# Patient Record
Sex: FEMALE | Race: WHITE | NOT HISPANIC OR LATINO | Employment: OTHER | URBAN - METROPOLITAN AREA
[De-identification: names, ages, dates, MRNs, and addresses within clinical notes are randomized per-mention and may not be internally consistent; named-entity substitution may affect disease eponyms.]

---

## 2017-01-04 ENCOUNTER — HOSPITAL ENCOUNTER (OUTPATIENT)
Dept: PHYSICAL THERAPY | Facility: HOSPITAL | Age: 82
Setting detail: RECURRING SERIES
Discharge: HOME | End: 2017-02-08
Attending: FAMILY MEDICINE | Admitting: FAMILY MEDICINE

## 2017-01-12 ENCOUNTER — HOSPITAL ENCOUNTER (OUTPATIENT)
Dept: PAIN MEDICINE | Facility: HOSPITAL | Age: 82
Discharge: HOME | End: 2017-01-12
Attending: ANESTHESIOLOGY | Admitting: ANESTHESIOLOGY

## 2017-02-02 ENCOUNTER — HOSPITAL ENCOUNTER (OUTPATIENT)
Dept: PAIN MEDICINE | Facility: HOSPITAL | Age: 82
Discharge: HOME | End: 2017-02-02
Attending: ANESTHESIOLOGY | Admitting: ANESTHESIOLOGY

## 2017-02-07 ENCOUNTER — HOSPITAL ENCOUNTER (OUTPATIENT)
Dept: PAIN MEDICINE | Facility: HOSPITAL | Age: 82
Discharge: HOME | End: 2017-02-07
Attending: ANESTHESIOLOGY | Admitting: ANESTHESIOLOGY

## 2017-02-16 ENCOUNTER — HOSPITAL ENCOUNTER (OUTPATIENT)
Dept: PAIN MEDICINE | Facility: HOSPITAL | Age: 82
Discharge: HOME | End: 2017-02-16
Attending: ANESTHESIOLOGY | Admitting: ANESTHESIOLOGY

## 2017-05-18 ENCOUNTER — HOSPITAL ENCOUNTER (OUTPATIENT)
Dept: PAIN MEDICINE | Facility: HOSPITAL | Age: 82
Discharge: HOME | End: 2017-05-18
Attending: ANESTHESIOLOGY | Admitting: ANESTHESIOLOGY

## 2017-08-23 ENCOUNTER — HOSPITAL ENCOUNTER (OUTPATIENT)
Dept: FAMILY MEDICINE CLINIC | Facility: CLINIC | Age: 82
Setting detail: SPECIMEN
Discharge: HOME | End: 2017-08-23
Attending: FAMILY MEDICINE | Admitting: FAMILY MEDICINE

## 2017-08-24 ENCOUNTER — HOSPITAL ENCOUNTER (OUTPATIENT)
Dept: LAB | Facility: HOSPITAL | Age: 82
Discharge: HOME | End: 2017-08-24
Attending: FAMILY MEDICINE | Admitting: FAMILY MEDICINE

## 2017-08-24 LAB
BASOPHILS # BLD AUTO: 0.1 10*3/UL (ref 0–0.2)
BASOPHILS NFR BLD AUTO: 2 % (ref 0–2)
DIFFERENTIAL METHOD BLD: (no result)
EOSINOPHIL # BLD AUTO: 0.3 10*3/UL (ref 0–0.3)
EOSINOPHIL # BLD AUTO: 4 % (ref 0–3)
ERYTHROCYTE [DISTWIDTH] IN BLOOD BY AUTOMATED COUNT: 13.9 % (ref 11.5–14.5)
HCT VFR BLD AUTO: 40.6 % (ref 35–49)
HGB BLD-MCNC: 13.7 G/DL (ref 12–15)
LYMPHOCYTES # BLD AUTO: 2.1 10*3/UL (ref 0.8–4.8)
LYMPHOCYTES NFR BLD AUTO: 29 % (ref 18–42)
MCH RBC QN AUTO: 31 PG (ref 26–32)
MCHC RBC AUTO-ENTMCNC: 33.8 G/DL (ref 32–36)
MCV RBC AUTO: 91.8 FL (ref 80–94)
MONOCYTES # BLD AUTO: 1.1 10*3/UL (ref 0.1–1.3)
MONOCYTES NFR BLD AUTO: 15 % (ref 2–11)
NEUTROPHILS # BLD AUTO: 3.7 10*3/UL (ref 2.3–8.6)
NEUTROPHILS NFR BLD AUTO: 50 % (ref 50–75)
NRBC BLD AUTO-RTO: 0 /100{WBCS}
NRBC/RBC NFR BLD MANUAL: 0 10*3/UL
PLATELET # BLD AUTO: 186 10*3/UL (ref 150–450)
PMV BLD AUTO: 8.6 FL (ref 7.4–10.4)
RBC # BLD AUTO: 4.43 10*6/UL (ref 4–5.4)
WBC # BLD AUTO: 7.3 10*3/UL (ref 4.5–11.5)

## 2018-02-19 ENCOUNTER — HOSPITAL ENCOUNTER (OUTPATIENT)
Dept: FAMILY MEDICINE CLINIC | Facility: CLINIC | Age: 83
Setting detail: SPECIMEN
Discharge: HOME | End: 2018-02-19
Attending: FAMILY MEDICINE | Admitting: FAMILY MEDICINE

## 2018-02-19 LAB
ALBUMIN SERPL-MCNC: 3.9 G/DL (ref 3.5–4.8)
ALBUMIN/GLOB SERPL: 1.5 {RATIO} (ref 1–1.7)
ALP SERPL-CCNC: 54 IU/L (ref 32–91)
ALT SERPL-CCNC: 20 IU/L (ref 14–54)
ANION GAP SERPL CALC-SCNC: 9.3 MMOL/L (ref 10–20)
AST SERPL-CCNC: 20 IU/L (ref 15–41)
BASOPHILS # BLD AUTO: 0.1 10*3/UL (ref 0–0.2)
BASOPHILS NFR BLD AUTO: 1 % (ref 0–2)
BILIRUB SERPL-MCNC: 0.8 MG/DL (ref 0.3–1.2)
BUN SERPL-MCNC: 10 MG/DL (ref 8–20)
BUN/CREAT SERPL: 12.5 (ref 5.4–26.2)
CALCIUM SERPL-MCNC: 9.1 MG/DL (ref 8.9–10.3)
CHLORIDE SERPL-SCNC: 100 MMOL/L (ref 101–111)
CONV CO2: 31 MMOL/L (ref 22–32)
CONV TOTAL PROTEIN: 6.5 G/DL (ref 6.1–7.9)
CREAT UR-MCNC: 0.8 MG/DL (ref 0.4–1)
DIFFERENTIAL METHOD BLD: (no result)
EOSINOPHIL # BLD AUTO: 0.5 10*3/UL (ref 0–0.3)
EOSINOPHIL # BLD AUTO: 5 % (ref 0–3)
ERYTHROCYTE [DISTWIDTH] IN BLOOD BY AUTOMATED COUNT: 13.6 % (ref 11.5–14.5)
GLOBULIN UR ELPH-MCNC: 2.6 G/DL (ref 2.5–3.8)
GLUCOSE SERPL-MCNC: 90 MG/DL (ref 65–99)
HCT VFR BLD AUTO: 39.2 % (ref 35–49)
HGB BLD-MCNC: 13.4 G/DL (ref 12–15)
LYMPHOCYTES # BLD AUTO: 2 10*3/UL (ref 0.8–4.8)
LYMPHOCYTES NFR BLD AUTO: 23 % (ref 18–42)
MCH RBC QN AUTO: 32.1 PG (ref 26–32)
MCHC RBC AUTO-ENTMCNC: 34.3 G/DL (ref 32–36)
MCV RBC AUTO: 93.6 FL (ref 80–94)
MONOCYTES # BLD AUTO: 1.2 10*3/UL (ref 0.1–1.3)
MONOCYTES NFR BLD AUTO: 14 % (ref 2–11)
NEUTROPHILS # BLD AUTO: 4.9 10*3/UL (ref 2.3–8.6)
NEUTROPHILS NFR BLD AUTO: 57 % (ref 50–75)
NRBC BLD AUTO-RTO: 0 /100{WBCS}
NRBC/RBC NFR BLD MANUAL: 0 10*3/UL
PLATELET # BLD AUTO: 206 10*3/UL (ref 150–450)
PMV BLD AUTO: 8.9 FL (ref 7.4–10.4)
POTASSIUM SERPL-SCNC: 4.3 MMOL/L (ref 3.6–5.1)
RBC # BLD AUTO: 4.19 10*6/UL (ref 4–5.4)
SODIUM SERPL-SCNC: 136 MMOL/L (ref 136–144)
WBC # BLD AUTO: 8.7 10*3/UL (ref 4.5–11.5)

## 2018-07-05 ENCOUNTER — HOSPITAL ENCOUNTER (OUTPATIENT)
Dept: URGENT CARE | Facility: CLINIC | Age: 83
Discharge: HOME | End: 2018-07-05
Attending: FAMILY MEDICINE | Admitting: FAMILY MEDICINE

## 2018-08-22 ENCOUNTER — HOSPITAL ENCOUNTER (OUTPATIENT)
Dept: FAMILY MEDICINE CLINIC | Facility: CLINIC | Age: 83
Setting detail: SPECIMEN
Discharge: HOME | End: 2018-08-22
Attending: FAMILY MEDICINE | Admitting: FAMILY MEDICINE

## 2018-08-22 LAB
ANION GAP SERPL CALC-SCNC: 8.9 MMOL/L (ref 10–20)
BUN SERPL-MCNC: 11 MG/DL (ref 8–20)
BUN/CREAT SERPL: 15.7 (ref 5.4–26.2)
CALCIUM SERPL-MCNC: 9.1 MG/DL (ref 8.9–10.3)
CHLORIDE SERPL-SCNC: 98 MMOL/L (ref 101–111)
CONV CO2: 30 MMOL/L (ref 22–32)
CREAT UR-MCNC: 0.7 MG/DL (ref 0.4–1)
GLUCOSE SERPL-MCNC: 93 MG/DL (ref 65–99)
POTASSIUM SERPL-SCNC: 3.9 MMOL/L (ref 3.6–5.1)
SODIUM SERPL-SCNC: 133 MMOL/L (ref 136–144)

## 2018-09-04 ENCOUNTER — HOSPITAL ENCOUNTER (OUTPATIENT)
Dept: PHYSICAL THERAPY | Facility: HOSPITAL | Age: 83
Setting detail: RECURRING SERIES
Discharge: HOME | End: 2018-09-28
Attending: NURSE PRACTITIONER | Admitting: NURSE PRACTITIONER

## 2019-02-18 ENCOUNTER — HOSPITAL ENCOUNTER (OUTPATIENT)
Dept: FAMILY MEDICINE CLINIC | Facility: CLINIC | Age: 84
Setting detail: SPECIMEN
Discharge: HOME | End: 2019-02-18
Attending: FAMILY MEDICINE | Admitting: FAMILY MEDICINE

## 2019-02-18 LAB
ANION GAP SERPL CALC-SCNC: 11.9 MMOL/L (ref 10–20)
BUN SERPL-MCNC: 12 MG/DL (ref 8–20)
BUN/CREAT SERPL: 15 (ref 5.4–26.2)
CALCIUM SERPL-MCNC: 9 MG/DL (ref 8.9–10.3)
CHLORIDE SERPL-SCNC: 100 MMOL/L (ref 101–111)
CONV CO2: 29 MMOL/L (ref 22–32)
CREAT UR-MCNC: 0.8 MG/DL (ref 0.4–1)
GLUCOSE SERPL-MCNC: 90 MG/DL (ref 65–99)
POTASSIUM SERPL-SCNC: 3.9 MMOL/L (ref 3.6–5.1)
SODIUM SERPL-SCNC: 137 MMOL/L (ref 136–144)

## 2019-04-30 ENCOUNTER — CONVERSION ENCOUNTER (OUTPATIENT)
Dept: CARDIOLOGY | Facility: CLINIC | Age: 84
End: 2019-04-30

## 2019-06-03 VITALS
DIASTOLIC BLOOD PRESSURE: 80 MMHG | WEIGHT: 139 LBS | SYSTOLIC BLOOD PRESSURE: 155 MMHG | BODY MASS INDEX: 22.43 KG/M2 | HEART RATE: 61 BPM

## 2019-06-04 ENCOUNTER — CONVERSION ENCOUNTER (OUTPATIENT)
Dept: CARDIOLOGY | Facility: CLINIC | Age: 84
End: 2019-06-04

## 2019-06-04 VITALS
SYSTOLIC BLOOD PRESSURE: 147 MMHG | DIASTOLIC BLOOD PRESSURE: 81 MMHG | HEART RATE: 68 BPM | BODY MASS INDEX: 22.43 KG/M2 | WEIGHT: 139 LBS

## 2019-06-07 NOTE — PROCEDURES
Vital Signs:    Patient Profile:    89 Years Old Female  Height:     66 inches (167.64 cm)  Weight:     139 pounds  (Measured Weight:  139 lbs.  oz.)  BMI:        22.52  Pulse rate: 68 / minute    BP sittin / 81  (left arm)    Vitals Entered By: Sandy Dean RN (2019 11:51 AM)    Allergies:   CODEINE (Critical)  PENICILLIN (PENICILLIN V POTASSIUM) (Critical)  * LATEX (Critical)        Anticoagulation Management History:       The patient is on coumadin and comes in today for a routine follow up visit.  The patient is on Coumadin for chronic atrial fibrillation.  Plans are to keep the patient on coumadin for life.  Her last INR was 2.3 and today's INR is 2.8.      Anticoagulation Management Assessment/Plan:       The patient's current anticoagulation dose is Warfarin sodium 4 mg tablet: TAKE 1 TABLET BY MOUTH ON MONDAY AND THURSDAY, AND 2MG ALL OTHER DAYS.  The target INR is 2.0-3.0.  Anticoagulation instructions were given to patient.  She was notified by cj.        Prior Anticoagulation Instructions:  Continue current dosage and recheck on 6/3/2019 at 11:00 am    Current Anticoagulation Instructions:  Continue current dosage and recheck on 7/10/2019 at 11:15 am        Electronically signed by Sandy Dean RN on 2019 at 12:02 PM  ________________________________________________________________________       Disclaimer: Converted Note message may not contain all data elements that existed in the legacy source system. Please see [employer] for the original note details.

## 2019-07-10 ENCOUNTER — ANTICOAGULATION VISIT (OUTPATIENT)
Dept: CARDIOLOGY | Facility: CLINIC | Age: 84
End: 2019-07-10

## 2019-07-10 VITALS
HEART RATE: 63 BPM | SYSTOLIC BLOOD PRESSURE: 149 MMHG | BODY MASS INDEX: 22.19 KG/M2 | DIASTOLIC BLOOD PRESSURE: 91 MMHG | WEIGHT: 137.5 LBS

## 2019-07-10 DIAGNOSIS — I48.20 CHRONIC ATRIAL FIBRILLATION (HCC): ICD-10-CM

## 2019-07-10 DIAGNOSIS — Z79.01 LONG TERM (CURRENT) USE OF ANTICOAGULANTS: ICD-10-CM

## 2019-07-10 PROBLEM — I48.91 ATRIAL FIBRILLATION: Status: ACTIVE | Noted: 2019-07-10

## 2019-07-10 LAB — INR PPP: 2.8 (ref 2–3)

## 2019-07-10 PROCEDURE — 36416 COLLJ CAPILLARY BLOOD SPEC: CPT | Performed by: INTERNAL MEDICINE

## 2019-07-10 PROCEDURE — 85610 PROTHROMBIN TIME: CPT | Performed by: INTERNAL MEDICINE

## 2019-07-19 ENCOUNTER — APPOINTMENT (OUTPATIENT)
Dept: GENERAL RADIOLOGY | Facility: HOSPITAL | Age: 84
End: 2019-07-19

## 2019-07-19 ENCOUNTER — HOSPITAL ENCOUNTER (INPATIENT)
Facility: HOSPITAL | Age: 84
LOS: 4 days | Discharge: NURSING, REHAB OR LONG-TERM CARE | End: 2019-07-23
Attending: EMERGENCY MEDICINE | Admitting: FAMILY MEDICINE

## 2019-07-19 DIAGNOSIS — S72.141A CLOSED 2-PART INTERTROCHANTERIC FRACTURE OF RIGHT FEMUR, INITIAL ENCOUNTER (HCC): Primary | ICD-10-CM

## 2019-07-19 DIAGNOSIS — R79.1 ELEVATED INR: ICD-10-CM

## 2019-07-19 DIAGNOSIS — S72.144A NONDISPLACED INTERTROCHANTERIC FRACTURE OF RIGHT FEMUR, INITIAL ENCOUNTER FOR CLOSED FRACTURE (HCC): ICD-10-CM

## 2019-07-19 DIAGNOSIS — S40.021A CONTUSION OF RIGHT UPPER ARM, INITIAL ENCOUNTER: ICD-10-CM
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
## 2019-07-19 LAB
ANION GAP SERPL CALCULATED.3IONS-SCNC: 15 MMOL/L (ref 5–15)
APTT PPP: 32.6 SECONDS (ref 24–31)
BASOPHILS # BLD MANUAL: 0.13 10*3/MM3 (ref 0–0.2)
BASOPHILS NFR BLD AUTO: 1 % (ref 0–1.5)
BUN BLD-MCNC: 11 MG/DL (ref 8–20)
BUN/CREAT SERPL: 18.3 (ref 5.4–26.2)
CALCIUM SPEC-SCNC: 8.7 MG/DL (ref 8.9–10.3)
CHLORIDE SERPL-SCNC: 102 MMOL/L (ref 101–111)
CO2 SERPL-SCNC: 22 MMOL/L (ref 22–32)
CREAT BLD-MCNC: 0.6 MG/DL (ref 0.4–1)
DEPRECATED RDW RBC AUTO: 46.8 FL (ref 37–54)
ELLIPTOCYTES BLD QL SMEAR: ABNORMAL
EOSINOPHIL # BLD MANUAL: 0.4 10*3/MM3 (ref 0–0.4)
EOSINOPHIL NFR BLD MANUAL: 3 % (ref 0.3–6.2)
ERYTHROCYTE [DISTWIDTH] IN BLOOD BY AUTOMATED COUNT: 13.9 % (ref 12.3–15.4)
GFR SERPL CREATININE-BSD FRML MDRD: 94 ML/MIN/1.73
GLUCOSE BLD-MCNC: 113 MG/DL (ref 65–99)
HCT VFR BLD AUTO: 42 % (ref 34–46.6)
HGB BLD-MCNC: 13.8 G/DL (ref 12–15.9)
INR PPP: 3.58 (ref 2–3)
LYMPHOCYTES # BLD MANUAL: 0.93 10*3/MM3 (ref 0.7–3.1)
LYMPHOCYTES NFR BLD MANUAL: 7 % (ref 19.6–45.3)
LYMPHOCYTES NFR BLD MANUAL: 8 % (ref 5–12)
MCH RBC QN AUTO: 31.7 PG (ref 26.6–33)
MCHC RBC AUTO-ENTMCNC: 32.9 G/DL (ref 31.5–35.7)
MCV RBC AUTO: 96.4 FL (ref 79–97)
METAMYELOCYTES NFR BLD MANUAL: 1 % (ref 0–0)
MONOCYTES # BLD AUTO: 1.06 10*3/MM3 (ref 0.1–0.9)
MYELOCYTES NFR BLD MANUAL: 1 % (ref 0–0)
NEUTROPHILS # BLD AUTO: 10.37 10*3/MM3 (ref 1.7–7)
NEUTROPHILS NFR BLD MANUAL: 73 % (ref 42.7–76)
NEUTS BAND NFR BLD MANUAL: 5 % (ref 0–5)
PLAT MORPH BLD: NORMAL
PLATELET # BLD AUTO: 188 10*3/MM3 (ref 140–450)
PMV BLD AUTO: 8.6 FL (ref 6–12)
POTASSIUM BLD-SCNC: 4 MMOL/L (ref 3.6–5.1)
PROTHROMBIN TIME: 34.7 SECONDS (ref 19.4–28.5)
RBC # BLD AUTO: 4.36 10*6/MM3 (ref 3.77–5.28)
SCAN SLIDE: NORMAL
SODIUM BLD-SCNC: 135 MMOL/L (ref 136–144)
VARIANT LYMPHS NFR BLD MANUAL: 1 % (ref 0–5)
WBC MORPH BLD: NORMAL
WBC NRBC COR # BLD: 13.3 10*3/MM3 (ref 3.4–10.8)

## 2019-07-19 PROCEDURE — 71045 X-RAY EXAM CHEST 1 VIEW: CPT

## 2019-07-19 PROCEDURE — 73060 X-RAY EXAM OF HUMERUS: CPT

## 2019-07-19 PROCEDURE — 80048 BASIC METABOLIC PNL TOTAL CA: CPT | Performed by: EMERGENCY MEDICINE

## 2019-07-19 PROCEDURE — 25010000002 MORPHINE PER 10 MG: Performed by: EMERGENCY MEDICINE

## 2019-07-19 PROCEDURE — 85007 BL SMEAR W/DIFF WBC COUNT: CPT | Performed by: EMERGENCY MEDICINE

## 2019-07-19 PROCEDURE — 99223 1ST HOSP IP/OBS HIGH 75: CPT | Performed by: PHYSICIAN ASSISTANT

## 2019-07-19 PROCEDURE — 85025 COMPLETE CBC W/AUTO DIFF WBC: CPT | Performed by: EMERGENCY MEDICINE

## 2019-07-19 PROCEDURE — 25010000002 VITAMIN K1 PER 1 MG: Performed by: ORTHOPAEDIC SURGERY

## 2019-07-19 PROCEDURE — 73502 X-RAY EXAM HIP UNI 2-3 VIEWS: CPT

## 2019-07-19 PROCEDURE — 85610 PROTHROMBIN TIME: CPT | Performed by: EMERGENCY MEDICINE

## 2019-07-19 PROCEDURE — 99284 EMERGENCY DEPT VISIT MOD MDM: CPT

## 2019-07-19 PROCEDURE — 25010000002 ONDANSETRON PER 1 MG: Performed by: EMERGENCY MEDICINE

## 2019-07-19 PROCEDURE — 85730 THROMBOPLASTIN TIME PARTIAL: CPT | Performed by: EMERGENCY MEDICINE

## 2019-07-19 RX ORDER — PHYTONADIONE 2 MG/ML
5 INJECTION, EMULSION INTRAMUSCULAR; INTRAVENOUS; SUBCUTANEOUS ONCE
Status: COMPLETED | OUTPATIENT
Start: 2019-07-19 | End: 2019-07-19

## 2019-07-19 RX ORDER — LATANOPROST 50 UG/ML
1 SOLUTION/ DROPS OPHTHALMIC NIGHTLY
COMMUNITY

## 2019-07-19 RX ORDER — MORPHINE SULFATE 4 MG/ML
2 INJECTION, SOLUTION INTRAMUSCULAR; INTRAVENOUS ONCE
Status: COMPLETED | OUTPATIENT
Start: 2019-07-19 | End: 2019-07-19

## 2019-07-19 RX ORDER — ONDANSETRON 2 MG/ML
4 INJECTION INTRAMUSCULAR; INTRAVENOUS ONCE
Status: COMPLETED | OUTPATIENT
Start: 2019-07-19 | End: 2019-07-19

## 2019-07-19 RX ORDER — WARFARIN SODIUM 4 MG/1
4 TABLET ORAL 2 TIMES WEEKLY
COMMUNITY
End: 2019-11-01 | Stop reason: DRUGHIGH

## 2019-07-19 RX ORDER — LANSOPRAZOLE 30 MG/1
30 CAPSULE, DELAYED RELEASE ORAL DAILY PRN
COMMUNITY
End: 2020-03-02

## 2019-07-19 RX ORDER — DIGOXIN 125 MCG
125 TABLET ORAL DAILY
COMMUNITY
Start: 2019-05-15 | End: 2020-04-07 | Stop reason: SDUPTHER

## 2019-07-19 RX ORDER — ASCORBIC ACID 500 MG
1 TABLET ORAL DAILY
COMMUNITY

## 2019-07-19 RX ORDER — OMEGA-3S/DHA/EPA/FISH OIL/D3 300MG-1000
1 CAPSULE ORAL DAILY
COMMUNITY

## 2019-07-19 RX ORDER — WARFARIN SODIUM 2 MG/1
2 TABLET ORAL
COMMUNITY
End: 2019-11-01 | Stop reason: SDUPTHER

## 2019-07-19 RX ORDER — BIOTIN 1 MG
1000 TABLET ORAL EVERY 24 HOURS
COMMUNITY
Start: 2018-02-01 | End: 2019-07-23 | Stop reason: HOSPADM

## 2019-07-19 RX ORDER — METOPROLOL SUCCINATE 25 MG/1
25 TABLET, EXTENDED RELEASE ORAL 2 TIMES DAILY
Refills: 1 | COMMUNITY
Start: 2019-05-29 | End: 2020-01-21

## 2019-07-19 RX ORDER — MAGNESIUM GLUCONATE 27 MG(500)
1 TABLET ORAL 2 TIMES DAILY
COMMUNITY

## 2019-07-19 RX ORDER — SODIUM CHLORIDE 0.9 % (FLUSH) 0.9 %
10 SYRINGE (ML) INJECTION AS NEEDED
Status: DISCONTINUED | OUTPATIENT
Start: 2019-07-19 | End: 2019-07-23 | Stop reason: HOSPADM

## 2019-07-19 RX ADMIN — PHYTONADIONE 5 MG: 10 INJECTION, EMULSION INTRAMUSCULAR; INTRAVENOUS; SUBCUTANEOUS at 20:56

## 2019-07-19 RX ADMIN — MORPHINE SULFATE 2 MG: 4 INJECTION INTRAVENOUS at 19:27

## 2019-07-19 RX ADMIN — ONDANSETRON 4 MG: 2 INJECTION INTRAMUSCULAR; INTRAVENOUS at 19:25

## 2019-07-19 RX ADMIN — MORPHINE SULFATE 2 MG: 4 INJECTION INTRAVENOUS at 21:03

## 2019-07-20 ENCOUNTER — APPOINTMENT (OUTPATIENT)
Dept: GENERAL RADIOLOGY | Facility: HOSPITAL | Age: 84
End: 2019-07-20

## 2019-07-20 ENCOUNTER — ANESTHESIA (OUTPATIENT)
Dept: PERIOP | Facility: HOSPITAL | Age: 84
End: 2019-07-20

## 2019-07-20 ENCOUNTER — ANESTHESIA EVENT (OUTPATIENT)
Dept: PERIOP | Facility: HOSPITAL | Age: 84
End: 2019-07-20

## 2019-07-20 PROBLEM — S72.141A CLOSED 2-PART INTERTROCHANTERIC FRACTURE OF PROXIMAL END OF RIGHT FEMUR: Status: ACTIVE | Noted: 2019-07-19

## 2019-07-20 PROBLEM — I63.9 CEREBROVASCULAR ACCIDENT (CVA): Status: ACTIVE | Noted: 2019-07-20

## 2019-07-20 PROBLEM — E78.5 HYPERLIPIDEMIA: Status: ACTIVE | Noted: 2019-07-20

## 2019-07-20 LAB
ABO GROUP BLD: NORMAL
ANION GAP SERPL CALCULATED.3IONS-SCNC: 12.5 MMOL/L (ref 5–15)
BACTERIA UR QL AUTO: ABNORMAL /HPF
BASOPHILS # BLD AUTO: 0 10*3/MM3 (ref 0–0.2)
BASOPHILS NFR BLD AUTO: 0 % (ref 0–1.5)
BILIRUB UR QL STRIP: NEGATIVE
BLD GP AB SCN SERPL QL: NEGATIVE
BUN BLD-MCNC: 11 MG/DL (ref 8–20)
BUN/CREAT SERPL: 18.3 (ref 5.4–26.2)
CALCIUM SPEC-SCNC: 8.5 MG/DL (ref 8.9–10.3)
CHLORIDE SERPL-SCNC: 102 MMOL/L (ref 101–111)
CLARITY UR: CLEAR
CO2 SERPL-SCNC: 25 MMOL/L (ref 22–32)
COLOR UR: YELLOW
CREAT BLD-MCNC: 0.6 MG/DL (ref 0.4–1)
DEPRECATED RDW RBC AUTO: 46.4 FL (ref 37–54)
EOSINOPHIL # BLD AUTO: 0.6 10*3/MM3 (ref 0–0.4)
EOSINOPHIL NFR BLD AUTO: 5 % (ref 0.3–6.2)
ERYTHROCYTE [DISTWIDTH] IN BLOOD BY AUTOMATED COUNT: 13.9 % (ref 12.3–15.4)
GFR SERPL CREATININE-BSD FRML MDRD: 94 ML/MIN/1.73
GLUCOSE BLD-MCNC: 131 MG/DL (ref 65–99)
GLUCOSE UR STRIP-MCNC: NEGATIVE MG/DL
HCT VFR BLD AUTO: 39 % (ref 34–46.6)
HGB BLD-MCNC: 13.1 G/DL (ref 12–15.9)
HGB UR QL STRIP.AUTO: ABNORMAL
HYALINE CASTS UR QL AUTO: ABNORMAL /LPF
INR PPP: 1.45 (ref 2–3)
INR PPP: 3.43 (ref 2–3)
INR PPP: 3.7 (ref 2–3)
KETONES UR QL STRIP: NEGATIVE
LEUKOCYTE ESTERASE UR QL STRIP.AUTO: ABNORMAL
LYMPHOCYTES # BLD AUTO: 1.3 10*3/MM3 (ref 0.7–3.1)
LYMPHOCYTES NFR BLD AUTO: 10.6 % (ref 19.6–45.3)
MCH RBC QN AUTO: 31.8 PG (ref 26.6–33)
MCHC RBC AUTO-ENTMCNC: 33.5 G/DL (ref 31.5–35.7)
MCV RBC AUTO: 94.8 FL (ref 79–97)
MONOCYTES # BLD AUTO: 2.4 10*3/MM3 (ref 0.1–0.9)
MONOCYTES NFR BLD AUTO: 20 % (ref 5–12)
NEUTROPHILS # BLD AUTO: 7.8 10*3/MM3 (ref 1.7–7)
NEUTROPHILS NFR BLD AUTO: 64.4 % (ref 42.7–76)
NITRITE UR QL STRIP: NEGATIVE
NRBC BLD AUTO-RTO: 0 /100 WBC (ref 0–0.2)
PH UR STRIP.AUTO: 6 [PH] (ref 5–8)
PLATELET # BLD AUTO: 176 10*3/MM3 (ref 140–450)
PMV BLD AUTO: 8.4 FL (ref 6–12)
POTASSIUM BLD-SCNC: 4.5 MMOL/L (ref 3.6–5.1)
PROT UR QL STRIP: NEGATIVE
PROTHROMBIN TIME: 14.3 SECONDS (ref 19.4–28.5)
PROTHROMBIN TIME: 33.2 SECONDS (ref 19.4–28.5)
PROTHROMBIN TIME: 35.9 SECONDS (ref 19.4–28.5)
RBC # BLD AUTO: 4.12 10*6/MM3 (ref 3.77–5.28)
RBC # UR: ABNORMAL /HPF
REF LAB TEST METHOD: ABNORMAL
RH BLD: POSITIVE
SODIUM BLD-SCNC: 135 MMOL/L (ref 136–144)
SP GR UR STRIP: 1.02 (ref 1–1.03)
SQUAMOUS #/AREA URNS HPF: ABNORMAL /HPF
T&S EXPIRATION DATE: NORMAL
UROBILINOGEN UR QL STRIP: ABNORMAL
WBC NRBC COR # BLD: 12.1 10*3/MM3 (ref 3.4–10.8)
WBC UR QL AUTO: ABNORMAL /HPF

## 2019-07-20 PROCEDURE — 87081 CULTURE SCREEN ONLY: CPT | Performed by: ORTHOPAEDIC SURGERY

## 2019-07-20 PROCEDURE — 80048 BASIC METABOLIC PNL TOTAL CA: CPT | Performed by: PHYSICIAN ASSISTANT

## 2019-07-20 PROCEDURE — 25010000002 PROPOFOL 10 MG/ML EMULSION: Performed by: ANESTHESIOLOGY

## 2019-07-20 PROCEDURE — 85610 PROTHROMBIN TIME: CPT | Performed by: INTERNAL MEDICINE

## 2019-07-20 PROCEDURE — 73502 X-RAY EXAM HIP UNI 2-3 VIEWS: CPT

## 2019-07-20 PROCEDURE — 85610 PROTHROMBIN TIME: CPT | Performed by: ORTHOPAEDIC SURGERY

## 2019-07-20 PROCEDURE — C1713 ANCHOR/SCREW BN/BN,TIS/BN: HCPCS | Performed by: ORTHOPAEDIC SURGERY

## 2019-07-20 PROCEDURE — 81001 URINALYSIS AUTO W/SCOPE: CPT | Performed by: ORTHOPAEDIC SURGERY

## 2019-07-20 PROCEDURE — 25010000002 VITAMIN K1 PER 1 MG: Performed by: ORTHOPAEDIC SURGERY

## 2019-07-20 PROCEDURE — 25010000002 ONDANSETRON PER 1 MG: Performed by: PHYSICIAN ASSISTANT

## 2019-07-20 PROCEDURE — 86900 BLOOD TYPING SEROLOGIC ABO: CPT

## 2019-07-20 PROCEDURE — 25010000002 PROTHROMBIN COMPLEX CONC HUMAN 1000 UNITS KIT: Performed by: ORTHOPAEDIC SURGERY

## 2019-07-20 PROCEDURE — 85610 PROTHROMBIN TIME: CPT | Performed by: PHYSICIAN ASSISTANT

## 2019-07-20 PROCEDURE — 86850 RBC ANTIBODY SCREEN: CPT | Performed by: PHYSICIAN ASSISTANT

## 2019-07-20 PROCEDURE — 85025 COMPLETE CBC W/AUTO DIFF WBC: CPT | Performed by: PHYSICIAN ASSISTANT

## 2019-07-20 PROCEDURE — 86901 BLOOD TYPING SEROLOGIC RH(D): CPT

## 2019-07-20 PROCEDURE — 86901 BLOOD TYPING SEROLOGIC RH(D): CPT | Performed by: PHYSICIAN ASSISTANT

## 2019-07-20 PROCEDURE — 99232 SBSQ HOSP IP/OBS MODERATE 35: CPT | Performed by: HOSPITALIST

## 2019-07-20 PROCEDURE — 25010000002 PROPOFOL 1000 MG/ML EMULSION: Performed by: ANESTHESIOLOGY

## 2019-07-20 PROCEDURE — C9132 KCENTRA, PER I.U.: HCPCS | Performed by: ORTHOPAEDIC SURGERY

## 2019-07-20 PROCEDURE — 76000 FLUOROSCOPY <1 HR PHYS/QHP: CPT

## 2019-07-20 PROCEDURE — 86900 BLOOD TYPING SEROLOGIC ABO: CPT | Performed by: PHYSICIAN ASSISTANT

## 2019-07-20 PROCEDURE — 25010000003 LIDOCAINE 1 % SOLUTION: Performed by: ORTHOPAEDIC SURGERY

## 2019-07-20 PROCEDURE — 93005 ELECTROCARDIOGRAM TRACING: CPT | Performed by: ORTHOPAEDIC SURGERY

## 2019-07-20 PROCEDURE — 25010000002 MORPHINE PER 10 MG: Performed by: NURSE PRACTITIONER

## 2019-07-20 PROCEDURE — 99221 1ST HOSP IP/OBS SF/LOW 40: CPT | Performed by: ORTHOPAEDIC SURGERY

## 2019-07-20 DEVICE — SCRW CORT FA FUL/THRD HEX3.5 TI 5X35MM: Type: IMPLANTABLE DEVICE | Site: HIP | Status: FUNCTIONAL

## 2019-07-20 DEVICE — ZNN CMN NAIL 10MMX21.5CM 125R
Type: IMPLANTABLE DEVICE | Site: HIP | Status: FUNCTIONAL
Brand: ZIMMER® NATURAL NAIL® SYSTEM

## 2019-07-20 DEVICE — ZNN CMN LAG SCREW 10.5X110
Type: IMPLANTABLE DEVICE | Site: HIP | Status: FUNCTIONAL
Brand: ZIMMER® NATURAL NAIL® SYSTEM

## 2019-07-20 RX ORDER — CALCIUM CARBONATE 200(500)MG
1 TABLET,CHEWABLE ORAL 2 TIMES DAILY PRN
Status: DISCONTINUED | OUTPATIENT
Start: 2019-07-20 | End: 2019-07-23 | Stop reason: HOSPADM

## 2019-07-20 RX ORDER — BISACODYL 10 MG
10 SUPPOSITORY, RECTAL RECTAL DAILY PRN
Status: DISCONTINUED | OUTPATIENT
Start: 2019-07-20 | End: 2019-07-20

## 2019-07-20 RX ORDER — CLINDAMYCIN PHOSPHATE 600 MG/50ML
600 INJECTION, SOLUTION INTRAVENOUS ONCE
Status: COMPLETED | OUTPATIENT
Start: 2019-07-20 | End: 2019-07-20

## 2019-07-20 RX ORDER — DOCUSATE SODIUM 100 MG/1
100 CAPSULE, LIQUID FILLED ORAL 2 TIMES DAILY
Status: DISCONTINUED | OUTPATIENT
Start: 2019-07-20 | End: 2019-07-23 | Stop reason: HOSPADM

## 2019-07-20 RX ORDER — PANTOPRAZOLE SODIUM 40 MG/1
40 TABLET, DELAYED RELEASE ORAL EVERY MORNING
Status: DISCONTINUED | OUTPATIENT
Start: 2019-07-20 | End: 2019-07-23 | Stop reason: HOSPADM

## 2019-07-20 RX ORDER — KETAMINE HYDROCHLORIDE 10 MG/ML
INJECTION INTRAMUSCULAR; INTRAVENOUS AS NEEDED
Status: DISCONTINUED | OUTPATIENT
Start: 2019-07-20 | End: 2019-07-20 | Stop reason: SURG

## 2019-07-20 RX ORDER — LIDOCAINE HYDROCHLORIDE 10 MG/ML
INJECTION, SOLUTION INFILTRATION; PERINEURAL AS NEEDED
Status: DISCONTINUED | OUTPATIENT
Start: 2019-07-20 | End: 2019-07-20 | Stop reason: HOSPADM

## 2019-07-20 RX ORDER — SODIUM CHLORIDE 0.9 % (FLUSH) 0.9 %
3-10 SYRINGE (ML) INJECTION AS NEEDED
Status: DISCONTINUED | OUTPATIENT
Start: 2019-07-20 | End: 2019-07-23 | Stop reason: HOSPADM

## 2019-07-20 RX ORDER — MORPHINE SULFATE 4 MG/ML
1 INJECTION, SOLUTION INTRAMUSCULAR; INTRAVENOUS EVERY 4 HOURS PRN
Status: DISCONTINUED | OUTPATIENT
Start: 2019-07-20 | End: 2019-07-23 | Stop reason: HOSPADM

## 2019-07-20 RX ORDER — GABAPENTIN 300 MG/1
300 CAPSULE ORAL ONCE
Status: COMPLETED | OUTPATIENT
Start: 2019-07-20 | End: 2019-07-20

## 2019-07-20 RX ORDER — ACETAMINOPHEN 325 MG/1
650 TABLET ORAL EVERY 4 HOURS PRN
Status: DISCONTINUED | OUTPATIENT
Start: 2019-07-20 | End: 2019-07-23 | Stop reason: HOSPADM

## 2019-07-20 RX ORDER — SODIUM CHLORIDE, SODIUM LACTATE, POTASSIUM CHLORIDE, CALCIUM CHLORIDE 600; 310; 30; 20 MG/100ML; MG/100ML; MG/100ML; MG/100ML
INJECTION, SOLUTION INTRAVENOUS CONTINUOUS PRN
Status: DISCONTINUED | OUTPATIENT
Start: 2019-07-20 | End: 2019-07-20 | Stop reason: SURG

## 2019-07-20 RX ORDER — ONDANSETRON 2 MG/ML
4 INJECTION INTRAMUSCULAR; INTRAVENOUS EVERY 6 HOURS PRN
Status: DISCONTINUED | OUTPATIENT
Start: 2019-07-20 | End: 2019-07-23 | Stop reason: HOSPADM

## 2019-07-20 RX ORDER — WARFARIN SODIUM 4 MG/1
4 TABLET ORAL
Status: COMPLETED | OUTPATIENT
Start: 2019-07-20 | End: 2019-07-20

## 2019-07-20 RX ORDER — CELECOXIB 200 MG/1
200 CAPSULE ORAL ONCE
Status: COMPLETED | OUTPATIENT
Start: 2019-07-20 | End: 2019-07-20

## 2019-07-20 RX ORDER — NALOXONE HCL 0.4 MG/ML
0.4 VIAL (ML) INJECTION
Status: DISCONTINUED | OUTPATIENT
Start: 2019-07-20 | End: 2019-07-23 | Stop reason: HOSPADM

## 2019-07-20 RX ORDER — ALUMINA, MAGNESIA, AND SIMETHICONE 2400; 2400; 240 MG/30ML; MG/30ML; MG/30ML
15 SUSPENSION ORAL EVERY 6 HOURS PRN
Status: DISCONTINUED | OUTPATIENT
Start: 2019-07-20 | End: 2019-07-23 | Stop reason: HOSPADM

## 2019-07-20 RX ORDER — CLINDAMYCIN PHOSPHATE 600 MG/50ML
600 INJECTION, SOLUTION INTRAVENOUS EVERY 8 HOURS
Status: COMPLETED | OUTPATIENT
Start: 2019-07-21 | End: 2019-07-21

## 2019-07-20 RX ORDER — DIGOXIN 125 MCG
125 TABLET ORAL
Status: DISCONTINUED | OUTPATIENT
Start: 2019-07-20 | End: 2019-07-22

## 2019-07-20 RX ORDER — FENTANYL CITRATE 50 UG/ML
50 INJECTION, SOLUTION INTRAMUSCULAR; INTRAVENOUS
Status: DISCONTINUED | OUTPATIENT
Start: 2019-07-20 | End: 2019-07-20 | Stop reason: HOSPADM

## 2019-07-20 RX ORDER — OXYCODONE HCL 10 MG/1
10 TABLET, FILM COATED, EXTENDED RELEASE ORAL ONCE
Status: COMPLETED | OUTPATIENT
Start: 2019-07-20 | End: 2019-07-20

## 2019-07-20 RX ORDER — SODIUM CHLORIDE 0.9 % (FLUSH) 0.9 %
3 SYRINGE (ML) INJECTION EVERY 12 HOURS SCHEDULED
Status: DISCONTINUED | OUTPATIENT
Start: 2019-07-20 | End: 2019-07-23 | Stop reason: HOSPADM

## 2019-07-20 RX ORDER — MORPHINE SULFATE 4 MG/ML
2 INJECTION, SOLUTION INTRAMUSCULAR; INTRAVENOUS EVERY 4 HOURS PRN
Status: DISCONTINUED | OUTPATIENT
Start: 2019-07-20 | End: 2019-07-23 | Stop reason: HOSPADM

## 2019-07-20 RX ORDER — PHYTONADIONE 2 MG/ML
2.5 INJECTION, EMULSION INTRAMUSCULAR; INTRAVENOUS; SUBCUTANEOUS ONCE
Status: COMPLETED | OUTPATIENT
Start: 2019-07-20 | End: 2019-07-20

## 2019-07-20 RX ORDER — PROPOFOL 10 MG/ML
VIAL (ML) INTRAVENOUS AS NEEDED
Status: DISCONTINUED | OUTPATIENT
Start: 2019-07-20 | End: 2019-07-20 | Stop reason: SURG

## 2019-07-20 RX ORDER — METOPROLOL SUCCINATE 25 MG/1
25 TABLET, EXTENDED RELEASE ORAL 2 TIMES DAILY
Status: DISCONTINUED | OUTPATIENT
Start: 2019-07-20 | End: 2019-07-23 | Stop reason: HOSPADM

## 2019-07-20 RX ORDER — ONDANSETRON 2 MG/ML
4 INJECTION INTRAMUSCULAR; INTRAVENOUS ONCE AS NEEDED
Status: DISCONTINUED | OUTPATIENT
Start: 2019-07-20 | End: 2019-07-20 | Stop reason: HOSPADM

## 2019-07-20 RX ORDER — ONDANSETRON 4 MG/1
4 TABLET, FILM COATED ORAL EVERY 6 HOURS PRN
Status: DISCONTINUED | OUTPATIENT
Start: 2019-07-20 | End: 2019-07-23 | Stop reason: HOSPADM

## 2019-07-20 RX ORDER — PHENYLEPHRINE HCL IN 0.9% NACL 0.5 MG/5ML
SYRINGE (ML) INTRAVENOUS AS NEEDED
Status: DISCONTINUED | OUTPATIENT
Start: 2019-07-20 | End: 2019-07-20 | Stop reason: SURG

## 2019-07-20 RX ORDER — ONDANSETRON 4 MG/1
4 TABLET, FILM COATED ORAL EVERY 6 HOURS PRN
Status: DISCONTINUED | OUTPATIENT
Start: 2019-07-20 | End: 2019-07-21

## 2019-07-20 RX ORDER — DOCUSATE SODIUM 100 MG/1
100 CAPSULE, LIQUID FILLED ORAL 2 TIMES DAILY PRN
Status: DISCONTINUED | OUTPATIENT
Start: 2019-07-20 | End: 2019-07-23 | Stop reason: HOSPADM

## 2019-07-20 RX ORDER — SODIUM CHLORIDE 0.9 % (FLUSH) 0.9 %
3 SYRINGE (ML) INJECTION EVERY 12 HOURS SCHEDULED
Status: DISCONTINUED | OUTPATIENT
Start: 2019-07-20 | End: 2019-07-21

## 2019-07-20 RX ORDER — NITROGLYCERIN 0.4 MG/1
0.4 TABLET SUBLINGUAL
Status: DISCONTINUED | OUTPATIENT
Start: 2019-07-20 | End: 2019-07-23 | Stop reason: HOSPADM

## 2019-07-20 RX ORDER — SODIUM CHLORIDE 0.9 % (FLUSH) 0.9 %
3-10 SYRINGE (ML) INJECTION AS NEEDED
Status: DISCONTINUED | OUTPATIENT
Start: 2019-07-20 | End: 2019-07-21

## 2019-07-20 RX ORDER — BISACODYL 5 MG/1
5 TABLET, DELAYED RELEASE ORAL DAILY PRN
Status: DISCONTINUED | OUTPATIENT
Start: 2019-07-20 | End: 2019-07-23 | Stop reason: HOSPADM

## 2019-07-20 RX ORDER — BISACODYL 10 MG
10 SUPPOSITORY, RECTAL RECTAL DAILY PRN
Status: DISCONTINUED | OUTPATIENT
Start: 2019-07-20 | End: 2019-07-23 | Stop reason: HOSPADM

## 2019-07-20 RX ORDER — SODIUM CHLORIDE 9 MG/ML
75 INJECTION, SOLUTION INTRAVENOUS CONTINUOUS
Status: DISCONTINUED | OUTPATIENT
Start: 2019-07-20 | End: 2019-07-23 | Stop reason: HOSPADM

## 2019-07-20 RX ORDER — ACETAMINOPHEN 500 MG
1000 TABLET ORAL ONCE
Status: COMPLETED | OUTPATIENT
Start: 2019-07-20 | End: 2019-07-20

## 2019-07-20 RX ORDER — BUPIVACAINE HYDROCHLORIDE AND EPINEPHRINE 2.5; 5 MG/ML; UG/ML
INJECTION, SOLUTION EPIDURAL; INFILTRATION; INTRACAUDAL; PERINEURAL AS NEEDED
Status: DISCONTINUED | OUTPATIENT
Start: 2019-07-20 | End: 2019-07-20 | Stop reason: HOSPADM

## 2019-07-20 RX ORDER — ROCURONIUM BROMIDE 10 MG/ML
INJECTION, SOLUTION INTRAVENOUS AS NEEDED
Status: DISCONTINUED | OUTPATIENT
Start: 2019-07-20 | End: 2019-07-20 | Stop reason: SURG

## 2019-07-20 RX ORDER — ACETAMINOPHEN 650 MG/1
650 SUPPOSITORY RECTAL EVERY 4 HOURS PRN
Status: DISCONTINUED | OUTPATIENT
Start: 2019-07-20 | End: 2019-07-23 | Stop reason: HOSPADM

## 2019-07-20 RX ORDER — ONDANSETRON 2 MG/ML
4 INJECTION INTRAMUSCULAR; INTRAVENOUS EVERY 6 HOURS PRN
Status: DISCONTINUED | OUTPATIENT
Start: 2019-07-20 | End: 2019-07-21

## 2019-07-20 RX ORDER — OXYCODONE HYDROCHLORIDE 5 MG/1
5 TABLET ORAL EVERY 4 HOURS PRN
Status: DISCONTINUED | OUTPATIENT
Start: 2019-07-20 | End: 2019-07-23 | Stop reason: HOSPADM

## 2019-07-20 RX ORDER — LATANOPROST 50 UG/ML
1 SOLUTION/ DROPS OPHTHALMIC NIGHTLY
Status: DISCONTINUED | OUTPATIENT
Start: 2019-07-20 | End: 2019-07-23 | Stop reason: HOSPADM

## 2019-07-20 RX ADMIN — Medication 3 ML: at 00:39

## 2019-07-20 RX ADMIN — DOCUSATE SODIUM 100 MG: 100 CAPSULE, LIQUID FILLED ORAL at 22:47

## 2019-07-20 RX ADMIN — PHYTONADIONE 2.5 MG: 10 INJECTION, EMULSION INTRAMUSCULAR; INTRAVENOUS; SUBCUTANEOUS at 11:37

## 2019-07-20 RX ADMIN — OXYCODONE HYDROCHLORIDE 10 MG: 10 TABLET, FILM COATED, EXTENDED RELEASE ORAL at 17:11

## 2019-07-20 RX ADMIN — CLINDAMYCIN PHOSPHATE 600 MG: 600 INJECTION, SOLUTION INTRAVENOUS at 17:10

## 2019-07-20 RX ADMIN — SODIUM CHLORIDE, SODIUM LACTATE, POTASSIUM CHLORIDE, AND CALCIUM CHLORIDE: .6; .31; .03; .02 INJECTION, SOLUTION INTRAVENOUS at 17:51

## 2019-07-20 RX ADMIN — Medication 1668 UNITS: at 10:36

## 2019-07-20 RX ADMIN — METOPROLOL SUCCINATE 25 MG: 25 TABLET, EXTENDED RELEASE ORAL at 01:05

## 2019-07-20 RX ADMIN — GABAPENTIN 300 MG: 300 CAPSULE ORAL at 17:11

## 2019-07-20 RX ADMIN — ACETAMINOPHEN 1000 MG: 500 TABLET, FILM COATED ORAL at 17:11

## 2019-07-20 RX ADMIN — CELECOXIB 200 MG: 200 CAPSULE ORAL at 17:11

## 2019-07-20 RX ADMIN — MORPHINE SULFATE 2 MG: 4 INJECTION INTRAVENOUS at 00:38

## 2019-07-20 RX ADMIN — PROPOFOL 50 MCG/KG/MIN: 10 INJECTION, EMULSION INTRAVENOUS at 17:55

## 2019-07-20 RX ADMIN — PHENYLEPHRINE HYDROCHLORIDE 100 MCG: 10 INJECTION INTRAVENOUS at 18:12

## 2019-07-20 RX ADMIN — MORPHINE SULFATE 2 MG: 4 INJECTION INTRAVENOUS at 13:57

## 2019-07-20 RX ADMIN — ONDANSETRON 4 MG: 2 INJECTION INTRAMUSCULAR; INTRAVENOUS at 13:57

## 2019-07-20 RX ADMIN — WARFARIN SODIUM 4 MG: 4 TABLET ORAL at 22:47

## 2019-07-20 RX ADMIN — MORPHINE SULFATE 2 MG: 4 INJECTION INTRAVENOUS at 04:30

## 2019-07-20 RX ADMIN — LATANOPROST 1 DROP: 50 SOLUTION/ DROPS OPHTHALMIC at 01:05

## 2019-07-20 RX ADMIN — ROCURONIUM BROMIDE 40 MG: 10 INJECTION, SOLUTION INTRAVENOUS at 17:55

## 2019-07-20 RX ADMIN — PROPOFOL 100 MG: 10 INJECTION, EMULSION INTRAVENOUS at 17:55

## 2019-07-20 RX ADMIN — KETAMINE HYDROCHLORIDE 15 MG: 10 INJECTION INTRAMUSCULAR; INTRAVENOUS at 18:04

## 2019-07-20 NOTE — ANESTHESIA POSTPROCEDURE EVALUATION
Patient: Shruthi Amin    Procedure Summary     Date:  07/20/19 Room / Location:  [facility] OR 08 / [facility] MAIN OR    Anesthesia Start:  1751 Anesthesia Stop:  1856    Procedure:  HIP TROCHANTERIC NAILING SHORT WITH INTRAMEDULLARY HIP SCREW (Right Hip) Diagnosis:       Closed 2-part intertrochanteric fracture of right femur, initial encounter (CMS/[facility])      (Closed 2-part intertrochanteric fracture of right femur, initial encounter (CMS/[facility]) [S72.141A])    Surgeon:  Edward Centeno MD Provider:  Neil Mckay MD    Anesthesia Type:  general ASA Status:  3          Anesthesia Type: general  Last vitals  BP   156/83 (07/20/19 1641)   Temp   98.7 °F (37.1 °C) (07/20/19 1641)   Pulse   72 (07/20/19 1641)   Resp   16 (07/20/19 1641)     SpO2   98 % (07/20/19 1641)     Post Anesthesia Care and Evaluation    Patient location during evaluation: PACU  Patient participation: complete - patient participated  Level of consciousness: awake  Pain scale: See nurse's notes for pain score.  Pain management: adequate  Airway patency: patent  Anesthetic complications: No anesthetic complications  PONV Status: none  Cardiovascular status: acceptable  Respiratory status: acceptable  Hydration status: acceptable    Comments: Patient seen and examined postoperatively; vital signs stable; SpO2 greater than or equal to 90%; cardiopulmonary status stable; nausea/vomiting adequately controlled; pain adequately controlled; no apparent anesthesia complications; patient discharged from anesthesia care when discharge criteria were met

## 2019-07-20 NOTE — ANESTHESIA PROCEDURE NOTES
Airway  Urgency: elective    Airway not difficult    General Information and Staff    Patient location during procedure: OR    Indications and Patient Condition  Indications for airway management: airway protection    Preoxygenated: yes  Mask difficulty assessment: 1 - vent by mask    Final Airway Details  Final airway type: endotracheal airway      Successful airway: ETT  Cuffed: yes   Successful intubation technique: direct laryngoscopy  Blade: Ike  Blade size: 3  ETT size (mm): 7.0  Cormack-Lehane Classification: grade I - full view of glottis  Placement verified by: capnometry   Cuff volume (mL): 7  Measured from: lips  ETT to lips (cm): 20  Number of attempts at approach: 1

## 2019-07-20 NOTE — ANESTHESIA PREPROCEDURE EVALUATION
Anesthesia Evaluation     Patient summary reviewed   NPO Solid Status: > 8 hours  NPO Liquid Status: > 2 hours           Airway   Dental      Pulmonary    Cardiovascular     (+) hypertension, CAD, dysrhythmias Atrial Fib, hyperlipidemia,       Neuro/Psych  (+) CVA, numbness,     GI/Hepatic/Renal/Endo    (+)  hiatal hernia, GERD,      Musculoskeletal     (+) back pain,   Abdominal    Substance History      OB/GYN          Other   (+) arthritis                     Anesthesia Plan    ASA 3     general   total IV anesthesia(ERAS hip protocol.  NO block due to high INR)  intravenous induction   Anesthetic plan, all risks, benefits, and alternatives have been provided, discussed and informed consent has been obtained with: patient.

## 2019-07-21 LAB
ANION GAP SERPL CALCULATED.3IONS-SCNC: 12.3 MMOL/L (ref 5–15)
BASOPHILS # BLD AUTO: 0.1 10*3/MM3 (ref 0–0.2)
BASOPHILS NFR BLD AUTO: 0.7 % (ref 0–1.5)
BUN BLD-MCNC: 10 MG/DL (ref 8–20)
BUN/CREAT SERPL: 16.7 (ref 5.4–26.2)
CALCIUM SPEC-SCNC: 7.8 MG/DL (ref 8.9–10.3)
CHLORIDE SERPL-SCNC: 98 MMOL/L (ref 101–111)
CO2 SERPL-SCNC: 25 MMOL/L (ref 22–32)
CREAT BLD-MCNC: 0.6 MG/DL (ref 0.4–1)
DEPRECATED RDW RBC AUTO: 46.4 FL (ref 37–54)
EOSINOPHIL # BLD AUTO: 0.4 10*3/MM3 (ref 0–0.4)
EOSINOPHIL NFR BLD AUTO: 3.5 % (ref 0.3–6.2)
ERYTHROCYTE [DISTWIDTH] IN BLOOD BY AUTOMATED COUNT: 13.8 % (ref 12.3–15.4)
GFR SERPL CREATININE-BSD FRML MDRD: 94 ML/MIN/1.73
GLUCOSE BLD-MCNC: 125 MG/DL (ref 65–99)
GLUCOSE BLDC GLUCOMTR-MCNC: 149 MG/DL (ref 70–105)
HCT VFR BLD AUTO: 33 % (ref 34–46.6)
HGB BLD-MCNC: 10.9 G/DL (ref 12–15.9)
INR PPP: 1.64 (ref 2–3)
LYMPHOCYTES # BLD AUTO: 1.5 10*3/MM3 (ref 0.7–3.1)
LYMPHOCYTES NFR BLD AUTO: 11.4 % (ref 19.6–45.3)
MCH RBC QN AUTO: 31.7 PG (ref 26.6–33)
MCHC RBC AUTO-ENTMCNC: 33.2 G/DL (ref 31.5–35.7)
MCV RBC AUTO: 95.6 FL (ref 79–97)
MONOCYTES # BLD AUTO: 1.8 10*3/MM3 (ref 0.1–0.9)
MONOCYTES NFR BLD AUTO: 14.2 % (ref 5–12)
MRSA SPEC QL CULT: NORMAL
NEUTROPHILS # BLD AUTO: 8.9 10*3/MM3 (ref 1.7–7)
NEUTROPHILS NFR BLD AUTO: 70.2 % (ref 42.7–76)
NRBC BLD AUTO-RTO: 0.1 /100 WBC (ref 0–0.2)
PLATELET # BLD AUTO: 147 10*3/MM3 (ref 140–450)
PMV BLD AUTO: 8.7 FL (ref 6–12)
POTASSIUM BLD-SCNC: 4.3 MMOL/L (ref 3.6–5.1)
PROTHROMBIN TIME: 16 SECONDS (ref 19.4–28.5)
RBC # BLD AUTO: 3.45 10*6/MM3 (ref 3.77–5.28)
SODIUM BLD-SCNC: 131 MMOL/L (ref 136–144)
WBC NRBC COR # BLD: 12.7 10*3/MM3 (ref 3.4–10.8)

## 2019-07-21 PROCEDURE — 99232 SBSQ HOSP IP/OBS MODERATE 35: CPT | Performed by: HOSPITALIST

## 2019-07-21 PROCEDURE — 97530 THERAPEUTIC ACTIVITIES: CPT

## 2019-07-21 PROCEDURE — 27245 TREAT THIGH FRACTURE: CPT | Performed by: ORTHOPAEDIC SURGERY

## 2019-07-21 PROCEDURE — 85025 COMPLETE CBC W/AUTO DIFF WBC: CPT | Performed by: PHYSICIAN ASSISTANT

## 2019-07-21 PROCEDURE — 85610 PROTHROMBIN TIME: CPT | Performed by: ORTHOPAEDIC SURGERY

## 2019-07-21 PROCEDURE — 80048 BASIC METABOLIC PNL TOTAL CA: CPT | Performed by: PHYSICIAN ASSISTANT

## 2019-07-21 PROCEDURE — 0QH606Z INSERTION OF INTRAMEDULLARY INTERNAL FIXATION DEVICE INTO RIGHT UPPER FEMUR, OPEN APPROACH: ICD-10-PCS | Performed by: ORTHOPAEDIC SURGERY

## 2019-07-21 PROCEDURE — 0QS6XZZ REPOSITION RIGHT UPPER FEMUR, EXTERNAL APPROACH: ICD-10-PCS | Performed by: ORTHOPAEDIC SURGERY

## 2019-07-21 PROCEDURE — 82962 GLUCOSE BLOOD TEST: CPT

## 2019-07-21 PROCEDURE — 97162 PT EVAL MOD COMPLEX 30 MIN: CPT

## 2019-07-21 RX ORDER — WARFARIN SODIUM 4 MG/1
4 TABLET ORAL
Status: COMPLETED | OUTPATIENT
Start: 2019-07-21 | End: 2019-07-21

## 2019-07-21 RX ORDER — HYDROCODONE BITARTRATE AND ACETAMINOPHEN 5; 325 MG/1; MG/1
1 TABLET ORAL EVERY 6 HOURS PRN
Qty: 24 TABLET | Refills: 0 | Status: SHIPPED | OUTPATIENT
Start: 2019-07-21 | End: 2019-09-17

## 2019-07-21 RX ORDER — ONDANSETRON 2 MG/ML
4 INJECTION INTRAMUSCULAR; INTRAVENOUS EVERY 6 HOURS PRN
Status: DISCONTINUED | OUTPATIENT
Start: 2019-07-21 | End: 2019-07-23 | Stop reason: HOSPADM

## 2019-07-21 RX ADMIN — WARFARIN SODIUM 4 MG: 4 TABLET ORAL at 17:18

## 2019-07-21 RX ADMIN — OXYCODONE HYDROCHLORIDE 5 MG: 5 TABLET ORAL at 08:51

## 2019-07-21 RX ADMIN — METOPROLOL SUCCINATE 25 MG: 25 TABLET, EXTENDED RELEASE ORAL at 22:13

## 2019-07-21 RX ADMIN — SODIUM CHLORIDE 75 ML/HR: 900 INJECTION, SOLUTION INTRAVENOUS at 04:24

## 2019-07-21 RX ADMIN — DOCUSATE SODIUM 100 MG: 100 CAPSULE, LIQUID FILLED ORAL at 08:51

## 2019-07-21 RX ADMIN — CLINDAMYCIN PHOSPHATE 600 MG: 600 INJECTION, SOLUTION INTRAVENOUS at 02:01

## 2019-07-21 RX ADMIN — CLINDAMYCIN PHOSPHATE 600 MG: 600 INJECTION, SOLUTION INTRAVENOUS at 08:50

## 2019-07-21 RX ADMIN — ACETAMINOPHEN 650 MG: 325 TABLET, FILM COATED ORAL at 12:10

## 2019-07-21 RX ADMIN — DIGOXIN 125 MCG: 125 TABLET ORAL at 12:08

## 2019-07-21 RX ADMIN — DOCUSATE SODIUM 100 MG: 100 CAPSULE, LIQUID FILLED ORAL at 22:14

## 2019-07-21 RX ADMIN — OXYCODONE HYDROCHLORIDE 5 MG: 5 TABLET ORAL at 04:24

## 2019-07-21 RX ADMIN — OXYCODONE HYDROCHLORIDE 5 MG: 5 TABLET ORAL at 15:19

## 2019-07-22 LAB
ANION GAP SERPL CALCULATED.3IONS-SCNC: 11.5 MMOL/L (ref 5–15)
BASOPHILS # BLD AUTO: 0.1 10*3/MM3 (ref 0–0.2)
BASOPHILS NFR BLD AUTO: 0.7 % (ref 0–1.5)
BUN BLD-MCNC: 13 MG/DL (ref 8–20)
BUN/CREAT SERPL: 18.6 (ref 5.4–26.2)
CALCIUM SPEC-SCNC: 7.8 MG/DL (ref 8.9–10.3)
CHLORIDE SERPL-SCNC: 97 MMOL/L (ref 101–111)
CO2 SERPL-SCNC: 24 MMOL/L (ref 22–32)
CREAT BLD-MCNC: 0.7 MG/DL (ref 0.4–1)
DEPRECATED RDW RBC AUTO: 45.5 FL (ref 37–54)
EOSINOPHIL # BLD AUTO: 0.3 10*3/MM3 (ref 0–0.4)
EOSINOPHIL NFR BLD AUTO: 2.5 % (ref 0.3–6.2)
ERYTHROCYTE [DISTWIDTH] IN BLOOD BY AUTOMATED COUNT: 13.7 % (ref 12.3–15.4)
GFR SERPL CREATININE-BSD FRML MDRD: 78 ML/MIN/1.73
GLUCOSE BLD-MCNC: 125 MG/DL (ref 65–99)
HCT VFR BLD AUTO: 30.7 % (ref 34–46.6)
HGB BLD-MCNC: 10.1 G/DL (ref 12–15.9)
INR PPP: 2.71 (ref 2–3)
LYMPHOCYTES # BLD AUTO: 1.3 10*3/MM3 (ref 0.7–3.1)
LYMPHOCYTES NFR BLD AUTO: 10.7 % (ref 19.6–45.3)
MCH RBC QN AUTO: 31.2 PG (ref 26.6–33)
MCHC RBC AUTO-ENTMCNC: 33 G/DL (ref 31.5–35.7)
MCV RBC AUTO: 94.8 FL (ref 79–97)
MONOCYTES # BLD AUTO: 1.9 10*3/MM3 (ref 0.1–0.9)
MONOCYTES NFR BLD AUTO: 15.5 % (ref 5–12)
NEUTROPHILS # BLD AUTO: 8.7 10*3/MM3 (ref 1.7–7)
NEUTROPHILS NFR BLD AUTO: 70.6 % (ref 42.7–76)
NRBC BLD AUTO-RTO: 0 /100 WBC (ref 0–0.2)
PLATELET # BLD AUTO: 152 10*3/MM3 (ref 140–450)
PMV BLD AUTO: 9.5 FL (ref 6–12)
POTASSIUM BLD-SCNC: 4.5 MMOL/L (ref 3.6–5.1)
PROTHROMBIN TIME: 26.2 SECONDS (ref 19.4–28.5)
RBC # BLD AUTO: 3.24 10*6/MM3 (ref 3.77–5.28)
SODIUM BLD-SCNC: 128 MMOL/L (ref 136–144)
WBC NRBC COR # BLD: 12.3 10*3/MM3 (ref 3.4–10.8)

## 2019-07-22 PROCEDURE — 80048 BASIC METABOLIC PNL TOTAL CA: CPT | Performed by: PHYSICIAN ASSISTANT

## 2019-07-22 PROCEDURE — 99232 SBSQ HOSP IP/OBS MODERATE 35: CPT | Performed by: FAMILY MEDICINE

## 2019-07-22 PROCEDURE — 85025 COMPLETE CBC W/AUTO DIFF WBC: CPT | Performed by: PHYSICIAN ASSISTANT

## 2019-07-22 PROCEDURE — 85610 PROTHROMBIN TIME: CPT | Performed by: PHYSICIAN ASSISTANT

## 2019-07-22 PROCEDURE — 25010000002 ONDANSETRON PER 1 MG: Performed by: NURSE PRACTITIONER

## 2019-07-22 PROCEDURE — 97116 GAIT TRAINING THERAPY: CPT

## 2019-07-22 PROCEDURE — 97530 THERAPEUTIC ACTIVITIES: CPT

## 2019-07-22 RX ORDER — DIGOXIN 125 MCG
125 TABLET ORAL
Status: DISCONTINUED | OUTPATIENT
Start: 2019-07-22 | End: 2019-07-23 | Stop reason: HOSPADM

## 2019-07-22 RX ADMIN — LATANOPROST 1 DROP: 50 SOLUTION/ DROPS OPHTHALMIC at 23:11

## 2019-07-22 RX ADMIN — DOCUSATE SODIUM 100 MG: 100 CAPSULE, LIQUID FILLED ORAL at 08:38

## 2019-07-22 RX ADMIN — BISACODYL 10 MG: 10 SUPPOSITORY RECTAL at 17:16

## 2019-07-22 RX ADMIN — SODIUM CHLORIDE, PRESERVATIVE FREE 3 ML: 5 INJECTION INTRAVENOUS at 07:39

## 2019-07-22 RX ADMIN — DOCUSATE SODIUM 100 MG: 100 CAPSULE, LIQUID FILLED ORAL at 23:01

## 2019-07-22 RX ADMIN — DIGOXIN 125 MCG: 125 TABLET ORAL at 11:01

## 2019-07-22 RX ADMIN — ONDANSETRON 4 MG: 2 INJECTION INTRAMUSCULAR; INTRAVENOUS at 07:38

## 2019-07-22 RX ADMIN — PANTOPRAZOLE SODIUM 40 MG: 40 TABLET, DELAYED RELEASE ORAL at 11:01

## 2019-07-22 RX ADMIN — OXYCODONE HYDROCHLORIDE 5 MG: 5 TABLET ORAL at 11:57

## 2019-07-22 RX ADMIN — METOPROLOL SUCCINATE 25 MG: 25 TABLET, EXTENDED RELEASE ORAL at 08:38

## 2019-07-22 RX ADMIN — Medication 10 ML: at 14:07

## 2019-07-22 RX ADMIN — BISACODYL 5 MG: 5 TABLET, COATED ORAL at 08:38

## 2019-07-22 RX ADMIN — METOPROLOL SUCCINATE 25 MG: 25 TABLET, EXTENDED RELEASE ORAL at 23:00

## 2019-07-22 RX ADMIN — Medication 3 ML: at 23:42

## 2019-07-22 RX ADMIN — OXYCODONE HYDROCHLORIDE 5 MG: 5 TABLET ORAL at 19:43

## 2019-07-23 VITALS
OXYGEN SATURATION: 98 % | RESPIRATION RATE: 16 BRPM | HEART RATE: 77 BPM | WEIGHT: 135 LBS | SYSTOLIC BLOOD PRESSURE: 96 MMHG | DIASTOLIC BLOOD PRESSURE: 60 MMHG | BODY MASS INDEX: 21.69 KG/M2 | TEMPERATURE: 98.5 F | HEIGHT: 66 IN

## 2019-07-23 LAB
ANION GAP SERPL CALCULATED.3IONS-SCNC: 10.4 MMOL/L (ref 5–15)
BUN BLD-MCNC: 10 MG/DL (ref 8–20)
BUN/CREAT SERPL: 20 (ref 5.4–26.2)
CALCIUM SPEC-SCNC: 7.9 MG/DL (ref 8.9–10.3)
CHLORIDE SERPL-SCNC: 97 MMOL/L (ref 101–111)
CO2 SERPL-SCNC: 27 MMOL/L (ref 22–32)
CREAT BLD-MCNC: 0.5 MG/DL (ref 0.4–1)
DEPRECATED RDW RBC AUTO: 44.6 FL (ref 37–54)
EOSINOPHIL # BLD MANUAL: 0.22 10*3/MM3 (ref 0–0.4)
EOSINOPHIL NFR BLD MANUAL: 2 % (ref 0.3–6.2)
ERYTHROCYTE [DISTWIDTH] IN BLOOD BY AUTOMATED COUNT: 13.6 % (ref 12.3–15.4)
GFR SERPL CREATININE-BSD FRML MDRD: 116 ML/MIN/1.73
GLUCOSE BLD-MCNC: 109 MG/DL (ref 65–99)
HCT VFR BLD AUTO: 29.4 % (ref 34–46.6)
HGB BLD-MCNC: 10.2 G/DL (ref 12–15.9)
INR PPP: 3.12 (ref 2–3)
LYMPHOCYTES # BLD MANUAL: 0.97 10*3/MM3 (ref 0.7–3.1)
LYMPHOCYTES NFR BLD MANUAL: 18 % (ref 5–12)
LYMPHOCYTES NFR BLD MANUAL: 9 % (ref 19.6–45.3)
MCH RBC QN AUTO: 32.6 PG (ref 26.6–33)
MCHC RBC AUTO-ENTMCNC: 34.9 G/DL (ref 31.5–35.7)
MCV RBC AUTO: 93.4 FL (ref 79–97)
METAMYELOCYTES NFR BLD MANUAL: 1 % (ref 0–0)
MONOCYTES # BLD AUTO: 1.94 10*3/MM3 (ref 0.1–0.9)
NEUTROPHILS # BLD AUTO: 7.56 10*3/MM3 (ref 1.7–7)
NEUTROPHILS NFR BLD MANUAL: 64 % (ref 42.7–76)
NEUTS BAND NFR BLD MANUAL: 6 % (ref 0–5)
PLAT MORPH BLD: NORMAL
PLATELET # BLD AUTO: 157 10*3/MM3 (ref 140–450)
PMV BLD AUTO: 9.5 FL (ref 6–12)
POTASSIUM BLD-SCNC: 4.4 MMOL/L (ref 3.6–5.1)
PROTHROMBIN TIME: 30.2 SECONDS (ref 19.4–28.5)
RBC # BLD AUTO: 3.15 10*6/MM3 (ref 3.77–5.28)
RBC MORPH BLD: NORMAL
SCAN SLIDE: NORMAL
SODIUM BLD-SCNC: 130 MMOL/L (ref 136–144)
WBC MORPH BLD: NORMAL
WBC NRBC COR # BLD: 10.8 10*3/MM3 (ref 3.4–10.8)

## 2019-07-23 PROCEDURE — 85007 BL SMEAR W/DIFF WBC COUNT: CPT | Performed by: PHYSICIAN ASSISTANT

## 2019-07-23 PROCEDURE — 99238 HOSP IP/OBS DSCHRG MGMT 30/<: CPT | Performed by: FAMILY MEDICINE

## 2019-07-23 PROCEDURE — 97530 THERAPEUTIC ACTIVITIES: CPT

## 2019-07-23 PROCEDURE — 85610 PROTHROMBIN TIME: CPT | Performed by: PHYSICIAN ASSISTANT

## 2019-07-23 PROCEDURE — 80048 BASIC METABOLIC PNL TOTAL CA: CPT | Performed by: PHYSICIAN ASSISTANT

## 2019-07-23 PROCEDURE — 97116 GAIT TRAINING THERAPY: CPT

## 2019-07-23 PROCEDURE — 85025 COMPLETE CBC W/AUTO DIFF WBC: CPT | Performed by: PHYSICIAN ASSISTANT

## 2019-07-23 RX ORDER — OXYCODONE HYDROCHLORIDE 5 MG/1
5 TABLET ORAL EVERY 4 HOURS PRN
Qty: 28 TABLET | Refills: 0 | Status: SHIPPED | OUTPATIENT
Start: 2019-07-23 | End: 2019-07-30

## 2019-07-23 RX ADMIN — DOCUSATE SODIUM 100 MG: 100 CAPSULE, LIQUID FILLED ORAL at 09:58

## 2019-07-23 RX ADMIN — METOPROLOL SUCCINATE 25 MG: 25 TABLET, EXTENDED RELEASE ORAL at 09:57

## 2019-07-23 RX ADMIN — DIGOXIN 125 MCG: 125 TABLET ORAL at 13:00

## 2019-07-23 RX ADMIN — Medication 3 ML: at 09:57

## 2019-07-23 RX ADMIN — PANTOPRAZOLE SODIUM 40 MG: 40 TABLET, DELAYED RELEASE ORAL at 07:33

## 2019-07-24 ENCOUNTER — EPISODE CHANGES (OUTPATIENT)
Dept: CASE MANAGEMENT | Facility: OTHER | Age: 84
End: 2019-07-24

## 2019-07-24 ENCOUNTER — PATIENT OUTREACH (OUTPATIENT)
Dept: CASE MANAGEMENT | Facility: OTHER | Age: 84
End: 2019-07-24

## 2019-07-24 NOTE — OUTREACH NOTE
SNF Follow-up Note    Skilled Nursing Facility Discharge Assessment 7/24/2019   Acute Facility Discharged From [facility]    Acute Discharge Date 7/23/2019   Name of the Skilled Nursing Facility? [facility]   Who is the insurance provider or payor of patient stay? Medicare     Acute discharge alert via PING. ALBER diaz will monitor for discharge from SNF.    Bhumika Valentino RN    7/24/2019, 11:17 AM

## 2019-08-05 ENCOUNTER — OFFICE VISIT (OUTPATIENT)
Dept: ORTHOPEDIC SURGERY | Facility: CLINIC | Age: 84
End: 2019-08-05

## 2019-08-05 VITALS
HEIGHT: 66 IN | BODY MASS INDEX: 21.69 KG/M2 | DIASTOLIC BLOOD PRESSURE: 56 MMHG | HEART RATE: 80 BPM | SYSTOLIC BLOOD PRESSURE: 106 MMHG | WEIGHT: 135 LBS

## 2019-08-05 DIAGNOSIS — S72.141S CLOSED 2-PART INTERTROCHANTERIC FRACTURE OF RIGHT FEMUR, SEQUELA: Primary | ICD-10-CM

## 2019-08-05 DIAGNOSIS — Z47.89 ORTHOPEDIC AFTERCARE: ICD-10-CM

## 2019-08-05 PROCEDURE — 99024 POSTOP FOLLOW-UP VISIT: CPT | Performed by: ORTHOPAEDIC SURGERY

## 2019-08-05 NOTE — PROGRESS NOTES
Patient ID: Shruthi Amin is a 89 y.o. female.    7/20/19 IM nail right hip  Pain improving    Objective:    There were no vitals taken for this visit.    Physical Examination:    Incisions are healing well without infection, compartments are soft with a negative Ruth.  Sensory and motor exam are intact all distributions. Dorsalis pedis and posterior tibialis pulses are palpable and capillary refill is less than two seconds to all digits    Imaging:  Maintenance of fracture reduction with hardware in position    Assessment:  Healing right hip fracture    Plan:  Continue weightbearing as tolerated with x-ray in 6 weeks

## 2019-08-19 ENCOUNTER — TELEPHONE (OUTPATIENT)
Dept: CARDIOLOGY | Facility: CLINIC | Age: 84
End: 2019-08-19

## 2019-08-19 NOTE — TELEPHONE ENCOUNTER
Called pt about INR being due. She said she is currently in rehab and they are now monitoring her INR.

## 2019-09-11 ENCOUNTER — EPISODE CHANGES (OUTPATIENT)
Dept: CASE MANAGEMENT | Facility: OTHER | Age: 84
End: 2019-09-11

## 2019-09-12 ENCOUNTER — TELEPHONE (OUTPATIENT)
Dept: CARDIOLOGY | Facility: CLINIC | Age: 84
End: 2019-09-12

## 2019-09-12 ENCOUNTER — ANTICOAGULATION VISIT (OUTPATIENT)
Dept: CARDIOLOGY | Facility: CLINIC | Age: 84
End: 2019-09-12

## 2019-09-12 DIAGNOSIS — Z79.01 LONG TERM (CURRENT) USE OF ANTICOAGULANTS: ICD-10-CM

## 2019-09-12 DIAGNOSIS — I48.20 CHRONIC ATRIAL FIBRILLATION (HCC): ICD-10-CM

## 2019-09-12 LAB — INR PPP: 1.9 (ref 2–3)

## 2019-09-12 NOTE — TELEPHONE ENCOUNTER
Tasia from home health calling in results. INR: 1.9 and PT 22.9. Will be starting back today with 4 mg Mon and TH. And 2 mg all other days unless advised differently. Just discharged from rehab and was taking a lesser dose. #[phone]

## 2019-09-12 NOTE — TELEPHONE ENCOUNTER
9/12/19 Called Spoke with Tasia MONCADA w/ [facility]  [phone] advised no changes in medication recheck next week Wednesday at scheduled visit Maria

## 2019-09-16 ENCOUNTER — OFFICE VISIT (OUTPATIENT)
Dept: ORTHOPEDIC SURGERY | Facility: CLINIC | Age: 84
End: 2019-09-16

## 2019-09-16 VITALS
WEIGHT: 135 LBS | SYSTOLIC BLOOD PRESSURE: 134 MMHG | HEIGHT: 55 IN | BODY MASS INDEX: 31.24 KG/M2 | DIASTOLIC BLOOD PRESSURE: 66 MMHG | HEART RATE: 76 BPM

## 2019-09-16 DIAGNOSIS — S72.141S CLOSED 2-PART INTERTROCHANTERIC FRACTURE OF RIGHT FEMUR, SEQUELA: Primary | ICD-10-CM

## 2019-09-16 DIAGNOSIS — Z47.89 ORTHOPEDIC AFTERCARE: ICD-10-CM

## 2019-09-16 PROCEDURE — 99024 POSTOP FOLLOW-UP VISIT: CPT | Performed by: ORTHOPAEDIC SURGERY

## 2019-09-16 NOTE — PROGRESS NOTES
"     Patient ID: Shruthi Amin is a 89 y.o. female.  7/20/19 IM nail right hip  Pain improving      Objective:    /66   Pulse 76   Ht 65.5 cm (25.79\")   Wt 61.2 kg (135 lb)   .74 kg/m²     Physical Examination:  Incisions are healed, no pain on range of motion of the hip.  Ruth is negative      Imaging:  Maintenance of fracture reduction with hardware in position    Assessment:  Healing hip fracture    Plan:  Continue weightbearing as tolerated and see me with x-ray in a month  "

## 2019-09-17 ENCOUNTER — OFFICE VISIT (OUTPATIENT)
Dept: FAMILY MEDICINE CLINIC | Facility: CLINIC | Age: 84
End: 2019-09-17

## 2019-09-17 ENCOUNTER — APPOINTMENT (OUTPATIENT)
Dept: LAB | Facility: HOSPITAL | Age: 84
End: 2019-09-17

## 2019-09-17 VITALS
SYSTOLIC BLOOD PRESSURE: 109 MMHG | HEIGHT: 60 IN | BODY MASS INDEX: 26.46 KG/M2 | DIASTOLIC BLOOD PRESSURE: 68 MMHG | OXYGEN SATURATION: 96 % | TEMPERATURE: 98.2 F | RESPIRATION RATE: 20 BRPM | HEART RATE: 78 BPM | WEIGHT: 134.8 LBS

## 2019-09-17 DIAGNOSIS — D64.9 ANEMIA, UNSPECIFIED TYPE: ICD-10-CM

## 2019-09-17 DIAGNOSIS — S72.141S CLOSED 2-PART INTERTROCHANTERIC FRACTURE OF RIGHT FEMUR, SEQUELA: Primary | ICD-10-CM

## 2019-09-17 LAB
ANION GAP SERPL CALCULATED.3IONS-SCNC: 16.7 MMOL/L (ref 5–15)
BASOPHILS # BLD AUTO: 0.1 10*3/MM3 (ref 0–0.2)
BASOPHILS NFR BLD AUTO: 1.2 % (ref 0–1.5)
BUN BLD-MCNC: 13 MG/DL (ref 8–20)
BUN/CREAT SERPL: 18.6 (ref 5.4–26.2)
CALCIUM SPEC-SCNC: 9.4 MG/DL (ref 8.9–10.3)
CHLORIDE SERPL-SCNC: 99 MMOL/L (ref 101–111)
CO2 SERPL-SCNC: 27 MMOL/L (ref 22–32)
CREAT BLD-MCNC: 0.7 MG/DL (ref 0.4–1)
DEPRECATED RDW RBC AUTO: 46.4 FL (ref 37–54)
EOSINOPHIL # BLD AUTO: 0.3 10*3/MM3 (ref 0–0.4)
EOSINOPHIL NFR BLD AUTO: 3.2 % (ref 0.3–6.2)
ERYTHROCYTE [DISTWIDTH] IN BLOOD BY AUTOMATED COUNT: 13.9 % (ref 12.3–15.4)
GFR SERPL CREATININE-BSD FRML MDRD: 78 ML/MIN/1.73
GLUCOSE BLD-MCNC: 99 MG/DL (ref 65–99)
HCT VFR BLD AUTO: 41.4 % (ref 34–46.6)
HGB BLD-MCNC: 13.8 G/DL (ref 12–15.9)
LYMPHOCYTES # BLD AUTO: 2.5 10*3/MM3 (ref 0.7–3.1)
LYMPHOCYTES NFR BLD AUTO: 26.2 % (ref 19.6–45.3)
MCH RBC QN AUTO: 31.6 PG (ref 26.6–33)
MCHC RBC AUTO-ENTMCNC: 33.4 G/DL (ref 31.5–35.7)
MCV RBC AUTO: 94.5 FL (ref 79–97)
MONOCYTES # BLD AUTO: 1.3 10*3/MM3 (ref 0.1–0.9)
MONOCYTES NFR BLD AUTO: 13.3 % (ref 5–12)
NEUTROPHILS # BLD AUTO: 5.3 10*3/MM3 (ref 1.7–7)
NEUTROPHILS NFR BLD AUTO: 56.1 % (ref 42.7–76)
NRBC BLD AUTO-RTO: 0.1 /100 WBC (ref 0–0.2)
PLATELET # BLD AUTO: 193 10*3/MM3 (ref 140–450)
PMV BLD AUTO: 8.9 FL (ref 6–12)
POTASSIUM BLD-SCNC: 4.7 MMOL/L (ref 3.6–5.1)
RBC # BLD AUTO: 4.38 10*6/MM3 (ref 3.77–5.28)
SODIUM BLD-SCNC: 138 MMOL/L (ref 136–144)
WBC NRBC COR # BLD: 9.4 10*3/MM3 (ref 3.4–10.8)

## 2019-09-17 PROCEDURE — 85025 COMPLETE CBC W/AUTO DIFF WBC: CPT | Performed by: NURSE PRACTITIONER

## 2019-09-17 PROCEDURE — 80048 BASIC METABOLIC PNL TOTAL CA: CPT | Performed by: NURSE PRACTITIONER

## 2019-09-17 PROCEDURE — 36415 COLL VENOUS BLD VENIPUNCTURE: CPT | Performed by: NURSE PRACTITIONER

## 2019-09-17 PROCEDURE — 99213 OFFICE O/P EST LOW 20 MIN: CPT | Performed by: NURSE PRACTITIONER

## 2019-09-17 NOTE — PROGRESS NOTES
Subjective   Shruthi Amin is a 89 y.o. female.     Chief Complaint   Patient presents with   • Hip Injury     rehab follow up       HPI  She is here for follow up from fall she had femur fracture right hip she finished rehab and is using walker. Lives with 2 sons . One is with her nightly one is away. Daughter staying with her now. She is not cooking or cleaning she does all her ADL's independently.       The following portions of the patient's history were reviewed and updated as appropriate: allergies, current medications, past family history, past medical history, past social history, past surgical history and problem list.      Current Outpatient Medications:   •  B Complex Vitamins (VITAMIN B COMPLEX PO), Take 1 tablet by mouth Daily., Disp: , Rfl:   •  Calcium-Phosphorus-Vitamin D (CITRACAL +D3) 250-107-500 MG-MG-UNIT chewable tablet, Chew 1 tablet Daily., Disp: , Rfl:   •  cholecalciferol (VITAMIN D3) 400 units tablet, Take 400 Units by mouth Daily., Disp: , Rfl:   •  digoxin (LANOXIN) 125 MCG tablet, Take 125 mcg by mouth Daily., Disp: , Rfl:   •  lansoprazole (PREVACID) 30 MG capsule, Take 30 mg by mouth Daily As Needed (heartburn)., Disp: , Rfl:   •  latanoprost (XALATAN) 0.005 % ophthalmic solution, Administer 1 drop to the right eye Every Night., Disp: , Rfl:   •  magnesium gluconate (MAGONATE) 500 MG tablet, Take 27 mg by mouth 2 (Two) Times a Day., Disp: , Rfl:   •  metoprolol succinate XL (TOPROL-XL) 25 MG 24 hr tablet, Take 25 mg by mouth 2 (Two) Times a Day., Disp: , Rfl: 1  •  vitamin C (ASCORBIC ACID) 500 MG tablet, Take 500 mg by mouth Daily., Disp: , Rfl:   •  warfarin (COUMADIN) 2 MG tablet, Take 2 mg by mouth 4 (Four) Times a Week. Sun, Tue, Wed, Fri, Sat, Disp: , Rfl:   •  warfarin (COUMADIN) 4 MG tablet, Take 4 mg by mouth 2 (Two) Times a Week. Mon and Thurs, Disp: , Rfl:     Recent Results (from the past 4032 hour(s))   POC INR    Collection Time: 07/10/19 11:56 AM   Result Value Ref  Range    INR 2.80 2 - 3   Protime-INR    Collection Time: 07/19/19  7:19 PM   Result Value Ref Range    Protime 34.7 (H) 19.4 - 28.5 Seconds    INR 3.58 (H) 2.00 - 3.00   Basic Metabolic Panel    Collection Time: 07/19/19  7:19 PM   Result Value Ref Range    Glucose 113 (H) 65 - 99 mg/dL    BUN 11 8 - 20 mg/dL    Creatinine 0.60 0.40 - 1.00 mg/dL    Sodium 135 (L) 136 - 144 mmol/L    Potassium 4.0 3.6 - 5.1 mmol/L    Chloride 102 101 - 111 mmol/L    CO2 22.0 22.0 - 32.0 mmol/L    Calcium 8.7 (L) 8.9 - 10.3 mg/dL    eGFR Non African Amer 94 >60 mL/min/1.73    BUN/Creatinine Ratio 18.3 5.4 - 26.2    Anion Gap 15.0 5.0 - 15.0 mmol/L   aPTT    Collection Time: 07/19/19  7:19 PM   Result Value Ref Range    PTT 32.6 (H) 24.0 - 31.0 seconds   CBC Auto Differential    Collection Time: 07/19/19  7:19 PM   Result Value Ref Range    WBC 13.30 (H) 3.40 - 10.80 10*3/mm3    RBC 4.36 3.77 - 5.28 10*6/mm3    Hemoglobin 13.8 12.0 - 15.9 g/dL    Hematocrit 42.0 34.0 - 46.6 %    MCV 96.4 79.0 - 97.0 fL    MCH 31.7 26.6 - 33.0 pg    MCHC 32.9 31.5 - 35.7 g/dL    RDW 13.9 12.3 - 15.4 %    RDW-SD 46.8 37.0 - 54.0 fl    MPV 8.6 6.0 - 12.0 fL    Platelets 188 140 - 450 10*3/mm3   Scan Slide    Collection Time: 07/19/19  7:19 PM   Result Value Ref Range    Scan Slide     Manual Differential    Collection Time: 07/19/19  7:19 PM   Result Value Ref Range    Neutrophil % 73.0 42.7 - 76.0 %    Lymphocyte % 7.0 (L) 19.6 - 45.3 %    Monocyte % 8.0 5.0 - 12.0 %    Eosinophil % 3.0 0.3 - 6.2 %    Basophil % 1.0 0.0 - 1.5 %    Bands %  5.0 0.0 - 5.0 %    Metamyelocyte % 1.0 (H) 0.0 - 0.0 %    Myelocyte % 1.0 (H) 0.0 - 0.0 %    Atypical Lymphocyte % 1.0 0.0 - 5.0 %    Neutrophils Absolute 10.37 (H) 1.70 - 7.00 10*3/mm3    Lymphocytes Absolute 0.93 0.70 - 3.10 10*3/mm3    Monocytes Absolute 1.06 (H) 0.10 - 0.90 10*3/mm3    Eosinophils Absolute 0.40 0.00 - 0.40 10*3/mm3    Basophils Absolute 0.13 0.00 - 0.20 10*3/mm3    Elliptocytes Slight/1+ None Seen     WBC Morphology Normal Normal    Platelet Morphology Normal Normal   Type & Screen    Collection Time: 07/20/19  2:18 AM   Result Value Ref Range    ABO Type A     RH type Positive     Antibody Screen Negative     T&S Expiration Date 7/23/2019 11:59:59 PM    Basic Metabolic Panel    Collection Time: 07/20/19  2:19 AM   Result Value Ref Range    Glucose 131 (H) 65 - 99 mg/dL    BUN 11 8 - 20 mg/dL    Creatinine 0.60 0.40 - 1.00 mg/dL    Sodium 135 (L) 136 - 144 mmol/L    Potassium 4.5 3.6 - 5.1 mmol/L    Chloride 102 101 - 111 mmol/L    CO2 25.0 22.0 - 32.0 mmol/L    Calcium 8.5 (L) 8.9 - 10.3 mg/dL    eGFR Non African Amer 94 >60 mL/min/1.73    BUN/Creatinine Ratio 18.3 5.4 - 26.2    Anion Gap 12.5 5.0 - 15.0 mmol/L   Protime-INR    Collection Time: 07/20/19  2:19 AM   Result Value Ref Range    Protime 35.9 (H) 19.4 - 28.5 Seconds    INR 3.70 (H) 2.00 - 3.00   CBC Auto Differential    Collection Time: 07/20/19  2:19 AM   Result Value Ref Range    WBC 12.10 (H) 3.40 - 10.80 10*3/mm3    RBC 4.12 3.77 - 5.28 10*6/mm3    Hemoglobin 13.1 12.0 - 15.9 g/dL    Hematocrit 39.0 34.0 - 46.6 %    MCV 94.8 79.0 - 97.0 fL    MCH 31.8 26.6 - 33.0 pg    MCHC 33.5 31.5 - 35.7 g/dL    RDW 13.9 12.3 - 15.4 %    RDW-SD 46.4 37.0 - 54.0 fl    MPV 8.4 6.0 - 12.0 fL    Platelets 176 140 - 450 10*3/mm3    Neutrophil % 64.4 42.7 - 76.0 %    Lymphocyte % 10.6 (L) 19.6 - 45.3 %    Monocyte % 20.0 (H) 5.0 - 12.0 %    Eosinophil % 5.0 0.3 - 6.2 %    Basophil % 0.0 0.0 - 1.5 %    Neutrophils, Absolute 7.80 (H) 1.70 - 7.00 10*3/mm3    Lymphocytes, Absolute 1.30 0.70 - 3.10 10*3/mm3    Monocytes, Absolute 2.40 (H) 0.10 - 0.90 10*3/mm3    Eosinophils, Absolute 0.60 (H) 0.00 - 0.40 10*3/mm3    Basophils, Absolute 0.00 0.00 - 0.20 10*3/mm3    nRBC 0.0 0.0 - 0.2 /100 WBC   MRSA Screen Culture - Swab, Nares    Collection Time: 07/20/19  5:01 AM   Result Value Ref Range    MRSA SCREEN CX       No Methicillin Resistant Staphylococcus aureus isolated    Urinalysis With Culture If Indicated - Urine, Clean Catch    Collection Time: 07/20/19  5:01 AM   Result Value Ref Range    Color, UA Yellow Yellow, Straw    Appearance, UA Clear Clear    pH, UA 6.0 5.0 - 8.0    Specific Gravity, UA 1.016 1.005 - 1.030    Glucose, UA Negative Negative    Ketones, UA Negative Negative    Bilirubin, UA Negative Negative    Blood, UA Small (1+) (A) Negative    Protein, UA Negative Negative    Leuk Esterase, UA Trace (A) Negative    Nitrite, UA Negative Negative    Urobilinogen, UA 0.2 E.U./dL 0.2 - 1.0 E.U./dL   Urinalysis, Microscopic Only - Urine, Clean Catch    Collection Time: 07/20/19  5:01 AM   Result Value Ref Range    RBC, UA 6-12 (A) None Seen /HPF    WBC, UA 3-5 (A) None Seen /HPF    Bacteria, UA None Seen None Seen /HPF    Squamous Epithelial Cells, UA None Seen None Seen, 0-2 /HPF    Hyaline Casts, UA 0-2 None Seen /LPF    Methodology Automated Microscopy    Protime-INR    Collection Time: 07/20/19  8:25 AM   Result Value Ref Range    Protime 33.2 (H) 19.4 - 28.5 Seconds    INR 3.43 (H) 2.00 - 3.00   Protime-INR    Collection Time: 07/20/19  1:33 PM   Result Value Ref Range    Protime 14.3 (L) 19.4 - 28.5 Seconds    INR 1.45 (L) 2.00 - 3.00   Protime-INR    Collection Time: 07/21/19  2:18 AM   Result Value Ref Range    Protime 16.0 (L) 19.4 - 28.5 Seconds    INR 1.64 (L) 2.00 - 3.00   Basic Metabolic Panel    Collection Time: 07/21/19  2:18 AM   Result Value Ref Range    Glucose 125 (H) 65 - 99 mg/dL    BUN 10 8 - 20 mg/dL    Creatinine 0.60 0.40 - 1.00 mg/dL    Sodium 131 (L) 136 - 144 mmol/L    Potassium 4.3 3.6 - 5.1 mmol/L    Chloride 98 (L) 101 - 111 mmol/L    CO2 25.0 22.0 - 32.0 mmol/L    Calcium 7.8 (L) 8.9 - 10.3 mg/dL    eGFR Non African Amer 94 >60 mL/min/1.73    BUN/Creatinine Ratio 16.7 5.4 - 26.2    Anion Gap 12.3 5.0 - 15.0 mmol/L   CBC Auto Differential    Collection Time: 07/21/19  2:18 AM   Result Value Ref Range    WBC 12.70 (H) 3.40 - 10.80 10*3/mm3     RBC 3.45 (L) 3.77 - 5.28 10*6/mm3    Hemoglobin 10.9 (L) 12.0 - 15.9 g/dL    Hematocrit 33.0 (L) 34.0 - 46.6 %    MCV 95.6 79.0 - 97.0 fL    MCH 31.7 26.6 - 33.0 pg    MCHC 33.2 31.5 - 35.7 g/dL    RDW 13.8 12.3 - 15.4 %    RDW-SD 46.4 37.0 - 54.0 fl    MPV 8.7 6.0 - 12.0 fL    Platelets 147 140 - 450 10*3/mm3    Neutrophil % 70.2 42.7 - 76.0 %    Lymphocyte % 11.4 (L) 19.6 - 45.3 %    Monocyte % 14.2 (H) 5.0 - 12.0 %    Eosinophil % 3.5 0.3 - 6.2 %    Basophil % 0.7 0.0 - 1.5 %    Neutrophils, Absolute 8.90 (H) 1.70 - 7.00 10*3/mm3    Lymphocytes, Absolute 1.50 0.70 - 3.10 10*3/mm3    Monocytes, Absolute 1.80 (H) 0.10 - 0.90 10*3/mm3    Eosinophils, Absolute 0.40 0.00 - 0.40 10*3/mm3    Basophils, Absolute 0.10 0.00 - 0.20 10*3/mm3    nRBC 0.1 0.0 - 0.2 /100 WBC   POC Glucose Once    Collection Time: 07/21/19 12:01 PM   Result Value Ref Range    Glucose 149 (H) 70 - 105 mg/dL   Basic Metabolic Panel    Collection Time: 07/22/19  2:37 AM   Result Value Ref Range    Glucose 125 (H) 65 - 99 mg/dL    BUN 13 8 - 20 mg/dL    Creatinine 0.70 0.40 - 1.00 mg/dL    Sodium 128 (L) 136 - 144 mmol/L    Potassium 4.5 3.6 - 5.1 mmol/L    Chloride 97 (L) 101 - 111 mmol/L    CO2 24.0 22.0 - 32.0 mmol/L    Calcium 7.8 (L) 8.9 - 10.3 mg/dL    eGFR Non African Amer 78 >60 mL/min/1.73    BUN/Creatinine Ratio 18.6 5.4 - 26.2    Anion Gap 11.5 5.0 - 15.0 mmol/L   Protime-INR    Collection Time: 07/22/19  2:37 AM   Result Value Ref Range    Protime 26.2 19.4 - 28.5 Seconds    INR 2.71 2.00 - 3.00   CBC Auto Differential    Collection Time: 07/22/19  2:37 AM   Result Value Ref Range    WBC 12.30 (H) 3.40 - 10.80 10*3/mm3    RBC 3.24 (L) 3.77 - 5.28 10*6/mm3    Hemoglobin 10.1 (L) 12.0 - 15.9 g/dL    Hematocrit 30.7 (L) 34.0 - 46.6 %    MCV 94.8 79.0 - 97.0 fL    MCH 31.2 26.6 - 33.0 pg    MCHC 33.0 31.5 - 35.7 g/dL    RDW 13.7 12.3 - 15.4 %    RDW-SD 45.5 37.0 - 54.0 fl    MPV 9.5 6.0 - 12.0 fL    Platelets 152 140 - 450 10*3/mm3     Neutrophil % 70.6 42.7 - 76.0 %    Lymphocyte % 10.7 (L) 19.6 - 45.3 %    Monocyte % 15.5 (H) 5.0 - 12.0 %    Eosinophil % 2.5 0.3 - 6.2 %    Basophil % 0.7 0.0 - 1.5 %    Neutrophils, Absolute 8.70 (H) 1.70 - 7.00 10*3/mm3    Lymphocytes, Absolute 1.30 0.70 - 3.10 10*3/mm3    Monocytes, Absolute 1.90 (H) 0.10 - 0.90 10*3/mm3    Eosinophils, Absolute 0.30 0.00 - 0.40 10*3/mm3    Basophils, Absolute 0.10 0.00 - 0.20 10*3/mm3    nRBC 0.0 0.0 - 0.2 /100 WBC   Basic Metabolic Panel    Collection Time: 07/23/19  4:51 AM   Result Value Ref Range    Glucose 109 (H) 65 - 99 mg/dL    BUN 10 8 - 20 mg/dL    Creatinine 0.50 0.40 - 1.00 mg/dL    Sodium 130 (L) 136 - 144 mmol/L    Potassium 4.4 3.6 - 5.1 mmol/L    Chloride 97 (L) 101 - 111 mmol/L    CO2 27.0 22.0 - 32.0 mmol/L    Calcium 7.9 (L) 8.9 - 10.3 mg/dL    eGFR Non African Amer 116 >60 mL/min/1.73    BUN/Creatinine Ratio 20.0 5.4 - 26.2    Anion Gap 10.4 5.0 - 15.0 mmol/L   Protime-INR    Collection Time: 07/23/19  4:51 AM   Result Value Ref Range    Protime 30.2 (H) 19.4 - 28.5 Seconds    INR 3.12 (H) 2.00 - 3.00   CBC Auto Differential    Collection Time: 07/23/19  4:51 AM   Result Value Ref Range    WBC 10.80 3.40 - 10.80 10*3/mm3    RBC 3.15 (L) 3.77 - 5.28 10*6/mm3    Hemoglobin 10.2 (L) 12.0 - 15.9 g/dL    Hematocrit 29.4 (L) 34.0 - 46.6 %    MCV 93.4 79.0 - 97.0 fL    MCH 32.6 26.6 - 33.0 pg    MCHC 34.9 31.5 - 35.7 g/dL    RDW 13.6 12.3 - 15.4 %    RDW-SD 44.6 37.0 - 54.0 fl    MPV 9.5 6.0 - 12.0 fL    Platelets 157 140 - 450 10*3/mm3   Scan Slide    Collection Time: 07/23/19  4:51 AM   Result Value Ref Range    Scan Slide     Manual Differential    Collection Time: 07/23/19  4:51 AM   Result Value Ref Range    Neutrophil % 64.0 42.7 - 76.0 %    Lymphocyte % 9.0 (L) 19.6 - 45.3 %    Monocyte % 18.0 (H) 5.0 - 12.0 %    Eosinophil % 2.0 0.3 - 6.2 %    Bands %  6.0 (H) 0.0 - 5.0 %    Metamyelocyte % 1.0 (H) 0.0 - 0.0 %    Neutrophils Absolute 7.56 (H) 1.70 - 7.00  "10*3/mm3    Lymphocytes Absolute 0.97 0.70 - 3.10 10*3/mm3    Monocytes Absolute 1.94 (H) 0.10 - 0.90 10*3/mm3    Eosinophils Absolute 0.22 0.00 - 0.40 10*3/mm3    RBC Morphology Normal Normal    WBC Morphology Normal Normal    Platelet Morphology Normal Normal   Protime-INR    Collection Time: 09/12/19 12:00 AM   Result Value Ref Range    INR 1.90 (A) 2 - 3         Review of Systems   Constitutional: Negative for activity change, appetite change, unexpected weight gain and unexpected weight loss.   Respiratory: Negative for chest tightness and shortness of breath.    Musculoskeletal: Positive for arthralgias.        Occassionly right hip pain not much. Gets up to a 4-5        Objective     /68 (BP Location: Left arm, Patient Position: Sitting, Cuff Size: Adult)   Pulse 78   Temp 98.2 °F (36.8 °C) (Oral)   Resp 20   Ht 152.4 cm (60\")   Wt 61.1 kg (134 lb 12.8 oz)   SpO2 96%   BMI 26.33 kg/m²     Physical Exam   Constitutional: She is oriented to person, place, and time. She appears well-developed and well-nourished.   HENT:   Head: Normocephalic and atraumatic.   Eyes: Conjunctivae and EOM are normal. Pupils are equal, round, and reactive to light.   Neck: Normal range of motion. Neck supple.   Cardiovascular: Normal rate, regular rhythm, normal heart sounds and intact distal pulses.   Pulmonary/Chest: Effort normal and breath sounds normal.   Abdominal: Soft. Bowel sounds are normal.   Musculoskeletal: Normal range of motion.   Neurological: She is alert and oriented to person, place, and time.   Skin: Skin is warm and dry.   Psychiatric: She has a normal mood and affect. Her behavior is normal.   Nursing note and vitals reviewed.        Assessment/Plan   Shruthi was seen today for hip injury.    Diagnoses and all orders for this visit:    Closed 2-part intertrochanteric fracture of right femur, sequela  Comments:  PT and OT with nurse from homehealth   rehab follow up    Anemia, unspecified type  -     " CBC & Differential  -     Basic Metabolic Panel      Patient Instructions   Follow up on labs.         Mary Alice Snell, APRN    09/17/19

## 2019-09-18 ENCOUNTER — PATIENT OUTREACH (OUTPATIENT)
Dept: CASE MANAGEMENT | Facility: OTHER | Age: 84
End: 2019-09-18

## 2019-09-18 ENCOUNTER — ANTICOAGULATION VISIT (OUTPATIENT)
Dept: CARDIOLOGY | Facility: CLINIC | Age: 84
End: 2019-09-18

## 2019-09-18 DIAGNOSIS — Z79.01 LONG TERM (CURRENT) USE OF ANTICOAGULANTS: ICD-10-CM

## 2019-09-18 DIAGNOSIS — I48.20 CHRONIC ATRIAL FIBRILLATION (HCC): ICD-10-CM

## 2019-09-18 LAB — INR PPP: 3

## 2019-09-18 NOTE — OUTREACH NOTE
SNF Follow-up Note      Responses   Name of the Skilled Nursing Facility?  [facility]   Skilled Nursing Discharge Date?  09/11/19   Where was the patient discharged to?  Home   Home Health Agency at discharge?  Yes   Name of Home Health Agency?  [facility]   DME Needed at Discharge?  Yes   What Medical Equipment is needed?  walker   Are there any medication concerns at Discharge  No   Does the patient have a follow-up appointment?  Yes   Comments Regarding F/U Appt. ?  saw PCP 9/17/19        Pt discharged from [facility] to home on 9/11/19. RN-CC outreach call made to pt. Pt saw PCP yesterday, on 9/17/19. Pt saw orthopedic surgeon earlier this week, on 9/16/19, for follow up s/p hip surgery, pain improving and incisions healed per MD note. Pt confirmed she does have HH services with [facility], including nurse, PT, and OT. Pt states nurse saw her today. Pt states she is using walker to assist with ambulation. Pt states she is staying at her son's house and her daughter is there helping her. Pt reports she is taking all medications as directed. Pt confirmed next follow up appointment date with ortho surgeon. No questions or concerns per pt at this time. Advised pt to call RN-CC with any needs and next outreach would be scheduled for 2-3 weeks, pt in agreement with plan. Outreach scheduled.     Bhumika Valentino RN  Community Care Coordinator    9/18/2019, 3:07 PM

## 2019-09-23 ENCOUNTER — ANTICOAGULATION VISIT (OUTPATIENT)
Dept: CARDIOLOGY | Facility: CLINIC | Age: 84
End: 2019-09-23

## 2019-09-23 DIAGNOSIS — Z79.01 LONG TERM (CURRENT) USE OF ANTICOAGULANTS: ICD-10-CM

## 2019-09-23 DIAGNOSIS — I48.20 CHRONIC ATRIAL FIBRILLATION (HCC): ICD-10-CM

## 2019-09-23 LAB — INR PPP: 1.9 (ref 2–3)

## 2019-09-30 ENCOUNTER — ANTICOAGULATION VISIT (OUTPATIENT)
Dept: CARDIOLOGY | Facility: CLINIC | Age: 84
End: 2019-09-30

## 2019-09-30 ENCOUNTER — TELEPHONE (OUTPATIENT)
Dept: CARDIOLOGY | Facility: CLINIC | Age: 84
End: 2019-09-30

## 2019-09-30 DIAGNOSIS — I48.20 CHRONIC ATRIAL FIBRILLATION (HCC): ICD-10-CM

## 2019-09-30 DIAGNOSIS — Z79.01 LONG TERM (CURRENT) USE OF ANTICOAGULANTS: ICD-10-CM

## 2019-09-30 LAB — INR PPP: 3.1

## 2019-10-10 ENCOUNTER — ANTICOAGULATION VISIT (OUTPATIENT)
Dept: CARDIOLOGY | Facility: CLINIC | Age: 84
End: 2019-10-10

## 2019-10-10 DIAGNOSIS — Z79.01 LONG TERM (CURRENT) USE OF ANTICOAGULANTS: ICD-10-CM

## 2019-10-10 DIAGNOSIS — I48.91 ATRIAL FIBRILLATION, UNSPECIFIED TYPE (HCC): ICD-10-CM

## 2019-10-10 LAB — INR PPP: 2.2

## 2019-10-14 ENCOUNTER — OFFICE VISIT (OUTPATIENT)
Dept: ORTHOPEDIC SURGERY | Facility: CLINIC | Age: 84
End: 2019-10-14

## 2019-10-14 VITALS
HEART RATE: 89 BPM | DIASTOLIC BLOOD PRESSURE: 68 MMHG | SYSTOLIC BLOOD PRESSURE: 107 MMHG | HEIGHT: 60 IN | WEIGHT: 134 LBS | BODY MASS INDEX: 26.31 KG/M2

## 2019-10-14 DIAGNOSIS — S72.141S CLOSED 2-PART INTERTROCHANTERIC FRACTURE OF RIGHT FEMUR, SEQUELA: Primary | ICD-10-CM

## 2019-10-14 DIAGNOSIS — Z47.89 ORTHOPEDIC AFTERCARE: ICD-10-CM

## 2019-10-14 PROCEDURE — 99024 POSTOP FOLLOW-UP VISIT: CPT | Performed by: ORTHOPAEDIC SURGERY

## 2019-10-14 NOTE — PROGRESS NOTES
"     Patient ID: Shruthi Amin is a 89 y.o. female.    7/20/19 IM nail right hip  Pain nearly resolved    Objective:    /68   Pulse 89   Ht 152.4 cm (60\")   Wt 60.8 kg (134 lb)   BMI 26.17 kg/m²     Physical Examination:    Incision is healed, hip flexion 90 degrees, internal/external rotation 50 degrees with no pain.  Ruth negative    Imaging:  Healed fracture with hardware in position    Assessment:  Right hip fracture    Plan:  Activity as tolerated and see me as needed  "

## 2019-10-21 ENCOUNTER — PATIENT OUTREACH (OUTPATIENT)
Dept: CASE MANAGEMENT | Facility: OTHER | Age: 84
End: 2019-10-21

## 2019-10-21 NOTE — OUTREACH NOTE
Care Plan Note      Responses   Lifestyle Goals  Routine follow-up with doctor(s), Medication management, Decrease falls risk, Increase physical activity   Self Management  Increase Physical Activities, Medication Adherence [use walker/rollator]   Annual Wellness Visit:   Care Coordinator Will Schedule   AWV Materials  Send Materials   Care Gaps Addressed  Colon Cancer Screening, Mammogram, Flu Shot, Pneumonia Vaccine   Colon Cancer Screening Type  Colonoscopy   Colonoscopy Status  Up to Date (< 10 yrs)   Flu Shot Status  Refused   Mammogram Status  -- [pt unsure when last mammo was]   Pneumonia Vaccine Status  Up to Date [she believes she received]   Other Patient Education/Resources   24/7 [facility] Nurse Call Line, Advanced Care Planning   24/7 Nurse Call Line Education Method  Send Materials   ACP Education Method  Send Materials   Does patient have depression diagnosis?  No   Advanced Directives:  Send Materials   Ed Visits past 12 months:  None   Hospitalizations past 12 months  1   Discharged From:  [facility]   Discharged to:  [facility]   Admit Date:  07/19/19   Discharge Date:  07/23/19   Discharge destination:  Home Health   Agency:  [facility]   Medication Adherence  Medications understood   Goal Progress  Making Progress Toward Goal(s)   Readiness Scale  10   Confidence Scale  10   How often do you have someone help you read hospital materials?  Never   How often do you have problems learning about your medical condition because of difficulty understanding written information?  Never   How often do you have a problem understanding what is told to you about your medical condition?  Never   How confident are you filling out medical forms by yourself?  Extremely   Health Literacy  Excellent        The main concerns and/or symptoms the patient would like to address are: Follow up RN-CC outreach call made to pt. Pt was discharged home from [facility] on 9/11/19. Pt was in [facility] on 7/19/19 for right hip  fracture. Patient's last follow up appointment with orthopedic surgeon, MD Centeno, was on 10/14/19. Pt last saw NP at PCP office on 9/17/19. Pt has appointment on 10/31/19 for INR check, appointment with cardiologist MD Mathew on 3/2/20, and appointment with NP at PCP office on 3/17/19. Pt reports she completed [facility] services on 10/8/19. Pt is using walker or rollator to assist with ambulation. Pt lives with her 2 son's and her daughter is staying with her currently. Pt is independent with ADL's. Pt states she has been cleaning some dishes and her daughter has been doing her laundry. Pt states she has not been released to drive by herself yet. Pt is compliant with medications. Pt does report that she has an occasional twinge of pain in her hip but states it goes away and does not interfere with mobility. Pt denies pain medication use.     Education/instruction provided by Care Coordinator: safety/fall precautions discussed. Reviewed with pt; medical follow up appointments, RN-CC contact info, coumadin use and monitoring, advance directives, and health maintenance gaps. Pt states she does have an advance directive but she isn't sure where it is. RN-CC will mail AD materials. Pt states she will update a new one with AD materials. Discussed AWV and RN-CC offered to schedule, pt agreed. Pt states her last colonoscopy was in 2014 and she was told she did not need anymore after that. Pt is unsure when her last mammogram was. Pt reports she is up to date on shingles vaccine and she has had a pneumonia vaccine, unsure when. Pt refuses flu vaccine.     Follow Up Outreach Due: RN-CC to call pts PCP office to schedule AWV and call pt back. After call, follow up outreach will be scheduled for 1-2 months.     Bhumika Valentino RN  Community Care Coordinator    10/21/2019, 2:54 PM

## 2019-10-21 NOTE — OUTREACH NOTE
Care Coordination Assessment    Documented/Reviewed By:  Bhumika Valentino RN Date/time:  10/21/2019  2:32 PM   Assessment completed with:  patient  Enrolled in care management program:  Yes  Living arrangement:  children  Support system:  children  Type of residence:  private residence  Home care services:  No  Equipment used at home:  walker  Communication device:  Yes  Bed or wheelchair confined:  No  Inadequate nutrition:  No  Medication adherence problem:  No  Experiencing side effects from current medications:  No  History of fall(s) in last 6 months:  Yes  Difficulty keeping appointments:  No  Family aware of the patient's advance care planning wishes:  Yes

## 2019-10-21 NOTE — OUTREACH NOTE
Care Coordination Note    Called pts PCP, MD Jeronimo's office, AWV appointment scheduled with MD Jeronimo on 12/2/19 at 10:45 am.     Bhumika Valentino RN  Community Care Coordinator    10/21/2019, 2:54 PM

## 2019-10-21 NOTE — OUTREACH NOTE
Patient Outreach Note    Called pt and gave her AWV appointment information. Pt has no questions or concerns at this time. Advised pt to call RN-CC with any needs. Follow up outreach scheduled.     Bhumika Valentino RN  Community Care Coordinator    10/21/2019, 2:56 PM

## 2019-10-31 ENCOUNTER — ANTICOAGULATION VISIT (OUTPATIENT)
Dept: CARDIOLOGY | Facility: CLINIC | Age: 84
End: 2019-10-31

## 2019-10-31 VITALS
BODY MASS INDEX: 26.76 KG/M2 | WEIGHT: 137 LBS | HEART RATE: 70 BPM | SYSTOLIC BLOOD PRESSURE: 139 MMHG | DIASTOLIC BLOOD PRESSURE: 75 MMHG

## 2019-10-31 DIAGNOSIS — Z79.01 LONG TERM (CURRENT) USE OF ANTICOAGULANTS: ICD-10-CM

## 2019-10-31 DIAGNOSIS — I48.91 ATRIAL FIBRILLATION, UNSPECIFIED TYPE (HCC): ICD-10-CM

## 2019-10-31 LAB — INR PPP: 2.7 (ref 0.9–1.1)

## 2019-10-31 PROCEDURE — 85610 PROTHROMBIN TIME: CPT | Performed by: INTERNAL MEDICINE

## 2019-10-31 PROCEDURE — 36416 COLLJ CAPILLARY BLOOD SPEC: CPT | Performed by: INTERNAL MEDICINE

## 2019-11-01 ENCOUNTER — TELEPHONE (OUTPATIENT)
Dept: CARDIOLOGY | Facility: CLINIC | Age: 84
End: 2019-11-01

## 2019-11-01 RX ORDER — WARFARIN SODIUM 2 MG/1
TABLET ORAL
Qty: 40 TABLET | Refills: 3 | Status: SHIPPED | OUTPATIENT
Start: 2019-11-01 | End: 2020-03-02

## 2019-12-02 ENCOUNTER — ANTICOAGULATION VISIT (OUTPATIENT)
Dept: CARDIOLOGY | Facility: CLINIC | Age: 84
End: 2019-12-02

## 2019-12-02 ENCOUNTER — OFFICE VISIT (OUTPATIENT)
Dept: FAMILY MEDICINE CLINIC | Facility: CLINIC | Age: 84
End: 2019-12-02

## 2019-12-02 VITALS
DIASTOLIC BLOOD PRESSURE: 73 MMHG | HEIGHT: 60 IN | SYSTOLIC BLOOD PRESSURE: 145 MMHG | WEIGHT: 138.8 LBS | OXYGEN SATURATION: 94 % | HEART RATE: 67 BPM | BODY MASS INDEX: 27.25 KG/M2 | TEMPERATURE: 97.7 F | RESPIRATION RATE: 16 BRPM

## 2019-12-02 VITALS
SYSTOLIC BLOOD PRESSURE: 149 MMHG | HEART RATE: 73 BPM | DIASTOLIC BLOOD PRESSURE: 81 MMHG | WEIGHT: 139 LBS | BODY MASS INDEX: 27.15 KG/M2

## 2019-12-02 DIAGNOSIS — Z00.00 ENCOUNTER FOR MEDICARE ANNUAL WELLNESS EXAM: Primary | ICD-10-CM

## 2019-12-02 DIAGNOSIS — I48.91 ATRIAL FIBRILLATION, UNSPECIFIED TYPE (HCC): ICD-10-CM

## 2019-12-02 DIAGNOSIS — Z79.01 LONG TERM (CURRENT) USE OF ANTICOAGULANTS: ICD-10-CM

## 2019-12-02 LAB — INR PPP: 2 (ref 0.9–1.1)

## 2019-12-02 PROCEDURE — G0439 PPPS, SUBSEQ VISIT: HCPCS | Performed by: FAMILY MEDICINE

## 2019-12-02 PROCEDURE — 36416 COLLJ CAPILLARY BLOOD SPEC: CPT | Performed by: INTERNAL MEDICINE

## 2019-12-02 PROCEDURE — 85610 PROTHROMBIN TIME: CPT | Performed by: INTERNAL MEDICINE

## 2019-12-02 NOTE — PROGRESS NOTES
The ABCs of the Annual Wellness Visit  Subsequent Medicare Wellness Visit    Chief Complaint   Patient presents with   • Medicare Wellness-subsequent       Subjective   History of Present Illness:  Shruthi Amin is a 89 y.o. female who presents for a Subsequent Medicare Wellness Visit.    HEALTH RISK ASSESSMENT    Recent Hospitalizations:  No hospitalization(s) within the last year.    Current Medical Providers:  Patient Care Team:  Og Jeronimo Jr., MD as PCP - General  Aleksey Mathew MD as PCP - Claims Attributed  Bhumika Majano RN as Ambulatory     Smoking Status:  Social History     Tobacco Use   Smoking Status Never Smoker   Smokeless Tobacco Never Used       Alcohol Consumption:  Social History     Substance and Sexual Activity   Alcohol Use No   • Frequency: Never       Depression Screen:   PHQ-2/PHQ-9 Depression Screening 12/2/2019   Little interest or pleasure in doing things 0   Feeling down, depressed, or hopeless 0   Trouble falling or staying asleep, or sleeping too much 0   Feeling tired or having little energy 0   Poor appetite or overeating 0   Feeling bad about yourself - or that you are a failure or have let yourself or your family down 0   Trouble concentrating on things, such as reading the newspaper or watching television 0   Moving or speaking so slowly that other people could have noticed. Or the opposite - being so fidgety or restless that you have been moving around a lot more than usual 0   Thoughts that you would be better off dead, or of hurting yourself in some way 0   Total Score 0       Fall Risk Screen:  STEADI Fall Risk Assessment was completed, and patient is at LOW risk for falls.Assessment completed on:12/2/2019    Health Habits and Functional and Cognitive Screening:  Functional & Cognitive Status 12/2/2019   Do you have difficulty preparing food and eating? No   Do you have difficulty bathing yourself, getting dressed or grooming yourself? No   Do you have  difficulty using the toilet? No   Do you have difficulty moving around from place to place? No   Do you have trouble with steps or getting out of a bed or a chair? No   Current Diet Well Balanced Diet   Dental Exam Up to date   Eye Exam Up to date   Exercise (times per week) 0 times per week   Current Exercise Activities Include None   Do you need help using the phone?  No   Are you deaf or do you have serious difficulty hearing?  No   Do you need help with transportation? Yes   Do you need help shopping? Yes   Do you need help preparing meals?  No   Do you need help with housework?  No   Do you need help with laundry? No   Do you need help taking your medications? No   Do you need help managing money? No   Do you ever drive or ride in a car without wearing a seat belt? No   Have you felt unusual stress, anger or loneliness in the last month? No   Who do you live with? Child   If you need help, do you have trouble finding someone available to you? No   Do you have difficulty concentrating, remembering or making decisions? No         Does the patient have evidence of cognitive impairment? No    Asprin use counseling:Does not need ASA (and currently is not on it)    Age-appropriate Screening Schedule:  Refer to the list below for future screening recommendations based on patient's age, sex and/or medical conditions. Orders for these recommended tests are listed in the plan section. The patient has been provided with a written plan.    Health Maintenance   Topic Date Due   • TDAP/TD VACCINES (1 - Tdap) 06/10/1947   • ZOSTER VACCINE (1 of 2) 06/10/1978   • PNEUMOCOCCAL VACCINES (65+ LOW/MEDIUM RISK) (1 of 2 - PCV13) 06/10/1993   • LIPID PANEL  07/24/2019   • DXA SCAN  07/24/2019   • INFLUENZA VACCINE  Addressed          The following portions of the patient's history were reviewed and updated as appropriate: allergies, current medications, past family history, past medical history, past social history, past surgical  history and problem list.    Outpatient Medications Prior to Visit   Medication Sig Dispense Refill   • B Complex Vitamins (VITAMIN B COMPLEX PO) Take 1 tablet by mouth Daily.     • Calcium-Phosphorus-Vitamin D (CITRACAL +D3) 250-107-500 MG-MG-UNIT chewable tablet Chew 1 tablet Daily.     • cholecalciferol (VITAMIN D3) 400 units tablet Take 400 Units by mouth Daily.     • digoxin (LANOXIN) 125 MCG tablet Take 125 mcg by mouth Daily.     • lansoprazole (PREVACID) 30 MG capsule Take 30 mg by mouth Daily As Needed (heartburn).     • latanoprost (XALATAN) 0.005 % ophthalmic solution Administer 1 drop to the right eye Every Night.     • magnesium gluconate (MAGONATE) 500 MG tablet Take 27 mg by mouth 2 (Two) Times a Day.     • metoprolol succinate XL (TOPROL-XL) 25 MG 24 hr tablet Take 25 mg by mouth 2 (Two) Times a Day.  1   • vitamin C (ASCORBIC ACID) 500 MG tablet Take 500 mg by mouth Daily.     • warfarin (COUMADIN) 2 MG tablet Take 2 tabs on Monday and Thursday with 1 tab all other days, po, qd after 4 pm or as directed. 40 tablet 3     No facility-administered medications prior to visit.        Patient Active Problem List   Diagnosis   • Atrial fibrillation (CMS/[facility]) [I48.91]   • Long term (current) use of anticoagulants [Z79.01]   • Nondisplaced intertrochanteric fracture of right femur, initial encounter for closed fracture (CMS/HCC)   • Back pain   • Cerebrovascular accident (CVA) (CMS/HCC)   • Degeneration of lumbar or lumbosacral intervertebral disc   • Hiatal hernia   • Hyperlipidemia   • Hypertension   • Lumbar radiculopathy   • Osteoarthritis   • Osteoporosis   • Closed 2-part intertrochanteric fracture of proximal end of right femur (CMS/HCC)       Advanced Care Planning:  Patient does not have an advance directive - information provided to the patient today    Review of Systems    Compared to one year ago, the patient feels her physical health is the same.  Compared to one year ago, the patient feels her  "mental health is the same.    Reviewed chart for potential of high risk medication in the elderly: yes  Reviewed chart for potential of harmful drug interactions in the elderly:yes    Objective         Vitals:    12/02/19 1044   BP: 145/73   BP Location: Right arm   Patient Position: Sitting   Cuff Size: Adult   Pulse: 67   Resp: 16   Temp: 97.7 °F (36.5 °C)   TempSrc: Oral   SpO2: 94%   Weight: 63 kg (138 lb 12.8 oz)   Height: 152.4 cm (60\")       Body mass index is 27.11 kg/m².  Discussed the patient's BMI with her. The BMI is in the acceptable range.    Physical Exam   Constitutional: She is oriented to person, place, and time.   HENT:   Head: Normocephalic and atraumatic.   Eyes: EOM are normal. Pupils are equal, round, and reactive to light.   Neck: Neck supple.   Cardiovascular: Normal rate, normal heart sounds and intact distal pulses. An irregularly irregular rhythm present.   Pulmonary/Chest: Effort normal and breath sounds normal.   Abdominal: Soft. Bowel sounds are normal.   Musculoskeletal: Normal range of motion.   Neurological: She is oriented to person, place, and time.   Skin: Skin is warm and dry.   Psychiatric: She has a normal mood and affect. Her behavior is normal. Judgment and thought content normal.   Nursing note and vitals reviewed.            Assessment/Plan   Medicare Risks and Personalized Health Plan  CMS Preventative Services Quick Reference  Advance Directive Discussion  Immunizations Discussed/Encouraged (specific immunizations; Td, Influenza, Pneumococcal 23, Prevnar and Shingrix )    The above risks/problems have been discussed with the patient.  Pertinent information has been shared with the patient in the After Visit Summary.  Follow up plans and orders are seen below in the Assessment/Plan Section.    Diagnoses and all orders for this visit:    1. Encounter for Medicare annual wellness exam (Primary)      Follow Up:  Return in about 6 months (around 6/2/2020) for Recheck.     An " After Visit Summary and PPPS were given to the patient.

## 2019-12-10 ENCOUNTER — PATIENT OUTREACH (OUTPATIENT)
Dept: CASE MANAGEMENT | Facility: OTHER | Age: 84
End: 2019-12-10

## 2019-12-10 NOTE — OUTREACH NOTE
Care Coordination Assessment    Documented/Reviewed By:  Bhumika Majano RN Date/time:  12/10/2019  1:26 PM   Assessment completed with:  patient  Enrolled in care management program:  Yes  Living arrangement:  children  Support system:  children, family  Type of residence:  private residence  Home care services:  No  Equipment used at home:  walker (Comment: shower chair)  Communication device:  Yes  Bed or wheelchair confined:  No  Inadequate nutrition:  No  Medication adherence problem:  No  Experiencing side effects from current medications:  No  History of fall(s) in last 6 months:  Yes  Difficulty keeping appointments:  No  Family aware of the patient's advance care planning wishes:  Yes  Chronic pain:  Yes  Location of chronic pain:  arthritic pain

## 2019-12-10 NOTE — OUTREACH NOTE
Care Plan Note      Responses   Annual Wellness Visit:   Patient Has Completed   Care Gaps Addressed  Flu Shot, Pneumonia Vaccine, Other (See Comment) [shingles vaccine]   Flu Shot Status  Refused   Pneumonia Vaccine Status  Patient will Complete   Specific Disease Process Teaching  Hypertension   Other Patient Education/Resources   24/7 [facility] Nurse Call Line   24/7 Nurse Call Line Education Method  Send Materials   Advanced Directives:  Patient Has [pt states she has signed one, unsure where it is]   Medication Adherence  Medications understood        The main concerns and/or symptoms the patient would like to address are: Follow up RN-CC call made to pt. Pt saw PCP for AWV on 12/2/19 and had INR check at cardiologist on 12/2/19. Pt reports she has not had any hip pain, does complain of arthritic pain at times. Pt states she is going to [place] with her daughter for about a month. Pt lives with her son's, is independent with ADL's. Pt states she has a walk in shower with a shower chair. Pt reports MD Centeno released her to drive and she tried but didn't feel comfortable so her daughter provides her transportation. Pt uses a walker or rollator to assist with ambulation, denies any recent falls. Pt reports she manages her own medications and is compliant with meds and appointments. Pt reports she has not been checking her blood pressure at home. Pt states she used to check it and probably should because it was a little high at the dentist office recently.     Education/instruction provided by Care Coordinator: Reviewed with pt: education regarding HTN, safety/fall precautions; medications, including Coumadin use; diet; medical appointments; RN-CC contact information; [facility] 24 hour nurse line number; health maintenance gaps, and advance directive. RN-CC encouraged pt to check blood pressure regularly at home. Pt is up to date on AWV. Pt states she has received shingles vaccine years ago. Pt  reports she will look into getting pneumonia vaccine when she gets back from [place]. Pt refuses flu vaccine. Pt reports she has signed an advance directive but states she isn't sure where it is. RN-CC advised pt to give PCP a copy when she finds it. Pt states her granddaughter is her POA and assists her with MyChart. Pt states her daughter, who she is going to [place] with, is a nurse so she helps with her medical care also. Pt has appointments scheduled with PCP, cardiologist, and for INR check. No question or concerns per pt. Advised pt to call RN-CC with any needs. No needs identified. No future outreach scheduled at this time.     Follow Up Outreach Due: as needed    Bhumika Majano RN  Ambulatory     12/10/2019, 1:30 PM

## 2020-01-02 ENCOUNTER — TELEPHONE (OUTPATIENT)
Dept: CARDIOLOGY | Facility: CLINIC | Age: 85
End: 2020-01-02

## 2020-01-02 DIAGNOSIS — Z79.01 LONG TERM (CURRENT) USE OF ANTICOAGULANTS: ICD-10-CM

## 2020-01-02 DIAGNOSIS — I48.91 ATRIAL FIBRILLATION, UNSPECIFIED TYPE (HCC): Primary | ICD-10-CM

## 2020-01-02 DIAGNOSIS — I48.20 CHRONIC ATRIAL FIBRILLATION (HCC): ICD-10-CM

## 2020-01-07 LAB — INR PPP: 1.7

## 2020-01-08 ENCOUNTER — ANTICOAGULATION VISIT (OUTPATIENT)
Dept: CARDIOLOGY | Facility: CLINIC | Age: 85
End: 2020-01-08

## 2020-01-08 DIAGNOSIS — Z79.01 LONG TERM (CURRENT) USE OF ANTICOAGULANTS: ICD-10-CM

## 2020-01-08 DIAGNOSIS — I48.91 ATRIAL FIBRILLATION, UNSPECIFIED TYPE (HCC): ICD-10-CM

## 2020-01-21 RX ORDER — METOPROLOL SUCCINATE 25 MG/1
TABLET, EXTENDED RELEASE ORAL
Qty: 180 TABLET | Refills: 1 | Status: SHIPPED | OUTPATIENT
Start: 2020-01-21 | End: 2020-01-23 | Stop reason: SDUPTHER

## 2020-01-22 ENCOUNTER — TELEPHONE (OUTPATIENT)
Dept: CARDIOLOGY | Facility: CLINIC | Age: 85
End: 2020-01-22

## 2020-01-23 RX ORDER — METOPROLOL SUCCINATE 25 MG/1
25 TABLET, EXTENDED RELEASE ORAL 2 TIMES DAILY
Qty: 180 TABLET | Refills: 1 | Status: SHIPPED | OUTPATIENT
Start: 2020-01-23 | End: 2020-03-09

## 2020-02-03 ENCOUNTER — OFFICE VISIT (OUTPATIENT)
Dept: FAMILY MEDICINE CLINIC | Facility: CLINIC | Age: 85
End: 2020-02-03

## 2020-02-03 VITALS
DIASTOLIC BLOOD PRESSURE: 75 MMHG | WEIGHT: 136.4 LBS | OXYGEN SATURATION: 97 % | HEART RATE: 73 BPM | SYSTOLIC BLOOD PRESSURE: 151 MMHG | TEMPERATURE: 98.1 F | BODY MASS INDEX: 26.78 KG/M2 | HEIGHT: 60 IN | RESPIRATION RATE: 16 BRPM

## 2020-02-03 DIAGNOSIS — I10 ESSENTIAL HYPERTENSION: ICD-10-CM

## 2020-02-03 DIAGNOSIS — M19.91 PRIMARY OSTEOARTHRITIS, UNSPECIFIED SITE: ICD-10-CM

## 2020-02-03 DIAGNOSIS — E78.5 HYPERLIPIDEMIA, UNSPECIFIED HYPERLIPIDEMIA TYPE: ICD-10-CM

## 2020-02-03 DIAGNOSIS — S81.812A LACERATION OF LEFT LOWER EXTREMITY, INITIAL ENCOUNTER: Primary | ICD-10-CM

## 2020-02-03 DIAGNOSIS — I63.10 CEREBROVASCULAR ACCIDENT (CVA) DUE TO EMBOLISM OF PRECEREBRAL ARTERY (HCC): ICD-10-CM

## 2020-02-03 DIAGNOSIS — I48.91 ATRIAL FIBRILLATION, UNSPECIFIED TYPE (HCC): ICD-10-CM

## 2020-02-03 PROBLEM — S72.141A CLOSED 2-PART INTERTROCHANTERIC FRACTURE OF PROXIMAL END OF RIGHT FEMUR (HCC): Status: RESOLVED | Noted: 2019-07-19 | Resolved: 2020-02-03

## 2020-02-03 PROCEDURE — 99214 OFFICE O/P EST MOD 30 MIN: CPT | Performed by: FAMILY MEDICINE

## 2020-02-03 NOTE — PATIENT INSTRUCTIONS
Continue your current medications and treatment.    Use anti-biotic ointment to the leg wound.    Follow up in the office in 3 months or sooner if needed.

## 2020-02-03 NOTE — PROGRESS NOTES
"Subjective   Shruthi Amin is a 89 y.o. female.     Chief Complaint   Patient presents with   • Leg Pain     Left leg       HPI  Chief complaint: Leg injury hypertension hyperlipidemia atrial fibrillation osteoporosis    The patient is an 81-year-old white female comes in for follow-up maintenance of her current problems which include    1. leg injury-new-patient states that she lost her balance and fell down the stairs on December 31, 2019.  She states he lacerated the posterior aspect of her left lower extremity.  Patient states that she has been treating this with antibiotic dressings.  She states that slowly healing.  She denied bone pain.    2.  Hypertension-stable-patient is currently on metoprolol 25 mg twice a day.  She denied headache lightheadedness dizziness or chest pain    3.  Atrial fibrillation-stable-patient's current metoprolol and Lanoxin 0.125 mg daily.  She denies episodes of rapid or slow heart rhythm.  She denied heart palpitations lightheadedness or dizziness.      4.  Osteoporosis-stable-patient on calcium and vitamin D.  She denied recent bone pain or injury.  Patient did fall and fractured hip several months ago.  She is recovered from this.    5 Gastroesophageal reflux disease/hiatal hernia-stable-patient on Prevacid 30 mg daily.  She denies dysphagia or heartburn.        The following portions of the patient's history were reviewed and updated as appropriate: allergies, current medications, past family history, past medical history, past social history, past surgical history and problem list.    Review of Systems    Objective     /75 (BP Location: Right arm, Patient Position: Sitting, Cuff Size: Adult)   Pulse 73   Temp 98.1 °F (36.7 °C) (Oral)   Resp 16   Ht 152.4 cm (60\")   Wt 61.9 kg (136 lb 6.4 oz)   SpO2 97%   BMI 26.64 kg/m²     Physical Exam   Constitutional: She is oriented to person, place, and time.   HENT:   Head: Normocephalic and atraumatic.   Eyes: Pupils are " equal, round, and reactive to light. Conjunctivae and EOM are normal.   Neck: Normal range of motion. Neck supple.   Cardiovascular: Normal rate, S2 normal, normal heart sounds and intact distal pulses. An irregularly irregular rhythm present.   Pulmonary/Chest: Effort normal and breath sounds normal.   Abdominal: Soft. Bowel sounds are normal.   Musculoskeletal: Normal range of motion.   Neurological: She is alert and oriented to person, place, and time.   Skin: Skin is warm and dry.   Psychiatric: She has a normal mood and affect. Her behavior is normal.   Nursing note and vitals reviewed.        Assessment/Plan   Shruthi was seen today for leg pain.    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    Use anti-biotic ointment to the leg wound.    Follow up in the office in 3 months or sooner if needed.          Og Jeronimo Jr., MD    02/03/20

## 2020-02-11 ENCOUNTER — ANTICOAGULATION VISIT (OUTPATIENT)
Dept: CARDIOLOGY | Facility: CLINIC | Age: 85
End: 2020-02-11

## 2020-02-11 VITALS
WEIGHT: 168 LBS | DIASTOLIC BLOOD PRESSURE: 68 MMHG | BODY MASS INDEX: 32.81 KG/M2 | HEART RATE: 68 BPM | SYSTOLIC BLOOD PRESSURE: 150 MMHG

## 2020-02-11 DIAGNOSIS — I48.91 ATRIAL FIBRILLATION, UNSPECIFIED TYPE (HCC): ICD-10-CM

## 2020-02-11 DIAGNOSIS — Z79.01 LONG TERM (CURRENT) USE OF ANTICOAGULANTS: ICD-10-CM

## 2020-02-11 LAB — INR PPP: 2.2 (ref 0.9–1.1)

## 2020-02-11 PROCEDURE — 85610 PROTHROMBIN TIME: CPT | Performed by: INTERNAL MEDICINE

## 2020-02-11 PROCEDURE — 36416 COLLJ CAPILLARY BLOOD SPEC: CPT | Performed by: INTERNAL MEDICINE

## 2020-03-02 ENCOUNTER — ANTICOAGULATION VISIT (OUTPATIENT)
Dept: CARDIOLOGY | Facility: CLINIC | Age: 85
End: 2020-03-02

## 2020-03-02 ENCOUNTER — OFFICE VISIT (OUTPATIENT)
Dept: CARDIOLOGY | Facility: CLINIC | Age: 85
End: 2020-03-02

## 2020-03-02 VITALS
WEIGHT: 135 LBS | BODY MASS INDEX: 26.37 KG/M2 | SYSTOLIC BLOOD PRESSURE: 147 MMHG | HEART RATE: 73 BPM | DIASTOLIC BLOOD PRESSURE: 79 MMHG

## 2020-03-02 VITALS
BODY MASS INDEX: 26.5 KG/M2 | HEART RATE: 73 BPM | DIASTOLIC BLOOD PRESSURE: 79 MMHG | SYSTOLIC BLOOD PRESSURE: 147 MMHG | OXYGEN SATURATION: 95 % | WEIGHT: 135 LBS | HEIGHT: 60 IN

## 2020-03-02 DIAGNOSIS — I48.19 OTHER PERSISTENT ATRIAL FIBRILLATION (HCC): Primary | ICD-10-CM

## 2020-03-02 DIAGNOSIS — I48.91 ATRIAL FIBRILLATION, UNSPECIFIED TYPE (HCC): ICD-10-CM

## 2020-03-02 DIAGNOSIS — Z79.01 LONG TERM (CURRENT) USE OF ANTICOAGULANTS: ICD-10-CM

## 2020-03-02 DIAGNOSIS — E78.00 PURE HYPERCHOLESTEROLEMIA: ICD-10-CM

## 2020-03-02 DIAGNOSIS — I10 ESSENTIAL HYPERTENSION: ICD-10-CM

## 2020-03-02 LAB — INR PPP: 2.7 (ref 0.9–1.1)

## 2020-03-02 PROCEDURE — 99213 OFFICE O/P EST LOW 20 MIN: CPT | Performed by: INTERNAL MEDICINE

## 2020-03-02 PROCEDURE — 85610 PROTHROMBIN TIME: CPT | Performed by: INTERNAL MEDICINE

## 2020-03-02 PROCEDURE — 36416 COLLJ CAPILLARY BLOOD SPEC: CPT | Performed by: INTERNAL MEDICINE

## 2020-03-02 RX ORDER — FUROSEMIDE 20 MG/1
20 TABLET ORAL 2 TIMES DAILY
COMMUNITY
End: 2020-03-19 | Stop reason: SDUPTHER

## 2020-03-02 RX ORDER — WARFARIN SODIUM 4 MG/1
4 TABLET ORAL
COMMUNITY
End: 2020-06-01 | Stop reason: DRUGHIGH

## 2020-03-02 RX ORDER — GLUCOSAMINE/D3/BOSWELLIA SERRA 1500MG-400
1 TABLET ORAL DAILY
COMMUNITY
Start: 2020-01-01

## 2020-03-02 RX ORDER — WARFARIN SODIUM 2 MG/1
TABLET ORAL
Qty: 40 TABLET | Refills: 2 | Status: SHIPPED | OUTPATIENT
Start: 2020-03-02 | End: 2020-06-01

## 2020-03-02 RX ORDER — DOCUSATE SODIUM 100 MG/1
2 CAPSULE, LIQUID FILLED ORAL 2 TIMES DAILY PRN
COMMUNITY
Start: 2022-02-17

## 2020-03-02 RX ORDER — SPIRONOLACTONE 25 MG
20 TABLET ORAL DAILY
COMMUNITY
Start: 2020-01-01

## 2020-03-02 NOTE — PROGRESS NOTES
"    Subjective:     Encounter Date:03/02/2020      Patient ID: Shruthi Amin is a 89 y.o. female.    Chief Complaint:  History of Present Illness 89-year-old white female with history of atrial fibrillation history of hypertension hyperlipidemia and CVA in the past presents to my office for follow-up.  Patient is currently stable without incidence of chest pain or shortness of breath at rest on exertion.  No complains of any PND orthopnea.  No palpitations dizziness syncope or swelling of the feet.  Patient is taking all the medicines regularly she does not smoke    The following portions of the patient's history were reviewed and updated as appropriate: allergies, current medications, past family history, past medical history, past social history, past surgical history and problem list.  Past Medical History:   Diagnosis Date   • Coronary artery disease     Atrial fibrillation   • GERD (gastroesophageal reflux disease)    • Glaucoma    • Hiatal hernia with gastroesophageal reflux    • Hypertension    • Stroke (CMS/HCC)     off and on dizziness from the stroke.     Past Surgical History:   Procedure Laterality Date   • BACK SURGERY      kyphoplasty   • EYE SURGERY      cataract surgery   • FIXATION KYPHOPLASTY LUMBAR SPINE  2013   • HIP TROCHANTERIC NAILING WITH INTRAMEDULLARY HIP SCREW Right 7/20/2019    Procedure: HIP TROCHANTERIC NAILING SHORT WITH INTRAMEDULLARY HIP SCREW;  Surgeon: Edward Centeno MD;  Location: [facility] OR;  Service: Orthopedics   • RECTAL SURGERY      x 3     /79 (BP Location: Left arm, Patient Position: Sitting)   Pulse 73   Ht 152.4 cm (60\")   Wt 61.2 kg (135 lb)   SpO2 95%   BMI 26.37 kg/m²   Family History   Problem Relation Age of Onset   • Heart disease Mother    • Hypertension Mother    • Cancer Father         colon cancer   • Cancer Sister         lung   • Cancer Brother         colon cancer       Current Outpatient Medications:   •  B Complex Vitamins " (VITAMIN B COMPLEX PO), Take 1 tablet by mouth Daily., Disp: , Rfl:   •  Bacillus Coagulans-Inulin (ALIGN PREBIOTIC-PROBIOTIC PO), Take  by mouth., Disp: , Rfl:   •  Biotin 21910 MCG tablet, Take  by mouth., Disp: , Rfl:   •  Calcium-Phosphorus-Vitamin D (CITRACAL +D3) 250-107-500 MG-MG-UNIT chewable tablet, Chew 1 tablet Daily., Disp: , Rfl:   •  cholecalciferol (VITAMIN D3) 400 units tablet, Take 400 Units by mouth Daily., Disp: , Rfl:   •  digoxin (LANOXIN) 125 MCG tablet, Take 125 mcg by mouth Daily., Disp: , Rfl:   •  docusate sodium (STOOL SOFTENER) 100 MG capsule, Take 100 mg by mouth 2 (Two) Times a Day., Disp: , Rfl:   •  furosemide (LASIX) 20 MG tablet, Take 20 mg by mouth 2 (Two) Times a Day., Disp: , Rfl:   •  GERMAN BERRY PO, Take  by mouth., Disp: , Rfl:   •  latanoprost (XALATAN) 0.005 % ophthalmic solution, Administer 1 drop to the right eye Every Night., Disp: , Rfl:   •  Lutein 20 MG capsule, Take  by mouth., Disp: , Rfl:   •  magnesium gluconate (MAGONATE) 500 MG tablet, Take 27 mg by mouth 2 (Two) Times a Day., Disp: , Rfl:   •  metoprolol succinate XL (TOPROL-XL) 25 MG 24 hr tablet, Take 1 tablet by mouth 2 (Two) Times a Day., Disp: 180 tablet, Rfl: 1  •  vitamin C (ASCORBIC ACID) 500 MG tablet, Take 500 mg by mouth Daily., Disp: , Rfl:   •  warfarin (COUMADIN) 2 MG tablet, Take 2 tabs on Monday and Thursday with 1 tab all other days, po, qd after 4 pm or as directed., Disp: 40 tablet, Rfl: 3  •  warfarin (COUMADIN) 4 MG tablet, Take 4 mg by mouth Daily. Monday and Thursday 4mg, Disp: , Rfl:   Allergies   Allergen Reactions   • Codeine Nausea And Vomiting   • Penicillins Hives   • Latex Rash     Social History     Socioeconomic History   • Marital status:      Spouse name: Not on file   • Number of children: Not on file   • Years of education: Not on file   • Highest education level: Not on file   Tobacco Use   • Smoking status: Never Smoker   • Smokeless tobacco: Never Used    Substance and Sexual Activity   • Alcohol use: No     Frequency: Never   • Drug use: No   • Sexual activity: Defer     Review of Systems   Constitution: Negative for fever and malaise/fatigue.   Cardiovascular: Negative for chest pain, dyspnea on exertion, leg swelling and palpitations.   Respiratory: Negative for cough and shortness of breath.    Skin: Negative for rash.   Gastrointestinal: Negative for abdominal pain, nausea and vomiting.   Neurological: Negative for focal weakness and headaches.   All other systems reviewed and are negative.             Objective:     Physical Exam   Constitutional: She appears well-developed and well-nourished.   HENT:   Head: Normocephalic and atraumatic.   Eyes: Conjunctivae are normal. No scleral icterus.   Neck: Normal range of motion. Neck supple. No JVD present. Carotid bruit is not present.   Cardiovascular: Normal rate, regular rhythm, S1 normal, S2 normal and intact distal pulses. PMI is not displaced.   Murmur heard.  Pulmonary/Chest: Effort normal and breath sounds normal. She has no wheezes. She has no rales.   Abdominal: Soft. Bowel sounds are normal.   Neurological: She is alert. She has normal strength.   Skin: Skin is warm and dry. No rash noted.     Procedures    Lab Review:       Assessment:          Diagnosis Plan   1. Other persistent atrial fibrillation     2. Pure hypercholesterolemia     3. Essential hypertension            Plan:       Patient has history of atrial fibrillation is currently stable on warfarin digoxin and metoprolol  Patient's blood pressure and heart rate stable  Patient's lipid levels are followed by the primary care doctor.

## 2020-03-09 RX ORDER — METOPROLOL SUCCINATE 25 MG/1
TABLET, EXTENDED RELEASE ORAL
Qty: 180 TABLET | Refills: 1 | Status: SHIPPED | OUTPATIENT
Start: 2020-03-09 | End: 2020-09-03

## 2020-03-19 RX ORDER — FUROSEMIDE 20 MG/1
20 TABLET ORAL AS NEEDED
Qty: 30 TABLET | Refills: 3 | Status: SHIPPED | OUTPATIENT
Start: 2020-03-19 | End: 2020-12-21

## 2020-04-07 RX ORDER — DIGOXIN 125 MCG
125 TABLET ORAL DAILY
Qty: 90 TABLET | Refills: 1 | Status: SHIPPED | OUTPATIENT
Start: 2020-04-07 | End: 2020-10-05

## 2020-04-24 ENCOUNTER — TELEPHONE (OUTPATIENT)
Dept: FAMILY MEDICINE CLINIC | Facility: CLINIC | Age: 85
End: 2020-04-24

## 2020-05-05 ENCOUNTER — ANTICOAGULATION VISIT (OUTPATIENT)
Dept: CARDIOLOGY | Facility: CLINIC | Age: 85
End: 2020-05-05

## 2020-05-05 VITALS — BODY MASS INDEX: 26.07 KG/M2 | HEART RATE: 71 BPM | WEIGHT: 133.5 LBS

## 2020-05-05 DIAGNOSIS — Z79.01 LONG TERM (CURRENT) USE OF ANTICOAGULANTS: ICD-10-CM

## 2020-05-05 DIAGNOSIS — I48.19 OTHER PERSISTENT ATRIAL FIBRILLATION (HCC): ICD-10-CM

## 2020-05-05 LAB — INR PPP: 2.9 (ref 0.9–1.1)

## 2020-05-05 PROCEDURE — 85610 PROTHROMBIN TIME: CPT | Performed by: INTERNAL MEDICINE

## 2020-05-05 PROCEDURE — 36416 COLLJ CAPILLARY BLOOD SPEC: CPT | Performed by: INTERNAL MEDICINE

## 2020-06-01 RX ORDER — WARFARIN SODIUM 2 MG/1
TABLET ORAL
Qty: 120 TABLET | Refills: 0 | Status: SHIPPED | OUTPATIENT
Start: 2020-06-01 | End: 2020-06-29

## 2020-06-02 ENCOUNTER — OFFICE VISIT (OUTPATIENT)
Dept: FAMILY MEDICINE CLINIC | Facility: CLINIC | Age: 85
End: 2020-06-02

## 2020-06-02 ENCOUNTER — LAB (OUTPATIENT)
Dept: LAB | Facility: HOSPITAL | Age: 85
End: 2020-06-02

## 2020-06-02 VITALS
DIASTOLIC BLOOD PRESSURE: 84 MMHG | RESPIRATION RATE: 20 BRPM | SYSTOLIC BLOOD PRESSURE: 155 MMHG | WEIGHT: 134.4 LBS | BODY MASS INDEX: 26.39 KG/M2 | HEIGHT: 60 IN | OXYGEN SATURATION: 94 % | HEART RATE: 78 BPM

## 2020-06-02 DIAGNOSIS — I48.19 OTHER PERSISTENT ATRIAL FIBRILLATION (HCC): ICD-10-CM

## 2020-06-02 DIAGNOSIS — E78.00 PURE HYPERCHOLESTEROLEMIA: ICD-10-CM

## 2020-06-02 DIAGNOSIS — M81.0 OSTEOPOROSIS, UNSPECIFIED OSTEOPOROSIS TYPE, UNSPECIFIED PATHOLOGICAL FRACTURE PRESENCE: ICD-10-CM

## 2020-06-02 DIAGNOSIS — K44.9 HIATAL HERNIA: ICD-10-CM

## 2020-06-02 DIAGNOSIS — I63.10 CEREBROVASCULAR ACCIDENT (CVA) DUE TO EMBOLISM OF PRECEREBRAL ARTERY (HCC): ICD-10-CM

## 2020-06-02 DIAGNOSIS — I10 ESSENTIAL HYPERTENSION: Primary | ICD-10-CM

## 2020-06-02 DIAGNOSIS — M19.91 PRIMARY OSTEOARTHRITIS, UNSPECIFIED SITE: ICD-10-CM

## 2020-06-02 DIAGNOSIS — I10 ESSENTIAL HYPERTENSION: ICD-10-CM

## 2020-06-02 PROBLEM — S72.144A NONDISPLACED INTERTROCHANTERIC FRACTURE OF RIGHT FEMUR, INITIAL ENCOUNTER FOR CLOSED FRACTURE: Status: RESOLVED | Noted: 2019-07-19 | Resolved: 2020-06-02

## 2020-06-02 PROBLEM — Z79.01 LONG TERM (CURRENT) USE OF ANTICOAGULANTS: Status: RESOLVED | Noted: 2019-07-10 | Resolved: 2020-06-02

## 2020-06-02 PROBLEM — S81.812A LACERATION OF LEFT LOWER EXTREMITY: Status: RESOLVED | Noted: 2020-02-03 | Resolved: 2020-06-02

## 2020-06-02 LAB
ALBUMIN SERPL-MCNC: 4.3 G/DL (ref 3.5–5.2)
ALBUMIN/GLOB SERPL: 1.7 G/DL
ALP SERPL-CCNC: 73 U/L (ref 39–117)
ALT SERPL W P-5'-P-CCNC: 31 U/L (ref 1–33)
ANION GAP SERPL CALCULATED.3IONS-SCNC: 8.6 MMOL/L (ref 5–15)
AST SERPL-CCNC: 42 U/L (ref 1–32)
BASOPHILS # BLD AUTO: 0.11 10*3/MM3 (ref 0–0.2)
BASOPHILS NFR BLD AUTO: 1.3 % (ref 0–1.5)
BILIRUB SERPL-MCNC: 0.8 MG/DL (ref 0.2–1.2)
BUN BLD-MCNC: 11 MG/DL (ref 8–23)
BUN/CREAT SERPL: 16.2 (ref 7–25)
CALCIUM SPEC-SCNC: 9.6 MG/DL (ref 8.2–9.6)
CHLORIDE SERPL-SCNC: 101 MMOL/L (ref 98–107)
CHOLEST SERPL-MCNC: 170 MG/DL (ref 0–200)
CO2 SERPL-SCNC: 29.4 MMOL/L (ref 22–29)
CREAT BLD-MCNC: 0.68 MG/DL (ref 0.57–1)
DEPRECATED RDW RBC AUTO: 43.3 FL (ref 37–54)
EOSINOPHIL # BLD AUTO: 0.54 10*3/MM3 (ref 0–0.4)
EOSINOPHIL NFR BLD AUTO: 6.5 % (ref 0.3–6.2)
ERYTHROCYTE [DISTWIDTH] IN BLOOD BY AUTOMATED COUNT: 12.5 % (ref 12.3–15.4)
GFR SERPL CREATININE-BSD FRML MDRD: 81 ML/MIN/1.73
GLOBULIN UR ELPH-MCNC: 2.5 GM/DL
GLUCOSE BLD-MCNC: 105 MG/DL (ref 65–99)
HCT VFR BLD AUTO: 40.6 % (ref 34–46.6)
HDLC SERPL-MCNC: 64 MG/DL (ref 40–60)
HGB BLD-MCNC: 13.6 G/DL (ref 12–15.9)
IMM GRANULOCYTES # BLD AUTO: 0.07 10*3/MM3 (ref 0–0.05)
IMM GRANULOCYTES NFR BLD AUTO: 0.8 % (ref 0–0.5)
LDLC SERPL CALC-MCNC: 79 MG/DL (ref 0–100)
LDLC/HDLC SERPL: 1.24 {RATIO}
LYMPHOCYTES # BLD AUTO: 1.85 10*3/MM3 (ref 0.7–3.1)
LYMPHOCYTES NFR BLD AUTO: 22.1 % (ref 19.6–45.3)
MCH RBC QN AUTO: 31.7 PG (ref 26.6–33)
MCHC RBC AUTO-ENTMCNC: 33.5 G/DL (ref 31.5–35.7)
MCV RBC AUTO: 94.6 FL (ref 79–97)
MONOCYTES # BLD AUTO: 1.18 10*3/MM3 (ref 0.1–0.9)
MONOCYTES NFR BLD AUTO: 14.1 % (ref 5–12)
NEUTROPHILS # BLD AUTO: 4.62 10*3/MM3 (ref 1.7–7)
NEUTROPHILS NFR BLD AUTO: 55.2 % (ref 42.7–76)
NRBC BLD AUTO-RTO: 0 /100 WBC (ref 0–0.2)
PLATELET # BLD AUTO: 175 10*3/MM3 (ref 140–450)
PMV BLD AUTO: 10.8 FL (ref 6–12)
POTASSIUM BLD-SCNC: 4.3 MMOL/L (ref 3.5–5.2)
PROT SERPL-MCNC: 6.8 G/DL (ref 6–8.5)
RBC # BLD AUTO: 4.29 10*6/MM3 (ref 3.77–5.28)
SODIUM BLD-SCNC: 139 MMOL/L (ref 136–145)
TRIGL SERPL-MCNC: 134 MG/DL (ref 0–150)
VLDLC SERPL-MCNC: 26.8 MG/DL (ref 5–40)
WBC NRBC COR # BLD: 8.37 10*3/MM3 (ref 3.4–10.8)

## 2020-06-02 PROCEDURE — 36415 COLL VENOUS BLD VENIPUNCTURE: CPT

## 2020-06-02 PROCEDURE — 85025 COMPLETE CBC W/AUTO DIFF WBC: CPT

## 2020-06-02 PROCEDURE — 80053 COMPREHEN METABOLIC PANEL: CPT

## 2020-06-02 PROCEDURE — 80061 LIPID PANEL: CPT

## 2020-06-02 PROCEDURE — 99214 OFFICE O/P EST MOD 30 MIN: CPT | Performed by: FAMILY MEDICINE

## 2020-06-02 NOTE — PROGRESS NOTES
"Subjective   Shruthi Amin is a 89 y.o. female.     Chief Complaint   Patient presents with   • Hypertension   • Atrial Fibrillation   • Cerebrovascular Accident       HPI  Chief complaint: Hypertension atrial fibrillation status post CVA osteoporosis arthritis    Patient is 89-year-old white female comes in for follow-up and maintenance of her current problems which include    1.  Atrial fibrillation-stable-patient on Coumadin and beta-blocker and Lanoxin.  She denied episodes of rapid or slow heart rhythm.  She denied heart palpitations lightheadedness or dizziness.    2.  Hypertension-stable-patient on metoprolol 25 mg daily and Lasix 20 mg daily and potassium replacement therapy.  She denied headache lightheadedness dizziness or chest pain.    3.  Hyperlipidemia-stable-patient is on a diet and multiple homeopathic medications.    4.  Osteoporosis-stable-patient on calcium with vitamin D.  She denied bone pain or fractures.    5.  Status post CVA-stable-patient's had an embolic stroke.  She has had minimal residual neurological impairment.      The following portions of the patient's history were reviewed and updated as appropriate: allergies, current medications, past family history, past medical history, past social history, past surgical history and problem list.    Review of Systems    Objective     /84 (BP Location: Left arm, Patient Position: Sitting, Cuff Size: Adult)   Pulse 78   Resp 20   Ht 152.4 cm (60\")   Wt 61 kg (134 lb 6.4 oz)   SpO2 94%   BMI 26.25 kg/m²     Physical Exam   Constitutional: She is oriented to person, place, and time.   HENT:   Head: Normocephalic and atraumatic.   Eyes: Pupils are equal, round, and reactive to light. Conjunctivae and EOM are normal.   Neck: Normal range of motion. Neck supple.   Cardiovascular: Normal rate, normal heart sounds and intact distal pulses. An irregularly irregular rhythm present.   Pulmonary/Chest: Effort normal and breath sounds normal. "   Abdominal: Soft. Bowel sounds are normal.   Musculoskeletal: Normal range of motion.   Neurological: She is alert and oriented to person, place, and time.   Skin: Skin is warm and dry.   Psychiatric: She has a normal mood and affect. Her behavior is normal.   Nursing note and vitals reviewed.        Assessment/Plan   Shruthi was seen today for hypertension, atrial fibrillation and cerebrovascular accident.    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    Follow up in the office in 6 months.    Get the Tetanus vaccine and the shingles vaccine.    Follow up in the office in 6 months.      Og Jeronimo Jr., MD    06/02/20

## 2020-06-02 NOTE — PATIENT INSTRUCTIONS
Continue your current medications and treatment.    Follow up in the office in 6 months.    Get the Tetanus vaccine and the shingles vaccine.    Follow up in the office in 6 months.

## 2020-06-10 ENCOUNTER — ANTICOAGULATION VISIT (OUTPATIENT)
Dept: CARDIOLOGY | Facility: CLINIC | Age: 85
End: 2020-06-10

## 2020-06-10 VITALS
HEART RATE: 70 BPM | BODY MASS INDEX: 26.37 KG/M2 | WEIGHT: 135 LBS | DIASTOLIC BLOOD PRESSURE: 82 MMHG | SYSTOLIC BLOOD PRESSURE: 154 MMHG

## 2020-06-10 DIAGNOSIS — I48.19 OTHER PERSISTENT ATRIAL FIBRILLATION (HCC): ICD-10-CM

## 2020-06-10 LAB — INR PPP: 2.4 (ref 0.9–1.1)

## 2020-06-10 PROCEDURE — 36416 COLLJ CAPILLARY BLOOD SPEC: CPT | Performed by: INTERNAL MEDICINE

## 2020-06-10 PROCEDURE — 85610 PROTHROMBIN TIME: CPT | Performed by: INTERNAL MEDICINE

## 2020-06-29 RX ORDER — WARFARIN SODIUM 2 MG/1
TABLET ORAL
Qty: 120 TABLET | Refills: 0 | Status: SHIPPED | OUTPATIENT
Start: 2020-06-29 | End: 2020-12-21

## 2020-07-22 ENCOUNTER — ANTICOAGULATION VISIT (OUTPATIENT)
Dept: CARDIOLOGY | Facility: CLINIC | Age: 85
End: 2020-07-22

## 2020-07-22 VITALS
DIASTOLIC BLOOD PRESSURE: 69 MMHG | BODY MASS INDEX: 26.17 KG/M2 | WEIGHT: 134 LBS | OXYGEN SATURATION: 95 % | SYSTOLIC BLOOD PRESSURE: 131 MMHG | HEART RATE: 73 BPM

## 2020-07-22 DIAGNOSIS — I48.19 OTHER PERSISTENT ATRIAL FIBRILLATION (HCC): ICD-10-CM

## 2020-07-22 LAB — INR PPP: 2 (ref 0.9–1.1)

## 2020-07-22 PROCEDURE — 85610 PROTHROMBIN TIME: CPT | Performed by: INTERNAL MEDICINE

## 2020-07-22 PROCEDURE — 36416 COLLJ CAPILLARY BLOOD SPEC: CPT | Performed by: INTERNAL MEDICINE

## 2020-08-26 ENCOUNTER — OFFICE VISIT (OUTPATIENT)
Dept: CARDIOLOGY | Facility: CLINIC | Age: 85
End: 2020-08-26

## 2020-08-26 ENCOUNTER — ANTICOAGULATION VISIT (OUTPATIENT)
Dept: CARDIOLOGY | Facility: CLINIC | Age: 85
End: 2020-08-26

## 2020-08-26 VITALS
OXYGEN SATURATION: 95 % | HEART RATE: 68 BPM | DIASTOLIC BLOOD PRESSURE: 80 MMHG | WEIGHT: 129 LBS | BODY MASS INDEX: 25.32 KG/M2 | HEIGHT: 60 IN | SYSTOLIC BLOOD PRESSURE: 139 MMHG

## 2020-08-26 VITALS
BODY MASS INDEX: 25.19 KG/M2 | SYSTOLIC BLOOD PRESSURE: 139 MMHG | HEART RATE: 68 BPM | DIASTOLIC BLOOD PRESSURE: 80 MMHG | WEIGHT: 129 LBS

## 2020-08-26 DIAGNOSIS — I48.19 OTHER PERSISTENT ATRIAL FIBRILLATION (HCC): Primary | ICD-10-CM

## 2020-08-26 DIAGNOSIS — R06.02 SHORTNESS OF BREATH: ICD-10-CM

## 2020-08-26 DIAGNOSIS — I48.19 OTHER PERSISTENT ATRIAL FIBRILLATION (HCC): ICD-10-CM

## 2020-08-26 DIAGNOSIS — E78.00 PURE HYPERCHOLESTEROLEMIA: ICD-10-CM

## 2020-08-26 DIAGNOSIS — I10 ESSENTIAL HYPERTENSION: ICD-10-CM

## 2020-08-26 LAB — INR PPP: 2.1 (ref 0.9–1.1)

## 2020-08-26 PROCEDURE — 36416 COLLJ CAPILLARY BLOOD SPEC: CPT | Performed by: INTERNAL MEDICINE

## 2020-08-26 PROCEDURE — 99213 OFFICE O/P EST LOW 20 MIN: CPT | Performed by: INTERNAL MEDICINE

## 2020-08-26 PROCEDURE — 85610 PROTHROMBIN TIME: CPT | Performed by: INTERNAL MEDICINE

## 2020-08-26 NOTE — PROGRESS NOTES
"    Subjective:     Encounter Date:08/26/2020      Patient ID: Shruthi Amin is a 89 y.o. female.    Chief Complaint:  History of Present Illness 89-year-old white female with history of coronary disease history of atrial fibrillation hypertension history of CVA hyperlipidemia presents to my office for follow-up.  Patient is currently stable with absence of chest pain but has some shortness of breath with exertion.  No complains of any PND orthopnea.  No palpitation dizziness syncope or swelling of the feet.  Patient has been taking all the medicines regularly.  Patient does not smoke.  Patient is trying to exercise regularly she follows a good diet    The following portions of the patient's history were reviewed and updated as appropriate: allergies, current medications, past family history, past medical history, past social history, past surgical history and problem list.  Past Medical History:   Diagnosis Date   • Coronary artery disease     Atrial fibrillation   • GERD (gastroesophageal reflux disease)    • Glaucoma    • Hiatal hernia with gastroesophageal reflux    • Hypertension    • Stroke (CMS/HCC)     off and on dizziness from the stroke.     Past Surgical History:   Procedure Laterality Date   • BACK SURGERY      kyphoplasty   • EYE SURGERY      cataract surgery   • FIXATION KYPHOPLASTY LUMBAR SPINE  2013   • HIP TROCHANTERIC NAILING WITH INTRAMEDULLARY HIP SCREW Right 7/20/2019    Procedure: HIP TROCHANTERIC NAILING SHORT WITH INTRAMEDULLARY HIP SCREW;  Surgeon: Edward Centeno MD;  Location: [facility];  Service: Orthopedics   • RECTAL SURGERY      x 3     /80 (BP Location: Left arm, Patient Position: Sitting)   Pulse 68   Ht 152.4 cm (60\")   Wt 58.5 kg (129 lb)   SpO2 95%   BMI 25.19 kg/m²   Family History   Problem Relation Age of Onset   • Heart disease Mother    • Hypertension Mother    • Cancer Father         colon cancer   • Cancer Sister         lung   • Cancer Brother  "        colon cancer       Current Outpatient Medications:   •  B Complex Vitamins (VITAMIN B COMPLEX PO), Take 1 tablet by mouth Daily., Disp: , Rfl:   •  Bacillus Coagulans-Inulin (ALIGN PREBIOTIC-PROBIOTIC PO), Take  by mouth., Disp: , Rfl:   •  Biotin 00666 MCG tablet, Take  by mouth., Disp: , Rfl:   •  Calcium-Phosphorus-Vitamin D (CITRACAL +D3) 250-107-500 MG-MG-UNIT chewable tablet, Chew 1 tablet Daily., Disp: , Rfl:   •  cholecalciferol (VITAMIN D3) 400 units tablet, Take 400 Units by mouth Daily., Disp: , Rfl:   •  digoxin (LANOXIN) 125 MCG tablet, Take 1 tablet by mouth Daily., Disp: 90 tablet, Rfl: 1  •  docusate sodium (STOOL SOFTENER) 100 MG capsule, Take 100 mg by mouth 2 (Two) Times a Day., Disp: , Rfl:   •  furosemide (LASIX) 20 MG tablet, Take 1 tablet by mouth As Needed (swelling)., Disp: 30 tablet, Rfl: 3  •  GERMAN BERRY PO, Take  by mouth., Disp: , Rfl:   •  latanoprost (XALATAN) 0.005 % ophthalmic solution, Administer 1 drop to the right eye Every Night., Disp: , Rfl:   •  Lutein 20 MG capsule, Take  by mouth., Disp: , Rfl:   •  magnesium gluconate (MAGONATE) 500 MG tablet, Take 27 mg by mouth 2 (Two) Times a Day., Disp: , Rfl:   •  metoprolol succinate XL (TOPROL-XL) 25 MG 24 hr tablet, TAKE 1 TABLET BY MOUTH TWICE A DAY (Patient taking differently: Take  by mouth 2 (two) times a day.), Disp: 180 tablet, Rfl: 1  •  vitamin C (ASCORBIC ACID) 500 MG tablet, Take 500 mg by mouth Daily., Disp: , Rfl:   •  warfarin (COUMADIN) 2 MG tablet, TAKE 2 TABS ON MON AND THURS AND 1 TAB ALL OTHER DAYS BY MOUTH OR AS DIRECTED., Disp: 120 tablet, Rfl: 0  Allergies   Allergen Reactions   • Codeine Nausea And Vomiting   • Penicillins Hives   • Latex Rash     Social History     Socioeconomic History   • Marital status:      Spouse name: Not on file   • Number of children: Not on file   • Years of education: Not on file   • Highest education level: Not on file   Tobacco Use   • Smoking status: Never  Smoker   • Smokeless tobacco: Never Used   Substance and Sexual Activity   • Alcohol use: No     Frequency: Never   • Drug use: No   • Sexual activity: Defer     Review of Systems   Constitution: Negative for fever and malaise/fatigue.   Cardiovascular: Negative for chest pain, dyspnea on exertion and palpitations.   Respiratory: Positive for shortness of breath. Negative for cough.    Skin: Negative for rash.   Gastrointestinal: Positive for nausea. Negative for abdominal pain and vomiting.   Neurological: Negative for focal weakness and headaches.   All other systems reviewed and are negative.             Objective:     Physical Exam   Constitutional: She appears well-developed and well-nourished.   HENT:   Head: Normocephalic and atraumatic.   Eyes: Conjunctivae are normal. No scleral icterus.   Neck: Normal range of motion. Neck supple. No JVD present. Carotid bruit is not present.   Cardiovascular: Normal rate, regular rhythm, S1 normal, S2 normal and intact distal pulses. PMI is not displaced.   Murmur heard.  Pulmonary/Chest: Effort normal and breath sounds normal. She has no wheezes. She has no rales.   Abdominal: Soft. Bowel sounds are normal.   Neurological: She is alert. She has normal strength.   Skin: Skin is warm and dry. No rash noted.     Procedures    Lab Review:       Assessment:          Diagnosis Plan   1. Other persistent atrial fibrillation (CMS/HCC)     2. Pure hypercholesterolemia     3. Essential hypertension     4. Shortness of breath            Plan:     Patient has history of atrial fibrillation is currently stable on medical therapy including warfarin  Patient has shortness of breath and hence I will increase her Lasix to 20 mg once a day and add potassium  His blood pressure heart rate stable  Patient lipid levels are followed by the primary care doctor

## 2020-09-03 RX ORDER — METOPROLOL SUCCINATE 25 MG/1
TABLET, EXTENDED RELEASE ORAL
Qty: 180 TABLET | Refills: 1 | Status: SHIPPED | OUTPATIENT
Start: 2020-09-03 | End: 2020-12-23

## 2020-09-09 ENCOUNTER — LAB (OUTPATIENT)
Dept: LAB | Facility: HOSPITAL | Age: 85
End: 2020-09-09

## 2020-09-09 ENCOUNTER — OFFICE VISIT (OUTPATIENT)
Dept: FAMILY MEDICINE CLINIC | Facility: CLINIC | Age: 85
End: 2020-09-09

## 2020-09-09 ENCOUNTER — HOSPITAL ENCOUNTER (OUTPATIENT)
Dept: GENERAL RADIOLOGY | Facility: HOSPITAL | Age: 85
Discharge: HOME | End: 2020-09-09

## 2020-09-09 VITALS
OXYGEN SATURATION: 94 % | DIASTOLIC BLOOD PRESSURE: 87 MMHG | HEART RATE: 77 BPM | BODY MASS INDEX: 20.7 KG/M2 | RESPIRATION RATE: 15 BRPM | HEIGHT: 66 IN | WEIGHT: 128.8 LBS | SYSTOLIC BLOOD PRESSURE: 142 MMHG | TEMPERATURE: 97.3 F

## 2020-09-09 DIAGNOSIS — J44.0 COPD WITH LOWER RESPIRATORY INFECTION (HCC): ICD-10-CM

## 2020-09-09 DIAGNOSIS — J22 ACUTE RESPIRATORY INFECTION: Primary | ICD-10-CM

## 2020-09-09 LAB
ALBUMIN SERPL-MCNC: 4.3 G/DL (ref 3.5–5.2)
ALBUMIN/GLOB SERPL: 2 G/DL
ALP SERPL-CCNC: 78 U/L (ref 39–117)
ALT SERPL W P-5'-P-CCNC: 34 U/L (ref 1–33)
ANION GAP SERPL CALCULATED.3IONS-SCNC: 8.3 MMOL/L (ref 5–15)
AST SERPL-CCNC: 34 U/L (ref 1–32)
B PARAPERT DNA SPEC QL NAA+PROBE: NOT DETECTED
B PERT DNA SPEC QL NAA+PROBE: NOT DETECTED
BASOPHILS # BLD AUTO: 0.1 10*3/MM3 (ref 0–0.2)
BASOPHILS NFR BLD AUTO: 1.1 % (ref 0–1.5)
BILIRUB SERPL-MCNC: 1.1 MG/DL (ref 0–1.2)
BUN SERPL-MCNC: 11 MG/DL (ref 8–23)
BUN/CREAT SERPL: 17.2 (ref 7–25)
C PNEUM DNA NPH QL NAA+NON-PROBE: NOT DETECTED
CALCIUM SPEC-SCNC: 9.5 MG/DL (ref 8.2–9.6)
CHLORIDE SERPL-SCNC: 97 MMOL/L (ref 98–107)
CO2 SERPL-SCNC: 28.7 MMOL/L (ref 22–29)
CREAT SERPL-MCNC: 0.64 MG/DL (ref 0.57–1)
DEPRECATED RDW RBC AUTO: 43.6 FL (ref 37–54)
EOSINOPHIL # BLD AUTO: 0.31 10*3/MM3 (ref 0–0.4)
EOSINOPHIL NFR BLD AUTO: 3.5 % (ref 0.3–6.2)
ERYTHROCYTE [DISTWIDTH] IN BLOOD BY AUTOMATED COUNT: 12.6 % (ref 12.3–15.4)
FLUAV H1 2009 PAND RNA NPH QL NAA+PROBE: NOT DETECTED
FLUAV H1 HA GENE NPH QL NAA+PROBE: NOT DETECTED
FLUAV H3 RNA NPH QL NAA+PROBE: NOT DETECTED
FLUAV SUBTYP SPEC NAA+PROBE: NOT DETECTED
FLUBV RNA ISLT QL NAA+PROBE: NOT DETECTED
GFR SERPL CREATININE-BSD FRML MDRD: 87 ML/MIN/1.73
GLOBULIN UR ELPH-MCNC: 2.1 GM/DL
GLUCOSE SERPL-MCNC: 80 MG/DL (ref 65–99)
HADV DNA SPEC NAA+PROBE: NOT DETECTED
HCOV 229E RNA SPEC QL NAA+PROBE: NOT DETECTED
HCOV HKU1 RNA SPEC QL NAA+PROBE: NOT DETECTED
HCOV NL63 RNA SPEC QL NAA+PROBE: NOT DETECTED
HCOV OC43 RNA SPEC QL NAA+PROBE: NOT DETECTED
HCT VFR BLD AUTO: 39.9 % (ref 34–46.6)
HGB BLD-MCNC: 13.6 G/DL (ref 12–15.9)
HMPV RNA NPH QL NAA+NON-PROBE: NOT DETECTED
HPIV1 RNA SPEC QL NAA+PROBE: NOT DETECTED
HPIV2 RNA SPEC QL NAA+PROBE: NOT DETECTED
HPIV3 RNA NPH QL NAA+PROBE: NOT DETECTED
HPIV4 P GENE NPH QL NAA+PROBE: NOT DETECTED
IMM GRANULOCYTES # BLD AUTO: 0.16 10*3/MM3 (ref 0–0.05)
IMM GRANULOCYTES NFR BLD AUTO: 1.8 % (ref 0–0.5)
LYMPHOCYTES # BLD AUTO: 2.06 10*3/MM3 (ref 0.7–3.1)
LYMPHOCYTES NFR BLD AUTO: 23.5 % (ref 19.6–45.3)
M PNEUMO IGG SER IA-ACNC: NOT DETECTED
MCH RBC QN AUTO: 32 PG (ref 26.6–33)
MCHC RBC AUTO-ENTMCNC: 34.1 G/DL (ref 31.5–35.7)
MCV RBC AUTO: 93.9 FL (ref 79–97)
MONOCYTES # BLD AUTO: 1.19 10*3/MM3 (ref 0.1–0.9)
MONOCYTES NFR BLD AUTO: 13.6 % (ref 5–12)
NEUTROPHILS NFR BLD AUTO: 4.95 10*3/MM3 (ref 1.7–7)
NEUTROPHILS NFR BLD AUTO: 56.5 % (ref 42.7–76)
NRBC BLD AUTO-RTO: 0.1 /100 WBC (ref 0–0.2)
PLATELET # BLD AUTO: 207 10*3/MM3 (ref 140–450)
PMV BLD AUTO: 11.1 FL (ref 6–12)
POTASSIUM SERPL-SCNC: 4.3 MMOL/L (ref 3.5–5.2)
PROT SERPL-MCNC: 6.4 G/DL (ref 6–8.5)
RBC # BLD AUTO: 4.25 10*6/MM3 (ref 3.77–5.28)
RHINOVIRUS RNA SPEC NAA+PROBE: DETECTED
RSV RNA NPH QL NAA+NON-PROBE: NOT DETECTED
SARS-COV-2 RNA NPH QL NAA+NON-PROBE: NOT DETECTED
SODIUM SERPL-SCNC: 134 MMOL/L (ref 136–145)
WBC # BLD AUTO: 8.77 10*3/MM3 (ref 3.4–10.8)

## 2020-09-09 PROCEDURE — 85025 COMPLETE CBC W/AUTO DIFF WBC: CPT

## 2020-09-09 PROCEDURE — 99214 OFFICE O/P EST MOD 30 MIN: CPT | Performed by: FAMILY MEDICINE

## 2020-09-09 PROCEDURE — 36415 COLL VENOUS BLD VENIPUNCTURE: CPT

## 2020-09-09 PROCEDURE — C9803 HOPD COVID-19 SPEC COLLECT: HCPCS

## 2020-09-09 PROCEDURE — 0202U NFCT DS 22 TRGT SARS-COV-2: CPT

## 2020-09-09 PROCEDURE — 71046 X-RAY EXAM CHEST 2 VIEWS: CPT

## 2020-09-09 PROCEDURE — 80053 COMPREHEN METABOLIC PANEL: CPT

## 2020-09-09 NOTE — PATIENT INSTRUCTIONS
Continue your current medications and treatment.    Have the follow up labs and the xray done and call for results.    Quarantine your self for at least 7 days and 24 hours after your symptoms have resolved.    Further care will depend on the results of the tests and how you progress.

## 2020-09-09 NOTE — PROGRESS NOTES
"Subjective   Shruthi Amin is a 89 y.o. female.     Chief Complaint   Patient presents with   • Bronchitis     urgent care followup, still coughing with phlem       HPI  Chief complaint: Respiratory infection    Patient is a 89-year-old white female who comes in for follow-up and maintenance of her current problems which include    1.  Respiratory infection-new-patient states she became ill about a week to 10 days ago with cough and chest congestion.  States the cough is productive of sputum.  She denied fever chills.  She denied sore throat or sinus congestion.  Patient does feel tired.  The patient went to an urgent care center.  She was diagnosed with bronchitis the patient was given doxycycline.  Patient refused testing for COVID-19 at that time.  The patient has not been quarantining her self.      The following portions of the patient's history were reviewed and updated as appropriate: allergies, current medications, past family history, past medical history, past social history, past surgical history and problem list.    Review of Systems    Objective     /87 (BP Location: Right arm, Patient Position: Sitting, Cuff Size: Adult)   Pulse 77   Temp 97.3 °F (36.3 °C) (Temporal)   Resp 15   Ht 167.6 cm (66\")   Wt 58.4 kg (128 lb 12.8 oz)   SpO2 94%   BMI 20.79 kg/m²     Physical Exam   Constitutional: She is oriented to person, place, and time. She appears well-developed and well-nourished.   HENT:   Head: Normocephalic and atraumatic.   Eyes: Pupils are equal, round, and reactive to light. Conjunctivae and EOM are normal.   Neck: Normal range of motion. Neck supple.   Cardiovascular: Normal rate, normal heart sounds and intact distal pulses. An irregularly irregular rhythm present.   Pulmonary/Chest: Effort normal and breath sounds normal.   Abdominal: Soft. Bowel sounds are normal.   Musculoskeletal: Normal range of motion.   Neurological: She is alert and oriented to person, place, and time. "   Skin: Skin is warm and dry.   Psychiatric: She has a normal mood and affect. Her behavior is normal.   Nursing note and vitals reviewed.        Assessment/Plan   Shruthi was seen today for bronchitis.    Diagnoses and all orders for this visit:    Acute respiratory infection-patient was advised that she has a respiratory infection that could be due to COVID-19 or other respiratory virus.  The patient was advised to quarantine herself for the 7 days after symptoms began and 24 hours after her symptoms resolved.  Recommended chest x-ray laboratory testing respiratory virus panel.    COPD with lower respiratory infection (CMS/[facility])  -     CBC & Differential; Future  -     Comprehensive Metabolic Panel; Future  -     COVID-19,[facility] ROUTINE, NP/OP SWAB IN TRANSPORT MEDIA OR ESWAB 72 HR TAT - Swab, Nasopharynx; Future  -     Respiratory Panel, PCR - Swab, Nasopharynx; Future  -     XR Chest 2 View; Future      Patient Instructions   Continue your current medications and treatment.    Have the follow up labs and the xray done and call for results.    Quarantine your self for at least 7 days and 24 hours after your symptoms have resolved.    Further care will depend on the results of the tests and how you progress.          Og Jeronimo Jr., MD    09/09/20

## 2020-09-10 ENCOUNTER — TELEPHONE (OUTPATIENT)
Dept: FAMILY MEDICINE CLINIC | Facility: CLINIC | Age: 85
End: 2020-09-10

## 2020-09-10 RX ORDER — DOXYCYCLINE 100 MG/1
1 CAPSULE ORAL 2 TIMES DAILY
COMMUNITY
Start: 2020-09-06 | End: 2020-09-10

## 2020-09-10 NOTE — TELEPHONE ENCOUNTER
I spoke with the patient.  Her tests indicate Rhinovirus infection.  I recommended symptomatic therapy.

## 2020-09-23 ENCOUNTER — LAB (OUTPATIENT)
Dept: LAB | Facility: HOSPITAL | Age: 85
End: 2020-09-23

## 2020-09-23 ENCOUNTER — OFFICE VISIT (OUTPATIENT)
Dept: FAMILY MEDICINE CLINIC | Facility: CLINIC | Age: 85
End: 2020-09-23

## 2020-09-23 VITALS
OXYGEN SATURATION: 94 % | HEART RATE: 69 BPM | DIASTOLIC BLOOD PRESSURE: 73 MMHG | TEMPERATURE: 97.2 F | HEIGHT: 62 IN | WEIGHT: 133.2 LBS | SYSTOLIC BLOOD PRESSURE: 128 MMHG | BODY MASS INDEX: 24.51 KG/M2 | RESPIRATION RATE: 20 BRPM

## 2020-09-23 DIAGNOSIS — R06.09 DYSPNEA ON EXERTION: Primary | ICD-10-CM

## 2020-09-23 DIAGNOSIS — R06.09 DYSPNEA ON EXERTION: ICD-10-CM

## 2020-09-23 DIAGNOSIS — J22 ACUTE RESPIRATORY INFECTION: ICD-10-CM

## 2020-09-23 LAB
ANION GAP SERPL CALCULATED.3IONS-SCNC: 6.9 MMOL/L (ref 5–15)
BASOPHILS # BLD AUTO: 0.09 10*3/MM3 (ref 0–0.2)
BASOPHILS NFR BLD AUTO: 1.3 % (ref 0–1.5)
BUN SERPL-MCNC: 10 MG/DL (ref 8–23)
BUN/CREAT SERPL: 13.7 (ref 7–25)
CALCIUM SPEC-SCNC: 9 MG/DL (ref 8.2–9.6)
CHLORIDE SERPL-SCNC: 97 MMOL/L (ref 98–107)
CO2 SERPL-SCNC: 31.1 MMOL/L (ref 22–29)
CREAT SERPL-MCNC: 0.73 MG/DL (ref 0.57–1)
D DIMER PPP FEU-MCNC: 0.51 MG/L (FEU) (ref 0–0.59)
DEPRECATED RDW RBC AUTO: 43.7 FL (ref 37–54)
EOSINOPHIL # BLD AUTO: 0.33 10*3/MM3 (ref 0–0.4)
EOSINOPHIL NFR BLD AUTO: 4.7 % (ref 0.3–6.2)
ERYTHROCYTE [DISTWIDTH] IN BLOOD BY AUTOMATED COUNT: 12.6 % (ref 12.3–15.4)
GFR SERPL CREATININE-BSD FRML MDRD: 75 ML/MIN/1.73
GLUCOSE SERPL-MCNC: 93 MG/DL (ref 65–99)
HCT VFR BLD AUTO: 37.4 % (ref 34–46.6)
HGB BLD-MCNC: 12.8 G/DL (ref 12–15.9)
IMM GRANULOCYTES # BLD AUTO: 0.09 10*3/MM3 (ref 0–0.05)
IMM GRANULOCYTES NFR BLD AUTO: 1.3 % (ref 0–0.5)
LYMPHOCYTES # BLD AUTO: 1.36 10*3/MM3 (ref 0.7–3.1)
LYMPHOCYTES NFR BLD AUTO: 19.3 % (ref 19.6–45.3)
MCH RBC QN AUTO: 32.5 PG (ref 26.6–33)
MCHC RBC AUTO-ENTMCNC: 34.2 G/DL (ref 31.5–35.7)
MCV RBC AUTO: 94.9 FL (ref 79–97)
MONOCYTES # BLD AUTO: 1.06 10*3/MM3 (ref 0.1–0.9)
MONOCYTES NFR BLD AUTO: 15 % (ref 5–12)
NEUTROPHILS NFR BLD AUTO: 4.13 10*3/MM3 (ref 1.7–7)
NEUTROPHILS NFR BLD AUTO: 58.4 % (ref 42.7–76)
NRBC BLD AUTO-RTO: 0.1 /100 WBC (ref 0–0.2)
NT-PROBNP SERPL-MCNC: 2064 PG/ML (ref 0–1800)
PLATELET # BLD AUTO: 169 10*3/MM3 (ref 140–450)
PMV BLD AUTO: 11.3 FL (ref 6–12)
POTASSIUM SERPL-SCNC: 4.4 MMOL/L (ref 3.5–5.2)
RBC # BLD AUTO: 3.94 10*6/MM3 (ref 3.77–5.28)
SODIUM SERPL-SCNC: 135 MMOL/L (ref 136–145)
WBC # BLD AUTO: 7.06 10*3/MM3 (ref 3.4–10.8)

## 2020-09-23 PROCEDURE — 85379 FIBRIN DEGRADATION QUANT: CPT

## 2020-09-23 PROCEDURE — 83880 ASSAY OF NATRIURETIC PEPTIDE: CPT

## 2020-09-23 PROCEDURE — 36415 COLL VENOUS BLD VENIPUNCTURE: CPT

## 2020-09-23 PROCEDURE — 99213 OFFICE O/P EST LOW 20 MIN: CPT | Performed by: FAMILY MEDICINE

## 2020-09-23 PROCEDURE — 80048 BASIC METABOLIC PNL TOTAL CA: CPT

## 2020-09-23 PROCEDURE — 85025 COMPLETE CBC W/AUTO DIFF WBC: CPT

## 2020-09-23 NOTE — PROGRESS NOTES
"Subjective   Shruthi Amin is a 89 y.o. female.     Chief Complaint   Patient presents with   • Shortness of Breath     2 week follow up   • URI       HPI  Chief complaint: Respiratory infection, dyspnea on exertion    Patient is a 89-year-old white female comes in for follow-up and maintenance of her current problems which include    1.  Respiratory infection-stable-the patient was seen by me a couple weeks ago because of cough sinus congestion drainage.  Testing was performed.  Patient was found to have infection with rhinovirus.  She states that the sinus congestion drainage and cough is improved.  She denied fever or chills.    2.  Dyspnea on exertion-new-patient states however over the past several weeks she has had gradually increasing shortness of breath with activity.  She states she gets shortness of breath with walking down the heard or walking across her room.  She states she will have to stop.  Patient had chest x-ray last week which revealed a large hiatal hernia and a small right pleural effusion.      The following portions of the patient's history were reviewed and updated as appropriate: allergies, current medications, past family history, past medical history, past social history, past surgical history and problem list.    Review of Systems    Objective     /73 (BP Location: Left arm, Patient Position: Sitting, Cuff Size: Adult)   Pulse 69   Temp 97.2 °F (36.2 °C) (Infrared)   Resp 20   Ht 157.5 cm (62\")   Wt 60.4 kg (133 lb 3.2 oz)   SpO2 94%   BMI 24.36 kg/m²     Physical Exam  Vitals signs and nursing note reviewed.   Constitutional:       Appearance: Normal appearance. She is well-developed and normal weight.   HENT:      Head: Normocephalic and atraumatic.      Nose: Nose normal.      Mouth/Throat:      Mouth: Mucous membranes are dry.      Pharynx: Oropharynx is clear.   Eyes:      Extraocular Movements: Extraocular movements intact.      Pupils: Pupils are equal, round, and " reactive to light.   Neck:      Musculoskeletal: Neck supple.   Cardiovascular:      Rate and Rhythm: Normal rate and regular rhythm.      Heart sounds: Normal heart sounds.   Pulmonary:      Effort: Pulmonary effort is normal.      Breath sounds: Examination of the right-middle field reveals decreased breath sounds. Examination of the right-lower field reveals decreased breath sounds. Decreased breath sounds present.   Abdominal:      General: Bowel sounds are normal.      Palpations: Abdomen is soft.   Musculoskeletal: Normal range of motion.   Skin:     General: Skin is warm and dry.   Neurological:      Mental Status: She is alert and oriented to person, place, and time.   Psychiatric:         Behavior: Behavior normal.         Thought Content: Thought content normal.         Judgment: Judgment normal.           Assessment/Plan   Shruthi was seen today for shortness of breath and uri.    Diagnoses and all orders for this visit:    Dyspnea on exertion-the patient and her granddaughter were advised that the dyspnea on exertion could be due to the hiatal hernia or other cause.  We discussed evaluation options which include laboratory testing exercise oximetry and CT his chest.  They would like to proceed.  -     CBC & Differential; Future  -     Basic Metabolic Panel; Future  -     proBNP; Future  -     D-dimer, Quantitative; Future  -     Walking Oximetry; Future  -     CT Chest With Contrast; Future    Acute respiratory infection      Patient Instructions   Continue your current meidcations and treatment.    Consider having follow up labs, CT chest, exercise oximetry.    Further care will depend on the results of the tests and how you progress.      Og Jeronimo Jr., MD    09/23/20

## 2020-09-23 NOTE — PATIENT INSTRUCTIONS
Continue your current meidcations and treatment.    Consider having follow up labs, CT chest, exercise oximetry.    Further care will depend on the results of the tests and how you progress.

## 2020-09-28 ENCOUNTER — HOSPITAL ENCOUNTER (OUTPATIENT)
Dept: CT IMAGING | Facility: HOSPITAL | Age: 85
Discharge: HOME | End: 2020-09-28
Admitting: FAMILY MEDICINE

## 2020-09-28 DIAGNOSIS — R06.09 DYSPNEA ON EXERTION: ICD-10-CM

## 2020-09-28 PROCEDURE — 0 IOPAMIDOL PER 1 ML: Performed by: FAMILY MEDICINE

## 2020-09-28 PROCEDURE — 71260 CT THORAX DX C+: CPT

## 2020-09-28 RX ADMIN — IOPAMIDOL 100 ML: 755 INJECTION, SOLUTION INTRAVENOUS at 17:00

## 2020-09-29 ENCOUNTER — HOSPITAL ENCOUNTER (OUTPATIENT)
Dept: RESPIRATORY THERAPY | Facility: HOSPITAL | Age: 85
Discharge: HOME | End: 2020-09-29

## 2020-09-29 DIAGNOSIS — R06.09 DYSPNEA ON EXERTION: ICD-10-CM

## 2020-09-29 NOTE — PROGRESS NOTES
Exercise Oximetry    Patient Name:Shruthi Amin   MRN: [id number]   Date: 09/29/20             ROOM AIR BASELINE   SpO2% 96   Heart Rate 70   Blood Pressure      EXERCISE ON ROOM AIR SpO2% EXERCISE ON O2 @ LPM SpO2%   1 MINUTE           73hr 94 1 MINUTE    2 MINUTES        74hr 94 2 MINUTES    3 MINUTES        79hr 93 3 MINUTES    4 MINUTES       82hr 91 4 MINUTES    5 MINUTES       88hr 90 5 MINUTES    6 MINUTES       87hr 90 6 MINUTES               Distance Walked  1350ft Distance Walked   Dyspnea (Stacie Scale)  severe Dyspnea (Stacie Scale)   Fatigue (Stacie Scale)  moderate Fatigue (Stacie Scale)   SpO2% Post Exercise  93 SpO2% Post Exercise   HR Post Exercise  76 HR Post Exercise   Time to Recovery   Time to Recovery     Comments:

## 2020-09-30 ENCOUNTER — ANTICOAGULATION VISIT (OUTPATIENT)
Dept: CARDIOLOGY | Facility: CLINIC | Age: 85
End: 2020-09-30

## 2020-09-30 VITALS
BODY MASS INDEX: 24.33 KG/M2 | WEIGHT: 133 LBS | HEART RATE: 84 BPM | DIASTOLIC BLOOD PRESSURE: 86 MMHG | SYSTOLIC BLOOD PRESSURE: 134 MMHG

## 2020-09-30 DIAGNOSIS — I48.19 OTHER PERSISTENT ATRIAL FIBRILLATION (HCC): ICD-10-CM

## 2020-09-30 LAB — INR PPP: 3.5 (ref 0.9–1.1)

## 2020-09-30 PROCEDURE — 85610 PROTHROMBIN TIME: CPT | Performed by: INTERNAL MEDICINE

## 2020-09-30 PROCEDURE — 36416 COLLJ CAPILLARY BLOOD SPEC: CPT | Performed by: INTERNAL MEDICINE

## 2020-10-01 ENCOUNTER — TELEPHONE (OUTPATIENT)
Dept: FAMILY MEDICINE CLINIC | Facility: CLINIC | Age: 85
End: 2020-10-01

## 2020-10-01 NOTE — TELEPHONE ENCOUNTER
I spoke with the patient.  She has a large hiatal hernia which is compressing her lungs.  I recommended eating frequent small meals.  I offered consultation with general surgery for paco.  She wants to think about it.

## 2020-10-02 ENCOUNTER — APPOINTMENT (OUTPATIENT)
Dept: CT IMAGING | Facility: HOSPITAL | Age: 85
End: 2020-10-02

## 2020-10-05 ENCOUNTER — APPOINTMENT (OUTPATIENT)
Dept: RESPIRATORY THERAPY | Facility: HOSPITAL | Age: 85
End: 2020-10-05

## 2020-10-05 RX ORDER — DIGOXIN 125 MCG
TABLET ORAL
Qty: 90 TABLET | Refills: 1 | Status: SHIPPED | OUTPATIENT
Start: 2020-10-05 | End: 2021-04-12

## 2020-10-28 ENCOUNTER — ANTICOAGULATION VISIT (OUTPATIENT)
Dept: CARDIOLOGY | Facility: CLINIC | Age: 85
End: 2020-10-28

## 2020-10-28 VITALS
SYSTOLIC BLOOD PRESSURE: 147 MMHG | BODY MASS INDEX: 23.59 KG/M2 | WEIGHT: 129 LBS | DIASTOLIC BLOOD PRESSURE: 74 MMHG | HEART RATE: 75 BPM

## 2020-10-28 DIAGNOSIS — I48.91 ATRIAL FIBRILLATION, UNSPECIFIED TYPE (HCC): ICD-10-CM

## 2020-10-28 LAB — INR PPP: 2 (ref 0.9–1.1)

## 2020-10-28 PROCEDURE — 36416 COLLJ CAPILLARY BLOOD SPEC: CPT | Performed by: INTERNAL MEDICINE

## 2020-10-28 PROCEDURE — 85610 PROTHROMBIN TIME: CPT | Performed by: INTERNAL MEDICINE

## 2020-12-02 ENCOUNTER — ANTICOAGULATION VISIT (OUTPATIENT)
Dept: CARDIOLOGY | Facility: CLINIC | Age: 85
End: 2020-12-02

## 2020-12-02 VITALS
WEIGHT: 130 LBS | HEART RATE: 71 BPM | BODY MASS INDEX: 23.78 KG/M2 | SYSTOLIC BLOOD PRESSURE: 132 MMHG | DIASTOLIC BLOOD PRESSURE: 72 MMHG

## 2020-12-02 DIAGNOSIS — I48.91 ATRIAL FIBRILLATION, UNSPECIFIED TYPE (HCC): ICD-10-CM

## 2020-12-02 LAB — INR PPP: 2.3 (ref 0.9–1.1)

## 2020-12-02 PROCEDURE — 36416 COLLJ CAPILLARY BLOOD SPEC: CPT | Performed by: INTERNAL MEDICINE

## 2020-12-02 PROCEDURE — 85610 PROTHROMBIN TIME: CPT | Performed by: INTERNAL MEDICINE

## 2020-12-21 RX ORDER — FUROSEMIDE 20 MG/1
20 TABLET ORAL AS NEEDED
Qty: 90 TABLET | Refills: 1 | Status: SHIPPED | OUTPATIENT
Start: 2020-12-21 | End: 2021-07-13

## 2020-12-21 RX ORDER — WARFARIN SODIUM 2 MG/1
TABLET ORAL
Qty: 105 TABLET | Refills: 0 | Status: SHIPPED | OUTPATIENT
Start: 2020-12-21 | End: 2021-03-18

## 2020-12-23 RX ORDER — METOPROLOL SUCCINATE 25 MG/1
TABLET, EXTENDED RELEASE ORAL
Qty: 180 TABLET | Refills: 1 | Status: SHIPPED | OUTPATIENT
Start: 2020-12-23 | End: 2021-08-30

## 2021-01-04 ENCOUNTER — ANTICOAGULATION VISIT (OUTPATIENT)
Dept: CARDIOLOGY | Facility: CLINIC | Age: 86
End: 2021-01-04

## 2021-01-04 VITALS
TEMPERATURE: 97.3 F | WEIGHT: 130 LBS | HEART RATE: 79 BPM | BODY MASS INDEX: 23.78 KG/M2 | SYSTOLIC BLOOD PRESSURE: 158 MMHG | DIASTOLIC BLOOD PRESSURE: 78 MMHG

## 2021-01-04 DIAGNOSIS — I48.91 ATRIAL FIBRILLATION, UNSPECIFIED TYPE (HCC): ICD-10-CM

## 2021-01-04 LAB — INR PPP: 2 (ref 0.9–1.1)

## 2021-01-04 PROCEDURE — 36416 COLLJ CAPILLARY BLOOD SPEC: CPT | Performed by: INTERNAL MEDICINE

## 2021-01-04 PROCEDURE — 85610 PROTHROMBIN TIME: CPT | Performed by: INTERNAL MEDICINE

## 2021-01-26 ENCOUNTER — OFFICE VISIT (OUTPATIENT)
Dept: FAMILY MEDICINE CLINIC | Facility: CLINIC | Age: 86
End: 2021-01-26

## 2021-01-26 VITALS
TEMPERATURE: 96.4 F | SYSTOLIC BLOOD PRESSURE: 143 MMHG | HEART RATE: 54 BPM | WEIGHT: 128 LBS | HEIGHT: 62 IN | OXYGEN SATURATION: 92 % | BODY MASS INDEX: 23.55 KG/M2 | DIASTOLIC BLOOD PRESSURE: 76 MMHG | RESPIRATION RATE: 16 BRPM

## 2021-01-26 DIAGNOSIS — R06.09 DYSPNEA ON EXERTION: ICD-10-CM

## 2021-01-26 DIAGNOSIS — K44.9 HIATAL HERNIA: Chronic | ICD-10-CM

## 2021-01-26 DIAGNOSIS — I10 ESSENTIAL HYPERTENSION: Chronic | ICD-10-CM

## 2021-01-26 DIAGNOSIS — I48.91 ATRIAL FIBRILLATION, UNSPECIFIED TYPE (HCC): Chronic | ICD-10-CM

## 2021-01-26 DIAGNOSIS — Z00.00 ENCOUNTER FOR ANNUAL WELLNESS EXAM IN MEDICARE PATIENT: Primary | ICD-10-CM

## 2021-01-26 PROBLEM — I63.9 CEREBROVASCULAR ACCIDENT (CVA): Chronic | Status: ACTIVE | Noted: 2019-07-20

## 2021-01-26 PROBLEM — J22 ACUTE RESPIRATORY INFECTION: Status: RESOLVED | Noted: 2020-09-09 | Resolved: 2021-01-26

## 2021-01-26 PROBLEM — E78.5 HYPERLIPIDEMIA: Chronic | Status: ACTIVE | Noted: 2019-07-20

## 2021-01-26 PROCEDURE — G0439 PPPS, SUBSEQ VISIT: HCPCS | Performed by: FAMILY MEDICINE

## 2021-01-26 PROCEDURE — 99214 OFFICE O/P EST MOD 30 MIN: CPT | Performed by: FAMILY MEDICINE

## 2021-01-26 NOTE — PROGRESS NOTES
The ABCs of the Annual Wellness Visit  Subsequent Medicare Wellness Visit    Chief Complaint   Patient presents with   • Medicare Wellness-subsequent       Subjective   History of Present Illness:  Shruthi Amin is a 89 y.o. female who presents for a Subsequent Medicare Wellness Visit.    HEALTH RISK ASSESSMENT    Recent Hospitalizations:  No hospitalization(s) within the last year.    Current Medical Providers:  Patient Care Team:  Og Jeronimo Jr., MD as PCP - General  Aleksey Mathew MD as Consulting Physician (Cardiology)    Smoking Status:  Social History     Tobacco Use   Smoking Status Never Smoker   Smokeless Tobacco Never Used       Alcohol Consumption:  Social History     Substance and Sexual Activity   Alcohol Use No   • Frequency: Never       Depression Screen:   PHQ-2/PHQ-9 Depression Screening 1/26/2021   Little interest or pleasure in doing things 0   Feeling down, depressed, or hopeless 0   Trouble falling or staying asleep, or sleeping too much -   Feeling tired or having little energy -   Poor appetite or overeating -   Feeling bad about yourself - or that you are a failure or have let yourself or your family down -   Trouble concentrating on things, such as reading the newspaper or watching television -   Moving or speaking so slowly that other people could have noticed. Or the opposite - being so fidgety or restless that you have been moving around a lot more than usual -   Thoughts that you would be better off dead, or of hurting yourself in some way -   Total Score 0       Fall Risk Screen:  STEADI Fall Risk Assessment was completed, and patient is at LOW risk for falls.Assessment completed on:1/26/2021    Health Habits and Functional and Cognitive Screening:  Functional & Cognitive Status 1/26/2021   Do you have difficulty preparing food and eating? No   Do you have difficulty bathing yourself, getting dressed or grooming yourself? No   Do you have difficulty using the toilet? No   Do  you have difficulty moving around from place to place? No   Do you have trouble with steps or getting out of a bed or a chair? No   Current Diet Well Balanced Diet   Dental Exam Up to date   Eye Exam Up to date   Exercise (times per week) 0 times per week   Current Exercise Activities Include None   Do you need help using the phone?  No   Are you deaf or do you have serious difficulty hearing?  Yes   Do you need help with transportation? Yes   Do you need help shopping? Yes   Do you need help preparing meals?  No   Do you need help with housework?  No   Do you need help with laundry? No   Do you need help taking your medications? No   Do you need help managing money? No   Do you ever drive or ride in a car without wearing a seat belt? No   Have you felt unusual stress, anger or loneliness in the last month? No   Who do you live with? Child   If you need help, do you have trouble finding someone available to you? No   Have you been bothered in the last four weeks by sexual problems? No   Do you have difficulty concentrating, remembering or making decisions? No         Does the patient have evidence of cognitive impairment? No    Asprin use counseling:Does not need ASA (and currently is not on it)    Age-appropriate Screening Schedule:  Refer to the list below for future screening recommendations based on patient's age, sex and/or medical conditions. Orders for these recommended tests are listed in the plan section. The patient has been provided with a written plan.    Health Maintenance   Topic Date Due   • TDAP/TD VACCINES (1 - Tdap) 06/10/1947   • DXA SCAN  07/24/2019   • INFLUENZA VACCINE  01/26/2022 (Originally 8/1/2020)   • LIPID PANEL  06/02/2021   • ZOSTER VACCINE  Completed          The following portions of the patient's history were reviewed and updated as appropriate: allergies, current medications, past family history, past medical history, past social history, past surgical history and problem  list.    Outpatient Medications Prior to Visit   Medication Sig Dispense Refill   • B Complex Vitamins (VITAMIN B COMPLEX PO) Take 1 tablet by mouth Daily.     • Bacillus Coagulans-Inulin (ALIGN PREBIOTIC-PROBIOTIC PO) Take  by mouth.     • Biotin 85767 MCG tablet Take  by mouth.     • Calcium-Phosphorus-Vitamin D (CITRACAL +D3) 250-107-500 MG-MG-UNIT chewable tablet Chew 1 tablet Daily.     • cholecalciferol (VITAMIN D3) 400 units tablet Take 400 Units by mouth Daily.     • digoxin (LANOXIN) 125 MCG tablet TAKE 1 TABLET BY MOUTH EVERY DAY 90 tablet 1   • docusate sodium (STOOL SOFTENER) 100 MG capsule Take 100 mg by mouth 2 (Two) Times a Day.     • furosemide (LASIX) 20 MG tablet TAKE 1 TABLET BY MOUTH AS NEEDED (SWELLING). 90 tablet 1   • GERMAN BERRY PO Take  by mouth.     • latanoprost (XALATAN) 0.005 % ophthalmic solution Administer 1 drop to the right eye Every Night.     • Lutein 20 MG capsule Take  by mouth.     • magnesium gluconate (MAGONATE) 500 MG tablet Take 27 mg by mouth 2 (Two) Times a Day.     • metoprolol succinate XL (TOPROL-XL) 25 MG 24 hr tablet TAKE 1 TABLET BY MOUTH TWICE A  tablet 1   • vitamin C (ASCORBIC ACID) 500 MG tablet Take 500 mg by mouth Daily.     • warfarin (COUMADIN) 2 MG tablet TAKE 2 TABS ON MON AND 1 TAB ALL OTHER DAYS BY MOUTH OR AS DIRECTED. 105 tablet 0     No facility-administered medications prior to visit.        Patient Active Problem List   Diagnosis   • Atrial fibrillation (CMS/HCC) [I48.91]   • Back pain   • Cerebrovascular accident (CVA) (CMS/HCC)   • Hiatal hernia   • Hyperlipidemia   • Hypertension   • Osteoarthritis   • Osteoporosis       Advanced Care Planning:  ACP discussion was held with the patient during this visit. Patient has an advance directive in EMR which is still valid.     Review of Systems    Compared to one year ago, the patient feels her physical health is the same.  Compared to one year ago, the patient feels her mental health is the  "same.    Reviewed chart for potential of high risk medication in the elderly: yes  Reviewed chart for potential of harmful drug interactions in the elderly:yes    Objective         Vitals:    01/26/21 1141   BP: 143/76   BP Location: Right arm   Patient Position: Sitting   Cuff Size: Adult   Pulse: 54   Resp: 16   Temp: 96.4 °F (35.8 °C)   TempSrc: Infrared   SpO2: 92%   Weight: 58.1 kg (128 lb)   Height: 157.5 cm (62\")       Body mass index is 23.41 kg/m².  Discussed the patient's BMI with her. The BMI is in the acceptable range.    Physical Exam  Vitals signs and nursing note reviewed.   Constitutional:       Appearance: She is well-developed and normal weight.   HENT:      Head: Normocephalic and atraumatic.      Nose: Nose normal.      Mouth/Throat:      Mouth: Mucous membranes are moist.      Pharynx: Oropharynx is clear.   Eyes:      Extraocular Movements: Extraocular movements intact.      Conjunctiva/sclera: Conjunctivae normal.      Pupils: Pupils are equal, round, and reactive to light.   Neck:      Musculoskeletal: Neck supple.   Cardiovascular:      Rate and Rhythm: Normal rate. Rhythm irregularly irregular.      Heart sounds: Normal heart sounds.   Pulmonary:      Effort: Pulmonary effort is normal.      Breath sounds: Normal breath sounds.   Abdominal:      General: Abdomen is flat. Bowel sounds are normal.      Palpations: Abdomen is soft.   Musculoskeletal: Normal range of motion.   Skin:     General: Skin is warm and dry.   Neurological:      Mental Status: She is alert and oriented to person, place, and time.   Psychiatric:         Behavior: Behavior normal.         Thought Content: Thought content normal.         Judgment: Judgment normal.               Assessment/Plan   Medicare Risks and Personalized Health Plan  CMS Preventative Services Quick Reference  Advance Directive Discussion  Immunizations Discussed/Encouraged (specific immunizations; Td, Influenza, Pneumococcal 23 and Shingrix )    The " above risks/problems have been discussed with the patient.  Pertinent information has been shared with the patient in the After Visit Summary.  Follow up plans and orders are seen below in the Assessment/Plan Section.    Diagnoses and all orders for this visit:    1. Encounter for annual wellness exam in Medicare patient (Primary)    2. Dyspnea on exertion  -     Walking Oximetry; Future      Follow Up:  Return in about 6 months (around 7/26/2021) for Recheck.     An After Visit Summary and PPPS were given to the patient.

## 2021-01-26 NOTE — PROGRESS NOTES
"Subjective   Shruthi Amin is a 89 y.o. female.     Chief Complaint   Patient presents with   • Medicare Wellness-subsequent   • Hypertension   • Shortness of Breath   • Osteoporosis       HPI chief complaint: Hypertension dyspnea on exertion osteoporosis atrial fibrillation    The patient is a 89-year-old white female comes in for follow-up and maintenance of her current problems which include    1. dyspnea on exertion-deteriorated-patient has history of shortness of breath which is felt to be due to pulmonary fibrosis atrial fibrillation Age and large hiatal hernia.  Patient states that when she walks her O2 sats dropped into the 80s.  Patient had oximetry back in September which revealed her O2 sats did not drop below 90.  She requested follow-up O2 sat.    2.  Hypertension-stable-patient on metoprolol 25 mg once a day Lasix 20 mg daily and potassium.  She denied headache lightheadedness or chest pain    3.  Atrial fibrillation-stable-patient is on Lanoxin 0.125 daily metoprolol 25 mg daily and Coumadin.  She denies episodes of rapid or slow heart rhythm.    4. hiatal hernia-unchanged-patient has been found to have a large hiatal hernia.  Patient does have shortness of breath that is in part due to the hiatal hernia.  Patient also has weight loss patient is not able to eat as much as she has in the past.  She is been offered surgical evaluation.  Patient refuses.        The following portions of the patient's history were reviewed and updated as appropriate: allergies, current medications, past family history, past medical history, past social history, past surgical history and problem list.    Review of Systems    Objective     /76 (BP Location: Right arm, Patient Position: Sitting, Cuff Size: Adult)   Pulse 54   Temp 96.4 °F (35.8 °C) (Infrared)   Resp 16   Ht 157.5 cm (62\")   Wt 58.1 kg (128 lb)   SpO2 92%   BMI 23.41 kg/m²     Physical Exam      Assessment/Plan   Diagnoses and all orders for " this visit:    1. Encounter for annual wellness exam in Medicare patient (Primary)    2. Dyspnea on exertion  -     Walking Oximetry; Future    3. Essential hypertension    4. Atrial fibrillation, unspecified type (CMS/HCC)    5. Hiatal hernia      Patient Instructions   Have the follow up Oximetry done.    Continue your current medications and treatment.    Follow up in the offcie in 6 months.      Og Jeronimo Jr., MD    01/26/21

## 2021-01-26 NOTE — PATIENT INSTRUCTIONS
Have the follow up Oximetry done.    Continue your current medications and treatment.    Follow up in the offcie in 6 months.

## 2021-01-27 ENCOUNTER — OFFICE VISIT (OUTPATIENT)
Dept: CARDIOLOGY | Facility: CLINIC | Age: 86
End: 2021-01-27

## 2021-01-27 ENCOUNTER — ANTICOAGULATION VISIT (OUTPATIENT)
Dept: CARDIOLOGY | Facility: CLINIC | Age: 86
End: 2021-01-27

## 2021-01-27 VITALS
SYSTOLIC BLOOD PRESSURE: 139 MMHG | HEART RATE: 72 BPM | BODY MASS INDEX: 23.41 KG/M2 | DIASTOLIC BLOOD PRESSURE: 87 MMHG | WEIGHT: 128 LBS

## 2021-01-27 VITALS
SYSTOLIC BLOOD PRESSURE: 139 MMHG | HEIGHT: 62 IN | WEIGHT: 128 LBS | HEART RATE: 72 BPM | BODY MASS INDEX: 23.55 KG/M2 | DIASTOLIC BLOOD PRESSURE: 87 MMHG | OXYGEN SATURATION: 94 % | TEMPERATURE: 95 F

## 2021-01-27 DIAGNOSIS — I25.10 CORONARY ARTERY DISEASE INVOLVING NATIVE CORONARY ARTERY OF NATIVE HEART WITHOUT ANGINA PECTORIS: ICD-10-CM

## 2021-01-27 DIAGNOSIS — I48.91 ATRIAL FIBRILLATION, UNSPECIFIED TYPE (HCC): ICD-10-CM

## 2021-01-27 DIAGNOSIS — E78.00 PURE HYPERCHOLESTEROLEMIA: ICD-10-CM

## 2021-01-27 DIAGNOSIS — R06.02 SHORTNESS OF BREATH: ICD-10-CM

## 2021-01-27 DIAGNOSIS — I48.91 ATRIAL FIBRILLATION, UNSPECIFIED TYPE (HCC): Primary | ICD-10-CM

## 2021-01-27 DIAGNOSIS — I10 ESSENTIAL HYPERTENSION: ICD-10-CM

## 2021-01-27 LAB — INR PPP: 2.4 (ref 0.9–1.1)

## 2021-01-27 PROCEDURE — 36416 COLLJ CAPILLARY BLOOD SPEC: CPT | Performed by: INTERNAL MEDICINE

## 2021-01-27 PROCEDURE — 99214 OFFICE O/P EST MOD 30 MIN: CPT | Performed by: INTERNAL MEDICINE

## 2021-01-27 PROCEDURE — 85610 PROTHROMBIN TIME: CPT | Performed by: INTERNAL MEDICINE

## 2021-01-27 NOTE — PROGRESS NOTES
"    Subjective:     Encounter Date:01/27/2021      Patient ID: Shruthi Amin is a 89 y.o. female.    Chief Complaint:  History of Present Illness 89-year-old white female with history of coronary disease history of atrial fibrillation history of hypertension hyperlipidemia presents to my office for follow-up.  Patient has been having increasing episodes of shortness of breath.  No complains of any chest pain.  No PND orthopnea.  No palpitation dizziness syncope or swelling of the feet.  She is taking her medicines regularly.  She does not smoke.  She wants to be active and tries to walk every day.  She is following good diet    The following portions of the patient's history were reviewed and updated as appropriate: allergies, current medications, past family history, past medical history, past social history, past surgical history and problem list.  Past Medical History:   Diagnosis Date   • Coronary artery disease     Atrial fibrillation   • GERD (gastroesophageal reflux disease)    • Glaucoma    • Hiatal hernia with gastroesophageal reflux    • Hypertension    • Stroke (CMS/HCC)     off and on dizziness from the stroke.     Past Surgical History:   Procedure Laterality Date   • BACK SURGERY      kyphoplasty   • EYE SURGERY      cataract surgery   • FIXATION KYPHOPLASTY LUMBAR SPINE  2013   • HIP TROCHANTERIC NAILING WITH INTRAMEDULLARY HIP SCREW Right 7/20/2019    Procedure: HIP TROCHANTERIC NAILING SHORT WITH INTRAMEDULLARY HIP SCREW;  Surgeon: Edward Centeno MD;  Location: [facility];  Service: Orthopedics   • RECTAL SURGERY      x 3     /87 (BP Location: Left arm, Patient Position: Sitting)   Pulse 72   Temp 95 °F (35 °C)   Ht 157.5 cm (62\")   Wt 58.1 kg (128 lb)   SpO2 94%   BMI 23.41 kg/m²   Family History   Problem Relation Age of Onset   • Heart disease Mother    • Hypertension Mother    • Cancer Father         colon cancer   • Cancer Sister         lung   • Cancer Brother       "   colon cancer       Current Outpatient Medications:   •  B Complex Vitamins (VITAMIN B COMPLEX PO), Take 1 tablet by mouth Daily., Disp: , Rfl:   •  Bacillus Coagulans-Inulin (ALIGN PREBIOTIC-PROBIOTIC PO), Take  by mouth., Disp: , Rfl:   •  Biotin 01995 MCG tablet, Take  by mouth., Disp: , Rfl:   •  Calcium-Phosphorus-Vitamin D (CITRACAL +D3) 250-107-500 MG-MG-UNIT chewable tablet, Chew 1 tablet Daily., Disp: , Rfl:   •  cholecalciferol (VITAMIN D3) 400 units tablet, Take 400 Units by mouth Daily., Disp: , Rfl:   •  digoxin (LANOXIN) 125 MCG tablet, TAKE 1 TABLET BY MOUTH EVERY DAY, Disp: 90 tablet, Rfl: 1  •  docusate sodium (STOOL SOFTENER) 100 MG capsule, Take 100 mg by mouth 2 (Two) Times a Day., Disp: , Rfl:   •  furosemide (LASIX) 20 MG tablet, TAKE 1 TABLET BY MOUTH AS NEEDED (SWELLING)., Disp: 90 tablet, Rfl: 1  •  GERMAN BERRY PO, Take  by mouth., Disp: , Rfl:   •  latanoprost (XALATAN) 0.005 % ophthalmic solution, Administer 1 drop to the right eye Every Night., Disp: , Rfl:   •  Lutein 20 MG capsule, Take  by mouth., Disp: , Rfl:   •  magnesium gluconate (MAGONATE) 500 MG tablet, Take 27 mg by mouth 2 (Two) Times a Day., Disp: , Rfl:   •  metoprolol succinate XL (TOPROL-XL) 25 MG 24 hr tablet, TAKE 1 TABLET BY MOUTH TWICE A DAY, Disp: 180 tablet, Rfl: 1  •  vitamin C (ASCORBIC ACID) 500 MG tablet, Take 500 mg by mouth Daily., Disp: , Rfl:   •  warfarin (COUMADIN) 2 MG tablet, TAKE 2 TABS ON MON AND 1 TAB ALL OTHER DAYS BY MOUTH OR AS DIRECTED., Disp: 105 tablet, Rfl: 0  Allergies   Allergen Reactions   • Codeine Nausea And Vomiting   • Penicillins Hives   • Latex Rash     Social History     Socioeconomic History   • Marital status:      Spouse name: Not on file   • Number of children: Not on file   • Years of education: Not on file   • Highest education level: Not on file   Tobacco Use   • Smoking status: Never Smoker   • Smokeless tobacco: Never Used   Substance and Sexual Activity   •  Alcohol use: No     Frequency: Never   • Drug use: No   • Sexual activity: Defer     Review of Systems   Constitution: Positive for malaise/fatigue. Negative for fever.   Cardiovascular: Negative for chest pain, dyspnea on exertion and palpitations.   Respiratory: Positive for shortness of breath. Negative for cough.    Skin: Negative for rash.   Gastrointestinal: Negative for abdominal pain, nausea and vomiting.   Neurological: Negative for focal weakness and headaches.   All other systems reviewed and are negative.             Objective:     Constitutional:       Appearance: Well-developed.   Eyes:      General: No scleral icterus.     Conjunctiva/sclera: Conjunctivae normal.   HENT:      Head: Normocephalic and atraumatic.   Neck:      Musculoskeletal: Normal range of motion and neck supple.      Vascular: No carotid bruit or JVD.   Pulmonary:      Effort: Pulmonary effort is normal.      Breath sounds: Normal breath sounds. No wheezing. No rales.   Cardiovascular:      Normal rate. Regular rhythm.      Murmurs: There is a systolic murmur.   Pulses:     Intact distal pulses.   Abdominal:      General: Bowel sounds are normal.      Palpations: Abdomen is soft.   Skin:     General: Skin is warm and dry.      Findings: No rash.   Neurological:      Mental Status: Alert.       Procedures    Lab Review:         MDM   #1 coronary disease  Patient has nonobstructive coronary disease with normal function the past but is having shortness of breath and hence I will perform an echocardiogram for LV function valvular abnormalities  2.  Atrial fibrillation  Patient has history of atrial fibrillation and is currently stable on medical therapy including warfarin and keeps INR between 2.0-3.0  3.  Hypertension  Patient blood pressure is currently stable on medical therapy with beta-blockers  4.  Hyperlipidemia  Patient's lipid levels are followed by the primary care doctor

## 2021-02-09 ENCOUNTER — HOSPITAL ENCOUNTER (OUTPATIENT)
Dept: RESPIRATORY THERAPY | Facility: HOSPITAL | Age: 86
Discharge: HOME | End: 2021-02-09

## 2021-02-09 ENCOUNTER — TELEPHONE (OUTPATIENT)
Dept: FAMILY MEDICINE CLINIC | Facility: CLINIC | Age: 86
End: 2021-02-09

## 2021-02-09 DIAGNOSIS — R06.09 DYSPNEA ON EXERTION: ICD-10-CM

## 2021-02-09 NOTE — TELEPHONE ENCOUNTER
Caller: Shruthi Amin    Relationship to patient: Self    Best call back number: [phone]    Patient is needing: Patient had a breathing test on 2/9/2021, and qualifies for oxygen, she was told to call us and let us know. Please reach out to patient and advise on next steps.

## 2021-02-09 NOTE — PROGRESS NOTES
Exercise Oximetry    Patient Name:Shruthi Amin   MRN: [id number]   Date: 02/09/21             ROOM AIR BASELINE   SpO2% 96   Heart Rate 87   Blood Pressure     EXERCISE ON ROOM AIR SpO2% EXERCISE ON O2 @ 2 LPM SpO2%   1 MINUTE 93 1 MINUTE 90   2 MINUTES 90 2 MINUTES 91   3 MINUTES 88 3 MINUTES 91   4 MINUTES 86 4 MINUTES 91   5 MINUTES  5 MINUTES 92   6 MINUTES  6 MINUTES 92              Distance Walked   Distance Walked     1400ft   Dyspnea (Stacie Scale)   Dyspnea (Stacie Scale) slight   Fatigue (Stacie Scale)   Fatigue (Stacie Scale) mild   SpO2% Post Exercise   SpO2% Post Exercise   97   HR Post Exercise   HR Post Exercise 83   Time to Recovery   Time to Recovery 1 minute     Comments:

## 2021-02-19 ENCOUNTER — TELEPHONE (OUTPATIENT)
Dept: FAMILY MEDICINE CLINIC | Facility: CLINIC | Age: 86
End: 2021-02-19

## 2021-02-19 NOTE — TELEPHONE ENCOUNTER
Patient called in requesting the status of the oxygen test she had done. Patient wants to know if she's going to get oxygen?    Best call back # [phone]

## 2021-02-19 NOTE — TELEPHONE ENCOUNTER
Tami Roberson,  I spoke with Ms Amin.  She does qualify for 02.  We sent the order to [facility].  She has not received the oxygen or heatrd from [facility].  Please check with [employer] and see where things stand.  Thanks, [facility]

## 2021-03-03 ENCOUNTER — ANTICOAGULATION VISIT (OUTPATIENT)
Dept: CARDIOLOGY | Facility: CLINIC | Age: 86
End: 2021-03-03

## 2021-03-03 VITALS
BODY MASS INDEX: 23.23 KG/M2 | DIASTOLIC BLOOD PRESSURE: 67 MMHG | HEART RATE: 72 BPM | SYSTOLIC BLOOD PRESSURE: 134 MMHG | WEIGHT: 127 LBS

## 2021-03-03 DIAGNOSIS — I48.91 ATRIAL FIBRILLATION, UNSPECIFIED TYPE (HCC): ICD-10-CM

## 2021-03-03 LAB — INR PPP: 2.2 (ref 0.9–1.1)

## 2021-03-03 PROCEDURE — 85610 PROTHROMBIN TIME: CPT | Performed by: INTERNAL MEDICINE

## 2021-03-03 PROCEDURE — 36416 COLLJ CAPILLARY BLOOD SPEC: CPT | Performed by: INTERNAL MEDICINE

## 2021-03-18 DIAGNOSIS — I48.91 ATRIAL FIBRILLATION, UNSPECIFIED TYPE (HCC): ICD-10-CM

## 2021-03-18 DIAGNOSIS — Z79.01 LONG TERM (CURRENT) USE OF ANTICOAGULANTS: Primary | ICD-10-CM

## 2021-03-18 RX ORDER — WARFARIN SODIUM 2 MG/1
TABLET ORAL
Qty: 105 TABLET | Refills: 0 | Status: SHIPPED | OUTPATIENT
Start: 2021-03-18 | End: 2021-04-19

## 2021-04-07 ENCOUNTER — ANTICOAGULATION VISIT (OUTPATIENT)
Dept: CARDIOLOGY | Facility: CLINIC | Age: 86
End: 2021-04-07

## 2021-04-07 VITALS
TEMPERATURE: 96.7 F | SYSTOLIC BLOOD PRESSURE: 148 MMHG | DIASTOLIC BLOOD PRESSURE: 65 MMHG | HEART RATE: 72 BPM | WEIGHT: 121 LBS | BODY MASS INDEX: 22.13 KG/M2

## 2021-04-07 DIAGNOSIS — I48.91 ATRIAL FIBRILLATION, UNSPECIFIED TYPE (HCC): ICD-10-CM

## 2021-04-07 LAB — INR PPP: 3.5 (ref 0.9–1.1)

## 2021-04-07 PROCEDURE — 36416 COLLJ CAPILLARY BLOOD SPEC: CPT | Performed by: INTERNAL MEDICINE

## 2021-04-07 PROCEDURE — 85610 PROTHROMBIN TIME: CPT | Performed by: INTERNAL MEDICINE

## 2021-04-12 RX ORDER — DIGOXIN 125 MCG
TABLET ORAL
Qty: 90 TABLET | Refills: 1 | Status: SHIPPED | OUTPATIENT
Start: 2021-04-12 | End: 2021-11-02

## 2021-04-14 ENCOUNTER — TELEPHONE (OUTPATIENT)
Dept: CARDIOLOGY | Facility: CLINIC | Age: 86
End: 2021-04-14

## 2021-04-14 RX ORDER — POTASSIUM CHLORIDE 750 MG/1
TABLET, EXTENDED RELEASE ORAL
Qty: 90 TABLET | Refills: 1 | Status: SHIPPED | OUTPATIENT
Start: 2021-04-14

## 2021-04-18 DIAGNOSIS — Z79.01 LONG TERM (CURRENT) USE OF ANTICOAGULANTS: ICD-10-CM

## 2021-04-18 DIAGNOSIS — I48.91 ATRIAL FIBRILLATION, UNSPECIFIED TYPE (HCC): ICD-10-CM

## 2021-04-19 RX ORDER — WARFARIN SODIUM 2 MG/1
TABLET ORAL
Qty: 100 TABLET | Refills: 0 | Status: SHIPPED | OUTPATIENT
Start: 2021-04-19 | End: 2021-09-17 | Stop reason: SDUPTHER

## 2021-04-21 ENCOUNTER — ANTICOAGULATION VISIT (OUTPATIENT)
Dept: CARDIOLOGY | Facility: CLINIC | Age: 86
End: 2021-04-21

## 2021-04-21 VITALS — SYSTOLIC BLOOD PRESSURE: 134 MMHG | HEART RATE: 71 BPM | TEMPERATURE: 97.2 F | DIASTOLIC BLOOD PRESSURE: 67 MMHG

## 2021-04-21 DIAGNOSIS — I48.91 ATRIAL FIBRILLATION, UNSPECIFIED TYPE (HCC): Primary | ICD-10-CM

## 2021-04-21 LAB — INR PPP: 1.8 (ref 0.9–1.1)

## 2021-04-21 PROCEDURE — 36416 COLLJ CAPILLARY BLOOD SPEC: CPT | Performed by: INTERNAL MEDICINE

## 2021-04-21 PROCEDURE — 85610 PROTHROMBIN TIME: CPT | Performed by: INTERNAL MEDICINE

## 2021-05-18 ENCOUNTER — ANTICOAGULATION VISIT (OUTPATIENT)
Dept: CARDIOLOGY | Facility: CLINIC | Age: 86
End: 2021-05-18

## 2021-05-18 VITALS
HEART RATE: 65 BPM | DIASTOLIC BLOOD PRESSURE: 70 MMHG | SYSTOLIC BLOOD PRESSURE: 146 MMHG | WEIGHT: 123 LBS | BODY MASS INDEX: 22.5 KG/M2

## 2021-05-18 DIAGNOSIS — I48.91 ATRIAL FIBRILLATION, UNSPECIFIED TYPE (HCC): Primary | Chronic | ICD-10-CM

## 2021-05-18 LAB — INR PPP: 1.7 (ref 0.9–1.1)

## 2021-05-18 PROCEDURE — 85610 PROTHROMBIN TIME: CPT | Performed by: INTERNAL MEDICINE

## 2021-05-18 PROCEDURE — 36416 COLLJ CAPILLARY BLOOD SPEC: CPT | Performed by: INTERNAL MEDICINE

## 2021-06-04 ENCOUNTER — ANTICOAGULATION VISIT (OUTPATIENT)
Dept: CARDIOLOGY | Facility: CLINIC | Age: 86
End: 2021-06-04

## 2021-06-04 VITALS
DIASTOLIC BLOOD PRESSURE: 65 MMHG | BODY MASS INDEX: 22.09 KG/M2 | HEART RATE: 67 BPM | WEIGHT: 120.75 LBS | SYSTOLIC BLOOD PRESSURE: 105 MMHG

## 2021-06-04 DIAGNOSIS — Z79.01 LONG TERM (CURRENT) USE OF ANTICOAGULANTS: ICD-10-CM

## 2021-06-04 DIAGNOSIS — I48.91 ATRIAL FIBRILLATION, UNSPECIFIED TYPE (HCC): Primary | ICD-10-CM

## 2021-06-04 LAB — INR PPP: 1.7 (ref 0.9–1.1)

## 2021-06-04 PROCEDURE — 36416 COLLJ CAPILLARY BLOOD SPEC: CPT | Performed by: INTERNAL MEDICINE

## 2021-06-04 PROCEDURE — 85610 PROTHROMBIN TIME: CPT | Performed by: INTERNAL MEDICINE

## 2021-06-17 ENCOUNTER — ANTICOAGULATION VISIT (OUTPATIENT)
Dept: CARDIOLOGY | Facility: CLINIC | Age: 86
End: 2021-06-17

## 2021-06-17 VITALS
DIASTOLIC BLOOD PRESSURE: 98 MMHG | BODY MASS INDEX: 22.13 KG/M2 | HEART RATE: 78 BPM | SYSTOLIC BLOOD PRESSURE: 149 MMHG | WEIGHT: 121 LBS

## 2021-06-17 DIAGNOSIS — I48.91 ATRIAL FIBRILLATION, UNSPECIFIED TYPE (HCC): Primary | ICD-10-CM

## 2021-06-17 DIAGNOSIS — Z79.01 LONG TERM (CURRENT) USE OF ANTICOAGULANTS: ICD-10-CM

## 2021-06-17 LAB — INR PPP: 2.1 (ref 0.9–1.1)

## 2021-06-17 PROCEDURE — 36416 COLLJ CAPILLARY BLOOD SPEC: CPT | Performed by: INTERNAL MEDICINE

## 2021-06-17 PROCEDURE — 85610 PROTHROMBIN TIME: CPT | Performed by: INTERNAL MEDICINE

## 2021-07-13 RX ORDER — FUROSEMIDE 20 MG/1
20 TABLET ORAL AS NEEDED
Qty: 90 TABLET | Refills: 2 | Status: SHIPPED | OUTPATIENT
Start: 2021-07-13

## 2021-07-15 ENCOUNTER — ANTICOAGULATION VISIT (OUTPATIENT)
Dept: CARDIOLOGY | Facility: CLINIC | Age: 86
End: 2021-07-15

## 2021-07-15 VITALS
SYSTOLIC BLOOD PRESSURE: 151 MMHG | DIASTOLIC BLOOD PRESSURE: 75 MMHG | BODY MASS INDEX: 22.13 KG/M2 | WEIGHT: 121 LBS | HEART RATE: 72 BPM

## 2021-07-15 DIAGNOSIS — I48.11 LONGSTANDING PERSISTENT ATRIAL FIBRILLATION (HCC): Primary | Chronic | ICD-10-CM

## 2021-07-15 LAB — INR PPP: 2.3 (ref 0.9–1.1)

## 2021-07-15 PROCEDURE — 36416 COLLJ CAPILLARY BLOOD SPEC: CPT | Performed by: INTERNAL MEDICINE

## 2021-07-15 PROCEDURE — 85610 PROTHROMBIN TIME: CPT | Performed by: INTERNAL MEDICINE

## 2021-07-26 ENCOUNTER — LAB (OUTPATIENT)
Dept: LAB | Facility: HOSPITAL | Age: 86
End: 2021-07-26

## 2021-07-26 ENCOUNTER — OFFICE VISIT (OUTPATIENT)
Dept: FAMILY MEDICINE CLINIC | Facility: CLINIC | Age: 86
End: 2021-07-26

## 2021-07-26 VITALS
BODY MASS INDEX: 22.08 KG/M2 | DIASTOLIC BLOOD PRESSURE: 84 MMHG | OXYGEN SATURATION: 98 % | TEMPERATURE: 96.9 F | HEART RATE: 91 BPM | WEIGHT: 120 LBS | SYSTOLIC BLOOD PRESSURE: 136 MMHG | HEIGHT: 62 IN | RESPIRATION RATE: 16 BRPM

## 2021-07-26 DIAGNOSIS — E78.00 PURE HYPERCHOLESTEROLEMIA: Chronic | ICD-10-CM

## 2021-07-26 DIAGNOSIS — I48.11 LONGSTANDING PERSISTENT ATRIAL FIBRILLATION (HCC): Chronic | ICD-10-CM

## 2021-07-26 DIAGNOSIS — J84.10 PULMONARY FIBROSIS (HCC): ICD-10-CM

## 2021-07-26 DIAGNOSIS — I10 ESSENTIAL HYPERTENSION: Chronic | ICD-10-CM

## 2021-07-26 DIAGNOSIS — R09.02 HYPOXEMIA: Chronic | ICD-10-CM

## 2021-07-26 DIAGNOSIS — M19.91 PRIMARY OSTEOARTHRITIS, UNSPECIFIED SITE: Chronic | ICD-10-CM

## 2021-07-26 DIAGNOSIS — M81.0 OSTEOPOROSIS, UNSPECIFIED OSTEOPOROSIS TYPE, UNSPECIFIED PATHOLOGICAL FRACTURE PRESENCE: Chronic | ICD-10-CM

## 2021-07-26 DIAGNOSIS — I10 ESSENTIAL HYPERTENSION: Primary | Chronic | ICD-10-CM

## 2021-07-26 DIAGNOSIS — I63.10 CEREBROVASCULAR ACCIDENT (CVA) DUE TO EMBOLISM OF PRECEREBRAL ARTERY (HCC): Chronic | ICD-10-CM

## 2021-07-26 PROCEDURE — 80048 BASIC METABOLIC PNL TOTAL CA: CPT

## 2021-07-26 PROCEDURE — 36415 COLL VENOUS BLD VENIPUNCTURE: CPT

## 2021-07-26 PROCEDURE — 99214 OFFICE O/P EST MOD 30 MIN: CPT | Performed by: FAMILY MEDICINE

## 2021-07-26 NOTE — PROGRESS NOTES
"Subjective   Shruthi Amni is a 89 y.o. female.     Chief Complaint   Patient presents with   • Hyperlipidemia     6 month followup   • Hypertension       HPI  Chief complaint: Hypertension hyperlipidemia atrial fibrillation pulmonary fibrosis    Patient is a 89-year-old white female comes in for follow-up and maintenance of her current problems which include    1.  Hypertension-stable-patient on Lasix 20 mg daily.  She denied headache lightheadedness dizziness or chest pain.      2. atrial fibrillation-stable-patient is currently on metoprolol 25 mg daily and Lanoxin 0.125 mg daily warfarin 2 mg daily.  She denies episodes of rapid or slow heart rhythm.  She denied heart palpitations lightheadedness or dizziness.    3.  Osteoporosis-stable--the patient is on calcium and vitamin D.  She denied bone pain or fractures.    4.  Pulmonary fibrosis/hypoxemia-stable-patient's been found to have pulmonary fibrosis with hypoxemia.  Patient is on oxygen.  She states that she finds she is using her oxygen more and more.    5.  Status post CVA-stable-patient currently does not have weakness or numbness on one side of the body or the other.  She denied problems processing information or speech disorder.      The following portions of the patient's history were reviewed and updated as appropriate: allergies, current medications, past family history, past medical history, past social history, past surgical history and problem list.    Review of Systems    Objective     /84 (BP Location: Left arm, Patient Position: Sitting, Cuff Size: Adult)   Pulse 91   Temp 96.9 °F (36.1 °C) (Infrared)   Resp 16   Ht 157.5 cm (62\")   Wt 54.4 kg (120 lb)   SpO2 98% Comment: 2 liters of oxygen  BMI 21.95 kg/m²     Physical Exam  Vitals and nursing note reviewed.   Constitutional:       Appearance: She is well-developed and normal weight.   HENT:      Head: Normocephalic and atraumatic.      Nose: Nose normal.      Mouth/Throat:      " Mouth: Mucous membranes are moist.      Pharynx: Oropharynx is clear.   Eyes:      Extraocular Movements: Extraocular movements intact.      Conjunctiva/sclera: Conjunctivae normal.      Pupils: Pupils are equal, round, and reactive to light.   Cardiovascular:      Rate and Rhythm: Normal rate and regular rhythm.      Heart sounds: Normal heart sounds.   Pulmonary:      Effort: Pulmonary effort is normal.      Breath sounds: Normal breath sounds.   Abdominal:      General: Abdomen is flat. Bowel sounds are normal.      Palpations: Abdomen is soft.   Musculoskeletal:         General: Normal range of motion.      Cervical back: Neck supple.   Skin:     General: Skin is warm and dry.   Neurological:      Mental Status: She is alert and oriented to person, place, and time.   Psychiatric:         Behavior: Behavior normal.         Thought Content: Thought content normal.         Judgment: Judgment normal.       Adult Transthoracic Echo Complete W/ Cont if Necessary Per Protocol (01/27/2021 11:36)    CT Chest With Contrast (09/28/2020 16:08)  D-dimer, Quantitative (09/23/2020 09:10)  proBNP (09/23/2020 09:10)  Basic Metabolic Panel (09/23/2020 09:10)  CBC & Differential (09/23/2020 09:10)  COVID PRE-OP / PRE-PROCEDURE SCREENING ORDER (NO ISOLATION) - Swab, Nasopharynx (09/09/2020 12:32)    Assessment/Plan   Diagnoses and all orders for this visit:    1. Essential hypertension (Primary)  -     Basic Metabolic Panel; Future    2. Pure hypercholesterolemia    3. Longstanding persistent atrial fibrillation (CMS/HCC)    4. Primary osteoarthritis, unspecified site    5. Osteoporosis, unspecified osteoporosis type, unspecified pathological fracture presence  -     Ambulatory Referral to Physical Therapy Evaluate and treat    6. Hypoxemia    7. 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 Pulmonary fibrosis (CMS/HCC)      Patient Instructions   Continue your current medications and  treatment.    Follow up in the office in 6 months.    Laboratory testing at that time.      Og Jeronimo Jr., MD    07/26/21

## 2021-07-27 LAB
ANION GAP SERPL CALCULATED.3IONS-SCNC: 8.8 MMOL/L (ref 5–15)
BUN SERPL-MCNC: 15 MG/DL (ref 8–23)
BUN/CREAT SERPL: 24.2 (ref 7–25)
CALCIUM SPEC-SCNC: 9 MG/DL (ref 8.2–9.6)
CHLORIDE SERPL-SCNC: 98 MMOL/L (ref 98–107)
CO2 SERPL-SCNC: 30.2 MMOL/L (ref 22–29)
CREAT SERPL-MCNC: 0.62 MG/DL (ref 0.57–1)
GFR SERPL CREATININE-BSD FRML MDRD: 90 ML/MIN/1.73
GLUCOSE SERPL-MCNC: 94 MG/DL (ref 65–99)
POTASSIUM SERPL-SCNC: 4.8 MMOL/L (ref 3.5–5.2)
SODIUM SERPL-SCNC: 137 MMOL/L (ref 136–145)

## 2021-08-18 ENCOUNTER — OFFICE VISIT (OUTPATIENT)
Dept: CARDIOLOGY | Facility: CLINIC | Age: 86
End: 2021-08-18

## 2021-08-18 ENCOUNTER — HOSPITAL ENCOUNTER (OUTPATIENT)
Dept: CARDIOLOGY | Facility: HOSPITAL | Age: 86
Discharge: HOME | End: 2021-08-18
Admitting: INTERNAL MEDICINE

## 2021-08-18 ENCOUNTER — ANTICOAGULATION VISIT (OUTPATIENT)
Dept: CARDIOLOGY | Facility: CLINIC | Age: 86
End: 2021-08-18

## 2021-08-18 VITALS
WEIGHT: 120.25 LBS | SYSTOLIC BLOOD PRESSURE: 144 MMHG | HEART RATE: 65 BPM | OXYGEN SATURATION: 98 % | DIASTOLIC BLOOD PRESSURE: 78 MMHG | BODY MASS INDEX: 22.13 KG/M2 | HEIGHT: 62 IN

## 2021-08-18 VITALS
HEART RATE: 65 BPM | DIASTOLIC BLOOD PRESSURE: 78 MMHG | BODY MASS INDEX: 21.95 KG/M2 | SYSTOLIC BLOOD PRESSURE: 144 MMHG | WEIGHT: 120 LBS

## 2021-08-18 VITALS — BODY MASS INDEX: 22.08 KG/M2 | HEIGHT: 62 IN | WEIGHT: 120 LBS

## 2021-08-18 DIAGNOSIS — I48.91 ATRIAL FIBRILLATION, UNSPECIFIED TYPE (HCC): Primary | ICD-10-CM

## 2021-08-18 DIAGNOSIS — R06.02 SHORTNESS OF BREATH: ICD-10-CM

## 2021-08-18 DIAGNOSIS — I10 ESSENTIAL HYPERTENSION: ICD-10-CM

## 2021-08-18 DIAGNOSIS — I25.10 CORONARY ARTERY DISEASE INVOLVING NATIVE CORONARY ARTERY OF NATIVE HEART WITHOUT ANGINA PECTORIS: ICD-10-CM

## 2021-08-18 DIAGNOSIS — E78.00 PURE HYPERCHOLESTEROLEMIA: ICD-10-CM

## 2021-08-18 DIAGNOSIS — I48.91 ATRIAL FIBRILLATION, UNSPECIFIED TYPE (HCC): ICD-10-CM

## 2021-08-18 LAB
BH CV ECHO MEAS - ACS: 1.8 CM
BH CV ECHO MEAS - AO MAX PG (FULL): 4.4 MMHG
BH CV ECHO MEAS - AO MAX PG: 5.9 MMHG
BH CV ECHO MEAS - AO MEAN PG (FULL): 2.4 MMHG
BH CV ECHO MEAS - AO MEAN PG: 3.2 MMHG
BH CV ECHO MEAS - AO ROOT AREA (BSA CORRECTED): 1.9
BH CV ECHO MEAS - AO ROOT AREA: 6.8 CM^2
BH CV ECHO MEAS - AO ROOT DIAM: 2.9 CM
BH CV ECHO MEAS - AO V2 MAX: 121.7 CM/SEC
BH CV ECHO MEAS - AO V2 MEAN: 85.1 CM/SEC
BH CV ECHO MEAS - AO V2 VTI: 26.2 CM
BH CV ECHO MEAS - ASC AORTA: 2.9 CM
BH CV ECHO MEAS - AVA(I,A): 1.6 CM^2
BH CV ECHO MEAS - AVA(I,D): 1.6 CM^2
BH CV ECHO MEAS - AVA(V,A): 1.8 CM^2
BH CV ECHO MEAS - AVA(V,D): 1.8 CM^2
BH CV ECHO MEAS - BSA(HAYCOCK): 1.5 M^2
BH CV ECHO MEAS - BSA: 1.5 M^2
BH CV ECHO MEAS - BZI_BMI: 22.3 KILOGRAMS/M^2
BH CV ECHO MEAS - BZI_METRIC_HEIGHT: 154.9 CM
BH CV ECHO MEAS - BZI_METRIC_WEIGHT: 53.5 KG
BH CV ECHO MEAS - EDV(CUBED): 98.9 ML
BH CV ECHO MEAS - EDV(MOD-SP4): 60.6 ML
BH CV ECHO MEAS - EDV(TEICH): 98.5 ML
BH CV ECHO MEAS - EF(CUBED): 61.1 %
BH CV ECHO MEAS - EF(MOD-SP4): 57.4 %
BH CV ECHO MEAS - EF(TEICH): 52.6 %
BH CV ECHO MEAS - ESV(CUBED): 38.5 ML
BH CV ECHO MEAS - ESV(MOD-SP4): 25.8 ML
BH CV ECHO MEAS - ESV(TEICH): 46.7 ML
BH CV ECHO MEAS - FS: 27 %
BH CV ECHO MEAS - IVS/LVPW: 1.1
BH CV ECHO MEAS - IVSD: 1.1 CM
BH CV ECHO MEAS - LA DIMENSION: 5.5 CM
BH CV ECHO MEAS - LA/AO: 1.9
BH CV ECHO MEAS - LV DIASTOLIC VOL/BSA (35-75): 40.1 ML/M^2
BH CV ECHO MEAS - LV MASS(C)D: 161.2 GRAMS
BH CV ECHO MEAS - LV MASS(C)DI: 106.8 GRAMS/M^2
BH CV ECHO MEAS - LV MAX PG: 1.6 MMHG
BH CV ECHO MEAS - LV MEAN PG: 0.82 MMHG
BH CV ECHO MEAS - LV SYSTOLIC VOL/BSA (12-30): 17.1 ML/M^2
BH CV ECHO MEAS - LV V1 MAX: 62.4 CM/SEC
BH CV ECHO MEAS - LV V1 MEAN: 43.1 CM/SEC
BH CV ECHO MEAS - LV V1 VTI: 12.2 CM
BH CV ECHO MEAS - LVIDD: 4.6 CM
BH CV ECHO MEAS - LVIDS: 3.4 CM
BH CV ECHO MEAS - LVOT AREA: 3.5 CM^2
BH CV ECHO MEAS - LVOT DIAM: 2.1 CM
BH CV ECHO MEAS - LVPWD: 0.95 CM
BH CV ECHO MEAS - MV A MAX VEL: 39.5 CM/SEC
BH CV ECHO MEAS - MV DEC SLOPE: 850.1 CM/SEC^2
BH CV ECHO MEAS - MV DEC TIME: 0.17 SEC
BH CV ECHO MEAS - MV E MAX VEL: 140.8 CM/SEC
BH CV ECHO MEAS - MV E/A: 3.6
BH CV ECHO MEAS - MV MAX PG: 9.1 MMHG
BH CV ECHO MEAS - MV MEAN PG: 2.3 MMHG
BH CV ECHO MEAS - MV V2 MAX: 151 CM/SEC
BH CV ECHO MEAS - MV V2 MEAN: 66.3 CM/SEC
BH CV ECHO MEAS - MV V2 VTI: 27.3 CM
BH CV ECHO MEAS - MVA(VTI): 1.6 CM^2
BH CV ECHO MEAS - PA ACC TIME: 0.11 SEC
BH CV ECHO MEAS - PA MAX PG (FULL): 1.3 MMHG
BH CV ECHO MEAS - PA MAX PG: 2.6 MMHG
BH CV ECHO MEAS - PA PR(ACCEL): 29.2 MMHG
BH CV ECHO MEAS - PA V2 MAX: 80.1 CM/SEC
BH CV ECHO MEAS - PI MAX PG: 16.6 MMHG
BH CV ECHO MEAS - PI MAX VEL: 203.5 CM/SEC
BH CV ECHO MEAS - RAP SYSTOLE: 15 MMHG
BH CV ECHO MEAS - RV MAX PG: 1.3 MMHG
BH CV ECHO MEAS - RV MEAN PG: 0.7 MMHG
BH CV ECHO MEAS - RV V1 MAX: 56 CM/SEC
BH CV ECHO MEAS - RV V1 MEAN: 40 CM/SEC
BH CV ECHO MEAS - RV V1 VTI: 11.6 CM
BH CV ECHO MEAS - RVDD: 2.4 CM
BH CV ECHO MEAS - RVSP: 49.1 MMHG
BH CV ECHO MEAS - SI(AO): 118.1 ML/M^2
BH CV ECHO MEAS - SI(CUBED): 40 ML/M^2
BH CV ECHO MEAS - SI(LVOT): 28.3 ML/M^2
BH CV ECHO MEAS - SI(MOD-SP4): 23 ML/M^2
BH CV ECHO MEAS - SI(TEICH): 34.4 ML/M^2
BH CV ECHO MEAS - SV(AO): 178.3 ML
BH CV ECHO MEAS - SV(CUBED): 60.4 ML
BH CV ECHO MEAS - SV(LVOT): 42.7 ML
BH CV ECHO MEAS - SV(MOD-SP4): 34.8 ML
BH CV ECHO MEAS - SV(TEICH): 51.9 ML
BH CV ECHO MEAS - TR MAX VEL: 291 CM/SEC
INR PPP: 1.8 (ref 0.9–1.1)
MAXIMAL PREDICTED HEART RATE: 127 BPM
STRESS TARGET HR: 108 BPM

## 2021-08-18 PROCEDURE — 93306 TTE W/DOPPLER COMPLETE: CPT | Performed by: INTERNAL MEDICINE

## 2021-08-18 PROCEDURE — 85610 PROTHROMBIN TIME: CPT | Performed by: INTERNAL MEDICINE

## 2021-08-18 PROCEDURE — 99214 OFFICE O/P EST MOD 30 MIN: CPT | Performed by: INTERNAL MEDICINE

## 2021-08-18 PROCEDURE — 93306 TTE W/DOPPLER COMPLETE: CPT

## 2021-08-18 PROCEDURE — 36416 COLLJ CAPILLARY BLOOD SPEC: CPT | Performed by: INTERNAL MEDICINE

## 2021-08-18 NOTE — PROGRESS NOTES
"    Subjective:     Encounter Date:08/18/2021      Patient ID: Shruthi Amin is a 89 y.o. female.    Chief Complaint: Atrial fibrillation  History of Present Illness 89-year-old white female with history of coronary disease hypertension hyperlipidemia atrial fibrillation presents to my office for follow-up.  Patient is currently stable without any signs of chest pain but has some shortness of breath with exertion.  No complains any PND orthopnea.  No palpitation dizziness syncope or swelling of the feet.  She is taking her medicine regularly.  She does not smoke.  She is trying to walk and be active    The following portions of the patient's history were reviewed and updated as appropriate: allergies, current medications, past family history, past medical history, past social history, past surgical history and problem list.  Past Medical History:   Diagnosis Date   • Atrial fibrillation (CMS/HCC)    • Coronary artery disease     Atrial fibrillation   • GERD (gastroesophageal reflux disease)    • Glaucoma    • Hiatal hernia with gastroesophageal reflux    • Hyperlipidemia    • Hypertension    • Osteoarthritis    • Osteoporosis    • Stroke (CMS/HCC)     off and on dizziness from the stroke.     Past Surgical History:   Procedure Laterality Date   • BACK SURGERY      kyphoplasty   • EYE SURGERY      cataract surgery   • FIXATION KYPHOPLASTY LUMBAR SPINE  2013   • HIP TROCHANTERIC NAILING WITH INTRAMEDULLARY HIP SCREW Right 7/20/2019    Procedure: HIP TROCHANTERIC NAILING SHORT WITH INTRAMEDULLARY HIP SCREW;  Surgeon: Edward Centeno MD;  Location: [facility] OR;  Service: Orthopedics   • RECTAL SURGERY      x 3     /78 (BP Location: Left arm, Patient Position: Sitting, Cuff Size: Adult)   Pulse 65   Ht 157.5 cm (62\")   Wt 54.5 kg (120 lb 4 oz)   SpO2 98%   BMI 21.99 kg/m²   Family History   Problem Relation Age of Onset   • Heart disease Mother    • Hypertension Mother    • Cancer Father         " colon cancer   • Cancer Sister         lung   • Cancer Brother         colon cancer       Current Outpatient Medications:   •  B Complex Vitamins (VITAMIN B COMPLEX PO), Take 1 tablet by mouth Daily., Disp: , Rfl:   •  Bacillus Coagulans-Inulin (ALIGN PREBIOTIC-PROBIOTIC PO), Take  by mouth., Disp: , Rfl:   •  Biotin 54217 MCG tablet, Take  by mouth., Disp: , Rfl:   •  Calcium-Phosphorus-Vitamin D (CITRACAL +D3) 250-107-500 MG-MG-UNIT chewable tablet, Chew 1 tablet Daily., Disp: , Rfl:   •  cholecalciferol (VITAMIN D3) 400 units tablet, Take 400 Units by mouth Daily., Disp: , Rfl:   •  digoxin (LANOXIN) 125 MCG tablet, TAKE 1 TABLET BY MOUTH EVERY DAY, Disp: 90 tablet, Rfl: 1  •  docusate sodium (STOOL SOFTENER) 100 MG capsule, Take 100 mg by mouth 2 (Two) Times a Day., Disp: , Rfl:   •  furosemide (LASIX) 20 MG tablet, TAKE 1 TABLET BY MOUTH AS NEEDED (SWELLING)., Disp: 90 tablet, Rfl: 2  •  KLOR-CON 10 MEQ CR tablet, TAKE 1 TABLET BY MOUTH EVERY DAY, Disp: 90 tablet, Rfl: 1  •  latanoprost (XALATAN) 0.005 % ophthalmic solution, Administer 1 drop to the right eye Every Night., Disp: , Rfl:   •  Lutein 20 MG capsule, Take  by mouth., Disp: , Rfl:   •  magnesium gluconate (MAGONATE) 500 MG tablet, Take 27 mg by mouth 2 (Two) Times a Day., Disp: , Rfl:   •  metoprolol succinate XL (TOPROL-XL) 25 MG 24 hr tablet, TAKE 1 TABLET BY MOUTH TWICE A DAY, Disp: 180 tablet, Rfl: 1  •  vitamin C (ASCORBIC ACID) 500 MG tablet, Take 500 mg by mouth Daily., Disp: , Rfl:   •  warfarin (COUMADIN) 2 MG tablet, Take 1 tablet by mouth daily., Disp: 100 tablet, Rfl: 0  Allergies   Allergen Reactions   • Codeine Nausea And Vomiting   • Penicillins Hives   • Latex Rash     Social History     Socioeconomic History   • Marital status:      Spouse name: Not on file   • Number of children: Not on file   • Years of education: Not on file   • Highest education level: Not on file   Tobacco Use   • Smoking status: Never Smoker   •  Smokeless tobacco: Never Used   Vaping Use   • Vaping Use: Never used   Substance and Sexual Activity   • Alcohol use: No   • Drug use: No   • Sexual activity: Defer     Review of Systems   Constitutional: Negative for fever and malaise/fatigue.   HENT: Negative for congestion and hearing loss.    Eyes: Negative for double vision and visual disturbance.   Cardiovascular: Negative for chest pain, claudication, dyspnea on exertion, leg swelling and syncope.   Respiratory: Positive for shortness of breath. Negative for cough.    Endocrine: Negative for cold intolerance.   Skin: Negative for color change and rash.   Musculoskeletal: Negative for arthritis and joint pain.   Gastrointestinal: Negative for abdominal pain and heartburn.   Genitourinary: Negative for hematuria.   Neurological: Negative for excessive daytime sleepiness and dizziness.   Psychiatric/Behavioral: Negative for depression. The patient is not nervous/anxious.    All other systems reviewed and are negative.             Objective:     Constitutional:       Appearance: Well-developed.   Eyes:      General: No scleral icterus.     Conjunctiva/sclera: Conjunctivae normal.   HENT:      Head: Normocephalic and atraumatic.   Neck:      Vascular: No carotid bruit or JVD.   Pulmonary:      Effort: Pulmonary effort is normal.      Breath sounds: Normal breath sounds. No wheezing. No rales.   Cardiovascular:      Normal rate. Regular rhythm.      Murmurs: There is a systolic murmur.   Pulses:     Intact distal pulses.   Abdominal:      General: Bowel sounds are normal.      Palpations: Abdomen is soft.   Musculoskeletal:      Cervical back: Normal range of motion and neck supple. Skin:     General: Skin is warm and dry.      Findings: No rash.   Neurological:      Mental Status: Alert.       Procedures    Lab Review:         MDM    1.  Coronary artery disease  Patient has nonobstructive disease now with normal LV systolic function is currently stable on  medications  Patient has mild to moderate mitral gestation moderate tricuspid regurgitation  2.  Atrial fibrillation  Patient has history of atrial fibrillation is currently stable on warfarin keep his INR between 2.0-3.0  3.  Hypertension  Patient blood pressure currently stable on medications  4.  Hyperlipidemia  Patient lipid levels are followed by the primary care doctor.    Patient's previous medical records, labs, and EKG were reviewed and discussed with the patient at today's visit.

## 2021-08-26 ENCOUNTER — TREATMENT (OUTPATIENT)
Dept: PHYSICAL THERAPY | Facility: CLINIC | Age: 86
End: 2021-08-26

## 2021-08-26 DIAGNOSIS — M81.0 AGE RELATED OSTEOPOROSIS, UNSPECIFIED PATHOLOGICAL FRACTURE PRESENCE: Primary | ICD-10-CM

## 2021-08-26 PROCEDURE — 97110 THERAPEUTIC EXERCISES: CPT | Performed by: PHYSICAL THERAPIST

## 2021-08-26 PROCEDURE — 97162 PT EVAL MOD COMPLEX 30 MIN: CPT | Performed by: PHYSICAL THERAPIST

## 2021-08-26 PROCEDURE — 97530 THERAPEUTIC ACTIVITIES: CPT | Performed by: PHYSICAL THERAPIST

## 2021-08-26 NOTE — PROGRESS NOTES
Physical Therapy Initial Evaluation and Plan of Care    Patient: Shruthi Amin   : 6/10/1928  Diagnosis/ICD-10 Code:  Age related osteoporosis, unspecified pathological fracture presence [M81.0]  Referring practitioner: Og Jeronimo Jr., MD  Date of Initial Visit: 2021  Today's Date: 2021  Patient seen for 1 sessions           Subjective Questionnaire: Oswestry: 21/50 ( 42%)      Subjective Evaluation    History of Present Illness  Mechanism of injury: Pt is referred to therapy due to Osteoporosis. She has hx of compression fxs, hx of  kyphoplasty in . She fell in 2019 and broke her R leg, had surgery and spent several weeks in rehab. She asked the Dr if there is anything to do to improve her posture. He recommended therapy. She wants to strengthen her back, improve her posture and be able to stand up straight.  She uses a rwx when she goes out, doesn't need it at home. She gets short of breath and her chest hurts if reach up or walk too much. Has been using O2 for several months.  She sleeps with her head elevated due to her breathing.   PMH: hx of stroke, she is on blood thinner, A-fib, back surgery, kyphoplasty.     Quality of life: fair    Pain  Current pain ratin  At best pain ratin  At worst pain ratin  Location: upper back and hips  Quality: dull ache  Relieving factors: change in position and rest  Aggravating factors: standing, prolonged positioning and ambulation    Social Support  Lives with: adult children    Patient Goals  Patient goals for therapy: decreased pain and increased motion  Patient goal: improve posture         Precautions: SOA, she is on blood thinner    Objective          Postural Observations  Seated posture: poor  Standing posture: fair    Additional Postural Observation Details  Flexed posture, ambulates with 4 wheel rwx, leans over her wx, she is on 2 lt O2, but takes it off to put her mask on.   Inc thoracic kyphosis/ dec lumbar lordosis, RS, spinal  scoliosis, L hip higher      Active Range of Motion     Lumbar   Flexion: WFL    Additional Active Range of Motion Details  Cervical and thoracic ROM w/ mod limitation   Lumbar ext w/ mod/ max limitation. She can come to neutral standing against the wall    LE ROM/ strength: wfl B     UE ROM/ strength: wfl B ,co pain ant chest with shoulder flex    30' Sit to stand test: x5  Inc SOA/ has to use UE            Assessment & Plan     Assessment  Impairments: abnormal or restricted ROM, activity intolerance, lacks appropriate home exercise program and pain with function  Assessment details: Pt is a 89 y/o f referred to therapy due to Osteoporosis. Pt presents with poor posture, dec tolerance to activities due to SOA.  Presents with hx of previous compression fx, hx of Kyphoplasty, back surgery, R hip fx.  Upon initial evaluation pt exhibits the above impairments and functional limitations. Impairments affect walking/ and performing normal/ daily activities. Pt will benefit from skilled physical therapy to address impairments, improve posture, reduce pain  and maximize function.   Prognosis: fair  Functional Limitations: lifting, uncomfortable because of pain, standing and reaching overhead  Goals  Plan Goals: STGs:  In 4 weeks  1- Pt will  report at least 35 % improvement and pain reduction   2- Pt will be independent with initial HEP   3- Pt will tolerate progression of HEP and her exercise program   4- Pt demonstrate understanding of postural corrections and correct with min vcs    LTGs: By DC   1- Pt will report at least 75 % improvement and pain reduction   2- Pt will be independent with final HEP and self management of her condition   3- Pt will improve Oswestry score compare to initial score at eval indicating functional improvement   4- Pt will voice readiness for DC with independent program   5- Pt will demonstrate improved posture and be able to self correct  6- Pt will improve 30' sit to stand test to 7 or  better    Plan  Therapy options: will be seen for skilled physical therapy services  Planned therapy interventions: abdominal trunk stabilization, functional ROM exercises, flexibility, home exercise program, manual therapy, neuromuscular re-education, postural training, strengthening and therapeutic activities  Frequency: 1x week  Treatment plan discussed with: patient  Plan details: 20 visits        See flow sheet for treatment detail    History # of Personal Factors and/or Comorbidities: MODERATE (1-2)  Examination of Body System(s): # of elements: MODERATE (3)  Clinical Presentation: EVOLVING  Clinical Decision Making: MODERATE          Timed:         Manual Therapy:         mins  94020;     Therapeutic Exercise:   10      mins  74192;     Neuromuscular Anel:        mins  06589;    Therapeutic Activity:     15     mins  31430;     Gait Training:           mins  24914;     Ultrasound:          mins  72157;    Ionto                                   mins   87247  Self Care                            mins   61540  Canal repositioning           mins    42952      Un-Timed:  Electrical Stimulation:         mins  57509 ( );  Dry Needling          mins self-pay  Traction          mins 86752  Low Eval          Mins  88053  Mod Eval      25    Mins  71998  High Eval                            Mins  96558  Re-Eval                               mins  28809        Timed Treatment:  25    mins   Total Treatment:    50    mins    PT SIGNATURE: Leo Morrison PT, CLTANYA   DATE TREATMENT INITIATED: 8/26/2021    Initial Certification  Certification Period: 11/24/2021  I certify that the therapy services are furnished while this patient is under my care.  The services outlined above are required by this patient, and will be reviewed every 90 days.     PHYSICIAN: Og Jeronimo Jr., MD      DATE:     Please sign and return via fax to [phone].. Thank you, [facility].

## 2021-08-30 RX ORDER — METOPROLOL SUCCINATE 25 MG/1
TABLET, EXTENDED RELEASE ORAL
Qty: 180 TABLET | Refills: 1 | Status: SHIPPED | OUTPATIENT
Start: 2021-08-30 | End: 2022-02-21

## 2021-08-30 NOTE — TELEPHONE ENCOUNTER
Rx Refill Note  Requested Prescriptions     Pending Prescriptions Disp Refills   • metoprolol succinate XL (TOPROL-XL) 25 MG 24 hr tablet [Pharmacy Med Name: METOPROLOL SUCC ER 25 MG TAB] 180 tablet 1     Sig: TAKE 1 TABLET BY MOUTH TWICE A DAY      Last office visit with prescribing clinician: 8/18/2021      Next office visit with prescribing clinician: 3/14/2022            Ellen Pearson MA  08/30/21, 07:31 EDT

## 2021-09-02 ENCOUNTER — TREATMENT (OUTPATIENT)
Dept: PHYSICAL THERAPY | Facility: CLINIC | Age: 86
End: 2021-09-02

## 2021-09-02 DIAGNOSIS — M81.0 AGE RELATED OSTEOPOROSIS, UNSPECIFIED PATHOLOGICAL FRACTURE PRESENCE: Primary | ICD-10-CM

## 2021-09-02 PROCEDURE — 97110 THERAPEUTIC EXERCISES: CPT | Performed by: PHYSICAL THERAPIST

## 2021-09-02 PROCEDURE — 97530 THERAPEUTIC ACTIVITIES: CPT | Performed by: PHYSICAL THERAPIST

## 2021-09-02 NOTE — PROGRESS NOTES
Physical Therapy Daily Progress Note    Patient: Shruthi Amin  : 6/10/1928  Today's Date: 2021    VISIT#: 2    Subjective   Shruthi Amin reports: Doing well, feels a little SOA at times.      Objective     See Exercise, Manual, and Modality Logs for complete treatment.     Patient Education: continue HEP.    Assessment/Plan  Good response to session, requires frequent cues to prevent scapular hiking during exercises and cues for postural corrections.    Progress per Plan of Care            Timed:         Manual Therapy:         mins  00849;     Therapeutic Exercise:    15     mins  53830;     Neuromuscular Anel:        mins  50038;    Therapeutic Activity:     10     mins  20435;     Gait Training:           mins  29568;     Ultrasound:          mins  25641;    Ionto:                                   mins   76147  Self Care:                            mins   80922    Un-Timed:  Electrical Stimulation:         mins  67300 ( );  Dry Needling          mins self-pay  Traction          mins 68332  Re-Eval                               mins  35194    Timed Treatment:   25   mins   Total Treatment:     25   mins    Farheen Crowder PT  Physical Therapist

## 2021-09-07 ENCOUNTER — TREATMENT (OUTPATIENT)
Dept: PHYSICAL THERAPY | Facility: CLINIC | Age: 86
End: 2021-09-07

## 2021-09-07 DIAGNOSIS — M81.0 AGE RELATED OSTEOPOROSIS, UNSPECIFIED PATHOLOGICAL FRACTURE PRESENCE: Primary | ICD-10-CM

## 2021-09-07 PROCEDURE — 97530 THERAPEUTIC ACTIVITIES: CPT | Performed by: PHYSICAL THERAPIST

## 2021-09-07 PROCEDURE — 97110 THERAPEUTIC EXERCISES: CPT | Performed by: PHYSICAL THERAPIST

## 2021-09-07 NOTE — PROGRESS NOTES
Physical Therapy Daily Progress Note        Patient: Shruthi Amin   : 6/10/1928  Diagnosis/ICD-10 Code:  Age related osteoporosis, unspecified pathological fracture presence [M81.0]  Referring practitioner: Og Jeronimo Jr., MD  Date of Initial Visit: Type: THERAPY  Noted: 2021  Today's Date: 2021  Patient seen for 3 sessions.  POC 20, 1x/wk, exp. 21    PMH: hx of stroke, she is on blood thinner, A-fib, back surgery, kyphoplasty.          Subjective :  Pt. C/o shortness of breath and weakness.  She did not bring her oxygen.     Objective   See Exercise, Manual, and Modality Logs for complete treatment. Progression as noted.   During treatment pt. SpO2 fluctuated between 82%  to 97% .    EDUCATION:  Importance of wearing oxygen when exercising.     Assessment/Plan:  Frequent rest required due to short of breath and oxygen decompensating.  Encouraged patient to bring oxygen to PT.     Progress per Plan of Care     Timed:                                                                          Manual Therapy:                 mins  74630;                      Therapeutic Exercise:    20     mins  07034;     Neuromuscular Anel:        mins  80233;    Therapeutic Activity:      10     mins  14816;     Gait Training:                      mins  28038;     Ultrasound:                          mins  22995;    Ionto:                                   mins   32533  Self Care:                            mins   49720     Un-Timed:  Electrical Stimulation:         mins  12390 ( );  Dry Needling                       mins self-pay  Traction                               mins 41702  Re-Eval                               mins  86826     Timed Treatment:   30   mins   Total Treatment:     30   mins         Christine Powers PTA  Physical Therapist Assistant

## 2021-09-15 ENCOUNTER — TREATMENT (OUTPATIENT)
Dept: PHYSICAL THERAPY | Facility: CLINIC | Age: 86
End: 2021-09-15

## 2021-09-15 DIAGNOSIS — M81.0 AGE RELATED OSTEOPOROSIS, UNSPECIFIED PATHOLOGICAL FRACTURE PRESENCE: Primary | ICD-10-CM

## 2021-09-15 PROCEDURE — 97530 THERAPEUTIC ACTIVITIES: CPT | Performed by: PHYSICAL THERAPIST

## 2021-09-15 PROCEDURE — 97110 THERAPEUTIC EXERCISES: CPT | Performed by: PHYSICAL THERAPIST

## 2021-09-15 NOTE — PROGRESS NOTES
Physical Therapy Daily Progress Note    Patient: Shruthi Amin  : 6/10/1928  Referring practitioner: Og Jeronimo Jr., MD  Today's Date: 9/15/2021    VISIT#: 4    Subjective   Pt reports: doing ok , a little SOA today. She brought her O2 tank today.       Objective     See Exercise, Manual, and Modality Logs for complete treatment.     Patient Education:    Assessment & Plan     Assessment  Assessment details: Good roland to today's treatment session, inc SOA with min activities. Requires frequent short rest breaks.           Progress per Plan of Care            Timed:         Manual Therapy:         mins  72923;     Therapeutic Exercise:    20     mins  57624;     Neuromuscular Anel:        mins  94680;    Therapeutic Activity:    15      mins  25886;     Gait Training:           mins  69398;     Ultrasound:          mins  45193;    Ionto                                   mins   36820  Self Care                            mins   90008  Canal repositioning           mins    41848    Un-Timed:  Electrical Stimulation:         mins  22699 ( );  Traction          mins 47089  Low Eval          Mins  75603  Mod Eval          Mins  96233  High Eval                            Mins  12790  Re-Eval                               mins  78156    Timed Treatment:  35    mins   Total Treatment:    35    mins    Leo Morrison, PT, CLT  Physical Therapist

## 2021-09-17 ENCOUNTER — TELEPHONE (OUTPATIENT)
Dept: CARDIOLOGY | Facility: CLINIC | Age: 86
End: 2021-09-17

## 2021-09-17 ENCOUNTER — ANTICOAGULATION VISIT (OUTPATIENT)
Dept: CARDIOLOGY | Facility: CLINIC | Age: 86
End: 2021-09-17

## 2021-09-17 VITALS
HEART RATE: 78 BPM | BODY MASS INDEX: 21.95 KG/M2 | WEIGHT: 120 LBS | DIASTOLIC BLOOD PRESSURE: 74 MMHG | SYSTOLIC BLOOD PRESSURE: 144 MMHG

## 2021-09-17 DIAGNOSIS — Z79.01 LONG TERM (CURRENT) USE OF ANTICOAGULANTS: ICD-10-CM

## 2021-09-17 DIAGNOSIS — I48.91 ATRIAL FIBRILLATION, UNSPECIFIED TYPE (HCC): ICD-10-CM

## 2021-09-17 DIAGNOSIS — I48.91 ATRIAL FIBRILLATION, UNSPECIFIED TYPE (HCC): Primary | ICD-10-CM

## 2021-09-17 LAB — INR PPP: 1.8 (ref 0.9–1.1)

## 2021-09-17 PROCEDURE — 85610 PROTHROMBIN TIME: CPT | Performed by: INTERNAL MEDICINE

## 2021-09-17 PROCEDURE — 36416 COLLJ CAPILLARY BLOOD SPEC: CPT | Performed by: INTERNAL MEDICINE

## 2021-09-17 RX ORDER — WARFARIN SODIUM 2 MG/1
TABLET ORAL
Qty: 102 TABLET | Refills: 0 | Status: SHIPPED | OUTPATIENT
Start: 2021-09-17 | End: 2021-09-24

## 2021-09-17 NOTE — TELEPHONE ENCOUNTER
Rx Refill Note  Requested Prescriptions     Pending Prescriptions Disp Refills   • warfarin (COUMADIN) 2 MG tablet 102 tablet 0     Sig: Take 2 tablets by mouth on Mondays, with 1 tab all other days or as directed.      Last office visit with prescribing clinician: 8/18/2021      Next office visit with prescribing clinician: 3/14/2022            Sandy Dean RN  09/17/21, 12:20 EDT

## 2021-09-21 ENCOUNTER — TREATMENT (OUTPATIENT)
Dept: PHYSICAL THERAPY | Facility: CLINIC | Age: 86
End: 2021-09-21

## 2021-09-21 DIAGNOSIS — M81.0 AGE RELATED OSTEOPOROSIS, UNSPECIFIED PATHOLOGICAL FRACTURE PRESENCE: Primary | ICD-10-CM

## 2021-09-21 PROCEDURE — 97110 THERAPEUTIC EXERCISES: CPT | Performed by: PHYSICAL THERAPIST

## 2021-09-21 PROCEDURE — 97530 THERAPEUTIC ACTIVITIES: CPT | Performed by: PHYSICAL THERAPIST

## 2021-09-21 NOTE — PROGRESS NOTES
Physical Therapy Daily Progress Note        Patient: Shruthi Amin   : 6/10/1928  Diagnosis/ICD-10 Code:  Age related osteoporosis, unspecified pathological fracture presence [M81.0]  Referring practitioner: Og Jeronimo Jr., MD  Date of Initial Visit: Type: THERAPY  Noted: 2021  Today's Date: 2021  Patient seen for 5 sessions.  POC 20, 1x/wk, exp. 21    PMH: hx of stroke, she is on blood thinner, A-fib, back surgery, kyphoplasty.          Subjective :  Pt. States that she thinks she may be getting a little stronger.     Objective   See Exercise, Manual, and Modality Logs for complete treatment.    Pt. On 2L continuous oxygen per nasal canula:  SpO2  96%-97% throughout treatment.       EDUCATION:      Assessment/Plan:  Pt. Endurance much improved with oxygen.  She requires frequent cueing for posture.     Progress per Plan of Care     Timed:                                                                          Manual Therapy:                 mins  95921;                      Therapeutic Exercise:    20     mins  77079;     Neuromuscular Anel:        mins  83775;    Therapeutic Activity:      10     mins  15605;     Gait Training:                      mins  99456;     Ultrasound:                          mins  40560;    Ionto:                                   mins   90470  Self Care:                            mins   08481     Un-Timed:  Electrical Stimulation:         mins  91338 ( );  Dry Needling                       mins self-pay  Traction                               mins 19309  Re-Eval                               mins  00727     Timed Treatment:   30   mins   Total Treatment:     30   mins         Christine Powers PTA  Physical Therapist Assistant

## 2021-09-24 DIAGNOSIS — Z79.01 LONG TERM (CURRENT) USE OF ANTICOAGULANTS: ICD-10-CM

## 2021-09-24 DIAGNOSIS — I48.91 ATRIAL FIBRILLATION, UNSPECIFIED TYPE (HCC): ICD-10-CM

## 2021-09-24 RX ORDER — WARFARIN SODIUM 2 MG/1
TABLET ORAL
Qty: 105 TABLET | Refills: 0 | Status: SHIPPED | OUTPATIENT
Start: 2021-09-24 | End: 2021-12-08 | Stop reason: SDUPTHER

## 2021-09-24 NOTE — TELEPHONE ENCOUNTER
Rx Refill Note  Requested Prescriptions     Pending Prescriptions Disp Refills   • warfarin (COUMADIN) 2 MG tablet [Pharmacy Med Name: WARFARIN SODIUM 2 MG TABLET] 105 tablet 0     Sig: TAKE 2 tablets by mouth on Monday and one TABLET BY MOUTH all other days or as direcred      Last office visit with prescribing clinician: 8/18/2021      Next office visit with prescribing clinician: 3/14/2022              Anticoagulation Visit (09/17/2021)      Debbie Enamorado LPN  09/24/21, 14:43 EDT

## 2021-09-28 ENCOUNTER — TREATMENT (OUTPATIENT)
Dept: PHYSICAL THERAPY | Facility: CLINIC | Age: 86
End: 2021-09-28

## 2021-09-28 DIAGNOSIS — M81.0 AGE RELATED OSTEOPOROSIS, UNSPECIFIED PATHOLOGICAL FRACTURE PRESENCE: Primary | ICD-10-CM

## 2021-09-28 PROCEDURE — 97110 THERAPEUTIC EXERCISES: CPT | Performed by: PHYSICAL THERAPIST

## 2021-09-28 PROCEDURE — 97530 THERAPEUTIC ACTIVITIES: CPT | Performed by: PHYSICAL THERAPIST

## 2021-09-28 NOTE — PROGRESS NOTES
Physical Therapy Daily Progress Note        Patient: Shruthi Amin   : 6/10/1928  Diagnosis/ICD-10 Code:  Age related osteoporosis, unspecified pathological fracture presence [M81.0]  Referring practitioner: Og Jeronimo Jr., MD  Date of Initial Visit: Type: THERAPY  Noted: 2021  Today's Date: 2021  Patient seen for 6 sessions.  POC 20, 1x/wk, exp. 21    PMH: hx of stroke, she is on blood thinner, A-fib, back surgery, kyphoplasty.          Subjective :  Denies pain currently.  Pt. Biggest complaint at this time is becoming short of breath with activity.     Objective   See Exercise, Manual, and Modality Logs for complete treatment.  Progression as noted.     Pt. On 2L continuous oxygen per nasal canula:  SpO2  93%-96%   throughout treatment.       EDUCATION:      Assessment/Plan:  Pt. Able to progress without issue, just frequent therapeutic rest periods needed.     Progress per Plan of Care     Timed:                                                                          Manual Therapy:                 mins  66388;                      Therapeutic Exercise:    20     mins  67893;     Neuromuscular Anel:        mins  60186;    Therapeutic Activity:      10     mins  60321;     Gait Training:                      mins  06807;     Ultrasound:                          mins  03607;    Ionto:                                   mins   39048  Self Care:                            mins   84608     Un-Timed:  Electrical Stimulation:         mins  37233 ( );  Dry Needling                       mins self-pay  Traction                               mins 91694  Re-Eval                               mins  25694     Timed Treatment:   30   mins   Total Treatment:     30   mins         Christine Powers PTA  Physical Therapist Assistant

## 2021-10-05 ENCOUNTER — TREATMENT (OUTPATIENT)
Dept: PHYSICAL THERAPY | Facility: CLINIC | Age: 86
End: 2021-10-05

## 2021-10-05 DIAGNOSIS — M81.0 AGE RELATED OSTEOPOROSIS, UNSPECIFIED PATHOLOGICAL FRACTURE PRESENCE: Primary | ICD-10-CM

## 2021-10-05 PROCEDURE — 97530 THERAPEUTIC ACTIVITIES: CPT | Performed by: PHYSICAL THERAPIST

## 2021-10-05 PROCEDURE — 97110 THERAPEUTIC EXERCISES: CPT | Performed by: PHYSICAL THERAPIST

## 2021-10-05 NOTE — PROGRESS NOTES
Physical Therapy Progress Note ( 30 day PN)    Patient: Shruthi Amin  : 6/10/1928  Referring practitioner: Og Jeronimo Jr., MD  Today's Date: 10/5/2021    VISIT#: 7    Subjective Evaluation    Pain  Current pain ratin  At best pain ratin  At worst pain ratin         Pt reports: her back still hurts when she tries to cook or do anything at home. The exercises help and try to do them at least once a day.       Objective          Postural Observations  Seated posture: fair  Standing posture: fair    Additional Postural Observation Details  Flexed posture, ambulates with 4 wheel rwx, she is on 2 lt O2. Inc SOA with min activities  Inc thoracic kyphosis/ dec lumbar lordosis, RS, spinal scoliosis, L hip higher      Active Range of Motion     Lumbar   Flexion: WFL    Additional Active Range of Motion Details  Cervical and thoracic ROM w/ mod limitation   Lumbar ext w/ mod/ max limitation. She can come to neutral standing against the wall    LE ROM/ strength: wfl B     UE ROM/ strength: wfl B ,co pain ant chest with shoulder flex              See Exercise, Manual, and Modality Logs for complete treatment.     Patient Education:    Assessment & Plan     Assessment  Assessment details: Pt has been seen for 7 visits. Demonstrates understanding of her HEP. She is able to correct posture with vcs but still presents with flexed posture and inc subjective pain with standing and min physical activities such as cooking and performing daily activities. She will benefit from continue therapy to max function.   2/4 STGs met , making progress towards remaining STG and LTGs.     Goals  Plan Goals: Plan Goals: STGs:  In 4 weeks  1- Pt will  report at least 35 % improvement and pain reduction   2- Pt will be independent with initial HEP ( MET 10/5)  3- Pt will tolerate progression of HEP and her exercise program ( MET 10/5)  4- Pt demonstrate understanding of postural corrections and correct with min vcs    LTGs: By  DC   1- Pt will report at least 75 % improvement and pain reduction   2- Pt will be independent with final HEP and self management of her condition   3- Pt will improve Oswestry score compare to initial score at eval indicating functional improvement   4- Pt will voice readiness for DC with independent program   5- Pt will demonstrate improved posture and be able to self correct  6- Pt will improve 30' sit to stand test to 7 or better     Plan  Therapy options: will be seen for skilled physical therapy services  Plan details: Continue PT, current POC still indicated.                       Timed:         Manual Therapy:         mins  37256;     Therapeutic Exercise:   15      mins  70864;     Neuromuscular Anel:        mins  04486;    Therapeutic Activity:    15      mins  81898;     Gait Training:           mins  06682;     Ultrasound:          mins  84720;    Ionto                                   mins   78492  Self Care                            mins   64278  Canal repositioning           mins    09258    Un-Timed:  Electrical Stimulation:         mins  59210 ( );  Traction          mins 77668  Low Eval          Mins  88817  Mod Eval          Mins  31238  High Eval                            Mins  05691  Re-Eval                               mins  09215    Timed Treatment:   30   mins   Total Treatment:    30    mins    Leo Morrison, PT, CLT  Physical Therapist

## 2021-10-12 ENCOUNTER — TREATMENT (OUTPATIENT)
Dept: PHYSICAL THERAPY | Facility: CLINIC | Age: 86
End: 2021-10-12

## 2021-10-12 DIAGNOSIS — M81.0 AGE RELATED OSTEOPOROSIS, UNSPECIFIED PATHOLOGICAL FRACTURE PRESENCE: Primary | ICD-10-CM

## 2021-10-12 PROCEDURE — 97530 THERAPEUTIC ACTIVITIES: CPT | Performed by: PHYSICAL THERAPIST

## 2021-10-12 PROCEDURE — 97110 THERAPEUTIC EXERCISES: CPT | Performed by: PHYSICAL THERAPIST

## 2021-10-12 NOTE — PROGRESS NOTES
Physical Therapy Daily Progress Note        Patient: Shruthi Amin   : 6/10/1928  Diagnosis/ICD-10 Code:  Age related osteoporosis, unspecified pathological fracture presence [M81.0]  Referring practitioner: Og Jeronimo Jr., MD  Date of Initial Visit: Type: THERAPY  Noted: 2021  Today's Date: 10/12/2021  Patient seen for 8 sessions.  POC 20, 1x/wk, exp. 21    PMH: hx of stroke, she is on blood thinner, A-fib, back surgery, kyphoplasty.          Subjective :  Denies pain currently. She notes that PT seems to be helping with posture  Objective   See Exercise, Manual, and Modality Logs for complete treatment.  Progression as noted.     Pt. On 2L continuous oxygen per nasal canula:  SpO2  91%-93%   throughout treatment.       EDUCATION:      Assessment/Plan:  Pt. Becoming more aware of posture and will independently correct.     Progress per Plan of Care     Timed:                                                                          Manual Therapy:                 mins  99365;                      Therapeutic Exercise:    20     mins  01062;     Neuromuscular Anel:        mins  84056;    Therapeutic Activity:      10     mins  50968;     Gait Training:                      mins  58823;     Ultrasound:                          mins  64196;    Ionto:                                   mins   55539  Self Care:                            mins   92510     Un-Timed:  Electrical Stimulation:         mins  98279 ( );  Dry Needling                       mins self-pay  Traction                               mins 01182  Re-Eval                               mins  36046     Timed Treatment:   30   mins   Total Treatment:     30   mins         Christine Powers PTA  Physical Therapist Assistant

## 2021-10-21 ENCOUNTER — ANTICOAGULATION VISIT (OUTPATIENT)
Dept: CARDIOLOGY | Facility: CLINIC | Age: 86
End: 2021-10-21

## 2021-10-21 VITALS
WEIGHT: 118 LBS | BODY MASS INDEX: 21.58 KG/M2 | DIASTOLIC BLOOD PRESSURE: 79 MMHG | HEART RATE: 74 BPM | SYSTOLIC BLOOD PRESSURE: 126 MMHG

## 2021-10-21 DIAGNOSIS — I48.91 ATRIAL FIBRILLATION, UNSPECIFIED TYPE (HCC): Primary | ICD-10-CM

## 2021-10-21 LAB — INR PPP: 2 (ref 0.9–1.1)

## 2021-10-21 PROCEDURE — 85610 PROTHROMBIN TIME: CPT | Performed by: INTERNAL MEDICINE

## 2021-10-21 PROCEDURE — 36416 COLLJ CAPILLARY BLOOD SPEC: CPT | Performed by: INTERNAL MEDICINE

## 2021-10-26 ENCOUNTER — TREATMENT (OUTPATIENT)
Dept: PHYSICAL THERAPY | Facility: CLINIC | Age: 86
End: 2021-10-26

## 2021-10-26 DIAGNOSIS — M81.0 AGE RELATED OSTEOPOROSIS, UNSPECIFIED PATHOLOGICAL FRACTURE PRESENCE: Primary | ICD-10-CM

## 2021-10-26 PROCEDURE — 97112 NEUROMUSCULAR REEDUCATION: CPT | Performed by: PHYSICAL THERAPIST

## 2021-10-26 PROCEDURE — 97530 THERAPEUTIC ACTIVITIES: CPT | Performed by: PHYSICAL THERAPIST

## 2021-10-26 NOTE — PROGRESS NOTES
Physical Therapy Daily Progress Note    Patient: Shruthi Amin  : 6/10/1928  Referring practitioner: Og Jeronimo Jr., MD  Today's Date: 10/26/2021    VISIT#: 9    Subjective   Shruthi Amin reports: Says she is feeling better since last visit. Says her back is ok when sitting, but when she gets up to move around it still hurts.       Objective     O2 96% post exercise.    See Exercise, Manual, and Modality Logs for complete treatment.     Patient Education: continue HEP    Assessment/Plan  Good response to session, no increased pain throughout. Balance impairment noted in standing but no assist needed today. Continues to demonstrate impaired posture and weakness of postural muscles.     Progress per Plan of Care            Timed:         Manual Therapy:         mins  04114;     Therapeutic Exercise:         mins  59110;     Neuromuscular Anel:    10    mins  92157;    Therapeutic Activity:     15     mins  22330;     Gait Training:           mins  29870;     Ultrasound:          mins  34743;    Ionto:                                   mins   41071  Self Care:                            mins   85931    Un-Timed:  Electrical Stimulation:         mins  28522 ( );  Dry Needling          mins self-pay  Traction          mins 58459  Re-Eval                               mins  59694    Timed Treatment:   25   mins   Total Treatment:     25   mins    Farheen Crowder PT  Physical Therapist

## 2021-11-02 RX ORDER — DIGOXIN 125 MCG
TABLET ORAL
Qty: 90 TABLET | Refills: 1 | Status: SHIPPED | OUTPATIENT
Start: 2021-11-02 | End: 2022-05-23

## 2021-11-02 NOTE — TELEPHONE ENCOUNTER
Rx Refill Note  Requested Prescriptions     Pending Prescriptions Disp Refills   • digoxin (LANOXIN) 125 MCG tablet [Pharmacy Med Name: DIGOXIN 125 MCG TABLET] 90 tablet 1     Sig: TAKE 1 TABLET BY MOUTH EVERY DAY      Last office visit with prescribing clinician: 8/18/2021      Next office visit with prescribing clinician: 3/14/2022            Rissa Cortez MA  11/02/21, 08:50 EDT

## 2021-11-04 ENCOUNTER — TREATMENT (OUTPATIENT)
Dept: PHYSICAL THERAPY | Facility: CLINIC | Age: 86
End: 2021-11-04

## 2021-11-04 DIAGNOSIS — M81.0 AGE RELATED OSTEOPOROSIS, UNSPECIFIED PATHOLOGICAL FRACTURE PRESENCE: Primary | ICD-10-CM

## 2021-11-04 PROCEDURE — 97110 THERAPEUTIC EXERCISES: CPT | Performed by: PHYSICAL THERAPIST

## 2021-11-04 PROCEDURE — 97112 NEUROMUSCULAR REEDUCATION: CPT | Performed by: PHYSICAL THERAPIST

## 2021-11-04 NOTE — PROGRESS NOTES
Physical Therapy Daily Progress Note    Patient: Shruthi Amin  : 6/10/1928  Referring practitioner: Og Jeronimo Jr., MD  Today's Date: 2021    VISIT#: 10    Subjective   Pt reports: pt 10 min late for her appt today. She moved last week, she  Lives  with her daughter now.       Objective     See Exercise, Manual, and Modality Logs for complete treatment.     Patient Education:    Assessment & Plan     Assessment  Assessment details: Good roland to today's treatment session.           Progress per Plan of Care            Timed:         Manual Therapy:         mins  47479;     Therapeutic Exercise:         mins  97130;     Neuromuscular Anel:  10      mins  87802;    Therapeutic Activity:   15       mins  41198;     Gait Training:           mins  98344;     Ultrasound:          mins  19079;    Ionto                                   mins   09478  Self Care                            mins   42535  Canal repositioning           mins    67932    Un-Timed:  Electrical Stimulation:         mins  24008 ( );  Traction          mins 30863  Low Eval          Mins  51202  Mod Eval          Mins  70566  High Eval                            Mins  83702  Re-Eval                               mins  42761    Timed Treatment:  25   mins   Total Treatment:    25    mins    Leo Morrison, PT, CLT  Physical Therapist

## 2021-11-09 ENCOUNTER — TREATMENT (OUTPATIENT)
Dept: PHYSICAL THERAPY | Facility: CLINIC | Age: 86
End: 2021-11-09

## 2021-11-09 DIAGNOSIS — M81.0 AGE RELATED OSTEOPOROSIS, UNSPECIFIED PATHOLOGICAL FRACTURE PRESENCE: Primary | ICD-10-CM

## 2021-11-09 PROCEDURE — 97112 NEUROMUSCULAR REEDUCATION: CPT | Performed by: PHYSICAL THERAPIST

## 2021-11-09 PROCEDURE — 97110 THERAPEUTIC EXERCISES: CPT | Performed by: PHYSICAL THERAPIST

## 2021-11-09 NOTE — PROGRESS NOTES
Physical Therapy Daily Progress Note    Patient: Shruthi Amin  : 6/10/1928  Referring practitioner: Og Jeronimo Jr., MD  Today's Date: 2021    VISIT#: 11    Subjective   Pt reports: doing pretty good . Nothing new to report. Feels she is doing a little better.       Objective     See Exercise, Manual, and Modality Logs for complete treatment. Issued RTB for progression of her HEP.     Patient Education:    Assessment & Plan     Assessment  Assessment details: Pt tolerated today's treatment session well. Inc tolerance to exercises and all activities. Demos understanding of her HEP.           Progress per Plan of Care            Timed:         Manual Therapy:         mins  50378;     Therapeutic Exercise:    15     mins  34992;     Neuromuscular Anel:  15      mins  72588;    Therapeutic Activity:          mins  61949;     Gait Training:           mins  56526;     Ultrasound:          mins  11224;    Ionto                                   mins   27228  Self Care                            mins   01602  Canal repositioning           mins    16988        Timed Treatment: 30     mins   Total Treatment:   30     mins    Leo Morrison PT, CLT  Physical Therapist

## 2021-11-16 ENCOUNTER — TREATMENT (OUTPATIENT)
Dept: PHYSICAL THERAPY | Facility: CLINIC | Age: 86
End: 2021-11-16

## 2021-11-16 DIAGNOSIS — M81.0 AGE RELATED OSTEOPOROSIS, UNSPECIFIED PATHOLOGICAL FRACTURE PRESENCE: Primary | ICD-10-CM

## 2021-11-16 PROCEDURE — 97110 THERAPEUTIC EXERCISES: CPT | Performed by: PHYSICAL THERAPIST

## 2021-11-16 NOTE — PROGRESS NOTES
Physical Therapy Daily Progress Note        Patient: Shruthi Amin   : 6/10/1928  Diagnosis/ICD-10 Code:  Age related osteoporosis, unspecified pathological fracture presence [M81.0]  Referring practitioner: Og Jeronimo Jr., MD  Date of Initial Visit: Type: THERAPY  Noted: 2021  Today's Date: 2021  Patient seen for 12 sessions.  POC 20, 1x/wk, exp. 21    PMH: hx of stroke, she is on blood thinner, A-fib, back surgery, kyphoplasty.          Subjective :  Denies pain currently. She notes that PT seems to be helping with posture    Objective PATIENT 10 MINUTES LATE.   See Exercise, Manual, and Modality Logs for complete treatment.      Pt. On 2L continuous oxygen per nasal canula:  SpO2  91%-93%   throughout treatment.       EDUCATION:      Assessment/Plan:  Treatment abbreviated due to patient being late. She knows exercises well and requires little cueing.     Progress per Plan of Care     Timed:                                                                          Manual Therapy:                 mins  10589;                      Therapeutic Exercise:    20     mins  96663;     Neuromuscular Anel:        mins  49626;    Therapeutic Activity:           mins  98919;     Gait Training:                      mins  49179;     Ultrasound:                          mins  21395;    Ionto:                                   mins   62240  Self Care:                            mins   19140     Un-Timed:  Electrical Stimulation:         mins  79404 ( );  Dry Needling                       mins self-pay  Traction                               mins 09513  Re-Eval                               mins  88242     Timed Treatment:   20   mins   Total Treatment:     20   mins         Christine Powers PTA  Physical Therapist Assistant

## 2021-11-18 ENCOUNTER — TREATMENT (OUTPATIENT)
Dept: PHYSICAL THERAPY | Facility: CLINIC | Age: 86
End: 2021-11-18

## 2021-11-18 PROCEDURE — 97530 THERAPEUTIC ACTIVITIES: CPT | Performed by: PHYSICAL THERAPIST

## 2021-11-18 PROCEDURE — 97110 THERAPEUTIC EXERCISES: CPT | Performed by: PHYSICAL THERAPIST

## 2021-11-18 NOTE — PROGRESS NOTES
Physical Therapy Progress Note/ DC summary    Patient: Shruthi Amin  : 6/10/1928  Referring practitioner: Og Jeronimo Jr., MD  Today's Date: 2021    VISIT#: 13    Subjective   Pt reports: doing pretty well, feels therapy has helped her . Feels a little stronger. Able to stand and walk a little longer , doesn't have much pain unless stand too long. Doesn't have to do much at home. Her daughter does the cooking and cleaning. She makes her own breakfast. Would like to be DC to continue with HEP.       Objective          Postural Observations  Seated posture: fair  Standing posture: fair    Additional Postural Observation Details  Flexed posture, ambulates with 4 wheel rwx, she is on 2 lt O2. Inc SOA with min activities  Inc thoracic kyphosis/ dec lumbar lordosis, RS, spinal scoliosis, L hip higher      Active Range of Motion     Lumbar   Flexion: WFL    Additional Active Range of Motion Details  Cervical and thoracic ROM w/ mod limitation   Lumbar ext w/ mod/ max limitation. She can come to neutral standing against the wall    LE ROM/ strength: wfl B     UE ROM/ strength: wfl B     30' Sit to stand test: x7  Inc SOA/ has to use UE                  See Exercise, Manual, and Modality Logs for complete treatment.     Patient Education:    Assessment & Plan     Assessment  Assessment details: Pt has been seen for 13 visits. Demonstrates understanding of her HEP. Voices readiness for DC with I program and HEP.  She is able to correct posture with min vcs but still presents with flexed posture and inc subjective pain with prolonged standing . Reports at least 35-40% improved since start of therapy .   All STGs and 4/6 LTGs met .     Goals  Plan Goals: Plan Goals: STGs:  In 4 weeks  1- Pt will  report at least 35 % improvement and pain reduction ( MET )  2- Pt will be independent with initial HEP ( MET 10/5)  3- Pt will tolerate progression of HEP and her exercise program ( MET 10/5)  4- Pt demonstrate  understanding of postural corrections and correct with min vcs  (MET 11/17)    LTGs: By DC   1- Pt will report at least 75 % improvement and pain reduction   2- Pt will be independent with final HEP and self management of her condition  (MET 11/17)  3- Pt will improve Oswestry score compare to initial score at eval indicating functional improvement   4- Pt will voice readiness for DC with independent program ( MET 11/17)  5- Pt will demonstrate improved posture and be able to self correct ( MET 11/17)  6- Pt will improve 30' sit to stand test to 7 or better ( met 11/17)    Plan  Plan details: Pt will be DC to continue with HEP.                       Timed:         Manual Therapy:         mins  49164;     Therapeutic Exercise:    15     mins  27770;     Neuromuscular Anle:        mins  48060;    Therapeutic Activity:     15     mins  43085;     Gait Training:           mins  50053;     Ultrasound:          mins  76591;    Ionto                                   mins   65028  Self Care                            mins   48092  Canal repositioning           mins    15158          Timed Treatment:   30   mins   Total Treatment:    30    mins    Leo Morrison, PT, CLT  Physical Therapist

## 2021-11-23 ENCOUNTER — ANTICOAGULATION VISIT (OUTPATIENT)
Dept: CARDIOLOGY | Facility: CLINIC | Age: 86
End: 2021-11-23

## 2021-11-23 VITALS
HEART RATE: 72 BPM | SYSTOLIC BLOOD PRESSURE: 138 MMHG | WEIGHT: 117 LBS | DIASTOLIC BLOOD PRESSURE: 75 MMHG | BODY MASS INDEX: 21.4 KG/M2

## 2021-11-23 DIAGNOSIS — I48.11 LONGSTANDING PERSISTENT ATRIAL FIBRILLATION (HCC): Primary | ICD-10-CM

## 2021-11-23 LAB — INR PPP: 1.7 (ref 0.9–1.1)

## 2021-11-23 PROCEDURE — 36416 COLLJ CAPILLARY BLOOD SPEC: CPT | Performed by: INTERNAL MEDICINE

## 2021-11-23 PROCEDURE — 85610 PROTHROMBIN TIME: CPT | Performed by: INTERNAL MEDICINE

## 2021-11-30 ENCOUNTER — TELEPHONE (OUTPATIENT)
Dept: FAMILY MEDICINE CLINIC | Facility: CLINIC | Age: 86
End: 2021-11-30

## 2021-11-30 NOTE — TELEPHONE ENCOUNTER
Caller: Shruthi Amin    Relationship: Self    Best call back number: [phone]    What is the best time to reach you: ANYTIME    Who are you requesting to speak with (clinical staff, provider,  specific staff member): MA OR DOCTOR     What was the call regarding: PATIENT STATES SHE RECEIVED A LETTER THAT SHE NEEDS TO BE REEVALUATED FOR RECEIVING OXYGEN BUT PATIENT HAS APPT 01/27. PATIENT IS WONDERING IF SHE NEEDS TO BE SEEN BEFORE     Do you require a callback: YES

## 2021-11-30 NOTE — TELEPHONE ENCOUNTER
I spoke with the patient.  She spoke with her Oxygen supplier and they told her that she does not need to do anything at this time.

## 2021-12-07 ENCOUNTER — ANTICOAGULATION VISIT (OUTPATIENT)
Dept: CARDIOLOGY | Facility: CLINIC | Age: 86
End: 2021-12-07

## 2021-12-07 VITALS
WEIGHT: 117 LBS | HEART RATE: 77 BPM | BODY MASS INDEX: 21.4 KG/M2 | DIASTOLIC BLOOD PRESSURE: 80 MMHG | SYSTOLIC BLOOD PRESSURE: 143 MMHG

## 2021-12-07 DIAGNOSIS — I48.91 ATRIAL FIBRILLATION, UNSPECIFIED TYPE (HCC): Primary | ICD-10-CM

## 2021-12-07 LAB — INR PPP: 1.8 (ref 0.9–1.1)

## 2021-12-07 PROCEDURE — 36416 COLLJ CAPILLARY BLOOD SPEC: CPT | Performed by: INTERNAL MEDICINE

## 2021-12-07 PROCEDURE — 85610 PROTHROMBIN TIME: CPT | Performed by: INTERNAL MEDICINE

## 2021-12-08 ENCOUNTER — TELEPHONE (OUTPATIENT)
Dept: CARDIOLOGY | Facility: CLINIC | Age: 86
End: 2021-12-08

## 2021-12-08 DIAGNOSIS — Z79.01 LONG TERM (CURRENT) USE OF ANTICOAGULANTS: ICD-10-CM

## 2021-12-08 DIAGNOSIS — I48.91 ATRIAL FIBRILLATION, UNSPECIFIED TYPE (HCC): ICD-10-CM

## 2021-12-08 RX ORDER — WARFARIN SODIUM 2 MG/1
TABLET ORAL
Qty: 130 TABLET | Refills: 0 | Status: SHIPPED | OUTPATIENT
Start: 2021-12-08 | End: 2022-02-21

## 2021-12-08 NOTE — TELEPHONE ENCOUNTER
Incoming Refill Request      Medication requested (name and dose): WARFARIN     Pharmacy where request should be sent: [facility] [address]    Additional details provided by patient:     Best call back number: [phone]    Does the patient have less than a 3 day supply:  [] Yes  [x] No    [employer] Rep  12/08/21, 09:51 EST

## 2021-12-15 ENCOUNTER — TELEPHONE (OUTPATIENT)
Dept: CARDIOLOGY | Facility: CLINIC | Age: 86
End: 2021-12-15

## 2021-12-15 NOTE — TELEPHONE ENCOUNTER
Called [employer] and advised them they need to send us a form on their letterhead with their request for home monitoring. Leonardo

## 2021-12-15 NOTE — TELEPHONE ENCOUNTER
Caller:  dung    Relationship: [unfilled] self    Best call back number: [phone]    What is your medical concern? Per pt- Tami smith told her we need to call/fax rx to them for a inr at home machine.

## 2021-12-16 ENCOUNTER — TELEPHONE (OUTPATIENT)
Dept: CARDIOLOGY | Facility: CLINIC | Age: 86
End: 2021-12-16

## 2021-12-16 NOTE — TELEPHONE ENCOUNTER
[facility] and placed on Dr Mathew's desk to sign apLPN

## 2022-01-10 ENCOUNTER — ANTICOAGULATION VISIT (OUTPATIENT)
Dept: CARDIOLOGY | Facility: CLINIC | Age: 87
End: 2022-01-10

## 2022-01-10 VITALS
SYSTOLIC BLOOD PRESSURE: 142 MMHG | HEART RATE: 80 BPM | BODY MASS INDEX: 21.58 KG/M2 | WEIGHT: 118 LBS | OXYGEN SATURATION: 88 % | DIASTOLIC BLOOD PRESSURE: 83 MMHG

## 2022-01-10 DIAGNOSIS — I48.11 LONGSTANDING PERSISTENT ATRIAL FIBRILLATION: Primary | ICD-10-CM

## 2022-01-10 LAB — INR PPP: 2.1 (ref 0.9–1.1)

## 2022-01-10 PROCEDURE — 85610 PROTHROMBIN TIME: CPT | Performed by: INTERNAL MEDICINE

## 2022-01-10 PROCEDURE — 36416 COLLJ CAPILLARY BLOOD SPEC: CPT | Performed by: INTERNAL MEDICINE

## 2022-01-13 ENCOUNTER — ANTICOAGULATION VISIT (OUTPATIENT)
Dept: CARDIOLOGY | Facility: CLINIC | Age: 87
End: 2022-01-13

## 2022-01-13 DIAGNOSIS — I48.11 LONGSTANDING PERSISTENT ATRIAL FIBRILLATION: Primary | ICD-10-CM

## 2022-01-13 LAB — INR PPP: 2.3

## 2022-01-18 ENCOUNTER — ANTICOAGULATION VISIT (OUTPATIENT)
Dept: CARDIOLOGY | Facility: CLINIC | Age: 87
End: 2022-01-18

## 2022-01-18 LAB — INR PPP: 2.1

## 2022-01-26 ENCOUNTER — ANTICOAGULATION VISIT (OUTPATIENT)
Dept: CARDIOLOGY | Facility: CLINIC | Age: 87
End: 2022-01-26

## 2022-01-26 LAB — INR PPP: 1.9

## 2022-01-27 ENCOUNTER — LAB (OUTPATIENT)
Dept: LAB | Facility: HOSPITAL | Age: 87
End: 2022-01-27

## 2022-01-27 ENCOUNTER — OFFICE VISIT (OUTPATIENT)
Dept: FAMILY MEDICINE CLINIC | Facility: CLINIC | Age: 87
End: 2022-01-27

## 2022-01-27 VITALS
DIASTOLIC BLOOD PRESSURE: 72 MMHG | WEIGHT: 118 LBS | BODY MASS INDEX: 21.71 KG/M2 | TEMPERATURE: 97 F | HEIGHT: 62 IN | RESPIRATION RATE: 16 BRPM | SYSTOLIC BLOOD PRESSURE: 135 MMHG | HEART RATE: 65 BPM

## 2022-01-27 DIAGNOSIS — K44.9 HIATAL HERNIA: Chronic | ICD-10-CM

## 2022-01-27 DIAGNOSIS — Z00.00 ENCOUNTER FOR ANNUAL WELLNESS EXAM IN MEDICARE PATIENT: Primary | ICD-10-CM

## 2022-01-27 DIAGNOSIS — E78.00 PURE HYPERCHOLESTEROLEMIA: Chronic | ICD-10-CM

## 2022-01-27 DIAGNOSIS — I10 PRIMARY HYPERTENSION: Chronic | ICD-10-CM

## 2022-01-27 DIAGNOSIS — R09.02 HYPOXEMIA: Chronic | ICD-10-CM

## 2022-01-27 DIAGNOSIS — I48.11 LONGSTANDING PERSISTENT ATRIAL FIBRILLATION: Chronic | ICD-10-CM

## 2022-01-27 LAB
ANION GAP SERPL CALCULATED.3IONS-SCNC: 5.6 MMOL/L (ref 5–15)
BUN SERPL-MCNC: 13 MG/DL (ref 8–23)
BUN/CREAT SERPL: 18.8 (ref 7–25)
CALCIUM SPEC-SCNC: 9.3 MG/DL (ref 8.2–9.6)
CHLORIDE SERPL-SCNC: 100 MMOL/L (ref 98–107)
CHOLEST SERPL-MCNC: 159 MG/DL (ref 0–200)
CO2 SERPL-SCNC: 31.4 MMOL/L (ref 22–29)
CREAT SERPL-MCNC: 0.69 MG/DL (ref 0.57–1)
DIGOXIN SERPL-MCNC: 0.9 NG/ML (ref 0.6–1.2)
GFR SERPL CREATININE-BSD FRML MDRD: 79 ML/MIN/1.73
GLUCOSE SERPL-MCNC: 95 MG/DL (ref 65–99)
HDLC SERPL-MCNC: 73 MG/DL (ref 40–60)
LDLC SERPL CALC-MCNC: 75 MG/DL (ref 0–100)
LDLC/HDLC SERPL: 1.02 {RATIO}
POTASSIUM SERPL-SCNC: 4.3 MMOL/L (ref 3.5–5.2)
SODIUM SERPL-SCNC: 137 MMOL/L (ref 136–145)
TRIGL SERPL-MCNC: 56 MG/DL (ref 0–150)
VLDLC SERPL-MCNC: 11 MG/DL (ref 5–40)

## 2022-01-27 PROCEDURE — 80048 BASIC METABOLIC PNL TOTAL CA: CPT

## 2022-01-27 PROCEDURE — 36415 COLL VENOUS BLD VENIPUNCTURE: CPT

## 2022-01-27 PROCEDURE — 80061 LIPID PANEL: CPT

## 2022-01-27 PROCEDURE — G0439 PPPS, SUBSEQ VISIT: HCPCS | Performed by: FAMILY MEDICINE

## 2022-01-27 PROCEDURE — 80162 ASSAY OF DIGOXIN TOTAL: CPT

## 2022-01-27 PROCEDURE — 99214 OFFICE O/P EST MOD 30 MIN: CPT | Performed by: FAMILY MEDICINE

## 2022-01-27 PROCEDURE — 1160F RVW MEDS BY RX/DR IN RCRD: CPT | Performed by: FAMILY MEDICINE

## 2022-01-27 PROCEDURE — 1126F AMNT PAIN NOTED NONE PRSNT: CPT | Performed by: FAMILY MEDICINE

## 2022-01-27 PROCEDURE — 1170F FXNL STATUS ASSESSED: CPT | Performed by: FAMILY MEDICINE

## 2022-01-27 NOTE — PATIENT INSTRUCTIONS
Continue your current medications and treatment.    Have the follow up labs done and call for results.    Follow up in the office in 1 year.

## 2022-01-27 NOTE — PROGRESS NOTES
"Subjective   Shruthi Amin is a 89 y.o. female.     Chief Complaint   Patient presents with   • Medicare Wellness-subsequent   • Hypertension   • Atrial Fibrillation       HPI  Chief complaint: Hypertension hyperlipidemia atrial fibrillation hiatal hernia hypoxemia    Patient is a 89-year-old white female who comes in for follow-up and maintenance of her current problems which include    1.  Hypertension-stable-patient on Lasix 20 mg daily as needed potassium 10 mEq daily as needed metoprolol 25 mg daily.  She is asymptomatic.  She does complain of swelling in her extremities on a periodic basis for which she takes Lasix.    2.  Hyperlipidemia-stable out of patient is current on a diet.    3.  Atrial fibrillation-stable-patient has history of chronic atrial fibrillation.  Patient is on metoprolol 25 mg daily Coumadin 2 mg Tuesday Thursday Saturday and Sunday and 4 mg Monday Wednesday and Friday.  Patient also is on Lanoxin 0.125 mg daily.  She denied episodes of rapid or slow heart rhythm.  She denied heart palpitations lightheadedness or dizziness.    4.  Hiatal hernia-stable-patient is not on a proton pump inhibitor.  Patient is a large hiatal hernia is found on CT scan in September 2020.  Patient gets occasional chest pain from this.    5.  Hypoxemia-stable-patient has history of chronic hypoxemia secondary to pulmonary fibrosis.  States her O2 requirement is gradually getting worse.        The following portions of the patient's history were reviewed and updated as appropriate: allergies, current medications, past family history, past medical history, past social history, past surgical history and problem list.    Review of Systems    Objective     /72 (BP Location: Left arm, Patient Position: Sitting, Cuff Size: Adult)   Pulse 65   Temp 97 °F (36.1 °C) (Infrared)   Resp 16   Ht 157.5 cm (62\")   Wt 53.5 kg (118 lb)   BMI 21.58 kg/m²     Physical Exam  Vitals and nursing note reviewed. "   Constitutional:       Appearance: She is well-developed and normal weight.   HENT:      Head: Normocephalic and atraumatic.   Eyes:      Pupils: Pupils are equal, round, and reactive to light.   Cardiovascular:      Rate and Rhythm: Normal rate and regular rhythm.      Pulses: Normal pulses.      Heart sounds: Murmur heard.    Systolic murmur is present with a grade of 3/6.  No friction rub. No gallop.    Pulmonary:      Effort: Pulmonary effort is normal.      Breath sounds: Normal breath sounds.   Abdominal:      General: Bowel sounds are normal.      Palpations: Abdomen is soft.   Musculoskeletal:         General: Normal range of motion.      Cervical back: Neck supple.   Skin:     General: Skin is warm and dry.   Neurological:      Mental Status: She is alert and oriented to person, place, and time.   Psychiatric:         Behavior: Behavior normal.         Thought Content: Thought content normal.         Judgment: Judgment normal.           Assessment/Plan   Diagnoses and all orders for this visit:    1. Encounter for annual wellness exam in Medicare patient (Primary)    2. Primary hypertension  -     Basic Metabolic Panel; Future    3. Pure hypercholesterolemia  -     Lipid Panel; Future    4. Longstanding persistent atrial fibrillation (HCC)  -     Digoxin level; Future    5. Hiatal hernia    6. Hypoxemia      Patient Instructions   Continue your current medications and treatment.    Have the follow up labs done and call for results.    Follow up in the office in 1 year.          Og Jeronimo Jr., MD    01/27/22

## 2022-01-27 NOTE — PROGRESS NOTES
The ABCs of the Annual Wellness Visit  Subsequent Medicare Wellness Visit    Chief Complaint   Patient presents with   • Medicare Wellness-subsequent   • Hypertension   • Atrial Fibrillation      Subjective    History of Present Illness:  Shruthi Amin is a 89 y.o. female who presents for a Subsequent Medicare Wellness Visit.    The following portions of the patient's history were reviewed and   updated as appropriate: allergies, current medications, past family history, past medical history, past social history, past surgical history and problem list.    Compared to one year ago, the patient feels her physical   health is the same.    Compared to one year ago, the patient feels her mental   health is the same.    Recent Hospitalizations:  She was not admitted to the hospital during the last year.       Current Medical Providers:  Patient Care Team:  Og Jeronimo Jr., MD as PCP - General  Aleksey Mathew MD as Consulting Physician (Cardiology)    Outpatient Medications Prior to Visit   Medication Sig Dispense Refill   • B Complex Vitamins (VITAMIN B COMPLEX PO) Take 1 tablet by mouth Daily.     • Bacillus Coagulans-Inulin (ALIGN PREBIOTIC-PROBIOTIC PO) Take  by mouth.     • Biotin 94705 MCG tablet Take  by mouth.     • Calcium-Phosphorus-Vitamin D (CITRACAL +D3) 250-107-500 MG-MG-UNIT chewable tablet Chew 1 tablet Daily.     • cholecalciferol (VITAMIN D3) 400 units tablet Take 400 Units by mouth Daily.     • digoxin (LANOXIN) 125 MCG tablet TAKE 1 TABLET BY MOUTH EVERY DAY 90 tablet 1   • docusate sodium (STOOL SOFTENER) 100 MG capsule Take 100 mg by mouth 2 (Two) Times a Day.     • furosemide (LASIX) 20 MG tablet TAKE 1 TABLET BY MOUTH AS NEEDED (SWELLING). 90 tablet 2   • KLOR-CON 10 MEQ CR tablet TAKE 1 TABLET BY MOUTH EVERY DAY 90 tablet 1   • latanoprost (XALATAN) 0.005 % ophthalmic solution Administer 1 drop to the right eye Every Night.     • Lutein 20 MG capsule Take  by mouth.     • magnesium  "gluconate (MAGONATE) 500 MG tablet Take 27 mg by mouth 2 (Two) Times a Day.     • metoprolol succinate XL (TOPROL-XL) 25 MG 24 hr tablet TAKE 1 TABLET BY MOUTH TWICE A  tablet 1   • vitamin C (ASCORBIC ACID) 500 MG tablet Take 500 mg by mouth Daily.     • warfarin (COUMADIN) 2 MG tablet TAKE 2 tablets by mouth on Monday Wednesday and Friday and one TABLET BY MOUTH all other days or as direcred 130 tablet 0     No facility-administered medications prior to visit.       No opioid medication identified on active medication list. I have reviewed chart for other potential  high risk medication/s and harmful drug interactions in the elderly.          Aspirin is not on active medication list.  Aspirin use is not indicated based on review of current medical condition/s. Risk of harm outweighs potential benefits.  .    Patient Active Problem List   Diagnosis   • Atrial fibrillation (CMS/HCC) [I48.91]   • Back pain   • Cerebrovascular accident (CVA) (HCC)   • Hiatal hernia   • Hyperlipidemia   • Hypertension   • Osteoarthritis   • Osteoporosis   • Hypoxemia     Advance Care Planning  Advance Directive is not on file.  ACP discussion was held with the patient during this visit. Patient has an advance directive (not in EMR), copy requested.          Objective    Vitals:    01/27/22 1323   BP: 135/72   BP Location: Left arm   Patient Position: Sitting   Cuff Size: Adult   Pulse: 65   Resp: 16   Temp: 97 °F (36.1 °C)   TempSrc: Infrared   SpO2: Comment: 2 liters of oxygen   Weight: 53.5 kg (118 lb)   Height: 157.5 cm (62\")   PainSc: 0-No pain     BMI Readings from Last 1 Encounters:   01/27/22 21.58 kg/m²   BMI is within normal parameters. No follow-up required.    Does the patient have evidence of cognitive impairment? No    Physical Exam  Vitals and nursing note reviewed.   Constitutional:       Appearance: She is well-developed and normal weight.   HENT:      Head: Normocephalic and atraumatic.   Eyes:      Pupils: " Pupils are equal, round, and reactive to light.   Cardiovascular:      Rate and Rhythm: Normal rate. Rhythm irregularly irregular.      Pulses: Normal pulses.      Heart sounds: Normal heart sounds. No murmur heard.  No friction rub. No gallop.    Pulmonary:      Effort: Pulmonary effort is normal.      Breath sounds: Normal breath sounds.   Abdominal:      General: Abdomen is flat. Bowel sounds are normal.      Palpations: Abdomen is soft.   Musculoskeletal:         General: Normal range of motion.      Cervical back: Normal range of motion and neck supple.   Skin:     General: Skin is warm and dry.   Neurological:      Mental Status: She is alert and oriented to person, place, and time.   Psychiatric:         Behavior: Behavior normal.         Thought Content: Thought content normal.         Judgment: Judgment normal.                 HEALTH RISK ASSESSMENT    Smoking Status:  Social History     Tobacco Use   Smoking Status Never Smoker   Smokeless Tobacco Never Used     Alcohol Consumption:  Social History     Substance and Sexual Activity   Alcohol Use No     Fall Risk Screen:    [facility], and patient is at LOW risk for falls.Assessment completed on:1/27/2022    Depression Screening:  PHQ-2/PHQ-9 Depression Screening 1/27/2022   Little interest or pleasure in doing things 0   Feeling down, depressed, or hopeless 1   Trouble falling or staying asleep, or sleeping too much -   Feeling tired or having little energy -   Poor appetite or overeating -   Feeling bad about yourself - or that you are a failure or have let yourself or your family down -   Trouble concentrating on things, such as reading the newspaper or watching television -   Moving or speaking so slowly that other people could have noticed. Or the opposite - being so fidgety or restless that you have been moving around a lot more than usual -   Thoughts that you would be better off dead, or of hurting yourself in some way  -   Total Score 1       Health Habits and Functional and Cognitive Screening:  Functional & Cognitive Status 1/27/2022   Do you have difficulty preparing food and eating? No   Do you have difficulty bathing yourself, getting dressed or grooming yourself? No   Do you have difficulty using the toilet? No   Do you have difficulty moving around from place to place? No   Do you have trouble with steps or getting out of a bed or a chair? No   Current Diet Well Balanced Diet   Dental Exam Up to date   Eye Exam Up to date   Exercise (times per week) 0 times per week   Current Exercises Include No Regular Exercise   Current Exercise Activities Include -   Do you need help using the phone?  No   Are you deaf or do you have serious difficulty hearing?  Yes   Do you need help with transportation? Yes   Do you need help shopping? No   Do you need help preparing meals?  No   Do you need help with housework?  No   Do you need help with laundry? No   Do you need help taking your medications? No   Do you need help managing money? No   Do you ever drive or ride in a car without wearing a seat belt? No   Have you felt unusual stress, anger or loneliness in the last month? No   Who do you live with? Child   If you need help, do you have trouble finding someone available to you? No   Have you been bothered in the last four weeks by sexual problems? No   Do you have difficulty concentrating, remembering or making decisions? Yes       Age-appropriate Screening Schedule:  Refer to the list below for future screening recommendations based on patient's age, sex and/or medical conditions. Orders for these recommended tests are listed in the plan section. The patient has been provided with a written plan.    Health Maintenance   Topic Date Due   • DXA SCAN  Never done   • LIPID PANEL  06/02/2021   • INFLUENZA VACCINE  03/31/2022 (Originally 8/1/2021)   • TDAP/TD VACCINES (1 - Tdap) 01/27/2023 (Originally 6/10/1947)   • ZOSTER VACCINE   Completed              Assessment/Plan   CMS Preventative Services Quick Reference  Risk Factors Identified During Encounter  Immunizations Discussed/Encouraged (specific Immunizations; Td, Influenza, Pneumococcal 23, Shingrix and COVID19  The above risks/problems have been discussed with the patient.  Follow up actions/plans if indicated are seen below in the Assessment/Plan Section.  Pertinent information has been shared with the patient in the After Visit Summary.    Diagnoses and all orders for this visit:    1. Encounter for annual wellness exam in Medicare patient (Primary)        Follow Up:   Return in about 1 year (around 1/27/2023).     An After Visit Summary and PPPS were made available to the patient.

## 2022-01-29 ENCOUNTER — APPOINTMENT (OUTPATIENT)
Dept: GENERAL RADIOLOGY | Facility: HOSPITAL | Age: 87
End: 2022-01-29

## 2022-01-29 ENCOUNTER — HOSPITAL ENCOUNTER (EMERGENCY)
Facility: HOSPITAL | Age: 87
Discharge: HOME | End: 2022-01-29
Admitting: EMERGENCY MEDICINE

## 2022-01-29 VITALS
RESPIRATION RATE: 16 BRPM | OXYGEN SATURATION: 94 % | HEART RATE: 79 BPM | HEIGHT: 60 IN | SYSTOLIC BLOOD PRESSURE: 152 MMHG | TEMPERATURE: 97.6 F | BODY MASS INDEX: 22.72 KG/M2 | DIASTOLIC BLOOD PRESSURE: 86 MMHG | WEIGHT: 115.74 LBS

## 2022-01-29 DIAGNOSIS — M54.6 ACUTE MIDLINE THORACIC BACK PAIN: Primary | ICD-10-CM

## 2022-01-29 DIAGNOSIS — S22.050A CLOSED WEDGE COMPRESSION FRACTURE OF T6 VERTEBRA, INITIAL ENCOUNTER: ICD-10-CM

## 2022-01-29 LAB
ANION GAP SERPL CALCULATED.3IONS-SCNC: 9 MMOL/L (ref 5–15)
BASOPHILS # BLD MANUAL: 0.09 10*3/MM3 (ref 0–0.2)
BASOPHILS NFR BLD MANUAL: 1 % (ref 0–1.5)
BUN SERPL-MCNC: 13 MG/DL (ref 8–23)
BUN/CREAT SERPL: 21.3 (ref 7–25)
CALCIUM SPEC-SCNC: 9.1 MG/DL (ref 8.2–9.6)
CHLORIDE SERPL-SCNC: 98 MMOL/L (ref 98–107)
CO2 SERPL-SCNC: 29 MMOL/L (ref 22–29)
CREAT SERPL-MCNC: 0.61 MG/DL (ref 0.57–1)
DEPRECATED RDW RBC AUTO: 48.6 FL (ref 37–54)
EOSINOPHIL # BLD MANUAL: 0.36 10*3/MM3 (ref 0–0.4)
EOSINOPHIL NFR BLD MANUAL: 4 % (ref 0.3–6.2)
ERYTHROCYTE [DISTWIDTH] IN BLOOD BY AUTOMATED COUNT: 14.4 % (ref 12.3–15.4)
GFR SERPL CREATININE-BSD FRML MDRD: 92 ML/MIN/1.73
GLUCOSE SERPL-MCNC: 89 MG/DL (ref 65–99)
HCT VFR BLD AUTO: 41.8 % (ref 34–46.6)
HGB BLD-MCNC: 14.2 G/DL (ref 12–15.9)
INR PPP: 2.89 (ref 2–3)
LARGE PLATELETS: ABNORMAL
LYMPHOCYTES # BLD MANUAL: 1.69 10*3/MM3 (ref 0.7–3.1)
LYMPHOCYTES NFR BLD MANUAL: 10 % (ref 5–12)
MACROCYTES BLD QL SMEAR: ABNORMAL
MCH RBC QN AUTO: 33.3 PG (ref 26.6–33)
MCHC RBC AUTO-ENTMCNC: 34.1 G/DL (ref 31.5–35.7)
MCV RBC AUTO: 97.8 FL (ref 79–97)
MONOCYTES # BLD: 0.89 10*3/MM3 (ref 0.1–0.9)
NEUTROPHILS # BLD AUTO: 5.87 10*3/MM3 (ref 1.7–7)
NEUTROPHILS NFR BLD MANUAL: 63 % (ref 42.7–76)
NEUTS BAND NFR BLD MANUAL: 3 % (ref 0–5)
PLATELET # BLD AUTO: 172 10*3/MM3 (ref 140–450)
PMV BLD AUTO: 8.9 FL (ref 6–12)
POTASSIUM SERPL-SCNC: 4.3 MMOL/L (ref 3.5–5.2)
PROTHROMBIN TIME: 29.7 SECONDS (ref 19.4–28.5)
RBC # BLD AUTO: 4.27 10*6/MM3 (ref 3.77–5.28)
SCAN SLIDE: NORMAL
SODIUM SERPL-SCNC: 136 MMOL/L (ref 136–145)
TROPONIN T SERPL-MCNC: <0.01 NG/ML (ref 0–0.03)
VARIANT LYMPHS NFR BLD MANUAL: 19 % (ref 19.6–45.3)
WBC MORPH BLD: NORMAL
WBC NRBC COR # BLD: 8.9 10*3/MM3 (ref 3.4–10.8)

## 2022-01-29 PROCEDURE — 85610 PROTHROMBIN TIME: CPT | Performed by: NURSE PRACTITIONER

## 2022-01-29 PROCEDURE — 99282 EMERGENCY DEPT VISIT SF MDM: CPT

## 2022-01-29 PROCEDURE — 80048 BASIC METABOLIC PNL TOTAL CA: CPT | Performed by: NURSE PRACTITIONER

## 2022-01-29 PROCEDURE — 93005 ELECTROCARDIOGRAM TRACING: CPT | Performed by: NURSE PRACTITIONER

## 2022-01-29 PROCEDURE — 72072 X-RAY EXAM THORAC SPINE 3VWS: CPT

## 2022-01-29 PROCEDURE — 85025 COMPLETE CBC W/AUTO DIFF WBC: CPT | Performed by: NURSE PRACTITIONER

## 2022-01-29 PROCEDURE — 84484 ASSAY OF TROPONIN QUANT: CPT | Performed by: NURSE PRACTITIONER

## 2022-01-29 PROCEDURE — 85007 BL SMEAR W/DIFF WBC COUNT: CPT | Performed by: NURSE PRACTITIONER

## 2022-01-29 PROCEDURE — 36415 COLL VENOUS BLD VENIPUNCTURE: CPT

## 2022-01-29 RX ORDER — HYDROCODONE BITARTRATE AND ACETAMINOPHEN 5; 325 MG/1; MG/1
1 TABLET ORAL EVERY 6 HOURS PRN
Qty: 12 TABLET | Refills: 0 | Status: SHIPPED | OUTPATIENT
Start: 2022-01-29 | End: 2022-02-03 | Stop reason: SDUPTHER

## 2022-01-29 RX ORDER — SODIUM CHLORIDE 0.9 % (FLUSH) 0.9 %
10 SYRINGE (ML) INJECTION AS NEEDED
Status: DISCONTINUED | OUTPATIENT
Start: 2022-01-29 | End: 2022-01-29 | Stop reason: HOSPADM

## 2022-01-30 LAB — QT INTERVAL: 363 MS

## 2022-02-01 ENCOUNTER — OFFICE VISIT (OUTPATIENT)
Dept: NEUROSURGERY | Facility: CLINIC | Age: 87
End: 2022-02-01

## 2022-02-01 VITALS
BODY MASS INDEX: 22.58 KG/M2 | OXYGEN SATURATION: 95 % | HEART RATE: 66 BPM | SYSTOLIC BLOOD PRESSURE: 138 MMHG | TEMPERATURE: 98.4 F | RESPIRATION RATE: 16 BRPM | DIASTOLIC BLOOD PRESSURE: 74 MMHG | WEIGHT: 115 LBS | HEIGHT: 60 IN

## 2022-02-01 DIAGNOSIS — M80.88XA: ICD-10-CM

## 2022-02-01 DIAGNOSIS — S22.050A COMPRESSION FRACTURE OF T6 VERTEBRA, INITIAL ENCOUNTER: Primary | ICD-10-CM

## 2022-02-01 LAB — INR PPP: 2.7

## 2022-02-01 PROCEDURE — 99214 OFFICE O/P EST MOD 30 MIN: CPT | Performed by: NURSE PRACTITIONER

## 2022-02-01 RX ORDER — CYCLOBENZAPRINE HCL 10 MG
5 TABLET ORAL 2 TIMES DAILY PRN
Qty: 60 TABLET | Refills: 1 | Status: ON HOLD | OUTPATIENT
Start: 2022-02-01 | End: 2022-10-02

## 2022-02-03 ENCOUNTER — ANTICOAGULATION VISIT (OUTPATIENT)
Dept: CARDIOLOGY | Facility: CLINIC | Age: 87
End: 2022-02-03

## 2022-02-03 DIAGNOSIS — I48.91 ATRIAL FIBRILLATION, UNSPECIFIED TYPE: Primary | ICD-10-CM

## 2022-02-03 DIAGNOSIS — S22.050A CLOSED WEDGE COMPRESSION FRACTURE OF T6 VERTEBRA, INITIAL ENCOUNTER: ICD-10-CM

## 2022-02-03 NOTE — TELEPHONE ENCOUNTER
Caller: NEIDA BERNY    Relationship: Child    Best call back number: [phone]     Requested Prescriptions:   Requested Prescriptions     Pending Prescriptions Disp Refills   • HYDROcodone-acetaminophen (NORCO) 5-325 MG per tablet 12 tablet 0     Sig: Take 1 tablet by mouth Every 6 (Six) Hours As Needed for Moderate Pain .        Pharmacy where request should be sent: [facility]/PHARMACY #[id number] [place], [place] - [address] [place] [address] [phone] [facility] [phone]      Additional details provided by patient:HAS LESS THAN THREE DAYS LEFT     Does the patient have less than a 3 day supply:  [x] Yes  [] No    Little Lantigua Rep   02/03/22 10:25 EST

## 2022-02-04 ENCOUNTER — PATIENT ROUNDING (BHMG ONLY) (OUTPATIENT)
Dept: NEUROSURGERY | Facility: CLINIC | Age: 87
End: 2022-02-04

## 2022-02-04 ENCOUNTER — TELEPHONE (OUTPATIENT)
Dept: FAMILY MEDICINE CLINIC | Facility: CLINIC | Age: 87
End: 2022-02-04

## 2022-02-04 RX ORDER — HYDROCODONE BITARTRATE AND ACETAMINOPHEN 5; 325 MG/1; MG/1
1 TABLET ORAL EVERY 6 HOURS PRN
Qty: 12 TABLET | Refills: 0 | Status: SHIPPED | OUTPATIENT
Start: 2022-02-04 | End: 2022-02-08 | Stop reason: SDUPTHER

## 2022-02-08 ENCOUNTER — OFFICE VISIT (OUTPATIENT)
Dept: FAMILY MEDICINE CLINIC | Facility: CLINIC | Age: 87
End: 2022-02-08

## 2022-02-08 ENCOUNTER — PATIENT OUTREACH (OUTPATIENT)
Dept: CASE MANAGEMENT | Facility: OTHER | Age: 87
End: 2022-02-08

## 2022-02-08 VITALS
DIASTOLIC BLOOD PRESSURE: 83 MMHG | TEMPERATURE: 96.9 F | SYSTOLIC BLOOD PRESSURE: 144 MMHG | RESPIRATION RATE: 16 BRPM | BODY MASS INDEX: 22.46 KG/M2 | HEART RATE: 82 BPM | HEIGHT: 60 IN

## 2022-02-08 DIAGNOSIS — S22.050A CLOSED WEDGE COMPRESSION FRACTURE OF T6 VERTEBRA, INITIAL ENCOUNTER: ICD-10-CM

## 2022-02-08 DIAGNOSIS — S22.000A COMPRESSION FRACTURE OF BODY OF THORACIC VERTEBRA: Primary | ICD-10-CM

## 2022-02-08 LAB — INR PPP: 2.8

## 2022-02-08 PROCEDURE — 99213 OFFICE O/P EST LOW 20 MIN: CPT | Performed by: FAMILY MEDICINE

## 2022-02-08 RX ORDER — HYDROCODONE BITARTRATE AND ACETAMINOPHEN 5; 325 MG/1; MG/1
1 TABLET ORAL EVERY 6 HOURS PRN
Qty: 56 TABLET | Refills: 0 | Status: SHIPPED | OUTPATIENT
Start: 2022-02-08 | End: 2022-02-23

## 2022-02-08 NOTE — PROGRESS NOTES
"Subjective   Shruthi Amin is a 89 y.o. female.     Chief Complaint   Patient presents with   • Back Pain     fractured spine pain getting worse       HPI  Chief complaint: Back pain    Patient is a 89-year-old white female comes in for follow-up and maintenance of her current problems which include    1.  Back pain/compression fracture T6-deteriorated-the patient developed spontaneous back pain recently.  Patient was sitting and she turned the wrong way or turned a certain direction and developed pain in her mid back.  She states that pain was severe.  She went to the emergency room.  In the emergency room evaluation was performed.  Patient was found to have sustained a compression fracture of T6.  Patient was given pain medication and sent home.  Patient states she continues have pain in the mid thoracic spine area.  She denied fever.  She denied bowel or bladder problems.  She denied weakness in the lower extremities or upper extremities.        The following portions of the patient's history were reviewed and updated as appropriate: allergies, current medications, past family history, past medical history, past social history, past surgical history and problem list.    Review of Systems    Objective     /83 (BP Location: Left arm, Patient Position: Sitting, Cuff Size: Adult)   Pulse 82   Temp 96.9 °F (36.1 °C) (Infrared)   Resp 16   Ht 152.4 cm (60\")   BMI 22.46 kg/m²     Physical Exam  Vitals and nursing note reviewed.   Constitutional:       Appearance: She is well-developed and normal weight.   HENT:      Head: Normocephalic and atraumatic.   Eyes:      Pupils: Pupils are equal, round, and reactive to light.   Cardiovascular:      Rate and Rhythm: Normal rate and regular rhythm.      Pulses: Normal pulses.      Heart sounds: Normal heart sounds. No murmur heard.  No friction rub. No gallop.    Pulmonary:      Effort: Pulmonary effort is normal.      Breath sounds: Normal breath sounds. "   Abdominal:      General: Bowel sounds are normal.      Palpations: Abdomen is soft.   Musculoskeletal:         General: Normal range of motion.      Cervical back: Neck supple.      Comments: Kyphosis     Skin:     General: Skin is warm and dry.   Neurological:      Mental Status: She is alert and oriented to person, place, and time.   Psychiatric:         Behavior: Behavior normal.         Thought Content: Thought content normal.         Judgment: Judgment normal.           Assessment/Plan   Diagnoses and all orders for this visit:    1. Compression fracture of body of thoracic vertebra (HCC) (Primary)-patient was advised that she has a compression fracture of T6.  She was advised that treatment options could be either with pain control and physical therapy or she could pursue having a kyphoplasty.  She was advised the kyphoplasty would involve get an MRI.  Patient opted to try physical therapy and pain management.  Patient does request assistance at home from home health.    2. Closed wedge compression fracture of T6 vertebra, initial encounter ([facility])      Patient Instructions   Take the pain medication as needed.    Gradually resume your regular activities.    Continue other medications.    Follow-up in the office in 2 weeks or sooner.    Call if you develop weakness in the lower extremities fever or bowel or bladder problems.      Og Jeronimo Jr., MD    02/08/22

## 2022-02-08 NOTE — OUTREACH NOTE
Ambulatory Case Management Note    Patient Outreach    Spoke with patient over the phone as a follow up call regarding an ER visit on 1/29/2022 for back pain, compression fracture. Patient reports continued upper back pain that limits her independence. She has difficulty bathing and requested home health services to assist her. Spoke with daughter on the phone as well. Patient has a PCP appointment today, daughter will discuss home health services during PCP appointment. Discussed a ramp to enter the home. Per daughter, they rent the home. She will talk with the land lord and follow up with patient's  at [facility]. They denied further needs/questions at this time and are agreeable for outreach call next week.     General & Health Literacy Assessment    Questions/Answers      Most Recent Value   Assessment Completed With Patient   Living Arrangement Children  [Lives with ronen who is a retired nurse]   Type of Residence Private Residence   Home Care Services No  [Previously had caregiver services with [facility] ([facility]]   Equiptment Used at Home Bedside Commode,  Walker,  Oxygen/Respiratory Treatment  [Has a rollator as well, INR machine and oxygen is with [facility]]   Communication Device Yes   Other Issues Hearing Impairment   Bed or Wheelchair Confined Yes   Difficulty Keeping Appointments No   Chronic Pain Yes   Location of Chronic Pain Upper back, left shoulder   Chronic Pain Timing Constant   Chronic Pain Severity 10   Limitation of Routine Activities Due to Chronic Pain Severe        Care Evaluation    Questions/Answers      Most Recent Value   Other Patient Education/Resources  24/7 [facility] Nurse Call Line   24/7 Nurse Call Line Education Method Verbal   Medication Adherence Medications understood   Healthy Lifestyle (Self-Efficacy) recognizes when to contact medical assistance,  self-reports important symptoms to medical professional      SDOH updated and reviewed with the patient during  this program:     Financial Resource Strain: Low Risk    • Difficulty of Paying Living Expenses: Not hard at all       Food Insecurity: No Food Insecurity   • Worried About Running Out of Food in the Last Year: Never true   • Ran Out of Food in the Last Year: Never true       Transportation Needs: No Transportation Needs   • Lack of Transportation (Medical): No   • Lack of Transportation (Non-Medical): No         Celeste Del Rio RN  Ambulatory Case Management    2/8/2022, 11:37 EST

## 2022-02-08 NOTE — PATIENT INSTRUCTIONS
Take the pain medication as needed.    Gradually resume your regular activities.    Continue other medications.    Follow-up in the office in 2 weeks or sooner.    Call if you develop weakness in the lower extremities fever or bowel or bladder problems.

## 2022-02-09 ENCOUNTER — ANTICOAGULATION VISIT (OUTPATIENT)
Dept: CARDIOLOGY | Facility: CLINIC | Age: 87
End: 2022-02-09

## 2022-02-09 ENCOUNTER — HOME HEALTH ADMISSION (OUTPATIENT)
Dept: HOME HEALTH SERVICES | Facility: HOME HEALTHCARE | Age: 87
End: 2022-02-09

## 2022-02-09 DIAGNOSIS — I48.91 ATRIAL FIBRILLATION, UNSPECIFIED TYPE: Primary | ICD-10-CM

## 2022-02-10 ENCOUNTER — HOME CARE VISIT (OUTPATIENT)
Dept: HOME HEALTH SERVICES | Facility: HOME HEALTHCARE | Age: 87
End: 2022-02-10

## 2022-02-10 VITALS
HEIGHT: 66 IN | BODY MASS INDEX: 18.48 KG/M2 | WEIGHT: 115 LBS | OXYGEN SATURATION: 99 % | TEMPERATURE: 98.5 F | HEART RATE: 55 BPM | DIASTOLIC BLOOD PRESSURE: 62 MMHG | RESPIRATION RATE: 19 BRPM | SYSTOLIC BLOOD PRESSURE: 142 MMHG

## 2022-02-10 PROCEDURE — G0299 HHS/HOSPICE OF RN EA 15 MIN: HCPCS

## 2022-02-11 ENCOUNTER — HOME CARE VISIT (OUTPATIENT)
Dept: HOME HEALTH SERVICES | Facility: HOME HEALTHCARE | Age: 87
End: 2022-02-11

## 2022-02-11 VITALS
DIASTOLIC BLOOD PRESSURE: 78 MMHG | SYSTOLIC BLOOD PRESSURE: 116 MMHG | TEMPERATURE: 98.4 F | HEART RATE: 59 BPM | OXYGEN SATURATION: 99 %

## 2022-02-11 DIAGNOSIS — M81.0 OSTEOPOROSIS, UNSPECIFIED OSTEOPOROSIS TYPE, UNSPECIFIED PATHOLOGICAL FRACTURE PRESENCE: Primary | ICD-10-CM

## 2022-02-11 PROCEDURE — G0151 HHCP-SERV OF PT,EA 15 MIN: HCPCS

## 2022-02-11 NOTE — HOME HEALTH
Pt is a 89 yo female referred for PT due to recent compression fx of T6. Pt states she was just sitting in car when all of a sudden her back started hurting. She went to ER where they found the compression fracture.     PLOF limited to residential ambulation with use of front wheeled walker. Pt o2 dependent.     Environment Lives with daughter in a single story house with 2 steps to get in and out. House is clutter free. Daughter willing and able to assist patient.     Pt reporting pain is limiting her from ambulating. Pt wants to return to being able to ambulate in house. Pt requires SBA with bed mobility, transfers and ambulation. Pt requires use of wheeled walker for support. Pt was only able to tolerate ambulation x 40 ft with use of wheeled walker and had to sit down due to fatigue, shortness of breath and back pain.     Plan for next visit progress HEP as tolerated, transfer training balance and gait training.

## 2022-02-11 NOTE — HOME HEALTH
Pt is a 89 yowf who had a compression fracture in Jan 2022. pt is alert and oriented and is able to walk about her home with the use of walker. pt does wear oxygen at 2l cont.    pt will require SN,PT and OT     admitting dx   compression fracture of body of thoracic vertebra and closed wedge compression fracture of T6    history   afib  hypertension  atherosclertoic heart disease of native coronary arthery without angina  intervertebral deisc degeneration lumbosacral region  osteoporosis

## 2022-02-15 ENCOUNTER — HOME CARE VISIT (OUTPATIENT)
Dept: HOME HEALTH SERVICES | Facility: HOME HEALTHCARE | Age: 87
End: 2022-02-15

## 2022-02-15 ENCOUNTER — PATIENT OUTREACH (OUTPATIENT)
Dept: CASE MANAGEMENT | Facility: OTHER | Age: 87
End: 2022-02-15

## 2022-02-15 NOTE — OUTREACH NOTE
Ambulatory Case Management Note    Patient Outreach    Spoke with patient over the phone as a follow up to my call last week. She now receives [facility] ([facility]) for SN/PT/OT. She reported her back pain has improved with Norco. Currently rates her pain as 0. Reviewed side effects of Norco and ways to help with constipation. Patient has several steps at home and is unable to move to one floor. Daughter is discussing a ramp with their land lord. Patient denied further questions and needs at this time. ACM-RN will plan for a chart review next week.     There are no recently modified care plans to display for this patient.      Celeste Del Rio RN  Ambulatory Case Management    2/15/2022, 12:59 EST

## 2022-02-16 ENCOUNTER — ANTICOAGULATION VISIT (OUTPATIENT)
Dept: CARDIOLOGY | Facility: CLINIC | Age: 87
End: 2022-02-16

## 2022-02-16 ENCOUNTER — HOME CARE VISIT (OUTPATIENT)
Dept: HOME HEALTH SERVICES | Facility: HOME HEALTHCARE | Age: 87
End: 2022-02-16

## 2022-02-16 LAB — INR PPP: 2.3

## 2022-02-16 PROCEDURE — G0157 HHC PT ASSISTANT EA 15: HCPCS

## 2022-02-17 ENCOUNTER — HOME CARE VISIT (OUTPATIENT)
Dept: HOME HEALTH SERVICES | Facility: HOME HEALTHCARE | Age: 87
End: 2022-02-17

## 2022-02-17 VITALS
RESPIRATION RATE: 18 BRPM | TEMPERATURE: 97.9 F | DIASTOLIC BLOOD PRESSURE: 82 MMHG | SYSTOLIC BLOOD PRESSURE: 138 MMHG | HEART RATE: 80 BPM

## 2022-02-17 VITALS
DIASTOLIC BLOOD PRESSURE: 76 MMHG | SYSTOLIC BLOOD PRESSURE: 126 MMHG | HEART RATE: 88 BPM | TEMPERATURE: 97.7 F | OXYGEN SATURATION: 98 %

## 2022-02-17 PROCEDURE — G0299 HHS/HOSPICE OF RN EA 15 MIN: HCPCS

## 2022-02-17 PROCEDURE — G0152 HHCP-SERV OF OT,EA 15 MIN: HCPCS

## 2022-02-17 PROCEDURE — G0156 HHCP-SVS OF AIDE,EA 15 MIN: HCPCS

## 2022-02-18 ENCOUNTER — HOME CARE VISIT (OUTPATIENT)
Dept: HOME HEALTH SERVICES | Facility: HOME HEALTHCARE | Age: 87
End: 2022-02-18

## 2022-02-18 VITALS
BODY MASS INDEX: 18.56 KG/M2 | RESPIRATION RATE: 18 BRPM | OXYGEN SATURATION: 94 % | DIASTOLIC BLOOD PRESSURE: 70 MMHG | TEMPERATURE: 97.2 F | HEART RATE: 89 BPM | WEIGHT: 115 LBS | SYSTOLIC BLOOD PRESSURE: 118 MMHG

## 2022-02-18 PROCEDURE — G0157 HHC PT ASSISTANT EA 15: HCPCS

## 2022-02-18 NOTE — HOME HEALTH
Pt main concern today is constipation.  Pt has been taking norco routinely for pain 3-4 times a day.  Instructed on norco and main SE of constipation.  Instructed on increasing fluid intake, foods high in fiber, on taking her stool softeners and on warm MOM and prune juice.  Pt and Daughter states they will try.   Pt has call necklace on.  She states she fell in 2013 and had 2 compression fractures.  She recently found out she sustained another compression fx  by just moving around.  She states they are unable to operate and she is to take the pain meds until it heals. Lives with supportive daughter. HHA arriving to assist with bath. No supplies in home duoderm retreived from car stock.  Daughter states Nurse ordered supplies last visit.     Plan for next visit: assess Bowels  assess skin wound.  If supplies not in home, check office and bring duoderm.

## 2022-02-20 VITALS
DIASTOLIC BLOOD PRESSURE: 66 MMHG | RESPIRATION RATE: 17 BRPM | OXYGEN SATURATION: 96 % | SYSTOLIC BLOOD PRESSURE: 132 MMHG | TEMPERATURE: 97.8 F | HEART RATE: 71 BPM

## 2022-02-20 NOTE — HOME HEALTH
pt sitting up in her recliner,  ready for PT session.    reporting she has been performing hep daily and is motivated to improve/return to plf.    pt admits her strength has been low, currently at 50 percent plf       pt was compliant throughout PT session but was able to perform all the requested activities.  pt was challenged today to stand for extended periods of time and needed extensive rest periods. pt was educated on proper posture when standing and was encouraged to use the cane for ambulation when needed.    pt needed cues during hep review to perform within the correct plane and to fully contract/hold as able

## 2022-02-21 ENCOUNTER — HOME CARE VISIT (OUTPATIENT)
Dept: HOME HEALTH SERVICES | Facility: HOME HEALTHCARE | Age: 87
End: 2022-02-21

## 2022-02-21 DIAGNOSIS — Z79.01 LONG TERM (CURRENT) USE OF ANTICOAGULANTS: ICD-10-CM

## 2022-02-21 DIAGNOSIS — I48.91 ATRIAL FIBRILLATION, UNSPECIFIED TYPE: ICD-10-CM

## 2022-02-21 PROCEDURE — G0156 HHCP-SVS OF AIDE,EA 15 MIN: HCPCS

## 2022-02-21 RX ORDER — WARFARIN SODIUM 2 MG/1
TABLET ORAL
Qty: 120 TABLET | Refills: 0 | Status: SHIPPED | OUTPATIENT
Start: 2022-02-21 | End: 2022-05-20

## 2022-02-21 RX ORDER — METOPROLOL SUCCINATE 25 MG/1
TABLET, EXTENDED RELEASE ORAL
Qty: 180 TABLET | Refills: 1 | Status: SHIPPED | OUTPATIENT
Start: 2022-02-21 | End: 2022-08-11

## 2022-02-21 NOTE — TELEPHONE ENCOUNTER
Rx Refill Note  Requested Prescriptions     Pending Prescriptions Disp Refills   • warfarin (COUMADIN) 2 MG tablet [Pharmacy Med Name: WARFARIN SODIUM 2 MG TABLET] 105 tablet      Sig: TAKE 2 TABLETS BY MOUTH ON MONDAY AND ONE TABLET BY MOUTH ALL OTHER DAYS OR AS DIRECRED      Last office visit with prescribing clinician: 8/18/2021      Next office visit with prescribing clinician: 3/14/2022            Quintin Shipley MA  02/21/22, 11:33 EST

## 2022-02-21 NOTE — TELEPHONE ENCOUNTER
Rx Refill Note  Requested Prescriptions     Pending Prescriptions Disp Refills   • metoprolol succinate XL (TOPROL-XL) 25 MG 24 hr tablet [Pharmacy Med Name: METOPROLOL SUCC ER 25 MG TAB] 180 tablet 1     Sig: TAKE 1 TABLET BY MOUTH TWICE A DAY      Last office visit with prescribing clinician: 8/18/2021      Next office visit with prescribing clinician: 3/14/2022            Quintin Shipley MA  02/21/22, 07:03 EST

## 2022-02-21 NOTE — HOME HEALTH
Patient is a 89 Yo female who lives in a single story home with daughter.  Patient referred to OT due to recetn T6 compression fracture.  Patient stated she was just riding in the car and felt pain.  No fall or other incident noted by patient.    Patient is O2 dependent  SOA with minimal exertion.    PLOF was increased independence with ADLS and overall activity tolerance      Patient demonstrates decreased strength, enduracne and independence since dx with compression fracture    OT recommended to address these issues.  Patient in agreement with POC

## 2022-02-21 NOTE — TELEPHONE ENCOUNTER
Rx Refill Note  Requested Prescriptions     Pending Prescriptions Disp Refills   • warfarin (COUMADIN) 2 MG tablet [Pharmacy Med Name: WARFARIN SODIUM 2 MG TABLET] 105 tablet      Sig: TAKE 2 TABLETS BY MOUTH ON MONDAY AND ONE TABLET BY MOUTH ALL OTHER DAYS OR AS DIRECRED      Last office visit with prescribing clinician: 8/18/2021      Next office visit with prescribing clinician: 3/14/2022   Anticoagulation Visit 2/15/22 INR 2.3           Allyson Calzada RN  02/21/22, 13:28 EST

## 2022-02-22 ENCOUNTER — HOME CARE VISIT (OUTPATIENT)
Dept: HOME HEALTH SERVICES | Facility: HOME HEALTHCARE | Age: 87
End: 2022-02-22

## 2022-02-22 PROCEDURE — G0180 MD CERTIFICATION HHA PATIENT: HCPCS | Performed by: FAMILY MEDICINE

## 2022-02-22 PROCEDURE — G0151 HHCP-SERV OF PT,EA 15 MIN: HCPCS

## 2022-02-23 ENCOUNTER — TELEPHONE (OUTPATIENT)
Dept: CARDIOLOGY | Facility: CLINIC | Age: 87
End: 2022-02-23

## 2022-02-23 ENCOUNTER — HOME CARE VISIT (OUTPATIENT)
Dept: HOME HEALTH SERVICES | Facility: HOME HEALTHCARE | Age: 87
End: 2022-02-23

## 2022-02-23 ENCOUNTER — OFFICE VISIT (OUTPATIENT)
Dept: ORTHOPEDIC SURGERY | Facility: CLINIC | Age: 87
End: 2022-02-23

## 2022-02-23 ENCOUNTER — ANTICOAGULATION VISIT (OUTPATIENT)
Dept: CARDIOLOGY | Facility: CLINIC | Age: 87
End: 2022-02-23

## 2022-02-23 VITALS
RESPIRATION RATE: 18 BRPM | DIASTOLIC BLOOD PRESSURE: 88 MMHG | HEART RATE: 68 BPM | SYSTOLIC BLOOD PRESSURE: 138 MMHG | OXYGEN SATURATION: 100 % | TEMPERATURE: 97.4 F

## 2022-02-23 VITALS
TEMPERATURE: 97.4 F | DIASTOLIC BLOOD PRESSURE: 82 MMHG | HEART RATE: 80 BPM | OXYGEN SATURATION: 99 % | SYSTOLIC BLOOD PRESSURE: 126 MMHG

## 2022-02-23 VITALS
OXYGEN SATURATION: 93 % | HEART RATE: 71 BPM | SYSTOLIC BLOOD PRESSURE: 110 MMHG | TEMPERATURE: 98.2 F | DIASTOLIC BLOOD PRESSURE: 62 MMHG | RESPIRATION RATE: 18 BRPM

## 2022-02-23 VITALS
BODY MASS INDEX: 17.52 KG/M2 | SYSTOLIC BLOOD PRESSURE: 156 MMHG | HEIGHT: 66 IN | DIASTOLIC BLOOD PRESSURE: 75 MMHG | HEART RATE: 91 BPM | WEIGHT: 109 LBS

## 2022-02-23 DIAGNOSIS — Z87.81 HX OF COMPRESSION FRACTURE OF SPINE: ICD-10-CM

## 2022-02-23 DIAGNOSIS — M81.0 AGE-RELATED OSTEOPOROSIS WITHOUT CURRENT PATHOLOGICAL FRACTURE: Primary | ICD-10-CM

## 2022-02-23 DIAGNOSIS — Z87.310 HISTORY OF OSTEOPOROTIC PATHOLOGICAL FRACTURE: ICD-10-CM

## 2022-02-23 DIAGNOSIS — I48.11 LONGSTANDING PERSISTENT ATRIAL FIBRILLATION: ICD-10-CM

## 2022-02-23 DIAGNOSIS — Z82.62 FAMILY HX OSTEOPOROSIS: ICD-10-CM

## 2022-02-23 DIAGNOSIS — I63.10 CEREBROVASCULAR ACCIDENT (CVA) DUE TO EMBOLISM OF PRECEREBRAL ARTERY: ICD-10-CM

## 2022-02-23 LAB — INR PPP: 3.7

## 2022-02-23 PROCEDURE — G0158 HHC OT ASSISTANT EA 15: HCPCS

## 2022-02-23 PROCEDURE — G0299 HHS/HOSPICE OF RN EA 15 MIN: HCPCS

## 2022-02-23 PROCEDURE — 99215 OFFICE O/P EST HI 40 MIN: CPT | Performed by: PHYSICIAN ASSISTANT

## 2022-02-23 NOTE — PATIENT INSTRUCTIONS
Osteoporosis    Osteoporosis is when the bones get thin and weak. This can cause your bones to break (fracture) more easily.  What are the causes?  The exact cause of this condition is not known.  What increases the risk?  · Having family members with this condition.  · Not eating enough healthy foods.  · Taking certain medicines.  · Being female.  · Being age 50 or older.  · Smoking or using other products that contain nicotine or tobacco, such as e-cigarettes or chewing tobacco.  · Not exercising.  · Being of  or  ancestry.  · Having a small body frame.  What are the signs or symptoms?  A broken bone might be the first sign, especially if the break results from a fall or injury that usually would not cause a bone to break.  Other signs and symptoms include:  · Pain in the neck or low back.  · Being hunched over (stooped posture).  · Getting shorter.  How is this treated?  · Eating more foods with more calcium and vitamin D in them.  · Doing exercises.  · Stopping tobacco use.  · Limiting how much alcohol you drink.  · Taking medicines to slow bone loss or help make the bones stronger.  · Taking supplements of calcium and vitamin D every day.  · Taking medicines to replace chemicals in the body (hormone replacement medicines).  · Monitoring your levels of calcium and vitamin D.  The goal of treatment is to strengthen your bones and lower your risk for a bone break.  Follow these instructions at home:  Eating and drinking  Eat plenty of calcium and vitamin D. These nutrients are good for your bones. Good sources of calcium and vitamin D include:  · Some fish, such as salmon and tuna.  · Foods that have calcium and vitamin D added to them (fortified foods), such as some breakfast cereals.  · Egg yolks.  · Cheese.  · Liver.    Activity  Do exercises as told by your doctor. Ask your doctor what exercises are safe for you. You should do:  · Exercises that make your muscles work to hold your body weight up  (weight-bearing exercises). These include luz elena chi, yoga, and walking.  · Exercises to make your muscles stronger. One example is lifting weights.  Lifestyle  · Do not drink alcohol if:  ? Your doctor tells you not to drink.  ? You are pregnant, may be pregnant, or are planning to become pregnant.  · If you drink alcohol:  ? Limit how much you use to:  § 0-1 drink a day for women.  § 0-2 drinks a day for men.  · Know how much alcohol is in your drink. In the [place] one drink equals one 12 oz bottle of beer (355 mL), one 5 oz glass of wine (148 mL), or one 1½ oz glass of hard liquor (44 mL).  · Do not smoke or use any products that contain nicotine or tobacco. If you need help quitting, ask your doctor.  Preventing falls  · Use tools to help you move around (mobility aids) as needed. These include canes, walkers, scooters, and crutches.  · Keep rooms well-lit.  · Put away things on the floor that could make you trip. These include cords and rugs.  · Install safety rails on stairs. Install grab bars in bathrooms.  · Use rubber mats in slippery areas, like bathrooms.  · Wear shoes that:  ? Fit you well.  ? Support your feet.  ? Have closed toes.  ? Have rubber soles or low heels.  · Tell your doctor about all of the medicines you are taking. Some medicines can make you more likely to fall.  General instructions  · Take over-the-counter and prescription medicines only as told by your doctor.  · Keep all follow-up visits.  Contact a doctor if:  · You have not been tested (screened) for osteoporosis and you are:  ? A woman who is age 65 or older.  ? A man who is age 70 or older.  Get help right away if:  · You fall.  · You get hurt.  Summary  · Osteoporosis happens when your bones get thin and weak.  · Weak bones can break (fracture) more easily.  · Eat plenty of calcium and vitamin D. These are good for your bones.  · Tell your doctor about all of the medicines that you take.  This information is not intended to replace  advice given to you by your health care provider. Make sure you discuss any questions you have with your health care provider.  Document Revised: 06/03/2021 Document Reviewed: 06/03/2021  Elsevier Patient Education © 2021 [employer]

## 2022-02-23 NOTE — HOME HEALTH
Patient stated no disturbing pain in back this visit and reported compliance with illustrated home exercise program to ble between PT visits. Functionally, she is ambulating safely indoor with rolling walker and ambulation goal upgraded to safe indoor ambulation without assistive device as in prior gait status.

## 2022-02-23 NOTE — HOME HEALTH
"The patient's caregiver greeted DUGGAN at the door. Pt was seated in recliner upon DUGGAN arrival. She states she is \"doing fair\" although did not sleep well last night. Reviewed medication and caregiver addressed that patient is taking 3 new ones not listed.  Added to medication list:  Lidocaine Pain Relief Patch Lidocaine 4% places one patch on back every night  Bone Nutrient Supplement   Phytoestrogen    Pt was fully engaged in skilled OT session and is motivated to improve and return to I living at home. The patient denies any new issues or concerns, no falls. Next BLANCA visit to continue HEP for BUE strengthening and transfer training with pt voicing understanding."

## 2022-02-23 NOTE — PROGRESS NOTES
ORTHOPEDIC VISIT    Referring Provider: Gamal  Primary Care Provider: Og Jeronimo Jr., MD         Subjective:  Chief Complaint:  Chief Complaint   Patient presents with   • Osteoporosis     New Consult       HPI:  Shruthi Amin is a 89 y.o. female who presents for initial visit for osteoporosis.  The patient complains of Complains of: back pain, loss of height and recent spinal fracture.  And denies Denies: generalized bone pain and Joint pain.  Their risk factors include risk factors: low calcium intake, Low sun exposure, Personal history of fracture, Family history of fracture, Elevated falls risk, Hypogonadism and Medication use.  The patient has the following associated illnesses: Associated illnesses: Esophagitis, Gastritis, Atrial fibrillation and History of stroke.  Treatment to date has included Treatment to date: Calcium supplementation and Vitamin D supplementation.  She has never taken any osteoporosis medications.  She currently takes a multivitamin +5000 international units of D3 per day.  She has sustained multiple fragility fractures, including T12 and L1 in 2013, right intertrochanteric hip fracture in July 2019, and a recent T6 fracture in January 2022.  She does have a family history of osteoporosis in her mother, who sustained a hip fracture, and her father, who did not sustain any fractures.  She reports menopause around the age of 50.  She does not smoke or drink alcohol.  She gets very little physical activity, although she is currently in physical therapy at home.  She denies any history of cancer, radiation treatment, kidney stones, or chronic diarrhea.  She is currently on Coumadin.  She does have a history of A. fib and stroke.  She also takes digoxin.  She does see a dentist regularly.  She denies any falls in the last year, however does feel unsteady with walking, but is not worried about falling.  She scored a 7 out of 14 on the STEADI risk for falling assessment.  Chart  review completed today, along with recent and previous labs, and previous imaging.  She does not recall when her last DEXA scan was.  Referred for consultation by Sultana Yeung NP.    STEADI Fall Risk Assessment was completed, and patient is at MODERATE risk for falls. Assessment completed on:2/23/2022      Past Medical History:   Diagnosis Date   • Atrial fibrillation (HCC)    • Coronary artery disease     Atrial fibrillation   • GERD (gastroesophageal reflux disease)    • Glaucoma    • Hiatal hernia with gastroesophageal reflux    • History of osteoporotic pathological fracture     Right intertroch (7/2019)   • Hx of compression fracture of spine     T12 and L1(2013); T6 (1/2022)   • Hyperlipidemia    • Hypertension    • Osteoarthritis    • Osteoporosis    • Stroke (HCC)     off and on dizziness from the stroke.       Past Surgical History:   Procedure Laterality Date   • BACK SURGERY      kyphoplasty   • EYE SURGERY      cataract surgery   • FIXATION KYPHOPLASTY LUMBAR SPINE  2013   • HIP TROCHANTERIC NAILING WITH INTRAMEDULLARY HIP SCREW Right 7/20/2019    Procedure: HIP TROCHANTERIC NAILING SHORT WITH INTRAMEDULLARY HIP SCREW;  Surgeon: Edward Centeno MD;  Location: [facility] MAIN OR;  Service: Orthopedics   • RECTAL SURGERY      x 3       Family History   Problem Relation Age of Onset   • Heart disease Mother    • Hypertension Mother    • Osteoporosis Mother    • Cancer Father         colon cancer   • Osteoporosis Father    • Cancer Sister         lung   • Cancer Brother         colon cancer       Social History     Occupational History   • Not on file   Tobacco Use   • Smoking status: Never Smoker   • Smokeless tobacco: Never Used   Vaping Use   • Vaping Use: Never used   Substance and Sexual Activity   • Alcohol use: No   • Drug use: No   • Sexual activity: Defer        Medications:    Current Outpatient Medications:   •  Bacillus Coagulans-Inulin (ALIGN PREBIOTIC-PROBIOTIC PO), Take  by mouth., Disp:  , Rfl:   •  Biotin 83364 MCG tablet, Take  by mouth., Disp: , Rfl:   •  cholecalciferol (VITAMIN D3) 400 units tablet, Take 400 Units by mouth Daily., Disp: , Rfl:   •  cyclobenzaprine (FLEXERIL) 10 MG tablet, Take 0.5 tablets by mouth 2 (Two) Times a Day As Needed for Muscle Spasms., Disp: 60 tablet, Rfl: 1  •  digoxin (LANOXIN) 125 MCG tablet, TAKE 1 TABLET BY MOUTH EVERY DAY, Disp: 90 tablet, Rfl: 1  •  docusate sodium (STOOL SOFTENER) 100 MG capsule, Take 200 mg by mouth 2 (Two) Times a Day As Needed for Constipation., Disp: , Rfl:   •  furosemide (LASIX) 20 MG tablet, TAKE 1 TABLET BY MOUTH AS NEEDED (SWELLING)., Disp: 90 tablet, Rfl: 2  •  KLOR-CON 10 MEQ CR tablet, TAKE 1 TABLET BY MOUTH EVERY DAY (Patient taking differently: 10 mEq As Needed. ONLY WHEN TAKING WATER PILL), Disp: 90 tablet, Rfl: 1  •  latanoprost (XALATAN) 0.005 % ophthalmic solution, Administer 1 drop to the right eye Every Night., Disp: , Rfl:   •  Lutein 20 MG capsule, Take  by mouth., Disp: , Rfl:   •  magnesium gluconate (MAGONATE) 500 MG tablet, Take 27 mg by mouth 2 (Two) Times a Day., Disp: , Rfl:   •  magnesium hydroxide (MILK OF MAGNESIA) 800 MG/5ML suspension, Take 30 mL by mouth Daily As Needed for Constipation., Disp: , Rfl:   •  metoprolol succinate XL (TOPROL-XL) 25 MG 24 hr tablet, TAKE 1 TABLET BY MOUTH TWICE A DAY, Disp: 180 tablet, Rfl: 1  •  NON FORMULARY, 2 tablets 2 (Two) Times a Day. Bone nutrient, Disp: , Rfl:   •  NON FORMULARY, 1 tablet 2 (Two) Times a Day. PHYTOESTROGEN, Disp: , Rfl:   •  Sennosides 25 MG tablet, Take 25 mg by mouth 2 (Two) Times a Day As Needed (constipation)., Disp: , Rfl:   •  vitamin C (ASCORBIC ACID) 500 MG tablet, Take 500 mg by mouth Daily., Disp: , Rfl:   •  warfarin (COUMADIN) 2 MG tablet, TAKE 2 TABLETS BY MOUTH ON Monday, Wednesday AND FRIDAY AND ONE TABLET BY MOUTH ALL OTHER DAYS OR AS DIRECRED, Disp: 120 tablet, Rfl: 0  •  Zinc Sulfate (ZINC 15 PO), Take 2 tablets by mouth Daily. WITH  "ELDERBERRY--GUMMIES, Disp: , Rfl:   •  B Complex Vitamins (VITAMIN B COMPLEX PO), Take 1 tablet by mouth Daily., Disp: , Rfl:     Allergies:  Allergies   Allergen Reactions   • Codeine Nausea And Vomiting   • Penicillins Hives   • Latex Rash         Review of Systems:  Gen -no fever, chills , sweats, headache   Eyes - no irritation or discharge   ENT -  no ear pain , runny nose , sore throat , difficulty swallowing   Resp - no cough , congestion , excessive expectoration   CVS - no chest pain , palpitations.   Abd - no pain , nausea , vomiting , diarrhea   Skin - no rash , lesions.   Neuro - no dizziness    Please see HPI for any other pertinent positives.  All other systems were reviewed and are negative.       Objective   Objective:    /75 (BP Location: Left arm, Patient Position: Sitting, Cuff Size: Adult)   Pulse 91   Ht 167.6 cm (66\")   Wt 49.4 kg (109 lb)   BMI 17.59 kg/m²     Physical Examination:  Very thin, frail-appearing individual in no acute distress, patient is alert and cooperative with the exam, appears to have normal mood and affect with a normal attention span and concentration, currently in a wheelchair  normocephalic, atraumatic, extraocular movements intact, conjunctiva and sclera are clear, grossly normal hearing  no lymphadenopathy or thyromegaly  normal respiratory effort, on O2  abdomen is nontender, without guarding or rebound and nondistended  Kyphotic  no LE edema noted  cranial nerves II-XII grossly intact with no focal defects  skin intact without lesions or rashes visible         Imagin-T12 and L1 compression fractures  2019-right intertrochanteric hip fragility fracture  2022-T6 compression fracture            Assessment:  1. Age-related osteoporosis without current pathological fracture    2. Hx of compression fracture of spine    3. History of osteoporotic pathological fracture    4. Family hx osteoporosis    5. Longstanding persistent atrial fibrillation (HCC) " "   6. 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  Due to age she has deferred any more DEXA scans at this time.  I do recommend 1200 mg of calcium daily, either through diet or supplementation.  She should continue her vitamin D.  With her recent vertebral compression fracture, she is at imminent fracture risk for the next 2 years.  At this time, I am recommending treatment with Prolia.  We did discuss the risks and benefits of this medication, including but not limited to, rash, hypocalcemia, osteonecrosis of the jaw, atypical femur fractures, and the risk of multiple vertebral fractures upon discontinuation.  She voiced understanding and would like to proceed.  She will be given an osteoporosis folder today containing everything discussed, including weightbearing exercises and fall prevention.  She should continue her physical therapy.  I will plan to see her back in 1 year and as needed, and she will be notified when the injection is available here in the office.  She should call with any questions or concerns.  More than 45 minutes was spent with previsit planning, chart review, face-to-face encounter with counseling and education and final documentation of the visit.  PATIENT EDUCATION:    Education was provided to: patient and patient's caretaker  Patient response: expressed understanding and receptive  Topics discussed: medical condition, workup results, treatment options, therapeutic risks and benefits, medication use, health promotion, diet and nutrition, support systems available, drug interactions, compliance with medication, osteoporosis risks, Prolia  Informed how: verbally, demonstration, literature             ROCAEL Yañez  02/23/22  16:10 EST    \"Please note that portions of this note were completed with a voice recognition program\".   "

## 2022-02-24 ENCOUNTER — HOME CARE VISIT (OUTPATIENT)
Dept: HOME HEALTH SERVICES | Facility: HOME HEALTHCARE | Age: 87
End: 2022-02-24

## 2022-02-24 ENCOUNTER — TELEPHONE (OUTPATIENT)
Dept: ORTHOPEDIC SURGERY | Facility: CLINIC | Age: 87
End: 2022-02-24

## 2022-02-24 PROCEDURE — G0156 HHCP-SVS OF AIDE,EA 15 MIN: HCPCS

## 2022-02-24 NOTE — HOME HEALTH
Patient denies changes from last SN visit, including worsening SOA, CP, falls or other symptoms. Encouraged patient to continue using incentive spirometer for PNA prevention and O2 PRN. Pressure injury on coccyx assessed and appears to be healing well without s/sx infection. Order changed to: apply silicone border dressing Q3D based on management recommendation. Wound care performed as such. Also advised patient to begin applying barrier cream containing zinc oxide. Silicone border dressings ordered 2/23/22 to be shipped to [place]. Patient verbalizes understanding of new care plan.      Plan for next visit: wound care, medication review, disease management education (osteoporosis).

## 2022-02-25 ENCOUNTER — OFFICE VISIT (OUTPATIENT)
Dept: FAMILY MEDICINE CLINIC | Facility: CLINIC | Age: 87
End: 2022-02-25

## 2022-02-25 ENCOUNTER — HOME CARE VISIT (OUTPATIENT)
Dept: HOME HEALTH SERVICES | Facility: HOME HEALTHCARE | Age: 87
End: 2022-02-25

## 2022-02-25 ENCOUNTER — PATIENT ROUNDING (BHMG ONLY) (OUTPATIENT)
Dept: ORTHOPEDIC SURGERY | Facility: CLINIC | Age: 87
End: 2022-02-25

## 2022-02-25 VITALS
HEIGHT: 66 IN | OXYGEN SATURATION: 93 % | BODY MASS INDEX: 18.48 KG/M2 | HEART RATE: 88 BPM | SYSTOLIC BLOOD PRESSURE: 127 MMHG | DIASTOLIC BLOOD PRESSURE: 81 MMHG | TEMPERATURE: 97.1 F | WEIGHT: 115 LBS

## 2022-02-25 DIAGNOSIS — S22.000A COMPRESSION FRACTURE OF BODY OF THORACIC VERTEBRA: ICD-10-CM

## 2022-02-25 DIAGNOSIS — Z87.310 HISTORY OF OSTEOPOROTIC PATHOLOGICAL FRACTURE: Primary | Chronic | ICD-10-CM

## 2022-02-25 PROBLEM — Z87.81 HX OF COMPRESSION FRACTURE OF SPINE: Status: RESOLVED | Noted: 2022-02-23 | Resolved: 2022-02-25

## 2022-02-25 PROCEDURE — G0157 HHC PT ASSISTANT EA 15: HCPCS

## 2022-02-25 PROCEDURE — 99213 OFFICE O/P EST LOW 20 MIN: CPT | Performed by: FAMILY MEDICINE

## 2022-02-25 NOTE — PROGRESS NOTES
A My-Chart message has been sent to the patient for PATIENT ROUNDING with [facility]

## 2022-02-25 NOTE — PATIENT INSTRUCTIONS
Continue your current medications and treatment.    Consider using the prolia injection to treat the osteoporosis to prevent further fractures.    Follow up in the office in 3 months.

## 2022-02-25 NOTE — PROGRESS NOTES
"Subjective   Shruthi Amin is a 89 y.o. female.     Chief Complaint   Patient presents with   • Back Pain     2 week f/u       HPI chief complaint: Compression fracture of the thoracic spine, osteoporosis, atrial fibrillation    The patient is a 89-year-old white female comes in for follow-up and maintenance of her current problems which include    1.  Compression fracture of the thoracic spine-unchanged-patient developed a spontaneous compression fracture of the thoracic spine several weeks ago.  Patient is treating this nonoperatively.  Patient states she still has pain.  She could not tolerate opioids.  Patient is using lidocaine patch and Tylenol which helps.    2.  Osteoporosis-unchanged-patient has been advised to start Prolia injections or to consider using Prolia injections to help treat osteoporosis to prevent further fractures.  Patient states that she is having second thoughts about it.  I strongly encouraged patient to use the injections to help prevent further fractures.    Her other problems include atrial fibrillation hypertension hyperlipidemia and pulmonary hypertension with hypoxemia.        The following portions of the patient's history were reviewed and updated as appropriate: allergies, current medications, past family history, past medical history, past social history, past surgical history and problem list.    Review of Systems    Objective     /81 (BP Location: Right arm, Patient Position: Sitting, Cuff Size: Adult)   Pulse 88   Temp 97.1 °F (36.2 °C) (Infrared)   Ht 167.6 cm (66\")   Wt 52.2 kg (115 lb)   SpO2 93%   BMI 18.56 kg/m²     Physical Exam  Vitals and nursing note reviewed.   Constitutional:       Appearance: She is well-developed.      Comments: Elderly and frail   HENT:      Head: Normocephalic and atraumatic.   Eyes:      Pupils: Pupils are equal, round, and reactive to light.   Cardiovascular:      Rate and Rhythm: Normal rate. Rhythm irregularly irregular.      " Pulses: Normal pulses.      Heart sounds: Normal heart sounds. No murmur heard.  No friction rub. No gallop.    Pulmonary:      Effort: Pulmonary effort is normal.      Breath sounds: Normal breath sounds.   Abdominal:      General: Abdomen is flat. Bowel sounds are normal.      Palpations: Abdomen is soft.   Musculoskeletal:         General: Normal range of motion.      Cervical back: Neck supple.   Skin:     General: Skin is warm and dry.   Neurological:      Mental Status: She is alert and oriented to person, place, and time.   Psychiatric:         Behavior: Behavior normal.         Thought Content: Thought content normal.         Judgment: Judgment normal.           Assessment/Plan   Diagnoses and all orders for this visit:    1. History of osteoporotic pathological fracture (Primary)    2. Compression fracture of body of thoracic vertebra (HCC)      Patient Instructions   Continue your current medications and treatment.    Consider using the prolia injection to treat the osteoporosis to prevent further fractures.    Follow up in the office in 3 months.      Og Jeronimo Jr., MD    02/25/22

## 2022-02-26 VITALS
TEMPERATURE: 97.1 F | DIASTOLIC BLOOD PRESSURE: 80 MMHG | SYSTOLIC BLOOD PRESSURE: 122 MMHG | OXYGEN SATURATION: 97 % | HEART RATE: 88 BPM

## 2022-02-28 ENCOUNTER — HOME CARE VISIT (OUTPATIENT)
Dept: HOME HEALTH SERVICES | Facility: HOME HEALTHCARE | Age: 87
End: 2022-02-28

## 2022-02-28 VITALS
TEMPERATURE: 98.7 F | DIASTOLIC BLOOD PRESSURE: 62 MMHG | OXYGEN SATURATION: 97 % | SYSTOLIC BLOOD PRESSURE: 106 MMHG | HEART RATE: 66 BPM

## 2022-02-28 PROCEDURE — G0151 HHCP-SERV OF PT,EA 15 MIN: HCPCS

## 2022-03-01 ENCOUNTER — ANTICOAGULATION VISIT (OUTPATIENT)
Dept: CARDIOLOGY | Facility: CLINIC | Age: 87
End: 2022-03-01

## 2022-03-01 ENCOUNTER — HOME CARE VISIT (OUTPATIENT)
Dept: HOME HEALTH SERVICES | Facility: HOME HEALTHCARE | Age: 87
End: 2022-03-01

## 2022-03-01 DIAGNOSIS — I48.91 ATRIAL FIBRILLATION, UNSPECIFIED TYPE: Primary | ICD-10-CM

## 2022-03-01 LAB — INR PPP: 2

## 2022-03-01 NOTE — HOME HEALTH
Patient is compliant with illustrated home exercise program and met all transfer and ambulation goals. She and daughter therefore agreed with PT discharge this visit.

## 2022-03-03 ENCOUNTER — HOME CARE VISIT (OUTPATIENT)
Dept: HOME HEALTH SERVICES | Facility: HOME HEALTHCARE | Age: 87
End: 2022-03-03

## 2022-03-03 PROCEDURE — G0299 HHS/HOSPICE OF RN EA 15 MIN: HCPCS

## 2022-03-03 NOTE — HOME HEALTH
pt still having difficulty with getting in and out of the shower. pt will require hha to continue. sn for medication education, pain assessment, fall prevention education and oxygen education

## 2022-03-04 ENCOUNTER — HOME CARE VISIT (OUTPATIENT)
Dept: HOME HEALTH SERVICES | Facility: HOME HEALTHCARE | Age: 87
End: 2022-03-04

## 2022-03-04 VITALS
DIASTOLIC BLOOD PRESSURE: 84 MMHG | SYSTOLIC BLOOD PRESSURE: 128 MMHG | HEART RATE: 78 BPM | TEMPERATURE: 98.9 F | OXYGEN SATURATION: 90 %

## 2022-03-04 PROCEDURE — G0156 HHCP-SVS OF AIDE,EA 15 MIN: HCPCS

## 2022-03-04 PROCEDURE — G0158 HHC OT ASSISTANT EA 15: HCPCS

## 2022-03-04 NOTE — TELEPHONE ENCOUNTER
[employer] Prolia SOB returned. PA required, $0 OOP for Prolia and admin fee.    Primary medical benefit Benefits subject to a $233.00 deductible ($233.00 met) and 20% co-insurance forthe administration and cost of Prolia. The benefits provided on this Verification of Benefits form are Medical Benefits and are the patient's In-Network benefits for Prolia.    Secondary medical benefit : The secondary plan will coordinate benefits and will consider the patient's remaining cost share at 100%. The benefits provided on this Verification of Benefits form are Medical Benefits and are the patient's In-Network benefits for Prolia.    Call placed to [facility] ins, was advised that need to auth Prolia through [facility] ([employer]) at [phone]. Transferred to [facility]. Information given, clinical question answered and advised that has gone to review. Need to fax clinical to [phone] with Case# [id number], Ins ID#, pt info, and provider info on cover sheet.   Clinical printed and faxed.

## 2022-03-04 NOTE — HOME HEALTH
"The patient was seated in recliner upon DUGGAN arrival. She states she is \"ok\" and consents to skilled OT session. Pt was fully engaged in skilled OT session and is motivated to improve and return to I living safely at home. Pt denies any new issues/concerns, no med changes or adverse reactions. Pt does report pain in L side/rib area under arm however tolerates fair during OT session."

## 2022-03-08 ENCOUNTER — HOME CARE VISIT (OUTPATIENT)
Dept: HOME HEALTH SERVICES | Facility: HOME HEALTHCARE | Age: 87
End: 2022-03-08

## 2022-03-08 LAB — INR PPP: 1.9

## 2022-03-08 PROCEDURE — G0156 HHCP-SVS OF AIDE,EA 15 MIN: HCPCS

## 2022-03-09 ENCOUNTER — ANTICOAGULATION VISIT (OUTPATIENT)
Dept: CARDIOLOGY | Facility: CLINIC | Age: 87
End: 2022-03-09

## 2022-03-09 DIAGNOSIS — I48.91 ATRIAL FIBRILLATION, UNSPECIFIED TYPE: Primary | ICD-10-CM

## 2022-03-10 ENCOUNTER — HOME CARE VISIT (OUTPATIENT)
Dept: HOME HEALTH SERVICES | Facility: HOME HEALTHCARE | Age: 87
End: 2022-03-10

## 2022-03-10 VITALS
SYSTOLIC BLOOD PRESSURE: 98 MMHG | TEMPERATURE: 98.3 F | DIASTOLIC BLOOD PRESSURE: 70 MMHG | OXYGEN SATURATION: 96 % | HEART RATE: 79 BPM

## 2022-03-10 PROCEDURE — G0158 HHC OT ASSISTANT EA 15: HCPCS

## 2022-03-10 NOTE — TELEPHONE ENCOUNTER
Call from Dr. Mattie Lopez, [employer]  is approved  Auth # [id number]   3-9-2022 through 3-9-2023    Call placed to patient,left message on machine advising of auth and to call back to get scheduled for first injection. Advised to contact me at my direct phone number.

## 2022-03-10 NOTE — HOME HEALTH
"The patient was seated on couch upon DUGGAN arrival; states she is feeling \"just ok\" today, feeling tired. The patient was fully engaged in skilled OT session and is motivated to improve. The patient is motivated to improve. The patient denies any new issues/concerns, no medication changes or adverse reactions. Pt is aware of OT d/c at next visit and voiced understanding and agreement."

## 2022-03-11 ENCOUNTER — HOME CARE VISIT (OUTPATIENT)
Dept: HOME HEALTH SERVICES | Facility: HOME HEALTHCARE | Age: 87
End: 2022-03-11

## 2022-03-11 VITALS
HEART RATE: 69 BPM | WEIGHT: 110 LBS | RESPIRATION RATE: 18 BRPM | BODY MASS INDEX: 17.75 KG/M2 | DIASTOLIC BLOOD PRESSURE: 60 MMHG | SYSTOLIC BLOOD PRESSURE: 122 MMHG | OXYGEN SATURATION: 99 % | TEMPERATURE: 98.2 F

## 2022-03-11 PROCEDURE — G0299 HHS/HOSPICE OF RN EA 15 MIN: HCPCS

## 2022-03-11 PROCEDURE — G0156 HHCP-SVS OF AIDE,EA 15 MIN: HCPCS

## 2022-03-11 NOTE — TELEPHONE ENCOUNTER
Call back from patient's daughter to schedule first Prolia injection. Advised of first avail appt; per patient's daughter Inessa, they will take it.

## 2022-03-14 ENCOUNTER — OFFICE VISIT (OUTPATIENT)
Dept: CARDIOLOGY | Facility: CLINIC | Age: 87
End: 2022-03-14

## 2022-03-14 VITALS
WEIGHT: 111 LBS | BODY MASS INDEX: 17.84 KG/M2 | HEART RATE: 77 BPM | OXYGEN SATURATION: 97 % | DIASTOLIC BLOOD PRESSURE: 75 MMHG | SYSTOLIC BLOOD PRESSURE: 148 MMHG | HEIGHT: 66 IN

## 2022-03-14 DIAGNOSIS — I48.11 LONGSTANDING PERSISTENT ATRIAL FIBRILLATION: Primary | ICD-10-CM

## 2022-03-14 DIAGNOSIS — I25.10 CORONARY ARTERY DISEASE INVOLVING NATIVE CORONARY ARTERY OF NATIVE HEART WITHOUT ANGINA PECTORIS: ICD-10-CM

## 2022-03-14 DIAGNOSIS — I10 ESSENTIAL HYPERTENSION: ICD-10-CM

## 2022-03-14 DIAGNOSIS — E78.00 PURE HYPERCHOLESTEROLEMIA: ICD-10-CM

## 2022-03-14 PROCEDURE — 99214 OFFICE O/P EST MOD 30 MIN: CPT | Performed by: INTERNAL MEDICINE

## 2022-03-14 NOTE — PROGRESS NOTES
Subjective:     Encounter Date:03/14/2022      Patient ID: Shruthi Amin is a 89 y.o. female.    Chief Complaint:  History of Present Illness 89-year-old white female with history of atrial fibrillation hypertension hyperlipidemia coronary disease presents to office for follow-up.  Patient is currently stable without any symptoms of chest pain but has some shortness of breath exertion but no complains any PND orthopnea.  She has occasional palpitation without any dizziness syncope.  She has some swelling of the feet but she is taking her medicines regularly.  She does not smoke.  She had back injury and since then she has been slowed down a little bit.    The following portions of the patient's history were reviewed and updated as appropriate: allergies, current medications, past family history, past medical history, past social history, past surgical history and problem list.  Past Medical History:   Diagnosis Date   • Atrial fibrillation (HCC)    • Coronary artery disease     Atrial fibrillation   • GERD (gastroesophageal reflux disease)    • Glaucoma    • Hiatal hernia with gastroesophageal reflux    • History of osteoporotic pathological fracture     Right intertroch (7/2019)   • Hx of compression fracture of spine     T12 and L1(2013); T6 (1/2022)   • Hyperlipidemia    • Hypertension    • Osteoarthritis    • Osteoporosis    • Stroke (HCC)     off and on dizziness from the stroke.     Past Surgical History:   Procedure Laterality Date   • BACK SURGERY      kyphoplasty   • EYE SURGERY      cataract surgery   • FIXATION KYPHOPLASTY LUMBAR SPINE  2013   • HIP TROCHANTERIC NAILING WITH INTRAMEDULLARY HIP SCREW Right 7/20/2019    Procedure: HIP TROCHANTERIC NAILING SHORT WITH INTRAMEDULLARY HIP SCREW;  Surgeon: Edward Centeno MD;  Location: [facility];  Service: Orthopedics   • RECTAL SURGERY      x 3     /75 (BP Location: Left arm, Patient Position: Sitting, Cuff Size: Adult)   Pulse 77    "Ht 167.6 cm (66\")   Wt 50.3 kg (111 lb)   SpO2 97%   BMI 17.92 kg/m²   Family History   Problem Relation Age of Onset   • Heart disease Mother    • Hypertension Mother    • Osteoporosis Mother    • Cancer Father         colon cancer   • Osteoporosis Father    • Cancer Sister         lung   • Cancer Brother         colon cancer       Current Outpatient Medications:   •  B Complex Vitamins (VITAMIN B COMPLEX PO), Take 1 tablet by mouth Daily., Disp: , Rfl:   •  Bacillus Coagulans-Inulin (ALIGN PREBIOTIC-PROBIOTIC PO), Take  by mouth., Disp: , Rfl:   •  Biotin 97446 MCG tablet, Take  by mouth., Disp: , Rfl:   •  cholecalciferol (VITAMIN D3) 400 units tablet, Take 400 Units by mouth Daily., Disp: , Rfl:   •  cyclobenzaprine (FLEXERIL) 10 MG tablet, Take 0.5 tablets by mouth 2 (Two) Times a Day As Needed for Muscle Spasms., Disp: 60 tablet, Rfl: 1  •  digoxin (LANOXIN) 125 MCG tablet, TAKE 1 TABLET BY MOUTH EVERY DAY, Disp: 90 tablet, Rfl: 1  •  docusate sodium (COLACE) 100 MG capsule, Take 200 mg by mouth 2 (Two) Times a Day As Needed for Constipation., Disp: , Rfl:   •  furosemide (LASIX) 20 MG tablet, TAKE 1 TABLET BY MOUTH AS NEEDED (SWELLING)., Disp: 90 tablet, Rfl: 2  •  KLOR-CON 10 MEQ CR tablet, TAKE 1 TABLET BY MOUTH EVERY DAY (Patient taking differently: 10 mEq As Needed. ONLY WHEN TAKING WATER PILL), Disp: 90 tablet, Rfl: 1  •  latanoprost (XALATAN) 0.005 % ophthalmic solution, Administer 1 drop to the right eye Every Night., Disp: , Rfl:   •  Lutein 20 MG capsule, Take  by mouth., Disp: , Rfl:   •  magnesium gluconate (MAGONATE) 500 MG tablet, Take 27 mg by mouth 2 (Two) Times a Day., Disp: , Rfl:   •  magnesium hydroxide (MILK OF MAGNESIA) 800 MG/5ML suspension, Take 30 mL by mouth Daily As Needed for Constipation., Disp: , Rfl:   •  metoprolol succinate XL (TOPROL-XL) 25 MG 24 hr tablet, TAKE 1 TABLET BY MOUTH TWICE A DAY, Disp: 180 tablet, Rfl: 1  •  NON FORMULARY, 2 tablets 2 (Two) Times a Day. Bone " nutrient, Disp: , Rfl:   •  NON FORMULARY, 1 tablet 2 (Two) Times a Day. PHYTOESTROGEN, Disp: , Rfl:   •  O2 (OXYGEN), Inhale 2 L/min Continuous As Needed., Disp: , Rfl:   •  Sennosides 25 MG tablet, Take 25 mg by mouth 2 (Two) Times a Day As Needed (constipation)., Disp: , Rfl:   •  vitamin C (ASCORBIC ACID) 500 MG tablet, Take 500 mg by mouth Daily., Disp: , Rfl:   •  warfarin (COUMADIN) 2 MG tablet, TAKE 2 TABLETS BY MOUTH ON Monday, Wednesday AND FRIDAY AND ONE TABLET BY MOUTH ALL OTHER DAYS OR AS DIRECRED, Disp: 120 tablet, Rfl: 0  •  Zinc Sulfate (ZINC 15 PO), Take 2 tablets by mouth Daily. WITH ELDERBERRY--GUMMIES, Disp: , Rfl:   Allergies   Allergen Reactions   • Codeine Nausea And Vomiting   • Penicillins Hives   • Latex Rash     Social History     Socioeconomic History   • Marital status:    Tobacco Use   • Smoking status: Never Smoker   • Smokeless tobacco: Never Used   Vaping Use   • Vaping Use: Never used   Substance and Sexual Activity   • Alcohol use: No   • Drug use: No   • Sexual activity: Defer     Review of Systems   Constitutional: Negative for fever and malaise/fatigue.   Cardiovascular: Positive for leg swelling and palpitations. Negative for chest pain and dyspnea on exertion.   Respiratory: Positive for shortness of breath. Negative for cough.    Skin: Negative for rash.   Gastrointestinal: Negative for abdominal pain, nausea and vomiting.   Neurological: Negative for focal weakness and headaches.   All other systems reviewed and are negative.             Objective:     Constitutional:       Appearance: Well-developed.   Eyes:      General: No scleral icterus.     Conjunctiva/sclera: Conjunctivae normal.   HENT:      Head: Normocephalic and atraumatic.   Neck:      Vascular: No carotid bruit or JVD.   Pulmonary:      Effort: Pulmonary effort is normal.      Breath sounds: Normal breath sounds. No wheezing. No rales.   Cardiovascular:      Normal rate. Irregularly irregular rhythm.       Murmurs: There is a systolic murmur.   Pulses:     Intact distal pulses.   Abdominal:      General: Bowel sounds are normal.      Palpations: Abdomen is soft.   Musculoskeletal:      Cervical back: Normal range of motion and neck supple. Skin:     General: Skin is warm and dry.      Findings: No rash.   Neurological:      Mental Status: Alert.       Procedures    Lab Review:         MDM  1 atrial fibrillation  Patient has history of atrial fibrillation is currently stable on medications including metoprolol and warfarin keep his INR between 2.0-3.0  2.  Coronary disease  Patient has nonobstructive disease and is currently stable on medications  3.  Hyperlipidemia  Patient's lipid levels are followed by the primary care doctor  4.  Hypertension  Patient blood pressure currently stable on medications      Patient's previous medical records, labs, and EKG were reviewed and discussed with the patient at today's visit.

## 2022-03-15 ENCOUNTER — HOME CARE VISIT (OUTPATIENT)
Dept: HOME HEALTH SERVICES | Facility: HOME HEALTHCARE | Age: 87
End: 2022-03-15

## 2022-03-15 PROCEDURE — G0152 HHCP-SERV OF OT,EA 15 MIN: HCPCS

## 2022-03-15 PROCEDURE — G0156 HHCP-SVS OF AIDE,EA 15 MIN: HCPCS

## 2022-03-16 ENCOUNTER — ANTICOAGULATION VISIT (OUTPATIENT)
Dept: CARDIOLOGY | Facility: CLINIC | Age: 87
End: 2022-03-16

## 2022-03-16 VITALS — DIASTOLIC BLOOD PRESSURE: 60 MMHG | SYSTOLIC BLOOD PRESSURE: 110 MMHG

## 2022-03-16 LAB — INR PPP: 3.5

## 2022-03-17 ENCOUNTER — HOME CARE VISIT (OUTPATIENT)
Dept: HOME HEALTH SERVICES | Facility: HOME HEALTHCARE | Age: 87
End: 2022-03-17

## 2022-03-17 NOTE — HOME HEALTH
Pt discharged from OT services this date as pt has met most goals and reached Max level of ADL function.  Pt to continue with HEP for maintenance.      Pt denies any falls or med changes

## 2022-03-18 ENCOUNTER — HOME CARE VISIT (OUTPATIENT)
Dept: HOME HEALTH SERVICES | Facility: HOME HEALTHCARE | Age: 87
End: 2022-03-18

## 2022-03-18 PROCEDURE — G0156 HHCP-SVS OF AIDE,EA 15 MIN: HCPCS

## 2022-03-21 ENCOUNTER — CLINICAL SUPPORT (OUTPATIENT)
Dept: ORTHOPEDIC SURGERY | Facility: CLINIC | Age: 87
End: 2022-03-21

## 2022-03-21 VITALS
DIASTOLIC BLOOD PRESSURE: 56 MMHG | BODY MASS INDEX: 17.84 KG/M2 | HEIGHT: 66 IN | HEART RATE: 57 BPM | WEIGHT: 111 LBS | SYSTOLIC BLOOD PRESSURE: 121 MMHG

## 2022-03-21 DIAGNOSIS — M81.0 AGE-RELATED OSTEOPOROSIS WITHOUT CURRENT PATHOLOGICAL FRACTURE: Primary | ICD-10-CM

## 2022-03-21 PROCEDURE — 96372 THER/PROPH/DIAG INJ SC/IM: CPT | Performed by: PHYSICIAN ASSISTANT

## 2022-03-21 NOTE — PROGRESS NOTES
Patient presents for First Prolia injection. Patient's last Calcium was 9.1mg/dL. Injection administered and patient tolerated without complication after waiting in office for 15 minutes.

## 2022-03-22 ENCOUNTER — ANTICOAGULATION VISIT (OUTPATIENT)
Dept: CARDIOLOGY | Facility: CLINIC | Age: 87
End: 2022-03-22

## 2022-03-22 ENCOUNTER — OFFICE VISIT (OUTPATIENT)
Dept: FAMILY MEDICINE CLINIC | Facility: CLINIC | Age: 87
End: 2022-03-22

## 2022-03-22 ENCOUNTER — HOME CARE VISIT (OUTPATIENT)
Dept: HOME HEALTH SERVICES | Facility: HOME HEALTHCARE | Age: 87
End: 2022-03-22

## 2022-03-22 VITALS
TEMPERATURE: 96.8 F | SYSTOLIC BLOOD PRESSURE: 142 MMHG | HEIGHT: 66 IN | BODY MASS INDEX: 17.97 KG/M2 | DIASTOLIC BLOOD PRESSURE: 76 MMHG | WEIGHT: 111.8 LBS | OXYGEN SATURATION: 94 % | HEART RATE: 97 BPM

## 2022-03-22 DIAGNOSIS — M81.0 AGE-RELATED OSTEOPOROSIS WITHOUT CURRENT PATHOLOGICAL FRACTURE: Chronic | ICD-10-CM
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
## 2022-03-22 DIAGNOSIS — S22.000A COMPRESSION FRACTURE OF BODY OF THORACIC VERTEBRA: ICD-10-CM

## 2022-03-22 LAB — INR PPP: 1.7

## 2022-03-22 PROCEDURE — 90471 IMMUNIZATION ADMIN: CPT | Performed by: FAMILY MEDICINE

## 2022-03-22 PROCEDURE — 90715 TDAP VACCINE 7 YRS/> IM: CPT | Performed by: FAMILY MEDICINE

## 2022-03-22 PROCEDURE — G0156 HHCP-SVS OF AIDE,EA 15 MIN: HCPCS

## 2022-03-22 PROCEDURE — 99213 OFFICE O/P EST LOW 20 MIN: CPT | Performed by: FAMILY MEDICINE

## 2022-03-22 NOTE — PATIENT INSTRUCTIONS
Continue your current medications and treatment.    Use anti-biotic ointment to the bite.    Follow up in the office at your next appointment.

## 2022-03-22 NOTE — PROGRESS NOTES
"Subjective   Shruthi Amin is a 89 y.o. female.     Chief Complaint   Patient presents with   • Animal Bite     Sunday pt was bit by cat on right wrist. No pain.       HPI  Chief complaint: Cat bite, osteoporosis    Patient is 89-year-old white female states that she was petting her cat the other day and he suddenly better.  He bit her on the radial side of the right wrist at the level of the radial styloid.  Patient complains of some soreness in this area.    Patient's current problems include    1.  Osteoporosis/compression fracture of the thoracic vertebra-stable-the patient has had a Prolia injection since I saw her last.  She also states that the pain in her back is improved.  She is getting along better.      Her other problems include pulmonary fibrosis and hypoxemia atrial fibrillation hyperlipidemia hiatal hernia and previous CVA.    The following portions of the patient's history were reviewed and updated as appropriate: allergies, current medications, past family history, past medical history, past social history, past surgical history and problem list.    Review of Systems    Objective     /76 (BP Location: Right arm, Patient Position: Sitting, Cuff Size: Adult)   Pulse 97   Temp 96.8 °F (36 °C) (Infrared)   Ht 167.6 cm (66\")   Wt 50.7 kg (111 lb 12.8 oz)   SpO2 94%   BMI 18.04 kg/m²     Physical Exam  Vitals and nursing note reviewed.   Constitutional:       Appearance: She is well-developed and normal weight.   HENT:      Head: Normocephalic and atraumatic.   Eyes:      Pupils: Pupils are equal, round, and reactive to light.   Cardiovascular:      Rate and Rhythm: Normal rate and regular rhythm.      Pulses: Normal pulses.      Heart sounds: Normal heart sounds. No murmur heard.    No friction rub. No gallop.   Pulmonary:      Effort: Pulmonary effort is normal.      Breath sounds: Normal breath sounds.   Abdominal:      General: Bowel sounds are normal.      Palpations: Abdomen is soft. "   Musculoskeletal:         General: Normal range of motion.      Cervical back: Neck supple.   Skin:     General: Skin is warm and dry.      Comments: 1/4 inch superficial laceration of the disttal right wrist without surrounding erythema   Neurological:      Mental Status: She is alert and oriented to person, place, and time.   Psychiatric:         Behavior: Behavior normal.         Thought Content: Thought content normal.         Judgment: Judgment normal.           Assessment/Plan   Diagnoses and all orders for this visit:    1. Cat bite, initial encounter (Primary)-patient has a small puncture wound at the distal right wrist.  There is no surrounding erythema.  I recommended treatment with topical antibiotic ointment.  She is going to get a tetanus shot.    2. Age-related osteoporosis without current pathological fracture    3. Compression fracture of body of thoracic vertebra (HCC)      Patient Instructions   Continue your current medications and treatment.    Use anti-biotic ointment to the bite.    Follow up in the office at your next appointment.      Og Jeronimo Jr., MD    03/22/22

## 2022-03-25 ENCOUNTER — HOME CARE VISIT (OUTPATIENT)
Dept: HOME HEALTH SERVICES | Facility: HOME HEALTHCARE | Age: 87
End: 2022-03-25

## 2022-03-25 PROCEDURE — G0156 HHCP-SVS OF AIDE,EA 15 MIN: HCPCS

## 2022-03-28 ENCOUNTER — HOME CARE VISIT (OUTPATIENT)
Dept: HOME HEALTH SERVICES | Facility: HOME HEALTHCARE | Age: 87
End: 2022-03-28

## 2022-03-28 PROCEDURE — G0156 HHCP-SVS OF AIDE,EA 15 MIN: HCPCS

## 2022-03-29 NOTE — PROGRESS NOTES
"...Subjective   History of Present Illness: Shruthi Amin is a 89 y.o. female being seen for follow-up of her T6 compression fracture. She has seen osteoporosis clinic and has started on Prolia.  She reports that her pain is significantly better and she has not had to take any narcotic pain medication for several days.  She continues with her physical therapy exercises and utilizes a walker for extra stability.  She denies any acute pain, radiating pain or numbness and tingling.  She has no acute bowel or bladder dysfunction.         Back Pain  This is a recurrent problem. The current episode started 1 to 4 weeks ago. The problem occurs intermittently. The problem has been gradually improving since onset. The pain is present in the lumbar spine. The quality of the pain is described as aching. The pain does not radiate. The pain is worse during the night. Pertinent negatives include no bladder incontinence, bowel incontinence, chest pain, leg pain, numbness or weakness. She has tried heat for the symptoms.       The following portions of the patient's history were reviewed and updated as appropriate: allergies, current medications, past family history, past medical history, past social history, past surgical history and problem list.    Review of Systems   Respiratory: Negative for chest tightness and shortness of breath.    Cardiovascular: Negative for chest pain.   Gastrointestinal: Negative for bowel incontinence.   Genitourinary: Negative for bladder incontinence.   Musculoskeletal: Positive for back pain.   Neurological: Negative for weakness and numbness.   All other systems reviewed and are negative.  She    Objective     ./72   Pulse 67   Temp 97.6 °F (36.4 °C)   Resp 16   Ht 167.6 cm (66\")   Wt 50.3 kg (111 lb)   SpO2 96%   BMI 17.92 kg/m²    Body mass index is 17.92 kg/m².      Physical Exam  Neurologic Exam  Alert and oriented x3  Advanced thoracic kyphosis  No tenderness to " palpation    Assessment/Plan   Independent Review of Radiographic Studies:      I personally reviewed the images from the following studies.    No new imaging    Medical Decision Making:      Shruthi Aminis a 89 y.o. female being seen for follow-up of her T6 compression fracture.  Patient was last seen evaluated by myself on 2/1/2022 when she initially presented for her back pain related to a T6 compression fracture.  She has a significant thoracic kyphotic curve and advanced osteoporosis and a known history of prior kyphoplasty at T12.  Patient was sent for physical therapy and work-up in osteoporosis clinic.  She was interested in kyphoplasty procedure but explained that it was not recommended in the acute phase of a compression fracture and that she would need to be off warfarin therapy and need clearance to do so from a cardiology.  She is already suffered 1 stroke before.  She was seen evaluated in osteoporosis clinic and recommended Prolia.  Patient doing well with significantly improved back pain.  She is no longer requiring use of narcotic pain medication.  She has started with Prolia therapy and tolerating well.  I recommend that patient continue with her physical therapy exercises and utilize walker at all times for stability.  I also recommend she continue with Tylenol for management of pain as narcotic pain medicine can result in constipation.  We can follow her up as needed from here on out.  She is to call if the office with any questions or concerns.    Procedures      Diagnoses and all orders for this visit:    1. Osteoporosis, unspecified osteoporosis type, unspecified pathological fracture presence (Primary)    2. Compression fracture of T6 vertebra with delayed healing, subsequent encounter      Return if symptoms worsen or fail to improve.    This patient was examined wearing appropriate personal protective equipment.     Shruthi JARVIS Amin  reports that she has never smoked. She has never used  smokeless tobacco..    Patient's Body mass index is 17.92 kg/m². indicating that she is within normal range (BMI 18.5-24.9). No BMI management plan needed..        Patient's blood pressure was reviewed.  Recommendations for  a low-salt diet and exercise to maintain/improve BP in addition to taking any presribed medications.    Advance Care Planning   ACP discussion was held with the patient during this visit. Patient has an advance directive (not in EMR), copy requested.         ALMA Kim  03/30/22  12:01 EDT

## 2022-03-30 ENCOUNTER — OFFICE VISIT (OUTPATIENT)
Dept: NEUROSURGERY | Facility: CLINIC | Age: 87
End: 2022-03-30

## 2022-03-30 ENCOUNTER — ANTICOAGULATION VISIT (OUTPATIENT)
Dept: CARDIOLOGY | Facility: CLINIC | Age: 87
End: 2022-03-30

## 2022-03-30 VITALS
BODY MASS INDEX: 17.84 KG/M2 | OXYGEN SATURATION: 96 % | RESPIRATION RATE: 16 BRPM | WEIGHT: 111 LBS | DIASTOLIC BLOOD PRESSURE: 72 MMHG | HEIGHT: 66 IN | SYSTOLIC BLOOD PRESSURE: 144 MMHG | HEART RATE: 67 BPM | TEMPERATURE: 97.6 F

## 2022-03-30 DIAGNOSIS — M81.0 OSTEOPOROSIS, UNSPECIFIED OSTEOPOROSIS TYPE, UNSPECIFIED PATHOLOGICAL FRACTURE PRESENCE: Primary | ICD-10-CM

## 2022-03-30 DIAGNOSIS — I48.11 LONGSTANDING PERSISTENT ATRIAL FIBRILLATION: Primary | ICD-10-CM

## 2022-03-30 DIAGNOSIS — S22.050G COMPRESSION FRACTURE OF T6 VERTEBRA WITH DELAYED HEALING, SUBSEQUENT ENCOUNTER: ICD-10-CM

## 2022-03-30 PROBLEM — S22.000G COMPRESSION FRACTURE OF THORACIC VERTEBRA WITH DELAYED HEALING: Status: ACTIVE | Noted: 2022-02-08

## 2022-03-30 LAB — INR PPP: 2

## 2022-03-30 PROCEDURE — 99213 OFFICE O/P EST LOW 20 MIN: CPT | Performed by: NURSE PRACTITIONER

## 2022-03-31 ENCOUNTER — HOME CARE VISIT (OUTPATIENT)
Dept: HOME HEALTH SERVICES | Facility: HOME HEALTHCARE | Age: 87
End: 2022-03-31

## 2022-04-01 ENCOUNTER — HOME CARE VISIT (OUTPATIENT)
Dept: HOME HEALTH SERVICES | Facility: HOME HEALTHCARE | Age: 87
End: 2022-04-01

## 2022-04-01 PROCEDURE — G0156 HHCP-SVS OF AIDE,EA 15 MIN: HCPCS

## 2022-04-06 ENCOUNTER — TELEPHONE (OUTPATIENT)
Dept: CARDIOLOGY | Facility: CLINIC | Age: 87
End: 2022-04-06

## 2022-04-06 NOTE — TELEPHONE ENCOUNTER
Pt unable to get home INR meter to work last night. Her daughter is out of town, so it will be next week before she can check  Her INR when her daughter gets home. Leonardo

## 2022-04-14 ENCOUNTER — ANTICOAGULATION VISIT (OUTPATIENT)
Dept: CARDIOLOGY | Facility: CLINIC | Age: 87
End: 2022-04-14

## 2022-04-14 LAB — INR PPP: 2.2

## 2022-04-19 ENCOUNTER — OFFICE VISIT (OUTPATIENT)
Dept: FAMILY MEDICINE CLINIC | Facility: CLINIC | Age: 87
End: 2022-04-19

## 2022-04-19 VITALS
TEMPERATURE: 96.9 F | HEIGHT: 66 IN | HEART RATE: 99 BPM | DIASTOLIC BLOOD PRESSURE: 75 MMHG | WEIGHT: 111 LBS | BODY MASS INDEX: 17.84 KG/M2 | SYSTOLIC BLOOD PRESSURE: 133 MMHG

## 2022-04-19 DIAGNOSIS — J01.00 ACUTE NON-RECURRENT MAXILLARY SINUSITIS: Primary | ICD-10-CM

## 2022-04-19 LAB
EXPIRATION DATE: NORMAL
FLUAV AG UPPER RESP QL IA.RAPID: NOT DETECTED
FLUBV AG UPPER RESP QL IA.RAPID: NOT DETECTED
INTERNAL CONTROL: NORMAL
Lab: NORMAL
SARS-COV-2 AG UPPER RESP QL IA.RAPID: NOT DETECTED

## 2022-04-19 PROCEDURE — 87428 SARSCOV & INF VIR A&B AG IA: CPT | Performed by: FAMILY MEDICINE

## 2022-04-19 PROCEDURE — 99213 OFFICE O/P EST LOW 20 MIN: CPT | Performed by: FAMILY MEDICINE

## 2022-04-19 RX ORDER — DOXYCYCLINE 100 MG/1
100 CAPSULE ORAL 2 TIMES DAILY
Qty: 20 CAPSULE | Refills: 0 | Status: ON HOLD | OUTPATIENT
Start: 2022-04-19 | End: 2022-10-02

## 2022-04-19 NOTE — PROGRESS NOTES
"Subjective   Shruthi Amin is a 89 y.o. female.     Chief Complaint   Patient presents with   • Nasal Congestion     Congestion x4 days       HPI  Chief complaint: Sinus congestion    The patient is a 89-year-old white female states that she has had sinus congestion and drainage for the past 2 weeks.  She states her symptoms started out as a cold.  She states that initially she had a scratchy throat in addition to the sinus infection.  She denied fever or chills.  She states she does not feel well.  She denied productive cough.    The patient's current problems include hypertension hyperlipidemia atrial fibrillation and osteoporosis.        The following portions of the patient's history were reviewed and updated as appropriate: allergies, current medications, past family history, past medical history, past social history, past surgical history and problem list.    Review of Systems    Objective     /75 (BP Location: Left arm, Patient Position: Sitting, Cuff Size: Adult)   Pulse 99   Temp 96.9 °F (36.1 °C) (Infrared)   Ht 167.6 cm (66\")   Wt 50.3 kg (111 lb)   BMI 17.92 kg/m²     Physical Exam  Vitals and nursing note reviewed.   Constitutional:       Appearance: She is well-developed.   HENT:      Head: Normocephalic and atraumatic.      Nose: Congestion present.      Comments: Patient has purulent secretions draining down the back of her throat.  Eyes:      Pupils: Pupils are equal, round, and reactive to light.   Cardiovascular:      Rate and Rhythm: Normal rate and regular rhythm.      Pulses: Normal pulses.      Heart sounds: Normal heart sounds. No murmur heard.    No friction rub. No gallop.   Pulmonary:      Effort: Pulmonary effort is normal.      Breath sounds: Normal breath sounds.   Abdominal:      General: Bowel sounds are normal.      Palpations: Abdomen is soft.   Musculoskeletal:         General: Normal range of motion.      Cervical back: Neck supple.   Skin:     General: Skin is warm " and dry.   Neurological:      Mental Status: She is alert and oriented to person, place, and time.   Psychiatric:         Behavior: Behavior normal.         Thought Content: Thought content normal.         Judgment: Judgment normal.       Assessment/Plan   Diagnoses and all orders for this visit:    1. Acute non-recurrent maxillary sinusitis (Primary)-the patient was tested for COVID and influenza.  Both were negative.  Patient has sinusitis.  We discussed antibiotic options and finally decided on doxycycline.  Patient is to take doxycycline 100 mg twice a day.  She is to monitor her INR frequently while on the doxycycline.  She was advised that this has a potential to prolong her INR.      Patient Instructions   Rest; get plenty of fluids.    Take the anti-biotics as prescribed.    Check your INR regularly while taking Doxycycline    Follow up in the office in 2 weeks or sooner if needed.      Og Jeronimo Jr., MD    04/19/22

## 2022-04-19 NOTE — PATIENT INSTRUCTIONS
Rest; get plenty of fluids.    Take the anti-biotics as prescribed.    Check your INR regularly while taking Doxycycline    Follow up in the office in 2 weeks or sooner if needed.

## 2022-04-20 ENCOUNTER — ANTICOAGULATION VISIT (OUTPATIENT)
Dept: CARDIOLOGY | Facility: CLINIC | Age: 87
End: 2022-04-20

## 2022-04-20 LAB — INR PPP: 2.1

## 2022-04-27 ENCOUNTER — ANTICOAGULATION VISIT (OUTPATIENT)
Dept: CARDIOLOGY | Facility: CLINIC | Age: 87
End: 2022-04-27

## 2022-04-27 LAB — INR PPP: 2.7

## 2022-05-03 ENCOUNTER — OFFICE VISIT (OUTPATIENT)
Dept: FAMILY MEDICINE CLINIC | Facility: CLINIC | Age: 87
End: 2022-05-03

## 2022-05-03 VITALS
WEIGHT: 112 LBS | HEART RATE: 80 BPM | BODY MASS INDEX: 18 KG/M2 | HEIGHT: 66 IN | DIASTOLIC BLOOD PRESSURE: 83 MMHG | TEMPERATURE: 96.6 F | SYSTOLIC BLOOD PRESSURE: 144 MMHG

## 2022-05-03 DIAGNOSIS — J01.00 ACUTE NON-RECURRENT MAXILLARY SINUSITIS: Primary | ICD-10-CM

## 2022-05-03 DIAGNOSIS — L98.9 SKIN LESION: ICD-10-CM

## 2022-05-03 PROCEDURE — 99213 OFFICE O/P EST LOW 20 MIN: CPT | Performed by: FAMILY MEDICINE

## 2022-05-03 NOTE — PROGRESS NOTES
"Subjective   Shruthi Amin is a 89 y.o. female.     Chief Complaint   Patient presents with   • Nasal Congestion     2 WEEK F/U       HPI  Chief complaint: Sinusitis, skin lesion    The patient is a 89-year-old white female comes in for follow-up and maintenance of her current problems which include    1.  Sinusitis-improved-patient was seen by me within the past 2 weeks with symptoms of sinusitis and sinus congestion.  Patient was treated with antibiotics Flonase and saline nasal spray.  Patient states she is feeling better.  She Dilla does have some sinus congestion.    2.  Skin lesion-new-patient has a skin lesion involving the posterior aspect of her left upper extremity.  She states it is gotten larger and started to bleed.  Patient wonders whether this needs to be removed.    Her other problems include hypertension A. fib atrial fibrillation hyperlipidemia hiatal hernia osteoporosis hypoxemia and hiatal hernia.      The following portions of the patient's history were reviewed and updated as appropriate: allergies, current medications, past family history, past medical history, past social history, past surgical history and problem list.    Review of Systems    Objective     /83 (BP Location: Left arm, Patient Position: Sitting, Cuff Size: Adult)   Pulse 80   Temp 96.6 °F (35.9 °C) (Infrared)   Ht 167.6 cm (66\")   Wt 50.8 kg (112 lb)   BMI 18.08 kg/m²     Physical Exam  Vitals and nursing note reviewed.   Constitutional:       Appearance: She is well-developed and normal weight.   HENT:      Head: Normocephalic and atraumatic.      Right Ear: Tympanic membrane normal.      Left Ear: Tympanic membrane normal.      Nose: Nose normal.      Mouth/Throat:      Mouth: Mucous membranes are moist.      Pharynx: Oropharynx is clear.   Eyes:      Extraocular Movements: Extraocular movements intact.      Conjunctiva/sclera: Conjunctivae normal.      Pupils: Pupils are equal, round, and reactive to light. "   Cardiovascular:      Rate and Rhythm: Normal rate and regular rhythm.      Pulses: Normal pulses.      Heart sounds: Normal heart sounds. No murmur heard.    No friction rub. No gallop.   Pulmonary:      Effort: Pulmonary effort is normal.      Breath sounds: Normal breath sounds.   Abdominal:      General: Bowel sounds are normal.      Palpations: Abdomen is soft.   Musculoskeletal:         General: Normal range of motion.      Cervical back: Neck supple.   Skin:     General: Skin is warm and dry.      Comments: 1 cm diameter macule of the left upper arm    Neurological:      Mental Status: She is alert and oriented to person, place, and time.   Psychiatric:         Behavior: Behavior normal.         Thought Content: Thought content normal.         Judgment: Judgment normal.           Assessment/Plan   Diagnoses and all orders for this visit:    1. Acute non-recurrent maxillary sinusitis (Primary)-the patient was advised that this is resolved and she needs no further intervention.    2. Skin lesion-patient is to follow-up with dermatology for possible removal of the lesion in the left upper extremity.      Patient Instructions   Continue your current medications and treatment.    Follow up with dermatology regarding the skin lesion.    Follow up in the [facility] in 6  months.      Og Jeronimo Jr., MD    05/03/22

## 2022-05-03 NOTE — PATIENT INSTRUCTIONS
Continue your current medications and treatment.    Follow up with dermatology regarding the skin lesion.    Follow up in the offcie in 6  months.

## 2022-05-04 ENCOUNTER — ANTICOAGULATION VISIT (OUTPATIENT)
Dept: CARDIOLOGY | Facility: CLINIC | Age: 87
End: 2022-05-04

## 2022-05-04 LAB — INR PPP: 1.5

## 2022-05-17 LAB — INR PPP: 2.6

## 2022-05-19 ENCOUNTER — ANTICOAGULATION VISIT (OUTPATIENT)
Dept: CARDIOLOGY | Facility: CLINIC | Age: 87
End: 2022-05-19

## 2022-05-19 DIAGNOSIS — I48.91 ATRIAL FIBRILLATION, UNSPECIFIED TYPE: Primary | ICD-10-CM

## 2022-05-20 DIAGNOSIS — Z79.01 LONG TERM (CURRENT) USE OF ANTICOAGULANTS: ICD-10-CM

## 2022-05-20 DIAGNOSIS — I48.91 ATRIAL FIBRILLATION, UNSPECIFIED TYPE: ICD-10-CM

## 2022-05-20 RX ORDER — WARFARIN SODIUM 2 MG/1
TABLET ORAL
Qty: 120 TABLET | Refills: 0 | Status: SHIPPED | OUTPATIENT
Start: 2022-05-20 | End: 2022-08-17

## 2022-05-20 NOTE — TELEPHONE ENCOUNTER
Rx Refill Note  Requested Prescriptions     Pending Prescriptions Disp Refills   • warfarin (COUMADIN) 2 MG tablet [Pharmacy Med Name: WARFARIN SODIUM 2 MG TABLET] 120 tablet 0     Sig: TAKE 2 TABLETS BY MOUTH ON Monday AND FRIDAY AND ONE TABLET BY MOUTH ALL OTHER DAYS OR AS DIRECTED      Last office visit with prescribing clinician: 3/14/2022      Next office visit with prescribing clinician: 10/27/2022              Anticoagulation Visit with Aleksey Mathew MD (05/19/2022)    Debbie Enamorado LPN  05/20/22, 09:02 EDT

## 2022-05-23 RX ORDER — DIGOXIN 125 MCG
TABLET ORAL
Qty: 90 TABLET | Refills: 1 | Status: SHIPPED | OUTPATIENT
Start: 2022-05-23 | End: 2022-11-22

## 2022-05-23 NOTE — TELEPHONE ENCOUNTER
Rx Refill Note  Requested Prescriptions     Pending Prescriptions Disp Refills   • digoxin (LANOXIN) 125 MCG tablet [Pharmacy Med Name: DIGOXIN 125 MCG TABLET] 90 tablet 1     Sig: TAKE 1 TABLET BY MOUTH EVERY DAY      Last office visit with prescribing clinician: 3/14/2022      Next office visit with prescribing clinician: 10/27/2022            Ellen Pearson MA  05/23/22, 08:06 EDT

## 2022-05-24 ENCOUNTER — ANTICOAGULATION VISIT (OUTPATIENT)
Dept: CARDIOLOGY | Facility: CLINIC | Age: 87
End: 2022-05-24

## 2022-05-24 DIAGNOSIS — I48.11 LONGSTANDING PERSISTENT ATRIAL FIBRILLATION: Primary | ICD-10-CM

## 2022-05-24 LAB — INR PPP: 2.1

## 2022-06-01 ENCOUNTER — ANTICOAGULATION VISIT (OUTPATIENT)
Dept: CARDIOLOGY | Facility: CLINIC | Age: 87
End: 2022-06-01

## 2022-06-01 DIAGNOSIS — I48.11 LONGSTANDING PERSISTENT ATRIAL FIBRILLATION: Primary | ICD-10-CM

## 2022-06-01 LAB — INR PPP: 2.9

## 2022-06-08 ENCOUNTER — ANTICOAGULATION VISIT (OUTPATIENT)
Dept: CARDIOLOGY | Facility: CLINIC | Age: 87
End: 2022-06-08

## 2022-06-08 LAB — INR PPP: 2

## 2022-06-15 ENCOUNTER — ANTICOAGULATION VISIT (OUTPATIENT)
Dept: CARDIOLOGY | Facility: CLINIC | Age: 87
End: 2022-06-15

## 2022-06-15 DIAGNOSIS — I48.11 LONGSTANDING PERSISTENT ATRIAL FIBRILLATION: Primary | ICD-10-CM

## 2022-06-15 LAB — INR PPP: 2

## 2022-06-21 LAB — INR PPP: 1.7

## 2022-06-22 ENCOUNTER — ANTICOAGULATION VISIT (OUTPATIENT)
Dept: CARDIOLOGY | Facility: CLINIC | Age: 87
End: 2022-06-22

## 2022-06-22 DIAGNOSIS — I48.91 ATRIAL FIBRILLATION, UNSPECIFIED TYPE: Primary | ICD-10-CM

## 2022-06-22 NOTE — PROGRESS NOTES
Home INR was slightly low yesterday. Left voicemail advising her to take 4 mgs today, then resume current dosage and recheck in a week. Leonardo

## 2022-06-29 ENCOUNTER — ANTICOAGULATION VISIT (OUTPATIENT)
Dept: CARDIOLOGY | Facility: CLINIC | Age: 87
End: 2022-06-29

## 2022-06-29 DIAGNOSIS — I48.11 LONGSTANDING PERSISTENT ATRIAL FIBRILLATION: Primary | ICD-10-CM

## 2022-06-29 LAB — INR PPP: 3.2

## 2022-07-05 ENCOUNTER — ANTICOAGULATION VISIT (OUTPATIENT)
Dept: CARDIOLOGY | Facility: CLINIC | Age: 87
End: 2022-07-05

## 2022-07-05 LAB — INR PPP: 2.1

## 2022-07-12 ENCOUNTER — ANTICOAGULATION VISIT (OUTPATIENT)
Dept: CARDIOLOGY | Facility: CLINIC | Age: 87
End: 2022-07-12

## 2022-07-12 DIAGNOSIS — I48.11 LONGSTANDING PERSISTENT ATRIAL FIBRILLATION: Primary | ICD-10-CM

## 2022-07-12 LAB — INR PPP: 1.7

## 2022-07-13 NOTE — PROGRESS NOTES
Spoke to pt. She had been drinking Boost this week, INR low at 1.7. Advised pt to take 4 mgs today, then resume current plan and recheck in a week. Leonardo

## 2022-07-18 ENCOUNTER — APPOINTMENT (OUTPATIENT)
Dept: CARDIOLOGY | Facility: HOSPITAL | Age: 87
End: 2022-07-18

## 2022-07-18 ENCOUNTER — HOSPITAL ENCOUNTER (EMERGENCY)
Facility: HOSPITAL | Age: 87
Discharge: HOME | End: 2022-07-18
Attending: EMERGENCY MEDICINE | Admitting: EMERGENCY MEDICINE

## 2022-07-18 ENCOUNTER — TELEPHONE (OUTPATIENT)
Dept: FAMILY MEDICINE CLINIC | Facility: CLINIC | Age: 87
End: 2022-07-18

## 2022-07-18 VITALS
WEIGHT: 109 LBS | TEMPERATURE: 97.4 F | SYSTOLIC BLOOD PRESSURE: 127 MMHG | HEART RATE: 80 BPM | HEIGHT: 65 IN | DIASTOLIC BLOOD PRESSURE: 74 MMHG | BODY MASS INDEX: 18.16 KG/M2 | OXYGEN SATURATION: 94 % | RESPIRATION RATE: 16 BRPM

## 2022-07-18 DIAGNOSIS — L23.1 CONTACT DERMATITIS DUE TO ADHESIVE BANDAGE: ICD-10-CM

## 2022-07-18 DIAGNOSIS — L03.115 CELLULITIS OF RIGHT LOWER EXTREMITY: Primary | ICD-10-CM

## 2022-07-18 LAB
ALBUMIN SERPL-MCNC: 4.1 G/DL (ref 3.5–5.2)
ALBUMIN/GLOB SERPL: 1.3 G/DL
ALP SERPL-CCNC: 92 U/L (ref 39–117)
ALT SERPL W P-5'-P-CCNC: 22 U/L (ref 1–33)
ANION GAP SERPL CALCULATED.3IONS-SCNC: 10 MMOL/L (ref 5–15)
AST SERPL-CCNC: 37 U/L (ref 1–32)
BASOPHILS # BLD AUTO: 0.1 10*3/MM3 (ref 0–0.2)
BASOPHILS NFR BLD AUTO: 1.4 % (ref 0–1.5)
BH CV LOWER VASCULAR LEFT COMMON FEMORAL AUGMENT: NORMAL
BH CV LOWER VASCULAR LEFT COMMON FEMORAL COMPETENT: NORMAL
BH CV LOWER VASCULAR LEFT COMMON FEMORAL COMPRESS: NORMAL
BH CV LOWER VASCULAR LEFT COMMON FEMORAL PHASIC: NORMAL
BH CV LOWER VASCULAR LEFT COMMON FEMORAL SPONT: NORMAL
BH CV LOWER VASCULAR LEFT DISTAL FEMORAL COMPRESS: NORMAL
BH CV LOWER VASCULAR LEFT GASTRONEMIUS COMPRESS: NORMAL
BH CV LOWER VASCULAR LEFT GREATER SAPH AK COMPRESS: NORMAL
BH CV LOWER VASCULAR LEFT GREATER SAPH BK COMPRESS: NORMAL
BH CV LOWER VASCULAR LEFT LESSER SAPH COMPRESS: NORMAL
BH CV LOWER VASCULAR LEFT MID FEMORAL AUGMENT: NORMAL
BH CV LOWER VASCULAR LEFT MID FEMORAL COMPETENT: NORMAL
BH CV LOWER VASCULAR LEFT MID FEMORAL COMPRESS: NORMAL
BH CV LOWER VASCULAR LEFT MID FEMORAL PHASIC: NORMAL
BH CV LOWER VASCULAR LEFT MID FEMORAL SPONT: NORMAL
BH CV LOWER VASCULAR LEFT PERONEAL COMPRESS: NORMAL
BH CV LOWER VASCULAR LEFT POPLITEAL AUGMENT: NORMAL
BH CV LOWER VASCULAR LEFT POPLITEAL COMPETENT: NORMAL
BH CV LOWER VASCULAR LEFT POPLITEAL COMPRESS: NORMAL
BH CV LOWER VASCULAR LEFT POPLITEAL PHASIC: NORMAL
BH CV LOWER VASCULAR LEFT POPLITEAL SPONT: NORMAL
BH CV LOWER VASCULAR LEFT POSTERIOR TIBIAL COMPRESS: NORMAL
BH CV LOWER VASCULAR LEFT PROXIMAL FEMORAL COMPRESS: NORMAL
BH CV LOWER VASCULAR LEFT SAPHENOFEMORAL JUNCTION COMPRESS: NORMAL
BH CV LOWER VASCULAR RIGHT COMMON FEMORAL AUGMENT: NORMAL
BH CV LOWER VASCULAR RIGHT COMMON FEMORAL COMPETENT: NORMAL
BH CV LOWER VASCULAR RIGHT COMMON FEMORAL COMPRESS: NORMAL
BH CV LOWER VASCULAR RIGHT COMMON FEMORAL PHASIC: NORMAL
BH CV LOWER VASCULAR RIGHT COMMON FEMORAL SPONT: NORMAL
BH CV LOWER VASCULAR RIGHT DISTAL FEMORAL COMPRESS: NORMAL
BH CV LOWER VASCULAR RIGHT GASTRONEMIUS COMPRESS: NORMAL
BH CV LOWER VASCULAR RIGHT GREATER SAPH AK COMPRESS: NORMAL
BH CV LOWER VASCULAR RIGHT GREATER SAPH BK COMPRESS: NORMAL
BH CV LOWER VASCULAR RIGHT LESSER SAPH COMPRESS: NORMAL
BH CV LOWER VASCULAR RIGHT MID FEMORAL AUGMENT: NORMAL
BH CV LOWER VASCULAR RIGHT MID FEMORAL COMPETENT: NORMAL
BH CV LOWER VASCULAR RIGHT MID FEMORAL COMPRESS: NORMAL
BH CV LOWER VASCULAR RIGHT MID FEMORAL PHASIC: NORMAL
BH CV LOWER VASCULAR RIGHT MID FEMORAL SPONT: NORMAL
BH CV LOWER VASCULAR RIGHT PERONEAL COMPRESS: NORMAL
BH CV LOWER VASCULAR RIGHT POPLITEAL AUGMENT: NORMAL
BH CV LOWER VASCULAR RIGHT POPLITEAL COMPETENT: NORMAL
BH CV LOWER VASCULAR RIGHT POPLITEAL COMPRESS: NORMAL
BH CV LOWER VASCULAR RIGHT POPLITEAL PHASIC: NORMAL
BH CV LOWER VASCULAR RIGHT POPLITEAL SPONT: NORMAL
BH CV LOWER VASCULAR RIGHT POSTERIOR TIBIAL COMPRESS: NORMAL
BH CV LOWER VASCULAR RIGHT PROXIMAL FEMORAL COMPRESS: NORMAL
BH CV LOWER VASCULAR RIGHT SAPHENOFEMORAL JUNCTION COMPRESS: NORMAL
BILIRUB SERPL-MCNC: 1 MG/DL (ref 0–1.2)
BUN SERPL-MCNC: 12 MG/DL (ref 8–23)
BUN/CREAT SERPL: 20 (ref 7–25)
CALCIUM SPEC-SCNC: 8.8 MG/DL (ref 8.2–9.6)
CHLORIDE SERPL-SCNC: 100 MMOL/L (ref 98–107)
CO2 SERPL-SCNC: 27 MMOL/L (ref 22–29)
CREAT SERPL-MCNC: 0.6 MG/DL (ref 0.57–1)
DEPRECATED RDW RBC AUTO: 48.1 FL (ref 37–54)
EGFRCR SERPLBLD CKD-EPI 2021: 83.3 ML/MIN/1.73
EOSINOPHIL # BLD AUTO: 0.1 10*3/MM3 (ref 0–0.4)
EOSINOPHIL NFR BLD AUTO: 2.8 % (ref 0.3–6.2)
ERYTHROCYTE [DISTWIDTH] IN BLOOD BY AUTOMATED COUNT: 14.3 % (ref 12.3–15.4)
GLOBULIN UR ELPH-MCNC: 3.1 GM/DL
GLUCOSE SERPL-MCNC: 91 MG/DL (ref 65–99)
HCT VFR BLD AUTO: 42.6 % (ref 34–46.6)
HGB BLD-MCNC: 14.5 G/DL (ref 12–15.9)
INR PPP: 2.91 (ref 2–3)
LYMPHOCYTES # BLD AUTO: 1.4 10*3/MM3 (ref 0.7–3.1)
LYMPHOCYTES NFR BLD AUTO: 26.5 % (ref 19.6–45.3)
MAXIMAL PREDICTED HEART RATE: 126 BPM
MCH RBC QN AUTO: 32.5 PG (ref 26.6–33)
MCHC RBC AUTO-ENTMCNC: 34.1 G/DL (ref 31.5–35.7)
MCV RBC AUTO: 95.4 FL (ref 79–97)
MONOCYTES # BLD AUTO: 0.9 10*3/MM3 (ref 0.1–0.9)
MONOCYTES NFR BLD AUTO: 17.2 % (ref 5–12)
NEUTROPHILS NFR BLD AUTO: 2.7 10*3/MM3 (ref 1.7–7)
NEUTROPHILS NFR BLD AUTO: 52.1 % (ref 42.7–76)
NRBC BLD AUTO-RTO: 0.4 /100 WBC (ref 0–0.2)
PLATELET # BLD AUTO: 129 10*3/MM3 (ref 140–450)
PMV BLD AUTO: 9 FL (ref 6–12)
POTASSIUM SERPL-SCNC: 4.8 MMOL/L (ref 3.5–5.2)
PROT SERPL-MCNC: 7.2 G/DL (ref 6–8.5)
PROTHROMBIN TIME: 28.2 SECONDS (ref 19.4–28.5)
RBC # BLD AUTO: 4.46 10*6/MM3 (ref 3.77–5.28)
SODIUM SERPL-SCNC: 137 MMOL/L (ref 136–145)
STRESS TARGET HR: 107 BPM
WBC NRBC COR # BLD: 5.2 10*3/MM3 (ref 3.4–10.8)

## 2022-07-18 PROCEDURE — 99283 EMERGENCY DEPT VISIT LOW MDM: CPT

## 2022-07-18 PROCEDURE — 36415 COLL VENOUS BLD VENIPUNCTURE: CPT

## 2022-07-18 PROCEDURE — 96365 THER/PROPH/DIAG IV INF INIT: CPT

## 2022-07-18 PROCEDURE — 93970 EXTREMITY STUDY: CPT

## 2022-07-18 PROCEDURE — 85610 PROTHROMBIN TIME: CPT | Performed by: EMERGENCY MEDICINE

## 2022-07-18 PROCEDURE — 85025 COMPLETE CBC W/AUTO DIFF WBC: CPT | Performed by: EMERGENCY MEDICINE

## 2022-07-18 PROCEDURE — 80053 COMPREHEN METABOLIC PANEL: CPT | Performed by: EMERGENCY MEDICINE

## 2022-07-18 PROCEDURE — 87040 BLOOD CULTURE FOR BACTERIA: CPT | Performed by: EMERGENCY MEDICINE

## 2022-07-18 RX ORDER — CLINDAMYCIN HYDROCHLORIDE 150 MG/1
150 CAPSULE ORAL 3 TIMES DAILY
Qty: 20 CAPSULE | Refills: 0 | Status: ON HOLD | OUTPATIENT
Start: 2022-07-18 | End: 2022-10-02

## 2022-07-18 RX ORDER — CLINDAMYCIN PHOSPHATE 300 MG/50ML
300 INJECTION, SOLUTION INTRAVENOUS ONCE
Status: COMPLETED | OUTPATIENT
Start: 2022-07-18 | End: 2022-07-18

## 2022-07-18 RX ADMIN — CLINDAMYCIN PHOSPHATE 300 MG: 300 INJECTION, SOLUTION INTRAVENOUS at 14:25

## 2022-07-18 NOTE — TELEPHONE ENCOUNTER
Caller: NEIDA ARRIETA    Relationship to patient: Child    Best call back number: [phone]    Chief complaint: NEIDA STATES PATIENT HAS AREA ON BACK OF LEG THAT IS RED, PAINFUL TO WALK AND RED.  SHE WAS DIRECTED TO THE ER.    Patient directed to call 911 or go to their nearest emergency room.     Patient verbalized understanding: [x] Yes  [] No  If no, why?    Additional notes:PATIENTS DAUGHTER STATES SHE WILL CALL A FAMILY MEMBER TO TAKE PATIENT TO ER.    PLEASE ADVISE.

## 2022-07-19 ENCOUNTER — PATIENT OUTREACH (OUTPATIENT)
Dept: CASE MANAGEMENT | Facility: OTHER | Age: 87
End: 2022-07-19

## 2022-07-19 ENCOUNTER — ANTICOAGULATION VISIT (OUTPATIENT)
Dept: CARDIOLOGY | Facility: CLINIC | Age: 87
End: 2022-07-19

## 2022-07-19 LAB — INR PPP: 4.4

## 2022-07-19 NOTE — OUTREACH NOTE
AMBULATORY CASE MANAGEMENT NOTE    Name and Relationship of Patient/Support Person: Shruthi Amin R - Self    Send Education  Questions/Answers    Flowsheet Row Most Recent Value   Other Patient Education/Resources  24/7 [facility] Nurse Call Line   24/7 Nurse Call Line Education Method Verbal        Patient Outreach    Pt discharged from [facility] ED on 7/18/22, seen for redness, swelling to RLE, diagnosed with cellulitis, contact dermatitis. RN-ACM outreach call made to pt. Explained role of RN-ACM. Reviewed ED AVS with pt. Education provided. Pt reports to be taking antibiotic rx as directed. RN-ACM emphasized importance of antibiotic completion. Pt states she has dermatologist appointment tomorrow, reports she plans to call her PCP office for follow up. Unable to further outreach, pt thanks RN-ACM for calling and ended call. Follow up outreach prn.     STEPHANIE JEFFERSON  Ambulatory Case Management    7/19/2022, 11:49 EDT

## 2022-07-20 NOTE — PROGRESS NOTES
Spoke to patient, she is on an abt at this time for infection from leg surgery. She is not sure how many days she will be on abt. Advised her to hold warfarin x 2 days and monitor. Will check INR next week.

## 2022-07-21 ENCOUNTER — TELEPHONE (OUTPATIENT)
Dept: FAMILY MEDICINE CLINIC | Facility: CLINIC | Age: 87
End: 2022-07-21

## 2022-07-21 NOTE — TELEPHONE ENCOUNTER
Caller: Shruthi Amin    Relationship: Self    Best call back number:     What is the best time to reach you:     Who are you requesting to speak with (clinical staff, provider,  specific staff member):     Do you know the name of the person who called:     What was the call regarding: PATIENT IS CALLING IN TO INFORM DR ALDRIDGE THAT SHE HAD CANCER ON HER ARM AND HAD STITCHES.  SHE HAD THEM REMOVED YESTERDAY AND SAY THAT SHE HAD A ALLERGIC REACTION TO A BAND AID THAT WAS PUT ON HER SKIN.  SHE WANT TO BE CONTACTED BY DR ALDRIDGE TO DISCUSS THIS AND OTHER ISSUES.     Do you require a callback: YES

## 2022-07-21 NOTE — TELEPHONE ENCOUNTER
Returned call and left a message on the patient's voicemail that I recommended a trial of Cortaid cream or ointment.  She is to call back with questions.  She is to be seen in the office if no improvement.

## 2022-07-23 LAB — BACTERIA SPEC AEROBE CULT: NORMAL

## 2022-07-26 ENCOUNTER — OFFICE VISIT (OUTPATIENT)
Dept: FAMILY MEDICINE CLINIC | Facility: CLINIC | Age: 87
End: 2022-07-26

## 2022-07-26 VITALS
DIASTOLIC BLOOD PRESSURE: 81 MMHG | SYSTOLIC BLOOD PRESSURE: 142 MMHG | HEIGHT: 66 IN | TEMPERATURE: 97.1 F | HEART RATE: 87 BPM | RESPIRATION RATE: 18 BRPM | OXYGEN SATURATION: 95 % | BODY MASS INDEX: 17.59 KG/M2

## 2022-07-26 DIAGNOSIS — L08.9 SKIN INFECTION: ICD-10-CM

## 2022-07-26 DIAGNOSIS — L23.1 ALLERGIC CONTACT DERMATITIS DUE TO ADHESIVES: Primary | ICD-10-CM

## 2022-07-26 LAB — INR PPP: 2.8

## 2022-07-26 PROCEDURE — 99213 OFFICE O/P EST LOW 20 MIN: CPT | Performed by: FAMILY MEDICINE

## 2022-07-26 NOTE — PROGRESS NOTES
"Subjective   Srhuthi Amin is a 89 y.o. female.     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  This required an elliptical incision.  The wound itself is healed.  The patient however is developed a skin rash from the adhesive on the dressing that was applied.    Patient also had a blister on the right distal posterior lower extremity.  This  popped.  She was left with a superficial ulcer.  She went to the emergency room.  She was treated with mupirocin.        The following portions of the patient's history were reviewed and updated as appropriate: allergies, current medications, past family history, past medical history, past social history, past surgical history and problem list.    Review of Systems    Objective     /81   Pulse 87   Temp 97.1 °F (36.2 °C) (Infrared)   Resp 18   Ht 167.6 cm (66\")   SpO2 95%   BMI 17.59 kg/m²     Physical Exam  Vitals and nursing note reviewed.   Constitutional:       Appearance: She is well-developed.   HENT:      Head: Normocephalic and atraumatic.   Eyes:      Pupils: Pupils are equal, round, and reactive to light.   Cardiovascular:      Rate and Rhythm: Normal rate and regular rhythm.      Heart sounds: Normal heart sounds.   Pulmonary:      Effort: Pulmonary effort is normal.      Breath sounds: Normal breath sounds.   Abdominal:      General: Bowel sounds are normal.      Palpations: Abdomen is soft.   Musculoskeletal:         General: Normal range of motion.      Cervical back: Neck supple.   Skin:     General: Skin is warm and dry.      Comments: Examination left upper extremity reveals erythematous outline of the bandage that was used to cover the wound.  This is typical contact dermatitis.    Patient also has a 1 cm x 2 and half " centimeter vertical superficial ulcer in the right posterior extremity.  There is no surrounding erythema.  There is minimal amount of drainage.   Neurological:      Mental Status: She is alert and oriented to person, place, and time.   Psychiatric:         Behavior: Behavior normal.         Thought Content: Thought content normal.         Judgment: Judgment normal.           Assessment & Plan   Diagnoses and all orders for this visit:    1. Allergic contact dermatitis due to adhesives (Primary)-the patient was advised she has contact dermatitis from the adhesive from the bandage.  I recommended cortisone cream 2.5% 2-4 times a day.  She also has 0.5 cm diameter eschar in the left inferior corner.  I recommended mupirocin ointment.    2. Skin infection-the patient has superficial skin infection in the right posterior lower extremity.  I recommended continued treatment with mupirocin.      Patient Instructions   Use the topical creams as prescribed.    Continue your other medications and treatment.    Follow up in the office if no improved over the next week      Og Jeronimo Jr., MD    07/26/22

## 2022-07-26 NOTE — PATIENT INSTRUCTIONS
Use the topical creams as prescribed.    Continue your other medications and treatment.    Follow up in the office if no improved over the next week

## 2022-07-27 ENCOUNTER — ANTICOAGULATION VISIT (OUTPATIENT)
Dept: CARDIOLOGY | Facility: CLINIC | Age: 87
End: 2022-07-27

## 2022-07-27 DIAGNOSIS — I48.91 ATRIAL FIBRILLATION, UNSPECIFIED TYPE: Primary | ICD-10-CM

## 2022-08-02 LAB — INR PPP: 3.3

## 2022-08-03 ENCOUNTER — ANTICOAGULATION VISIT (OUTPATIENT)
Dept: CARDIOLOGY | Facility: CLINIC | Age: 87
End: 2022-08-03

## 2022-08-03 DIAGNOSIS — I48.91 ATRIAL FIBRILLATION, UNSPECIFIED TYPE: Primary | ICD-10-CM

## 2022-08-10 ENCOUNTER — ANTICOAGULATION VISIT (OUTPATIENT)
Dept: CARDIOLOGY | Facility: CLINIC | Age: 87
End: 2022-08-10

## 2022-08-10 LAB — INR PPP: 1.9

## 2022-08-11 RX ORDER — METOPROLOL SUCCINATE 25 MG/1
TABLET, EXTENDED RELEASE ORAL
Qty: 180 TABLET | Refills: 1 | Status: SHIPPED | OUTPATIENT
Start: 2022-08-11 | End: 2023-03-15

## 2022-08-11 NOTE — TELEPHONE ENCOUNTER
Rx Refill Note  Requested Prescriptions     Pending Prescriptions Disp Refills   • metoprolol succinate XL (TOPROL-XL) 25 MG 24 hr tablet [Pharmacy Med Name: METOPROLOL SUCC ER 25 MG TAB] 180 tablet 1     Sig: TAKE 1 TABLET BY MOUTH TWICE A DAY      Last office visit with prescribing clinician: 3/14/2022      Next office visit with prescribing clinician: 10/27/2022            Quintin Shipley MA  08/11/22, 08:29 EDT

## 2022-08-16 ENCOUNTER — ANTICOAGULATION VISIT (OUTPATIENT)
Dept: CARDIOLOGY | Facility: CLINIC | Age: 87
End: 2022-08-16

## 2022-08-16 LAB — INR PPP: 1.6

## 2022-08-17 DIAGNOSIS — Z79.01 LONG TERM (CURRENT) USE OF ANTICOAGULANTS: ICD-10-CM

## 2022-08-17 DIAGNOSIS — I48.91 ATRIAL FIBRILLATION, UNSPECIFIED TYPE: ICD-10-CM

## 2022-08-17 RX ORDER — WARFARIN SODIUM 2 MG/1
TABLET ORAL
Qty: 120 TABLET | Refills: 0 | Status: SHIPPED | OUTPATIENT
Start: 2022-08-17 | End: 2022-09-02 | Stop reason: SDUPTHER

## 2022-08-17 NOTE — TELEPHONE ENCOUNTER
Rx Refill Note  Requested Prescriptions     Pending Prescriptions Disp Refills   • warfarin (COUMADIN) 2 MG tablet [Pharmacy Med Name: WARFARIN SODIUM 2 MG TABLET] 120 tablet 0     Sig: TAKE 2 TABLETS BY MOUTH ON MONDAY AND FRIDAY AND ONE TABLET BY MOUTH ALL OTHER DAYS OR AS DIRECTED    Last INR 8/16/22  Last office visit with prescribing clinician: 3/14/2022      Next office visit with prescribing clinician: 10/27/2022            Sandy Dean RN  08/17/22, 17:31 EDT

## 2022-08-24 ENCOUNTER — ANTICOAGULATION VISIT (OUTPATIENT)
Dept: CARDIOLOGY | Facility: CLINIC | Age: 87
End: 2022-08-24

## 2022-08-24 DIAGNOSIS — I48.91 ATRIAL FIBRILLATION, UNSPECIFIED TYPE: Primary | ICD-10-CM

## 2022-08-24 LAB — INR PPP: 2.1

## 2022-08-31 ENCOUNTER — ANTICOAGULATION VISIT (OUTPATIENT)
Dept: CARDIOLOGY | Facility: CLINIC | Age: 87
End: 2022-08-31

## 2022-08-31 DIAGNOSIS — I48.91 ATRIAL FIBRILLATION, UNSPECIFIED TYPE: Primary | ICD-10-CM

## 2022-08-31 LAB — INR PPP: 2.1

## 2022-09-02 ENCOUNTER — TELEPHONE (OUTPATIENT)
Dept: CARDIOLOGY | Facility: CLINIC | Age: 87
End: 2022-09-02

## 2022-09-02 DIAGNOSIS — I48.91 ATRIAL FIBRILLATION, UNSPECIFIED TYPE: ICD-10-CM

## 2022-09-02 DIAGNOSIS — Z79.01 LONG TERM (CURRENT) USE OF ANTICOAGULANTS: ICD-10-CM

## 2022-09-02 RX ORDER — WARFARIN SODIUM 2 MG/1
TABLET ORAL
Qty: 120 TABLET | Refills: 0 | Status: ON HOLD | OUTPATIENT
Start: 2022-09-02 | End: 2022-10-03

## 2022-09-06 ENCOUNTER — ANTICOAGULATION VISIT (OUTPATIENT)
Dept: CARDIOLOGY | Facility: CLINIC | Age: 87
End: 2022-09-06

## 2022-09-06 DIAGNOSIS — I48.91 ATRIAL FIBRILLATION, UNSPECIFIED TYPE: Primary | ICD-10-CM

## 2022-09-06 LAB — INR PPP: 2.2

## 2022-09-13 ENCOUNTER — ANTICOAGULATION VISIT (OUTPATIENT)
Dept: CARDIOLOGY | Facility: CLINIC | Age: 87
End: 2022-09-13

## 2022-09-13 DIAGNOSIS — I48.91 ATRIAL FIBRILLATION, UNSPECIFIED TYPE: Primary | ICD-10-CM

## 2022-09-13 LAB — INR PPP: 2.4

## 2022-09-21 ENCOUNTER — ANTICOAGULATION VISIT (OUTPATIENT)
Dept: CARDIOLOGY | Facility: CLINIC | Age: 87
End: 2022-09-21

## 2022-09-21 DIAGNOSIS — I48.91 ATRIAL FIBRILLATION, UNSPECIFIED TYPE: Primary | ICD-10-CM

## 2022-09-21 LAB — INR PPP: 2.3

## 2022-09-22 ENCOUNTER — CLINICAL SUPPORT (OUTPATIENT)
Dept: ORTHOPEDIC SURGERY | Facility: CLINIC | Age: 87
End: 2022-09-22

## 2022-09-22 VITALS
WEIGHT: 112.8 LBS | DIASTOLIC BLOOD PRESSURE: 75 MMHG | SYSTOLIC BLOOD PRESSURE: 136 MMHG | HEIGHT: 66 IN | BODY MASS INDEX: 18.13 KG/M2 | HEART RATE: 84 BPM

## 2022-09-22 DIAGNOSIS — M81.0 AGE-RELATED OSTEOPOROSIS WITHOUT CURRENT PATHOLOGICAL FRACTURE: Primary | ICD-10-CM

## 2022-09-22 PROCEDURE — 96372 THER/PROPH/DIAG INJ SC/IM: CPT | Performed by: PHYSICIAN ASSISTANT

## 2022-09-27 LAB — INR PPP: 2.4

## 2022-09-28 ENCOUNTER — ANTICOAGULATION VISIT (OUTPATIENT)
Dept: CARDIOLOGY | Facility: CLINIC | Age: 87
End: 2022-09-28

## 2022-09-28 DIAGNOSIS — I48.91 ATRIAL FIBRILLATION, UNSPECIFIED TYPE: Primary | ICD-10-CM

## 2022-10-01 ENCOUNTER — APPOINTMENT (OUTPATIENT)
Dept: CT IMAGING | Facility: HOSPITAL | Age: 87
End: 2022-10-01

## 2022-10-01 ENCOUNTER — HOSPITAL ENCOUNTER (INPATIENT)
Facility: HOSPITAL | Age: 87
LOS: 4 days | Discharge: HOME | End: 2022-10-06
Attending: EMERGENCY MEDICINE | Admitting: HOSPITALIST

## 2022-10-01 ENCOUNTER — APPOINTMENT (OUTPATIENT)
Dept: GENERAL RADIOLOGY | Facility: HOSPITAL | Age: 87
End: 2022-10-01

## 2022-10-01 DIAGNOSIS — R05.1 ACUTE COUGH: ICD-10-CM

## 2022-10-01 DIAGNOSIS — J96.21 ACUTE ON CHRONIC RESPIRATORY FAILURE WITH HYPOXIA: ICD-10-CM

## 2022-10-01 DIAGNOSIS — R53.1 GENERAL WEAKNESS: ICD-10-CM

## 2022-10-01 DIAGNOSIS — N39.0 ACUTE UTI: ICD-10-CM

## 2022-10-01 DIAGNOSIS — R53.1 GENERALIZED WEAKNESS: ICD-10-CM

## 2022-10-01 DIAGNOSIS — R06.02 SHORTNESS OF BREATH: ICD-10-CM

## 2022-10-01 DIAGNOSIS — I50.9 ACUTE ON CHRONIC CONGESTIVE HEART FAILURE, UNSPECIFIED HEART FAILURE TYPE: Primary | ICD-10-CM

## 2022-10-01 LAB
ALBUMIN SERPL-MCNC: 2.5 G/DL (ref 3.5–5.2)
ALBUMIN/GLOB SERPL: 1.1 G/DL
ALP SERPL-CCNC: 68 U/L (ref 39–117)
ALT SERPL W P-5'-P-CCNC: 13 U/L (ref 1–33)
ANION GAP SERPL CALCULATED.3IONS-SCNC: 8 MMOL/L (ref 5–15)
AST SERPL-CCNC: 20 U/L (ref 1–32)
BACTERIA UR QL AUTO: ABNORMAL /HPF
BASOPHILS # BLD AUTO: 0.1 10*3/MM3 (ref 0–0.2)
BASOPHILS NFR BLD AUTO: 1 % (ref 0–1.5)
BILIRUB SERPL-MCNC: 0.9 MG/DL (ref 0–1.2)
BILIRUB UR QL STRIP: NEGATIVE
BUN SERPL-MCNC: 17 MG/DL (ref 8–23)
BUN/CREAT SERPL: 37 (ref 7–25)
CALCIUM SPEC-SCNC: 6.6 MG/DL (ref 8.2–9.6)
CHLORIDE SERPL-SCNC: 103 MMOL/L (ref 98–107)
CLARITY UR: CLEAR
CO2 SERPL-SCNC: 23 MMOL/L (ref 22–29)
COLOR UR: YELLOW
CREAT SERPL-MCNC: 0.46 MG/DL (ref 0.57–1)
D DIMER PPP FEU-MCNC: 0.69 MG/L (FEU) (ref 0–0.59)
DEPRECATED RDW RBC AUTO: 50.3 FL (ref 37–54)
DIGOXIN SERPL-MCNC: 0.8 NG/ML (ref 0.6–1.2)
EGFRCR SERPLBLD CKD-EPI 2021: 88.8 ML/MIN/1.73
EOSINOPHIL # BLD AUTO: 0 10*3/MM3 (ref 0–0.4)
EOSINOPHIL NFR BLD AUTO: 0.4 % (ref 0.3–6.2)
ERYTHROCYTE [DISTWIDTH] IN BLOOD BY AUTOMATED COUNT: 14.6 % (ref 12.3–15.4)
GLOBULIN UR ELPH-MCNC: 2.2 GM/DL
GLUCOSE BLDC GLUCOMTR-MCNC: 92 MG/DL (ref 70–105)
GLUCOSE SERPL-MCNC: 81 MG/DL (ref 65–99)
GLUCOSE UR STRIP-MCNC: NEGATIVE MG/DL
HCT VFR BLD AUTO: 38.3 % (ref 34–46.6)
HGB BLD-MCNC: 12.7 G/DL (ref 12–15.9)
HGB UR QL STRIP.AUTO: ABNORMAL
HYALINE CASTS UR QL AUTO: ABNORMAL /LPF
INR PPP: 2.98 (ref 2–3)
KETONES UR QL STRIP: ABNORMAL
LEUKOCYTE ESTERASE UR QL STRIP.AUTO: ABNORMAL
LYMPHOCYTES # BLD AUTO: 1.7 10*3/MM3 (ref 0.7–3.1)
LYMPHOCYTES NFR BLD AUTO: 19.7 % (ref 19.6–45.3)
MCH RBC QN AUTO: 32.6 PG (ref 26.6–33)
MCHC RBC AUTO-ENTMCNC: 33.1 G/DL (ref 31.5–35.7)
MCV RBC AUTO: 98.5 FL (ref 79–97)
MONOCYTES # BLD AUTO: 1.9 10*3/MM3 (ref 0.1–0.9)
MONOCYTES NFR BLD AUTO: 22.2 % (ref 5–12)
MUCOUS THREADS URNS QL MICRO: ABNORMAL /HPF
NEUTROPHILS NFR BLD AUTO: 4.8 10*3/MM3 (ref 1.7–7)
NEUTROPHILS NFR BLD AUTO: 56.7 % (ref 42.7–76)
NITRITE UR QL STRIP: NEGATIVE
NRBC BLD AUTO-RTO: 0.3 /100 WBC (ref 0–0.2)
NT-PROBNP SERPL-MCNC: 5393 PG/ML (ref 0–1800)
PH UR STRIP.AUTO: <=5 [PH] (ref 5–8)
PLATELET # BLD AUTO: 148 10*3/MM3 (ref 140–450)
PMV BLD AUTO: 9 FL (ref 6–12)
POTASSIUM SERPL-SCNC: 3.9 MMOL/L (ref 3.5–5.2)
PROT SERPL-MCNC: 4.7 G/DL (ref 6–8.5)
PROT UR QL STRIP: ABNORMAL
PROTHROMBIN TIME: 28.9 SECONDS (ref 19.4–28.5)
RBC # BLD AUTO: 3.9 10*6/MM3 (ref 3.77–5.28)
RBC # UR STRIP: ABNORMAL /HPF
REF LAB TEST METHOD: ABNORMAL
SODIUM SERPL-SCNC: 134 MMOL/L (ref 136–145)
SP GR UR STRIP: 1.02 (ref 1–1.03)
SQUAMOUS #/AREA URNS HPF: ABNORMAL /HPF
TRANS CELLS #/AREA URNS HPF: ABNORMAL /HPF
TROPONIN T SERPL-MCNC: <0.01 NG/ML (ref 0–0.03)
UROBILINOGEN UR QL STRIP: ABNORMAL
WBC # UR STRIP: ABNORMAL /HPF
WBC NRBC COR # BLD: 8.4 10*3/MM3 (ref 3.4–10.8)

## 2022-10-01 PROCEDURE — 85379 FIBRIN DEGRADATION QUANT: CPT | Performed by: PHYSICIAN ASSISTANT

## 2022-10-01 PROCEDURE — 99284 EMERGENCY DEPT VISIT MOD MDM: CPT

## 2022-10-01 PROCEDURE — 93005 ELECTROCARDIOGRAM TRACING: CPT | Performed by: PHYSICIAN ASSISTANT

## 2022-10-01 PROCEDURE — 80053 COMPREHEN METABOLIC PANEL: CPT | Performed by: PHYSICIAN ASSISTANT

## 2022-10-01 PROCEDURE — P9612 CATHETERIZE FOR URINE SPEC: HCPCS

## 2022-10-01 PROCEDURE — 83880 ASSAY OF NATRIURETIC PEPTIDE: CPT | Performed by: PHYSICIAN ASSISTANT

## 2022-10-01 PROCEDURE — 82962 GLUCOSE BLOOD TEST: CPT

## 2022-10-01 PROCEDURE — 81001 URINALYSIS AUTO W/SCOPE: CPT | Performed by: PHYSICIAN ASSISTANT

## 2022-10-01 PROCEDURE — 84145 PROCALCITONIN (PCT): CPT | Performed by: INTERNAL MEDICINE

## 2022-10-01 PROCEDURE — 80162 ASSAY OF DIGOXIN TOTAL: CPT | Performed by: PHYSICIAN ASSISTANT

## 2022-10-01 PROCEDURE — 0 IOPAMIDOL PER 1 ML: Performed by: EMERGENCY MEDICINE

## 2022-10-01 PROCEDURE — 85610 PROTHROMBIN TIME: CPT | Performed by: PHYSICIAN ASSISTANT

## 2022-10-01 PROCEDURE — 71275 CT ANGIOGRAPHY CHEST: CPT

## 2022-10-01 PROCEDURE — 70450 CT HEAD/BRAIN W/O DYE: CPT

## 2022-10-01 PROCEDURE — 85025 COMPLETE CBC W/AUTO DIFF WBC: CPT | Performed by: PHYSICIAN ASSISTANT

## 2022-10-01 PROCEDURE — 87086 URINE CULTURE/COLONY COUNT: CPT | Performed by: PHYSICIAN ASSISTANT

## 2022-10-01 PROCEDURE — 71045 X-RAY EXAM CHEST 1 VIEW: CPT

## 2022-10-01 PROCEDURE — 84484 ASSAY OF TROPONIN QUANT: CPT | Performed by: PHYSICIAN ASSISTANT

## 2022-10-01 RX ORDER — SODIUM CHLORIDE 0.9 % (FLUSH) 0.9 %
10 SYRINGE (ML) INJECTION AS NEEDED
Status: DISCONTINUED | OUTPATIENT
Start: 2022-10-01 | End: 2022-10-06 | Stop reason: HOSPADM

## 2022-10-01 RX ADMIN — IOPAMIDOL 100 ML: 755 INJECTION, SOLUTION INTRAVENOUS at 23:02

## 2022-10-01 NOTE — ED PROVIDER NOTES
Subjective       Patient is a 89-year-old female comes in complaining of generalized weakness for the past 3 days.  Patient states that she has had a cough that is productive with yellow sputum the past 5 days as well.  Patient states she has had some subjective chills and body aches but no fever at home.  Patient denies any bilateral lower extremity edema, urinary symptoms, nausea, vomiting, diarrhea or abdominal or chest pain.  Patient does report some mild shortness of breath and states that she wears 2 L nasal cannula as needed but states she has had to wear it throughout the day yesterday.  Family at bedside states that they have to help the patient up from bed and help her walk when she is usually able to do this on her own without any assistance.  Patient lives at home with family.         Review of Systems   Constitutional: Positive for chills and fatigue. Negative for fever.   HENT: Negative for congestion, sore throat, tinnitus and trouble swallowing.    Eyes: Negative for photophobia, discharge and visual disturbance.   Respiratory: Positive for cough and shortness of breath. Negative for wheezing.    Cardiovascular: Negative for chest pain, palpitations and leg swelling.   Gastrointestinal: Negative for abdominal pain, diarrhea, nausea and vomiting.   Genitourinary: Negative for dysuria, flank pain and urgency.   Musculoskeletal: Negative for arthralgias and myalgias.   Skin: Negative for rash.   Neurological: Negative for dizziness, syncope, light-headedness and headaches.        Generalized weakness   Psychiatric/Behavioral: Negative for confusion.       Past Medical History:   Diagnosis Date   • Atrial fibrillation (HCC)    • Coronary artery disease     Atrial fibrillation   • GERD (gastroesophageal reflux disease)    • Glaucoma    • Hiatal hernia with gastroesophageal reflux    • History of osteoporotic pathological fracture     Right intertroch (7/2019)   • Hx of compression fracture of spine      T12 and L1(2013); T6 (1/2022)   • Hyperlipidemia    • Hypertension    • Osteoarthritis    • Osteoporosis     on prolia(3/21/22)   • Stroke (HCC)     off and on dizziness from the stroke.       Allergies   Allergen Reactions   • Codeine Nausea And Vomiting   • Penicillins Hives   • Latex Rash       Past Surgical History:   Procedure Laterality Date   • BACK SURGERY      kyphoplasty   • EYE SURGERY      cataract surgery   • FIXATION KYPHOPLASTY LUMBAR SPINE  2013   • HIP TROCHANTERIC NAILING WITH INTRAMEDULLARY HIP SCREW Right 7/20/2019    Procedure: HIP TROCHANTERIC NAILING SHORT WITH INTRAMEDULLARY HIP SCREW;  Surgeon: Edward Centeno MD;  Location: [facility] MAIN OR;  Service: Orthopedics   • RECTAL SURGERY      x 3       Family History   Problem Relation Age of Onset   • Heart disease Mother    • Hypertension Mother    • Osteoporosis Mother    • Cancer Father         colon cancer   • Osteoporosis Father    • Cancer Sister         lung   • Cancer Brother         colon cancer       Social History     Socioeconomic History   • Marital status:    Tobacco Use   • Smoking status: Never Smoker   • Smokeless tobacco: Never Used   Vaping Use   • Vaping Use: Never used   Substance and Sexual Activity   • Alcohol use: No   • Drug use: No   • Sexual activity: Defer           Objective   Physical Exam  Vitals and nursing note reviewed.   Constitutional:       General: She is not in acute distress.     Appearance: She is well-developed. She is not diaphoretic.   HENT:      Head: Normocephalic and atraumatic.      Right Ear: External ear normal.      Left Ear: External ear normal.      Nose: Nose normal.      Mouth/Throat:      Mouth: Mucous membranes are moist.      Pharynx: No oropharyngeal exudate.   Eyes:      Extraocular Movements: Extraocular movements intact.      Conjunctiva/sclera: Conjunctivae normal.      Pupils: Pupils are equal, round, and reactive to light.   Cardiovascular:      Rate and Rhythm: Normal  "rate and regular rhythm.      Pulses: Normal pulses.      Heart sounds: Normal heart sounds.      Comments: S1, S2 audible.  Pulmonary:      Effort: Pulmonary effort is normal. No respiratory distress.      Breath sounds: Rales (bilateral bases) present. No wheezing or rhonchi.      Comments: On room air.  Abdominal:      General: Bowel sounds are normal. There is no distension.      Palpations: Abdomen is soft.      Tenderness: There is no abdominal tenderness. There is no guarding or rebound.   Musculoskeletal:         General: No tenderness or deformity. Normal range of motion.      Cervical back: Normal range of motion.   Skin:     General: Skin is warm.      Capillary Refill: Capillary refill takes less than 2 seconds.      Findings: No erythema or rash.   Neurological:      General: No focal deficit present.      Mental Status: She is alert and oriented to person, place, and time. Mental status is at baseline.      Cranial Nerves: No cranial nerve deficit.      Sensory: No sensory deficit.      Motor: No weakness.   Psychiatric:         Mood and Affect: Mood normal.         Behavior: Behavior normal.         Procedures           ED Course      /52   Pulse 78   Temp 99.1 °F (37.3 °C) (Oral)   Resp 18   Ht 157.5 cm (62\")   Wt 49.9 kg (110 lb 0.2 oz)   SpO2 95%   BMI 20.12 kg/m²   Labs Reviewed   RESPIRATORY PANEL PCR W/ COVID-19 (SARS-COV-2) [facility]ONA/FABI/MISTY/PAD/[facility] IN-HOUSE, NP SWAB IN UT/VTP, 3-4 HR TAT - Abnormal; Notable for the following components:       Result Value    RSV, PCR Detected (*)     All other components within normal limits    Narrative:     In the setting of a positive respiratory panel with a viral infection PLUS a negative procalcitonin without other underlying concern for bacterial infection, consider observing off antibiotics or discontinuation of antibiotics and continue supportive care. If the respiratory panel is positive for atypical bacterial infection (Bordetella " pertussis, Chlamydophila pneumoniae, or Mycoplasma pneumoniae), consider antibiotic de-escalation to target atypical bacterial infection.   COMPREHENSIVE METABOLIC PANEL - Abnormal; Notable for the following components:    Creatinine 0.46 (*)     Sodium 134 (*)     Calcium 6.6 (*)     Total Protein 4.7 (*)     Albumin 2.50 (*)     BUN/Creatinine Ratio 37.0 (*)     All other components within normal limits    Narrative:     GFR Normal >60  Chronic Kidney Disease <60  Kidney Failure <15     URINALYSIS W/ CULTURE IF INDICATED - Abnormal; Notable for the following components:    Ketones, UA 15 mg/dL (1+) (*)     Blood, UA Large (3+) (*)     Protein,  mg/dL (2+) (*)     Leuk Esterase, UA Small (1+) (*)     All other components within normal limits    Narrative:     In absence of clinical symptoms, the presence of pyuria, bacteria, and/or nitrites on the urinalysis result does not correlate with infection.   PROTIME-INR - Abnormal; Notable for the following components:    Protime 28.9 (*)     All other components within normal limits   BNP (IN-HOUSE) - Abnormal; Notable for the following components:    proBNP 5,393.0 (*)     All other components within normal limits    Narrative:     Among patients with dyspnea, NT-proBNP is highly sensitive for the detection of acute congestive heart failure. In addition NT-proBNP of <300 pg/ml effectively rules out acute congestive heart failure with 99% negative predictive value.    Results may be falsely decreased if patient taking Biotin.     D-DIMER, QUANTITATIVE - Abnormal; Notable for the following components:    D-Dimer, Quantitative 0.69 (*)     All other components within normal limits    Narrative:     Reference Range  --------------------------------------------------------------------     < 0.50   Negative Predictive Value  0.50-0.59   Indeterminate    >= 0.60   Probable VTE             A very low percentage of patients with DVT may yield D-Dimer results   below the  "cut-off of 0.50 mg/L FEU.  This is known to be more   prevalent in patients with distal DVT.             Results of this test should always be interpreted in conjunction with   the patient's medical history, clinical presentation and other   findings.  Clinical diagnosis should not be based on the result of   [place] D-Dimer alone.   CBC WITH AUTO DIFFERENTIAL - Abnormal; Notable for the following components:    MCV 98.5 (*)     Monocyte % 22.2 (*)     Monocytes, Absolute 1.90 (*)     nRBC 0.3 (*)     All other components within normal limits   URINALYSIS, MICROSCOPIC ONLY - Abnormal; Notable for the following components:    RBC, UA 3-5 (*)     WBC, UA 13-20 (*)     Bacteria, UA 1+ (*)     Squamous Epithelial Cells, UA 3-6 (*)     Mucus, UA Small/1+ (*)     All other components within normal limits   PROCALCITONIN - Abnormal; Notable for the following components:    Procalcitonin 0.26 (*)     All other components within normal limits    Narrative:     As a Marker for Sepsis (Non-Neonates):    1. <0.5 ng/mL represents a low risk of severe sepsis and/or septic shock.  2. >2 ng/mL represents a high risk of severe sepsis and/or septic shock.    As a Marker for Lower Respiratory Tract Infections that require antibiotic therapy:    PCT on Admission    Antibiotic Therapy       6-12 Hrs later    >0.5                Strongly Recommended  >0.25 - <0.5        Recommended   0.1 - 0.25          Discouraged              Remeasure/reassess PCT  <0.1                Strongly Discouraged     Remeasure/reassess PCT    As 28 day mortality risk marker: \"Change in Procalcitonin Result\" (>80% or <=80%) if Day 0 (or Day 1) and Day 4 values are available. Refer to http://www.[employer]-pct-calculator.com    Change in PCT <=80%  A decrease of PCT levels below or equal to 80% defines a positive change in PCT test result representing a higher risk for 28-day all-cause mortality of patients diagnosed with severe sepsis for septic shock.    Change " in PCT >80%  A decrease of PCT levels of more than 80% defines a negative change in PCT result representing a lower risk for 28-day all-cause mortality of patients diagnosed with severe sepsis or septic shock.      TROPONIN (IN-HOUSE) - Normal    Narrative:     Troponin T Reference Range:  <= 0.03 ng/mL-   Negative for AMI  >0.03 ng/mL-     Abnormal for myocardial necrosis.  Clinicians would have to utilize clinical acumen, EKG, Troponin and serial changes to determine if it is an Acute Myocardial Infarction or myocardial injury due to an underlying chronic condition.       Results may be falsely decreased if patient taking Biotin.     DIGOXIN LEVEL - Normal   POCT GLUCOSE FINGERSTICK - Normal   POC LACTATE - Normal   URINE CULTURE   BLOOD CULTURE   BLOOD CULTURE   BASIC METABOLIC PANEL   CBC WITH AUTO DIFFERENTIAL   MAGNESIUM   POCT GLUCOSE FINGERSTICK   POC LACTATE   CBC AND DIFFERENTIAL    Narrative:     The following orders were created for panel order CBC & Differential.  Procedure                               Abnormality         Status                     ---------                               -----------         ------                     CBC Auto Differential[[id number]]        Abnormal            Final result               Scan Slide[[id number]]                                                                    Please view results for these tests on the individual orders.     CT Head Without Contrast    Result Date: 10/1/2022  1. No acute intracranial abnormality. 2. Mild chronic age-related intracranial findings as above. 3. Moderate mucosal thickening in the paranasal sinuses.  This examination was interpreted by Sekou Lal M.D. Electronically signed by:  Sekou Lal M.D.  10/1/2022 9:18 PM    XR Chest 1 View    Result Date: 10/1/2022  1. Stable cardiomegaly. 2. A moderate right pleural fluid collection has increased since the prior examination. 3. Atelectasis versus pneumonia in the right  "lower lung. 4. A large hiatal hernia was better demonstrated on the prior chest x-ray. Electronically signed by:  Eddie Ortez M.D.  10/1/2022 9:21 PM    CT Angiogram Chest Pulmonary Embolism    Result Date: 10/1/2022  1.  Large right pleural effusion, adjacent atelectasis/consolidation, there could be superimposed infection. 2.  Large hiatus hernia 3.  Right upper lung subpleural reticulation, pleural thickening, consider scarring, perhaps from prior insult 4.  Cardiomegaly, suggestion of pulmonary. Question heart failure. 5.  No pulmonary embolus. 6.  Unremarkable aorta Electronically signed by:  Seema Chang M.D.  10/1/2022 9:41 PM                                         MDM     Chart review: Allergy to penicillin, codeine and latex  EKG: EKG reviewed by myself interpreted by Dr. Puma Moraes, shows atrial fibrillation rate 82 bpm, no ST elevation apparent.    Imaging:  See above  Labs: CBC and CMP large unremarkable for acute findings.  D-dimer slightly elevated 0.69.  Digoxin level normal.  UA shows 1+ bacteria and 13-20 WBCs and 1+ leukocyte Estrace and 3+ blood, urine culture pending.  Blood cultures x2 pending.  Initial lactic normal.  Initial troponin negative.  BNP elevated at 5300.  INR in therapeutic range at 2.98.  Vitals:  /52   Pulse 78   Temp 99.1 °F (37.3 °C) (Oral)   Resp 18   Ht 157.5 cm (62\")   Wt 49.9 kg (110 lb 0.2 oz)   SpO2 95%   BMI 20.12 kg/m²     Medications given:    Medications   sodium chloride 0.9 % flush 10 mL (has no administration in time range)   nitroglycerin (NITROSTAT) SL tablet 0.4 mg (has no administration in time range)   sodium chloride 0.9 % flush 10 mL (has no administration in time range)   sodium chloride 0.9 % flush 10 mL (has no administration in time range)   acetaminophen (TYLENOL) tablet 650 mg (has no administration in time range)     Or   acetaminophen (TYLENOL) 160 MG/5ML solution 650 mg (has no administration in time range)     Or   acetaminophen " (TYLENOL) suppository 650 mg (has no administration in time range)   sennosides-docusate (PERICOLACE) 8.6-50 MG per tablet 2 tablet (has no administration in time range)     And   polyethylene glycol (MIRALAX) packet 17 g (has no administration in time range)     And   bisacodyl (DULCOLAX) EC tablet 5 mg (has no administration in time range)     And   bisacodyl (DULCOLAX) suppository 10 mg (has no administration in time range)   ondansetron (ZOFRAN) tablet 4 mg (has no administration in time range)     Or   ondansetron (ZOFRAN) injection 4 mg (has no administration in time range)   melatonin tablet 5 mg (has no administration in time range)   Pharmacy to dose warfarin (has no administration in time range)   digoxin (LANOXIN) tablet 125 mcg (has no administration in time range)   metoprolol succinate XL (TOPROL-XL) 24 hr tablet 25 mg (has no administration in time range)   albumin human 25 % IV SOLN 12.5 g (has no administration in time range)   furosemide (LASIX) injection 20 mg (has no administration in time range)   potassium chloride (K-DUR,KLOR-CON) CR tablet 40 mEq (has no administration in time range)   potassium chloride (KLOR-CON) packet 40 mEq (has no administration in time range)   Magnesium Sulfate 2 gram infusion - Mg less than or equal to 1.5 mg/dL (has no administration in time range)     Or   Magnesium Sulfate 1 gram infusion - Mg 1.6-1.9 mg/dL (has no administration in time range)   guaiFENesin (MUCINEX) 12 hr tablet 600 mg (has no administration in time range)   azelastine (ASTELIN) nasal spray 1 spray (has no administration in time range)   cefTRIAXone (ROCEPHIN) 1 g in sodium chloride 0.9 % 100 mL IVPB (has no administration in time range)   magnesium sulfate 2g/50 mL (PREMIX) infusion (has no administration in time range)   doxycycline (ADOXA) tablet 100 mg (has no administration in time range)   pantoprazole (PROTONIX) EC tablet 40 mg (has no administration in time range)   ipratropium-albuterol  (DUO-NEB) nebulizer solution 3 mL (has no administration in time range)   atorvastatin (LIPITOR) tablet 10 mg (has no administration in time range)   benzonatate (TESSALON) capsule 100 mg (has no administration in time range)   potassium chloride (K-DUR,KLOR-CON) CR tablet 20 mEq (has no administration in time range)   dexamethasone (DECADRON) injection 6 mg (has no administration in time range)   iopamidol (ISOVUE-370) 76 % injection 100 mL (100 mL Intravenous Given 10/1/22 2302)   azithromycin (ZITHROMAX) 500 mg in sodium chloride 0.9 % 250 mL IVPB-VTB (500 mg Intravenous Given 10/2/22 0149)   furosemide (LASIX) injection 80 mg (80 mg Intravenous Given 10/2/22 0102)       Procedures:  Not indicated  MDM: Patient is a 89-year-old female comes in complaining of generalized weakness, cough and shortness of breath.  IV established.  CBC and CMP large unremarkable for acute findings.  D-dimer slightly elevated 0.69.  Digoxin level normal.  UA shows 1+ bacteria and 13-20 WBCs and 1+ leukocyte Estrace and 3+ blood, urine culture pending.  Blood cultures x2 pending.  Initial lactic normal.  Initial troponin negative.  BNP elevated at 5300.  INR in therapeutic range at 2.98.  CT head unremarkable for acute findings.  Chest x-ray shows stable cardiomegaly.  A moderate right pleural fluid collection increased since prior exam.  Atelectasis versus pneumonia in the right lower lung.  Large hiatal hernia.  CT PE protocol was obtained given elevated D-dimer and shows large right pleural effusion, adjacent atelectasis/consolidation, there could be superimposed infection.  Right upper lung subpleural reticulation, pleural thickening, consider scarring.  Questionable heart failure.  Patient started on azithromycin and Rocephin for apparent UTI and questionable pneumonia.  Patient given IV Lasix for patient likely suffering from acute CHF exacerbation.  Patient and family at bedside states that she has been diagnosed with diastolic  heart failure in the past by her cardiologist.  Spoke with Dr. Lewis, who accepted patient behalf of hospitalists for admission to hospital.  Results and plan discussed with patient and is agreeable with plan.    Final diagnoses:   Acute on chronic congestive heart failure, unspecified heart failure type (HCC)   Acute UTI   Generalized weakness   Acute cough   Shortness of breath   Acute on chronic respiratory failure with hypoxia (HCC)       ED Disposition  ED Disposition     ED Disposition   Decision to Admit    Condition   --    Comment   Level of Care: Progressive Care [20]   Diagnosis: Acute on chronic congestive heart failure, unspecified heart failure type (HCC) [[id number]]   Admitting Physician: ROALIA LEWIS [[id number]]   Attending Physician: ORALIA LEWIS [[id number]]   Certification: I Certify That Inpatient Hospital Services Are Medically Necessary For Greater Than 2 Midnights               No follow-up provider specified.       Medication List      No changes were made to your prescriptions during this visit.          Meek Dalal PA  10/02/22 0306

## 2022-10-02 ENCOUNTER — APPOINTMENT (OUTPATIENT)
Dept: CARDIOLOGY | Facility: HOSPITAL | Age: 87
End: 2022-10-02

## 2022-10-02 PROBLEM — J98.4 SCARRING OF LUNG: Status: ACTIVE | Noted: 2022-10-02

## 2022-10-02 PROBLEM — J90 PLEURAL EFFUSION: Status: ACTIVE | Noted: 2022-10-02

## 2022-10-02 PROBLEM — J18.9 PNEUMONIA DUE TO INFECTIOUS ORGANISM: Status: ACTIVE | Noted: 2022-10-02

## 2022-10-02 PROBLEM — N39.0 UTI (URINARY TRACT INFECTION) DUE TO URINARY INDWELLING CATHETER: Status: ACTIVE | Noted: 2022-10-02

## 2022-10-02 PROBLEM — R53.1 GENERAL WEAKNESS: Status: ACTIVE | Noted: 2022-10-02

## 2022-10-02 PROBLEM — E88.09 HYPOALBUMINEMIA: Status: ACTIVE | Noted: 2022-10-02

## 2022-10-02 PROBLEM — T83.511A UTI (URINARY TRACT INFECTION) DUE TO URINARY INDWELLING CATHETER: Status: ACTIVE | Noted: 2022-10-02

## 2022-10-02 PROBLEM — J96.21 ACUTE ON CHRONIC RESPIRATORY FAILURE WITH HYPOXIA: Status: ACTIVE | Noted: 2022-10-02

## 2022-10-02 PROBLEM — I50.9 ACUTE ON CHRONIC CONGESTIVE HEART FAILURE, UNSPECIFIED HEART FAILURE TYPE: Status: ACTIVE | Noted: 2022-10-02

## 2022-10-02 LAB
ANION GAP SERPL CALCULATED.3IONS-SCNC: 10 MMOL/L (ref 5–15)
B PARAPERT DNA SPEC QL NAA+PROBE: NOT DETECTED
B PERT DNA SPEC QL NAA+PROBE: NOT DETECTED
BACTERIA SPEC AEROBE CULT: NO GROWTH
BASOPHILS # BLD AUTO: 0.1 10*3/MM3 (ref 0–0.2)
BASOPHILS NFR BLD AUTO: 0.7 % (ref 0–1.5)
BUN SERPL-MCNC: 20 MG/DL (ref 8–23)
BUN/CREAT SERPL: 32.8 (ref 7–25)
C PNEUM DNA NPH QL NAA+NON-PROBE: NOT DETECTED
CALCIUM SPEC-SCNC: 8.1 MG/DL (ref 8.2–9.6)
CHLORIDE SERPL-SCNC: 91 MMOL/L (ref 98–107)
CO2 SERPL-SCNC: 30 MMOL/L (ref 22–29)
CREAT SERPL-MCNC: 0.61 MG/DL (ref 0.57–1)
D-LACTATE SERPL-SCNC: 0.6 MMOL/L (ref 0.5–2)
DEPRECATED RDW RBC AUTO: 48.6 FL (ref 37–54)
EGFRCR SERPLBLD CKD-EPI 2021: 83 ML/MIN/1.73
EOSINOPHIL # BLD AUTO: 0 10*3/MM3 (ref 0–0.4)
EOSINOPHIL NFR BLD AUTO: 0 % (ref 0.3–6.2)
ERYTHROCYTE [DISTWIDTH] IN BLOOD BY AUTOMATED COUNT: 14.2 % (ref 12.3–15.4)
FLUAV SUBTYP SPEC NAA+PROBE: NOT DETECTED
FLUBV RNA ISLT QL NAA+PROBE: NOT DETECTED
GLUCOSE SERPL-MCNC: 114 MG/DL (ref 65–99)
HADV DNA SPEC NAA+PROBE: NOT DETECTED
HCOV 229E RNA SPEC QL NAA+PROBE: NOT DETECTED
HCOV HKU1 RNA SPEC QL NAA+PROBE: NOT DETECTED
HCOV NL63 RNA SPEC QL NAA+PROBE: NOT DETECTED
HCOV OC43 RNA SPEC QL NAA+PROBE: NOT DETECTED
HCT VFR BLD AUTO: 37.5 % (ref 34–46.6)
HGB BLD-MCNC: 12.4 G/DL (ref 12–15.9)
HMPV RNA NPH QL NAA+NON-PROBE: NOT DETECTED
HPIV1 RNA ISLT QL NAA+PROBE: NOT DETECTED
HPIV2 RNA SPEC QL NAA+PROBE: NOT DETECTED
HPIV3 RNA NPH QL NAA+PROBE: NOT DETECTED
HPIV4 P GENE NPH QL NAA+PROBE: NOT DETECTED
INR PPP: 2.56 (ref 2–3)
LYMPHOCYTES # BLD AUTO: 0.5 10*3/MM3 (ref 0.7–3.1)
LYMPHOCYTES NFR BLD AUTO: 6.8 % (ref 19.6–45.3)
M PNEUMO IGG SER IA-ACNC: NOT DETECTED
MAGNESIUM SERPL-MCNC: 2.5 MG/DL (ref 1.7–2.3)
MCH RBC QN AUTO: 32.7 PG (ref 26.6–33)
MCHC RBC AUTO-ENTMCNC: 33.2 G/DL (ref 31.5–35.7)
MCV RBC AUTO: 98.7 FL (ref 79–97)
MONOCYTES # BLD AUTO: 0.5 10*3/MM3 (ref 0.1–0.9)
MONOCYTES NFR BLD AUTO: 6.7 % (ref 5–12)
NEUTROPHILS NFR BLD AUTO: 6.5 10*3/MM3 (ref 1.7–7)
NEUTROPHILS NFR BLD AUTO: 85.8 % (ref 42.7–76)
NRBC BLD AUTO-RTO: 0.1 /100 WBC (ref 0–0.2)
PLATELET # BLD AUTO: 140 10*3/MM3 (ref 140–450)
PMV BLD AUTO: 9 FL (ref 6–12)
POTASSIUM SERPL-SCNC: 3.6 MMOL/L (ref 3.5–5.2)
PROCALCITONIN SERPL-MCNC: 0.26 NG/ML (ref 0–0.25)
PROTHROMBIN TIME: 25 SECONDS (ref 19.4–28.5)
RBC # BLD AUTO: 3.8 10*6/MM3 (ref 3.77–5.28)
RHINOVIRUS RNA SPEC NAA+PROBE: NOT DETECTED
RSV RNA NPH QL NAA+NON-PROBE: DETECTED
SARS-COV-2 RNA NPH QL NAA+NON-PROBE: NOT DETECTED
SODIUM SERPL-SCNC: 131 MMOL/L (ref 136–145)
WBC NRBC COR # BLD: 7.6 10*3/MM3 (ref 3.4–10.8)

## 2022-10-02 PROCEDURE — 99222 1ST HOSP IP/OBS MODERATE 55: CPT | Performed by: INTERNAL MEDICINE

## 2022-10-02 PROCEDURE — 87040 BLOOD CULTURE FOR BACTERIA: CPT | Performed by: PHYSICIAN ASSISTANT

## 2022-10-02 PROCEDURE — 25010000002 MAGNESIUM SULFATE 2 GM/50ML SOLUTION: Performed by: INTERNAL MEDICINE

## 2022-10-02 PROCEDURE — 97116 GAIT TRAINING THERAPY: CPT

## 2022-10-02 PROCEDURE — 94799 UNLISTED PULMONARY SVC/PX: CPT

## 2022-10-02 PROCEDURE — 93306 TTE W/DOPPLER COMPLETE: CPT

## 2022-10-02 PROCEDURE — 94640 AIRWAY INHALATION TREATMENT: CPT

## 2022-10-02 PROCEDURE — 25010000002 FUROSEMIDE PER 20 MG: Performed by: INTERNAL MEDICINE

## 2022-10-02 PROCEDURE — 36415 COLL VENOUS BLD VENIPUNCTURE: CPT | Performed by: INTERNAL MEDICINE

## 2022-10-02 PROCEDURE — 25010000002 FUROSEMIDE PER 20 MG: Performed by: PHYSICIAN ASSISTANT

## 2022-10-02 PROCEDURE — 85610 PROTHROMBIN TIME: CPT | Performed by: INTERNAL MEDICINE

## 2022-10-02 PROCEDURE — 25010000002 ALBUMIN HUMAN 25% PER 50 ML: Performed by: INTERNAL MEDICINE

## 2022-10-02 PROCEDURE — 85025 COMPLETE CBC W/AUTO DIFF WBC: CPT | Performed by: INTERNAL MEDICINE

## 2022-10-02 PROCEDURE — 94761 N-INVAS EAR/PLS OXIMETRY MLT: CPT

## 2022-10-02 PROCEDURE — 93306 TTE W/DOPPLER COMPLETE: CPT | Performed by: INTERNAL MEDICINE

## 2022-10-02 PROCEDURE — 25010000002 DEXAMETHASONE PER 1 MG: Performed by: INTERNAL MEDICINE

## 2022-10-02 PROCEDURE — 97166 OT EVAL MOD COMPLEX 45 MIN: CPT

## 2022-10-02 PROCEDURE — 83605 ASSAY OF LACTIC ACID: CPT

## 2022-10-02 PROCEDURE — 25010000002 CEFTRIAXONE PER 250 MG: Performed by: PHYSICIAN ASSISTANT

## 2022-10-02 PROCEDURE — 25010000002 CEFTRIAXONE PER 250 MG: Performed by: INTERNAL MEDICINE

## 2022-10-02 PROCEDURE — 97162 PT EVAL MOD COMPLEX 30 MIN: CPT

## 2022-10-02 PROCEDURE — 97535 SELF CARE MNGMENT TRAINING: CPT

## 2022-10-02 PROCEDURE — P9047 ALBUMIN (HUMAN), 25%, 50ML: HCPCS | Performed by: INTERNAL MEDICINE

## 2022-10-02 PROCEDURE — 80048 BASIC METABOLIC PNL TOTAL CA: CPT | Performed by: INTERNAL MEDICINE

## 2022-10-02 PROCEDURE — 0202U NFCT DS 22 TRGT SARS-COV-2: CPT | Performed by: PHYSICIAN ASSISTANT

## 2022-10-02 PROCEDURE — 83735 ASSAY OF MAGNESIUM: CPT | Performed by: INTERNAL MEDICINE

## 2022-10-02 PROCEDURE — 25010000002 AZITHROMYCIN PER 500 MG: Performed by: PHYSICIAN ASSISTANT

## 2022-10-02 RX ORDER — ONDANSETRON 4 MG/1
4 TABLET, FILM COATED ORAL EVERY 6 HOURS PRN
Status: DISCONTINUED | OUTPATIENT
Start: 2022-10-02 | End: 2022-10-06 | Stop reason: HOSPADM

## 2022-10-02 RX ORDER — IPRATROPIUM BROMIDE AND ALBUTEROL SULFATE 2.5; .5 MG/3ML; MG/3ML
3 SOLUTION RESPIRATORY (INHALATION)
Status: DISCONTINUED | OUTPATIENT
Start: 2022-10-02 | End: 2022-10-06 | Stop reason: HOSPADM

## 2022-10-02 RX ORDER — ACETAMINOPHEN 650 MG/1
650 SUPPOSITORY RECTAL EVERY 4 HOURS PRN
Status: DISCONTINUED | OUTPATIENT
Start: 2022-10-02 | End: 2022-10-06 | Stop reason: HOSPADM

## 2022-10-02 RX ORDER — DIGOXIN 125 MCG
125 TABLET ORAL
Status: DISCONTINUED | OUTPATIENT
Start: 2022-10-02 | End: 2022-10-06 | Stop reason: HOSPADM

## 2022-10-02 RX ORDER — GUAIFENESIN 600 MG/1
600 TABLET, EXTENDED RELEASE ORAL EVERY 12 HOURS SCHEDULED
Status: DISCONTINUED | OUTPATIENT
Start: 2022-10-02 | End: 2022-10-06 | Stop reason: HOSPADM

## 2022-10-02 RX ORDER — METOPROLOL SUCCINATE 25 MG/1
25 TABLET, EXTENDED RELEASE ORAL 2 TIMES DAILY
Status: DISCONTINUED | OUTPATIENT
Start: 2022-10-02 | End: 2022-10-06 | Stop reason: HOSPADM

## 2022-10-02 RX ORDER — SODIUM CHLORIDE 0.9 % (FLUSH) 0.9 %
10 SYRINGE (ML) INJECTION AS NEEDED
Status: DISCONTINUED | OUTPATIENT
Start: 2022-10-02 | End: 2022-10-06 | Stop reason: HOSPADM

## 2022-10-02 RX ORDER — BENZONATATE 100 MG/1
100 CAPSULE ORAL 3 TIMES DAILY PRN
Status: DISCONTINUED | OUTPATIENT
Start: 2022-10-02 | End: 2022-10-06 | Stop reason: HOSPADM

## 2022-10-02 RX ORDER — IPRATROPIUM BROMIDE AND ALBUTEROL SULFATE 2.5; .5 MG/3ML; MG/3ML
3 SOLUTION RESPIRATORY (INHALATION)
Status: DISCONTINUED | OUTPATIENT
Start: 2022-10-02 | End: 2022-10-02

## 2022-10-02 RX ORDER — POTASSIUM CHLORIDE 1.5 G/1.77G
40 POWDER, FOR SOLUTION ORAL AS NEEDED
Status: DISCONTINUED | OUTPATIENT
Start: 2022-10-02 | End: 2022-10-06 | Stop reason: HOSPADM

## 2022-10-02 RX ORDER — SODIUM CHLORIDE 0.9 % (FLUSH) 0.9 %
10 SYRINGE (ML) INJECTION EVERY 12 HOURS SCHEDULED
Status: DISCONTINUED | OUTPATIENT
Start: 2022-10-02 | End: 2022-10-06 | Stop reason: HOSPADM

## 2022-10-02 RX ORDER — ALUMINA, MAGNESIA, AND SIMETHICONE 2400; 2400; 240 MG/30ML; MG/30ML; MG/30ML
15 SUSPENSION ORAL EVERY 6 HOURS PRN
Status: DISCONTINUED | OUTPATIENT
Start: 2022-10-02 | End: 2022-10-02

## 2022-10-02 RX ORDER — AMOXICILLIN 250 MG
2 CAPSULE ORAL 2 TIMES DAILY
Status: DISCONTINUED | OUTPATIENT
Start: 2022-10-02 | End: 2022-10-06 | Stop reason: HOSPADM

## 2022-10-02 RX ORDER — ATORVASTATIN CALCIUM 10 MG/1
10 TABLET, FILM COATED ORAL NIGHTLY
Status: DISCONTINUED | OUTPATIENT
Start: 2022-10-02 | End: 2022-10-06 | Stop reason: HOSPADM

## 2022-10-02 RX ORDER — MAGNESIUM SULFATE 1 G/100ML
1 INJECTION INTRAVENOUS AS NEEDED
Status: DISCONTINUED | OUTPATIENT
Start: 2022-10-02 | End: 2022-10-06 | Stop reason: HOSPADM

## 2022-10-02 RX ORDER — FUROSEMIDE 10 MG/ML
80 INJECTION INTRAMUSCULAR; INTRAVENOUS ONCE
Status: COMPLETED | OUTPATIENT
Start: 2022-10-02 | End: 2022-10-02

## 2022-10-02 RX ORDER — ACETAMINOPHEN 160 MG/5ML
650 SOLUTION ORAL EVERY 4 HOURS PRN
Status: DISCONTINUED | OUTPATIENT
Start: 2022-10-02 | End: 2022-10-06 | Stop reason: HOSPADM

## 2022-10-02 RX ORDER — NITROGLYCERIN 0.4 MG/1
0.4 TABLET SUBLINGUAL
Status: DISCONTINUED | OUTPATIENT
Start: 2022-10-02 | End: 2022-10-06 | Stop reason: HOSPADM

## 2022-10-02 RX ORDER — FUROSEMIDE 10 MG/ML
20 INJECTION INTRAMUSCULAR; INTRAVENOUS EVERY 8 HOURS
Status: COMPLETED | OUTPATIENT
Start: 2022-10-02 | End: 2022-10-02

## 2022-10-02 RX ORDER — BISACODYL 10 MG
10 SUPPOSITORY, RECTAL RECTAL DAILY PRN
Status: DISCONTINUED | OUTPATIENT
Start: 2022-10-02 | End: 2022-10-06 | Stop reason: HOSPADM

## 2022-10-02 RX ORDER — ONDANSETRON 2 MG/ML
4 INJECTION INTRAMUSCULAR; INTRAVENOUS EVERY 6 HOURS PRN
Status: DISCONTINUED | OUTPATIENT
Start: 2022-10-02 | End: 2022-10-06 | Stop reason: HOSPADM

## 2022-10-02 RX ORDER — AZELASTINE 1 MG/ML
1 SPRAY, METERED NASAL 2 TIMES DAILY
Status: DISCONTINUED | OUTPATIENT
Start: 2022-10-02 | End: 2022-10-06 | Stop reason: HOSPADM

## 2022-10-02 RX ORDER — CHOLECALCIFEROL (VITAMIN D3) 125 MCG
5 CAPSULE ORAL NIGHTLY PRN
Status: DISCONTINUED | OUTPATIENT
Start: 2022-10-02 | End: 2022-10-06 | Stop reason: HOSPADM

## 2022-10-02 RX ORDER — POTASSIUM CHLORIDE 20 MEQ/1
20 TABLET, EXTENDED RELEASE ORAL ONCE
Status: COMPLETED | OUTPATIENT
Start: 2022-10-02 | End: 2022-10-02

## 2022-10-02 RX ORDER — PANTOPRAZOLE SODIUM 40 MG/1
40 TABLET, DELAYED RELEASE ORAL
Status: DISCONTINUED | OUTPATIENT
Start: 2022-10-02 | End: 2022-10-06 | Stop reason: HOSPADM

## 2022-10-02 RX ORDER — ACETAMINOPHEN 325 MG/1
650 TABLET ORAL EVERY 4 HOURS PRN
Status: DISCONTINUED | OUTPATIENT
Start: 2022-10-02 | End: 2022-10-06 | Stop reason: HOSPADM

## 2022-10-02 RX ORDER — MAGNESIUM SULFATE HEPTAHYDRATE 40 MG/ML
2 INJECTION, SOLUTION INTRAVENOUS ONCE
Status: COMPLETED | OUTPATIENT
Start: 2022-10-02 | End: 2022-10-02

## 2022-10-02 RX ORDER — POTASSIUM CHLORIDE 20 MEQ/1
40 TABLET, EXTENDED RELEASE ORAL AS NEEDED
Status: DISCONTINUED | OUTPATIENT
Start: 2022-10-02 | End: 2022-10-06 | Stop reason: HOSPADM

## 2022-10-02 RX ORDER — POLYETHYLENE GLYCOL 3350 17 G/17G
17 POWDER, FOR SOLUTION ORAL DAILY PRN
Status: DISCONTINUED | OUTPATIENT
Start: 2022-10-02 | End: 2022-10-06 | Stop reason: HOSPADM

## 2022-10-02 RX ORDER — METOPROLOL SUCCINATE 25 MG/1
25 TABLET, EXTENDED RELEASE ORAL
Status: DISCONTINUED | OUTPATIENT
Start: 2022-10-02 | End: 2022-10-02

## 2022-10-02 RX ORDER — ALBUMIN (HUMAN) 12.5 G/50ML
12.5 SOLUTION INTRAVENOUS EVERY 8 HOURS
Status: COMPLETED | OUTPATIENT
Start: 2022-10-02 | End: 2022-10-02

## 2022-10-02 RX ORDER — BISACODYL 5 MG/1
5 TABLET, DELAYED RELEASE ORAL DAILY PRN
Status: DISCONTINUED | OUTPATIENT
Start: 2022-10-02 | End: 2022-10-06 | Stop reason: HOSPADM

## 2022-10-02 RX ORDER — MAGNESIUM SULFATE HEPTAHYDRATE 40 MG/ML
2 INJECTION, SOLUTION INTRAVENOUS AS NEEDED
Status: DISCONTINUED | OUTPATIENT
Start: 2022-10-02 | End: 2022-10-06 | Stop reason: HOSPADM

## 2022-10-02 RX ORDER — DOXYCYCLINE 100 MG/1
100 TABLET ORAL EVERY 12 HOURS SCHEDULED
Status: DISCONTINUED | OUTPATIENT
Start: 2022-10-02 | End: 2022-10-03

## 2022-10-02 RX ORDER — DEXAMETHASONE SODIUM PHOSPHATE 4 MG/ML
6 INJECTION, SOLUTION INTRA-ARTICULAR; INTRALESIONAL; INTRAMUSCULAR; INTRAVENOUS; SOFT TISSUE DAILY
Status: DISCONTINUED | OUTPATIENT
Start: 2022-10-02 | End: 2022-10-03

## 2022-10-02 RX ORDER — WARFARIN SODIUM 2 MG/1
2 TABLET ORAL
Status: COMPLETED | OUTPATIENT
Start: 2022-10-02 | End: 2022-10-02

## 2022-10-02 RX ADMIN — SENNOSIDES AND DOCUSATE SODIUM 2 TABLET: 50; 8.6 TABLET ORAL at 20:32

## 2022-10-02 RX ADMIN — GUAIFENESIN 600 MG: 600 TABLET, EXTENDED RELEASE ORAL at 10:25

## 2022-10-02 RX ADMIN — CEFTRIAXONE 1 G: 1 INJECTION, POWDER, FOR SOLUTION INTRAMUSCULAR; INTRAVENOUS at 21:00

## 2022-10-02 RX ADMIN — MAGNESIUM SULFATE HEPTAHYDRATE 2 G: 2 INJECTION, SOLUTION INTRAVENOUS at 05:05

## 2022-10-02 RX ADMIN — POTASSIUM CHLORIDE 20 MEQ: 1500 TABLET, EXTENDED RELEASE ORAL at 05:02

## 2022-10-02 RX ADMIN — ATORVASTATIN CALCIUM 10 MG: 10 TABLET, FILM COATED ORAL at 05:02

## 2022-10-02 RX ADMIN — WARFARIN 2 MG: 2 TABLET ORAL at 17:43

## 2022-10-02 RX ADMIN — IPRATROPIUM BROMIDE AND ALBUTEROL SULFATE 3 ML: .5; 3 SOLUTION RESPIRATORY (INHALATION) at 07:36

## 2022-10-02 RX ADMIN — DOXYCYCLINE 100 MG: 100 TABLET ORAL at 10:17

## 2022-10-02 RX ADMIN — GUAIFENESIN 600 MG: 600 TABLET, EXTENDED RELEASE ORAL at 20:32

## 2022-10-02 RX ADMIN — Medication 10 ML: at 10:25

## 2022-10-02 RX ADMIN — CEFTRIAXONE 1 G: 1 INJECTION, POWDER, FOR SOLUTION INTRAMUSCULAR; INTRAVENOUS at 01:03

## 2022-10-02 RX ADMIN — GUAIFENESIN 600 MG: 600 TABLET, EXTENDED RELEASE ORAL at 05:02

## 2022-10-02 RX ADMIN — AZELASTINE HYDROCHLORIDE 1 SPRAY: 137 SPRAY, METERED NASAL at 10:17

## 2022-10-02 RX ADMIN — FUROSEMIDE 20 MG: 10 INJECTION, SOLUTION INTRAMUSCULAR; INTRAVENOUS at 10:25

## 2022-10-02 RX ADMIN — DEXAMETHASONE SODIUM PHOSPHATE 6 MG: 4 INJECTION, SOLUTION INTRAMUSCULAR; INTRAVENOUS at 05:01

## 2022-10-02 RX ADMIN — METOPROLOL SUCCINATE 25 MG: 25 TABLET, FILM COATED, EXTENDED RELEASE ORAL at 20:32

## 2022-10-02 RX ADMIN — ALBUMIN (HUMAN) 12.5 G: 0.25 INJECTION, SOLUTION INTRAVENOUS at 05:02

## 2022-10-02 RX ADMIN — SENNOSIDES AND DOCUSATE SODIUM 2 TABLET: 50; 8.6 TABLET ORAL at 10:25

## 2022-10-02 RX ADMIN — IPRATROPIUM BROMIDE AND ALBUTEROL SULFATE 3 ML: .5; 3 SOLUTION RESPIRATORY (INHALATION) at 20:20

## 2022-10-02 RX ADMIN — FUROSEMIDE 80 MG: 10 INJECTION, SOLUTION INTRAMUSCULAR; INTRAVENOUS at 01:02

## 2022-10-02 RX ADMIN — ALBUMIN (HUMAN) 12.5 G: 0.25 INJECTION, SOLUTION INTRAVENOUS at 17:43

## 2022-10-02 RX ADMIN — FUROSEMIDE 20 MG: 10 INJECTION, SOLUTION INTRAMUSCULAR; INTRAVENOUS at 05:01

## 2022-10-02 RX ADMIN — Medication 10 ML: at 05:03

## 2022-10-02 RX ADMIN — AZITHROMYCIN DIHYDRATE 500 MG: 500 INJECTION, POWDER, LYOPHILIZED, FOR SOLUTION INTRAVENOUS at 01:03

## 2022-10-02 RX ADMIN — FUROSEMIDE 20 MG: 10 INJECTION, SOLUTION INTRAMUSCULAR; INTRAVENOUS at 18:03

## 2022-10-02 RX ADMIN — AZITHROMYCIN DIHYDRATE 500 MG: 500 INJECTION, POWDER, LYOPHILIZED, FOR SOLUTION INTRAVENOUS at 01:49

## 2022-10-02 RX ADMIN — PANTOPRAZOLE SODIUM 40 MG: 40 TABLET, DELAYED RELEASE ORAL at 05:05

## 2022-10-02 RX ADMIN — DIGOXIN 125 MCG: 125 TABLET ORAL at 12:08

## 2022-10-02 RX ADMIN — ATORVASTATIN CALCIUM 10 MG: 10 TABLET, FILM COATED ORAL at 22:19

## 2022-10-02 RX ADMIN — Medication 10 ML: at 22:17

## 2022-10-02 RX ADMIN — DOXYCYCLINE 100 MG: 100 TABLET ORAL at 20:33

## 2022-10-02 RX ADMIN — ACETAMINOPHEN 650 MG: 325 TABLET, FILM COATED ORAL at 05:02

## 2022-10-02 RX ADMIN — ALBUMIN (HUMAN) 12.5 G: 0.25 INJECTION, SOLUTION INTRAVENOUS at 10:17

## 2022-10-02 RX ADMIN — METOPROLOL SUCCINATE 25 MG: 25 TABLET, FILM COATED, EXTENDED RELEASE ORAL at 10:25

## 2022-10-02 NOTE — PROGRESS NOTES
Please see H&P from this morning for details.  Briefly, 89-year-old lady with RSV infection and mild CHF.  Seems to be improving on 3 L at this time.  Wears 2 L at home.  Discussed with family at bedside.

## 2022-10-02 NOTE — PLAN OF CARE
Goal Outcome Evaluation:  Plan of Care Reviewed With: patient        Progress: improving  Outcome Evaluation: Pt was admitted after 3 days of increasing weakness and 5 days of coughing, Dx with acute on chronic respiratory failure with worsening hypoxia -patient has an underlying pulmonary scarring from a previous pneumonia and has oxygen available as needed - due to RSV infection. Normally she does not require oxygen but for the past 2 days she has needed it all the time at home per ER note. Pt has good family support and lives with her dtr who assists her as needed. Typically pt is walking in the home w/ RW without assist. Pt demonstrates an acute decline in her ADL skill and basic mobility. Would recommend home with HHC and family assist.

## 2022-10-02 NOTE — CONSULTS
Cardiology Consult Note    Patient Identification:  Name: Shruthi Amin  Age: 89 y.o.  Sex: female  :  6/10/1928  MRN: [id number]             Requesting Physician : Niki    Reason for Consultation / Chief Complaint : management recommendations  Heart failure and pleural effusion    History of Present Illness:      Patient is a 89-year-old female who presented to the emergency room with progressive weakness, shortness of breath, cough and congestion.    According to the patient and her family approximately 5 days ago she developed cough and congestion.  It progressively became worse and she developed more shortness of breath, activity intolerance, fatigue and weakness.  She previously had used oxygen as needed but has been requiring it more full-time and became too tired and weak to get out of bed.    In the emergency department evaluation included a respiratory virus panel which was positive for RSV.  CT of her chest showed significant right pleural effusion, possible right lower lobe pneumonia and concern for possible heart failure.  BNP was elevated at 5400.  CT of the head showed sinusitis.    She routinely follows in the office with Dr. Peguero.  She is known to have nonobstructive coronary artery disease, history of atrial fibrillation, hypertension and dyslipidemia.    The patient denies any current chest pain.  Initial troponin was less than 0.01    Cardiology attending addendum :    I have personally performed a face-to-face diagnostic evaluation, physical exam and reviewed data on this patient.  I have reviewed documentation done by me and nurse practitioner  and corrected as needed.  And agree with the different components of documentation.Greater than 50% of the time spent in the care of this patient was provided by attending consultant/me.      Is a 89-year-old with past medical history of CAD nonobstructive, A. fib hypertension dyslipidemia presented with complaint of progressive worsening  shortness of breath cough and congestion.  Was found to be positive for RSV.  CT chest showed right pleural effusion and right lower lobe pneumonia BNP is elevated.    Assessment:  :    Shortness of breath  Elevated proBNP  RSV pneumonia  Acute HFpEF  Right pleural effusion  Atrial fibrillation  Hypertension        Recommendations / Plan:        Telemetry to monitor rhythm  Diuresis as tolerated  Monitor renal function and urine output  Check an echocardiogram.  Patient has highly [id number] score of 8 due to female gender, age over 75, CVA, PAD CAD, hypertension, CHF, will benefit from long-term anticoagulation to prevent thromboembolic event  Patient is DNR status we will continue conservative management.  Patient's primary cardiologist will follow up in a.m.    CHADVASC2 SCORE   [id number] Score: 8 (10/2/2022  3:36 PM)             Diagnosis Plan   1. Acute on chronic congestive heart failure, unspecified heart failure type (HCC)     2. Acute UTI     3. Generalized weakness     4. Acute cough     5. Shortness of breath     6. Acute on chronic respiratory failure with hypoxia (HCC)                Past Medical History:  Past Medical History:   Diagnosis Date   • Atrial fibrillation (HCC)    • Coronary artery disease     Atrial fibrillation   • GERD (gastroesophageal reflux disease)    • Glaucoma    • Hiatal hernia with gastroesophageal reflux    • History of osteoporotic pathological fracture     Right intertroch (7/2019)   • Hx of compression fracture of spine     T12 and L1(2013); T6 (1/2022)   • Hyperlipidemia    • Hypertension    • Osteoarthritis    • Osteoporosis     on prolia(3/21/22)   • Stroke (HCC)     off and on dizziness from the stroke.     Past Surgical History:  Past Surgical History:   Procedure Laterality Date   • BACK SURGERY      kyphoplasty   • EYE SURGERY      cataract surgery   • FIXATION KYPHOPLASTY LUMBAR SPINE  2013   • HIP TROCHANTERIC NAILING WITH INTRAMEDULLARY HIP SCREW Right  7/20/2019    Procedure: HIP TROCHANTERIC NAILING SHORT WITH INTRAMEDULLARY HIP SCREW;  Surgeon: Edward Centeno MD;  Location: [facility] MAIN OR;  Service: Orthopedics   • RECTAL SURGERY      x 3      Allergies:  Allergies   Allergen Reactions   • Codeine Nausea And Vomiting   • Penicillins Hives   • Latex Rash     Home Meds:  Facility-Administered Medications Prior to Admission   Medication Dose Route Frequency Provider Last Rate Last Admin   • denosumab (PROLIA) syringe 60 mg  60 mg Subcutaneous Q6 Months Ashli Ott PA   60 mg at 09/22/22 1404     Medications Prior to Admission   Medication Sig Dispense Refill Last Dose   • B Complex Vitamins (VITAMIN B COMPLEX PO) Take 1 tablet by mouth Daily.      • Bacillus Coagulans-Inulin (ALIGN PREBIOTIC-PROBIOTIC PO) Take  by mouth.      • Biotin 00128 MCG tablet Take  by mouth.      • cholecalciferol (VITAMIN D3) 400 units tablet Take 400 Units by mouth Daily.      • digoxin (LANOXIN) 125 MCG tablet TAKE 1 TABLET BY MOUTH EVERY DAY 90 tablet 1 10/1/2022   • hydrocortisone 2.5 % cream Apply 1 application topically to the appropriate area as directed 2 (Two) Times a Day. 20 g 3 Past Month at Unknown time   • latanoprost (XALATAN) 0.005 % ophthalmic solution Administer 1 drop to the right eye Every Night.      • Lutein 20 MG capsule Take  by mouth.      • magnesium gluconate (MAGONATE) 500 MG tablet Take 27 mg by mouth 2 (Two) Times a Day.      • magnesium hydroxide (MILK OF MAGNESIA) 800 MG/5ML suspension Take 30 mL by mouth Daily As Needed for Constipation.   10/1/2022   • metoprolol succinate XL (TOPROL-XL) 25 MG 24 hr tablet TAKE 1 TABLET BY MOUTH TWICE A  tablet 1 10/1/2022   • NON FORMULARY 1 tablet 2 (Two) Times a Day. PHYTOESTROGEN      • O2 (OXYGEN) Inhale 2 L/min Continuous As Needed.   10/1/2022   • Sennosides 25 MG tablet Take 25 mg by mouth 2 (Two) Times a Day As Needed (constipation).      • vitamin C (ASCORBIC ACID) 500 MG tablet Take 500 mg  by mouth Daily.      • warfarin (COUMADIN) 2 MG tablet Take 2 tablets on Monday and Friday and 1 tablet the other 5 days a week. 120 tablet 0    • Zinc Sulfate (ZINC 15 PO) Take 2 tablets by mouth Daily. WITH ELDERBERRY--GUMMIES      • docusate sodium (COLACE) 100 MG capsule Take 200 mg by mouth 2 (Two) Times a Day As Needed for Constipation.   More than a month at Unknown time   • furosemide (LASIX) 20 MG tablet TAKE 1 TABLET BY MOUTH AS NEEDED (SWELLING). 90 tablet 2 More than a month at Unknown time   • KLOR-CON 10 MEQ CR tablet TAKE 1 TABLET BY MOUTH EVERY DAY (Patient taking differently: 10 mEq As Needed. ONLY WHEN TAKING WATER PILL) 90 tablet 1 More than a month at Unknown time     Current Meds:     Current Facility-Administered Medications:   •  acetaminophen (TYLENOL) tablet 650 mg, 650 mg, Oral, Q4H PRN, 650 mg at 10/02/22 0502 **OR** acetaminophen (TYLENOL) 160 MG/5ML solution 650 mg, 650 mg, Oral, Q4H PRN **OR** acetaminophen (TYLENOL) suppository 650 mg, 650 mg, Rectal, Q4H PRN, Eddie Yoder MD  •  albumin human 25 % IV SOLN 12.5 g, 12.5 g, Intravenous, Q8H, Eddie Yoder MD, 12.5 g at 10/02/22 1017  •  atorvastatin (LIPITOR) tablet 10 mg, 10 mg, Oral, Nightly, Eddie Yoder MD, 10 mg at 10/02/22 0502  •  azelastine (ASTELIN) nasal spray 1 spray, 1 spray, Each Nare, BID, Eddie Yoder MD, 1 spray at 10/02/22 1017  •  benzonatate (TESSALON) capsule 100 mg, 100 mg, Oral, TID PRN, Eddie Yoder MD  •  sennosides-docusate (PERICOLACE) 8.6-50 MG per tablet 2 tablet, 2 tablet, Oral, BID, 2 tablet at 10/02/22 1025 **AND** polyethylene glycol (MIRALAX) packet 17 g, 17 g, Oral, Daily PRN **AND** bisacodyl (DULCOLAX) EC tablet 5 mg, 5 mg, Oral, Daily PRN **AND** bisacodyl (DULCOLAX) suppository 10 mg, 10 mg, Rectal, Daily PRN, Eddie Yoder MD  •  cefTRIAXone (ROCEPHIN) 1 g in sodium chloride 0.9 % 100 mL IVPB, 1 g, Intravenous, Q24H, Eddie Yoder MD  •  dexamethasone (DECADRON) injection 6  mg, 6 mg, Intravenous, Daily, Eddie Yoder MD, 6 mg at 10/02/22 0501  •  digoxin (LANOXIN) tablet 125 mcg, 125 mcg, Oral, Daily, Eddie Yoder MD, 125 mcg at 10/02/22 1208  •  doxycycline (ADOXA) tablet 100 mg, 100 mg, Oral, Q12H, Eddie Yoder MD, 100 mg at 10/02/22 1017  •  furosemide (LASIX) injection 20 mg, 20 mg, Intravenous, Q8H, Eddie Yoder MD, 20 mg at 10/02/22 1025  •  guaiFENesin (MUCINEX) 12 hr tablet 600 mg, 600 mg, Oral, Q12H, Eddie Yoder MD, 600 mg at 10/02/22 1025  •  ipratropium-albuterol (DUO-NEB) nebulizer solution 3 mL, 3 mL, Nebulization, 4x Daily - RT, Eddie Yoder MD, 3 mL at 10/02/22 0736  •  Magnesium Sulfate 2 gram infusion - Mg less than or equal to 1.5 mg/dL, 2 g, Intravenous, PRN **OR** Magnesium Sulfate 1 gram infusion - Mg 1.6-1.9 mg/dL, 1 g, Intravenous, PRN, Eddie Yoder MD  •  melatonin tablet 5 mg, 5 mg, Oral, Nightly PRN, Eddie Yoder MD  •  metoprolol succinate XL (TOPROL-XL) 24 hr tablet 25 mg, 25 mg, Oral, Q24H, Eddie Yoder MD, 25 mg at 10/02/22 1025  •  nitroglycerin (NITROSTAT) SL tablet 0.4 mg, 0.4 mg, Sublingual, Q5 Min PRN, Eddie Yoder MD  •  ondansetron (ZOFRAN) tablet 4 mg, 4 mg, Oral, Q6H PRN **OR** ondansetron (ZOFRAN) injection 4 mg, 4 mg, Intravenous, Q6H PRN, Eddie Yoder MD  •  pantoprazole (PROTONIX) EC tablet 40 mg, 40 mg, Oral, Q AM, Eddie Yoder MD, 40 mg at 10/02/22 0505  •  Pharmacy to dose warfarin, , Does not apply, Continuous PRN, Eddie Yoder MD  •  potassium chloride (K-DUR,KLOR-CON) CR tablet 40 mEq, 40 mEq, Oral, PRN, Eddie Yoder MD  •  potassium chloride (KLOR-CON) packet 40 mEq, 40 mEq, Oral, PRN, Eddie Yoder MD  •  sodium chloride 0.9 % flush 10 mL, 10 mL, Intravenous, PRN, Meek Dalal PA  •  sodium chloride 0.9 % flush 10 mL, 10 mL, Intravenous, Q12H, Eddie Yoder MD, 10 mL at 10/02/22 1025  •  sodium chloride 0.9 % flush 10 mL, 10 mL, Intravenous, PRN, Eddie Yoder MD  Social  "History:   Social History     Tobacco Use   • Smoking status: Never Smoker   • Smokeless tobacco: Never Used   Substance Use Topics   • Alcohol use: No      Family History:  Family History   Problem Relation Age of Onset   • Heart disease Mother    • Hypertension Mother    • Osteoporosis Mother    • Cancer Father         colon cancer   • Osteoporosis Father    • Cancer Sister         lung   • Cancer Brother         colon cancer        Review of Systems : Review of Systems   Constitutional: Positive for malaise/fatigue.   HENT: Positive for congestion.    Cardiovascular: Positive for dyspnea on exertion.   Respiratory: Positive for cough, shortness of breath and sputum production.    Musculoskeletal: Positive for arthritis.   Gastrointestinal: Positive for anorexia.   Neurological: Positive for weakness.   All other systems reviewed and are negative.               Constitutional:  Temp:  [97.2 °F (36.2 °C)-99.1 °F (37.3 °C)] 97.2 °F (36.2 °C)  Heart Rate:  [70-94] 77  Resp:  [18-20] 20  BP: (107-143)/(51-75) 107/53    Physical Exam   /53 (BP Location: Left arm, Patient Position: Lying)   Pulse 77   Temp 97.2 °F (36.2 °C) (Oral)   Resp 20   Ht 157.5 cm (62\")   Wt 49.9 kg (110 lb)   SpO2 97%   BMI 20.12 kg/m²   Physical Exam  General:  Appears in no acute distress  Eyes: Sclera is anicteric,  conjunctiva is clear   HEENT:  No JVD. Thyroid not visibly enlarged. No mucosal pallor or cyanosis  Respiratory: Nonlabored breathing, scattered rhonchi  Cardiovascular: S1,S2 Regular rate and rhythm.  2/6 holosystolic murmur, no rub or gallop auscultated. No pretibial pitting edema  Gastrointestinal: Abdomen soft, flat, non tender. Bowel sounds present.   Musculoskeletal:  No abnormal movements  Extremities: No digital clubbing or cyanosis  Skin: Color pink. Skin warm and dry to touch. No rashes  No xanthoma  Neuro: Alert and awake, no lateralizing deficits appreciated    Cardiographics  ECG: EKG tracing was  " personally reviewed/interpreted by me  ECG 12 Lead   Preliminary Result   HEART RATE= 82  bpm   RR Interval= 732  ms   MD Interval=   ms   P Horizontal Axis=   deg   P Front Axis=   deg   QRSD Interval= 92  ms   QT Interval= 340  ms   QRS Axis= 188  deg   T Wave Axis= -26  deg   - ABNORMAL ECG -   Atrial fibrillation   Nonspecific T abnrm, anterolateral leads   When compared with ECG of 29-Jan-2022 16:01:10,   Significant repolarization change   Significant axis, voltage or hypertrophy change   Electronically Signed By:    Date and Time of Study: 2022-10-01 20:22:21                Telemetry: Atrial fibrillation with controlled rates    Echocardiogram:   Results for orders placed during the hospital encounter of 08/18/21    Adult Transthoracic Echo Complete W/ Cont if Necessary Per Protocol    Interpretation Summary  · Left ventricular ejection fraction appears to be 56 - 60%.  · Moderate tricuspid valve regurgitation is present.  · No pericardial effusion noted      Imaging  Chest X-ray:   Imaging Results (Last 24 Hours)     Procedure Component Value Units Date/Time    CT Angiogram Chest Pulmonary Embolism [[id number]] Collected: 10/01/22 2328     Updated: 10/01/22 2343    Narrative:      Probable EXAM:  CTA CHEST WITH IV CONTRAST, CT PULMONARY ANGIOGRAM  DATE: 10/1/2022 11:01 PM    HISTORY: FATIGUE, COUGH,     TECHNIQUE: CTA examination of the chest was performed following the intravenous administration of 100 mL Isovue 370. Sagittal, coronal and 3-D reformatted images were provided. CT dose lowering techniques were used, to include: automated exposure   control, adjustment for patient size, and or use of iterative reconstruction.    COMPARISON:  9/28/2020.      FINDINGS:    THYROID: Normal.     LUNGS/PLEURA: Large right pleural effusion (4 Hounsfield units), adjacent atelectasis/consolidation. Left lung base atelectasis near hernia. Anterior right upper lung and right lung apex subpleural reticulation. Suggestion  of septal thickening    MEDIASTINUM/CLAUS: Normal.    CARDIOVASCULAR:  Enlarged heart.   Unremarkable aorta.  Normal pulmonary arteries.    CHEST WALL: Normal.     MUSCULOSKELETAL: Moderate T6 compression deformity. T12 vertebroplasty.    UPPER ABDOMEN: Large hiatus hernia, with the near entirety of the rotated stomach in the thorax.        Impression:        1.  Large right pleural effusion, adjacent atelectasis/consolidation, there could be superimposed infection.  2.  Large hiatus hernia  3.  Right upper lung subpleural reticulation, pleural thickening, consider scarring, perhaps from prior insult   4.  Cardiomegaly, suggestion of pulmonary. Question heart failure.  5.  No pulmonary embolus.   6.  Unremarkable aorta    Electronically signed by:  Seema Chang M.D.    10/1/2022 9:41 PM    XR Chest 1 View [[id number]] Collected: 10/01/22 2319     Updated: 10/01/22 2322    Narrative:      EXAMINATION: XR CHEST 1 VW      DATE: 10/1/2022 8:17 PM    INDICATIONS: Altered mental status.    COMPARISON: September 9, 2020    FINDINGS:    Stable cardiomegaly.    A right-sided pleural fluid collection has increased since the prior examination and is now moderate. A trace left pleural fluid collection may be present.    Atelectasis versus pneumonia in the right lower lung.    Stable aorta and mediastinum. Stable osseous structures.    Large hiatal hernia was better demonstrated on the prior examination.      Impression:        1. Stable cardiomegaly.  2. A moderate right pleural fluid collection has increased since the prior examination.  3. Atelectasis versus pneumonia in the right lower lung.  4. A large hiatal hernia was better demonstrated on the prior chest x-ray.    Electronically signed by:  Eddie Ortez M.D.    10/1/2022 9:21 PM    CT Head Without Contrast [[id number]] Collected: 10/01/22 2317     Updated: 10/01/22 2319    Narrative:      EXAMINATION: CT HEAD WO CONTRAST    DATE: 10/1/2022 11:00 PM     INDICATION:  Generalized weakness, fatigue     COMPARISON: CT from 5/16/2018     TECHNIQUE: Thin section noncontrast axial images were obtained through the head. Coronal reformats were created.  CT dose lowering techniques were used, to include: automated exposure control, adjustment for patient size, and or use of iterative   reconstruction.     FINDINGS:     Intracranial contents:    No acute intracranial hemorrhage, evidence of acute territorial infarct, mass, mass effect or hydrocephalus. Mild global cerebral volume loss. Mild chronic small vessel ischemic changes in the white matter and mild intracranial atherosclerosis.    Bones and extracranial soft tissues:     No fracture or focal osseous lesion in the calvarium or skull base. Moderate mucosal thickening in bilateral anterior ethmoid air cells and left sphenoid sinus. Mild mucosal thickening in the bilateral maxillary sinuses and right sphenoid sinus. Mastoid   air cells and middle ear cavities are clear bilaterally. No acute findings in the orbits.         Impression:        1. No acute intracranial abnormality.  2. Mild chronic age-related intracranial findings as above.  3. Moderate mucosal thickening in the paranasal sinuses.         This examination was interpreted by Sekou Lal M.D.    Electronically signed by:  Sekou Lal M.D.    10/1/2022 9:18 PM          Lab Review: I have reviewed the labs  Results from last 7 days   Lab Units 10/01/22  2156   TROPONIN T ng/mL <0.010     Results from last 7 days   Lab Units 10/02/22  0706   MAGNESIUM mg/dL 2.5*     Results from last 7 days   Lab Units 10/02/22  0706   SODIUM mmol/L 131*   POTASSIUM mmol/L 3.6   BUN mg/dL 20   CREATININE mg/dL 0.61   CALCIUM mg/dL 8.1*         Results from last 7 days   Lab Units 10/01/22  2024   PROBNP pg/mL 5,393.0*     Results from last 7 days   Lab Units 10/02/22  0706 10/01/22  2024   WBC 10*3/mm3 7.60 8.40   HEMOGLOBIN g/dL 12.4 12.7   HEMATOCRIT % 37.5 38.3   PLATELETS  10*3/mm3 140 148     Results from last 7 days   Lab Units 10/02/22  0706 10/01/22  2024 09/27/22  0000   INR  2.56 2.98 2.40             Nadir Harris MD  10/2/2022, 15:37 EDT      EMR Dragon/Transcription:   Dictated utilizing Dragon dictation

## 2022-10-02 NOTE — THERAPY EVALUATION
Patient Name: Shruthi Amin  : 6/10/1928    MRN: [id number]                              Today's Date: 10/2/2022       Admit Date: 10/1/2022    Visit Dx:     ICD-10-CM ICD-9-CM   1. Acute on chronic congestive heart failure, unspecified heart failure type (HCC)  I50.9 428.0   2. Acute UTI  N39.0 599.0   3. Generalized weakness  R53.1 780.79   4. Acute cough  R05.1 786.2   5. Shortness of breath  R06.02 786.05   6. Acute on chronic respiratory failure with hypoxia (Formerly [facility])  J96.21 518.84     799.02     Patient Active Problem List   Diagnosis   • Atrial fibrillation (CMS/HCC) [I48.91]   • Chronic anticoagulation   • Back pain   • Cerebrovascular accident (CVA) (Formerly [facility])   • Hiatal hernia   • Hyperlipidemia   • Hypertension   • Osteoarthritis   • Osteoporosis   • Hypoxemia   • Compression fracture of thoracic vertebra with delayed healing   • History of osteoporotic pathological fracture   • Family hx osteoporosis   • Skin lesion   • Acute on chronic congestive heart failure, unspecified heart failure type (HCC)   • Acute on chronic respiratory failure with hypoxia ([facility]ly [facility])   • Scarring of lung   • UTI (urinary tract infection) due to urinary indwelling catheter ([facility]ly [facility])   • Pleural effusion   • Pneumonia due to infectious organism   • Hypoalbuminemia   • General weakness     Past Medical History:   Diagnosis Date   • Atrial fibrillation (Formerly [facility])    • Coronary artery disease     Atrial fibrillation   • GERD (gastroesophageal reflux disease)    • Glaucoma    • Hiatal hernia with gastroesophageal reflux    • History of osteoporotic pathological fracture     Right intertroch (2019)   • Hx of compression fracture of spine     T12 and L1(); T6 (2022)   • Hyperlipidemia    • Hypertension    • Osteoarthritis    • Osteoporosis     on prolia(3/21/22)   • Stroke (Formerly [facility])     off and on dizziness from the stroke.     Past Surgical History:   Procedure Laterality Date   • BACK SURGERY      kyphoplasty   • EYE SURGERY      cataract  surgery   • FIXATION KYPHOPLASTY LUMBAR SPINE  2013   • HIP TROCHANTERIC NAILING WITH INTRAMEDULLARY HIP SCREW Right 7/20/2019    Procedure: HIP TROCHANTERIC NAILING SHORT WITH INTRAMEDULLARY HIP SCREW;  Surgeon: Edward Centeno MD;  Location: [facility] MAIN OR;  Service: Orthopedics   • RECTAL SURGERY      x 3      General Information     Row Name 10/02/22 1602          General Information    Prior Level of Function independent:;all household mobility;mod assist:;home management;min assist:;ADL's  -     Existing Precautions/Restrictions fall  -     Barriers to Rehab medically complex;previous functional deficit  -     Row Name 10/02/22 1602          Living Environment    People in Home child(juan francisco), adult  -     Row Name 10/02/22 1602          Home Main Entrance    Number of Stairs, Main Entrance two  -     Row Name 10/02/22 1602          Stairs Within Home, Primary    Number of Stairs, Within Home, Primary none  -     Row Name 10/02/22 1602          Cognition    Orientation Status (Cognition) oriented x 4  -     Row Name 10/02/22 1602          Safety Issues, Functional Mobility    Impairments Affecting Function (Mobility) endurance/activity tolerance  -           User Key  (r) = Recorded By, (t) = Taken By, (c) = Cosigned By    Initials Name Provider Type     Dorene Conner OT Occupational Therapist                 Mobility/ADL's     Row Name 10/02/22 1603          Bed Mobility    Bed Mobility supine-sit-supine  -     Supine-Sit-Supine [place] (Bed Mobility) contact guard;set up  -     Assistive Device (Bed Mobility) head of bed elevated;bed rails  -     Comment, (Bed Mobility) has adjustible bed at home  -     Row Name 10/02/22 1603          Transfers    Transfers sit-stand transfer;toilet transfer  -     Bed-Chair [place] (Transfers) set up;verbal cues;contact guard;1 person to manage equipment  -     Assistive Device (Bed-Chair Transfers) walker, front-wheeled  -      Sit-Stand [place] (Transfers) contact guard  -     [place] Level (Toilet Transfer) 1 person to manage equipment;contact guard  -[facility] Name 10/02/22 1603          Sit-Stand Transfer    Assistive Device (Sit-Stand Transfers) walker, front-wheeled  -[facility] Name 10/02/22 1603          Toilet Transfer    Comment, (Toilet Transfer) incontinent during transfer. Pt was aware. Donned brief during cleanup to manage addittional incidence of incontinence if occurring during functional mobility.  -MH     Row Name 10/02/22 1603          Functional Mobility    Functional Mobility- Ind. Level contact guard assist;1 person + 1 person to manage equipment  -     Functional Mobility- Device walker, front-wheeled  -     Functional Mobility- Comment too weak still to walk more than a few feet. Kept within O2 line length.  -MH     Row Name 10/02/22 1603          Activities of Daily Living    BADL Assessment/Intervention lower body dressing;toileting;bathing;upper body dressing;grooming;feeding  -MH     Row Name 10/02/22 1603          Lower Body Dressing Assessment/Training    [place] Level (Lower Body Dressing) doff;don;pants/bottoms;socks;moderate assist (50% patient effort)  -[facility] Name 10/02/22 1603          Toileting Assessment/Training    [place] Level (Toileting) adjust/manage clothing;perform perineal hygiene;minimum assist (75% patient effort)  -MH     Row Name 10/02/22 1603          Bathing Assessment/Intervention    [place] Level (Bathing) bathing skills;maximum assist (25% patient effort)  -MH     Row Name 10/02/22 1603          Upper Body Dressing Assessment/Training    [place] Level (Upper Body Dressing) upper body dressing skills;maximum assist (25% patient effort)  -MH     Row Name 10/02/22 1603          Grooming Assessment/Training    [place] Level (Grooming) hair care, combing/brushing;maximum assist (25% patient effort);wash face, hands;set up  -MH     Row Name  10/02/22 1603          Self-Feeding Assessment/Training    [place] Level (Feeding) feeding skills;set up  -           User Key  (r) = Recorded By, (t) = Taken By, (c) = Cosigned By    Initials Name Provider Type     Dorene Conner OT Occupational Therapist               Obj/Interventions     [facility] Name 10/02/22 1606          Sensory Assessment (Somatosensory)    Sensory Assessment (Somatosensory) sensation intact  -     Row Name 10/02/22 1606          Vision Assessment/Intervention    Visual Impairment/Limitations corrective lenses full-time  -     Row Name 10/02/22 1606          Range of Motion Comprehensive    General Range of Motion no range of motion deficits identified  -[facility] Name 10/02/22 1606          Strength Comprehensive (MMT)    Comment, General Manual Muscle Testing (MMT) Assessment moderate global deconditioning  -     Row Name 10/02/22 1606          Balance    Balance Assessment sitting static balance;sitting dynamic balance;standing static balance;standing dynamic balance  -     Static Sitting Balance standby assist  -     Dynamic Sitting Balance standby assist  -     Static Standing Balance standby assist;set-up  -     Dynamic Standing Balance contact guard;1 person to manage equipment  -     Position/Device Used, Standing Balance supported;walker, rolling  -           User Key  (r) = Recorded By, (t) = Taken By, (c) = Cosigned By    Initials Name Provider Type     Dorene Conner OT Occupational Therapist               Goals/Plan     Row Name 10/02/22 1611          Bathing Goal 1 (OT)    Activity/Device (Bathing Goal 1, OT) bathing skills, all;grab bar, tub/shower;shower chair  -     [place] Level/Cues Needed (Bathing Goal 1, OT) minimum assist (75% or more patient effort)  -     Time Frame (Bathing Goal 1, OT) 2 weeks  -     Row Name 10/02/22 1611          Dressing Goal 1 (OT)    Activity/Device (Dressing Goal 1, OT) dressing skills, all  -      [place]/Cues Needed (Dressing Goal 1, OT) minimum assist (75% or more patient effort)  -     Time Frame (Dressing Goal 1, OT) 2 weeks  -     Row Name 10/02/22 1611          Toileting Goal 1 (OT)    Activity/Device (Toileting Goal 1, OT) toileting skills, all;grab bar/safety frame;raised toilet seat  -     [place] Level/Cues Needed (Toileting Goal 1, OT) set-up required  -     Time Frame (Toileting Goal 1, OT) 2 weeks  -     Row Name 10/02/22 1611          Therapy Assessment/Plan (OT)    Planned Therapy Interventions (OT) activity tolerance training;adaptive equipment training;BADL retraining;functional balance retraining;occupation/activity based interventions;patient/caregiver education/training;transfer/mobility retraining;ROM/therapeutic exercise  -           User Key  (r) = Recorded By, (t) = Taken By, (c) = Cosigned By    Initials Name Provider Type     Dorene Conner, OT Occupational Therapist               Clinical Impression     Row Name 10/02/22 1604          Pain Assessment    Pretreatment Pain Rating 0/10 - no pain  -     Posttreatment Pain Rating 0/10 - no pain  -     Row Name 10/02/22 160          Plan of Care Review    Plan of Care Reviewed With patient  -     Progress improving  -     Outcome Evaluation Pt was admitted after 3 days of increasing weakness and 5 days of coughing, Dx with acute on chronic respiratory failure with worsening hypoxia -patient has an underlying pulmonary scarring from a previous pneumonia and has oxygen available as needed - due to RSV infection. Normally she does not require oxygen but for the past 2 days she has needed it all the time at home per ER note. Pt has good family support and lives with her dtr who assists her as needed. Typically pt is walking in the home w/ RW without assist. Pt demonstrates an acute decline in her ADL skill and basic mobility. Would recommend home with [facility] and family assist.  -     Row Name 10/02/22 1253           Therapy Assessment/Plan (OT)    Rehab Potential (OT) good, to achieve stated therapy goals  -     Criteria for Skilled Therapeutic Interventions Met (OT) skilled treatment is necessary  -     Therapy Frequency (OT) 5 times/wk  -     Predicted Duration of Therapy Intervention (OT) until d/c  -     Row Name 10/02/22 1608          Therapy Plan Review/Discharge Plan (OT)    Anticipated Discharge Disposition (OT) home with home health;home with assist  -     Row Name 10/02/22 1608          Vital Signs    Pre Systolic BP Rehab 115  -MH     Pre Treatment Diastolic BP 58  -MH     Pretreatment Heart Rate (beats/min) 90  -MH     Intratreatment Heart Rate (beats/min) 104  -MH     Posttreatment Heart Rate (beats/min) 93  -MH     Pre SpO2 (%) 97  -MH     O2 Delivery Pre Treatment supplemental O2  -     O2 Delivery Intra Treatment supplemental O2  -     O2 Delivery Post Treatment supplemental O2  -MH     Pre Patient Position Supine  -     Intra Patient Position Standing  -     Post Patient Position Sitting  -     Row Name 10/02/22 1608          Positioning and Restraints    Pre-Treatment Position in bed  -     Post Treatment Position bed  -MH     In Bed notified nsg;fowlers;exit alarm on;encouraged to call for assist;call light within reach  -           User Key  (r) = Recorded By, (t) = Taken By, (c) = Cosigned By    Initials Name Provider Type     Dorene Conner, SAMREEN Occupational Therapist               Outcome Measures     Row Name 10/02/22 1126          How much help from another person do you currently need...    Turning from your back to your side while in flat bed without using bedrails? 3  -CL     Moving from lying on back to sitting on the side of a flat bed without bedrails? 3  -CL     Moving to and from a bed to a chair (including a wheelchair)? 3  -CL     Standing up from a chair using your arms (e.g., wheelchair, bedside chair)? 3  -CL     Climbing 3-5 steps with a railing? 1  -CL      To walk in hospital room? 3  -CL     AM-PAC 6 Clicks Score (PT) 16  -CL     Highest level of mobility 5 --> Static standing  -CL     Row Name 10/02/22 1126          Functional Assessment    Outcome Measure Options AM-PAC 6 Clicks Basic Mobility (PT)  -CL           User Key  (r) = Recorded By, (t) = Taken By, (c) = Cosigned By    Initials Name Provider Type    Rehana Pena PT Physical Therapist                Occupational Therapy Education                 Title: PT OT SLP Therapies (In Progress)     Topic: Occupational Therapy (In Progress)     Point: ADL training (Done)     Description:   Instruct learner(s) on proper safety adaptation and remediation techniques during self care or transfers.   Instruct in proper use of assistive devices.              Learning Progress Summary           Patient Acceptance, E,TB, VU,DU,NR by  at 10/2/2022 1612                   Point: Home exercise program (Not Started)     Description:   Instruct learner(s) on appropriate technique for monitoring, assisting and/or progressing therapeutic exercises/activities.              Learner Progress:  Not documented in this visit.          Point: Precautions (Done)     Description:   Instruct learner(s) on prescribed precautions during self-care and functional transfers.              Learning Progress Summary           Patient Acceptance, E,TB, VU,DU,NR by  at 10/2/2022 1612                   Point: Body mechanics (Done)     Description:   Instruct learner(s) on proper positioning and spine alignment during self-care, functional mobility activities and/or exercises.              Learning Progress Summary           Patient Acceptance, E,TB, VU,DU,NR by  at 10/2/2022 1612                               User Key     Initials Effective Dates Name Provider Type Discipline     06/16/21 -  Dorene Conner OT Occupational Therapist OT              OT Recommendation and Plan  Planned Therapy Interventions (OT): activity tolerance  training, adaptive equipment training, BADL retraining, functional balance retraining, occupation/activity based interventions, patient/caregiver education/training, transfer/mobility retraining, ROM/therapeutic exercise  Therapy Frequency (OT): 5 times/wk  Plan of Care Review  Plan of Care Reviewed With: patient  Progress: improving  Outcome Evaluation: Pt was admitted after 3 days of increasing weakness and 5 days of coughing, Dx with acute on chronic respiratory failure with worsening hypoxia -patient has an underlying pulmonary scarring from a previous pneumonia and has oxygen available as needed - due to RSV infection. Normally she does not require oxygen but for the past 2 days she has needed it all the time at home per ER note. Pt has good family support and lives with her dtr who assists her as needed. Typically pt is walking in the home w/ RW without assist. Pt demonstrates an acute decline in her ADL skill and basic mobility. Would recommend home with C and family assist.     Time Calculation:    Time Calculation- OT     Row Name 10/02/22 1613             Time Calculation-     OT Start Time 0906  -      OT Stop Time 0940  -      OT Time Calculation (min) 34 min  -      Total Timed Code Minutes- OT 15 minute(s)  -      OT Received On 10/02/22  -      OT - Next Appointment 10/04/22  -      OT Goal Re-Cert Due Date 10/16/22  -            User Key  (r) = Recorded By, (t) = Taken By, (c) = Cosigned By    Initials Name Provider Type     Dorene Conner OT Occupational Therapist              Therapy Charges for Today     Code Description Service Date Service Provider Modifiers Qty    [id number]  OT SELF CARE/MGMT/TRAIN EA 15 MIN 10/2/2022 Dorene Conner OT GO 1    [id number]  OT EVAL MOD COMPLEXITY 3 10/2/2022 Dorene Conner OT GO 1               Dorene Conner OT  10/2/2022

## 2022-10-02 NOTE — PLAN OF CARE
Goal Outcome Evaluation:  Plan of Care Reviewed With: patient           Outcome Evaluation: Pt is a 89 y.o. female with 5 day hx of cough and congestion admitted with acute on chronic resp failure and found to be positive for RSV.  PMH is significant for Afib, CHF, MVR osteoporosis with compression fx T12-L1 an T6. Pt lives at home with her daughter in a H with 1-2 LINO. Pt is normally independent with mobility using RW; she uses an adjustable bed at night.  Daughter does grocery shopping and meal prep; pt assists as she feels able. At time of evaluation, pt needed CGA for bed mobility and gait with RW requiring min A for transfers due to low endurance. She is currently maintaining SpO2 > 95% on 3L throughout session; she has used O2 at home in the past.  She is close to baseline and reports having family support. She would benefit from  PT at discharge.

## 2022-10-02 NOTE — THERAPY EVALUATION
Patient Name: Shruthi Amin  : 6/10/1928    MRN: [id number]                              Today's Date: 10/2/2022       Admit Date: 10/1/2022    Visit Dx:     ICD-10-CM ICD-9-CM   1. Acute on chronic congestive heart failure, unspecified heart failure type (HCC)  I50.9 428.0   2. Acute UTI  N39.0 599.0   3. Generalized weakness  R53.1 780.79   4. Acute cough  R05.1 786.2   5. Shortness of breath  R06.02 786.05   6. Acute on chronic respiratory failure with hypoxia ([facility])  J96.21 518.84     799.02     Patient Active Problem List   Diagnosis   • Atrial fibrillation (CMS/HCC) [I48.91]   • Chronic anticoagulation   • Back pain   • Cerebrovascular accident (CVA) ([facility])   • Hiatal hernia   • Hyperlipidemia   • Hypertension   • Osteoarthritis   • Osteoporosis   • Hypoxemia   • Compression fracture of thoracic vertebra with delayed healing   • History of osteoporotic pathological fracture   • Family hx osteoporosis   • Skin lesion   • Acute on chronic congestive heart failure, unspecified heart failure type (HCC)   • Acute on chronic respiratory failure with hypoxia ([facility])   • Scarring of lung   • UTI (urinary tract infection) due to urinary indwelling catheter ([facility])   • Pleural effusion   • Pneumonia due to infectious organism   • Hypoalbuminemia   • General weakness     Past Medical History:   Diagnosis Date   • Atrial fibrillation ([facility])    • Coronary artery disease     Atrial fibrillation   • GERD (gastroesophageal reflux disease)    • Glaucoma    • Hiatal hernia with gastroesophageal reflux    • History of osteoporotic pathological fracture     Right intertroch (2019)   • Hx of compression fracture of spine     T12 and L1(); T6 (2022)   • Hyperlipidemia    • Hypertension    • Osteoarthritis    • Osteoporosis     on prolia(3/21/22)   • Stroke ([facility])     off and on dizziness from the stroke.     Past Surgical History:   Procedure Laterality Date   • BACK SURGERY      kyphoplasty   • EYE SURGERY      cataract  surgery   • FIXATION KYPHOPLASTY LUMBAR SPINE  2013   • HIP TROCHANTERIC NAILING WITH INTRAMEDULLARY HIP SCREW Right 7/20/2019    Procedure: HIP TROCHANTERIC NAILING SHORT WITH INTRAMEDULLARY HIP SCREW;  Surgeon: Edward Centeno MD;  Location: [facility] MAIN OR;  Service: Orthopedics   • RECTAL SURGERY      x 3      General Information     Row Name 10/02/22 1114          Physical Therapy Time and Intention    Document Type evaluation  -CL     Mode of Treatment physical therapy;occupational therapy  -CL     Row Name 10/02/22 1114          General Information    Patient Profile Reviewed yes  -CL     Prior Level of Function independent:;all household mobility;gait;transfer  Uses RW; Daughter cooks - pt occasionally helps with meal prep  -CL     Existing Precautions/Restrictions fall  -CL     Barriers to Rehab medically complex  -CL     Row Name 10/02/22 1114          Living Environment    People in Home child(juan francisco), adult  -CL     Row Name 10/02/22 1114          Home Main Entrance    Number of Stairs, Main Entrance two  -CL     Row Name 10/02/22 1114          Stairs Within Home, Primary    Number of Stairs, Within Home, Primary none  -CL     Row Name 10/02/22 1114          Cognition    Orientation Status (Cognition) oriented x 4  -CL     Row Name 10/02/22 1114          Safety Issues, Functional Mobility    Impairments Affecting Function (Mobility) endurance/activity tolerance  -CL           User Key  (r) = Recorded By, (t) = Taken By, (c) = Cosigned By    Initials Name Provider Type    CL Rehana Moses PT Physical Therapist               Mobility     Row Name 10/02/22 1116          Bed Mobility    Bed Mobility supine-sit-supine  -CL     Supine-Sit-Supine [place] (Bed Mobility) contact guard  -CL     Assistive Device (Bed Mobility) bed rails  -CL     Row Name 10/02/22 1116          Bed-Chair Transfer    Bed-Chair [place] (Transfers) minimum assist (75% patient effort)  -CL     Assistive Device  (Bed-Chair Transfers) walker, front-wheeled  -CL     Row Name 10/02/22 1116          Sit-Stand Transfer    Sit-Stand [place] (Transfers) contact guard  -CL     Row Name 10/02/22 1116          Gait/Stairs (Locomotion)    [place] Level (Gait) contact guard;minimum assist (75% patient effort)  -CL     Assistive Device (Gait) walker, front-wheeled  -CL     Distance in Feet (Gait) 5' x 2  -CL           User Key  (r) = Recorded By, (t) = Taken By, (c) = Cosigned By    Initials Name Provider Type    CL Rehana Moses, PT Physical Therapist               Obj/Interventions     Row Name 10/02/22 1117          Range of Motion Comprehensive    General Range of Motion bilateral lower extremity ROM WFL  -CL     Row Name 10/02/22 1117          Strength Comprehensive (MMT)    Comment, General Manual Muscle Testing (MMT) Assessment BLEs grossly 4+/5  -CL     Row Name 10/02/22 1117          Balance    Balance Assessment sitting dynamic balance;standing static balance  -CL     Dynamic Sitting Balance modified independence  -CL     Position, Sitting Balance sitting edge of bed  -CL     Static Standing Balance contact guard  -CL     Position/Device Used, Standing Balance supported  -CL     Balance Interventions dynamic  -CL     Row Name 10/02/22 1117          Sensory Assessment (Somatosensory)    Sensory Assessment (Somatosensory) sensation intact  -CL           User Key  (r) = Recorded By, (t) = Taken By, (c) = Cosigned By    Initials Name Provider Type    CL Rehana Moses, PT Physical Therapist               Goals/Plan     Row Name 10/02/22 1123          Bed Mobility Goal 1 (PT)    Activity/Assistive Device (Bed Mobility Goal 1, PT) bed mobility activities, all  -CL     [place] Level/Cues Needed (Bed Mobility Goal 1, PT) independent  -CL     Time Frame (Bed Mobility Goal 1, PT) long term goal (LTG);2 weeks  -CL     Row Name 10/02/22 1123          Transfer Goal 1 (PT)    Activity/Assistive Device (Transfer Goal  1, PT) transfers, all  -CL     [place] Level/Cues Needed (Transfer Goal 1, PT) modified independence  -CL     Time Frame (Transfer Goal 1, PT) long term goal (LTG);2 weeks  -CL     Row Name 10/02/22 1123          Gait Training Goal 1 (PT)    Activity/Assistive Device (Gait Training Goal 1, PT) gait (walking locomotion);assistive device use  -CL     [place] Level (Gait Training Goal 1, PT) modified independence  -CL     Distance (Gait Training Goal 1, PT) 50'  -CL     Time Frame (Gait Training Goal 1, PT) long term goal (LTG);2 weeks  -CL     Row Name 10/02/22 1123          Stairs Goal 1 (PT)    Activity/Assistive Device (Stairs Goal 1, PT) ascending stairs;descending stairs  -CL     Number of Stairs (Stairs Goal 1, PT) 2  -CL     Time Frame (Stairs Goal 1, PT) long term goal (LTG);2 weeks  -CL     Row Name 10/02/22 1123          Therapy Assessment/Plan (PT)    Planned Therapy Interventions (PT) balance training;bed mobility training;gait training;patient/family education;stair training;strengthening;transfer training  -CL           User Key  (r) = Recorded By, (t) = Taken By, (c) = Cosigned By    Initials Name Provider Type    Rehana Pena, PT Physical Therapist               Clinical Impression     Row Name 10/02/22 1118          Pain    Pretreatment Pain Rating 0/10 - no pain  -CL     Posttreatment Pain Rating 0/10 - no pain  -CL     Row Name 10/02/22 1118          Plan of Care Review    Plan of Care Reviewed With patient  -CL     Outcome Evaluation Pt is a 89 y.o. female with 5 day hx of cough and congestion admitted with acute on chronic resp failure and found to be positive for RSV.  PMH is significant for Afib, CHF, MVR osteoporosis with compression fx T12-L1 an T6. Pt lives at home with her daughter in a H with 1-2 LINO. Pt is normally independent with mobility using RW; she uses an adjustable bed at night. Daughter preps and cooks all meals with pt helping as she feels able.   At time of  evaluation, pt needed CGA for bed mobility and gait with RW requiring min A for transfers due to low endurance. She is currently maintaining SpO2 > 95% on 3L throughout session; she has used O2 at home in the past.  She is close to baseline and reports having family support. She would benefit from  PT at discharge.  -CL     Row Name 10/02/22 1118          Therapy Assessment/Plan (PT)    Rehab Potential (PT) good, to achieve stated therapy goals  -CL     Criteria for Skilled Interventions Met (PT) yes;skilled treatment is necessary  -CL     Therapy Frequency (PT) 5 times/wk  -CL     Predicted Duration of Therapy Intervention (PT) Until discharge  -CL     Row Name 10/02/22 1118          Vital Signs    Pre Systolic BP Rehab 115  -CL     Pre Treatment Diastolic BP 58  -CL     Pretreatment Heart Rate (beats/min) 90  -CL     Intratreatment Heart Rate (beats/min) 96  -CL     Posttreatment Heart Rate (beats/min) 90  -CL     Pre SpO2 (%) 97  -CL     O2 Delivery Pre Treatment supplemental O2  3L  -CL     Row Name 10/02/22 1118          Positioning and Restraints    Pre-Treatment Position in bed  -CL     Post Treatment Position bed  -CL     In Bed notified nsg;supine;call light within reach;exit alarm on;encouraged to call for assist  -CL           User Key  (r) = Recorded By, (t) = Taken By, (c) = Cosigned By    Initials Name Provider Type    CL Rehana Moses, PT Physical Therapist               Outcome Measures     Row Name 10/02/22 1126          How much help from another person do you currently need...    Turning from your back to your side while in flat bed without using bedrails? 3  -CL     Moving from lying on back to sitting on the side of a flat bed without bedrails? 3  -CL     Moving to and from a bed to a chair (including a wheelchair)? 3  -CL     Standing up from a chair using your arms (e.g., wheelchair, bedside chair)? 3  -CL     Climbing 3-5 steps with a railing? 1  -CL     To walk in hospital room? 3   -CL     AM-PAC 6 Clicks Score (PT) 16  -CL     Highest level of mobility 5 --> Static standing  -CL     Row Name 10/02/22 1126          Functional Assessment    Outcome Measure Options AM-PAC 6 Clicks Basic Mobility (PT)  -CL           User Key  (r) = Recorded By, (t) = Taken By, (c) = Cosigned By    Initials Name Provider Type    CL Rehana Moses, PT Physical Therapist                             Physical Therapy Education                 Title: PT OT SLP Therapies (Done)     Topic: Physical Therapy (Done)     Point: Mobility training (Done)     Learning Progress Summary           Patient Acceptance, E,TB, VU by CL at 10/2/2022 1126                   Point: Home exercise program (Done)     Learning Progress Summary           Patient Acceptance, E,TB, VU by CL at 10/2/2022 1126                   Point: Body mechanics (Done)     Learning Progress Summary           Patient Acceptance, E,TB, VU by CL at 10/2/2022 1126                   Point: Precautions (Done)     Learning Progress Summary           Patient Acceptance, E,TB, VU by CL at 10/2/2022 1126                               User Key     Initials Effective Dates Name Provider Type Discipline     03/02/22 -  Rehana Moses PT Physical Therapist PT              PT Recommendation and Plan  Planned Therapy Interventions (PT): balance training, bed mobility training, gait training, patient/family education, stair training, strengthening, transfer training  Plan of Care Reviewed With: patient  Outcome Evaluation: Pt is a 89 y.o. female with 5 day hx of cough and congestion admitted with acute on chronic resp failure and found to be positive for RSV.  PMH is significant for Afib, CHF, MVR osteoporosis with compression fx T12-L1 an T6. Pt lives at home with her daughter in a H with 1-2 LINO. Pt is normally independent with mobility using RW; she uses an adjustable bed at night. Daughter preps and cooks all meals with pt helping as she feels able.   At time  of evaluation, pt needed CGA for bed mobility and gait with RW requiring min A for transfers due to low endurance. She is currently maintaining SpO2 > 95% on 3L throughout session; she has used O2 at home in the past.  She is close to baseline and reports having family support. She would benefit from  PT at discharge.     Time Calculation:    PT Charges     Row Name 10/02/22 1129             Time Calculation    Start Time 0906  -CL      Stop Time 0941  -CL      Time Calculation (min) 35 min  -CL      PT Received On 10/02/22  -CL      PT - Next Appointment 10/03/22  -CL      PT Goal Re-Cert Due Date 10/16/22  -CL              Time Calculation- PT    Total Timed Code Minutes- PT 10 minute(s)  -CL            User Key  (r) = Recorded By, (t) = Taken By, (c) = Cosigned By    Initials Name Provider Type    Rehana Pena, PT Physical Therapist              Therapy Charges for Today     Code Description Service Date Service Provider Modifiers Qty    81027522759 HC PT EVAL MOD COMPLEXITY 4 10/2/2022 Rehana Moses, PT GP 1    29874265501 HC GAIT TRAINING EA 15 MIN 10/2/2022 Rehana Moses, PT GP 1          PT G-Codes  Outcome Measure Options: AM-PAC 6 Clicks Basic Mobility (PT)  AM-PAC 6 Clicks Score (PT): 16    Rehana Moses PT  10/2/2022

## 2022-10-02 NOTE — H&P
[facility] Medicine Services      Patient Name: Shruthi Amin  : 6/10/1928  MRN: [id number]  Primary Care Physician:  Og Jeronimo Jr., MD  Date of admission: 10/1/2022      Subjective      Chief Complaint: Weakness and shortness of breath    History of Present Illness: Shruthi Amin is a 89 y.o. female who presented to [facility] on 10/1/2022 with a past medical history of atrial fibrillation, congestive heart failure with preserved ejection fraction with moderate tricuspid regurgitation, mild to moderate mitral valve regurgitation, lung scarring requiring as needed oxygen prescribed at home, chronic anticoagulation and large hiatal hernia.  Despite her advanced age she is relatively active.  She is able to walk on her own and interacts with her family.  She was her usual state of health till about 5 days ago when she developed a cough and congestion.  Couple days later the congestion became more thick and green.  She started becoming more short of breath.  She normally does not have to use her oxygen very much but over the past couple of days she has required her oxygen full-time.  Normally she gets up and walks and moves about the house but starting 48 hours ago she was just too tired and weak to get out of bed.  She had subjective fevers and chills but they never did check it.  Because her condition was worsening they took her to the emergency room.  In the emergency room patient was in nature of, urinalysis although dirty did indicate a possible infection, CT scan performed indicated a significant pleural effusion and possibly a right lower pneumonia but did indicate some bronchial thickening and scarring, and elevated BNP of 5400 and a CT scan of the head showed sinusitis.  Patient will be admitted for acute on chronic respiratory failure with hypoxia and will investigate potential causes.  Please see H&P on how we will proceed    Daughter was at bedside and  patient stated that she did not want to be resuscitated and the healthcare power of  is Inessa Trevino    ROS weakness generalized, shortness of breath, cough, increased sputum production, change in color of sputum, subjective fever and chills, decreased p.o. intake, but denied mental status changes, chest pain, leg swelling, abdominal pain, nausea vomiting or diarrhea, unusual joint pains, dysuria, flank pain, increased frequency and urgency of urination, and rash and the rest the 15 essential review of systems have been reviewed and are negative    Personal History     Past Medical History:   Diagnosis Date   • Atrial fibrillation (HCC)    • Coronary artery disease     Atrial fibrillation   • GERD (gastroesophageal reflux disease)    • Glaucoma    • Hiatal hernia with gastroesophageal reflux    • History of osteoporotic pathological fracture     Right intertroch (7/2019)   • Hx of compression fracture of spine     T12 and L1(2013); T6 (1/2022)   • Hyperlipidemia    • Hypertension    • Osteoarthritis    • Osteoporosis     on prolia(3/21/22)   • Stroke (HCC)     off and on dizziness from the stroke.       Past Surgical History:   Procedure Laterality Date   • BACK SURGERY      kyphoplasty   • EYE SURGERY      cataract surgery   • FIXATION KYPHOPLASTY LUMBAR SPINE  2013   • HIP TROCHANTERIC NAILING WITH INTRAMEDULLARY HIP SCREW Right 7/20/2019    Procedure: HIP TROCHANTERIC NAILING SHORT WITH INTRAMEDULLARY HIP SCREW;  Surgeon: Edward Centeno MD;  Location: [facility] MAIN OR;  Service: Orthopedics   • RECTAL SURGERY      x 3       Family History: family history includes Cancer in her brother, father, and sister; Heart disease in her mother; Hypertension in her mother; Osteoporosis in her father and mother. Otherwise pertinent FHx was reviewed and not pertinent to current issue.    Social History:  reports that she has never smoked. She has never used smokeless tobacco. She reports that she does not  drink alcohol and does not use drugs.  Currently lives with family's.  Power of  medical Inessa Trevino    Home Medications:  Prior to Admission Medications     Prescriptions Last Dose Informant Patient Reported? Taking?    B Complex Vitamins (VITAMIN B COMPLEX PO)   Yes No    Take 1 tablet by mouth Daily.    Bacillus Coagulans-Inulin (ALIGN PREBIOTIC-PROBIOTIC PO)   Yes No    Take  by mouth.    Biotin 26843 MCG tablet   Yes No    Take  by mouth.    cholecalciferol (VITAMIN D3) 400 units tablet   Yes No    Take 400 Units by mouth Daily.    clindamycin (CLEOCIN) 150 MG capsule   No No    Take 1 capsule by mouth 3 (Three) Times a Day.    cyclobenzaprine (FLEXERIL) 10 MG tablet   No No    Take 0.5 tablets by mouth 2 (Two) Times a Day As Needed for Muscle Spasms.    denosumab (PROLIA) syringe 60 mg   No No    digoxin (LANOXIN) 125 MCG tablet   No No    TAKE 1 TABLET BY MOUTH EVERY DAY    docusate sodium (COLACE) 100 MG capsule   Yes No    Take 200 mg by mouth 2 (Two) Times a Day As Needed for Constipation.    doxycycline (MONODOX) 100 MG capsule   No No    Take 1 capsule by mouth 2 (Two) Times a Day.    furosemide (LASIX) 20 MG tablet   No No    TAKE 1 TABLET BY MOUTH AS NEEDED (SWELLING).    hydrocortisone 2.5 % cream   No No    Apply 1 application topically to the appropriate area as directed 2 (Two) Times a Day.    KLOR-CON 10 MEQ CR tablet   No No    TAKE 1 TABLET BY MOUTH EVERY DAY    Patient taking differently:  10 mEq As Needed. ONLY WHEN TAKING WATER PILL    latanoprost (XALATAN) 0.005 % ophthalmic solution   Yes No    Administer 1 drop to the right eye Every Night.    Lutein 20 MG capsule   Yes No    Take  by mouth.    magnesium gluconate (MAGONATE) 500 MG tablet   Yes No    Take 27 mg by mouth 2 (Two) Times a Day.    magnesium hydroxide (MILK OF MAGNESIA) 800 MG/5ML suspension   Yes No    Take 30 mL by mouth Daily As Needed for Constipation.    metoprolol succinate XL (TOPROL-XL) 25 MG 24 hr tablet    No No    TAKE 1 TABLET BY MOUTH TWICE A DAY    mupirocin (BACTROBAN) 2 % ointment   Yes No    MIX 50/50 WITH HYDROCORTISONE OINTMENT AND APPLY TO AFFECTED AREAS TWICE A DAY.    NON FORMULARY   Yes No    1 tablet 2 (Two) Times a Day. PHYTOESTROGEN    O2 (OXYGEN)   Yes No    Inhale 2 L/min Continuous As Needed.    Sennosides 25 MG tablet   Yes No    Take 25 mg by mouth 2 (Two) Times a Day As Needed (constipation).    vitamin C (ASCORBIC ACID) 500 MG tablet   Yes No    Take 500 mg by mouth Daily.    warfarin (COUMADIN) 2 MG tablet   No No    Take 2 tablets on Monday and Friday and 1 tablet the other 5 days a week.    Zinc Sulfate (ZINC 15 PO)   Yes No    Take 2 tablets by mouth Daily. WITH ELDERBERRY--GUMMIES            Allergies:  Allergies   Allergen Reactions   • Codeine Nausea And Vomiting   • Penicillins Hives   • Latex Rash       Objective      Vitals:   Temp:  [98.4 °F (36.9 °C)-99.1 °F (37.3 °C)] 99.1 °F (37.3 °C)  Heart Rate:  [70-85] 78  Resp:  [18] 18  BP: (109-137)/(51-75) 109/52  Flow (L/min):  [2] 2    Physical Exam   General very fragile appearing but no distress and appropriate  Head atraumatic, there is some temporal wasting  Eyes equal round reactive to light  Oropharynx membranes moist no erythema  Nares green nasal discharge nasal cannula in place  Neck no thyromegaly lymphadenopathy  Pulmonary decreased breath sounds in the bases rhonchi and crackles throughout lung fields  Cardiac irregular irregular, 2/6 systolic ejection murmur heard best in the left sternal border, chest significant prominence of ribs little to no subcutaneous fat, no peripheral edema  Abdomen positive bowel sounds no guarding no rebound no hepatosplenomegaly no pain on palpation  Extremities symmetric right may be slightly larger than left but no peripheral edema significant atrophy probably appropriate for age  Psych patient was alert and oriented x4 very astute especially for her age  Neuro cranial nerves II through XII  intact moves all extremities  Derm no rash    Result Review    Result Review:  I have personally reviewed the results from the time of this admission to 10/2/2022 02:45 EDT and agree with these findings:  [x]  Laboratory  [x]  Microbiology  [x]  Radiology  [x]  EKG/Telemetry   [x]  Cardiology/Vascular   []  Pathology  [x]  Old records  []  Other:  Most notable findings include:     BNP 5400, troponin less than 0.10, D-dimer 0.69, PT 28.9, INR 3, digoxin level 0.8 glucose 92, sodium 134, potassium 3.9, bicarb 23, creatinine 0.46, BUN 17, calcium was 6.6 but albumin was low  Alkaline phosphatase 68, total protein 4.7, AST 20, ALT 13, total bilirubin 0.5, albumin 2.5 low  WBCs 8400, hemoglobin 12.7, platelets 148,000 with elevation in monocytes  Urinalysis blood 3+, leukocyte esterase positive nitrate negative White blood cells 13-20+1 bacteria 3-6 squamous    Echocardiogram approximately 1 year ago showed an ejection fraction 56 to 60% moderate tricuspid regurgitation and mild to moderate mitral valve regurgitation    Respiratory pathogen panel and procalcitonin pending       IMPRESSION: CT of head   1. No acute intracranial abnormality.  2. Mild chronic age-related intracranial findings as above.  3. Moderate mucosal thickening in the paranasal sinuses.     IMPRESSION: CTA of chest   1.  Large right pleural effusion, adjacent atelectasis/consolidation, there could be superimposed infection.  2.  Large hiatus hernia  3.  Right upper lung subpleural reticulation, pleural thickening, consider scarring, perhaps from prior insult   4.  Cardiomegaly, suggestion of pulmonary. Question heart failure.  5.  No pulmonary embolus.   6.  Unremarkable aorta     - ABNORMAL ECG -  Atrial fibrillation  Nonspecific T abnrm, anterolateral leads  When compared with ECG of 29-Jan-2022 16:01:10,  Significant repolarization change  Significant axis, voltage or hypertrophy change  Assessment & Plan        Active Hospital Problems:  Active  Hospital Problems    Diagnosis    • **Acute on chronic respiratory failure with hypoxia (HCC)    • Acute on chronic congestive heart failure, unspecified heart failure type (HCC)    • Pleural effusion    • Scarring of lung    • UTI (urinary tract infection) due to urinary indwelling catheter ([facility])    • Pneumonia due to infectious organism    • Hypoalbuminemia    • General weakness    • Hyperlipidemia    • Atrial fibrillation (CMS/[facility]) [I48.91]    • Chronic anticoagulation      Plan:   1.  Acute on chronic respiratory failure with worsening hypoxia -patient has an underlying pulmonary scarring from a previous pneumonia and has oxygen available as needed.  Normally she does not require oxygen but for the past 2 days she has needed it all the time.  At this time the differential diagnosis includes heart failure, pleural effusion, and or respiratory tract infection which would include upper respiratory and lower which would be pneumonia.  The reason I mention upper respiratory and pneumonias that could have set off a reactive airway disease with an underlying pulmonary scarring.  At this time we will treat both of these possibilities.  Reactive airway disease DuoNebs was started but did not place the patient on any steroids.  2.  Acute on chronic congestive heart failure with preserved ejection fraction -echocardiogram from approximately a year ago showed preserved ejection fraction 55 to 60%.  But it did show moderate tricuspid regurgitation and mild to moderate mitral valve regurgitation.  This could have resulted in worsening of her congestive heart failure with the mitral valve regurgitation increased pulmonary edema.  Since there is a pleural effusion most likely this is secondary to mitral valve regurgitation and congestive heart failure.  Patient has a low albumin and blood pressure is soft so in order to diurese the pleural effusion we will give albumin and Lasix.  This weight should preserve the blood pressure  and renal function while correcting the pleural effusion.  This treatment is only for 1 day the primary care team can determine the they want to continue this or not.  Dr. Mathew her cardiologist has been consulted.  Echocardiogram has been ordered.  Electrolytes and renal function will be followed and replaced as needed  3.  Pleural effusion most likely secondary to congestive heart failure see #2.  If there is concerned that this may be an empyema then a thoracentesis may be required she is on Coumadin and her INR is 3 but most likely it has to do with congestive heart failure  4.  Pneumonia -substantial amount of diagnostic uncertainty here.  Patient did not have a fever and white count was not elevated on admission.  However she did have increased cough congestion there was significant rhonchi within the lungs so for now we will treat with doxycycline and ceftriaxone.  This could be an upper respiratory tract infection.  Respiratory pathogen panel is pending this may help determine treatment plan this could very well be viral in nature.  Procalcitonin has been ordered if its negative may be able to decrease the antibiotics.  Do have the patient on Mucinex and DuoNebs.  Did not place the patient on steroids does not have a history of COPD or reactive airway disease  5.  Urinary tract infection this may also be contributing to her generalized weakness the UA had bacteria and white blood cells but also had squamous cells and she did not really have clear symptoms of a urinary tract infection she is on ceftriaxone which would cover double duty for pneumonia and urinary tract so for now we will continue cultures are pending  6.  Hypoalbuminemia replacing while giving Lasix and ordered boost and a nutrition consult  7.  Hyperlipidemia there was not a statin on her medical record but she has had a coronary artery disease and reported stroke in the past Alli Drake placed her on 20 mg of atorvastatin  8.  Atrial  fibrillation appears to be rate controlled digoxin is in a therapeutic level will maintain potassium greater than 4 and magnesium greater than 2 which should help continue metoprolol 25 mg and digoxin.  Her cardiologist has been consulted  9.  Generalized weakness can be attributed to many of the factors mentioned above.  We will treat the underlying problems provide supportive care and had PT and OT working with the patient  10.  Chronic anticoagulation have consulted pharmacy anticipate holding the warfarin for a while the INR is approaching 3 and she is on antibiotics    DVT prophylaxis:  Medical DVT prophylaxis orders are present.    CODE STATUS:    Level Of Support Discussed With: Patient  Code Status (Patient has no pulse and is not breathing): No CPR (Do Not Attempt to Resuscitate)  Medical Interventions (Patient has pulse or is breathing): Comfort Measures    Admission Status:  I believe this patient meets inpatient status.    I discussed the patient's findings and my recommendations with patient and family.    This patient has been examined wearing appropriate Personal Protective Equipment and discussed with . 10/02/22     Results came back after note  Respiratory pathogen panel came back positive for RSV this would explain her increased mucus production cough and congestion.  Since this is of very inflammatory type of virus went ahead and started some dexamethasone.  We will continue the doxycycline but if the procalcitonin comes back negative would discontinue the ceftriaxone    Signature:

## 2022-10-03 LAB
ALBUMIN SERPL-MCNC: 4.2 G/DL (ref 3.5–5.2)
ALBUMIN/GLOB SERPL: 1.6 G/DL
ALP SERPL-CCNC: 75 U/L (ref 39–117)
ALT SERPL W P-5'-P-CCNC: 21 U/L (ref 1–33)
ANION GAP SERPL CALCULATED.3IONS-SCNC: 10 MMOL/L (ref 5–15)
AST SERPL-CCNC: 30 U/L (ref 1–32)
BASOPHILS # BLD AUTO: 0 10*3/MM3 (ref 0–0.2)
BASOPHILS NFR BLD AUTO: 0.5 % (ref 0–1.5)
BH CV ECHO MEAS - ACS: 1.85 CM
BH CV ECHO MEAS - AO MAX PG: 3.9 MMHG
BH CV ECHO MEAS - AO MEAN PG: 2.21 MMHG
BH CV ECHO MEAS - AO ROOT DIAM: 2.9 CM
BH CV ECHO MEAS - AO V2 MAX: 98.5 CM/SEC
BH CV ECHO MEAS - AO V2 VTI: 18.3 CM
BH CV ECHO MEAS - AVA(I,D): 1.1 CM2
BH CV ECHO MEAS - EDV(CUBED): 106.6 ML
BH CV ECHO MEAS - EF(MOD-BP): 57 %
BH CV ECHO MEAS - ESV(CUBED): 30.8 ML
BH CV ECHO MEAS - FS: 33.9 %
BH CV ECHO MEAS - IVS/LVPW: 0.92 CM
BH CV ECHO MEAS - IVSD: 0.93 CM
BH CV ECHO MEAS - LA DIMENSION: 5.7 CM
BH CV ECHO MEAS - LV MASS(C)D: 160.5 GRAMS
BH CV ECHO MEAS - LV MAX PG: 0.3 MMHG
BH CV ECHO MEAS - LV MEAN PG: 0.17 MMHG
BH CV ECHO MEAS - LV V1 MAX: 27.2 CM/SEC
BH CV ECHO MEAS - LV V1 VTI: 5.2 CM
BH CV ECHO MEAS - LVIDD: 4.7 CM
BH CV ECHO MEAS - LVIDS: 3.1 CM
BH CV ECHO MEAS - LVOT AREA: 3.8 CM2
BH CV ECHO MEAS - LVOT DIAM: 2.21 CM
BH CV ECHO MEAS - LVPWD: 1.01 CM
BH CV ECHO MEAS - MR MAX PG: 84.5 MMHG
BH CV ECHO MEAS - MR MAX VEL: 457.4 CM/SEC
BH CV ECHO MEAS - MV DEC TIME: 0.16 MSEC
BH CV ECHO MEAS - MV E MAX VEL: 83.1 CM/SEC
BH CV ECHO MEAS - MV MAX PG: 5.5 MMHG
BH CV ECHO MEAS - MV MEAN PG: 2.21 MMHG
BH CV ECHO MEAS - MV V2 VTI: 21.3 CM
BH CV ECHO MEAS - MVA(VTI): 0.94 CM2
BH CV ECHO MEAS - PA V2 MAX: 72.1 CM/SEC
BH CV ECHO MEAS - PI END-D VEL: 111.3 CM/SEC
BH CV ECHO MEAS - RAP SYSTOLE: 15 MMHG
BH CV ECHO MEAS - RV MAX PG: 0.35 MMHG
BH CV ECHO MEAS - RV V1 MAX: 29.7 CM/SEC
BH CV ECHO MEAS - RV V1 VTI: 4.7 CM
BH CV ECHO MEAS - RVDD: 2.32 CM
BH CV ECHO MEAS - RVSP: 35.1 MMHG
BH CV ECHO MEAS - SV(LVOT): 20 ML
BH CV ECHO MEAS - TR MAX PG: 20.1 MMHG
BH CV ECHO MEAS - TR MAX VEL: 223.3 CM/SEC
BILIRUB SERPL-MCNC: 0.9 MG/DL (ref 0–1.2)
BUN SERPL-MCNC: 23 MG/DL (ref 8–23)
BUN/CREAT SERPL: 37.7 (ref 7–25)
CALCIUM SPEC-SCNC: 8 MG/DL (ref 8.2–9.6)
CHLORIDE SERPL-SCNC: 90 MMOL/L (ref 98–107)
CO2 SERPL-SCNC: 32 MMOL/L (ref 22–29)
CREAT SERPL-MCNC: 0.61 MG/DL (ref 0.57–1)
DEPRECATED RDW RBC AUTO: 47.3 FL (ref 37–54)
EGFRCR SERPLBLD CKD-EPI 2021: 83 ML/MIN/1.73
EOSINOPHIL # BLD AUTO: 0 10*3/MM3 (ref 0–0.4)
EOSINOPHIL NFR BLD AUTO: 0.1 % (ref 0.3–6.2)
ERYTHROCYTE [DISTWIDTH] IN BLOOD BY AUTOMATED COUNT: 14 % (ref 12.3–15.4)
GLOBULIN UR ELPH-MCNC: 2.7 GM/DL
GLUCOSE SERPL-MCNC: 122 MG/DL (ref 65–99)
HCT VFR BLD AUTO: 38.1 % (ref 34–46.6)
HGB BLD-MCNC: 12.6 G/DL (ref 12–15.9)
INR PPP: 2.88 (ref 2–3)
L PNEUMO1 AG UR QL IA: NEGATIVE
LYMPHOCYTES # BLD AUTO: 1.1 10*3/MM3 (ref 0.7–3.1)
LYMPHOCYTES NFR BLD AUTO: 12.9 % (ref 19.6–45.3)
MAGNESIUM SERPL-MCNC: 2.1 MG/DL (ref 1.7–2.3)
MAXIMAL PREDICTED HEART RATE: 126 BPM
MCH RBC QN AUTO: 32.1 PG (ref 26.6–33)
MCHC RBC AUTO-ENTMCNC: 33.2 G/DL (ref 31.5–35.7)
MCV RBC AUTO: 96.9 FL (ref 79–97)
MONOCYTES # BLD AUTO: 1.3 10*3/MM3 (ref 0.1–0.9)
MONOCYTES NFR BLD AUTO: 14.9 % (ref 5–12)
NEUTROPHILS NFR BLD AUTO: 6.4 10*3/MM3 (ref 1.7–7)
NEUTROPHILS NFR BLD AUTO: 71.6 % (ref 42.7–76)
NRBC BLD AUTO-RTO: 0.1 /100 WBC (ref 0–0.2)
PHOSPHATE SERPL-MCNC: 2 MG/DL (ref 2.5–4.5)
PHOSPHATE SERPL-MCNC: 2.2 MG/DL (ref 2.5–4.5)
PLATELET # BLD AUTO: 158 10*3/MM3 (ref 140–450)
PMV BLD AUTO: 9.5 FL (ref 6–12)
POTASSIUM SERPL-SCNC: 3.9 MMOL/L (ref 3.5–5.2)
PROCALCITONIN SERPL-MCNC: 0.2 NG/ML (ref 0–0.25)
PROT SERPL-MCNC: 6.9 G/DL (ref 6–8.5)
PROTHROMBIN TIME: 27.9 SECONDS (ref 19.4–28.5)
QT INTERVAL: 340 MS
RBC # BLD AUTO: 3.93 10*6/MM3 (ref 3.77–5.28)
S PNEUM AG SPEC QL LA: NEGATIVE
SODIUM SERPL-SCNC: 132 MMOL/L (ref 136–145)
STRESS TARGET HR: 107 BPM
WBC NRBC COR # BLD: 8.9 10*3/MM3 (ref 3.4–10.8)

## 2022-10-03 PROCEDURE — 94799 UNLISTED PULMONARY SVC/PX: CPT

## 2022-10-03 PROCEDURE — 84100 ASSAY OF PHOSPHORUS: CPT | Performed by: HOSPITALIST

## 2022-10-03 PROCEDURE — 63710000001 ONDANSETRON PER 8 MG: Performed by: INTERNAL MEDICINE

## 2022-10-03 PROCEDURE — 84100 ASSAY OF PHOSPHORUS: CPT | Performed by: INTERNAL MEDICINE

## 2022-10-03 PROCEDURE — 80053 COMPREHEN METABOLIC PANEL: CPT | Performed by: INTERNAL MEDICINE

## 2022-10-03 PROCEDURE — 83735 ASSAY OF MAGNESIUM: CPT | Performed by: INTERNAL MEDICINE

## 2022-10-03 PROCEDURE — 85025 COMPLETE CBC W/AUTO DIFF WBC: CPT | Performed by: INTERNAL MEDICINE

## 2022-10-03 PROCEDURE — 99232 SBSQ HOSP IP/OBS MODERATE 35: CPT | Performed by: INTERNAL MEDICINE

## 2022-10-03 PROCEDURE — 94761 N-INVAS EAR/PLS OXIMETRY MLT: CPT

## 2022-10-03 PROCEDURE — 87899 AGENT NOS ASSAY W/OPTIC: CPT | Performed by: HOSPITALIST

## 2022-10-03 PROCEDURE — 84145 PROCALCITONIN (PCT): CPT | Performed by: HOSPITALIST

## 2022-10-03 PROCEDURE — 25010000002 CEFTRIAXONE PER 250 MG: Performed by: HOSPITALIST

## 2022-10-03 PROCEDURE — 25010000002 DEXAMETHASONE PER 1 MG: Performed by: INTERNAL MEDICINE

## 2022-10-03 PROCEDURE — 85610 PROTHROMBIN TIME: CPT | Performed by: INTERNAL MEDICINE

## 2022-10-03 PROCEDURE — 97116 GAIT TRAINING THERAPY: CPT

## 2022-10-03 PROCEDURE — 94664 DEMO&/EVAL PT USE INHALER: CPT

## 2022-10-03 PROCEDURE — 87449 NOS EACH ORGANISM AG IA: CPT | Performed by: HOSPITALIST

## 2022-10-03 PROCEDURE — 36415 COLL VENOUS BLD VENIPUNCTURE: CPT | Performed by: INTERNAL MEDICINE

## 2022-10-03 RX ORDER — WARFARIN SODIUM 1 MG/1
1 TABLET ORAL
Status: COMPLETED | OUTPATIENT
Start: 2022-10-03 | End: 2022-10-03

## 2022-10-03 RX ORDER — WARFARIN SODIUM 2 MG/1
4 TABLET ORAL 2 TIMES WEEKLY
COMMUNITY
End: 2022-10-06 | Stop reason: HOSPADM

## 2022-10-03 RX ORDER — LATANOPROST 50 UG/ML
1 SOLUTION/ DROPS OPHTHALMIC NIGHTLY
Status: DISCONTINUED | OUTPATIENT
Start: 2022-10-03 | End: 2022-10-06 | Stop reason: HOSPADM

## 2022-10-03 RX ORDER — WARFARIN SODIUM 2 MG/1
2 TABLET ORAL SEE ADMIN INSTRUCTIONS
COMMUNITY
End: 2022-10-06 | Stop reason: HOSPADM

## 2022-10-03 RX ADMIN — DOXYCYCLINE 100 MG: 100 TABLET ORAL at 09:43

## 2022-10-03 RX ADMIN — POLYETHYLENE GLYCOL 3350 17 G: 17 POWDER, FOR SOLUTION ORAL at 09:43

## 2022-10-03 RX ADMIN — WARFARIN 1 MG: 1 TABLET ORAL at 18:31

## 2022-10-03 RX ADMIN — METOPROLOL SUCCINATE 25 MG: 25 TABLET, FILM COATED, EXTENDED RELEASE ORAL at 20:52

## 2022-10-03 RX ADMIN — DIGOXIN 125 MCG: 125 TABLET ORAL at 12:14

## 2022-10-03 RX ADMIN — IPRATROPIUM BROMIDE AND ALBUTEROL SULFATE 3 ML: .5; 3 SOLUTION RESPIRATORY (INHALATION) at 11:09

## 2022-10-03 RX ADMIN — GUAIFENESIN 600 MG: 600 TABLET, EXTENDED RELEASE ORAL at 09:43

## 2022-10-03 RX ADMIN — CEFTRIAXONE 1 G: 1 INJECTION, POWDER, FOR SOLUTION INTRAMUSCULAR; INTRAVENOUS at 20:52

## 2022-10-03 RX ADMIN — BENZONATATE 100 MG: 100 CAPSULE ORAL at 09:43

## 2022-10-03 RX ADMIN — Medication 10 ML: at 09:43

## 2022-10-03 RX ADMIN — IPRATROPIUM BROMIDE AND ALBUTEROL SULFATE 3 ML: .5; 3 SOLUTION RESPIRATORY (INHALATION) at 07:38

## 2022-10-03 RX ADMIN — SENNOSIDES AND DOCUSATE SODIUM 2 TABLET: 50; 8.6 TABLET ORAL at 09:43

## 2022-10-03 RX ADMIN — Medication 2 PACKET: at 12:14

## 2022-10-03 RX ADMIN — PANTOPRAZOLE SODIUM 40 MG: 40 TABLET, DELAYED RELEASE ORAL at 05:53

## 2022-10-03 RX ADMIN — AZELASTINE HYDROCHLORIDE 1 SPRAY: 137 SPRAY, METERED NASAL at 09:43

## 2022-10-03 RX ADMIN — ONDANSETRON HYDROCHLORIDE 4 MG: 4 TABLET, FILM COATED ORAL at 09:43

## 2022-10-03 RX ADMIN — SENNOSIDES AND DOCUSATE SODIUM 2 TABLET: 50; 8.6 TABLET ORAL at 20:52

## 2022-10-03 RX ADMIN — GUAIFENESIN 600 MG: 600 TABLET, EXTENDED RELEASE ORAL at 20:52

## 2022-10-03 RX ADMIN — IPRATROPIUM BROMIDE AND ALBUTEROL SULFATE 3 ML: .5; 3 SOLUTION RESPIRATORY (INHALATION) at 19:52

## 2022-10-03 RX ADMIN — AZELASTINE HYDROCHLORIDE 1 SPRAY: 137 SPRAY, METERED NASAL at 20:54

## 2022-10-03 RX ADMIN — METOPROLOL SUCCINATE 25 MG: 25 TABLET, FILM COATED, EXTENDED RELEASE ORAL at 09:43

## 2022-10-03 RX ADMIN — DEXAMETHASONE SODIUM PHOSPHATE 6 MG: 4 INJECTION, SOLUTION INTRAMUSCULAR; INTRAVENOUS at 09:43

## 2022-10-03 RX ADMIN — Medication 10 ML: at 20:52

## 2022-10-03 NOTE — PLAN OF CARE
Goal Outcome Evaluation:  Plan of Care Reviewed With: patient        Progress: no change  Outcome Evaluation: Patient with RSV, on baseline of 2L O2 via nasal cannula.  Some c/o nausea this shift.  Receiving IV Abx.  Phosphorus replaced.  Up with assist x1.  External cath in place.  Falls precautions in place

## 2022-10-03 NOTE — DISCHARGE PLACEMENT REQUEST
"Shruthi Cartwright (89 y.o. Female)             Date of Birth   06/10/1928    Social Security Number       Address   [facility] [address] [place] [facility]    Home Phone   [phone]    MRN   [id number]       Scientologist   None    Marital Status                               Admission Date   10/1/22    Admission Type   Emergency    Admitting Provider   Abran Amador MD    Attending Provider   Abran Amador MD    Department, Room/Bed   [facility] 3C MEDICAL INPATIENT, 360/1       Discharge Date       Discharge Disposition       Discharge Destination                               Attending Provider: Abran Amador MD    Allergies: Codeine, Penicillins, Latex    Isolation: Contact Drop   Infection: RSV (10/02/22)   Code Status: No CPR   Advance Care Planning Activity    Ht: 157.5 cm (62\")   Wt: 50 kg (110 lb 3.7 oz)    Admission Cmt: None   Principal Problem: Acute on chronic respiratory failure with hypoxia (HCC) [J96.21]                 Active Insurance as of 10/1/2022     Primary Coverage     Payor Plan Insurance Group Employer/Plan Group    MEDICARE MEDICARE A & B      Payor Plan Address Payor Plan Phone Number Payor Plan Fax Number Effective Dates    [address] [phone]  6/1/1993 - None Entered    [facility] [id number]       Subscriber Name Subscriber Birth Date Member ID       SHRUTHI CARTWRIGHT 6/10/1928 [id number]           Secondary Coverage     Payor Plan Insurance Group Employer/Plan Group    ANTHEM BLUE CROSS ANTHEM BLUE CROSS BLUE SHIELD PPO [id number]     Payor Plan Address Payor Plan Phone Number Payor Plan Fax Number Effective Dates    [address] [phone]  1/1/2021 - None Entered    [facility] [id number]       Subscriber Name Subscriber Birth Date Member ID       SHRUTHI CARTWRIGHT JARVIS 6/10/1928 [id number]                 Emergency Contacts      (Rel.) Home Phone Work Phone Mobile Phone    fritz aranda (Grandchild) -- -- [phone]    sonya cartwright (Son) -- " -- [phone]    Shea Nam (Daughter) -- -- [phone]

## 2022-10-03 NOTE — THERAPY TREATMENT NOTE
"Subjective: Pt agreeable to therapeutic plan of care.    Objective:     Bed mobility - Modified-Independent  Transfers - Min-A and with rolling walker  Ambulation - 30 feet Min-A and with rolling walker    Vitals: WNL    Pain: 3 VAS Soreness in both elbows  Education: Provided education on importance of mobility and skilled verbal / tactile cueing throughout intervention.     Assessment: Shruthi Amin presents with functional mobility impairments which indicate the need for skilled intervention. Tolerating session today without incident.Patient's both elbows are developing soreness, advised to apply ice 2-3/day. Will continue to follow and progress as tolerated.     Plan/Recommendations:   Low Intensity Therapy recommended post-acute care - This is recommended as therapy feels this patient would require 2-3 visits per week. OP or HH would be the best option depending on patient's home bound status. Consider, if the patient has other  \"skilled\" needs such as wounds, IV antibiotics, etc. Combined with \"low intensity\" could also equate to a SNF. If patient is medically complex, consider LTAC.. Pt requires no DME at discharge.     Pt desires Home with Home Health at discharge. Pt cooperative; agreeable to therapeutic recommendations and plan of care.         Basic Mobility 6-click:  Rollin = Total, A lot = 2, A little = 3; 4 = None  Supine>Sit:   1 = Total, A lot = 2, A little = 3; 4 = None   Sit>Stand with arms:  1 = Total, A lot = 2, A little = 3; 4 = None  Bed>Chair:   1 = Total, A lot = 2, A little = 3; 4 = None  Ambulate in room:  1 = Total, A lot = 2, A little = 3; 4 = None  3-5 Steps with railin = Total, A lot = 2, A little = 3; 4 = None  Score: 17    Modified [place]: N/A = No pre-op stroke/TIA    Post-Tx Position: Supine with HOB Elevated, Alarms activated and Call light and personal items within reach  PPE: mask, gloves and eye protection  "

## 2022-10-03 NOTE — PROGRESS NOTES
Referring Provider: Hospitalist    Reason for follow-up: Shortness of breath     Patient Care Team:  Og Jeronimo Jr., MD as PCP - General  Aleksey Mathew MD as Consulting Physician (Cardiology)    Subjective .  Patient is feeling much better than when she came in    Objective  In bed comfortably     Review of Systems   Constitutional: Negative for fever and malaise/fatigue.   Cardiovascular: Negative for chest pain, dyspnea on exertion and palpitations.   Respiratory: Negative for cough and shortness of breath.    Skin: Negative for rash.   Gastrointestinal: Negative for abdominal pain, nausea and vomiting.   Neurological: Negative for focal weakness and headaches.   All other systems reviewed and are negative.      Codeine, Penicillins, and Latex    Scheduled Meds:atorvastatin, 10 mg, Oral, Nightly  azelastine, 1 spray, Each Nare, BID  cefTRIAXone, 1 g, Intravenous, Q24H  digoxin, 125 mcg, Oral, Daily  guaiFENesin, 600 mg, Oral, Q12H  ipratropium-albuterol, 3 mL, Nebulization, Q6H While Awake - RT  metoprolol succinate XL, 25 mg, Oral, BID  pantoprazole, 40 mg, Oral, Q AM  senna-docusate sodium, 2 tablet, Oral, BID  sodium chloride, 10 mL, Intravenous, Q12H      Continuous Infusions:Pharmacy to dose warfarin,       PRN Meds:.•  acetaminophen **OR** acetaminophen **OR** acetaminophen  •  benzonatate  •  senna-docusate sodium **AND** polyethylene glycol **AND** bisacodyl **AND** bisacodyl  •  magnesium sulfate **OR** magnesium sulfate in D5W 1g/100mL (PREMIX)  •  melatonin  •  nitroglycerin  •  ondansetron **OR** ondansetron  •  Pharmacy to dose warfarin  •  potassium & sodium phosphates **OR** potassium & sodium phosphates  •  potassium chloride  •  potassium chloride  •  sodium chloride  •  sodium chloride        VITAL SIGNS  Vitals:    10/03/22 1109 10/03/22 1115 10/03/22 1214 10/03/22 1300   BP:   104/60 131/71   BP Location:    Left arm   Patient Position:    Lying   Pulse: 78 70 75 85   Resp: 20 20  20  "  Temp:    97.3 °F (36.3 °C)   TempSrc:    Oral   SpO2: 98% 98%  97%   Weight:       Height:           Flowsheet Rows    Flowsheet Row First Filed Value   Admission Height 157.5 cm (62\") Documented at 10/01/2022 1940   Admission Weight 49.9 kg (110 lb 0.2 oz) Documented at 10/01/2022 1940           TELEMETRY: Atrial fibrillation controlled ventricular response    Physical Exam:  Constitutional:       Appearance: Well-developed.   Eyes:      General: No scleral icterus.     Conjunctiva/sclera: Conjunctivae normal.   HENT:      Head: Normocephalic and atraumatic.   Neck:      Vascular: No carotid bruit or JVD.   Pulmonary:      Effort: Pulmonary effort is normal.      Breath sounds: Normal breath sounds. No wheezing. No rales.   Cardiovascular:      Normal rate. Regular rhythm.   Pulses:     Intact distal pulses.   Abdominal:      General: Bowel sounds are normal.      Palpations: Abdomen is soft.   Musculoskeletal:      Cervical back: Normal range of motion and neck supple. Skin:     General: Skin is warm and dry.      Findings: No rash.   Neurological:      Mental Status: Alert.          Results Review:   I reviewed the patient's new clinical results.  Lab Results (last 24 hours)     Procedure Component Value Units Date/Time    Procalcitonin [[id number]]  (Normal) Collected: 10/03/22 0852    Specimen: Blood Updated: 10/03/22 1130     Procalcitonin 0.20 ng/mL     Narrative:      As a Marker for Sepsis (Non-Neonates):    1. <0.5 ng/mL represents a low risk of severe sepsis and/or septic shock.  2. >2 ng/mL represents a high risk of severe sepsis and/or septic shock.    As a Marker for Lower Respiratory Tract Infections that require antibiotic therapy:    PCT on Admission    Antibiotic Therapy       6-12 Hrs later    >0.5                Strongly Recommended  >0.25 - <0.5        Recommended   0.1 - 0.25          Discouraged              Remeasure/reassess PCT  <0.1                Strongly Discouraged     " "Remeasure/reassess PCT    As 28 day mortality risk marker: \"Change in Procalcitonin Result\" (>80% or <=80%) if Day 0 (or Day 1) and Day 4 values are available. Refer to http://[email or web address]-pct-calculator.com    Change in PCT <=80%  A decrease of PCT levels below or equal to 80% defines a positive change in PCT test result representing a higher risk for 28-day all-cause mortality of patients diagnosed with severe sepsis for septic shock.    Change in PCT >80%  A decrease of PCT levels of more than 80% defines a negative change in PCT result representing a lower risk for 28-day all-cause mortality of patients diagnosed with severe sepsis or septic shock.       S. Pneumo Ag Urine or CSF - Urine, Urine, Clean Catch [[id number]]  (Normal) Collected: 10/03/22 1025    Specimen: Urine, Clean Catch Updated: 10/03/22 1058     Strep Pneumo Ag Negative    Legionella Antigen, Urine - Urine, Urine, Clean Catch [[id number]]  (Normal) Collected: 10/03/22 1025    Specimen: Urine, Clean Catch Updated: 10/03/22 1057     LEGIONELLA ANTIGEN, URINE Negative    Comprehensive Metabolic Panel [[id number]]  (Abnormal) Collected: 10/03/22 0213    Specimen: Blood Updated: 10/03/22 0428     Glucose 122 mg/dL      BUN 23 mg/dL      Creatinine 0.61 mg/dL      Sodium 132 mmol/L      Potassium 3.9 mmol/L      Chloride 90 mmol/L      CO2 32.0 mmol/L      Calcium 8.0 mg/dL      Total Protein 6.9 g/dL      Albumin 4.20 g/dL      ALT (SGPT) 21 U/L      AST (SGOT) 30 U/L      Alkaline Phosphatase 75 U/L      Total Bilirubin 0.9 mg/dL      Globulin 2.7 gm/dL      A/G Ratio 1.6 g/dL      BUN/Creatinine Ratio 37.7     Anion Gap 10.0 mmol/L      eGFR 83.0 mL/min/1.73      Comment: National Kidney Foundation and American Society of Nephrology (ASN) Task Force recommended calculation based on the Chronic Kidney Disease Epidemiology Collaboration (CKD-EPI) equation refit without adjustment for race.       Narrative:      GFR Normal >60  Chronic Kidney Disease " <60  Kidney Failure <15      Phosphorus [[id number]]  (Abnormal) Collected: 10/03/22 0213    Specimen: Blood Updated: 10/03/22 0417     Phosphorus 2.0 mg/dL     Magnesium [[id number]]  (Normal) Collected: 10/03/22 0213    Specimen: Blood Updated: 10/03/22 0417     Magnesium 2.1 mg/dL     Protime-INR [[id number]]  (Normal) Collected: 10/03/22 0213    Specimen: Blood Updated: 10/03/22 0403     Protime 27.9 Seconds      INR 2.88    CBC & Differential [[id number]]  (Abnormal) Collected: 10/03/22 0213    Specimen: Blood Updated: 10/03/22 0349    Narrative:      The following orders were created for panel order CBC & Differential.  Procedure                               Abnormality         Status                     ---------                               -----------         ------                     CBC Auto Differential[[id number]]        Abnormal            Final result                 Please view results for these tests on the individual orders.    CBC Auto Differential [[id number]]  (Abnormal) Collected: 10/03/22 0213    Specimen: Blood Updated: 10/03/22 0349     WBC 8.90 10*3/mm3      RBC 3.93 10*6/mm3      Hemoglobin 12.6 g/dL      Hematocrit 38.1 %      MCV 96.9 fL      MCH 32.1 pg      MCHC 33.2 g/dL      RDW 14.0 %      RDW-SD 47.3 fl      MPV 9.5 fL      Platelets 158 10*3/mm3      Neutrophil % 71.6 %      Lymphocyte % 12.9 %      Monocyte % 14.9 %      Eosinophil % 0.1 %      Basophil % 0.5 %      Neutrophils, Absolute 6.40 10*3/mm3      Lymphocytes, Absolute 1.10 10*3/mm3      Monocytes, Absolute 1.30 10*3/mm3      Eosinophils, Absolute 0.00 10*3/mm3      Basophils, Absolute 0.00 10*3/mm3      nRBC 0.1 /100 WBC     Blood Culture - Blood, Blood, Venous Line [[id number]]  (Normal) Collected: 10/02/22 0048    Specimen: Blood, Venous Line Updated: 10/03/22 0117     Blood Culture No growth at 24 hours    Blood Culture - Blood, Blood, Venous Line [[id number]]  (Normal) Collected: 10/02/22 0048    Specimen: Blood, Venous  Line Updated: 10/03/22 0117     Blood Culture No growth at 24 hours    Urine Culture - Urine, Urine, Catheter [[id number]]  (Normal) Collected: 10/01/22 2156    Specimen: Urine, Catheter Updated: 10/02/22 2307     Urine Culture No growth          Imaging Results (Last 24 Hours)     ** No results found for the last 24 hours. **          EKG      I personally viewed and interpreted the patient's EKG/Telemetry data:    ECHOCARDIOGRAM:    STRESS MYOVIEW:    CARDIAC CATHETERIZATION:    OTHER:         Assessment & Plan     Principal Problem:    Acute on chronic respiratory failure with hypoxia (HCC)  Active Problems:    Atrial fibrillation (CMS/HCC) [I48.91]    Chronic anticoagulation    Hyperlipidemia    Acute on chronic congestive heart failure, unspecified heart failure type (HCC)    Scarring of lung    UTI (urinary tract infection) due to urinary indwelling catheter (HCC)    Pleural effusion    Pneumonia due to infectious organism    Hypoalbuminemia    General weakness       Patient presents with shortness of breath and is ruled out for MI by get enzymes  Patient had respiratory virus and is being treated for the same  Patient had high chads Vascor but because of her age she did not want to be on any anticoagulation except for aspirin.  Patient is currently on digoxin and metoprolol and her heart rates are well controlled  No further cardiac work-up    I discussed the patients findings and my recommendations with patient and nurse    Aleksey Mathew MD  10/03/22  19:20 EDT

## 2022-10-03 NOTE — CASE MANAGEMENT/SOCIAL WORK
Discharge Planning Assessment   [facility]     Patient Name: Shruthi Amin  MRN: [id number]  Today's Date: 10/3/2022    Admit Date: 10/1/2022    Plan: Return home with daughter, current with [facility] for home O2. [facility] referral pending.   Discharge Needs Assessment     Row Name 10/03/22 1211       Living Environment    People in Home child(juan francisco), adult    Name(s) of People in Home Daughter-Shea    Current Living Arrangements home    Primary Care Provided by self;child(juan francisco)    Provides Primary Care For no one, unable/limited ability to care for self    Family Caregiver if Needed child(juan francisco), adult    Family Caregiver Names Shea    Quality of Family Relationships helpful;involved;supportive    Able to Return to Prior Arrangements yes       Resource/Environmental Concerns    Resource/Environmental Concerns none    Transportation Concerns none       Transition Planning    Patient/Family Anticipates Transition to home with family;home with help/services    Patient/Family Anticipated Services at Transition home health care    Transportation Anticipated family or friend will provide       Discharge Needs Assessment    Readmission Within the Last 30 Days no previous admission in last 30 days    Equipment Currently Used at Home walker, rolling;rollator;wheelchair;cane, straight;commode;oxygen    Concerns to be Addressed care coordination/care conferences;discharge planning    Anticipated Changes Related to Illness none    Equipment Needed After Discharge none    Outpatient/Agency/Support Group Needs homecare agency    Discharge Facility/Level of Care Needs home with home health    Provided Post Acute Provider List? N/A    Provided Post Acute Provider Quality & Resource List? N/A               Discharge Plan     Row Name 10/03/22 1213       Plan    Plan Return home with daughter, current with [facility] for home O2. [facility] referral pending.    Patient/Family in Agreement with Plan yes    Provided Post Acute  Provider List? N/A    Provided Post Acute Provider Quality & Resource List? N/A    Plan Comments Met with patient at bedside. Pt reports she lives at home with daughter Shea; she does not drive and requires assistance with ADL's. PCP and pharmacy confirmed. Pt uses a cane, walker, rollator, BSC, and 2L O2 (supplied by [facility]). Discussed PT's recommendations of HHC and pt is agreeable. Pt reports she has used [facility] previously and would like to use them again. Referral sent in [facility] and to liaison Mattie. No additional needs/services identified at this time.              Continued Care and Services - Admitted Since 10/1/2022     Home Medical Care     Service Provider Request Status Selected Services Address Phone Fax Patient Preferred    [facility]  Pending - Request Sent N/A [address] [facility] [place] [address] [phone] --              Expected Discharge Date and Time     Expected Discharge Date Expected Discharge Time    Oct 4, 2022          Demographic Summary     Row Name 10/03/22 1210       General Information    Admission Type inpatient    Arrived From emergency department    Referral Source admission list    Reason for Consult discharge planning;care coordination/care conference    Preferred Language English       Contact Information    Permission Granted to Share Info With                Functional Status     Row Name 10/03/22 1210       Functional Status    Usual Activity Tolerance moderate    Current Activity Tolerance moderate       Functional Status, IADL    Medications assistive person    Meal Preparation assistive person    Housekeeping assistive person    Laundry assistive person    Shopping assistive person              Met with patient in room wearing PPE: mask, face shield/goggles.      Maintained distance greater than six feet and spent less than 15 minutes in the room.      Megan Naegele, RN      Office Phone: [phone]  Office  Cell: [phone]

## 2022-10-03 NOTE — PLAN OF CARE
Goal Outcome Evaluation:              Outcome Evaluation: Pt transferred to this unit from pcu.  Vitals taken and placed on female external cath.  No other issues.  Will continue to monitor.

## 2022-10-03 NOTE — PLAN OF CARE
"Assessment: Shruthi Amin presents with functional mobility impairments which indicate the need for skilled intervention. Tolerating session today without incident.Patient's both elbows are developing soreness, advised to apply ice 2-3/day. Will continue to follow and progress as tolerated.     Plan/Recommendations:   Low Intensity Therapy recommended post-acute care - This is recommended as therapy feels this patient would require 2-3 visits per week. OP or HH would be the best option depending on patient's home bound status. Consider, if the patient has other  \"skilled\" needs such as wounds, IV antibiotics, etc. Combined with \"low intensity\" could also equate to a SNF. If patient is medically complex, consider LTAC.. Pt requires no DME at discharge.     Pt desires Home with Home Health at discharge. Pt cooperative; agreeable to therapeutic recommendations and plan of care.   "

## 2022-10-03 NOTE — PROGRESS NOTES
[facility] Medicine Services        Patient Name: Shruthi Amin  : 6/10/1928  MRN: [id number]  Primary Care Physician:  Og Jeronimo Jr., MD  Date of admission: 10/1/2022        Subjective       Chief Complaint: Weakness and shortness of breath     History of Present Illness: Shruthi Amin is a 89 y.o. female who presented to [facility] on 10/1/2022 with a past medical history of atrial fibrillation, congestive heart failure with preserved ejection fraction with moderate tricuspid regurgitation, mild to moderate mitral valve regurgitation, lung scarring requiring as needed oxygen prescribed at home, chronic anticoagulation and large hiatal hernia.  Despite her advanced age she is relatively active.  She is able to walk on her own and interacts with her family.  She was her usual state of health till about 5 days ago when she developed a cough and congestion.  Couple days later the congestion became more thick and green.  She started becoming more short of breath.  She normally does not have to use her oxygen very much but over the past couple of days she has required her oxygen full-time.  Normally she gets up and walks and moves about the house but starting 48 hours ago she was just too tired and weak to get out of bed.  She had subjective fevers and chills but they never did check it.  Because her condition was worsening they took her to the emergency room.  In the emergency room patient was in nature of, urinalysis although dirty did indicate a possible infection, CT scan performed indicated a significant pleural effusion and possibly a right lower pneumonia but did indicate some bronchial thickening and scarring, and elevated BNP of 5400 and a CT scan of the head showed sinusitis.  Patient will be admitted for acute on chronic respiratory failure with hypoxia and will investigate potential causes.  Please see H&P on how we will proceed     Daughter was at bedside  and patient stated that she did not want to be resuscitated and the healthcare power of  is Inessa Trevino     ROS weakness generalized, shortness of breath, cough, increased sputum production, change in color of sputum, subjective fever and chills, decreased p.o. intake, but denied mental status changes, chest pain, leg swelling, abdominal pain, nausea vomiting or diarrhea, unusual joint pains, dysuria, flank pain, increased frequency and urgency of urination, and rash and the rest the 15 essential review of systems have been reviewed and are negative     10/3/2022; first time seeing the patient, extensive chart notes reviewed, sitting up in the bed feels like her breathing has improved denies any fevers or chills nausea or vomiting, discontinue doxycycline as both strep and Legionella urine antigen negative     Personal History      Medical History        Past Medical History:   Diagnosis Date   • Atrial fibrillation (HCC)     • Coronary artery disease       Atrial fibrillation   • GERD (gastroesophageal reflux disease)     • Glaucoma     • Hiatal hernia with gastroesophageal reflux     • History of osteoporotic pathological fracture       Right intertroch (7/2019)   • Hx of compression fracture of spine       T12 and L1(2013); T6 (1/2022)   • Hyperlipidemia     • Hypertension     • Osteoarthritis     • Osteoporosis       on prolia(3/21/22)   • Stroke (HCC)       off and on dizziness from the stroke.            Surgical History         Past Surgical History:   Procedure Laterality Date   • BACK SURGERY         kyphoplasty   • EYE SURGERY         cataract surgery   • FIXATION KYPHOPLASTY LUMBAR SPINE   2013   • HIP TROCHANTERIC NAILING WITH INTRAMEDULLARY HIP SCREW Right 7/20/2019     Procedure: HIP TROCHANTERIC NAILING SHORT WITH INTRAMEDULLARY HIP SCREW;  Surgeon: Edward Centeno MD;  Location: [facility] MAIN OR;  Service: Orthopedics   • RECTAL SURGERY         x 3            Family History: family  history includes Cancer in her brother, father, and sister; Heart disease in her mother; Hypertension in her mother; Osteoporosis in her father and mother. Otherwise pertinent FHx was reviewed and not pertinent to current issue.     Social History:  reports that she has never smoked. She has never used smokeless tobacco. She reports that she does not drink alcohol and does not use drugs.  Currently lives with family's.  Power of  medical Inessa Trevino     Home Medications:           Prior to Admission Medications      Prescriptions Last Dose Informant Patient Reported? Taking?     B Complex Vitamins (VITAMIN B COMPLEX PO)     Yes No     Take 1 tablet by mouth Daily.     Bacillus Coagulans-Inulin (ALIGN PREBIOTIC-PROBIOTIC PO)     Yes No     Take  by mouth.     Biotin 25924 MCG tablet     Yes No     Take  by mouth.     cholecalciferol (VITAMIN D3) 400 units tablet     Yes No     Take 400 Units by mouth Daily.     clindamycin (CLEOCIN) 150 MG capsule     No No     Take 1 capsule by mouth 3 (Three) Times a Day.     cyclobenzaprine (FLEXERIL) 10 MG tablet     No No     Take 0.5 tablets by mouth 2 (Two) Times a Day As Needed for Muscle Spasms.     denosumab (PROLIA) syringe 60 mg     No No     digoxin (LANOXIN) 125 MCG tablet     No No     TAKE 1 TABLET BY MOUTH EVERY DAY     docusate sodium (COLACE) 100 MG capsule     Yes No     Take 200 mg by mouth 2 (Two) Times a Day As Needed for Constipation.     doxycycline (MONODOX) 100 MG capsule     No No     Take 1 capsule by mouth 2 (Two) Times a Day.     furosemide (LASIX) 20 MG tablet     No No     TAKE 1 TABLET BY MOUTH AS NEEDED (SWELLING).     hydrocortisone 2.5 % cream     No No     Apply 1 application topically to the appropriate area as directed 2 (Two) Times a Day.     KLOR-CON 10 MEQ CR tablet     No No     TAKE 1 TABLET BY MOUTH EVERY DAY     Patient taking differently:  10 mEq As Needed. ONLY WHEN TAKING WATER PILL     latanoprost (XALATAN) 0.005 %  ophthalmic solution     Yes No     Administer 1 drop to the right eye Every Night.     Lutein 20 MG capsule     Yes No     Take  by mouth.     magnesium gluconate (MAGONATE) 500 MG tablet     Yes No     Take 27 mg by mouth 2 (Two) Times a Day.     magnesium hydroxide (MILK OF MAGNESIA) 800 MG/5ML suspension     Yes No     Take 30 mL by mouth Daily As Needed for Constipation.     metoprolol succinate XL (TOPROL-XL) 25 MG 24 hr tablet     No No     TAKE 1 TABLET BY MOUTH TWICE A DAY     mupirocin (BACTROBAN) 2 % ointment     Yes No     MIX 50/50 WITH HYDROCORTISONE OINTMENT AND APPLY TO AFFECTED AREAS TWICE A DAY.     NON FORMULARY     Yes No     1 tablet 2 (Two) Times a Day. PHYTOESTROGEN     O2 (OXYGEN)     Yes No     Inhale 2 L/min Continuous As Needed.     Sennosides 25 MG tablet     Yes No     Take 25 mg by mouth 2 (Two) Times a Day As Needed (constipation).     vitamin C (ASCORBIC ACID) 500 MG tablet     Yes No     Take 500 mg by mouth Daily.     warfarin (COUMADIN) 2 MG tablet     No No     Take 2 tablets on Monday and Friday and 1 tablet the other 5 days a week.     Zinc Sulfate (ZINC 15 PO)     Yes No     Take 2 tablets by mouth Daily. WITH ELDERBERRY--GUMMIES                Allergies:       Allergies   Allergen Reactions   • Codeine Nausea And Vomiting   • Penicillins Hives   • Latex Rash         Objective       Vitals:   Temp:  [97.3 °F (36.3 °C)-97.6 °F (36.4 °C)] 97.3 °F (36.3 °C)  Heart Rate:  [66-87] 85  Resp:  [16-20] 20  BP: (104-146)/(60-89) 131/71  Flow (L/min):  [2-3] 2     Physical Exam   General very fragile appearing but no distress and appropriate  Head atraumatic, there is some temporal wasting  Eyes equal round reactive to light  Oropharynx membranes moist no erythema  Nares green nasal discharge nasal cannula in place  Neck no thyromegaly lymphadenopathy  Pulmonary decreased breath sounds in the bases rhonchi and crackles throughout lung fields  Cardiac irregular irregular, 2/6 systolic  ejection murmur heard best in the left sternal border, chest significant prominence of ribs little to no subcutaneous fat, no peripheral edema  Abdomen positive bowel sounds no guarding no rebound no hepatosplenomegaly no pain on palpation  Extremities symmetric right may be slightly larger than left but no peripheral edema.  Psych patient was alert and oriented x4 very astute especially for her age  Neuro cranial nerves II through XII intact moves all extremities  Derm no rash     Result Review    Result Review:  I have personally reviewed the results from the time of this admission to 10/2/2022 02:45 EDT and agree with these findings:  [x]?  Laboratory  [x]?  Microbiology  [x]?  Radiology  [x]?  EKG/Telemetry   [x]?  Cardiology/Vascular   []?  Pathology  [x]?  Old records  []?  Other:  Most notable findings include:      BNP 5400, troponin less than 0.10, D-dimer 0.69, PT 28.9, INR 3, digoxin level 0.8 glucose 92, sodium 134, potassium 3.9, bicarb 23, creatinine 0.46, BUN 17, calcium was 6.6 but albumin was low  Alkaline phosphatase 68, total protein 4.7, AST 20, ALT 13, total bilirubin 0.5, albumin 2.5 low  WBCs 8400, hemoglobin 12.7, platelets 148,000 with elevation in monocytes  Urinalysis blood 3+, leukocyte esterase positive nitrate negative White blood cells 13-20+1 bacteria 3-6 squamous     Echocardiogram approximately 1 year ago showed an ejection fraction 56 to 60% moderate tricuspid regurgitation and mild to moderate mitral valve regurgitation     Respiratory pathogen panel and procalcitonin pending        IMPRESSION: CT of head   1. No acute intracranial abnormality.  2. Mild chronic age-related intracranial findings as above.  3. Moderate mucosal thickening in the paranasal sinuses.     IMPRESSION: CTA of chest   1.  Large right pleural effusion, adjacent atelectasis/consolidation, there could be superimposed infection.  2.  Large hiatus hernia  3.  Right upper lung subpleural reticulation, pleural  thickening, consider scarring, perhaps from prior insult   4.  Cardiomegaly, suggestion of pulmonary. Question heart failure.  5.  No pulmonary embolus.   6.  Unremarkable aorta     - ABNORMAL ECG -  Atrial fibrillation  Nonspecific T abnrm, anterolateral leads  When compared with ECG of 29-Jan-2022 16:01:10,  Significant repolarization change  Significant axis, voltage or hypertrophy change  Assessment & Plan          Active Hospital Problems:       Active Hospital Problems     Diagnosis     • **Acute on chronic respiratory failure with hypoxia (HCC)     • Acute on chronic congestive heart failure, unspecified heart failure type (HCC)     • Pleural effusion     • Scarring of lung     • UTI (urinary tract infection) due to urinary indwelling catheter (HCC)     • Pneumonia due to infectious organism     • Hypoalbuminemia     • General weakness     • Hyperlipidemia     • Atrial fibrillation (CMS/HCC) [I48.91]     • Chronic anticoagulation        Plan:   1.  Acute on chronic hypoxic respiratory failure with worsening hypoxia -patient has an underlying pulmonary scarring from a previous pneumonia and has oxygen available as needed.  Normally she does not require oxygen but for the past 2 days she has needed it all the time.  At this time the differential diagnosis includes heart failure, pleural effusion, and or respiratory tract infection which would include upper respiratory and lower which would be pneumonia.  The reason I mention upper respiratory and pneumonias that could have set off a reactive airway disease with an underlying pulmonary scarring.  At this time we will treat both of these possibilities.  Reactive airway disease DuoNebs was started but did not place the patient on any steroids.  2.  Acute on chronic congestive heart failure with preserved ejection fraction -echocardiogram from approximately a year ago showed preserved ejection fraction 55 to 60%.  But it did show moderate tricuspid regurgitation  and mild to moderate mitral valve regurgitation.  This could have resulted in worsening of her congestive heart failure with the mitral valve regurgitation increased pulmonary edema.  Since there is a pleural effusion most likely this is secondary to mitral valve regurgitation and congestive heart failure.  Patient has a low albumin and blood pressure is soft so in order to diurese the pleural effusion we will give albumin and Lasix.  This weight should preserve the blood pressure and renal function while correcting the pleural effusion.    Dr. Mathew her cardiologist has been consulted.  Echocardiogram has been ordered.  Electrolytes and renal function will be followed and replaced as needed  3.  Pleural effusion most likely secondary to congestive heart failure see #2.  If there is concerned that this may be an empyema then a thoracentesis may be required she is on Coumadin and her INR is 3 but most likely it has to do with congestive heart failure  4.  RSV Pneumonia with possible secondary bacterial pneumonia; PCT elevated initially but later normalized, urine strep and Legionella antigen negative, discontinued Decadron as well as doxycycline continue Rocephin IV for 5 days.  5.    Pyuria; urine culture negative and blood cultures x2 negative growth to date, discontinued IV antibiotics.  6.  Hypoalbuminemia replacing while giving Lasix and ordered boost and a nutrition consult  7.  Hyperlipidemia there was not a statin on her medical record but she has had a coronary artery disease and reported stroke in the past Alli Drake placed her on 20 mg of atorvastatin  8.  Atrial fibrillation appears to be rate controlled digoxin is in a therapeutic level will maintain potassium greater than 4 and magnesium greater than 2 which should help continue metoprolol 25 mg and digoxin.  Her cardiologist has been consulted.  9.  Generalized weakness can be attributed to many of the factors mentioned above.  We will treat the  underlying problems provide supportive care and had PT and OT working with the patient  10.  Chronic anticoagulation pharmacy to manage     DVT prophylaxis:  Medical DVT prophylaxis orders are present.     CODE STATUS:    Level Of Support Discussed With: Patient  Code Status (Patient has no pulse and is not breathing): No CPR (Do Not Attempt to Resuscitate)  Medical Interventions (Patient has pulse or is breathing): Comfort Measures     Admission Status:  I believe this patient meets inpatient status.     I discussed the patient's findings and my recommendations with patient and family.     This patient has been examined wearing appropriate Personal Protective Equipment and discussed.    Electronically signed by Abran Amador MD, 10/03/22, 2:57 PM EDT.

## 2022-10-03 NOTE — CONSULTS
Nutrition Services    Patient Name: Shruthi Amin  YOB: 1928  MRN: [id number]  Admission date: 10/1/2022    Comment:  Boost Plus BID    Severe chronic disease related malnutrition related to inadequate oral intake in the context of CHF as evidenced by physical exam revealing overall severe muscle/fat loss with po intake providing less than 75% of needs for at least 1 month PTA.    See MSA at bottom of note.    PPE Documentation        PPE Worn By Provider mask, gloves, eye protection and gown   PPE Worn By Patient  none     CLINICAL NUTRITION ASSESSMENT      Reason for Assessment 10/3: Unintentional weight loss consult     H&P      Past Medical History:   Diagnosis Date   • Atrial fibrillation (HCC)    • Coronary artery disease     Atrial fibrillation   • GERD (gastroesophageal reflux disease)    • Glaucoma    • Hiatal hernia with gastroesophageal reflux    • History of osteoporotic pathological fracture     Right intertroch (7/2019)   • Hx of compression fracture of spine     T12 and L1(2013); T6 (1/2022)   • Hyperlipidemia    • Hypertension    • Osteoarthritis    • Osteoporosis     on prolia(3/21/22)   • Stroke (HCC)     off and on dizziness from the stroke.       Past Surgical History:   Procedure Laterality Date   • BACK SURGERY      kyphoplasty   • EYE SURGERY      cataract surgery   • FIXATION KYPHOPLASTY LUMBAR SPINE  2013   • HIP TROCHANTERIC NAILING WITH INTRAMEDULLARY HIP SCREW Right 7/20/2019    Procedure: HIP TROCHANTERIC NAILING SHORT WITH INTRAMEDULLARY HIP SCREW;  Surgeon: Edward Centeno MD;  Location: [facility] OR;  Service: Orthopedics   • RECTAL SURGERY      x 3        Current Problems   Acute on chronic hypoxic respiratory failure    Acute on chronic CHF    Pleural effusion    RSV    Pyuria    HLD    Afib    Generalized weakness       Encounter Information        Trending Narrative     10/3: Visited patient in room. Reports her appetite is currently low due to needing  "to have a BM. Family at bedside report poor po intake chronically at home. Pt reports she typically has oatmeal at breakfast or just coffee, lunch consists of soup, and dinner is prepared by family whom she lives with- small portion. Pt does take Boost at home, 1x/day. Pt agreeable to Boost while inpatient.     Anthropometrics        Current Height, Weight Height: 157.5 cm (62\")  Weight: 50 kg (110 lb 3.7 oz) (10/03/22 0335)       Ideal Body Weight (IBW) 110lb   Usual Body Weight (UBW) 106lb (without fluid)       Trending Weight Hx     This admission: 10/3: current weight stable since admit             PTA: 10/3: current weight down 8% x1 year    Wt Readings from Last 30 Encounters:   10/03/22 0335 50 kg (110 lb 3.7 oz)   10/03/22 0112 50 kg (110 lb 3.7 oz)   10/02/22 1103 49.9 kg (110 lb)   10/01/22 1940 49.9 kg (110 lb 0.2 oz)   09/22/22 1354 51.2 kg (112 lb 12.8 oz)   07/18/22 1252 49.4 kg (109 lb)   05/03/22 1422 50.8 kg (112 lb)   04/19/22 1420 50.3 kg (111 lb)   03/30/22 1130 50.3 kg (111 lb)   03/22/22 1339 50.7 kg (111 lb 12.8 oz)   03/21/22 1345 50.3 kg (111 lb)   03/14/22 1407 50.3 kg (111 lb)   03/11/22 1025 49.9 kg (110 lb)   02/25/22 1353 52.2 kg (115 lb)   02/23/22 1514 49.4 kg (109 lb)   02/17/22 1155 52.2 kg (115 lb)   02/10/22 1223 52.2 kg (115 lb)   02/01/22 1022 52.2 kg (115 lb)   01/29/22 1442 52.5 kg (115 lb 11.9 oz)   01/27/22 1323 53.5 kg (118 lb)   01/10/22 1155 53.5 kg (118 lb)   12/07/21 1342 53.1 kg (117 lb)   11/23/21 1122 53.1 kg (117 lb)   10/21/21 1316 53.5 kg (118 lb)   09/17/21 1044 54.4 kg (120 lb)   08/18/21 1302 54.4 kg (120 lb)   08/18/21 1431 54.4 kg (120 lb)   08/18/21 1357 54.5 kg (120 lb 4 oz)   07/26/21 1509 54.4 kg (120 lb)   07/15/21 1052 54.9 kg (121 lb)   06/17/21 1132 54.9 kg (121 lb)   06/04/21 1151 54.8 kg (120 lb 12 oz)   05/18/21 1202 55.8 kg (123 lb)      BMI kg/m2 Body mass index is 20.16 kg/m².       Labs        Pertinent Labs    Results from last 7 days   Lab " Units 10/03/22  0213 10/02/22  0706 10/01/22  2156   SODIUM mmol/L 132* 131* 134*   POTASSIUM mmol/L 3.9 3.6 3.9   CHLORIDE mmol/L 90* 91* 103   CO2 mmol/L 32.0* 30.0* 23.0   BUN mg/dL 23 20 17   CREATININE mg/dL 0.61 0.61 0.46*   CALCIUM mg/dL 8.0* 8.1* 6.6*   BILIRUBIN mg/dL 0.9  --  0.9   ALK PHOS U/L 75  --  68   ALT (SGPT) U/L 21  --  13   AST (SGOT) U/L 30  --  20   GLUCOSE mg/dL 122* 114* 81     Results from last 7 days   Lab Units 10/03/22  0213 10/02/22  0706   MAGNESIUM mg/dL 2.1 2.5*   PHOSPHORUS mg/dL 2.0*  --    HEMOGLOBIN g/dL 12.6 12.4   HEMATOCRIT % 38.1 37.5     COVID19   Date Value Ref Range Status   10/02/2022 Not Detected Not Detected - Ref. Range Final     No results found for: HGBA1C     Medications    Scheduled Medications atorvastatin, 10 mg, Oral, Nightly  azelastine, 1 spray, Each Nare, BID  cefTRIAXone, 1 g, Intravenous, Q24H  digoxin, 125 mcg, Oral, Daily  guaiFENesin, 600 mg, Oral, Q12H  ipratropium-albuterol, 3 mL, Nebulization, Q6H While Awake - RT  metoprolol succinate XL, 25 mg, Oral, BID  pantoprazole, 40 mg, Oral, Q AM  senna-docusate sodium, 2 tablet, Oral, BID  sodium chloride, 10 mL, Intravenous, Q12H  warfarin, 1 mg, Oral, Once        Infusions Pharmacy to dose warfarin,         PRN Medications •  acetaminophen **OR** acetaminophen **OR** acetaminophen  •  benzonatate  •  senna-docusate sodium **AND** polyethylene glycol **AND** bisacodyl **AND** bisacodyl  •  magnesium sulfate **OR** magnesium sulfate in D5W 1g/100mL (PREMIX)  •  melatonin  •  nitroglycerin  •  ondansetron **OR** ondansetron  •  Pharmacy to dose warfarin  •  potassium & sodium phosphates **OR** potassium & sodium phosphates  •  potassium chloride  •  potassium chloride  •  sodium chloride  •  sodium chloride     Physical Findings        Trending Physical   Appearance, NFPE 10/3: NFPE completed, overall meets criteria for severe malnutrition   --  Edema  trace     Bowel Function BM 10/1   Tubes none    Chewing/Swallowing No issues per pt- does have new dentures   Skin No breakdown    --  Current Nutrition Orders & Evaluation of Intake       Oral Nutrition     Food Allergies NKFA   Current PO Diet Diet Regular   Supplement Boost Plus BID   PO Evaluation     Trending % PO Intake 0-50%   --  Nutritional Risk Screening        NRS-2002 Score          Nutrition Diagnosis         Nutrition Dx Problem 1 Severe chronic disease related malnutrition related to inadequate oral intake in the context of CHF as evidenced by physical exam revealing overall severe muscle/fat loss with po intake providing less than 75% of needs for at least 1 month PTA.      Nutrition Dx Problem 2        Intervention Goal         Intervention Goal(s) Meal intake at least 50% of meals  Consumption of ONS     Nutrition Intervention        RD Action Continue ONS     Nutrition Prescription          Diet Prescription Regular   Supplement Prescription Boost Plus BID (Provides 720 kcals, 28 g protein if consumed)      --  Monitor/Evaluation        Monitor PO intake, Supplement intake, Pertinent labs, Weight, Skin status, GI status, POC/GOC     Malnutrition Severity Assessment      Patient meets criteria for : Severe Malnutrition  Malnutrition Type (last 8 hours)     Malnutrition Severity Assessment     Row Name 10/03/22 1635       Malnutrition Severity Assessment    Malnutrition Type Chronic Disease - Related Malnutrition    Row Name 10/03/22 1635       Insufficient Energy Intake     Insufficient Energy Intake Findings Severe    Insufficient Energy Intake  <75% of est. energy requirement for > or equal to 1 month    Row Name 10/03/22 1635       Muscle Loss    Loss of Muscle Mass Findings Severe    [place] Region Moderate - slight depression    Clavicle Bone Region Severe - protruding prominent bone    Acromion Bone Region Severe - squared shoulders, bones, and acromion process protrusion prominent    Scapular Bone Region Moderate - mild depression, bones  may show slightly    Dorsal Hand Region Moderate - slight depression    Patellar Region Severe - prominent bone, square looking, very little muscle definition    Anterior Thigh Region Severe - line/depression along thigh, obviously thin    Posterior Calf Region Severe - thin with very little definition/firmness    Row Name 10/03/22 2391       Fat Loss    Subcutaneous Fat Loss Findings Severe    Orbital Region  Moderate -  somewhat hollowness, slightly dark circles    Upper Arm Region Severe - mostly skin, very little space between folds, fingers touch    Row Name 10/03/22 5544       Criteria Met (Must meet criteria for severity in at least 2 of these categories: M Wasting, Fat Loss, Fluid, Secondary Signs, Wt. Status, Intake)    Patient meets criteria for  Severe Malnutrition                       Electronically signed by:  Silke Fontana RD  10/03/22 16:32 EDT

## 2022-10-03 NOTE — PROGRESS NOTES
"Pharmacy dosing service  Anticoagulant  Warfarin     Subjective:    Shruthi Amin is a 89 y.o.female being continued on warfarin for atrial fibrillation.    INR Goal: 2 - 3  Home medication?: Yes, warfarin 4 mg PO on Mon/Fri and warfarin 2 mg PO on all other days of the week  Bridge Therapy Present?:  No  Interacting Medications Evaluation (New/Present/Discontinued): doxycycline, ceftriaxone, dexamethasone (new meds, may increase INR)  Additional Contributing Factors: none      Assessment/Plan:    INR continues to be therapeutic today. Will give dose slightly lower than home regimen today in anticipation of INR effects of DDI with abx and steroid listed above.    Continue to monitor and adjust based on INR.         Date 10/1 10/2 10/3         INR 2.98 2.56 2.88         Dose hold 2 mg 1 mg           Objective:  [Ht: 157.5 cm (62\"); Wt: 50 kg (110 lb 3.7 oz); BMI: Body mass index is 20.16 kg/m².]    Lab Results   Component Value Date    ALBUMIN 4.20 10/03/2022     Lab Results   Component Value Date    INR 2.88 10/03/2022    INR 2.56 10/02/2022    INR 2.98 10/01/2022    PROTIME 27.9 10/03/2022    PROTIME 25.0 10/02/2022    PROTIME 28.9 (H) 10/01/2022     Lab Results   Component Value Date    HGB 12.6 10/03/2022    HGB 12.4 10/02/2022    HGB 12.7 10/01/2022     Lab Results   Component Value Date    HCT 38.1 10/03/2022    HCT 37.5 10/02/2022    HCT 38.3 10/01/2022     Johanna Mortensen, PharmD  10/03/22 09:21 EDT   "

## 2022-10-04 ENCOUNTER — HOME HEALTH ADMISSION (OUTPATIENT)
Dept: HOME HEALTH SERVICES | Facility: HOME HEALTHCARE | Age: 87
End: 2022-10-04

## 2022-10-04 PROBLEM — E43 SEVERE MALNUTRITION: Status: ACTIVE | Noted: 2022-10-04

## 2022-10-04 LAB
ANION GAP SERPL CALCULATED.3IONS-SCNC: 10 MMOL/L (ref 5–15)
BUN SERPL-MCNC: 18 MG/DL (ref 8–23)
BUN/CREAT SERPL: 40 (ref 7–25)
CALCIUM SPEC-SCNC: 8.4 MG/DL (ref 8.2–9.6)
CHLORIDE SERPL-SCNC: 89 MMOL/L (ref 98–107)
CO2 SERPL-SCNC: 29 MMOL/L (ref 22–29)
CREAT SERPL-MCNC: 0.45 MG/DL (ref 0.57–1)
EGFRCR SERPLBLD CKD-EPI 2021: 89.3 ML/MIN/1.73
GLUCOSE SERPL-MCNC: 119 MG/DL (ref 65–99)
INR PPP: 3.06 (ref 2–3)
MAGNESIUM SERPL-MCNC: 1.9 MG/DL (ref 1.7–2.3)
PHOSPHATE SERPL-MCNC: 2.1 MG/DL (ref 2.5–4.5)
PHOSPHATE SERPL-MCNC: 2.5 MG/DL (ref 2.5–4.5)
POTASSIUM SERPL-SCNC: 4 MMOL/L (ref 3.5–5.2)
POTASSIUM SERPL-SCNC: 4.1 MMOL/L (ref 3.5–5.2)
PROTHROMBIN TIME: 29.6 SECONDS (ref 19.4–28.5)
SODIUM SERPL-SCNC: 128 MMOL/L (ref 136–145)

## 2022-10-04 PROCEDURE — 94799 UNLISTED PULMONARY SVC/PX: CPT

## 2022-10-04 PROCEDURE — 83735 ASSAY OF MAGNESIUM: CPT | Performed by: INTERNAL MEDICINE

## 2022-10-04 PROCEDURE — 84100 ASSAY OF PHOSPHORUS: CPT | Performed by: HOSPITALIST

## 2022-10-04 PROCEDURE — 99232 SBSQ HOSP IP/OBS MODERATE 35: CPT | Performed by: INTERNAL MEDICINE

## 2022-10-04 PROCEDURE — 85610 PROTHROMBIN TIME: CPT | Performed by: INTERNAL MEDICINE

## 2022-10-04 PROCEDURE — 94664 DEMO&/EVAL PT USE INHALER: CPT

## 2022-10-04 PROCEDURE — 94761 N-INVAS EAR/PLS OXIMETRY MLT: CPT

## 2022-10-04 PROCEDURE — 80048 BASIC METABOLIC PNL TOTAL CA: CPT | Performed by: HOSPITALIST

## 2022-10-04 RX ORDER — ASPIRIN 81 MG/1
81 TABLET ORAL DAILY
Qty: 100 TABLET | Refills: 0 | Status: SHIPPED | OUTPATIENT
Start: 2022-10-04 | End: 2023-01-12

## 2022-10-04 RX ADMIN — LATANOPROST 1 DROP: 50 SOLUTION/ DROPS OPHTHALMIC at 20:37

## 2022-10-04 RX ADMIN — IPRATROPIUM BROMIDE AND ALBUTEROL SULFATE 3 ML: .5; 3 SOLUTION RESPIRATORY (INHALATION) at 06:29

## 2022-10-04 RX ADMIN — BENZONATATE 100 MG: 100 CAPSULE ORAL at 20:37

## 2022-10-04 RX ADMIN — METOPROLOL SUCCINATE 25 MG: 25 TABLET, FILM COATED, EXTENDED RELEASE ORAL at 09:08

## 2022-10-04 RX ADMIN — IPRATROPIUM BROMIDE AND ALBUTEROL SULFATE 3 ML: .5; 3 SOLUTION RESPIRATORY (INHALATION) at 19:15

## 2022-10-04 RX ADMIN — AZELASTINE HYDROCHLORIDE 1 SPRAY: 137 SPRAY, METERED NASAL at 20:37

## 2022-10-04 RX ADMIN — SENNOSIDES AND DOCUSATE SODIUM 2 TABLET: 50; 8.6 TABLET ORAL at 09:07

## 2022-10-04 RX ADMIN — LATANOPROST 1 DROP: 50 SOLUTION/ DROPS OPHTHALMIC at 04:43

## 2022-10-04 RX ADMIN — Medication 2 PACKET: at 05:35

## 2022-10-04 RX ADMIN — AZELASTINE HYDROCHLORIDE 1 SPRAY: 137 SPRAY, METERED NASAL at 09:08

## 2022-10-04 RX ADMIN — Medication 10 ML: at 20:38

## 2022-10-04 RX ADMIN — SENNOSIDES AND DOCUSATE SODIUM 2 TABLET: 50; 8.6 TABLET ORAL at 20:37

## 2022-10-04 RX ADMIN — METOPROLOL SUCCINATE 25 MG: 25 TABLET, FILM COATED, EXTENDED RELEASE ORAL at 20:37

## 2022-10-04 RX ADMIN — GUAIFENESIN 600 MG: 600 TABLET, EXTENDED RELEASE ORAL at 20:37

## 2022-10-04 RX ADMIN — PANTOPRAZOLE SODIUM 40 MG: 40 TABLET, DELAYED RELEASE ORAL at 05:35

## 2022-10-04 RX ADMIN — Medication 10 ML: at 09:08

## 2022-10-04 RX ADMIN — DIGOXIN 125 MCG: 125 TABLET ORAL at 13:00

## 2022-10-04 RX ADMIN — IPRATROPIUM BROMIDE AND ALBUTEROL SULFATE 3 ML: .5; 3 SOLUTION RESPIRATORY (INHALATION) at 11:16

## 2022-10-04 RX ADMIN — ATORVASTATIN CALCIUM 10 MG: 10 TABLET, FILM COATED ORAL at 20:37

## 2022-10-04 RX ADMIN — GUAIFENESIN 600 MG: 600 TABLET, EXTENDED RELEASE ORAL at 09:07

## 2022-10-04 NOTE — CASE MANAGEMENT/SOCIAL WORK
Continued Stay Note   J[facility]     Patient Name: Shruthi Amin  MRN: [id number]  Today's Date: 10/4/2022    Admit Date: 10/1/2022    Plan: Home with daughter and [facility] (accepted, order in). Current with [facility] for home O2.   Discharge Plan     Row Name 10/04/22 1509       Plan    Plan Home with daughter and L[facility] Concepcion [facility] (accepted, order in). Current with [facility] for home O2.    Patient/Family in Agreement with Plan yes    Plan Comments CM confirmed with home health liaison Mattie that pt will be accepted for services. Dr Amador notified to place [facility] order. Notified by C liaison that daughter Shea is out of town today 10/4, so discharge will be held. Avoidable day placed.              Phone communication or documentation only - no physical contact with patient or family.      Megan Naegele, RN      Office Phone: [phone]  Office Cell: [phone]

## 2022-10-04 NOTE — DISCHARGE SUMMARY
[facility]        Patient Name: Shruthi Amin  : 6/10/1928  MRN: [id number]  Primary Care Physician:  Og Jeronimo Jr., MD  Date of admission: 10/1/2022  Date of discharge; 10/4/2022     Subjective       Chief Complaint: Weakness and shortness of breath     History of Present Illness: Shruthi Amin is a 89 y.o. female who presented to [facility] on 10/1/2022 with a past medical history of atrial fibrillation, congestive heart failure with preserved ejection fraction with moderate tricuspid regurgitation, mild to moderate mitral valve regurgitation, lung scarring requiring as needed oxygen prescribed at home, chronic anticoagulation and large hiatal hernia.  Despite her advanced age she is relatively active.  She is able to walk on her own and interacts with her family.  She was her usual state of health till about 5 days ago when she developed a cough and congestion.  Couple days later the congestion became more thick and green.  She started becoming more short of breath.  She normally does not have to use her oxygen very much but over the past couple of days she has required her oxygen full-time.  Normally she gets up and walks and moves about the house but starting 48 hours ago she was just too tired and weak to get out of bed.  She had subjective fevers and chills but they never did check it.  Because her condition was worsening they took her to the emergency room.  In the emergency room patient was in nature of, urinalysis although dirty did indicate a possible infection, CT scan performed indicated a significant pleural effusion and possibly a right lower pneumonia but did indicate some bronchial thickening and scarring, and elevated BNP of 5400 and a CT scan of the head showed sinusitis.  Patient will be admitted for acute on chronic respiratory failure with hypoxia and will investigate potential causes.  Please see H&P on how we will proceed      Daughter was at bedside and patient stated that she did not want to be resuscitated and the healthcare power of  is Inessa Trevino     ROS weakness generalized, shortness of breath, cough, increased sputum production, change in color of sputum, subjective fever and chills, decreased p.o. intake, but denied mental status changes, chest pain, leg swelling, abdominal pain, nausea vomiting or diarrhea, unusual joint pains, dysuria, flank pain, increased frequency and urgency of urination, and rash and the rest the 15 essential review of systems have been reviewed and are negative     10/3/2022; first time seeing the patient, extensive chart notes reviewed, sitting up in the bed feels like her breathing has improved denies any fevers or chills nausea or vomiting, discontinue doxycycline as both strep and Legionella urine antigen negative     Personal History      Medical History        Past Medical History:   Diagnosis Date   • Atrial fibrillation (HCC)     • Coronary artery disease       Atrial fibrillation   • GERD (gastroesophageal reflux disease)     • Glaucoma     • Hiatal hernia with gastroesophageal reflux     • History of osteoporotic pathological fracture       Right intertroch (7/2019)   • Hx of compression fracture of spine       T12 and L1(2013); T6 (1/2022)   • Hyperlipidemia     • Hypertension     • Osteoarthritis     • Osteoporosis       on prolia(3/21/22)   • Stroke (HCC)       off and on dizziness from the stroke.            Surgical History         Past Surgical History:   Procedure Laterality Date   • BACK SURGERY         kyphoplasty   • EYE SURGERY         cataract surgery   • FIXATION KYPHOPLASTY LUMBAR SPINE   2013   • HIP TROCHANTERIC NAILING WITH INTRAMEDULLARY HIP SCREW Right 7/20/2019     Procedure: HIP TROCHANTERIC NAILING SHORT WITH INTRAMEDULLARY HIP SCREW;  Surgeon: Edward Centeno MD;  Location: [facility] MAIN OR;  Service: Orthopedics   • RECTAL SURGERY         x 3             Family History: family history includes Cancer in her brother, father, and sister; Heart disease in her mother; Hypertension in her mother; Osteoporosis in her father and mother. Otherwise pertinent FHx was reviewed and not pertinent to current issue.     Social History:  reports that she has never smoked. She has never used smokeless tobacco. She reports that she does not drink alcohol and does not use drugs.  Currently lives with family's.  Power of  medical Inessa Trevino     Home Medications:           Prior to Admission Medications      Prescriptions Last Dose Informant Patient Reported? Taking?     B Complex Vitamins (VITAMIN B COMPLEX PO)     Yes No     Take 1 tablet by mouth Daily.     Bacillus Coagulans-Inulin (ALIGN PREBIOTIC-PROBIOTIC PO)     Yes No     Take  by mouth.     Biotin 58866 MCG tablet     Yes No     Take  by mouth.     cholecalciferol (VITAMIN D3) 400 units tablet     Yes No     Take 400 Units by mouth Daily.     clindamycin (CLEOCIN) 150 MG capsule     No No     Take 1 capsule by mouth 3 (Three) Times a Day.     cyclobenzaprine (FLEXERIL) 10 MG tablet     No No     Take 0.5 tablets by mouth 2 (Two) Times a Day As Needed for Muscle Spasms.     denosumab (PROLIA) syringe 60 mg     No No     digoxin (LANOXIN) 125 MCG tablet     No No     TAKE 1 TABLET BY MOUTH EVERY DAY     docusate sodium (COLACE) 100 MG capsule     Yes No     Take 200 mg by mouth 2 (Two) Times a Day As Needed for Constipation.     doxycycline (MONODOX) 100 MG capsule     No No     Take 1 capsule by mouth 2 (Two) Times a Day.     furosemide (LASIX) 20 MG tablet     No No     TAKE 1 TABLET BY MOUTH AS NEEDED (SWELLING).     hydrocortisone 2.5 % cream     No No     Apply 1 application topically to the appropriate area as directed 2 (Two) Times a Day.     KLOR-CON 10 MEQ CR tablet     No No     TAKE 1 TABLET BY MOUTH EVERY DAY     Patient taking differently:  10 mEq As Needed. ONLY WHEN TAKING WATER PILL     latanoprost  (XALATAN) 0.005 % ophthalmic solution     Yes No     Administer 1 drop to the right eye Every Night.     Lutein 20 MG capsule     Yes No     Take  by mouth.     magnesium gluconate (MAGONATE) 500 MG tablet     Yes No     Take 27 mg by mouth 2 (Two) Times a Day.     magnesium hydroxide (MILK OF MAGNESIA) 800 MG/5ML suspension     Yes No     Take 30 mL by mouth Daily As Needed for Constipation.     metoprolol succinate XL (TOPROL-XL) 25 MG 24 hr tablet     No No     TAKE 1 TABLET BY MOUTH TWICE A DAY     mupirocin (BACTROBAN) 2 % ointment     Yes No     MIX 50/50 WITH HYDROCORTISONE OINTMENT AND APPLY TO AFFECTED AREAS TWICE A DAY.     NON FORMULARY     Yes No     1 tablet 2 (Two) Times a Day. PHYTOESTROGEN     O2 (OXYGEN)     Yes No     Inhale 2 L/min Continuous As Needed.     Sennosides 25 MG tablet     Yes No     Take 25 mg by mouth 2 (Two) Times a Day As Needed (constipation).     vitamin C (ASCORBIC ACID) 500 MG tablet     Yes No     Take 500 mg by mouth Daily.     warfarin (COUMADIN) 2 MG tablet     No No     Take 2 tablets on Monday and Friday and 1 tablet the other 5 days a week.     Zinc Sulfate (ZINC 15 PO)     Yes No     Take 2 tablets by mouth Daily. WITH ELDERBERRY--GUMMIES                Allergies:       Allergies   Allergen Reactions   • Codeine Nausea And Vomiting   • Penicillins Hives   • Latex Rash         Objective       Vitals:   Temp:  [97.3 °F (36.3 °C)-98.2 °F (36.8 °C)] 97.7 °F (36.5 °C)  Heart Rate:  [68-85] 73  Resp:  [18-20] 18  BP: ()/(55-83) 97/55  Flow (L/min):  [2] 2     Physical Exam   General very fragile appearing but no distress and appropriate  Head atraumatic, there is some temporal wasting appears cachectic  Eyes equal round reactive to light  Oropharynx membranes moist no erythema  Nares green nasal discharge nasal cannula in place  Neck no thyromegaly lymphadenopathy  Pulmonary decreased breath sounds in the bases rhonchi and crackles throughout lung fields  Cardiac  irregular irregular, 2/6 systolic ejection murmur heard best in the left sternal border, chest significant prominence of ribs little to no subcutaneous fat, no peripheral edema  Abdomen positive bowel sounds no guarding no rebound no hepatosplenomegaly no pain on palpation  Extremities symmetric right may be slightly larger than left but no peripheral edema.  Psych patient was alert and oriented x4 very astute especially for her age  Neuro cranial nerves II through XII intact moves all extremities  Derm no rash     Result Review    Result Review:  I have personally reviewed the results from the time of this admission to 10/2/2022 02:45 EDT and agree with these findings:  [x]?  Laboratory  [x]?  Microbiology  [x]?  Radiology  [x]?  EKG/Telemetry   [x]?  Cardiology/Vascular   []?  Pathology  [x]?  Old records  []?  Other:  Most notable findings include:      BNP 5400, troponin less than 0.10, D-dimer 0.69, PT 28.9, INR 3, digoxin level 0.8 glucose 92, sodium 134, potassium 3.9, bicarb 23, creatinine 0.46, BUN 17, calcium was 6.6 but albumin was low  Alkaline phosphatase 68, total protein 4.7, AST 20, ALT 13, total bilirubin 0.5, albumin 2.5 low  WBCs 8400, hemoglobin 12.7, platelets 148,000 with elevation in monocytes  Urinalysis blood 3+, leukocyte esterase positive nitrate negative White blood cells 13-20+1 bacteria 3-6 squamous     Echocardiogram approximately 1 year ago showed an ejection fraction 56 to 60% moderate tricuspid regurgitation and mild to moderate mitral valve regurgitation     Respiratory pathogen panel and procalcitonin pending        IMPRESSION: CT of head   1. No acute intracranial abnormality.  2. Mild chronic age-related intracranial findings as above.  3. Moderate mucosal thickening in the paranasal sinuses.     IMPRESSION: CTA of chest   1.  Large right pleural effusion, adjacent atelectasis/consolidation, there could be superimposed infection.  2.  Large hiatus hernia  3.  Right upper lung  subpleural reticulation, pleural thickening, consider scarring, perhaps from prior insult   4.  Cardiomegaly, suggestion of pulmonary. Question heart failure.  5.  No pulmonary embolus.   6.  Unremarkable aorta     - ABNORMAL ECG -  Atrial fibrillation  Nonspecific T abnrm, anterolateral leads  When compared with ECG of 29-Jan-2022 16:01:10,  Significant repolarization change  Significant axis, voltage or hypertrophy change  Assessment & Plan          Active Hospital Problems:       Active Hospital Problems     Diagnosis     • **Acute on chronic respiratory failure with hypoxia (HCC)     • Acute on chronic congestive heart failure, unspecified heart failure type (HCC)     • Pleural effusion     • Scarring of lung     • UTI (urinary tract infection) due to urinary indwelling catheter (HCC)     • Pneumonia due to infectious organism     • Hypoalbuminemia     • General weakness     • Hyperlipidemia     • Atrial fibrillation (CMS/HCC) [I48.91]     • Chronic anticoagulation        Plan:   1.  Acute on chronic hypoxic respiratory failure with worsening hypoxia -patient has an underlying pulmonary scarring from a previous pneumonia and has oxygen available as needed.  Normally she does not require oxygen but for the past 2 days she has needed it all the time.  RSV pneumonia,   2.  Acute on chronic congestive heart failure with preserved ejection fraction -echocardiogram from approximately a year ago showed preserved ejection fraction 55 to 60%.  But it did show moderate tricuspid regurgitation and mild to moderate mitral valve regurgitation.  This could have resulted in worsening of her congestive heart failure with the mitral valve regurgitation increased pulmonary edema.  Since there is a pleural effusion most likely this is secondary to mitral valve regurgitation and congestive heart failure.  Patient has a low albumin and blood pressure is soft so in order to diurese the pleural effusion received albumin and Lasix.    effusion.    Dr. Mathew her cardiologist consult appreciated Echocardiogram with EF of 60 to 65%, aortic valve is trileaflet has mobile density present on leaflet consistent with fibrin strands versus vegetation to follow-up with cardiology as outpatient spoke with cardiology Dr. Mathew.  3.  Pleural effusion most likely secondary to congestive heart failure see #2.  If there is concerned that this may be an empyema then a thoracentesis may be required she is on Coumadin and her INR is 3 but most likely it has to do with congestive heart failure currently back to her baseline 2 L  4.  RSV Pneumonia with possible secondary bacterial pneumonia; PCT elevated initially but later normalized, urine strep and Legionella antigen negative, discontinued Decadron as well as doxycycline.  Received Rocephin IV for 3 days.  5.    Pyuria; urine culture negative and blood cultures x2 negative growth to date, discontinued IV antibiotics.  6.  Hypoalbuminemia replacing while giving Lasix and ordered boost and a nutrition consult  7.  Hyperlipidemia there was not a statin on her medical record but she has had a coronary artery disease and reported stroke in the past Alli Drake placed her on 20 mg of atorvastatin  8.  Atrial fibrillation appears to be rate controlled digoxin is in a therapeutic level will maintain potassium greater than 4 and magnesium greater than 2 which should help continue metoprolol 25 mg and digoxin.  Continue with aspirin, will discontinue warfarin at the time of discharge  9.  Generalized weakness can be attributed to many of the factors mentioned above.  We will treat the underlying problems provide supportive care and had PT and OT working with the patient  10.  Severe protein energy malnutrition.  Dietitian consult appreciated       DVT prophylaxis:  Medical DVT prophylaxis orders are present.     CODE STATUS:    Level Of Support Discussed With: Patient  Code Status (Patient has no pulse and is not  breathing): No CPR (Do Not Attempt to Resuscitate)  Medical Interventions (Patient has pulse or is breathing): Comfort Measures     Admission Status:  I believe this patient meets inpatient status.     I discussed the patient's findings and my recommendations with patient and family.     This patient has been examined wearing appropriate Personal Protective Equipment and discussed.     Your medication list      START taking these medications      Instructions Last Dose Given Next Dose Due   aspirin 81 MG EC tablet      Take 1 tablet by mouth Daily for 100 days.          CHANGE how you take these medications      Instructions Last Dose Given Next Dose Due   KLOR-CON 10 MEQ CR tablet  Generic drug: potassium chloride  What changed:   · how much to take  · how to take this  · when to take this  · reasons to take this  · additional instructions      TAKE 1 TABLET BY MOUTH EVERY DAY          CONTINUE taking these medications      Instructions Last Dose Given Next Dose Due   ALIGN PREBIOTIC-PROBIOTIC PO      Take  by mouth.       Biotin 22424 MCG tablet      Take  by mouth.       cholecalciferol 10 MCG (400 UNIT) tablet  Commonly known as: VITAMIN D3      Take 400 Units by mouth Daily.       digoxin 125 MCG tablet  Commonly known as: LANOXIN      TAKE 1 TABLET BY MOUTH EVERY DAY       docusate sodium 100 MG capsule  Commonly known as: COLACE      Take 200 mg by mouth 2 (Two) Times a Day As Needed for Constipation.       furosemide 20 MG tablet  Commonly known as: LASIX      TAKE 1 TABLET BY MOUTH AS NEEDED (SWELLING).       hydrocortisone 2.5 % cream      Apply 1 application topically to the appropriate area as directed 2 (Two) Times a Day.       latanoprost 0.005 % ophthalmic solution  Commonly known as: XALATAN      Administer 1 drop to the right eye Every Night.       Lutein 20 MG capsule      Take  by mouth.       magnesium gluconate 500 MG tablet  Commonly known as: MAGONATE      Take 27 mg by mouth 2 (Two) Times a  Day.       magnesium hydroxide 800 MG/5ML suspension  Commonly known as: MILK OF MAGNESIA      Take 30 mL by mouth Daily As Needed for Constipation.       metoprolol succinate XL 25 MG 24 hr tablet  Commonly known as: TOPROL-XL      TAKE 1 TABLET BY MOUTH TWICE A DAY       NON FORMULARY      1 tablet 2 (Two) Times a Day. PHYTOESTROGEN       O2  Commonly known as: OXYGEN      Inhale 2 L/min Continuous As Needed.       Sennosides 25 MG tablet      Take 25 mg by mouth 2 (Two) Times a Day As Needed (constipation).       VITAMIN B COMPLEX PO      Take 1 tablet by mouth Daily.       vitamin C 500 MG tablet  Commonly known as: ASCORBIC ACID      Take 500 mg by mouth Daily.       ZINC 15 PO      Take 2 tablets by mouth Daily. WITH ELDERBERRY--GUMMIES          STOP taking these medications    warfarin 2 MG tablet  Commonly known as: COUMADIN              Where to Get Your Medications      These medications were sent to [facility]/pharmacy #[id number] [facility] [place], [place] - [address] [place] [address] - [phone]  - [phone] [address] [place] [address]    Hours: 24-hours Phone: [phone]   · aspirin 81 MG EC tablet           DISCHARGE Follow Up Recommendations for labs and diagnostics; follow-up with PCP and cardiology as scheduled  Discharge condition; fair condition  Discharge diet; cardiac diet.  Discharge activity; as per PT OT  Discharge total time; more than 33 minutes.    Electronically signed by Abran Amador MD, 10/04/22, 12:45 PM EDT.

## 2022-10-04 NOTE — PLAN OF CARE
Goal Outcome Evaluation:  Plan of Care Reviewed With: patient        Progress: no change  Outcome Evaluation: Pt AOx4 up with standby assist to BSC.Pt on 2L NC. Call light within reach. No s/s of distress noted.

## 2022-10-04 NOTE — SIGNIFICANT NOTE
10/04/22 1324   OTHER   Discipline physical therapist   Rehab Time/Intention   Session Not Performed other (see comments)  (hospital d/c pending)   Recommendation   PT - Next Appointment 10/05/22

## 2022-10-04 NOTE — PROGRESS NOTES
Spoke to daughter and is agreeable to [facility] services   Demographics verified    Daughter is her caregiver thru help at home  Attendant care    Dr Jeronimo will follow and sign POC per secure chat   SN admit

## 2022-10-04 NOTE — PLAN OF CARE
Goal Outcome Evaluation:   Patient is able to be discharged, patients family won't be available to  patient until tomorrow so we will discharge tomorrow. Will continue to monitor.

## 2022-10-04 NOTE — PROGRESS NOTES
Referring Provider: Hospitalist    Reason for follow-up: Shortness of breath     Patient Care Team:  Og Jeronimo Jr., MD as PCP - General  Aleksey Mathew MD as Consulting Physician (Cardiology)    Subjective .  Patient is doing well without any symptoms    Objective  In bed comfortably     Review of Systems   Constitutional: Negative for fever and malaise/fatigue.   Cardiovascular: Negative for chest pain, dyspnea on exertion and palpitations.   Respiratory: Negative for cough and shortness of breath.    Skin: Negative for rash.   Gastrointestinal: Negative for abdominal pain, nausea and vomiting.   Neurological: Negative for focal weakness and headaches.   All other systems reviewed and are negative.      Codeine, Penicillins, and Latex    Scheduled Meds:atorvastatin, 10 mg, Oral, Nightly  azelastine, 1 spray, Each Nare, BID  digoxin, 125 mcg, Oral, Daily  guaiFENesin, 600 mg, Oral, Q12H  ipratropium-albuterol, 3 mL, Nebulization, Q6H While Awake - RT  latanoprost, 1 drop, Both Eyes, Nightly  metoprolol succinate XL, 25 mg, Oral, BID  pantoprazole, 40 mg, Oral, Q AM  senna-docusate sodium, 2 tablet, Oral, BID  sodium chloride, 10 mL, Intravenous, Q12H      Continuous Infusions:Pharmacy to dose warfarin,       PRN Meds:.•  acetaminophen **OR** acetaminophen **OR** acetaminophen  •  benzonatate  •  senna-docusate sodium **AND** polyethylene glycol **AND** bisacodyl **AND** bisacodyl  •  magnesium sulfate **OR** magnesium sulfate in D5W 1g/100mL (PREMIX)  •  melatonin  •  nitroglycerin  •  ondansetron **OR** ondansetron  •  Pharmacy to dose warfarin  •  potassium & sodium phosphates **OR** potassium & sodium phosphates  •  potassium chloride  •  potassium chloride  •  sodium chloride  •  sodium chloride        VITAL SIGNS  Vitals:    10/04/22 0634 10/04/22 1116 10/04/22 1122 10/04/22 1135   BP:    97/55   BP Location:    Right arm   Patient Position:    Lying   Pulse: 74 70 71 73   Resp: 20 20 20 18   Temp:     "97.7 °F (36.5 °C)   TempSrc:    Oral   SpO2: 96% 98% 98% 97%   Weight:       Height:           Flowsheet Rows    Flowsheet Row First Filed Value   Admission Height 157.5 cm (62\") Documented at 10/01/2022 1940   Admission Weight 49.9 kg (110 lb 0.2 oz) Documented at 10/01/2022 1940           TELEMETRY: Atrial fibrillation controlled ventricular response    Physical Exam:  Constitutional:       Appearance: Well-developed.   Eyes:      General: No scleral icterus.     Conjunctiva/sclera: Conjunctivae normal.   HENT:      Head: Normocephalic and atraumatic.   Neck:      Vascular: No carotid bruit or JVD.   Pulmonary:      Effort: Pulmonary effort is normal.      Breath sounds: Normal breath sounds. No wheezing. No rales.   Cardiovascular:      Normal rate. Regular rhythm.   Pulses:     Intact distal pulses.   Abdominal:      General: Bowel sounds are normal.      Palpations: Abdomen is soft.   Musculoskeletal:      Cervical back: Normal range of motion and neck supple. Skin:     General: Skin is warm and dry.      Findings: No rash.   Neurological:      Mental Status: Alert.          Results Review:   I reviewed the patient's new clinical results.  Lab Results (last 24 hours)     Procedure Component Value Units Date/Time    Phosphorus [[id number]]  (Normal) Collected: 10/04/22 1135    Specimen: Blood Updated: 10/04/22 1225     Phosphorus 2.5 mg/dL     Basic Metabolic Panel [[id number]]  (Abnormal) Collected: 10/04/22 0114    Specimen: Blood Updated: 10/04/22 0940     Glucose 119 mg/dL      BUN 18 mg/dL      Creatinine 0.45 mg/dL      Sodium 128 mmol/L      Potassium 4.1 mmol/L      Comment: Slight hemolysis detected by analyzer. Results may be affected.        Chloride 89 mmol/L      CO2 29.0 mmol/L      Calcium 8.4 mg/dL      BUN/Creatinine Ratio 40.0     Anion Gap 10.0 mmol/L      eGFR 89.3 mL/min/1.73      Comment: National Kidney Foundation and American Society of Nephrology (ASN) Task Force recommended calculation " based on the Chronic Kidney Disease Epidemiology Collaboration (CKD-EPI) equation refit without adjustment for race.       Narrative:      GFR Normal >60  Chronic Kidney Disease <60  Kidney Failure <15      Protime-INR [[id number]]  (Abnormal) Collected: 10/04/22 0114    Specimen: Blood Updated: 10/04/22 0147     Protime 29.6 Seconds      INR 3.06    Magnesium [[id number]]  (Normal) Collected: 10/04/22 0114    Specimen: Blood Updated: 10/04/22 0143     Magnesium 1.9 mg/dL     Potassium [[id number]]  (Normal) Collected: 10/04/22 0114    Specimen: Blood Updated: 10/04/22 0143     Potassium 4.0 mmol/L      Comment: Slight hemolysis detected by analyzer. Results may be affected.       Phosphorus [[id number]]  (Abnormal) Collected: 10/04/22 0114    Specimen: Blood Updated: 10/04/22 0143     Phosphorus 2.1 mg/dL     Blood Culture - Blood, Blood, Venous Line [[id number]]  (Normal) Collected: 10/02/22 0048    Specimen: Blood, Venous Line Updated: 10/04/22 0115     Blood Culture No growth at 2 days    Blood Culture - Blood, Blood, Venous Line [[id number]]  (Normal) Collected: 10/02/22 0048    Specimen: Blood, Venous Line Updated: 10/04/22 0115     Blood Culture No growth at 2 days    Phosphorus [[id number]]  (Abnormal) Collected: 10/03/22 2036    Specimen: Blood Updated: 10/03/22 2120     Phosphorus 2.2 mg/dL           Imaging Results (Last 24 Hours)     ** No results found for the last 24 hours. **          EKG      I personally viewed and interpreted the patient's EKG/Telemetry data:    ECHOCARDIOGRAM:    STRESS MYOVIEW:    CARDIAC CATHETERIZATION:    OTHER:         Assessment & Plan     Principal Problem:    Acute on chronic respiratory failure with hypoxia (HCC)  Active Problems:    Atrial fibrillation (CMS/HCC) [I48.91]    Chronic anticoagulation    Hyperlipidemia    Acute on chronic congestive heart failure, unspecified heart failure type (HCC)    Scarring of lung    UTI (urinary tract infection) due to urinary indwelling  catheter (HCC)    Pleural effusion    Pneumonia due to infectious organism    Hypoalbuminemia    General weakness    Severe malnutrition (HCC)       Patient presents with shortness of breath and is ruled out for MI by get enzymes  Patient had respiratory virus and is being treated for the same  Patient had high chads Vascor but because of her age she did not want to be on any anticoagulation except for aspirin.  Patient is currently on digoxin and metoprolol and her heart rates are well controlled  Patient had an echocardiogram which showed a possible fibrin status post vegetation but her blood cultures were negative and hence no further cardiac work-up at this time and the suspicion for endocarditis is low.    I discussed the patients findings and my recommendations with patient and nurse    Aleksey Mathew MD  10/04/22  12:53 EDT

## 2022-10-04 NOTE — PROGRESS NOTES
"Pharmacy dosing service  Anticoagulant  Warfarin     Subjective:    Shruthi Amin is a 89 y.o.female being continued on warfarin for atrial fibrillation.    INR Goal: 2 - 3  Home medication?: Yes, warfarin 4 mg PO on Mon/Fri and warfarin 2 mg PO on all other days of the week  Bridge Therapy Present?:  No  Interacting Medications Evaluation (New/Present/Discontinued): doxycycline, ceftriaxone, dexamethasone (new meds, may increase INR, all stopped 10/3)  Additional Contributing Factors: none      Assessment/Plan:    INR is slightly supratherapeutic today. Slightly lower dose than home regimen given yesterday in anticipation of INR effects of DDI with abx and steroid listed above. Last doses of abx and steroids were yesterday. Will hold warfarin today and anticipate being able to resume tomorrow.    Continue to monitor and adjust based on INR.         Date 10/1 10/2 10/3 10/4        INR 2.98 2.56 2.88 3.06        Dose hold 2 mg 1 mg           Objective:  [Ht: 157.5 cm (62\"); Wt: 51.8 kg (114 lb 3.2 oz); BMI: Body mass index is 20.89 kg/m².]    Lab Results   Component Value Date    ALBUMIN 4.20 10/03/2022     Lab Results   Component Value Date    INR 3.06 (H) 10/04/2022    INR 2.88 10/03/2022    INR 2.56 10/02/2022    PROTIME 29.6 (H) 10/04/2022    PROTIME 27.9 10/03/2022    PROTIME 25.0 10/02/2022     Lab Results   Component Value Date    HGB 12.6 10/03/2022    HGB 12.4 10/02/2022    HGB 12.7 10/01/2022     Lab Results   Component Value Date    HCT 38.1 10/03/2022    HCT 37.5 10/02/2022    HCT 38.3 10/01/2022     Johanna Mortensen, PharmD  10/04/22 11:37 EDT   "

## 2022-10-05 ENCOUNTER — APPOINTMENT (OUTPATIENT)
Dept: GENERAL RADIOLOGY | Facility: HOSPITAL | Age: 87
End: 2022-10-05

## 2022-10-05 LAB
ANION GAP SERPL CALCULATED.3IONS-SCNC: 8 MMOL/L (ref 5–15)
BUN SERPL-MCNC: 22 MG/DL (ref 8–23)
BUN/CREAT SERPL: 33.8 (ref 7–25)
CALCIUM SPEC-SCNC: 8.6 MG/DL (ref 8.2–9.6)
CHLORIDE SERPL-SCNC: 93 MMOL/L (ref 98–107)
CO2 SERPL-SCNC: 33 MMOL/L (ref 22–29)
CREAT SERPL-MCNC: 0.65 MG/DL (ref 0.57–1)
EGFRCR SERPLBLD CKD-EPI 2021: 81.7 ML/MIN/1.73
GLUCOSE SERPL-MCNC: 76 MG/DL (ref 65–99)
INR PPP: 2.64 (ref 2–3)
MAGNESIUM SERPL-MCNC: 2.1 MG/DL (ref 1.7–2.3)
POTASSIUM SERPL-SCNC: 4.1 MMOL/L (ref 3.5–5.2)
POTASSIUM SERPL-SCNC: 4.2 MMOL/L (ref 3.5–5.2)
PROTHROMBIN TIME: 25.7 SECONDS (ref 19.4–28.5)
SODIUM SERPL-SCNC: 134 MMOL/L (ref 136–145)

## 2022-10-05 PROCEDURE — 85610 PROTHROMBIN TIME: CPT | Performed by: INTERNAL MEDICINE

## 2022-10-05 PROCEDURE — 94799 UNLISTED PULMONARY SVC/PX: CPT

## 2022-10-05 PROCEDURE — 80048 BASIC METABOLIC PNL TOTAL CA: CPT | Performed by: HOSPITALIST

## 2022-10-05 PROCEDURE — 72100 X-RAY EXAM L-S SPINE 2/3 VWS: CPT

## 2022-10-05 PROCEDURE — 72072 X-RAY EXAM THORAC SPINE 3VWS: CPT

## 2022-10-05 PROCEDURE — 97530 THERAPEUTIC ACTIVITIES: CPT

## 2022-10-05 PROCEDURE — 83735 ASSAY OF MAGNESIUM: CPT | Performed by: INTERNAL MEDICINE

## 2022-10-05 PROCEDURE — 36415 COLL VENOUS BLD VENIPUNCTURE: CPT | Performed by: INTERNAL MEDICINE

## 2022-10-05 PROCEDURE — 97535 SELF CARE MNGMENT TRAINING: CPT

## 2022-10-05 RX ORDER — WARFARIN SODIUM 2 MG/1
2 TABLET ORAL
Status: COMPLETED | OUTPATIENT
Start: 2022-10-05 | End: 2022-10-05

## 2022-10-05 RX ADMIN — AZELASTINE HYDROCHLORIDE 1 SPRAY: 137 SPRAY, METERED NASAL at 20:10

## 2022-10-05 RX ADMIN — AZELASTINE HYDROCHLORIDE 1 SPRAY: 137 SPRAY, METERED NASAL at 09:31

## 2022-10-05 RX ADMIN — Medication 10 ML: at 20:11

## 2022-10-05 RX ADMIN — SENNOSIDES AND DOCUSATE SODIUM 2 TABLET: 50; 8.6 TABLET ORAL at 20:11

## 2022-10-05 RX ADMIN — METOPROLOL SUCCINATE 25 MG: 25 TABLET, FILM COATED, EXTENDED RELEASE ORAL at 09:30

## 2022-10-05 RX ADMIN — DIGOXIN 125 MCG: 125 TABLET ORAL at 12:40

## 2022-10-05 RX ADMIN — METOPROLOL SUCCINATE 25 MG: 25 TABLET, FILM COATED, EXTENDED RELEASE ORAL at 20:11

## 2022-10-05 RX ADMIN — IPRATROPIUM BROMIDE AND ALBUTEROL SULFATE 3 ML: .5; 3 SOLUTION RESPIRATORY (INHALATION) at 11:57

## 2022-10-05 RX ADMIN — IPRATROPIUM BROMIDE AND ALBUTEROL SULFATE 3 ML: .5; 3 SOLUTION RESPIRATORY (INHALATION) at 19:56

## 2022-10-05 RX ADMIN — GUAIFENESIN 600 MG: 600 TABLET, EXTENDED RELEASE ORAL at 20:11

## 2022-10-05 RX ADMIN — LATANOPROST 1 DROP: 50 SOLUTION/ DROPS OPHTHALMIC at 20:10

## 2022-10-05 RX ADMIN — Medication 10 ML: at 09:31

## 2022-10-05 RX ADMIN — IPRATROPIUM BROMIDE AND ALBUTEROL SULFATE 3 ML: .5; 3 SOLUTION RESPIRATORY (INHALATION) at 07:57

## 2022-10-05 RX ADMIN — GUAIFENESIN 600 MG: 600 TABLET, EXTENDED RELEASE ORAL at 09:30

## 2022-10-05 RX ADMIN — WARFARIN 2 MG: 2 TABLET ORAL at 18:02

## 2022-10-05 RX ADMIN — SENNOSIDES AND DOCUSATE SODIUM 2 TABLET: 50; 8.6 TABLET ORAL at 09:30

## 2022-10-05 RX ADMIN — PANTOPRAZOLE SODIUM 40 MG: 40 TABLET, DELAYED RELEASE ORAL at 06:55

## 2022-10-05 NOTE — PROGRESS NOTES
"Pharmacy dosing service  Anticoagulant  Warfarin     Subjective:    Shruthi Amin is a 89 y.o.female being continued on warfarin for atrial fibrillation.    INR Goal: 2 - 3  Home medication?:  yes  Bridge Therapy Present?:  No  Interacting Medications Evaluation (New/Present/Discontinued): N/A  Additional Contributing Factors: DC abx and steroid      Assessment/Plan:  INR therapeutic - attempt to resume home dosing schedule with caution given potential DDIs have been DC'ed    Continue to monitor and adjust based on INR.       Date 10/1 10/2 10/3 10/4  10/5           INR 2.98 2.56 2.88 3.06  2.63           Dose hold 2 mg 1 mg held 2/2 supra  2mg                 Objective:  [Ht: 157.5 cm (62\"); Wt: 48.2 kg (106 lb 4.2 oz); BMI: Body mass index is 19.44 kg/m².]    Lab Results   Component Value Date    ALBUMIN 4.20 10/03/2022     Lab Results   Component Value Date    INR 2.64 10/05/2022    INR 3.06 (H) 10/04/2022    INR 2.88 10/03/2022    PROTIME 25.7 10/05/2022    PROTIME 29.6 (H) 10/04/2022    PROTIME 27.9 10/03/2022     Lab Results   Component Value Date    HGB 12.6 10/03/2022    HGB 12.4 10/02/2022    HGB 12.7 10/01/2022     Lab Results   Component Value Date    HCT 38.1 10/03/2022    HCT 37.5 10/02/2022    HCT 38.3 10/01/2022       Asiya Ray  10/05/22 10:30 EDT       "

## 2022-10-05 NOTE — PLAN OF CARE
Problem: Adult Inpatient Plan of Care  Goal: Absence of Hospital-Acquired Illness or Injury  Intervention: Prevent Skin Injury  Recent Flowsheet Documentation  Taken 10/5/2022 0817 by Maricel Garner LPN  Body Position: supine     Problem: Adult Inpatient Plan of Care  Goal: Absence of Hospital-Acquired Illness or Injury  Intervention: Prevent Infection  Recent Flowsheet Documentation  Taken 10/5/2022 1420 by Maricel Garner LPN  Infection Prevention:   environmental surveillance performed   hand hygiene promoted   personal protective equipment utilized   single patient room provided   visitors restricted/screened  Taken 10/5/2022 1220 by Maricel Garner LPN  Infection Prevention:   environmental surveillance performed   hand hygiene promoted   personal protective equipment utilized   single patient room provided  Taken 10/5/2022 1015 by Maricel Garner LPN  Infection Prevention:   environmental surveillance performed   hand hygiene promoted   personal protective equipment utilized   single patient room provided  Taken 10/5/2022 0817 by Maricel Garner LPN  Infection Prevention:   environmental surveillance performed   hand hygiene promoted   personal protective equipment utilized   single patient room provided   Goal Outcome Evaluation:  Plan of Care Reviewed With: patient, daughter        Progress: no change   Alert and oriented x 4. Takes medication whole and tolerates well. Continent of bowel and bladder. Assist x 1 for transfers, uses walker for ambulation. Discharge orders in place and family agreeable. PT/OT re-evaluated to ensure patient safe to go home. Spine x-rays ordered. Currently out of bed, daughters at bedside. Continues to require O2 therapy at 2 LPM via NC and tolerating well.

## 2022-10-05 NOTE — CASE MANAGEMENT/SOCIAL WORK
Continued Stay Note   Jamey     Patient Name: Shruthi Amin  MRN: [id number]  Today's Date: 10/5/2022    Admit Date: 10/1/2022    Plan: Accepted to [facility] pending bed availability. No precert required. PASRR per facility.   Discharge Plan     Row Name 10/05/22 1650       Plan    Plan Accepted to [facility] pending bed availability. No precert required. PASRR per facility.    Plan Comments CM called by nursing to meet with family at bedside regarding discharge plan. Met with daughters Sonia and Shea at bedside. They report that pt is not at her baseline physically and they have concerns with her returning home. Nursing contacted therapy to re-evaluate her. They also voiced concerns with patient experiencing back pain with hx of fracture. CM contacted Dr Amador who ordered x-rays. Spoke to OT after she worked with pt and she canged the recommendation to SNF. Met with pt and daughters again at bedside and they are now wanting SNF. Patient had previously been to [facility] and that's their first choice. Referral sent in Epic and message sent to liaisons Mili and Debbie; explained that pt is discharge ready pending her x-rays. Shortly after, notified by Shea that d/t rating on Medicare.gov, they would like to change their choice to 1) [facility] and 2) [facility]. Inquired about bed availability with [facility] who reports no bed available until the weekend. Checked with [facility] and [facility] who confirmed that they could take 10/6 AM once they rearranged some patients to accomodate patient's isolation status. Called Dr Amador and he is agreeable to cancel discharge until tomorrow. Secure chat also sent to update nursing. [facility]              Met with patient in room wearing PPE: mask, face shield/goggles.      Maintained distance greater than six feet and spent less than 15 minutes in the  room.      Megan Naegele, RN      Office Phone: [phone]  Office Cell: [phone]

## 2022-10-05 NOTE — THERAPY TREATMENT NOTE
"Subjective: Pt agreeable to therapeutic plan of care.  Cognition: oriented to Person, Place, Time and Situation    Objective:     Bed Mobility: CGA   Functional Transfers: CGA  Functional Ambulation: CGA and Min-A, Min-A required for balance while turning with FWW    Lower Body Dressing: Min-A  ADL Position: edge of bed sitting  ADL Comments: Pt required Min-A to don sourav shoes at EOB      Vitals: WNL    Pain: 0 VAS  Education: Provided education on importance of mobility and skilled verbal / tactile cueing throughout intervention.     Assessment: Shruthi Amin presents with ADL impairments below baseline abilities which indicate the need for continued skilled intervention while inpatient. Pt sitting up in bed at start of session. Pt required CGA for bed mobility and increased time due to weakness. Pt tolerated sitting EOB with Min-A for donning sourav shoes. Pt completed sit>stand with FWW and CGA. Pt ambulated from EOB>hallway>chair with CGA and Min-A for balance/stability during turns with FWW.  Tolerating session today without incident. Will continue to follow and progress as tolerated.     Plan/Recommendations:   Pt would benefit from Skilled Rehab placement at discharge from facility.   Pt desires Skilled Rehab placement at discharge. Pt cooperative; agreeable to therapeutic recommendations and plan of care.     Moderate Intensity Therapy recommended post-acute care. This is recommended as therapy feels the patient would require 3-4 days per week and wouldn't tolerate \"3 hour daily\" rehab intensity. SNF would be the preferred choice. If the patient does not agree to SNF, arrange HH or OP depending on home bound status. If patient is medically complex, consider LTACH.       Modified [place]: N/A = No pre-op stroke/TIA    Post-Tx Position: Up in Chair, Alarms activated and Call light and personal items within reach  PPE: mask, gloves, eye protection and gown    "

## 2022-10-05 NOTE — PLAN OF CARE
Goal Outcome Evaluation:              Outcome Evaluation: Pt resting on and off thorugh shift. No complaints voiced. Vitals stable. Will continue to monitor.

## 2022-10-05 NOTE — PLAN OF CARE
"Goal Outcome Evaluation:         Assessment: Shruthi Amin presents with ADL impairments below baseline abilities which indicate the need for continued skilled intervention while inpatient. Pt sitting up in bed at start of session. Pt required CGA for bed mobility and increased time due to weakness. Pt tolerated sitting EOB with Min-A for donning sourav shoes. Pt completed sit>stand with FWW and CGA. Pt ambulated from EOB>hallway>chair with CGA and Min-A for balance/stability during turns with FWW.  Tolerating session today without incident. Will continue to follow and progress as tolerated.     Plan/Recommendations:   Pt would benefit from Skilled Rehab placement at discharge from facility.   Pt desires Skilled Rehab placement at discharge. Pt cooperative; agreeable to therapeutic recommendations and plan of care.     Moderate Intensity Therapy recommended post-acute care. This is recommended as therapy feels the patient would require 3-4 days per week and wouldn't tolerate \"3 hour daily\" rehab intensity. SNF would be the preferred choice. If the patient does not agree to SNF, arrange HH or OP depending on home bound status. If patient is medically complex, consider LTACH.         "

## 2022-10-05 NOTE — PROGRESS NOTES
[facility] Medicine Services        Patient Name: Shruthi Amin  : 6/10/1928  MRN: [id number]  Primary Care Physician:  Og Jeronimo Jr., MD  Date of admission: 10/1/2022       Subjective       Chief Complaint: Weakness and shortness of breath     History of Present Illness: Shruthi Amin is a 89 y.o. female who presented to [facility] on 10/1/2022 with a past medical history of atrial fibrillation, congestive heart failure with preserved ejection fraction with moderate tricuspid regurgitation, mild to moderate mitral valve regurgitation, lung scarring requiring as needed oxygen prescribed at home, chronic anticoagulation and large hiatal hernia.  Despite her advanced age she is relatively active.  She is able to walk on her own and interacts with her family.  She was her usual state of health till about 5 days ago when she developed a cough and congestion.  Couple days later the congestion became more thick and green.  She started becoming more short of breath.  She normally does not have to use her oxygen very much but over the past couple of days she has required her oxygen full-time.  Normally she gets up and walks and moves about the house but starting 48 hours ago she was just too tired and weak to get out of bed.  She had subjective fevers and chills but they never did check it.  Because her condition was worsening they took her to the emergency room.  In the emergency room patient was in nature of, urinalysis although dirty did indicate a possible infection, CT scan performed indicated a significant pleural effusion and possibly a right lower pneumonia but did indicate some bronchial thickening and scarring, and elevated BNP of 5400 and a CT scan of the head showed sinusitis.  Patient will be admitted for acute on chronic respiratory failure with hypoxia and will investigate potential causes.  Please see H&P on how we will proceed     Daughter was at bedside  and patient stated that she did not want to be resuscitated and the healthcare power of  is Inessa Trevino     ROS weakness generalized, shortness of breath, cough, increased sputum production, change in color of sputum, subjective fever and chills, decreased p.o. intake, but denied mental status changes, chest pain, leg swelling, abdominal pain, nausea vomiting or diarrhea, unusual joint pains, dysuria, flank pain, increased frequency and urgency of urination, and rash and the rest the 15 essential review of systems have been reviewed and are negative     10/3/2022; first time seeing the patient, extensive chart notes reviewed, sitting up in the bed feels like her breathing has improved denies any fevers or chills nausea or vomiting, discontinue doxycycline as both strep and Legionella urine antigen negative   10/4/2022; afebrile vital signs are stable currently on room air, patient is medically ready to be discharged, there was no daughter to pick the patient, and that the family changed her mind to send the patient to rehab.    Personal History      Medical History        Past Medical History:   Diagnosis Date   • Atrial fibrillation (HCC)     • Coronary artery disease       Atrial fibrillation   • GERD (gastroesophageal reflux disease)     • Glaucoma     • Hiatal hernia with gastroesophageal reflux     • History of osteoporotic pathological fracture       Right intertroch (7/2019)   • Hx of compression fracture of spine       T12 and L1(2013); T6 (1/2022)   • Hyperlipidemia     • Hypertension     • Osteoarthritis     • Osteoporosis       on prolia(3/21/22)   • Stroke (HCC)       off and on dizziness from the stroke.            Surgical History         Past Surgical History:   Procedure Laterality Date   • BACK SURGERY         kyphoplasty   • EYE SURGERY         cataract surgery   • FIXATION KYPHOPLASTY LUMBAR SPINE   2013   • HIP TROCHANTERIC NAILING WITH INTRAMEDULLARY HIP SCREW Right 7/20/2019      Procedure: HIP TROCHANTERIC NAILING SHORT WITH INTRAMEDULLARY HIP SCREW;  Surgeon: Edward Centeno MD;  Location: [facility] MAIN OR;  Service: Orthopedics   • RECTAL SURGERY         x 3            Family History: family history includes Cancer in her brother, father, and sister; Heart disease in her mother; Hypertension in her mother; Osteoporosis in her father and mother. Otherwise pertinent FHx was reviewed and not pertinent to current issue.     Social History:  reports that she has never smoked. She has never used smokeless tobacco. She reports that she does not drink alcohol and does not use drugs.  Currently lives with family's.  Power of  medical Inessa Trevino     Home Medications:           Prior to Admission Medications      Prescriptions Last Dose Informant Patient Reported? Taking?     B Complex Vitamins (VITAMIN B COMPLEX PO)     Yes No     Take 1 tablet by mouth Daily.     Bacillus Coagulans-Inulin (ALIGN PREBIOTIC-PROBIOTIC PO)     Yes No     Take  by mouth.     Biotin 26965 MCG tablet     Yes No     Take  by mouth.     cholecalciferol (VITAMIN D3) 400 units tablet     Yes No     Take 400 Units by mouth Daily.     clindamycin (CLEOCIN) 150 MG capsule     No No     Take 1 capsule by mouth 3 (Three) Times a Day.     cyclobenzaprine (FLEXERIL) 10 MG tablet     No No     Take 0.5 tablets by mouth 2 (Two) Times a Day As Needed for Muscle Spasms.     denosumab (PROLIA) syringe 60 mg     No No     digoxin (LANOXIN) 125 MCG tablet     No No     TAKE 1 TABLET BY MOUTH EVERY DAY     docusate sodium (COLACE) 100 MG capsule     Yes No     Take 200 mg by mouth 2 (Two) Times a Day As Needed for Constipation.     doxycycline (MONODOX) 100 MG capsule     No No     Take 1 capsule by mouth 2 (Two) Times a Day.     furosemide (LASIX) 20 MG tablet     No No     TAKE 1 TABLET BY MOUTH AS NEEDED (SWELLING).     hydrocortisone 2.5 % cream     No No     Apply 1 application topically to the appropriate area as  directed 2 (Two) Times a Day.     KLOR-CON 10 MEQ CR tablet     No No     TAKE 1 TABLET BY MOUTH EVERY DAY     Patient taking differently:  10 mEq As Needed. ONLY WHEN TAKING WATER PILL     latanoprost (XALATAN) 0.005 % ophthalmic solution     Yes No     Administer 1 drop to the right eye Every Night.     Lutein 20 MG capsule     Yes No     Take  by mouth.     magnesium gluconate (MAGONATE) 500 MG tablet     Yes No     Take 27 mg by mouth 2 (Two) Times a Day.     magnesium hydroxide (MILK OF MAGNESIA) 800 MG/5ML suspension     Yes No     Take 30 mL by mouth Daily As Needed for Constipation.     metoprolol succinate XL (TOPROL-XL) 25 MG 24 hr tablet     No No     TAKE 1 TABLET BY MOUTH TWICE A DAY     mupirocin (BACTROBAN) 2 % ointment     Yes No     MIX 50/50 WITH HYDROCORTISONE OINTMENT AND APPLY TO AFFECTED AREAS TWICE A DAY.     NON FORMULARY     Yes No     1 tablet 2 (Two) Times a Day. PHYTOESTROGEN     O2 (OXYGEN)     Yes No     Inhale 2 L/min Continuous As Needed.     Sennosides 25 MG tablet     Yes No     Take 25 mg by mouth 2 (Two) Times a Day As Needed (constipation).     vitamin C (ASCORBIC ACID) 500 MG tablet     Yes No     Take 500 mg by mouth Daily.     warfarin (COUMADIN) 2 MG tablet     No No     Take 2 tablets on Monday and Friday and 1 tablet the other 5 days a week.     Zinc Sulfate (ZINC 15 PO)     Yes No     Take 2 tablets by mouth Daily. WITH ELDERBERRY--GUMMIES                Allergies:       Allergies   Allergen Reactions   • Codeine Nausea And Vomiting   • Penicillins Hives   • Latex Rash         Objective       Vitals:   Temp:  [97 °F (36.1 °C)-97.6 °F (36.4 °C)] 97.5 °F (36.4 °C)  Heart Rate:  [68-73] 68  Resp:  [18] 18  BP: (105-119)/(66-74) 105/66  Flow (L/min):  [2] 2     Physical Exam   General very fragile appearing but no distress and appropriate  Head atraumatic, there is some temporal wasting appears cachectic  Eyes equal round reactive to light  Oropharynx membranes moist no  erythema  Nares green nasal discharge nasal cannula in place  Neck no thyromegaly lymphadenopathy  Pulmonary decreased breath sounds in the bases rhonchi and crackles throughout lung fields  Cardiac irregular irregular, 2/6 systolic ejection murmur heard best in the left sternal border, chest significant prominence of ribs little to no subcutaneous fat, no peripheral edema  Abdomen positive bowel sounds no guarding no rebound no hepatosplenomegaly no pain on palpation  Extremities symmetric right may be slightly larger than left but no peripheral edema.  Psych patient was alert and oriented x4 very astute especially for her age  Neuro cranial nerves II through XII intact moves all extremities  Derm no rash     Result Review    Result Review:  I have personally reviewed the results from the time of this admission to 10/2/2022 02:45 EDT and agree with these findings:  [x]?  Laboratory  [x]?  Microbiology  [x]?  Radiology  [x]?  EKG/Telemetry   [x]?  Cardiology/Vascular   []?  Pathology  [x]?  Old records  []?  Other:  Most notable findings include:      BNP 5400, troponin less than 0.10, D-dimer 0.69, PT 28.9, INR 3, digoxin level 0.8 glucose 92, sodium 134, potassium 3.9, bicarb 23, creatinine 0.46, BUN 17, calcium was 6.6 but albumin was low  Alkaline phosphatase 68, total protein 4.7, AST 20, ALT 13, total bilirubin 0.5, albumin 2.5 low  WBCs 8400, hemoglobin 12.7, platelets 148,000 with elevation in monocytes  Urinalysis blood 3+, leukocyte esterase positive nitrate negative White blood cells 13-20+1 bacteria 3-6 squamous     Echocardiogram approximately 1 year ago showed an ejection fraction 56 to 60% moderate tricuspid regurgitation and mild to moderate mitral valve regurgitation     Respiratory pathogen panel and procalcitonin pending        IMPRESSION: CT of head   1. No acute intracranial abnormality.  2. Mild chronic age-related intracranial findings as above.  3. Moderate mucosal thickening in the paranasal  sinuses.     IMPRESSION: CTA of chest   1.  Large right pleural effusion, adjacent atelectasis/consolidation, there could be superimposed infection.  2.  Large hiatus hernia  3.  Right upper lung subpleural reticulation, pleural thickening, consider scarring, perhaps from prior insult   4.  Cardiomegaly, suggestion of pulmonary. Question heart failure.  5.  No pulmonary embolus.   6.  Unremarkable aorta     - ABNORMAL ECG -  Atrial fibrillation  Nonspecific T abnrm, anterolateral leads  When compared with ECG of 29-Jan-2022 16:01:10,  Significant repolarization change  Significant axis, voltage or hypertrophy change  Assessment & Plan          Active Hospital Problems:       Active Hospital Problems     Diagnosis     • **Acute on chronic respiratory failure with hypoxia (HCC)     • Acute on chronic congestive heart failure, unspecified heart failure type (HCC)     • Pleural effusion     • Scarring of lung     • UTI (urinary tract infection) due to urinary indwelling catheter (HCC)     • Pneumonia due to infectious organism     • Hypoalbuminemia     • General weakness     • Hyperlipidemia     • Atrial fibrillation (CMS/HCC) [I48.91]     • Chronic anticoagulation        Plan:   1.  Acute on chronic hypoxic respiratory failure with worsening hypoxia -patient has an underlying pulmonary scarring from a previous pneumonia and has oxygen available as needed.  Normally she does not require oxygen but for the past 2 days she has needed it all the time.  RSV pneumonia,   2.  Acute on chronic congestive heart failure with preserved ejection fraction -echocardiogram from approximately a year ago showed preserved ejection fraction 55 to 60%.  But it did show moderate tricuspid regurgitation and mild to moderate mitral valve regurgitation.  This could have resulted in worsening of her congestive heart failure with the mitral valve regurgitation increased pulmonary edema.  Since there is a pleural effusion most likely this is  secondary to mitral valve regurgitation and congestive heart failure.  Patient has a low albumin and blood pressure is soft so in order to diurese the pleural effusion received albumin and Lasix.   effusion.    Dr. Mathew her cardiologist consult appreciated Echocardiogram with EF of 60 to 65%, aortic valve is trileaflet has mobile density present on leaflet consistent with fibrin strands versus vegetation to follow-up with cardiology as outpatient spoke with cardiology Dr. Mathew.  3.  Pleural effusion most likely secondary to congestive heart failure see #2.  If there is concerned that this may be an empyema then a thoracentesis may be required she is on Coumadin and her INR is 3 but most likely it has to do with congestive heart failure currently back to her baseline 2 L  4.  RSV Pneumonia with possible secondary bacterial pneumonia; PCT elevated initially but later normalized, urine strep and Legionella antigen negative, discontinued Decadron as well as doxycycline.  Received Rocephin IV for 3 days.  5.    Pyuria; urine culture negative and blood cultures x2 negative growth to date, discontinued IV antibiotics.  6.  Hypoalbuminemia replacing while giving Lasix and ordered boost and a nutrition consult  7.  Hyperlipidemia there was not a statin on her medical record but she has had a coronary artery disease and reported stroke in the past Alli Drake placed her on 20 mg of atorvastatin  8.  Atrial fibrillation appears to be rate controlled digoxin is in a therapeutic level will maintain potassium greater than 4 and magnesium greater than 2 which should help continue metoprolol 25 mg and digoxin.  Continue with aspirin,  discontinue warfarin   9.  Generalized weakness can be attributed to many of the factors mentioned above.  We will treat the underlying problems provide supportive care and had PT and OT working with the patient  10.  Severe protein energy malnutrition.  Dietitian consult appreciated       DVT  prophylaxis:  Medical DVT prophylaxis orders are present.     CODE STATUS:    Level Of Support Discussed With: Patient  Code Status (Patient has no pulse and is not breathing): No CPR (Do Not Attempt to Resuscitate)  Medical Interventions (Patient has pulse or is breathing): Comfort Measures     Admission Status:  I believe this patient meets inpatient status.     I discussed the patient's findings and my recommendations with patient and family.     This patient has been examined wearing appropriate Personal Protective Equipment and discussed.            Electronically signed by Abran Amador MD, 10/04/22, 12:45 PM EDT.

## 2022-10-06 ENCOUNTER — READMISSION MANAGEMENT (OUTPATIENT)
Dept: CALL CENTER | Facility: HOSPITAL | Age: 87
End: 2022-10-06

## 2022-10-06 VITALS
SYSTOLIC BLOOD PRESSURE: 120 MMHG | RESPIRATION RATE: 16 BRPM | HEART RATE: 57 BPM | OXYGEN SATURATION: 96 % | HEIGHT: 62 IN | DIASTOLIC BLOOD PRESSURE: 69 MMHG | BODY MASS INDEX: 20.81 KG/M2 | WEIGHT: 113.1 LBS | TEMPERATURE: 97.3 F

## 2022-10-06 LAB
INR PPP: 2.01 (ref 2–3)
MAGNESIUM SERPL-MCNC: 2 MG/DL (ref 1.7–2.3)
POTASSIUM SERPL-SCNC: 4.2 MMOL/L (ref 3.5–5.2)
PROTHROMBIN TIME: 19.9 SECONDS (ref 19.4–28.5)

## 2022-10-06 PROCEDURE — 85610 PROTHROMBIN TIME: CPT | Performed by: INTERNAL MEDICINE

## 2022-10-06 PROCEDURE — 83735 ASSAY OF MAGNESIUM: CPT | Performed by: INTERNAL MEDICINE

## 2022-10-06 PROCEDURE — 36415 COLL VENOUS BLD VENIPUNCTURE: CPT | Performed by: INTERNAL MEDICINE

## 2022-10-06 PROCEDURE — 97530 THERAPEUTIC ACTIVITIES: CPT

## 2022-10-06 PROCEDURE — 97116 GAIT TRAINING THERAPY: CPT

## 2022-10-06 PROCEDURE — 84132 ASSAY OF SERUM POTASSIUM: CPT | Performed by: INTERNAL MEDICINE

## 2022-10-06 RX ORDER — WARFARIN SODIUM 4 MG/1
4 TABLET ORAL
Status: DISCONTINUED | OUTPATIENT
Start: 2022-10-07 | End: 2022-10-06 | Stop reason: HOSPADM

## 2022-10-06 RX ORDER — WARFARIN SODIUM 5 MG/1
5 TABLET ORAL
Status: DISCONTINUED | OUTPATIENT
Start: 2022-10-07 | End: 2022-10-06

## 2022-10-06 RX ORDER — WARFARIN SODIUM 2 MG/1
2 TABLET ORAL
Status: DISCONTINUED | OUTPATIENT
Start: 2022-10-06 | End: 2022-10-06 | Stop reason: HOSPADM

## 2022-10-06 RX ORDER — WARFARIN SODIUM 4 MG/1
TABLET ORAL
Qty: 30 TABLET | Refills: 0 | Status: SHIPPED | OUTPATIENT
Start: 2022-10-07 | End: 2022-10-06 | Stop reason: HOSPADM

## 2022-10-06 RX ORDER — LIDOCAINE 50 MG/G
1 PATCH TOPICAL
Status: DISCONTINUED | OUTPATIENT
Start: 2022-10-06 | End: 2022-10-06 | Stop reason: HOSPADM

## 2022-10-06 RX ORDER — LIDOCAINE 50 MG/G
1 PATCH TOPICAL
Qty: 30 PATCH | Refills: 0 | Status: SHIPPED | OUTPATIENT
Start: 2022-10-06 | End: 2022-11-05

## 2022-10-06 RX ORDER — WARFARIN SODIUM 2 MG/1
TABLET ORAL
Qty: 30 TABLET | Refills: 0 | Status: SHIPPED | OUTPATIENT
Start: 2022-10-06 | End: 2022-10-06 | Stop reason: HOSPADM

## 2022-10-06 RX ADMIN — METOPROLOL SUCCINATE 25 MG: 25 TABLET, FILM COATED, EXTENDED RELEASE ORAL at 09:12

## 2022-10-06 RX ADMIN — AZELASTINE HYDROCHLORIDE 1 SPRAY: 137 SPRAY, METERED NASAL at 09:12

## 2022-10-06 RX ADMIN — PANTOPRAZOLE SODIUM 40 MG: 40 TABLET, DELAYED RELEASE ORAL at 05:26

## 2022-10-06 RX ADMIN — LIDOCAINE 1 PATCH: 50 PATCH CUTANEOUS at 12:28

## 2022-10-06 RX ADMIN — Medication 10 ML: at 09:13

## 2022-10-06 RX ADMIN — GUAIFENESIN 600 MG: 600 TABLET, EXTENDED RELEASE ORAL at 09:12

## 2022-10-06 RX ADMIN — ACETAMINOPHEN 650 MG: 325 TABLET, FILM COATED ORAL at 09:12

## 2022-10-06 NOTE — DISCHARGE SUMMARY
[facility] Medicine Services        Patient Name: Shruthi Amin  : 6/10/1928  MRN: [id number]  Primary Care Physician:  Og Jeronimo Jr., MD  Date of admission: 10/1/2022  Date of discharge; 10/6/2022     Subjective       Chief Complaint: Weakness and shortness of breath     History of Present Illness: Shruthi Amin is a 89 y.o. female who presented to [facility] on 10/1/2022 with a past medical history of atrial fibrillation, congestive heart failure with preserved ejection fraction with moderate tricuspid regurgitation, mild to moderate mitral valve regurgitation, lung scarring requiring as needed oxygen prescribed at home, chronic anticoagulation and large hiatal hernia.  Despite her advanced age she is relatively active.  She is able to walk on her own and interacts with her family.  She was her usual state of health till about 5 days ago when she developed a cough and congestion.  Couple days later the congestion became more thick and green.  She started becoming more short of breath.  She normally does not have to use her oxygen very much but over the past couple of days she has required her oxygen full-time.  Normally she gets up and walks and moves about the house but starting 48 hours ago she was just too tired and weak to get out of bed.  She had subjective fevers and chills but they never did check it.  Because her condition was worsening they took her to the emergency room.  In the emergency room patient was in nature of, urinalysis although dirty did indicate a possible infection, CT scan performed indicated a significant pleural effusion and possibly a right lower pneumonia but did indicate some bronchial thickening and scarring, and elevated BNP of 5400 and a CT scan of the head showed sinusitis.  Patient will be admitted for acute on chronic respiratory failure with hypoxia and will investigate potential causes.  Please see H&P on how we will proceed      Daughter was at bedside and patient stated that she did not want to be resuscitated and the healthcare power of  is Inessa Trevion     ROS weakness generalized, shortness of breath, cough, increased sputum production, change in color of sputum, subjective fever and chills, decreased p.o. intake, but denied mental status changes, chest pain, leg swelling, abdominal pain, nausea vomiting or diarrhea, unusual joint pains, dysuria, flank pain, increased frequency and urgency of urination, and rash and the rest the 15 essential review of systems have been reviewed and are negative     10/3/2022; first time seeing the patient, extensive chart notes reviewed, sitting up in the bed feels like her breathing has improved denies any fevers or chills nausea or vomiting, discontinue doxycycline as both strep and Legionella urine antigen negative   10/4/2022; afebrile vital signs are stable currently on room air, patient is medically ready to be discharged, there was no daughter to pick the patient, and that the family changed her mind to send the patient to rehab.  10/5/2022; patient complained of upper thoracic as well as lumbar back pain, will get x-rays and the patient's family wanted her to go to rehab, hence  working on it, otherwise hemodynamically stable.  Personal History      Medical History        Past Medical History:   Diagnosis Date   • Atrial fibrillation (HCC)     • Coronary artery disease       Atrial fibrillation   • GERD (gastroesophageal reflux disease)     • Glaucoma     • Hiatal hernia with gastroesophageal reflux     • History of osteoporotic pathological fracture       Right intertroch (7/2019)   • Hx of compression fracture of spine       T12 and L1(2013); T6 (1/2022)   • Hyperlipidemia     • Hypertension     • Osteoarthritis     • Osteoporosis       on prolia(3/21/22)   • Stroke (HCC)       off and on dizziness from the stroke.            Surgical History         Past Surgical  History:   Procedure Laterality Date   • BACK SURGERY         kyphoplasty   • EYE SURGERY         cataract surgery   • FIXATION KYPHOPLASTY LUMBAR SPINE   2013   • HIP TROCHANTERIC NAILING WITH INTRAMEDULLARY HIP SCREW Right 7/20/2019     Procedure: HIP TROCHANTERIC NAILING SHORT WITH INTRAMEDULLARY HIP SCREW;  Surgeon: Edward Centeno MD;  Location: [facility] MAIN OR;  Service: Orthopedics   • RECTAL SURGERY         x 3            Family History: family history includes Cancer in her brother, father, and sister; Heart disease in her mother; Hypertension in her mother; Osteoporosis in her father and mother. Otherwise pertinent FHx was reviewed and not pertinent to current issue.     Social History:  reports that she has never smoked. She has never used smokeless tobacco. She reports that she does not drink alcohol and does not use drugs.  Currently lives with family's.  Power of  medical Inessaisaias Trevino     Home Medications:           Prior to Admission Medications      Prescriptions Last Dose Informant Patient Reported? Taking?     B Complex Vitamins (VITAMIN B COMPLEX PO)     Yes No     Take 1 tablet by mouth Daily.     Bacillus Coagulans-Inulin (ALIGN PREBIOTIC-PROBIOTIC PO)     Yes No     Take  by mouth.     Biotin 93316 MCG tablet     Yes No     Take  by mouth.     cholecalciferol (VITAMIN D3) 400 units tablet     Yes No     Take 400 Units by mouth Daily.     clindamycin (CLEOCIN) 150 MG capsule     No No     Take 1 capsule by mouth 3 (Three) Times a Day.     cyclobenzaprine (FLEXERIL) 10 MG tablet     No No     Take 0.5 tablets by mouth 2 (Two) Times a Day As Needed for Muscle Spasms.     denosumab (PROLIA) syringe 60 mg     No No     digoxin (LANOXIN) 125 MCG tablet     No No     TAKE 1 TABLET BY MOUTH EVERY DAY     docusate sodium (COLACE) 100 MG capsule     Yes No     Take 200 mg by mouth 2 (Two) Times a Day As Needed for Constipation.     doxycycline (MONODOX) 100 MG capsule     No No     Take  1 capsule by mouth 2 (Two) Times a Day.     furosemide (LASIX) 20 MG tablet     No No     TAKE 1 TABLET BY MOUTH AS NEEDED (SWELLING).     hydrocortisone 2.5 % cream     No No     Apply 1 application topically to the appropriate area as directed 2 (Two) Times a Day.     KLOR-CON 10 MEQ CR tablet     No No     TAKE 1 TABLET BY MOUTH EVERY DAY     Patient taking differently:  10 mEq As Needed. ONLY WHEN TAKING WATER PILL     latanoprost (XALATAN) 0.005 % ophthalmic solution     Yes No     Administer 1 drop to the right eye Every Night.     Lutein 20 MG capsule     Yes No     Take  by mouth.     magnesium gluconate (MAGONATE) 500 MG tablet     Yes No     Take 27 mg by mouth 2 (Two) Times a Day.     magnesium hydroxide (MILK OF MAGNESIA) 800 MG/5ML suspension     Yes No     Take 30 mL by mouth Daily As Needed for Constipation.     metoprolol succinate XL (TOPROL-XL) 25 MG 24 hr tablet     No No     TAKE 1 TABLET BY MOUTH TWICE A DAY     mupirocin (BACTROBAN) 2 % ointment     Yes No     MIX 50/50 WITH HYDROCORTISONE OINTMENT AND APPLY TO AFFECTED AREAS TWICE A DAY.     NON FORMULARY     Yes No     1 tablet 2 (Two) Times a Day. PHYTOESTROGEN     O2 (OXYGEN)     Yes No     Inhale 2 L/min Continuous As Needed.     Sennosides 25 MG tablet     Yes No     Take 25 mg by mouth 2 (Two) Times a Day As Needed (constipation).     vitamin C (ASCORBIC ACID) 500 MG tablet     Yes No     Take 500 mg by mouth Daily.     warfarin (COUMADIN) 2 MG tablet     No No     Take 2 tablets on Monday and Friday and 1 tablet the other 5 days a week.     Zinc Sulfate (ZINC 15 PO)     Yes No     Take 2 tablets by mouth Daily. WITH ELDERBERRY--GUMMIES                Allergies:       Allergies   Allergen Reactions   • Codeine Nausea And Vomiting   • Penicillins Hives   • Latex Rash         Objective       Vitals:   Temp:  [97.5 °F (36.4 °C)-97.6 °F (36.4 °C)] 97.6 °F (36.4 °C)  Heart Rate:  [68-78] 69  Resp:  [16-18] 16  BP: (105)/(66) 105/66  Flow  (L/min):  [2] 2     Physical Exam   General very fragile appearing but no distress and appropriate  Head atraumatic, there is some temporal wasting appears cachectic  Eyes equal round reactive to light  Oropharynx membranes moist no erythema  Nares green nasal discharge nasal cannula in place  Neck no thyromegaly lymphadenopathy  Pulmonary decreased breath sounds in the bases rhonchi and crackles throughout lung fields  Cardiac irregular irregular, 2/6 systolic ejection murmur heard best in the left sternal border, chest significant prominence of ribs little to no subcutaneous fat, no peripheral edema  Abdomen positive bowel sounds no guarding no rebound no hepatosplenomegaly no pain on palpation  Extremities symmetric right may be slightly larger than left but no peripheral edema.  Psych patient was alert and oriented x4 very astute especially for her age  Neuro cranial nerves II through XII intact moves all extremities  Derm no rash     Result Review    Result Review:  I have personally reviewed the results from the time of this admission to 10/2/2022 02:45 EDT and agree with these findings:  [x]?  Laboratory  [x]?  Microbiology  [x]?  Radiology  [x]?  EKG/Telemetry   [x]?  Cardiology/Vascular   []?  Pathology  [x]?  Old records  []?  Other:  Most notable findings include:      BNP 5400, troponin less than 0.10, D-dimer 0.69, PT 28.9, INR 3, digoxin level 0.8 glucose 92, sodium 134, potassium 3.9, bicarb 23, creatinine 0.46, BUN 17, calcium was 6.6 but albumin was low  Alkaline phosphatase 68, total protein 4.7, AST 20, ALT 13, total bilirubin 0.5, albumin 2.5 low  WBCs 8400, hemoglobin 12.7, platelets 148,000 with elevation in monocytes  Urinalysis blood 3+, leukocyte esterase positive nitrate negative White blood cells 13-20+1 bacteria 3-6 squamous     Echocardiogram approximately 1 year ago showed an ejection fraction 56 to 60% moderate tricuspid regurgitation and mild to moderate mitral valve regurgitation            IMPRESSION: CT of head   1. No acute intracranial abnormality.  2. Mild chronic age-related intracranial findings as above.  3. Moderate mucosal thickening in the paranasal sinuses.     IMPRESSION: CTA of chest   1.  Large right pleural effusion, adjacent atelectasis/consolidation, there could be superimposed infection.  2.  Large hiatus hernia  3.  Right upper lung subpleural reticulation, pleural thickening, consider scarring, perhaps from prior insult   4.  Cardiomegaly, suggestion of pulmonary. Question heart failure.  5.  No pulmonary embolus.   6.  Unremarkable aorta     - ABNORMAL ECG -  Atrial fibrillation  Nonspecific T abnrm, anterolateral leads  When compared with ECG of 29-Jan-2022 16:01:10,  Significant repolarization change  Significant axis, voltage or hypertrophy change  Assessment & Plan          Active Hospital Problems:       Active Hospital Problems     Diagnosis     • **Acute on chronic respiratory failure with hypoxia ([facility])     • Acute on chronic congestive heart failure, unspecified heart failure type ([facility])     • Pleural effusion     • Scarring of lung     • UTI (urinary tract infection) due to urinary indwelling catheter ([facility])     • Pneumonia due to infectious organism     • Hypoalbuminemia     • General weakness     • Hyperlipidemia     • Atrial fibrillation (CMS/[facility]) [I48.91]     • Chronic anticoagulation        Plan:   1.  Acute on chronic hypoxic respiratory failure with worsening hypoxia -patient has an underlying pulmonary scarring from a previous pneumonia and has oxygen available as needed.  Normally she does not require oxygen but for the past 2 days she has needed it all the time.  RSV pneumonia,   2.  Acute on chronic congestive heart failure with preserved ejection fraction -echocardiogram from approximately a year ago showed preserved ejection fraction 55 to 60%.  But it did show moderate tricuspid regurgitation and mild to moderate mitral valve regurgitation.  This could  have resulted in worsening of her congestive heart failure with the mitral valve regurgitation increased pulmonary edema.  Since there is a pleural effusion most likely this is secondary to mitral valve regurgitation and congestive heart failure.  Patient has a low albumin and blood pressure is soft so in order to diurese the pleural effusion received albumin and Lasix.   effusion.    Dr. Mathew her cardiologist consult appreciated Echocardiogram with EF of 60 to 65%, aortic valve is trileaflet has mobile density present on leaflet consistent with fibrin strands versus vegetation to follow-up with cardiology as outpatient spoke with cardiology Dr. Mathew.  3.  Pleural effusion most likely secondary to congestive heart failure see #2.  If there is concerned that this may be an empyema then a thoracentesis may be required she is on Coumadin and her INR is 3 but most likely it has to do with congestive heart failure currently back to her baseline 2 L  4.  RSV Pneumonia with possible secondary bacterial pneumonia; PCT elevated initially but later normalized, urine strep and Legionella antigen negative, discontinued Decadron as well as doxycycline.  Received Rocephin IV for 3 days.  5.    Pyuria; urine culture negative and blood cultures x2 negative growth to date, discontinued IV antibiotics.  6.  Hypoalbuminemia replacing while giving Lasix and ordered boost and a nutrition consult  7.  Hyperlipidemia there was not a statin on her medical record but she has had a coronary artery disease and reported stroke in the past Alli Drake placed her on 20 mg of atorvastatin  8.  Atrial fibrillation appears to be rate controlled digoxin is in a therapeutic level will maintain potassium greater than 4 and magnesium greater than 2 which should help continue metoprolol 25 mg and digoxin.  Continue with aspirin,  discontinued warfarin.  9.  Generalized weakness can be attributed to many of the factors mentioned above.  We will  treat the underlying problems provide supportive care and had PT and OT working with the patient  10.  Severe protein energy malnutrition.  Dietitian consult appreciated.  11.  Upper and lower back pain; thoracic and lumbar spine x-rays, compression deformity of T6 slightly increased from January 2022, lumbar spine x-rays with degenerative disease, recommended follow-up as outpatient, lidocaine patch and discharged to rehab.       DVT prophylaxis:  Medical DVT prophylaxis orders are present.     CODE STATUS:    Level Of Support Discussed With: Patient  Code Status (Patient has no pulse and is not breathing): No CPR (Do Not Attempt to Resuscitate)  Medical Interventions (Patient has pulse or is breathing): Comfort Measures     Admission Status:  I believe this patient meets inpatient status.     I discussed the patient's findings and my recommendations with patient and family.     This patient has been examined wearing appropriate Personal Protective Equipment and discussed.       Your medication list      START taking these medications      Instructions Last Dose Given Next Dose Due   aspirin 81 MG EC tablet      Take 1 tablet by mouth Daily for 100 days.       lidocaine 5 %  Commonly known as: LIDODERM      Place 1 patch on the skin as directed by provider Daily for 30 days. Remove & Discard patch within 12 hours or as directed by MD          CHANGE how you take these medications      Instructions Last Dose Given Next Dose Due   KLOR-CON 10 MEQ CR tablet  Generic drug: potassium chloride  What changed:   · how much to take  · how to take this  · when to take this  · reasons to take this  · additional instructions      TAKE 1 TABLET BY MOUTH EVERY DAY          CONTINUE taking these medications      Instructions Last Dose Given Next Dose Due   ALIGN PREBIOTIC-PROBIOTIC PO      Take  by mouth.       Biotin 90053 MCG tablet      Take  by mouth.       cholecalciferol 10 MCG (400 UNIT) tablet  Commonly known as: VITAMIN  D3      Take 400 Units by mouth Daily.       digoxin 125 MCG tablet  Commonly known as: LANOXIN      TAKE 1 TABLET BY MOUTH EVERY DAY       docusate sodium 100 MG capsule  Commonly known as: COLACE      Take 200 mg by mouth 2 (Two) Times a Day As Needed for Constipation.       furosemide 20 MG tablet  Commonly known as: LASIX      TAKE 1 TABLET BY MOUTH AS NEEDED (SWELLING).       hydrocortisone 2.5 % cream      Apply 1 application topically to the appropriate area as directed 2 (Two) Times a Day.       latanoprost 0.005 % ophthalmic solution  Commonly known as: XALATAN      Administer 1 drop to the right eye Every Night.       Lutein 20 MG capsule      Take  by mouth.       magnesium gluconate 500 MG tablet  Commonly known as: MAGONATE      Take 27 mg by mouth 2 (Two) Times a Day.       magnesium hydroxide 800 MG/5ML suspension  Commonly known as: MILK OF MAGNESIA      Take 30 mL by mouth Daily As Needed for Constipation.       metoprolol succinate XL 25 MG 24 hr tablet  Commonly known as: TOPROL-XL      TAKE 1 TABLET BY MOUTH TWICE A DAY       NON FORMULARY      1 tablet 2 (Two) Times a Day. PHYTOESTROGEN       O2  Commonly known as: OXYGEN      Inhale 2 L/min Continuous As Needed.       Sennosides 25 MG tablet      Take 25 mg by mouth 2 (Two) Times a Day As Needed (constipation).       VITAMIN B COMPLEX PO      Take 1 tablet by mouth Daily.       vitamin C 500 MG tablet  Commonly known as: ASCORBIC ACID      Take 500 mg by mouth Daily.       ZINC 15 PO      Take 2 tablets by mouth Daily. WITH ELDERBERRY--GUMMIES          STOP taking these medications    warfarin 2 MG tablet  Commonly known as: COUMADIN              Where to Get Your Medications      These medications were sent to [facility]/pharmacy #[id number] - [place], [place] - [address] [place] [address] - [phone]  - [phone]  [address] [place] [address] [place] [address]    Hours: 24-hours Phone: [phone]   · aspirin 81 MG EC tablet     These medications were  sent to [facility] [place] - [address] - [phone]  - [phone]   [address] [facility], [place] [address]    Phone: [phone]   · lidocaine 5 %         DISCHARGE Follow Up Recommendations for labs and diagnostics; follow-up with PCP in a week  Discharge condition; stable  Discharge diet; cardiac diet  Discharge activity; as per PT OT.  Discharge total time; more than 33 minutes    Electronically signed by Abran Amador MD, 10/06/22, 11:51 AM EDT.

## 2022-10-06 NOTE — OUTREACH NOTE
Prep Survey    Flowsheet Row Responses   [facility] facility patient discharged from? [facility]   Is LACE score < 7 ? No   Emergency Room discharge w/ pulse ox? No   Eligibility Not Eligible   What are the reasons patient is not eligible? [facility]   Does the patient have one of the following disease processes/diagnoses(primary or secondary)? Other   Prep survey completed? Yes          CRISTY WARNER - Registered Nurse

## 2022-10-06 NOTE — PLAN OF CARE
Goal Outcome Evaluation:  Plan of Care Reviewed With: patient        Progress: improving  Outcome Evaluation: Took over patient care at 11pm. Patient has been resting well over night. The plan is for her to do to [facility] at d/c.

## 2022-10-06 NOTE — PLAN OF CARE
Goal Outcome Evaluation:                   Patient discharging today to [facility].

## 2022-10-06 NOTE — CASE MANAGEMENT/SOCIAL WORK
Continued Stay Note   [facility]     Patient Name: Shruthi Amin  MRN: [id number]  Today's Date: 10/6/2022    Admit Date: 10/1/2022    Plan: Accepted to [facility]. Bed available 10/6. No precert required. PASRR per facility.   Discharge Plan     Row Name 10/06/22 1356       Plan    Plan Accepted to [facility]. Bed available 10/6. No precert required. PASRR per facility.    Patient/Family in Agreement with Plan yes    Plan Comments CM confirmed with facility liaison Jackeline that pt will have bed available today by 11 AM. Updated Dr Amador and nursing during MDR. [facility]. Contacted daughter Shea and updated her. She will be coming in to pick patient up.              Phone communication or documentation only - no physical contact with patient or family.      Megan Naegele, RN      Office Phone: [phone]  Office Cell: [phone]

## 2022-10-06 NOTE — CASE MANAGEMENT/SOCIAL WORK
Case Management Discharge Note      Final Note: [facility].    Selected Continued Care - Discharged on 10/6/2022 Admission date: 10/1/2022 - Discharge disposition: Home or Self Care    Destination Coordination complete.    Service Provider Selected Services Address Phone Fax Patient Preferred    [facility]  Skilled Nursing [address] [facility] [place] [id number] [phone] --           Transportation Services  Private: Car    Final Discharge Disposition Code: 03 - skilled nursing facility (SNF)

## 2022-10-06 NOTE — PLAN OF CARE
Pt presents with functional mobility impairments which indicate the need for skilled intervention. Tolerating session today without incident. Pt required CGA for bed mobility, min A for transfers with RW x 3 attempts.  Pt ambulated 15' x 3 with RW with min A.  Pt visibly fatigue with ambulation distances.  Pt completed 10 reps BLE strengthening exercise in siting x 10 reps each with rest breaks required after each exercise.  Pt would benefit from SNF.  Will continue to follow and progress as tolerated.

## 2022-10-06 NOTE — THERAPY TREATMENT NOTE
"Subjective: Pt agreeable to therapeutic plan of care.    Objective:     Bed mobility - CGA  Transfers -  Min A x 3 attempts with RW  Ambulation - 15' x 3 with RW with min A.  Pt visibly fatigued with ambulation distances  Therapeutic Exercises:  Completed 10 reps BLE sitting exercises: ankle pumps, LAQs, hip flexion, GS with rest breaks needed after each exercise    Vitals: WNL    Pain: 4 VAS back    Education: Provided education on importance of mobility and skilled verbal / tactile cueing throughout intervention.     Assessment: Shruthi Amin presents with functional mobility impairments which indicate the need for skilled intervention. Tolerating session today without incident. Pt required CGA for bed mobility, min A for transfers with RW x 3 attempts.  Pt ambulated 15' x 3 with RW with min A.  Pt visibly fatigue with ambulation distances.  Pt completed 10 reps BLE strengthening exercise in siting x 10 reps each with rest breaks required after each exercise.  Pt would benefit from SNF.  Will continue to follow and progress as tolerated.     Plan/Recommendations:   Moderate Intensity Therapy recommended post-acute care. This is recommended as therapy feels the patient would require 3-4 days per week and wouldn't tolerate \"3 hour daily\" rehab intensity. SNF would be the preferred choice. If the patient does not agree to SNF, arrange HH or OP depending on home bound status. If patient is medically complex, consider LTACH.. Pt requires no DME at discharge.     Pt desires Skilled Rehab placement at discharge. Pt cooperative; agreeable to therapeutic recommendations and plan of care.         Basic Mobility 6-click:  Rollin = Total, A lot = 2, A little = 3; 4 = None  Supine>Sit:   1 = Total, A lot = 2, A little = 3; 4 = None   Sit>Stand with arms:  1 = Total, A lot = 2, A little = 3; 4 = None  Bed>Chair:   1 = Total, A lot = 2, A little = 3; 4 = None  Ambulate in room:  1 = Total, A lot = 2, A little = 3; " 4 = None  3-5 Steps with railin = Total, A lot = 2, A little = 3; 4 = None  Score: 17      Post-Tx Position: Up in Chair, Staff Present, Alarms activated and Call light and personal items within reach  PPE: mask and eye protection

## 2022-10-06 NOTE — PROGRESS NOTES
[facility] Medicine Services        Patient Name: Shruthi Amin  : 6/10/1928  MRN: [id number]  Primary Care Physician:  Og Jeronimo Jr., MD  Date of admission: 10/1/2022       Subjective       Chief Complaint: Weakness and shortness of breath     History of Present Illness: Shruthi Amin is a 89 y.o. female who presented to [facility] on 10/1/2022 with a past medical history of atrial fibrillation, congestive heart failure with preserved ejection fraction with moderate tricuspid regurgitation, mild to moderate mitral valve regurgitation, lung scarring requiring as needed oxygen prescribed at home, chronic anticoagulation and large hiatal hernia.  Despite her advanced age she is relatively active.  She is able to walk on her own and interacts with her family.  She was her usual state of health till about 5 days ago when she developed a cough and congestion.  Couple days later the congestion became more thick and green.  She started becoming more short of breath.  She normally does not have to use her oxygen very much but over the past couple of days she has required her oxygen full-time.  Normally she gets up and walks and moves about the house but starting 48 hours ago she was just too tired and weak to get out of bed.  She had subjective fevers and chills but they never did check it.  Because her condition was worsening they took her to the emergency room.  In the emergency room patient was in nature of, urinalysis although dirty did indicate a possible infection, CT scan performed indicated a significant pleural effusion and possibly a right lower pneumonia but did indicate some bronchial thickening and scarring, and elevated BNP of 5400 and a CT scan of the head showed sinusitis.  Patient will be admitted for acute on chronic respiratory failure with hypoxia and will investigate potential causes.  Please see H&P on how we will proceed     Daughter was at bedside  and patient stated that she did not want to be resuscitated and the healthcare power of  is Inessa Trevino     ROS weakness generalized, shortness of breath, cough, increased sputum production, change in color of sputum, subjective fever and chills, decreased p.o. intake, but denied mental status changes, chest pain, leg swelling, abdominal pain, nausea vomiting or diarrhea, unusual joint pains, dysuria, flank pain, increased frequency and urgency of urination, and rash and the rest the 15 essential review of systems have been reviewed and are negative     10/3/2022; first time seeing the patient, extensive chart notes reviewed, sitting up in the bed feels like her breathing has improved denies any fevers or chills nausea or vomiting, discontinue doxycycline as both strep and Legionella urine antigen negative   10/4/2022; afebrile vital signs are stable currently on room air, patient is medically ready to be discharged, there was no daughter to pick the patient, and that the family changed her mind to send the patient to rehab.  10/5/2022; patient complained of upper thoracic as well as lumbar back pain, will get x-rays and the patient's family wanted her to go to rehab, hence  working on it, otherwise hemodynamically stable.  Personal History      Medical History        Past Medical History:   Diagnosis Date   • Atrial fibrillation (HCC)     • Coronary artery disease       Atrial fibrillation   • GERD (gastroesophageal reflux disease)     • Glaucoma     • Hiatal hernia with gastroesophageal reflux     • History of osteoporotic pathological fracture       Right intertroch (7/2019)   • Hx of compression fracture of spine       T12 and L1(2013); T6 (1/2022)   • Hyperlipidemia     • Hypertension     • Osteoarthritis     • Osteoporosis       on prolia(3/21/22)   • Stroke (HCC)       off and on dizziness from the stroke.            Surgical History         Past Surgical History:   Procedure  Laterality Date   • BACK SURGERY         kyphoplasty   • EYE SURGERY         cataract surgery   • FIXATION KYPHOPLASTY LUMBAR SPINE   2013   • HIP TROCHANTERIC NAILING WITH INTRAMEDULLARY HIP SCREW Right 7/20/2019     Procedure: HIP TROCHANTERIC NAILING SHORT WITH INTRAMEDULLARY HIP SCREW;  Surgeon: Edward Centeno MD;  Location: [facility] MAIN OR;  Service: Orthopedics   • RECTAL SURGERY         x 3            Family History: family history includes Cancer in her brother, father, and sister; Heart disease in her mother; Hypertension in her mother; Osteoporosis in her father and mother. Otherwise pertinent FHx was reviewed and not pertinent to current issue.     Social History:  reports that she has never smoked. She has never used smokeless tobacco. She reports that she does not drink alcohol and does not use drugs.  Currently lives with family's.  Power of  medical Inessa Trevino     Home Medications:           Prior to Admission Medications      Prescriptions Last Dose Informant Patient Reported? Taking?     B Complex Vitamins (VITAMIN B COMPLEX PO)     Yes No     Take 1 tablet by mouth Daily.     Bacillus Coagulans-Inulin (ALIGN PREBIOTIC-PROBIOTIC PO)     Yes No     Take  by mouth.     Biotin 98564 MCG tablet     Yes No     Take  by mouth.     cholecalciferol (VITAMIN D3) 400 units tablet     Yes No     Take 400 Units by mouth Daily.     clindamycin (CLEOCIN) 150 MG capsule     No No     Take 1 capsule by mouth 3 (Three) Times a Day.     cyclobenzaprine (FLEXERIL) 10 MG tablet     No No     Take 0.5 tablets by mouth 2 (Two) Times a Day As Needed for Muscle Spasms.     denosumab (PROLIA) syringe 60 mg     No No     digoxin (LANOXIN) 125 MCG tablet     No No     TAKE 1 TABLET BY MOUTH EVERY DAY     docusate sodium (COLACE) 100 MG capsule     Yes No     Take 200 mg by mouth 2 (Two) Times a Day As Needed for Constipation.     doxycycline (MONODOX) 100 MG capsule     No No     Take 1 capsule by mouth 2  (Two) Times a Day.     furosemide (LASIX) 20 MG tablet     No No     TAKE 1 TABLET BY MOUTH AS NEEDED (SWELLING).     hydrocortisone 2.5 % cream     No No     Apply 1 application topically to the appropriate area as directed 2 (Two) Times a Day.     KLOR-CON 10 MEQ CR tablet     No No     TAKE 1 TABLET BY MOUTH EVERY DAY     Patient taking differently:  10 mEq As Needed. ONLY WHEN TAKING WATER PILL     latanoprost (XALATAN) 0.005 % ophthalmic solution     Yes No     Administer 1 drop to the right eye Every Night.     Lutein 20 MG capsule     Yes No     Take  by mouth.     magnesium gluconate (MAGONATE) 500 MG tablet     Yes No     Take 27 mg by mouth 2 (Two) Times a Day.     magnesium hydroxide (MILK OF MAGNESIA) 800 MG/5ML suspension     Yes No     Take 30 mL by mouth Daily As Needed for Constipation.     metoprolol succinate XL (TOPROL-XL) 25 MG 24 hr tablet     No No     TAKE 1 TABLET BY MOUTH TWICE A DAY     mupirocin (BACTROBAN) 2 % ointment     Yes No     MIX 50/50 WITH HYDROCORTISONE OINTMENT AND APPLY TO AFFECTED AREAS TWICE A DAY.     NON FORMULARY     Yes No     1 tablet 2 (Two) Times a Day. PHYTOESTROGEN     O2 (OXYGEN)     Yes No     Inhale 2 L/min Continuous As Needed.     Sennosides 25 MG tablet     Yes No     Take 25 mg by mouth 2 (Two) Times a Day As Needed (constipation).     vitamin C (ASCORBIC ACID) 500 MG tablet     Yes No     Take 500 mg by mouth Daily.     warfarin (COUMADIN) 2 MG tablet     No No     Take 2 tablets on Monday and Friday and 1 tablet the other 5 days a week.     Zinc Sulfate (ZINC 15 PO)     Yes No     Take 2 tablets by mouth Daily. WITH ELDERBERRY--GUMMIES                Allergies:       Allergies   Allergen Reactions   • Codeine Nausea And Vomiting   • Penicillins Hives   • Latex Rash         Objective       Vitals:   Temp:  [97.5 °F (36.4 °C)-97.6 °F (36.4 °C)] 97.6 °F (36.4 °C)  Heart Rate:  [68-78] 69  Resp:  [16-18] 16  BP: (105)/(66) 105/66  Flow (L/min):  [2] 2      Physical Exam   General very fragile appearing but no distress and appropriate  Head atraumatic, there is some temporal wasting appears cachectic  Eyes equal round reactive to light  Oropharynx membranes moist no erythema  Nares green nasal discharge nasal cannula in place  Neck no thyromegaly lymphadenopathy  Pulmonary decreased breath sounds in the bases rhonchi and crackles throughout lung fields  Cardiac irregular irregular, 2/6 systolic ejection murmur heard best in the left sternal border, chest significant prominence of ribs little to no subcutaneous fat, no peripheral edema  Abdomen positive bowel sounds no guarding no rebound no hepatosplenomegaly no pain on palpation  Extremities symmetric right may be slightly larger than left but no peripheral edema.  Psych patient was alert and oriented x4 very astute especially for her age  Neuro cranial nerves II through XII intact moves all extremities  Derm no rash     Result Review    Result Review:  I have personally reviewed the results from the time of this admission to 10/2/2022 02:45 EDT and agree with these findings:  [x]?  Laboratory  [x]?  Microbiology  [x]?  Radiology  [x]?  EKG/Telemetry   [x]?  Cardiology/Vascular   []?  Pathology  [x]?  Old records  []?  Other:  Most notable findings include:      BNP 5400, troponin less than 0.10, D-dimer 0.69, PT 28.9, INR 3, digoxin level 0.8 glucose 92, sodium 134, potassium 3.9, bicarb 23, creatinine 0.46, BUN 17, calcium was 6.6 but albumin was low  Alkaline phosphatase 68, total protein 4.7, AST 20, ALT 13, total bilirubin 0.5, albumin 2.5 low  WBCs 8400, hemoglobin 12.7, platelets 148,000 with elevation in monocytes  Urinalysis blood 3+, leukocyte esterase positive nitrate negative White blood cells 13-20+1 bacteria 3-6 squamous     Echocardiogram approximately 1 year ago showed an ejection fraction 56 to 60% moderate tricuspid regurgitation and mild to moderate mitral valve regurgitation           IMPRESSION:  CT of head   1. No acute intracranial abnormality.  2. Mild chronic age-related intracranial findings as above.  3. Moderate mucosal thickening in the paranasal sinuses.     IMPRESSION: CTA of chest   1.  Large right pleural effusion, adjacent atelectasis/consolidation, there could be superimposed infection.  2.  Large hiatus hernia  3.  Right upper lung subpleural reticulation, pleural thickening, consider scarring, perhaps from prior insult   4.  Cardiomegaly, suggestion of pulmonary. Question heart failure.  5.  No pulmonary embolus.   6.  Unremarkable aorta     - ABNORMAL ECG -  Atrial fibrillation  Nonspecific T abnrm, anterolateral leads  When compared with ECG of 29-Jan-2022 16:01:10,  Significant repolarization change  Significant axis, voltage or hypertrophy change  Assessment & Plan          Active Hospital Problems:       Active Hospital Problems     Diagnosis     • **Acute on chronic respiratory failure with hypoxia ([facility])     • Acute on chronic congestive heart failure, unspecified heart failure type ([facility])     • Pleural effusion     • Scarring of lung     • UTI (urinary tract infection) due to urinary indwelling catheter ([facility])     • Pneumonia due to infectious organism     • Hypoalbuminemia     • General weakness     • Hyperlipidemia     • Atrial fibrillation (CMS/HCC) [I48.91]     • Chronic anticoagulation        Plan:   1.  Acute on chronic hypoxic respiratory failure with worsening hypoxia -patient has an underlying pulmonary scarring from a previous pneumonia and has oxygen available as needed.  Normally she does not require oxygen but for the past 2 days she has needed it all the time.  RSV pneumonia,   2.  Acute on chronic congestive heart failure with preserved ejection fraction -echocardiogram from approximately a year ago showed preserved ejection fraction 55 to 60%.  But it did show moderate tricuspid regurgitation and mild to moderate mitral valve regurgitation.  This could have resulted in  worsening of her congestive heart failure with the mitral valve regurgitation increased pulmonary edema.  Since there is a pleural effusion most likely this is secondary to mitral valve regurgitation and congestive heart failure.  Patient has a low albumin and blood pressure is soft so in order to diurese the pleural effusion received albumin and Lasix.   effusion.    Dr. Mathew her cardiologist consult appreciated Echocardiogram with EF of 60 to 65%, aortic valve is trileaflet has mobile density present on leaflet consistent with fibrin strands versus vegetation to follow-up with cardiology as outpatient spoke with cardiology Dr. Mathew.  3.  Pleural effusion most likely secondary to congestive heart failure see #2.  If there is concerned that this may be an empyema then a thoracentesis may be required she is on Coumadin and her INR is 3 but most likely it has to do with congestive heart failure currently back to her baseline 2 L  4.  RSV Pneumonia with possible secondary bacterial pneumonia; PCT elevated initially but later normalized, urine strep and Legionella antigen negative, discontinued Decadron as well as doxycycline.  Received Rocephin IV for 3 days.  5.    Pyuria; urine culture negative and blood cultures x2 negative growth to date, discontinued IV antibiotics.  6.  Hypoalbuminemia replacing while giving Lasix and ordered boost and a nutrition consult  7.  Hyperlipidemia there was not a statin on her medical record but she has had a coronary artery disease and reported stroke in the past Alli Drake placed her on 20 mg of atorvastatin  8.  Atrial fibrillation appears to be rate controlled digoxin is in a therapeutic level will maintain potassium greater than 4 and magnesium greater than 2 which should help continue metoprolol 25 mg and digoxin.  Continue with aspirin,  discontinue warfarin   9.  Generalized weakness can be attributed to many of the factors mentioned above.  We will treat the underlying  problems provide supportive care and had PT and OT working with the patient  10.  Severe protein energy malnutrition.  Dietitian consult appreciated.  11.  Upper and lower back pain; thoracic and lumbar spine x-rays ordered.       DVT prophylaxis:  Medical DVT prophylaxis orders are present.     CODE STATUS:    Level Of Support Discussed With: Patient  Code Status (Patient has no pulse and is not breathing): No CPR (Do Not Attempt to Resuscitate)  Medical Interventions (Patient has pulse or is breathing): Comfort Measures     Admission Status:  I believe this patient meets inpatient status.     I discussed the patient's findings and my recommendations with patient and family.     This patient has been examined wearing appropriate Personal Protective Equipment and discussed.            Electronically signed by Abran Amador MD, 10/05/22, 9:41 AM EDT.

## 2022-10-06 NOTE — SIGNIFICANT NOTE
10/06/22 1443   OTHER   Discipline occupational therapist   Rehab Time/Intention   Session Not Performed other (see comments)  (Pt with d/c orders to SNF this date. OT to follow-up if pt status changes.)   Therapy Assessment/Plan (PT)   Criteria for Skilled Interventions Met (PT) yes;skilled treatment is necessary   Recommendation   OT - Next Appointment 10/07/22

## 2022-10-06 NOTE — PROGRESS NOTES
"Pharmacy dosing service  Anticoagulant  Warfarin     Subjective:    Shruthi Amin is a 89 y.o.female being continued on warfarin for atrial fibrillation.    INR Goal: 2 - 3  Home medication?: Yes, warfarin 4 mg PO on Mon/Fri and warfarin 2 mg PO on all other days of the week  Bridge Therapy Present?:  No  Interacting Medications Evaluation (New/Present/Discontinued): doxycycline, ceftriaxone, dexamethasone (new meds, may increase INR, all stopped 10/3)  Additional Contributing Factors: none      Assessment/Plan:    INR  remains therapeutic today. Appears that effects of DDI with abx and steroid listed above have worn off. Will resume home regimen today.    Continue to monitor and adjust based on INR.         Date 10/1 10/2 10/3 10/4 10/5 10/6      INR 2.98 2.56 2.88 3.06 2.64 2.01      Dose hold 2 mg 1 mg hold 2 mg 2 mg        Objective:  [Ht: 157.5 cm (62\"); Wt: 51.3 kg (113 lb 1.5 oz); BMI: Body mass index is 20.69 kg/m².]    Lab Results   Component Value Date    ALBUMIN 4.20 10/03/2022     Lab Results   Component Value Date    INR 2.01 10/06/2022    INR 2.64 10/05/2022    INR 3.06 (H) 10/04/2022    PROTIME 19.9 10/06/2022    PROTIME 25.7 10/05/2022    PROTIME 29.6 (H) 10/04/2022     Lab Results   Component Value Date    HGB 12.6 10/03/2022    HGB 12.4 10/02/2022    HGB 12.7 10/01/2022     Lab Results   Component Value Date    HCT 38.1 10/03/2022    HCT 37.5 10/02/2022    HCT 38.3 10/01/2022     Johanna Mortensen, PharmD  10/06/22 09:15 EDT     "

## 2022-10-07 LAB
BACTERIA SPEC AEROBE CULT: NORMAL
BACTERIA SPEC AEROBE CULT: NORMAL

## 2022-10-27 ENCOUNTER — TELEPHONE (OUTPATIENT)
Dept: FAMILY MEDICINE CLINIC | Facility: CLINIC | Age: 87
End: 2022-10-27

## 2022-10-27 ENCOUNTER — OFFICE VISIT (OUTPATIENT)
Dept: CARDIOLOGY | Facility: CLINIC | Age: 87
End: 2022-10-27

## 2022-10-27 VITALS
HEART RATE: 62 BPM | DIASTOLIC BLOOD PRESSURE: 66 MMHG | WEIGHT: 114 LBS | BODY MASS INDEX: 20.98 KG/M2 | OXYGEN SATURATION: 96 % | HEIGHT: 62 IN | SYSTOLIC BLOOD PRESSURE: 147 MMHG

## 2022-10-27 DIAGNOSIS — E78.00 PURE HYPERCHOLESTEROLEMIA: ICD-10-CM

## 2022-10-27 DIAGNOSIS — I10 ESSENTIAL HYPERTENSION: ICD-10-CM

## 2022-10-27 DIAGNOSIS — I10 PRIMARY HYPERTENSION: Primary | Chronic | ICD-10-CM

## 2022-10-27 DIAGNOSIS — I48.11 LONGSTANDING PERSISTENT ATRIAL FIBRILLATION: Primary | ICD-10-CM

## 2022-10-27 DIAGNOSIS — I25.10 CORONARY ARTERY DISEASE INVOLVING NATIVE CORONARY ARTERY OF NATIVE HEART WITHOUT ANGINA PECTORIS: ICD-10-CM

## 2022-10-27 PROCEDURE — 99214 OFFICE O/P EST MOD 30 MIN: CPT | Performed by: INTERNAL MEDICINE

## 2022-10-27 NOTE — TELEPHONE ENCOUNTER
Caller: Shruthi Amin    Relationship: Self    Best call back number: [phone]    What orders are you requesting (i.e. lab or imaging): POTASSIUM CHECK    In what timeframe would the patient need to come in: TOMORROW, 10/27/22    Where will you receive your lab/imaging services: OFFICE    Additional notes: PATIENT CALLED AND SAID THAT DR. GARZON WANTS HER TO HAVE HER POTASIUM LEVEL CHECKED TOMORROW

## 2022-10-27 NOTE — PROGRESS NOTES
"    Subjective:     Encounter Date:10/27/2022      Patient ID: Shruthi Amin is a 89 y.o. female.    Chief Complaint:  History of Present Illness 89-year-old white female with history of atrial fibrillation coronary disease hypertension hyperlipidemia presents to my office for follow-up.  Patient is currently stable without any symptoms of chest pain but has some shortness of with exertion.  No complains any PND orthopnea.  She has occasional palpitation without any dizziness syncope or swelling of the feet.  She is taking her medicines regularly.  She does not smoke.    The following portions of the patient's history were reviewed and updated as appropriate: allergies, current medications, past family history, past medical history, past social history, past surgical history and problem list.  Past Medical History:   Diagnosis Date   • Atrial fibrillation (HCC)    • Coronary artery disease     Atrial fibrillation   • GERD (gastroesophageal reflux disease)    • Glaucoma    • Hiatal hernia with gastroesophageal reflux    • History of osteoporotic pathological fracture     Right intertroch (7/2019)   • Hx of compression fracture of spine     T12 and L1(2013); T6 (1/2022)   • Hyperlipidemia    • Hypertension    • Osteoarthritis    • Osteoporosis     on prolia(3/21/22)   • Stroke (HCC)     off and on dizziness from the stroke.   Current outpatient and discharge medications have been reconciled for the patient.  Reviewed by: Aleksey Mathew MD    Past Surgical History:   Procedure Laterality Date   • BACK SURGERY      kyphoplasty   • EYE SURGERY      cataract surgery   • FIXATION KYPHOPLASTY LUMBAR SPINE  2013   • HIP TROCHANTERIC NAILING WITH INTRAMEDULLARY HIP SCREW Right 7/20/2019    Procedure: HIP TROCHANTERIC NAILING SHORT WITH INTRAMEDULLARY HIP SCREW;  Surgeon: Edward Centeno MD;  Location: [facility];  Service: Orthopedics   • RECTAL SURGERY      x 3     /66   Pulse 62   Ht 157.5 cm (62\")   " Wt 51.7 kg (114 lb)   SpO2 96%   BMI 20.85 kg/m²   Family History   Problem Relation Age of Onset   • Heart disease Mother    • Hypertension Mother    • Osteoporosis Mother    • Cancer Father         colon cancer   • Osteoporosis Father    • Cancer Sister         lung   • Cancer Brother         colon cancer       Current Outpatient Medications:   •  aspirin (aspirin) 81 MG EC tablet, Take 1 tablet by mouth Daily for 100 days., Disp: 100 tablet, Rfl: 0  •  B Complex Vitamins (VITAMIN B COMPLEX PO), Take 1 tablet by mouth Daily., Disp: , Rfl:   •  Bacillus Coagulans-Inulin (ALIGN PREBIOTIC-PROBIOTIC PO), Take  by mouth., Disp: , Rfl:   •  Biotin 70075 MCG tablet, Take  by mouth., Disp: , Rfl:   •  cholecalciferol (VITAMIN D3) 400 units tablet, Take 400 Units by mouth Daily., Disp: , Rfl:   •  digoxin (LANOXIN) 125 MCG tablet, TAKE 1 TABLET BY MOUTH EVERY DAY, Disp: 90 tablet, Rfl: 1  •  docusate sodium (COLACE) 100 MG capsule, Take 200 mg by mouth 2 (Two) Times a Day As Needed for Constipation., Disp: , Rfl:   •  furosemide (LASIX) 20 MG tablet, TAKE 1 TABLET BY MOUTH AS NEEDED (SWELLING)., Disp: 90 tablet, Rfl: 2  •  hydrocortisone 2.5 % cream, Apply 1 application topically to the appropriate area as directed 2 (Two) Times a Day., Disp: 20 g, Rfl: 3  •  KLOR-CON 10 MEQ CR tablet, TAKE 1 TABLET BY MOUTH EVERY DAY (Patient taking differently: 1 tablet As Needed. ONLY WHEN TAKING WATER PILL), Disp: 90 tablet, Rfl: 1  •  latanoprost (XALATAN) 0.005 % ophthalmic solution, Administer 1 drop to the right eye Every Night., Disp: , Rfl:   •  lidocaine (LIDODERM) 5 %, Place 1 patch on the skin as directed by provider Daily for 30 days. Remove & Discard patch within 12 hours or as directed by MD, Disp: 30 patch, Rfl: 0  •  Lutein 20 MG capsule, Take  by mouth., Disp: , Rfl:   •  magnesium gluconate (MAGONATE) 500 MG tablet, Take 27 mg by mouth 2 (Two) Times a Day., Disp: , Rfl:   •  magnesium hydroxide (MILK OF MAGNESIA) 800  MG/5ML suspension, Take 30 mL by mouth Daily As Needed for Constipation., Disp: , Rfl:   •  metoprolol succinate XL (TOPROL-XL) 25 MG 24 hr tablet, TAKE 1 TABLET BY MOUTH TWICE A DAY, Disp: 180 tablet, Rfl: 1  •  NON FORMULARY, 1 tablet 2 (Two) Times a Day. PHYTOESTROGEN, Disp: , Rfl:   •  O2 (OXYGEN), Inhale 2 L/min Continuous As Needed., Disp: , Rfl:   •  Sennosides 25 MG tablet, Take 25 mg by mouth 2 (Two) Times a Day As Needed (constipation)., Disp: , Rfl:   •  vitamin C (ASCORBIC ACID) 500 MG tablet, Take 500 mg by mouth Daily., Disp: , Rfl:   •  Zinc Sulfate (ZINC 15 PO), Take 2 tablets by mouth Daily. WITH ELDERBERRY--GUMMIES, Disp: , Rfl:     Current Facility-Administered Medications:   •  denosumab (PROLIA) syringe 60 mg, 60 mg, Subcutaneous, Q6 Months, Ashli Ott PA, 60 mg at 09/22/22 1404  Allergies   Allergen Reactions   • Codeine Nausea And Vomiting   • Penicillins Hives   • Latex Rash     Social History     Socioeconomic History   • Marital status:    Tobacco Use   • Smoking status: Never   • Smokeless tobacco: Never   Vaping Use   • Vaping Use: Never used   Substance and Sexual Activity   • Alcohol use: No   • Drug use: No   • Sexual activity: Defer     Review of Systems   Constitutional: Negative for malaise/fatigue.   Cardiovascular: Positive for palpitations. Negative for chest pain, dyspnea on exertion and leg swelling.   Respiratory: Positive for shortness of breath. Negative for cough.    Gastrointestinal: Negative for abdominal pain, nausea and vomiting.   Neurological: Negative for dizziness, focal weakness, headaches, light-headedness and numbness.   All other systems reviewed and are negative.             Objective:     Constitutional:       Appearance: Well-developed.   Eyes:      General: No scleral icterus.     Conjunctiva/sclera: Conjunctivae normal.   HENT:      Head: Normocephalic and atraumatic.   Neck:      Vascular: No carotid bruit or JVD.   Pulmonary:      Effort:  Pulmonary effort is normal.      Breath sounds: Normal breath sounds. No wheezing. No rales.   Cardiovascular:      Normal rate. Regular rhythm.   Pulses:     Intact distal pulses.   Abdominal:      General: Bowel sounds are normal.      Palpations: Abdomen is soft.   Musculoskeletal:      Cervical back: Normal range of motion and neck supple. Skin:     General: Skin is warm and dry.      Findings: No rash.   Neurological:      Mental Status: Alert.       Procedures    Lab Review:         MDM #1 atrial fibrillation  Patient is currently stable on medical therapy including metoprolol but her warfarin has been stopped inadvertently in the hospital but I will restart it again but stop the aspirin because she is having significant bruising  Patient will continue to follow INRs my office and keep it between 2-2.5  2.  Hypertension stable blood pressure currently stable on beta-blockers  3.  Hyperlipidemia  Patient is currently on over-the-counter medicines only  4.  Coronary disease  Patient had nonobstructive disease in the past with normal LV systolic function is currently stable on medications    Patient's previous medical records, labs, and EKG were reviewed and discussed with the patient at today's visit.

## 2022-10-28 ENCOUNTER — LAB (OUTPATIENT)
Dept: LAB | Facility: HOSPITAL | Age: 87
End: 2022-10-28

## 2022-10-28 ENCOUNTER — HOME HEALTH ADMISSION (OUTPATIENT)
Dept: HOME HEALTH SERVICES | Facility: HOME HEALTHCARE | Age: 87
End: 2022-10-28

## 2022-10-28 ENCOUNTER — TRANSCRIBE ORDERS (OUTPATIENT)
Dept: HOME HEALTH SERVICES | Facility: HOME HEALTHCARE | Age: 87
End: 2022-10-28

## 2022-10-28 DIAGNOSIS — J96.21 ACUTE AND CHRONIC RESPIRATORY FAILURE WITH HYPOXIA: Primary | ICD-10-CM

## 2022-10-28 DIAGNOSIS — I10 PRIMARY HYPERTENSION: Chronic | ICD-10-CM

## 2022-10-29 ENCOUNTER — HOME CARE VISIT (OUTPATIENT)
Dept: HOME HEALTH SERVICES | Facility: HOME HEALTHCARE | Age: 87
End: 2022-10-29

## 2022-10-29 VITALS
HEART RATE: 61 BPM | SYSTOLIC BLOOD PRESSURE: 126 MMHG | DIASTOLIC BLOOD PRESSURE: 58 MMHG | OXYGEN SATURATION: 96 % | RESPIRATION RATE: 18 BRPM | BODY MASS INDEX: 17.84 KG/M2 | WEIGHT: 111 LBS | HEIGHT: 66 IN

## 2022-10-29 PROCEDURE — G0299 HHS/HOSPICE OF RN EA 15 MIN: HCPCS

## 2022-10-29 NOTE — HOME HEALTH
SOC Note: pt is a 89 yowf who lives with her daughter in their one story home. Pt is very frail and has difficulty with getting about her home. Pt will require hha for activites of daily living, SN, PT and OT. pt is alert and oriented but has times of confusion or gets off topic easily.     FOC is resp. failure    Home Health ordered for: disciplines SN, PT, OT and HHA    Reason for Hosp/Primary Dx/Co-morbidities: Acute and chronic respiratory failure with hypoxia   Focus of Care:acute and chronic respiratory failure with hypoxia    Current Functional status/mobility/DME:wheelchair. rollator. walker. oxygen concentrator with tubing    HB status/Living Arrangements: daughter lives with the patient    Skin Integrity/wound status: intact    Code Status: DNR    Fall Risk: high fall risk    POC confirmed with Dr. Jeronimo on 10.29.22 at 1310

## 2022-10-31 ENCOUNTER — HOME CARE VISIT (OUTPATIENT)
Dept: HOME HEALTH SERVICES | Facility: HOME HEALTHCARE | Age: 87
End: 2022-10-31

## 2022-10-31 ENCOUNTER — ANTICOAGULATION VISIT (OUTPATIENT)
Dept: CARDIOLOGY | Facility: CLINIC | Age: 87
End: 2022-10-31

## 2022-10-31 VITALS
RESPIRATION RATE: 18 BRPM | HEART RATE: 67 BPM | OXYGEN SATURATION: 96 % | TEMPERATURE: 98.2 F | SYSTOLIC BLOOD PRESSURE: 120 MMHG | DIASTOLIC BLOOD PRESSURE: 60 MMHG

## 2022-10-31 DIAGNOSIS — Z79.01 CHRONIC ANTICOAGULATION: ICD-10-CM

## 2022-10-31 DIAGNOSIS — I48.91 ATRIAL FIBRILLATION, UNSPECIFIED TYPE: Primary | Chronic | ICD-10-CM

## 2022-10-31 LAB — INR PPP: 1.5

## 2022-10-31 PROCEDURE — G0151 HHCP-SERV OF PT,EA 15 MIN: HCPCS

## 2022-10-31 NOTE — CASE COMMUNICATION
Patient did not answer call today x 2 and received no return call to schedule visit. Please move SN visit to another day this week.

## 2022-10-31 NOTE — PROGRESS NOTES
Left voicemail  For patient to continue current dosage and recheck in a week. She just recently resumed Warfarin on the 27th. Will recheck in a week. Leonardo

## 2022-11-01 ENCOUNTER — TELEPHONE (OUTPATIENT)
Dept: CARDIOLOGY | Facility: CLINIC | Age: 87
End: 2022-11-01

## 2022-11-01 ENCOUNTER — HOME CARE VISIT (OUTPATIENT)
Dept: HOME HEALTH SERVICES | Facility: HOME HEALTHCARE | Age: 87
End: 2022-11-01

## 2022-11-01 VITALS
OXYGEN SATURATION: 95 % | SYSTOLIC BLOOD PRESSURE: 119 MMHG | HEART RATE: 75 BPM | DIASTOLIC BLOOD PRESSURE: 80 MMHG | TEMPERATURE: 97.7 F | RESPIRATION RATE: 18 BRPM

## 2022-11-01 PROCEDURE — G0493 RN CARE EA 15 MIN HH/HOSPICE: HCPCS

## 2022-11-01 NOTE — HOME HEALTH
Ms. Shruthi Amin is pleasant 89-year old patient who was recently hospitalized with CHF and discharged home few days ago. She lives in one-story home with daughter who is retired and available to assist as needed. Ms. Amin was independent in indoor ambulation without assistive device but needed rollator walker to leave home with a family members.    She currently presents with generalized weakness and demonstrates 4/5 gross strength in ble. Functionally, she needs sba/cga for coming to stand and ambulates about 50ft indoor with cga, and wall/furniture support showing slow and labored steps with trunk sways. House is small with narrow pathways and difficulty to maneuver rolling walker but has good support of daughter. Ms. Amin will require further skilled PT services for therapeutic/home exercise program, transfer teaching and gait training.

## 2022-11-02 NOTE — HOME HEALTH
PLAN FOR NEXT VISIT    CP assessment  assess asfety and pain  assess when pt is to check her INR again    Patient reports she saw [facility] Contre[facility] yesterday. her INR was checked there it was 1.5. per her report. she does have her own coag machine in the home and dtr who is a nurse helps her with this.

## 2022-11-03 ENCOUNTER — OFFICE VISIT (OUTPATIENT)
Dept: FAMILY MEDICINE CLINIC | Facility: CLINIC | Age: 87
End: 2022-11-03

## 2022-11-03 VITALS
DIASTOLIC BLOOD PRESSURE: 40 MMHG | HEART RATE: 60 BPM | BODY MASS INDEX: 17.11 KG/M2 | TEMPERATURE: 97.1 F | OXYGEN SATURATION: 95 % | WEIGHT: 106 LBS | SYSTOLIC BLOOD PRESSURE: 120 MMHG

## 2022-11-03 DIAGNOSIS — J18.9 PNEUMONIA OF BOTH LOWER LOBES DUE TO INFECTIOUS ORGANISM: ICD-10-CM

## 2022-11-03 DIAGNOSIS — E78.00 PURE HYPERCHOLESTEROLEMIA: Chronic | ICD-10-CM

## 2022-11-03 DIAGNOSIS — I10 PRIMARY HYPERTENSION: Chronic | ICD-10-CM

## 2022-11-03 DIAGNOSIS — I48.91 ATRIAL FIBRILLATION, UNSPECIFIED TYPE: Primary | Chronic | ICD-10-CM

## 2022-11-03 DIAGNOSIS — J96.21 ACUTE ON CHRONIC RESPIRATORY FAILURE WITH HYPOXIA: ICD-10-CM

## 2022-11-03 DIAGNOSIS — K44.9 HIATAL HERNIA: Chronic | ICD-10-CM

## 2022-11-03 DIAGNOSIS — Z87.310 HISTORY OF OSTEOPOROTIC PATHOLOGICAL FRACTURE: Chronic | ICD-10-CM

## 2022-11-03 DIAGNOSIS — I34.0 NONRHEUMATIC MITRAL VALVE REGURGITATION: ICD-10-CM

## 2022-11-03 PROBLEM — E88.09 HYPOALBUMINEMIA: Status: RESOLVED | Noted: 2022-10-02 | Resolved: 2022-11-03

## 2022-11-03 PROBLEM — R53.1 GENERAL WEAKNESS: Status: RESOLVED | Noted: 2022-10-02 | Resolved: 2022-11-03

## 2022-11-03 PROBLEM — Z82.62 FAMILY HX OSTEOPOROSIS: Status: RESOLVED | Noted: 2022-02-23 | Resolved: 2022-11-03

## 2022-11-03 PROCEDURE — 99214 OFFICE O/P EST MOD 30 MIN: CPT | Performed by: FAMILY MEDICINE

## 2022-11-03 NOTE — PATIENT INSTRUCTIONS
Continue your current medications and treatment.    Follow up in the office in 3 months.    Have the labs done and call for results.

## 2022-11-03 NOTE — PROGRESS NOTES
Subjective   Shruthi Amin is a 89 y.o. female.     Chief Complaint   Patient presents with   • Hypertension     6 month f/u    • Atrial Fibrillation       HPI chief complaint: Hypertension hyperlipidemia atrial fibrillation hiatal hernia osteoporosis    The patient is a 89-year-old white female comes in for follow-up and maintenance of her current problems which include    1.  Hypertension-stable-patient is currently on Lasix 20 mg daily and potassium 10 mEq daily as needed and metoprolol 25 mg daily.  Blood pressure stable.    2.  Hyperlipidemia-stable after patient on a diet.    3.  Hiatal hernia-stable-patient has history of large hiatal hernia.  Patient is opted not to have this repaired surgically.  It does interfere with her ability to eat.    4.  Osteoporosis-stable after patient on calcium and vitamin D and Prolia 60 mg injected subcutaneously every 6 months.  She denied recent bone pain or fractures.    5.  Atrial fibrillation-stable after patient on metoprolol 25 mg daily Lanoxin 0.125 mg daily and Coumadin 2 mg daily 5 days a week and 4 mg 2 days a week.    6.  Respiratory infection-new-patient was recently hospitalized with respiratory infection secondary to RSV.  Patient was discharged to rehab facility.  Patient is now living at home with her daughter.  She is getting along well.          The following portions of the patient's history were reviewed and updated as appropriate: allergies, current medications, past family history, past medical history, past social history, past surgical history and problem list.    Review of Systems    Objective     /40 (BP Location: Left arm, Patient Position: Sitting, Cuff Size: Adult)   Pulse 60   Temp 97.1 °F (36.2 °C) (Infrared)   Wt 48.1 kg (106 lb)   SpO2 95%   BMI 17.11 kg/m²     Physical Exam  Vitals and nursing note reviewed.   Constitutional:       Appearance: She is well-developed.   HENT:      Head: Normocephalic and atraumatic.   Eyes:       Pupils: Pupils are equal, round, and reactive to light.   Cardiovascular:      Rate and Rhythm: Normal rate. Rhythm irregularly irregular.      Pulses: Normal pulses.      Heart sounds: Murmur heard.    Systolic murmur is present with a grade of 2/6.    No friction rub. No gallop.      Comments: Grade 2-3/6 holosystolic murmur radiates to the axilla  Pulmonary:      Effort: Pulmonary effort is normal.      Breath sounds: Normal breath sounds.   Abdominal:      General: Bowel sounds are normal.      Palpations: Abdomen is soft.   Musculoskeletal:         General: Normal range of motion.      Cervical back: Neck supple.   Skin:     General: Skin is warm and dry.   Neurological:      Mental Status: She is alert and oriented to person, place, and time.   Psychiatric:         Behavior: Behavior normal.         Thought Content: Thought content normal.         Judgment: Judgment normal.           Assessment & Plan   Diagnoses and all orders for this visit:    1. Atrial fibrillation, unspecified type (HCC) (Primary)    2. Pure hypercholesterolemia    3. Primary hypertension    4. Hiatal hernia    5. History of osteoporotic pathological fracture    6. Pneumonia of both lower lobes due to infectious organism    7. Acute on chronic respiratory failure with hypoxia (HCC)    8. Nonrheumatic mitral valve regurgitation-on clinical exam patient has grade 2/6 holosystolic murmur that radiates to the left axilla      Patient Instructions   Continue your current medications and treatment.    Follow up in the office in 3 months.    Have the labs done and call for results.      Og Jeronimo Jr., MD    11/03/22

## 2022-11-04 ENCOUNTER — HOME CARE VISIT (OUTPATIENT)
Dept: HOME HEALTH SERVICES | Facility: HOME HEALTHCARE | Age: 87
End: 2022-11-04

## 2022-11-04 VITALS
DIASTOLIC BLOOD PRESSURE: 65 MMHG | SYSTOLIC BLOOD PRESSURE: 110 MMHG | OXYGEN SATURATION: 95 % | TEMPERATURE: 97.5 F | HEART RATE: 61 BPM

## 2022-11-04 VITALS
OXYGEN SATURATION: 95 % | TEMPERATURE: 98.2 F | HEART RATE: 61 BPM | RESPIRATION RATE: 18 BRPM | SYSTOLIC BLOOD PRESSURE: 110 MMHG | WEIGHT: 110 LBS | DIASTOLIC BLOOD PRESSURE: 65 MMHG | BODY MASS INDEX: 17.75 KG/M2

## 2022-11-04 PROCEDURE — G0152 HHCP-SERV OF OT,EA 15 MIN: HCPCS

## 2022-11-04 PROCEDURE — G0157 HHC PT ASSISTANT EA 15: HCPCS

## 2022-11-04 PROCEDURE — G0299 HHS/HOSPICE OF RN EA 15 MIN: HCPCS

## 2022-11-04 NOTE — HOME HEALTH
"Pt is 89 y/o female who lives in single with roland Valdivia, a retired. Pt is [place] and needs to wear hearing aides.  Pt sitting on recliner on NC 1.5-2 L of O2, pt reports the Dr only told her to use O2 only as needed, with SOA or if O2 saturation gets below 90%.   Pt reports no changes in her medications and no falls.  Pt reports she recently was in hospital and recevied 20 days of Rehab OT/PT at [facility].  Pt very friendly, oriented X 4, Pt  and was able to recall details of rehab stay including names of her OT.     PMH:Cellulitis RLE and blister on back of calf took several weeks for it to heal, Basal cell skin CA to L UE,  OP,spontaneous fx rib Jan 2022, fx R hip ORIF 2019, R ( dominate) shld OA needed cortizone shots and PT and chiropractor who adjusted her back, neck tractions and laser tx on shld which helped it a lot , approx 3 yrs ago, allergic to some adhesives in band aide.      PLOF: Pt was Indep with dressing, and hair. Bathing was close S and A only for washing hair.  Fx mob Indep with no AD. Toilet transfer.  Pt D with homemaking, Indep with coffee. Pt was continent of B & B     Pt goal:  to be mobile again and fix own coffee, arms stronger for reaching into closet to get clothes, more confident with fx mob by myself, decrease SOA     Current Status: Pt and dgter report no confusion since hyphoxia has been corrected.  Pt's dgter reports no problems with cognition, \"she's as sharp as a tack\" Pt needs min A with dressing due to SOA and fatigue. Pt uses stool or sits EOB for donning sock, and slippers.  Pt now has leakage of bladder at night and wear Depends.Pt performed fx mob on RA fro HH toilet transfer with SBA mod  cues for PLB on RA O2 91%with sitting break and PLB rebounded quickly,  then to back  of the house approx 35 ft and one step with GB  stting break O2 with mod  cues PLB O2   sat 93 % and then another 25 ft and 2 steps to bathroom with toilet and for walk in shower tranfer, pt was SBA " for shower stool GB in shower is suction and not sticking well, ed not to use,  and HH toilet transfer. Recom pt wear PJ bottom instead  of heavy robe for improving ease of toileting.  Pt dresses herself but she reports she has a lot  more difficulty and SOA and sometimes does not wear 02 and not monitoring her 02 very closely. She reports she has needed her dgter to reach into closet for her clothes. Pt cont to need close S for shower and A with hair.   Mod Barthel Index. 82/100 moderate dependency. Decreased fx endurance, fx balance and upright safety for ADL/IADLs and fx transfes.      DME/AE: Rollator,SC,  several reacher, sock aide, BSC at [facility]    1.PLB  and no talkingv with all Fx mob   2. Two pillows under legs for pressure relief heeels  3. SBA for all showering and transfers   4. Ice to R shld if pain  5. Self monitoring of O2 saturation, pt  needs new puloximiter, not accurate    OT to tx pt [id number] for ADL/IADL including proper breathing and use of O2  training, BUE's strengthening/pain mgt, Econser/work simpl,falls prevention, pt/CG ed and training. PT is inagreement with POC.

## 2022-11-05 VITALS
OXYGEN SATURATION: 94 % | HEART RATE: 77 BPM | DIASTOLIC BLOOD PRESSURE: 78 MMHG | SYSTOLIC BLOOD PRESSURE: 124 MMHG | TEMPERATURE: 97.2 F

## 2022-11-05 NOTE — HOME HEALTH
Routine Visit Note:    Skill/education provided: cardiopulmonary assessment, medication education, fall prevention education, pain assessment    Patient/caregiver response: patient stated understanding    Plan for next visit:  cardiopulmonary assessment, medication education, fall prevention education, pain assessment    Other pertinent info:

## 2022-11-07 ENCOUNTER — HOME CARE VISIT (OUTPATIENT)
Dept: HOME HEALTH SERVICES | Facility: HOME HEALTHCARE | Age: 87
End: 2022-11-07

## 2022-11-07 ENCOUNTER — ANTICOAGULATION VISIT (OUTPATIENT)
Dept: CARDIOLOGY | Facility: CLINIC | Age: 87
End: 2022-11-07

## 2022-11-07 VITALS
HEART RATE: 73 BPM | SYSTOLIC BLOOD PRESSURE: 122 MMHG | DIASTOLIC BLOOD PRESSURE: 60 MMHG | TEMPERATURE: 98.2 F | OXYGEN SATURATION: 94 %

## 2022-11-07 DIAGNOSIS — Z79.01 CHRONIC ANTICOAGULATION: ICD-10-CM

## 2022-11-07 DIAGNOSIS — I48.91 ATRIAL FIBRILLATION, UNSPECIFIED TYPE: Primary | Chronic | ICD-10-CM

## 2022-11-07 LAB — INR PPP: 2

## 2022-11-07 PROCEDURE — G0299 HHS/HOSPICE OF RN EA 15 MIN: HCPCS

## 2022-11-07 PROCEDURE — G0156 HHCP-SVS OF AIDE,EA 15 MIN: HCPCS

## 2022-11-08 PROCEDURE — G0180 MD CERTIFICATION HHA PATIENT: HCPCS | Performed by: FAMILY MEDICINE

## 2022-11-08 NOTE — HOME HEALTH
Routine Visit Note:    Skill/education provided: cardiopulmonary assessment, medication education, fall prevention education, pain assessment    Patient/caregiver response: pt stated understanding    Plan for next visit: cardiopulmonary assessment, medication education, fall prevention education,     Other pertinent info: monitor for changes in resp. status

## 2022-11-09 ENCOUNTER — HOME CARE VISIT (OUTPATIENT)
Dept: HOME HEALTH SERVICES | Facility: HOME HEALTHCARE | Age: 87
End: 2022-11-09

## 2022-11-09 PROCEDURE — G0157 HHC PT ASSISTANT EA 15: HCPCS

## 2022-11-10 VITALS
TEMPERATURE: 96.9 F | DIASTOLIC BLOOD PRESSURE: 78 MMHG | OXYGEN SATURATION: 91 % | HEART RATE: 61 BPM | SYSTOLIC BLOOD PRESSURE: 128 MMHG

## 2022-11-10 NOTE — HOME HEALTH
Therapist instructed patient in proper sit to stand technique followed by patient doing sit to stand x 5 reps from her recliner.

## 2022-11-11 ENCOUNTER — HOME CARE VISIT (OUTPATIENT)
Dept: HOME HEALTH SERVICES | Facility: HOME HEALTHCARE | Age: 87
End: 2022-11-11

## 2022-11-11 VITALS
SYSTOLIC BLOOD PRESSURE: 100 MMHG | HEART RATE: 62 BPM | DIASTOLIC BLOOD PRESSURE: 60 MMHG | OXYGEN SATURATION: 98 % | TEMPERATURE: 97.7 F

## 2022-11-11 VITALS
RESPIRATION RATE: 18 BRPM | SYSTOLIC BLOOD PRESSURE: 106 MMHG | WEIGHT: 110 LBS | OXYGEN SATURATION: 93 % | DIASTOLIC BLOOD PRESSURE: 62 MMHG | BODY MASS INDEX: 17.75 KG/M2 | HEART RATE: 64 BPM

## 2022-11-11 PROCEDURE — G0299 HHS/HOSPICE OF RN EA 15 MIN: HCPCS

## 2022-11-11 PROCEDURE — G0152 HHCP-SERV OF OT,EA 15 MIN: HCPCS

## 2022-11-11 PROCEDURE — G0156 HHCP-SVS OF AIDE,EA 15 MIN: HCPCS

## 2022-11-11 NOTE — HOME HEALTH
Routine Visit Note:    Skill/education provided: cardiopulmonary assessment, medication education, pain assessment, fall prevention education     Patient/caregiver response: patient stated understanding    Plan for next visit:cardiopulmonary assessment, medication education, pain assessment, fall prevention education     Other pertinent info:BMP

## 2022-11-11 NOTE — HOME HEALTH
Pt sitting in recliner without O2. Pt reports reports no falls and no changes in medications.  Pt reports she has not been weraing 02 as much, she only wears it if she is SOA. Pt sometimes wears it at night.  O2 stayed up around 94%  without 02, pt repoets less SOA but still present with upright activity or bending over to putt on shoes and socks, Ed adaptive techique for this with less SOA.         Next visit cont with improved ed HEP's BUE/strengthening/endurance ex's

## 2022-11-14 ENCOUNTER — LAB REQUISITION (OUTPATIENT)
Dept: LAB | Facility: HOSPITAL | Age: 87
End: 2022-11-14

## 2022-11-14 ENCOUNTER — HOME CARE VISIT (OUTPATIENT)
Dept: HOME HEALTH SERVICES | Facility: HOME HEALTHCARE | Age: 87
End: 2022-11-14

## 2022-11-14 VITALS
HEART RATE: 69 BPM | WEIGHT: 111 LBS | SYSTOLIC BLOOD PRESSURE: 122 MMHG | RESPIRATION RATE: 18 BRPM | TEMPERATURE: 98.2 F | BODY MASS INDEX: 17.92 KG/M2 | DIASTOLIC BLOOD PRESSURE: 56 MMHG | OXYGEN SATURATION: 98 %

## 2022-11-14 DIAGNOSIS — J96.21 ACUTE AND CHRONIC RESPIRATORY FAILURE WITH HYPOXIA: ICD-10-CM

## 2022-11-14 LAB
ANION GAP SERPL CALCULATED.3IONS-SCNC: 8 MMOL/L (ref 5–15)
BUN SERPL-MCNC: 22 MG/DL (ref 8–23)
BUN/CREAT SERPL: 38.6 (ref 7–25)
CALCIUM SPEC-SCNC: 9 MG/DL (ref 8.2–9.6)
CHLORIDE SERPL-SCNC: 99 MMOL/L (ref 98–107)
CO2 SERPL-SCNC: 31 MMOL/L (ref 22–29)
CREAT SERPL-MCNC: 0.57 MG/DL (ref 0.57–1)
EGFRCR SERPLBLD CKD-EPI 2021: 84.3 ML/MIN/1.73
GLUCOSE SERPL-MCNC: 101 MG/DL (ref 65–99)
POTASSIUM SERPL-SCNC: 4.1 MMOL/L (ref 3.5–5.2)
SODIUM SERPL-SCNC: 138 MMOL/L (ref 136–145)

## 2022-11-14 PROCEDURE — 80048 BASIC METABOLIC PNL TOTAL CA: CPT | Performed by: FAMILY MEDICINE

## 2022-11-14 PROCEDURE — G0299 HHS/HOSPICE OF RN EA 15 MIN: HCPCS

## 2022-11-14 PROCEDURE — G0156 HHCP-SVS OF AIDE,EA 15 MIN: HCPCS

## 2022-11-15 ENCOUNTER — ANTICOAGULATION VISIT (OUTPATIENT)
Dept: CARDIOLOGY | Facility: CLINIC | Age: 87
End: 2022-11-15

## 2022-11-15 DIAGNOSIS — I48.91 ATRIAL FIBRILLATION, UNSPECIFIED TYPE: Primary | Chronic | ICD-10-CM

## 2022-11-15 DIAGNOSIS — Z79.01 CHRONIC ANTICOAGULATION: ICD-10-CM

## 2022-11-15 LAB — INR PPP: 2.1

## 2022-11-15 NOTE — HOME HEALTH
Routine Visit Note:    Skill/education provided: labs per md order, pain assessment, fall prevention education, cardiopulmonary assessment    Patient/caregiver response: pt stated understanding    Plan for next visit: cardiopulmonary assessment, medication education, fall prevention educaiton, pain assessment    Other pertinent info: monitor for changes in medication

## 2022-11-16 ENCOUNTER — HOME CARE VISIT (OUTPATIENT)
Dept: HOME HEALTH SERVICES | Facility: HOME HEALTHCARE | Age: 87
End: 2022-11-16

## 2022-11-16 PROCEDURE — G0157 HHC PT ASSISTANT EA 15: HCPCS

## 2022-11-17 ENCOUNTER — HOME CARE VISIT (OUTPATIENT)
Dept: HOME HEALTH SERVICES | Facility: HOME HEALTHCARE | Age: 87
End: 2022-11-17

## 2022-11-17 VITALS
OXYGEN SATURATION: 97 % | DIASTOLIC BLOOD PRESSURE: 78 MMHG | TEMPERATURE: 97.5 F | SYSTOLIC BLOOD PRESSURE: 128 MMHG | HEART RATE: 66 BPM

## 2022-11-17 PROCEDURE — G0156 HHCP-SVS OF AIDE,EA 15 MIN: HCPCS

## 2022-11-18 ENCOUNTER — HOME CARE VISIT (OUTPATIENT)
Dept: HOME HEALTH SERVICES | Facility: HOME HEALTHCARE | Age: 87
End: 2022-11-18

## 2022-11-18 VITALS
TEMPERATURE: 97.5 F | SYSTOLIC BLOOD PRESSURE: 142 MMHG | OXYGEN SATURATION: 95 % | DIASTOLIC BLOOD PRESSURE: 90 MMHG | HEART RATE: 77 BPM

## 2022-11-18 PROCEDURE — G0152 HHCP-SERV OF OT,EA 15 MIN: HCPCS

## 2022-11-18 NOTE — HOME HEALTH
Pt answered the door, not wearing O2, min SOB, O2 95%.  Pt reports no falls and no changes in medications.  Pt reports she has been using the 02 less sometimes needs it at night.  Pt doing very well, progressing towards goals, more AROM in R shld noted.     Next visit, dressing and shower transfer

## 2022-11-21 ENCOUNTER — HOME CARE VISIT (OUTPATIENT)
Dept: HOME HEALTH SERVICES | Facility: HOME HEALTHCARE | Age: 87
End: 2022-11-21

## 2022-11-21 VITALS
BODY MASS INDEX: 17.43 KG/M2 | WEIGHT: 108 LBS | HEART RATE: 64 BPM | RESPIRATION RATE: 18 BRPM | TEMPERATURE: 98.3 F | SYSTOLIC BLOOD PRESSURE: 120 MMHG | DIASTOLIC BLOOD PRESSURE: 70 MMHG | OXYGEN SATURATION: 96 %

## 2022-11-21 LAB — INR PPP: 2.1

## 2022-11-21 PROCEDURE — G0156 HHCP-SVS OF AIDE,EA 15 MIN: HCPCS

## 2022-11-22 ENCOUNTER — ANTICOAGULATION VISIT (OUTPATIENT)
Dept: CARDIOLOGY | Facility: CLINIC | Age: 87
End: 2022-11-22

## 2022-11-22 ENCOUNTER — HOME CARE VISIT (OUTPATIENT)
Dept: HOME HEALTH SERVICES | Facility: HOME HEALTHCARE | Age: 87
End: 2022-11-22

## 2022-11-22 DIAGNOSIS — Z79.01 CHRONIC ANTICOAGULATION: ICD-10-CM

## 2022-11-22 DIAGNOSIS — I48.91 ATRIAL FIBRILLATION, UNSPECIFIED TYPE: Primary | Chronic | ICD-10-CM

## 2022-11-22 PROCEDURE — G0151 HHCP-SERV OF PT,EA 15 MIN: HCPCS

## 2022-11-22 RX ORDER — DIGOXIN 125 MCG
TABLET ORAL
Qty: 90 TABLET | Refills: 1 | Status: SHIPPED | OUTPATIENT
Start: 2022-11-22

## 2022-11-22 NOTE — HOME HEALTH
Routine Visit Note:    Skill/education provided: cardiopulmonary assessment, medication education, fall prevention education, pain assessment    Patient/caregiver response: pt stated undertstanding    Plan for next visit: cardiopulmonary assessment, medication education, fall prevention education, pain assessment      Other pertinent info: current health picked up on 11.21.22

## 2022-11-22 NOTE — TELEPHONE ENCOUNTER
Rx Refill Note  Requested Prescriptions     Pending Prescriptions Disp Refills   • digoxin (LANOXIN) 125 MCG tablet [Pharmacy Med Name: DIGOXIN 125 MCG TABLET] 90 tablet 1     Sig: TAKE 1 TABLET BY MOUTH EVERY DAY      Last office visit with prescribing clinician: 10/27/2022      Next office visit with prescribing clinician: 4/27/2023            Julianne Ross MA  11/22/22, 08:22 EST

## 2022-11-23 ENCOUNTER — HOME CARE VISIT (OUTPATIENT)
Dept: HOME HEALTH SERVICES | Facility: HOME HEALTHCARE | Age: 87
End: 2022-11-23

## 2022-11-23 VITALS
OXYGEN SATURATION: 99 % | RESPIRATION RATE: 18 BRPM | DIASTOLIC BLOOD PRESSURE: 70 MMHG | SYSTOLIC BLOOD PRESSURE: 124 MMHG | HEART RATE: 62 BPM | TEMPERATURE: 97.7 F

## 2022-11-23 VITALS — SYSTOLIC BLOOD PRESSURE: 110 MMHG | DIASTOLIC BLOOD PRESSURE: 60 MMHG | HEART RATE: 74 BPM | OXYGEN SATURATION: 98 %

## 2022-11-23 PROCEDURE — G0152 HHCP-SERV OF OT,EA 15 MIN: HCPCS

## 2022-11-23 NOTE — HOME HEALTH
Ms. Amin is compliant with illustrated home exercise program to [facility] and demonstrates 4+/5 gross strength in ble. Functionally, she is independent in household transfers and able to ambulate safely and independently indoor without assistive device and does not walk outdoor this time of the year except for occasional medical appts due to cold weather.    All goals met and patient/daughter agreed with PT discharge.

## 2022-11-24 DIAGNOSIS — I48.91 UNSPECIFIED ATRIAL FIBRILLATION: ICD-10-CM

## 2022-11-24 DIAGNOSIS — Z79.01 LONG TERM (CURRENT) USE OF ANTICOAGULANTS: ICD-10-CM

## 2022-11-24 NOTE — HOME HEALTH
Pt sitting in recliner, a lot of family in for Thanksgiving.  Pt knowledgable of all her medicines and is Indep with putting pills in organizer.. Dgter takes INR, retired RN.  Pt reports she only uses O2 when she get SOA with upright fx mob, which is only approx 2-3 x wk, still uses min a night. Pt's o2 sateration at rest very good, with uprgiht mob, very diff to get pulse ox reading,hands cold, pt has only min SOA reports not experiencing SOA today like  when her 02 drops to occ 90% when upright, then uses 02 NC on 2 L.      pt/family ed to have pt try not to talk and walk, focus on PLB with walking maximo going to back of house for showering       D/C from OT next visit, re-ed PLB, proper breathing BUE HEP

## 2022-11-25 ENCOUNTER — HOME CARE VISIT (OUTPATIENT)
Dept: HOME HEALTH SERVICES | Facility: HOME HEALTHCARE | Age: 87
End: 2022-11-25

## 2022-11-28 LAB — INR PPP: 2.2

## 2022-11-28 RX ORDER — WARFARIN SODIUM 2 MG/1
TABLET ORAL
Qty: 120 TABLET | Refills: 0 | Status: SHIPPED | OUTPATIENT
Start: 2022-11-28 | End: 2022-12-12 | Stop reason: SDUPTHER

## 2022-11-28 NOTE — TELEPHONE ENCOUNTER
Rx Refill Note  Requested Prescriptions     Pending Prescriptions Disp Refills   • warfarin (COUMADIN) 2 MG tablet [Pharmacy Med Name: WARFARIN SODIUM 2 MG TABLET] 120 tablet      Sig: TAKE 2 TABLETS ORALLY ON MONDAY AND FRIDAY AND 1 TABLET THE OTHER 5 DAYS A WEEK.    Last INR 11/22/22  Last office visit with prescribing clinician: 10/27/2022      Next office visit with prescribing clinician: 4/27/2023            Sandy Dean RN  11/28/22, 14:53 EST

## 2022-11-29 ENCOUNTER — ANTICOAGULATION VISIT (OUTPATIENT)
Dept: CARDIOLOGY | Facility: CLINIC | Age: 87
End: 2022-11-29

## 2022-11-29 DIAGNOSIS — I48.91 ATRIAL FIBRILLATION, UNSPECIFIED TYPE: Primary | Chronic | ICD-10-CM

## 2022-11-29 DIAGNOSIS — Z79.01 CHRONIC ANTICOAGULATION: ICD-10-CM

## 2022-11-30 ENCOUNTER — HOME CARE VISIT (OUTPATIENT)
Dept: HOME HEALTH SERVICES | Facility: HOME HEALTHCARE | Age: 87
End: 2022-11-30

## 2022-11-30 VITALS
HEART RATE: 66 BPM | DIASTOLIC BLOOD PRESSURE: 60 MMHG | TEMPERATURE: 97.8 F | OXYGEN SATURATION: 97 % | SYSTOLIC BLOOD PRESSURE: 110 MMHG

## 2022-11-30 PROCEDURE — G0152 HHCP-SERV OF OT,EA 15 MIN: HCPCS

## 2022-12-02 ENCOUNTER — HOME CARE VISIT (OUTPATIENT)
Dept: HOME HEALTH SERVICES | Facility: HOME HEALTHCARE | Age: 87
End: 2022-12-02

## 2022-12-02 VITALS
TEMPERATURE: 98.5 F | DIASTOLIC BLOOD PRESSURE: 60 MMHG | OXYGEN SATURATION: 98 % | RESPIRATION RATE: 19 BRPM | BODY MASS INDEX: 17.59 KG/M2 | HEART RATE: 69 BPM | WEIGHT: 109 LBS | SYSTOLIC BLOOD PRESSURE: 130 MMHG

## 2022-12-02 PROCEDURE — G0299 HHS/HOSPICE OF RN EA 15 MIN: HCPCS

## 2022-12-02 NOTE — HOME HEALTH
Routine Visit Note:    Skill/education provided: cardiopulmonary assessment, medication education, fall prevention education, pain assessment    Patient/caregiver response: pt stated understanding    Plan for next visit: cardiopulmonary assessment, medication education, fall prevention education, pain assessment    Other pertinent info: monitor for falls

## 2022-12-06 ENCOUNTER — ANTICOAGULATION VISIT (OUTPATIENT)
Dept: CARDIOLOGY | Facility: CLINIC | Age: 87
End: 2022-12-06

## 2022-12-06 DIAGNOSIS — Z79.01 CHRONIC ANTICOAGULATION: ICD-10-CM

## 2022-12-06 DIAGNOSIS — I48.91 ATRIAL FIBRILLATION, UNSPECIFIED TYPE: Primary | Chronic | ICD-10-CM

## 2022-12-06 LAB — INR PPP: 2.3

## 2022-12-09 ENCOUNTER — HOME CARE VISIT (OUTPATIENT)
Dept: HOME HEALTH SERVICES | Facility: HOME HEALTHCARE | Age: 87
End: 2022-12-09

## 2022-12-09 VITALS
DIASTOLIC BLOOD PRESSURE: 60 MMHG | SYSTOLIC BLOOD PRESSURE: 132 MMHG | RESPIRATION RATE: 19 BRPM | TEMPERATURE: 98.4 F | OXYGEN SATURATION: 97 % | HEART RATE: 68 BPM

## 2022-12-09 PROCEDURE — G0299 HHS/HOSPICE OF RN EA 15 MIN: HCPCS

## 2022-12-09 NOTE — HOME HEALTH
Routine Visit Note:    Skill/education provided: cardiopulmonary assessment, med education, fall prevention education, pain assessment    Patient/caregiver response: pt stated understanding    Plan for next visit: cardiopulmonary assessment, med education, fall prevention education, pain assessment    Other pertinent info: monitor for readiness of discharge

## 2022-12-12 DIAGNOSIS — Z79.01 LONG TERM (CURRENT) USE OF ANTICOAGULANTS: ICD-10-CM

## 2022-12-12 DIAGNOSIS — I48.91 UNSPECIFIED ATRIAL FIBRILLATION: ICD-10-CM

## 2022-12-12 RX ORDER — WARFARIN SODIUM 2 MG/1
TABLET ORAL
Qty: 117 TABLET | Refills: 0 | Status: SHIPPED | OUTPATIENT
Start: 2022-12-12 | End: 2023-03-13

## 2022-12-12 NOTE — TELEPHONE ENCOUNTER
Rx Refill Note  Requested Prescriptions     Pending Prescriptions Disp Refills   • warfarin (COUMADIN) 2 MG tablet 117 tablet 0     Sig: Take 2 tablets by mouth on Monday and Friday and one tablet by mouth all other days or as directed      Last office visit with prescribing clinician: 10/27/2022   Last telemedicine visit with prescribing clinician: 4/27/2023   Next office visit with prescribing clinician: 4/27/2023                         Would you like a call back once the refill request has been completed: [] Yes [] No    If the office needs to give you a call back, can they leave a voicemail: [] Yes [] No    Anticoagulation Visit with Aleksey Mathew MD (12/06/2022)        Debbie Enamorado LPN  12/12/22, 17:07 EST

## 2022-12-13 ENCOUNTER — ANTICOAGULATION VISIT (OUTPATIENT)
Dept: CARDIOLOGY | Facility: CLINIC | Age: 87
End: 2022-12-13

## 2022-12-13 DIAGNOSIS — Z79.01 CHRONIC ANTICOAGULATION: ICD-10-CM

## 2022-12-13 DIAGNOSIS — I48.91 ATRIAL FIBRILLATION, UNSPECIFIED TYPE: Primary | Chronic | ICD-10-CM

## 2022-12-13 LAB — INR PPP: 2.3

## 2022-12-16 ENCOUNTER — HOME CARE VISIT (OUTPATIENT)
Dept: HOME HEALTH SERVICES | Facility: HOME HEALTHCARE | Age: 87
End: 2022-12-16

## 2022-12-16 VITALS
DIASTOLIC BLOOD PRESSURE: 60 MMHG | OXYGEN SATURATION: 92 % | TEMPERATURE: 98.5 F | HEART RATE: 70 BPM | SYSTOLIC BLOOD PRESSURE: 138 MMHG | RESPIRATION RATE: 19 BRPM

## 2022-12-16 PROCEDURE — G0299 HHS/HOSPICE OF RN EA 15 MIN: HCPCS

## 2022-12-20 LAB — INR PPP: 1.9

## 2022-12-21 ENCOUNTER — ANTICOAGULATION VISIT (OUTPATIENT)
Dept: CARDIOLOGY | Facility: CLINIC | Age: 87
End: 2022-12-21

## 2022-12-21 DIAGNOSIS — Z79.01 CHRONIC ANTICOAGULATION: ICD-10-CM

## 2022-12-21 DIAGNOSIS — I48.91 ATRIAL FIBRILLATION, UNSPECIFIED TYPE: Primary | Chronic | ICD-10-CM

## 2022-12-28 ENCOUNTER — ANTICOAGULATION VISIT (OUTPATIENT)
Dept: CARDIOLOGY | Facility: CLINIC | Age: 87
End: 2022-12-28

## 2022-12-28 DIAGNOSIS — Z79.01 CHRONIC ANTICOAGULATION: ICD-10-CM

## 2022-12-28 DIAGNOSIS — I48.91 ATRIAL FIBRILLATION, UNSPECIFIED TYPE: Primary | Chronic | ICD-10-CM

## 2022-12-28 LAB — INR PPP: 1.8

## 2023-01-03 LAB — INR PPP: 2.3

## 2023-01-04 ENCOUNTER — ANTICOAGULATION VISIT (OUTPATIENT)
Dept: CARDIOLOGY | Facility: CLINIC | Age: 88
End: 2023-01-04
Payer: MEDICARE

## 2023-01-04 DIAGNOSIS — Z79.01 CHRONIC ANTICOAGULATION: ICD-10-CM

## 2023-01-04 DIAGNOSIS — I48.91 ATRIAL FIBRILLATION, UNSPECIFIED TYPE: Primary | Chronic | ICD-10-CM

## 2023-01-11 ENCOUNTER — ANTICOAGULATION VISIT (OUTPATIENT)
Dept: CARDIOLOGY | Facility: CLINIC | Age: 88
End: 2023-01-11
Payer: MEDICARE

## 2023-01-11 DIAGNOSIS — Z79.01 CHRONIC ANTICOAGULATION: ICD-10-CM

## 2023-01-11 DIAGNOSIS — I48.91 ATRIAL FIBRILLATION, UNSPECIFIED TYPE: Primary | Chronic | ICD-10-CM

## 2023-01-11 LAB — INR PPP: 2.2

## 2023-01-18 ENCOUNTER — ANTICOAGULATION VISIT (OUTPATIENT)
Dept: CARDIOLOGY | Facility: CLINIC | Age: 88
End: 2023-01-18
Payer: MEDICARE

## 2023-01-18 DIAGNOSIS — I48.91 ATRIAL FIBRILLATION, UNSPECIFIED TYPE: Primary | Chronic | ICD-10-CM

## 2023-01-18 DIAGNOSIS — Z79.01 CHRONIC ANTICOAGULATION: ICD-10-CM

## 2023-01-18 LAB — INR PPP: 1.8

## 2023-01-25 ENCOUNTER — ANTICOAGULATION VISIT (OUTPATIENT)
Dept: CARDIOLOGY | Facility: CLINIC | Age: 88
End: 2023-01-25
Payer: MEDICARE

## 2023-01-25 DIAGNOSIS — Z79.01 CHRONIC ANTICOAGULATION: ICD-10-CM

## 2023-01-25 DIAGNOSIS — I48.91 ATRIAL FIBRILLATION, UNSPECIFIED TYPE: Primary | Chronic | ICD-10-CM

## 2023-01-25 LAB — INR PPP: 2.4

## 2023-01-31 ENCOUNTER — OFFICE VISIT (OUTPATIENT)
Dept: FAMILY MEDICINE CLINIC | Facility: CLINIC | Age: 88
End: 2023-01-31
Payer: MEDICARE

## 2023-01-31 ENCOUNTER — ANTICOAGULATION VISIT (OUTPATIENT)
Dept: CARDIOLOGY | Facility: CLINIC | Age: 88
End: 2023-01-31
Payer: MEDICARE

## 2023-01-31 VITALS
BODY MASS INDEX: 21.35 KG/M2 | WEIGHT: 116 LBS | SYSTOLIC BLOOD PRESSURE: 138 MMHG | HEIGHT: 62 IN | RESPIRATION RATE: 15 BRPM | DIASTOLIC BLOOD PRESSURE: 83 MMHG | TEMPERATURE: 98.2 F | HEART RATE: 83 BPM | OXYGEN SATURATION: 91 %

## 2023-01-31 DIAGNOSIS — Z00.00 ENCOUNTER FOR ANNUAL WELLNESS EXAM IN MEDICARE PATIENT: Primary | ICD-10-CM

## 2023-01-31 DIAGNOSIS — I48.91 ATRIAL FIBRILLATION, UNSPECIFIED TYPE: Primary | Chronic | ICD-10-CM

## 2023-01-31 DIAGNOSIS — Z79.01 CHRONIC ANTICOAGULATION: ICD-10-CM

## 2023-01-31 PROBLEM — R09.02 HYPOXEMIA: Chronic | Status: RESOLVED | Noted: 2021-07-26 | Resolved: 2023-01-31

## 2023-01-31 PROBLEM — J18.9 PNEUMONIA DUE TO INFECTIOUS ORGANISM: Status: RESOLVED | Noted: 2022-10-02 | Resolved: 2023-01-31

## 2023-01-31 PROBLEM — J98.4 SCARRING OF LUNG: Status: RESOLVED | Noted: 2022-10-02 | Resolved: 2023-01-31

## 2023-01-31 PROBLEM — J90 PLEURAL EFFUSION: Status: RESOLVED | Noted: 2022-10-02 | Resolved: 2023-01-31

## 2023-01-31 PROBLEM — L98.9 SKIN LESION: Status: RESOLVED | Noted: 2022-05-03 | Resolved: 2023-01-31

## 2023-01-31 PROBLEM — J96.21 ACUTE ON CHRONIC RESPIRATORY FAILURE WITH HYPOXIA: Status: RESOLVED | Noted: 2022-10-02 | Resolved: 2023-01-31

## 2023-01-31 LAB — INR PPP: 2.1

## 2023-01-31 PROCEDURE — 1126F AMNT PAIN NOTED NONE PRSNT: CPT | Performed by: FAMILY MEDICINE

## 2023-01-31 PROCEDURE — G0439 PPPS, SUBSEQ VISIT: HCPCS | Performed by: FAMILY MEDICINE

## 2023-01-31 PROCEDURE — 1160F RVW MEDS BY RX/DR IN RCRD: CPT | Performed by: FAMILY MEDICINE

## 2023-01-31 PROCEDURE — 1170F FXNL STATUS ASSESSED: CPT | Performed by: FAMILY MEDICINE

## 2023-01-31 PROCEDURE — 1125F AMNT PAIN NOTED PAIN PRSNT: CPT | Performed by: FAMILY MEDICINE

## 2023-01-31 PROCEDURE — 1159F MED LIST DOCD IN RCRD: CPT | Performed by: FAMILY MEDICINE

## 2023-01-31 NOTE — PROGRESS NOTES
The ABCs of the Annual Wellness Visit  Subsequent Medicare Wellness Visit    Subjective    Shruthi Amin is a 89 y.o. female who presents for a Subsequent Medicare Wellness Visit.    The following portions of the patient's history were reviewed and   updated as appropriate: allergies, current medications, past family history, past medical history, past social history, past surgical history and problem list.    Compared to one year ago, the patient feels her physical   health is the same.    Compared to one year ago, the patient feels her mental   health is the same.    Recent Hospitalizations:  This patient has had a [facility] by [facility] admission record on file within the last 365 days.    Current Medical Providers:  Patient Care Team:  Og Jeronimo Jr., MD as PCP - General  Aleksey Mathew MD as Consulting Physician (Cardiology)    Outpatient Medications Prior to Visit   Medication Sig Dispense Refill   • B Complex Vitamins (VITAMIN B COMPLEX PO) Take 1 tablet by mouth Daily. Indications: suppliment     • Bacillus Coagulans-Inulin (ALIGN PREBIOTIC-PROBIOTIC PO) Take 1 tablet by mouth Daily. Indications: constipation     • Biotin 96206 MCG tablet Take 1 tablet by mouth Daily. Indications: Thinning of Fingernails or Toenails     • cholecalciferol (VITAMIN D3) 400 units tablet Take 1 tablet by mouth Daily. Indications: Osteoporosis, Vitamin D Deficiency     • digoxin (LANOXIN) 125 MCG tablet TAKE 1 TABLET BY MOUTH EVERY DAY 90 tablet 1   • docusate sodium (COLACE) 100 MG capsule Take 2 capsules by mouth 2 (Two) Times a Day As Needed for Constipation. Indications: Constipation     • furosemide (LASIX) 20 MG tablet TAKE 1 TABLET BY MOUTH AS NEEDED (SWELLING). 90 tablet 2   • hydrocortisone 2.5 % cream Apply 1 application topically to the appropriate area as directed 2 (Two) Times a Day. 20 g 3   • KLOR-CON 10 MEQ CR tablet TAKE 1 TABLET BY MOUTH EVERY DAY (Patient taking differently: 1 tablet As Needed. ONLY WHEN  TAKING WATER PILL) 90 tablet 1   • latanoprost (XALATAN) 0.005 % ophthalmic solution Administer 1 drop to the right eye Every Night. Indications: Wide-Angle Glaucoma     • Lutein 20 MG capsule Take 20 mg by mouth Daily. Indications: vision      • magnesium gluconate (MAGONATE) 500 MG tablet Take 1 tablet by mouth 2 (Two) Times a Day. Indications: suppliment     • magnesium hydroxide (MILK OF MAGNESIA) 800 MG/5ML suspension Take 30 mL by mouth Daily As Needed for Constipation. Indications: constipation     • metoprolol succinate XL (TOPROL-XL) 25 MG 24 hr tablet TAKE 1 TABLET BY MOUTH TWICE A  tablet 1   • O2 (OXYGEN) Inhale 2 L/min Continuous As Needed. Indications: scar tissue in lungs      • Sennosides 25 MG tablet Take 25 mg by mouth 2 (Two) Times a Day As Needed (constipation). Indications: Constipation     • vitamin C (ASCORBIC ACID) 500 MG tablet Take 1 tablet by mouth Daily. Indications: Inadequate Vitamin C     • warfarin (COUMADIN) 2 MG tablet Take 2 tablets by mouth on Monday and Friday and one tablet by mouth all other days or as directed 117 tablet 0   • Zinc Sulfate (ZINC 15 PO) Take 2 tablets by mouth Daily. WITH ELDERBERRY--GUMMIES   Indications: suppliment       Facility-Administered Medications Prior to Visit   Medication Dose Route Frequency Provider Last Rate Last Admin   • denosumab (PROLIA) syringe 60 mg  60 mg Subcutaneous Q6 Months Ashli Ott PA   60 mg at 09/22/22 1404       No opioid medication identified on active medication list. I have reviewed chart for other potential  high risk medication/s and harmful drug interactions in the elderly.          Aspirin is not on active medication list.  Aspirin use is not indicated based on review of current medical condition/s. Risk of harm outweighs potential benefits.  .    Patient Active Problem List   Diagnosis   • Atrial fibrillation (CMS/HCC) [I48.91]   • Chronic anticoagulation   • Cerebrovascular accident (CVA) (HCC)   • Hiatal  "hernia   • Hyperlipidemia   • Hypertension   • Osteoarthritis   • Hypoxemia   • Compression fracture of thoracic vertebra with delayed healing   • History of osteoporotic pathological fracture   • Skin lesion   • Acute on chronic congestive heart failure, unspecified heart failure type (HCC)   • Acute on chronic respiratory failure with hypoxia (HCC)   • Scarring of lung   • UTI (urinary tract infection) due to urinary indwelling catheter (HCC)   • Pleural effusion   • Pneumonia due to infectious organism   • Severe malnutrition ([facility])   • Nonrheumatic mitral valve regurgitation     Advance Care Planning  Advance Directive is not on file.  ACP discussion was held with the patient during this visit. Patient has an advance directive (not in EMR), copy requested.     Objective    Vitals:    01/31/23 1134   Pulse: 83   Resp: 15   Temp: 98.2 °F (36.8 °C)   TempSrc: Oral   SpO2: 91%   Weight: 52.6 kg (116 lb)   Height: 157.5 cm (62\")   PainSc: 0-No pain     Estimated body mass index is 21.22 kg/m² as calculated from the following:    Height as of this encounter: 157.5 cm (62\").    Weight as of this encounter: 52.6 kg (116 lb).    BMI is within normal parameters. No other follow-up for BMI required.      Does the patient have evidence of cognitive impairment? No          HEALTH RISK ASSESSMENT    Smoking Status:  Social History     Tobacco Use   Smoking Status Never   Smokeless Tobacco Never     Alcohol Consumption:  Social History     Substance and Sexual Activity   Alcohol Use No     Fall Risk Screen:    STEADI Fall Risk Assessment was completed, and patient is at LOW risk for falls.Assessment completed on:1/31/2023    Depression Screening:  PHQ-2/PHQ-9 Depression Screening 1/31/2023   Little Interest or Pleasure in Doing Things 0-->not at all   Feeling Down, Depressed or Hopeless 0-->not at all   PHQ-9: Brief Depression Severity Measure Score 0       Health Habits and Functional and Cognitive Screening:  Functional & " Cognitive Status 1/31/2023   Do you have difficulty preparing food and eating? Yes   Do you have difficulty bathing yourself, getting dressed or grooming yourself? No   Do you have difficulty using the toilet? No   Do you have difficulty moving around from place to place? Yes   Do you have trouble with steps or getting out of a bed or a chair? Yes   Current Diet Well Balanced Diet   Dental Exam Up to date   Eye Exam Up to date   Exercise (times per week) 0 times per week   Current Exercises Include No Regular Exercise   Current Exercise Activities Include -   Do you need help using the phone?  No   Are you deaf or do you have serious difficulty hearing?  Yes   Do you need help with transportation? Yes   Do you need help shopping? Yes   Do you need help preparing meals?  Yes   Do you need help with housework?  Yes   Do you need help with laundry? Yes   Do you need help taking your medications? No   Do you need help managing money? No   Do you ever drive or ride in a car without wearing a seat belt? No   Have you felt unusual stress, anger or loneliness in the last month? No   Who do you live with? Child   If you need help, do you have trouble finding someone available to you? No   Have you been bothered in the last four weeks by sexual problems? No   Do you have difficulty concentrating, remembering or making decisions? Yes       Age-appropriate Screening Schedule:  Refer to the list below for future screening recommendations based on patient's age, sex and/or medical conditions. Orders for these recommended tests are listed in the plan section. The patient has been provided with a written plan.    Health Maintenance   Topic Date Due   • DXA SCAN  Never done   • INFLUENZA VACCINE  03/31/2023 (Originally 8/1/2022)   • LIPID PANEL  10/10/2023   • TDAP/TD VACCINES (2 - Td or Tdap) 03/22/2032   • ZOSTER VACCINE  Completed                CMS Preventative Services Quick Reference  Risk Factors Identified During  "Encounter  Immunizations Discussed/Encouraged: Td, Influenza, Pneumococcal 23, Prevnar 20 (Pneumococcal 20-valent conjugate), Vaxneuvance (Pneumococcal 15-valent conjugate), Shingrix and COVID19  The above risks/problems have been discussed with the patient.  Pertinent information has been shared with the patient in the After Visit Summary.  An After Visit Summary and PPPS were made available to the patient.    Follow Up:   Next Medicare Wellness visit to be scheduled in 1 year.       Additional E&M Note during same encounter follows:  Patient has multiple medical problems which are significant and separately identifiable that require additional work above and beyond the Medicare Wellness Visit.      Chief Complaint  Medicare Wellness-subsequent, Hypertension, and Hyperlipidemia (3 mth f/u)    Subjective        HPI  Shruthi Amin is also being seen today for   Hypertension hyperlipidemia atrial fibrillation osteoporosis.  These problems are stable.       Objective   Vital Signs:  Pulse 83   Temp 98.2 °F (36.8 °C) (Oral)   Resp 15   Ht 157.5 cm (62\")   Wt 52.6 kg (116 lb)   SpO2 91%   BMI 21.22 kg/m²     Physical Exam  Vitals and nursing note reviewed.   Constitutional:       Appearance: She is well-developed.   HENT:      Head: Normocephalic and atraumatic.   Eyes:      Pupils: Pupils are equal, round, and reactive to light.   Cardiovascular:      Rate and Rhythm: Normal rate. Rhythm irregularly irregular.      Pulses: Normal pulses.      Heart sounds: Normal heart sounds. No murmur heard.    No friction rub. No gallop.   Pulmonary:      Effort: Pulmonary effort is normal.      Breath sounds: Normal breath sounds.   Abdominal:      General: Bowel sounds are normal.      Palpations: Abdomen is soft.   Musculoskeletal:         General: Normal range of motion.      Cervical back: Neck supple.   Skin:     General: Skin is warm and dry.   Neurological:      Mental Status: She is oriented to person, place, and " time.   Psychiatric:         Behavior: Behavior normal.         Thought Content: Thought content normal.         Judgment: Judgment normal.                         Assessment and Plan   Diagnoses and all orders for this visit:    1. Encounter for annual wellness exam in Medicare patient (Primary)             Follow Up   No follow-ups on file.  Patient was given instructions and counseling regarding her condition or for health maintenance advice. Please see specific information pulled into the AVS if appropriate.

## 2023-02-08 ENCOUNTER — ANTICOAGULATION VISIT (OUTPATIENT)
Dept: CARDIOLOGY | Facility: CLINIC | Age: 88
End: 2023-02-08
Payer: MEDICARE

## 2023-02-08 DIAGNOSIS — I48.91 ATRIAL FIBRILLATION, UNSPECIFIED TYPE: Primary | Chronic | ICD-10-CM

## 2023-02-08 DIAGNOSIS — Z79.01 CHRONIC ANTICOAGULATION: ICD-10-CM

## 2023-02-08 LAB — INR PPP: 2.4

## 2023-02-15 ENCOUNTER — ANTICOAGULATION VISIT (OUTPATIENT)
Dept: CARDIOLOGY | Facility: CLINIC | Age: 88
End: 2023-02-15
Payer: MEDICARE

## 2023-02-15 DIAGNOSIS — I48.91 ATRIAL FIBRILLATION, UNSPECIFIED TYPE: Primary | Chronic | ICD-10-CM

## 2023-02-15 DIAGNOSIS — Z79.01 CHRONIC ANTICOAGULATION: ICD-10-CM

## 2023-02-15 LAB — INR PPP: 2.5

## 2023-02-22 ENCOUNTER — ANTICOAGULATION VISIT (OUTPATIENT)
Dept: CARDIOLOGY | Facility: CLINIC | Age: 88
End: 2023-02-22
Payer: MEDICARE

## 2023-02-22 DIAGNOSIS — Z79.01 CHRONIC ANTICOAGULATION: ICD-10-CM

## 2023-02-22 DIAGNOSIS — I48.91 ATRIAL FIBRILLATION, UNSPECIFIED TYPE: Primary | Chronic | ICD-10-CM

## 2023-02-22 LAB — INR PPP: 2

## 2023-03-01 ENCOUNTER — ANTICOAGULATION VISIT (OUTPATIENT)
Dept: CARDIOLOGY | Facility: CLINIC | Age: 88
End: 2023-03-01
Payer: MEDICARE

## 2023-03-01 DIAGNOSIS — I48.91 ATRIAL FIBRILLATION, UNSPECIFIED TYPE: Primary | Chronic | ICD-10-CM

## 2023-03-01 DIAGNOSIS — Z79.01 CHRONIC ANTICOAGULATION: ICD-10-CM

## 2023-03-01 LAB — INR PPP: 2.3

## 2023-03-01 NOTE — TELEPHONE ENCOUNTER
Called mom to discuss weight check. He is currently following his curve in around the third percentile, doing better with level 3 nipple on bottle. Will see him next at 4 months, sooner if concerns.    Patient dtr called requesting INR order faxed to [facility] called their fax number is [phone] phone number is [phone] ask to be transferred to lab.       Order faxed apLPN

## 2023-03-09 ENCOUNTER — TELEPHONE (OUTPATIENT)
Dept: ENDOCRINOLOGY | Facility: CLINIC | Age: 88
End: 2023-03-09
Payer: MEDICARE

## 2023-03-09 DIAGNOSIS — M81.0 AGE RELATED OSTEOPOROSIS, UNSPECIFIED PATHOLOGICAL FRACTURE PRESENCE: Primary | ICD-10-CM

## 2023-03-09 NOTE — TELEPHONE ENCOUNTER
Previous pt of Ashli Ott on Prolia- due 3/23/23. NP appt scheduled with Dr. Contreras 10/26/23. Per SOB no PA or copay required. Attempted to contact pt to schedule lab. Lm on pts vm. Order entered and Rx pended.

## 2023-03-12 DIAGNOSIS — I48.91 UNSPECIFIED ATRIAL FIBRILLATION: ICD-10-CM

## 2023-03-12 DIAGNOSIS — Z79.01 LONG TERM (CURRENT) USE OF ANTICOAGULANTS: ICD-10-CM

## 2023-03-13 LAB — INR PPP: 2.3

## 2023-03-13 RX ORDER — WARFARIN SODIUM 2 MG/1
TABLET ORAL
Qty: 115 TABLET | Refills: 0 | Status: SHIPPED | OUTPATIENT
Start: 2023-03-13

## 2023-03-13 NOTE — TELEPHONE ENCOUNTER
Rx Refill Note  Requested Prescriptions     Pending Prescriptions Disp Refills   • warfarin (COUMADIN) 2 MG tablet [Pharmacy Med Name: WARFARIN SODIUM 2 MG TABLET] 115 tablet 0     Sig: TAKE 2 TABLETS BY MOUTH ON MONDAY AND FRIDAY AND ONE TABLET BY MOUTH ALL OTHER DAYS OR AS DIRECTED    Last INR 3/1/23  Last office visit with prescribing clinician: 10/27/2022   Last telemedicine visit with prescribing clinician: 4/27/2023   Next office visit with prescribing clinician: 4/27/2023                         Would you like a call back once the refill request has been completed: [] Yes [] No    If the office needs to give you a call back, can they leave a voicemail: [] Yes [] No    Sandy Dean RN  03/13/23, 11:13 EDT

## 2023-03-14 ENCOUNTER — LAB (OUTPATIENT)
Dept: LAB | Facility: HOSPITAL | Age: 88
End: 2023-03-14
Payer: MEDICARE

## 2023-03-14 ENCOUNTER — ANTICOAGULATION VISIT (OUTPATIENT)
Dept: CARDIOLOGY | Facility: CLINIC | Age: 88
End: 2023-03-14
Payer: MEDICARE

## 2023-03-14 DIAGNOSIS — M81.0 AGE RELATED OSTEOPOROSIS, UNSPECIFIED PATHOLOGICAL FRACTURE PRESENCE: ICD-10-CM

## 2023-03-14 DIAGNOSIS — I48.91 ATRIAL FIBRILLATION, UNSPECIFIED TYPE: Primary | Chronic | ICD-10-CM

## 2023-03-14 DIAGNOSIS — Z79.01 CHRONIC ANTICOAGULATION: ICD-10-CM

## 2023-03-14 LAB
ALBUMIN SERPL-MCNC: 4.2 G/DL (ref 3.5–5.2)
ALBUMIN/GLOB SERPL: 1.6 G/DL
ALP SERPL-CCNC: 81 U/L (ref 39–117)
ALT SERPL W P-5'-P-CCNC: 20 U/L (ref 1–33)
ANION GAP SERPL CALCULATED.3IONS-SCNC: 8 MMOL/L (ref 5–15)
AST SERPL-CCNC: 28 U/L (ref 1–32)
BILIRUB SERPL-MCNC: 1.2 MG/DL (ref 0–1.2)
BUN SERPL-MCNC: 13 MG/DL (ref 8–23)
BUN/CREAT SERPL: 21 (ref 7–25)
CALCIUM SPEC-SCNC: 9.5 MG/DL (ref 8.2–9.6)
CHLORIDE SERPL-SCNC: 98 MMOL/L (ref 98–107)
CO2 SERPL-SCNC: 31 MMOL/L (ref 22–29)
CREAT SERPL-MCNC: 0.62 MG/DL (ref 0.57–1)
EGFRCR SERPLBLD CKD-EPI 2021: 82.6 ML/MIN/1.73
GLOBULIN UR ELPH-MCNC: 2.6 GM/DL
GLUCOSE SERPL-MCNC: 112 MG/DL (ref 65–99)
POTASSIUM SERPL-SCNC: 4.3 MMOL/L (ref 3.5–5.2)
PROT SERPL-MCNC: 6.8 G/DL (ref 6–8.5)
SODIUM SERPL-SCNC: 137 MMOL/L (ref 136–145)

## 2023-03-14 PROCEDURE — 36415 COLL VENOUS BLD VENIPUNCTURE: CPT

## 2023-03-14 PROCEDURE — 80053 COMPREHEN METABOLIC PANEL: CPT

## 2023-03-15 RX ORDER — METOPROLOL SUCCINATE 25 MG/1
TABLET, EXTENDED RELEASE ORAL
Qty: 180 TABLET | Refills: 1 | Status: SHIPPED | OUTPATIENT
Start: 2023-03-15

## 2023-03-15 NOTE — TELEPHONE ENCOUNTER
Rx Refill Note  Requested Prescriptions     Pending Prescriptions Disp Refills   • metoprolol succinate XL (TOPROL-XL) 25 MG 24 hr tablet [Pharmacy Med Name: METOPROLOL SUCC ER 25 MG TAB] 180 tablet 1     Sig: TAKE 1 TABLET BY MOUTH TWICE A DAY      Last office visit with prescribing clinician: 10/27/2022   Last telemedicine visit with prescribing clinician: 4/27/2023   Next office visit with prescribing clinician: 4/27/2023                         Would you like a call back once the refill request has been completed: [] Yes [] No    If the office needs to give you a call back, can they leave a voicemail: [] Yes [] No    Julianne Ross MA  03/15/23, 08:00 EDT

## 2023-03-22 ENCOUNTER — ANTICOAGULATION VISIT (OUTPATIENT)
Dept: CARDIOLOGY | Facility: CLINIC | Age: 88
End: 2023-03-22
Payer: MEDICARE

## 2023-03-22 DIAGNOSIS — Z79.01 CHRONIC ANTICOAGULATION: ICD-10-CM

## 2023-03-22 DIAGNOSIS — I48.91 ATRIAL FIBRILLATION, UNSPECIFIED TYPE: Primary | Chronic | ICD-10-CM

## 2023-03-22 LAB — INR PPP: 2.4

## 2023-03-23 ENCOUNTER — CLINICAL SUPPORT (OUTPATIENT)
Dept: ENDOCRINOLOGY | Facility: CLINIC | Age: 88
End: 2023-03-23
Payer: MEDICARE

## 2023-03-23 DIAGNOSIS — M81.0 AGE RELATED OSTEOPOROSIS, UNSPECIFIED PATHOLOGICAL FRACTURE PRESENCE: Primary | ICD-10-CM

## 2023-03-23 PROCEDURE — 96372 THER/PROPH/DIAG INJ SC/IM: CPT | Performed by: INTERNAL MEDICINE

## 2023-03-29 ENCOUNTER — ANTICOAGULATION VISIT (OUTPATIENT)
Dept: CARDIOLOGY | Facility: CLINIC | Age: 88
End: 2023-03-29
Payer: MEDICARE

## 2023-03-29 DIAGNOSIS — Z79.01 CHRONIC ANTICOAGULATION: ICD-10-CM

## 2023-03-29 DIAGNOSIS — I48.91 ATRIAL FIBRILLATION, UNSPECIFIED TYPE: Primary | Chronic | ICD-10-CM

## 2023-03-29 LAB — INR PPP: 2.4

## 2023-04-05 ENCOUNTER — ANTICOAGULATION VISIT (OUTPATIENT)
Dept: CARDIOLOGY | Facility: CLINIC | Age: 88
End: 2023-04-05
Payer: MEDICARE

## 2023-04-05 DIAGNOSIS — Z79.01 CHRONIC ANTICOAGULATION: ICD-10-CM

## 2023-04-05 DIAGNOSIS — I48.91 ATRIAL FIBRILLATION, UNSPECIFIED TYPE: Primary | Chronic | ICD-10-CM

## 2023-04-05 LAB — INR PPP: 2.1

## 2023-04-12 ENCOUNTER — ANTICOAGULATION VISIT (OUTPATIENT)
Dept: CARDIOLOGY | Facility: CLINIC | Age: 88
End: 2023-04-12
Payer: MEDICARE

## 2023-04-12 DIAGNOSIS — Z79.01 CHRONIC ANTICOAGULATION: ICD-10-CM

## 2023-04-12 DIAGNOSIS — I48.91 ATRIAL FIBRILLATION, UNSPECIFIED TYPE: Primary | Chronic | ICD-10-CM

## 2023-04-12 LAB — INR PPP: 2.4

## 2023-04-16 DIAGNOSIS — I48.91 UNSPECIFIED ATRIAL FIBRILLATION: ICD-10-CM

## 2023-04-16 DIAGNOSIS — Z79.01 LONG TERM (CURRENT) USE OF ANTICOAGULANTS: ICD-10-CM

## 2023-04-17 RX ORDER — WARFARIN SODIUM 2 MG/1
TABLET ORAL
Qty: 40 TABLET | Refills: 2 | Status: SHIPPED | OUTPATIENT
Start: 2023-04-17

## 2023-04-17 NOTE — TELEPHONE ENCOUNTER
Rx Refill Note  Requested Prescriptions     Pending Prescriptions Disp Refills   • warfarin (COUMADIN) 2 MG tablet [Pharmacy Med Name: WARFARIN SODIUM 2 MG TABLET] 40 tablet 2     Sig: TAKE 2 TABLETS BY MOUTH ON MONDAY AND FRIDAY AND ONE TABLET BY MOUTH ALL OTHER DAYS OR AS DIRECTED    Last INR 4/12/23  Last office visit with prescribing clinician: 10/27/2022   Last telemedicine visit with prescribing clinician: 4/27/2023   Next office visit with prescribing clinician: 4/27/2023                         Would you like a call back once the refill request has been completed: [] Yes [] No    If the office needs to give you a call back, can they leave a voicemail: [] Yes [] No    Sandy Dean RN  04/17/23, 13:45 EDT

## 2023-04-18 ENCOUNTER — ANTICOAGULATION VISIT (OUTPATIENT)
Dept: CARDIOLOGY | Facility: CLINIC | Age: 88
End: 2023-04-18
Payer: MEDICARE

## 2023-04-18 DIAGNOSIS — Z79.01 CHRONIC ANTICOAGULATION: ICD-10-CM

## 2023-04-18 DIAGNOSIS — I48.91 ATRIAL FIBRILLATION, UNSPECIFIED TYPE: Primary | Chronic | ICD-10-CM

## 2023-04-18 LAB — INR PPP: 2.5

## 2023-04-26 ENCOUNTER — ANTICOAGULATION VISIT (OUTPATIENT)
Dept: CARDIOLOGY | Facility: CLINIC | Age: 88
End: 2023-04-26
Payer: MEDICARE

## 2023-04-26 DIAGNOSIS — Z79.01 CHRONIC ANTICOAGULATION: ICD-10-CM

## 2023-04-26 DIAGNOSIS — I48.91 ATRIAL FIBRILLATION, UNSPECIFIED TYPE: Primary | Chronic | ICD-10-CM

## 2023-04-26 LAB — INR PPP: 2.2

## 2023-05-02 DIAGNOSIS — Z79.01 LONG TERM (CURRENT) USE OF ANTICOAGULANTS: ICD-10-CM

## 2023-05-02 DIAGNOSIS — I48.91 UNSPECIFIED ATRIAL FIBRILLATION: ICD-10-CM

## 2023-05-02 LAB — INR PPP: 2.1

## 2023-05-02 RX ORDER — DIGOXIN 125 MCG
125 TABLET ORAL DAILY
Qty: 90 TABLET | Refills: 1 | Status: SHIPPED | OUTPATIENT
Start: 2023-05-02

## 2023-05-02 RX ORDER — WARFARIN SODIUM 2 MG/1
TABLET ORAL
Qty: 40 TABLET | Refills: 2 | Status: SHIPPED | OUTPATIENT
Start: 2023-05-02

## 2023-05-02 NOTE — TELEPHONE ENCOUNTER
Rx Refill Note  Requested Prescriptions     Pending Prescriptions Disp Refills   • warfarin (COUMADIN) 2 MG tablet 40 tablet 2   • digoxin (LANOXIN) 125 MCG tablet 90 tablet 1     Sig: Take 1 tablet by mouth Daily. Indications: Chronic Atrial Fibrillation      Last office visit with prescribing clinician: 10/27/2022   Last telemedicine visit with prescribing clinician: 5/9/2023   Next office visit with prescribing clinician: 5/9/2023                         Would you like a call back once the refill request has been completed: [] Yes [] No    If the office needs to give you a call back, can they leave a voicemail: [] Yes [] No    Julianne Ross MA  05/02/23, 10:09 EDT

## 2023-05-02 NOTE — TELEPHONE ENCOUNTER
Rx Refill Note  Requested Prescriptions     Pending Prescriptions Disp Refills   • warfarin (COUMADIN) 2 MG tablet 40 tablet 2   • digoxin (LANOXIN) 125 MCG tablet 90 tablet 1     Sig: Take 1 tablet by mouth Daily. Indications: Chronic Atrial Fibrillation      Last office visit with prescribing clinician: 10/27/2022   Last telemedicine visit with prescribing clinician: 5/9/2023   Next office visit with prescribing clinician: 5/9/2023   Anticoagulation Visit 4/25/23 INR 2.2                        Would you like a call back once the refill request has been completed: [] Yes [] No    If the office needs to give you a call back, can they leave a voicemail: [] Yes [] No    Allyson Calzada RN  05/02/23, 13:51 EDT

## 2023-05-02 NOTE — TELEPHONE ENCOUNTER
Caller: NEIDA    Relationship: DAUGHTER    Best call back number: [phone]    Requested Prescriptions:   Requested Prescriptions     Pending Prescriptions Disp Refills   • warfarin (COUMADIN) 2 MG tablet 40 tablet 2   • digoxin (LANOXIN) 125 MCG tablet 90 tablet 1     Sig: Take 1 tablet by mouth Daily. Indications: Chronic Atrial Fibrillation        Pharmacy where request should be sent: [facility]ACY #[id number] [place] [id number] [facility] [phone] [facility] [phone] FX     Last office visit with prescribing clinician: 10/27/2022   Last telemedicine visit with prescribing clinician: 2023   Next office visit with prescribing clinician: 2023     Additional details provided by patient: PT IS IN [place] AND HAS RAN OUT OF MEDICATION- THE PHARMACY WILL NOT GIVE HER A FEW PILL TILL SHE GETS BACK HOME- STATED HER PRESCRIPTION IS .    Does the patient have less than a 3 day supply:  [x] Yes  [] No    Would you like a call back once the refill request has been completed: [] Yes [] No    If the office needs to give you a call back, can they leave a voicemail: [] Yes [] No    Little Bates Rep   23 10:05 EDT

## 2023-05-03 ENCOUNTER — ANTICOAGULATION VISIT (OUTPATIENT)
Dept: CARDIOLOGY | Facility: CLINIC | Age: 88
End: 2023-05-03
Payer: MEDICARE

## 2023-05-03 DIAGNOSIS — Z79.01 CHRONIC ANTICOAGULATION: ICD-10-CM

## 2023-05-03 DIAGNOSIS — I48.91 ATRIAL FIBRILLATION, UNSPECIFIED TYPE: Primary | Chronic | ICD-10-CM

## 2023-05-03 NOTE — PROGRESS NOTES
5/3/23    Home monitor INR is WNL.  No change in Coumadin dose.  Continue current plan and patient is to recheck as contracted. No call per protocol.  NIKO, RN

## 2023-05-09 ENCOUNTER — OFFICE VISIT (OUTPATIENT)
Dept: CARDIOLOGY | Facility: CLINIC | Age: 88
End: 2023-05-09
Payer: MEDICARE

## 2023-05-09 ENCOUNTER — ANTICOAGULATION VISIT (OUTPATIENT)
Dept: CARDIOLOGY | Facility: CLINIC | Age: 88
End: 2023-05-09

## 2023-05-09 VITALS
DIASTOLIC BLOOD PRESSURE: 94 MMHG | HEART RATE: 62 BPM | HEIGHT: 62 IN | OXYGEN SATURATION: 94 % | WEIGHT: 116 LBS | BODY MASS INDEX: 21.35 KG/M2 | SYSTOLIC BLOOD PRESSURE: 156 MMHG

## 2023-05-09 DIAGNOSIS — I10 ESSENTIAL HYPERTENSION: ICD-10-CM

## 2023-05-09 DIAGNOSIS — I48.11 LONGSTANDING PERSISTENT ATRIAL FIBRILLATION: Primary | ICD-10-CM

## 2023-05-09 DIAGNOSIS — I25.10 CORONARY ARTERY DISEASE INVOLVING NATIVE CORONARY ARTERY OF NATIVE HEART WITHOUT ANGINA PECTORIS: ICD-10-CM

## 2023-05-09 DIAGNOSIS — E78.00 PURE HYPERCHOLESTEROLEMIA: ICD-10-CM

## 2023-05-09 DIAGNOSIS — Z79.01 CHRONIC ANTICOAGULATION: ICD-10-CM

## 2023-05-09 DIAGNOSIS — I48.91 ATRIAL FIBRILLATION, UNSPECIFIED TYPE: Primary | Chronic | ICD-10-CM

## 2023-05-09 LAB — INR PPP: 2.2

## 2023-05-09 PROCEDURE — 1159F MED LIST DOCD IN RCRD: CPT | Performed by: INTERNAL MEDICINE

## 2023-05-09 PROCEDURE — 1160F RVW MEDS BY RX/DR IN RCRD: CPT | Performed by: INTERNAL MEDICINE

## 2023-05-09 PROCEDURE — 99214 OFFICE O/P EST MOD 30 MIN: CPT | Performed by: INTERNAL MEDICINE

## 2023-05-09 NOTE — PROGRESS NOTES
"    Subjective:     Encounter Date:05/09/2023      Patient ID: Shruthi Amin is a 89 y.o. female.    Chief Complaint:  History of Present Illness    {Common H&P Review Areas:95415}  Past Medical History:   Diagnosis Date   • Atrial fibrillation    • Coronary artery disease     Atrial fibrillation   • GERD (gastroesophageal reflux disease)    • Glaucoma    • Hiatal hernia with gastroesophageal reflux    • History of osteoporotic pathological fracture     Right intertroch (7/2019)   • Hx of compression fracture of spine     T12 and L1(2013); T6 (1/2022)   • Hyperlipidemia    • Hypertension    • Osteoarthritis    • Osteoporosis     on prolia(3/21/22)   • Stroke     off and on dizziness from the stroke.     Past Surgical History:   Procedure Laterality Date   • BACK SURGERY      kyphoplasty   • EYE SURGERY      cataract surgery   • FIXATION KYPHOPLASTY LUMBAR SPINE  2013   • HIP TROCHANTERIC NAILING WITH INTRAMEDULLARY HIP SCREW Right 7/20/2019    Procedure: HIP TROCHANTERIC NAILING SHORT WITH INTRAMEDULLARY HIP SCREW;  Surgeon: Edward Centeno MD;  Location: [facility] OR;  Service: Orthopedics   • RECTAL SURGERY      x 3     Ht 157.5 cm (62.01\")   BMI 21.21 kg/m²   Family History   Problem Relation Age of Onset   • Heart disease Mother    • Hypertension Mother    • Osteoporosis Mother    • Cancer Father         colon cancer   • Osteoporosis Father    • Cancer Sister         lung   • Cancer Brother         colon cancer       Current Outpatient Medications:   •  metoprolol succinate XL (TOPROL-XL) 25 MG 24 hr tablet, TAKE 1 TABLET BY MOUTH TWICE A DAY, Disp: 180 tablet, Rfl: 1  •  B Complex Vitamins (VITAMIN B COMPLEX PO), Take 1 tablet by mouth Daily. Indications: suppliment, Disp: , Rfl:   •  Bacillus Coagulans-Inulin (ALIGN PREBIOTIC-PROBIOTIC PO), Take 1 tablet by mouth Daily. Indications: constipation, Disp: , Rfl:   •  Biotin 90199 MCG tablet, Take 1 tablet by mouth Daily. Indications: Thinning of " Fingernails or Toenails, Disp: , Rfl:   •  cholecalciferol (VITAMIN D3) 400 units tablet, Take 1 tablet by mouth Daily. Indications: Osteoporosis, Vitamin D Deficiency, Disp: , Rfl:   •  digoxin (LANOXIN) 125 MCG tablet, Take 1 tablet by mouth Daily. Indications: Chronic Atrial Fibrillation, Disp: 90 tablet, Rfl: 1  •  docusate sodium (COLACE) 100 MG capsule, Take 2 capsules by mouth 2 (Two) Times a Day As Needed for Constipation. Indications: Constipation, Disp: , Rfl:   •  furosemide (LASIX) 20 MG tablet, TAKE 1 TABLET BY MOUTH AS NEEDED (SWELLING)., Disp: 90 tablet, Rfl: 2  •  hydrocortisone 2.5 % cream, Apply 1 application topically to the appropriate area as directed 2 (Two) Times a Day., Disp: 20 g, Rfl: 3  •  KLOR-CON 10 MEQ CR tablet, TAKE 1 TABLET BY MOUTH EVERY DAY (Patient taking differently: 1 tablet As Needed. ONLY WHEN TAKING WATER PILL), Disp: 90 tablet, Rfl: 1  •  latanoprost (XALATAN) 0.005 % ophthalmic solution, Administer 1 drop to the right eye Every Night. Indications: Wide-Angle Glaucoma, Disp: , Rfl:   •  Lutein 20 MG capsule, Take 20 mg by mouth Daily. Indications: vision , Disp: , Rfl:   •  magnesium gluconate (MAGONATE) 500 MG tablet, Take 1 tablet by mouth 2 (Two) Times a Day. Indications: suppliment, Disp: , Rfl:   •  magnesium hydroxide (MILK OF MAGNESIA) 800 MG/5ML suspension, Take 30 mL by mouth Daily As Needed for Constipation. Indications: constipation, Disp: , Rfl:   •  O2 (OXYGEN), Inhale 2 L/min Continuous As Needed. Indications: scar tissue in lungs , Disp: , Rfl:   •  Sennosides 25 MG tablet, Take 25 mg by mouth 2 (Two) Times a Day As Needed (constipation). Indications: Constipation, Disp: , Rfl:   •  vitamin C (ASCORBIC ACID) 500 MG tablet, Take 1 tablet by mouth Daily. Indications: Inadequate Vitamin C, Disp: , Rfl:   •  warfarin (COUMADIN) 2 MG tablet, Take 2 tablets by mouth on Monday and Friday and 1 tablet by mouth the other 5 days a week., Disp: 40 tablet, Rfl: 2  •  Zinc  Sulfate (ZINC 15 PO), Take 2 tablets by mouth Daily. WITH ELDERBERRY--GUMMIES   Indications: suppliment, Disp: , Rfl:     Current Facility-Administered Medications:   •  denosumab (PROLIA) syringe 60 mg, 60 mg, Subcutaneous, Q6 Months, Jena Contreras MD, 60 mg at 03/23/23 6955  Allergies   Allergen Reactions   • Penicillins Hives   • Codeine Nausea And Vomiting   • Latex Rash     Social History     Socioeconomic History   • Marital status:    Tobacco Use   • Smoking status: Never   • Smokeless tobacco: Never   Vaping Use   • Vaping Use: Never used   Substance and Sexual Activity   • Alcohol use: No   • Drug use: No   • Sexual activity: Defer     Review of Systems   Constitutional: Negative for malaise/fatigue.   Cardiovascular: Negative for chest pain, dyspnea on exertion, leg swelling and palpitations.   Respiratory: Negative for cough and shortness of breath.    Gastrointestinal: Negative for abdominal pain, nausea and vomiting.   Neurological: Negative for dizziness, focal weakness, headaches, light-headedness and numbness.   All other systems reviewed and are negative.             Objective:     Physical Exam  Procedures    Lab Review:         MDM      Patient's previous medical records, labs, and EKG were reviewed and discussed with the patient at today's visit.

## 2023-05-09 NOTE — PROGRESS NOTES
"    Subjective:     Encounter Date:05/09/2023      Patient ID: Shruthi Amin is a 89 y.o. female.    Chief Complaint:  History of Present Illness 89-year-old white female with history of coronary disease atrial fibrillation hypertension hyperlipidemia presents to my office for follow-up.  Patient is currently stable without any signs of chest pain but has some shortness of breath exertion.  No complaints of any PND orthopnea.  No palpitations dizziness syncope.  She has some swelling of the feet but she is taking her medicines regularly.  She does not smoke.    The following portions of the patient's history were reviewed and updated as appropriate: allergies, current medications, past family history, past medical history, past social history, past surgical history and problem list.  Past Medical History:   Diagnosis Date   • Atrial fibrillation    • Coronary artery disease     Atrial fibrillation   • GERD (gastroesophageal reflux disease)    • Glaucoma    • Hiatal hernia with gastroesophageal reflux    • History of osteoporotic pathological fracture     Right intertroch (7/2019)   • Hx of compression fracture of spine     T12 and L1(2013); T6 (1/2022)   • Hyperlipidemia    • Hypertension    • Osteoarthritis    • Osteoporosis     on prolia(3/21/22)   • Stroke     off and on dizziness from the stroke.     Past Surgical History:   Procedure Laterality Date   • BACK SURGERY      kyphoplasty   • EYE SURGERY      cataract surgery   • FIXATION KYPHOPLASTY LUMBAR SPINE  2013   • HIP TROCHANTERIC NAILING WITH INTRAMEDULLARY HIP SCREW Right 7/20/2019    Procedure: HIP TROCHANTERIC NAILING SHORT WITH INTRAMEDULLARY HIP SCREW;  Surgeon: Edward Centeno MD;  Location: [facility];  Service: Orthopedics   • RECTAL SURGERY      x 3     /94   Pulse 62   Ht 157.5 cm (62.01\")   Wt 52.6 kg (116 lb)   SpO2 94%   BMI 21.21 kg/m²   Family History   Problem Relation Age of Onset   • Heart disease Mother    • " Hypertension Mother    • Osteoporosis Mother    • Cancer Father         colon cancer   • Osteoporosis Father    • Cancer Sister         lung   • Cancer Brother         colon cancer       Current Outpatient Medications:   •  B Complex Vitamins (VITAMIN B COMPLEX PO), Take 1 tablet by mouth Daily. Indications: suppliment, Disp: , Rfl:   •  Bacillus Coagulans-Inulin (ALIGN PREBIOTIC-PROBIOTIC PO), Take 1 tablet by mouth Daily. Indications: constipation, Disp: , Rfl:   •  Biotin 87997 MCG tablet, Take 1 tablet by mouth Daily. Indications: Thinning of Fingernails or Toenails, Disp: , Rfl:   •  cholecalciferol (VITAMIN D3) 400 units tablet, Take 1 tablet by mouth Daily. Indications: Osteoporosis, Vitamin D Deficiency, Disp: , Rfl:   •  digoxin (LANOXIN) 125 MCG tablet, Take 1 tablet by mouth Daily. Indications: Chronic Atrial Fibrillation, Disp: 90 tablet, Rfl: 1  •  docusate sodium (COLACE) 100 MG capsule, Take 2 capsules by mouth 2 (Two) Times a Day As Needed for Constipation. Indications: Constipation, Disp: , Rfl:   •  furosemide (LASIX) 20 MG tablet, TAKE 1 TABLET BY MOUTH AS NEEDED (SWELLING)., Disp: 90 tablet, Rfl: 2  •  hydrocortisone 2.5 % cream, Apply 1 application topically to the appropriate area as directed 2 (Two) Times a Day., Disp: 20 g, Rfl: 3  •  KLOR-CON 10 MEQ CR tablet, TAKE 1 TABLET BY MOUTH EVERY DAY (Patient taking differently: 1 tablet As Needed. ONLY WHEN TAKING WATER PILL), Disp: 90 tablet, Rfl: 1  •  latanoprost (XALATAN) 0.005 % ophthalmic solution, Administer 1 drop to the right eye Every Night. Indications: Wide-Angle Glaucoma, Disp: , Rfl:   •  Lutein 20 MG capsule, Take 20 mg by mouth Daily. Indications: vision , Disp: , Rfl:   •  magnesium gluconate (MAGONATE) 500 MG tablet, Take 1 tablet by mouth 2 (Two) Times a Day. Indications: suppliment, Disp: , Rfl:   •  magnesium hydroxide (MILK OF MAGNESIA) 800 MG/5ML suspension, Take 30 mL by mouth Daily As Needed for Constipation. Indications:  constipation, Disp: , Rfl:   •  metoprolol succinate XL (TOPROL-XL) 25 MG 24 hr tablet, TAKE 1 TABLET BY MOUTH TWICE A DAY, Disp: 180 tablet, Rfl: 1  •  O2 (OXYGEN), Inhale 2 L/min Continuous As Needed. Indications: scar tissue in lungs , Disp: , Rfl:   •  Sennosides 25 MG tablet, Take 25 mg by mouth 2 (Two) Times a Day As Needed (constipation). Indications: Constipation, Disp: , Rfl:   •  vitamin C (ASCORBIC ACID) 500 MG tablet, Take 1 tablet by mouth Daily. Indications: Inadequate Vitamin C, Disp: , Rfl:   •  warfarin (COUMADIN) 2 MG tablet, Take 2 tablets by mouth on Monday and Friday and 1 tablet by mouth the other 5 days a week., Disp: 40 tablet, Rfl: 2  •  Zinc Sulfate (ZINC 15 PO), Take 2 tablets by mouth Daily. WITH ELDERBERRY--GUMMIES   Indications: suppliment, Disp: , Rfl:     Current Facility-Administered Medications:   •  denosumab (PROLIA) syringe 60 mg, 60 mg, Subcutaneous, Q6 Months, Jena Contreras MD, 60 mg at 03/23/23 3454  Allergies   Allergen Reactions   • Penicillins Hives   • Codeine Nausea And Vomiting   • Latex Rash     Social History     Socioeconomic History   • Marital status:    Tobacco Use   • Smoking status: Never   • Smokeless tobacco: Never   Vaping Use   • Vaping Use: Never used   Substance and Sexual Activity   • Alcohol use: No   • Drug use: No   • Sexual activity: Defer     Review of Systems   Constitutional: Negative for malaise/fatigue.   Cardiovascular: Positive for leg swelling. Negative for chest pain, dyspnea on exertion and palpitations.   Respiratory: Positive for shortness of breath. Negative for cough.    Gastrointestinal: Negative for abdominal pain, nausea and vomiting.   Neurological: Positive for numbness. Negative for dizziness, focal weakness, headaches and light-headedness.   All other systems reviewed and are negative.             Objective:     Constitutional:       Appearance: Well-developed.   Eyes:      General: No scleral icterus.      Conjunctiva/sclera: Conjunctivae normal.   HENT:      Head: Normocephalic and atraumatic.   Neck:      Vascular: No carotid bruit or JVD.   Pulmonary:      Effort: Pulmonary effort is normal.      Breath sounds: Normal breath sounds. No wheezing. No rales.   Cardiovascular:      Normal rate. Irregularly irregular rhythm.      Murmurs: There is a systolic murmur.   Pulses:     Intact distal pulses.   Abdominal:      General: Bowel sounds are normal.      Palpations: Abdomen is soft.   Musculoskeletal:      Cervical back: Normal range of motion and neck supple. Skin:     General: Skin is warm and dry.      Findings: No rash.   Neurological:      Mental Status: Alert.       Procedures    Lab Review:         MDM  1 coronary disease  Patient has nonobstructive disease and is currently stable on medications with normal LV function  2.  Atrial fibrillation  Patient has history of atrial fibrillation and is currently on medical therapy including digoxin metoprolol and warfarin for anticoagulation  3.  Hypertension  Patient blood pressure currently stable on metoprolol  4.  Hyperlipidemia  Patient is on statins and the lipid levels are well within normal limits    Patient's previous medical records, labs, and EKG were reviewed and discussed with the patient at today's visit.

## 2023-05-16 LAB — INR PPP: 2.3

## 2023-05-17 ENCOUNTER — ANTICOAGULATION VISIT (OUTPATIENT)
Dept: CARDIOLOGY | Facility: CLINIC | Age: 88
End: 2023-05-17
Payer: MEDICARE

## 2023-05-17 DIAGNOSIS — Z79.01 CHRONIC ANTICOAGULATION: ICD-10-CM

## 2023-05-17 DIAGNOSIS — I48.91 ATRIAL FIBRILLATION, UNSPECIFIED TYPE: Primary | Chronic | ICD-10-CM

## 2023-05-24 ENCOUNTER — ANTICOAGULATION VISIT (OUTPATIENT)
Dept: CARDIOLOGY | Facility: CLINIC | Age: 88
End: 2023-05-24
Payer: MEDICARE

## 2023-05-24 DIAGNOSIS — Z79.01 CHRONIC ANTICOAGULATION: ICD-10-CM

## 2023-05-24 DIAGNOSIS — I48.91 ATRIAL FIBRILLATION, UNSPECIFIED TYPE: Primary | Chronic | ICD-10-CM

## 2023-05-24 LAB — INR PPP: 2.4

## 2023-05-30 LAB — INR PPP: 1.9

## 2023-05-31 ENCOUNTER — ANTICOAGULATION VISIT (OUTPATIENT)
Dept: CARDIOLOGY | Facility: CLINIC | Age: 88
End: 2023-05-31

## 2023-05-31 DIAGNOSIS — I48.91 ATRIAL FIBRILLATION, UNSPECIFIED TYPE: Primary | Chronic | ICD-10-CM

## 2023-05-31 DIAGNOSIS — Z79.01 CHRONIC ANTICOAGULATION: ICD-10-CM

## 2023-06-07 ENCOUNTER — ANTICOAGULATION VISIT (OUTPATIENT)
Dept: CARDIOLOGY | Facility: CLINIC | Age: 88
End: 2023-06-07
Payer: MEDICARE

## 2023-06-07 DIAGNOSIS — Z79.01 CHRONIC ANTICOAGULATION: ICD-10-CM

## 2023-06-07 DIAGNOSIS — I48.91 ATRIAL FIBRILLATION, UNSPECIFIED TYPE: Primary | Chronic | ICD-10-CM

## 2023-06-07 LAB — INR PPP: 2.1 (ref 0.9–1.1)

## 2023-06-07 PROCEDURE — 36416 COLLJ CAPILLARY BLOOD SPEC: CPT | Performed by: INTERNAL MEDICINE

## 2023-06-07 PROCEDURE — 85610 PROTHROMBIN TIME: CPT | Performed by: INTERNAL MEDICINE

## 2023-06-13 LAB — INR PPP: 3.2

## 2023-06-14 ENCOUNTER — ANTICOAGULATION VISIT (OUTPATIENT)
Dept: CARDIOLOGY | Facility: CLINIC | Age: 88
End: 2023-06-14
Payer: MEDICARE

## 2023-06-14 DIAGNOSIS — I48.91 ATRIAL FIBRILLATION, UNSPECIFIED TYPE: Primary | Chronic | ICD-10-CM

## 2023-06-14 DIAGNOSIS — Z79.01 CHRONIC ANTICOAGULATION: ICD-10-CM

## 2023-06-14 NOTE — PROGRESS NOTES
Spoke to patient, she is a little elevated, denies any change in diet or medications. Patient prefers to eat more greens this week. Encouraged her to do so and recheck next week as scheduled.

## 2023-07-25 LAB — INR PPP: 2.2

## 2023-07-26 ENCOUNTER — ANTICOAGULATION VISIT (OUTPATIENT)
Dept: CARDIOLOGY | Facility: CLINIC | Age: 88
End: 2023-07-26
Payer: MEDICARE

## 2023-07-26 DIAGNOSIS — I48.91 ATRIAL FIBRILLATION, UNSPECIFIED TYPE: Primary | Chronic | ICD-10-CM

## 2023-07-26 DIAGNOSIS — Z79.01 CHRONIC ANTICOAGULATION: ICD-10-CM

## 2023-08-03 ENCOUNTER — OFFICE VISIT (OUTPATIENT)
Dept: FAMILY MEDICINE CLINIC | Facility: CLINIC | Age: 88
End: 2023-08-03
Payer: MEDICARE

## 2023-08-03 ENCOUNTER — LAB (OUTPATIENT)
Dept: LAB | Facility: HOSPITAL | Age: 88
End: 2023-08-03
Payer: MEDICARE

## 2023-08-03 VITALS
HEART RATE: 70 BPM | TEMPERATURE: 98.4 F | SYSTOLIC BLOOD PRESSURE: 129 MMHG | OXYGEN SATURATION: 90 % | HEIGHT: 62 IN | DIASTOLIC BLOOD PRESSURE: 70 MMHG | BODY MASS INDEX: 21.16 KG/M2 | WEIGHT: 115 LBS | RESPIRATION RATE: 15 BRPM

## 2023-08-03 DIAGNOSIS — I10 PRIMARY HYPERTENSION: Chronic | ICD-10-CM

## 2023-08-03 DIAGNOSIS — I48.91 UNSPECIFIED ATRIAL FIBRILLATION: ICD-10-CM

## 2023-08-03 DIAGNOSIS — Z87.310 HISTORY OF OSTEOPOROTIC PATHOLOGICAL FRACTURE: Chronic | ICD-10-CM

## 2023-08-03 DIAGNOSIS — I10 PRIMARY HYPERTENSION: Primary | Chronic | ICD-10-CM

## 2023-08-03 DIAGNOSIS — E78.00 PURE HYPERCHOLESTEROLEMIA: Chronic | ICD-10-CM

## 2023-08-03 DIAGNOSIS — K44.9 HIATAL HERNIA: Chronic | ICD-10-CM

## 2023-08-03 DIAGNOSIS — Z79.01 LONG TERM (CURRENT) USE OF ANTICOAGULANTS: ICD-10-CM

## 2023-08-03 DIAGNOSIS — I48.91 ATRIAL FIBRILLATION, UNSPECIFIED TYPE: Chronic | ICD-10-CM

## 2023-08-03 PROBLEM — T83.511A UTI (URINARY TRACT INFECTION) DUE TO URINARY INDWELLING CATHETER: Status: RESOLVED | Noted: 2022-10-02 | Resolved: 2023-08-03

## 2023-08-03 PROBLEM — N39.0 UTI (URINARY TRACT INFECTION) DUE TO URINARY INDWELLING CATHETER: Status: RESOLVED | Noted: 2022-10-02 | Resolved: 2023-08-03

## 2023-08-03 LAB
ANION GAP SERPL CALCULATED.3IONS-SCNC: 7 MMOL/L (ref 5–15)
BUN SERPL-MCNC: 11 MG/DL (ref 8–23)
BUN/CREAT SERPL: 16.7 (ref 7–25)
CALCIUM SPEC-SCNC: 9.6 MG/DL (ref 8.2–9.6)
CHLORIDE SERPL-SCNC: 98 MMOL/L (ref 98–107)
CO2 SERPL-SCNC: 31 MMOL/L (ref 22–29)
CREAT SERPL-MCNC: 0.66 MG/DL (ref 0.57–1)
EGFRCR SERPLBLD CKD-EPI 2021: 80.9 ML/MIN/1.73
GLUCOSE SERPL-MCNC: 96 MG/DL (ref 65–99)
POTASSIUM SERPL-SCNC: 4.5 MMOL/L (ref 3.5–5.2)
SODIUM SERPL-SCNC: 136 MMOL/L (ref 136–145)

## 2023-08-03 PROCEDURE — 1160F RVW MEDS BY RX/DR IN RCRD: CPT | Performed by: FAMILY MEDICINE

## 2023-08-03 PROCEDURE — 99214 OFFICE O/P EST MOD 30 MIN: CPT | Performed by: FAMILY MEDICINE

## 2023-08-03 PROCEDURE — 80048 BASIC METABOLIC PNL TOTAL CA: CPT

## 2023-08-03 PROCEDURE — 36415 COLL VENOUS BLD VENIPUNCTURE: CPT

## 2023-08-03 PROCEDURE — 1159F MED LIST DOCD IN RCRD: CPT | Performed by: FAMILY MEDICINE

## 2023-08-03 RX ORDER — WARFARIN SODIUM 2 MG/1
TABLET ORAL
Qty: 116 TABLET | Refills: 0 | Status: SHIPPED | OUTPATIENT
Start: 2023-08-03

## 2023-08-09 ENCOUNTER — TELEPHONE (OUTPATIENT)
Dept: ENDOCRINOLOGY | Facility: CLINIC | Age: 88
End: 2023-08-09
Payer: MEDICARE

## 2023-08-09 ENCOUNTER — ANTICOAGULATION VISIT (OUTPATIENT)
Dept: CARDIOLOGY | Facility: CLINIC | Age: 88
End: 2023-08-09
Payer: MEDICARE

## 2023-08-09 DIAGNOSIS — Z79.01 CHRONIC ANTICOAGULATION: ICD-10-CM

## 2023-08-09 DIAGNOSIS — I48.91 ATRIAL FIBRILLATION, UNSPECIFIED TYPE: Primary | Chronic | ICD-10-CM

## 2023-08-09 LAB
INR PPP: 2.1
INR PPP: 2.5

## 2023-08-09 NOTE — TELEPHONE ENCOUNTER
Provider: DR MILLER   Caller: MARIAH   Relationship to Patient: MARIAH CLARKE   Phone Number: [phone]  Reason for Call: MARIAH NEEDS YOU TO CALL THEM TO GIVE THEM MORE INFORMATION ABOUT DR MILLER TO GET THE PT APPROVED FOR HER SHOT

## 2023-08-16 ENCOUNTER — ANTICOAGULATION VISIT (OUTPATIENT)
Dept: CARDIOLOGY | Facility: CLINIC | Age: 88
End: 2023-08-16
Payer: MEDICARE

## 2023-08-16 DIAGNOSIS — I48.91 ATRIAL FIBRILLATION, UNSPECIFIED TYPE: Primary | Chronic | ICD-10-CM

## 2023-08-16 DIAGNOSIS — Z79.01 CHRONIC ANTICOAGULATION: ICD-10-CM

## 2023-08-16 LAB — INR PPP: 1.9

## 2023-08-22 LAB — INR PPP: 1.8

## 2023-08-24 ENCOUNTER — ANTICOAGULATION VISIT (OUTPATIENT)
Dept: CARDIOLOGY | Facility: CLINIC | Age: 88
End: 2023-08-24
Payer: MEDICARE

## 2023-08-24 DIAGNOSIS — I48.91 ATRIAL FIBRILLATION, UNSPECIFIED TYPE: Primary | Chronic | ICD-10-CM

## 2023-08-24 DIAGNOSIS — Z79.01 CHRONIC ANTICOAGULATION: ICD-10-CM

## 2023-08-24 NOTE — PROGRESS NOTES
Pts INR is borderline low at 1.8. Pt is usually WNL at this dose, so no changes at this time. Recheck as contracted in a week. CorinN

## 2023-08-30 ENCOUNTER — ANTICOAGULATION VISIT (OUTPATIENT)
Dept: CARDIOLOGY | Facility: CLINIC | Age: 88
End: 2023-08-30
Payer: MEDICARE

## 2023-08-30 DIAGNOSIS — I48.91 ATRIAL FIBRILLATION, UNSPECIFIED TYPE: Primary | Chronic | ICD-10-CM

## 2023-08-30 DIAGNOSIS — Z79.01 CHRONIC ANTICOAGULATION: ICD-10-CM

## 2023-08-30 LAB — INR PPP: 2.4

## 2023-09-05 LAB — INR PPP: 2.2

## 2023-09-06 ENCOUNTER — ANTICOAGULATION VISIT (OUTPATIENT)
Dept: CARDIOLOGY | Facility: CLINIC | Age: 88
End: 2023-09-06
Payer: MEDICARE

## 2023-09-06 DIAGNOSIS — Z79.01 CHRONIC ANTICOAGULATION: ICD-10-CM

## 2023-09-06 DIAGNOSIS — I48.91 ATRIAL FIBRILLATION, UNSPECIFIED TYPE: Primary | Chronic | ICD-10-CM

## 2023-09-08 ENCOUNTER — TELEPHONE (OUTPATIENT)
Dept: CARDIOLOGY | Facility: CLINIC | Age: 88
End: 2023-09-08
Payer: MEDICARE

## 2023-09-08 RX ORDER — FUROSEMIDE 20 MG/1
20 TABLET ORAL AS NEEDED
Qty: 90 TABLET | Refills: 2 | Status: SHIPPED | OUTPATIENT
Start: 2023-09-08

## 2023-09-08 RX ORDER — POTASSIUM CHLORIDE 750 MG/1
10 TABLET, EXTENDED RELEASE ORAL AS NEEDED
Qty: 90 TABLET | Refills: 1 | Status: SHIPPED | OUTPATIENT
Start: 2023-09-08

## 2023-09-08 NOTE — TELEPHONE ENCOUNTER
Rx Refill Note  Requested Prescriptions     Pending Prescriptions Disp Refills    furosemide (LASIX) 20 MG tablet 90 tablet 2     Sig: Take 1 tablet by mouth As Needed (swelling). Indications: Edema    potassium chloride (KLOR-CON) 10 MEQ CR tablet 90 tablet 1     Si tablet As Needed (as needed when taking water pill). ONLY WHEN TAKING WATER PILL      Last office visit with prescribing clinician: 2023   Last telemedicine visit with prescribing clinician: Visit date not found   Next office visit with prescribing clinician: 2023                         Would you like a call back once the refill request has been completed: [] Yes [] No    If the office needs to give you a call back, can they leave a voicemail: [] Yes [] No    Fabi Del Rio MA  23, 15:58 EDT

## 2023-09-08 NOTE — TELEPHONE ENCOUNTER
Caller: Shruthi Amin JARVIS    Relationship: Self    Best call back number: [phone]    Requested Prescriptions:   Requested Prescriptions     Pending Prescriptions Disp Refills    furosemide (LASIX) 20 MG tablet 90 tablet 2     Sig: Take 1 tablet by mouth As Needed (swelling). Indications: Edema        Pharmacy where request should be sent: [facility] #[id number] - [place], [place] - [address] [place] [address] [phone] [facility] [phone]      Last office visit with prescribing clinician: 5/9/2023   Last telemedicine visit with prescribing clinician: Visit date not found   Next office visit with prescribing clinician: 11/9/2023     Additional details provided by patient: PATIENT ALSO STATED SHE NEED POTASSIUM.     Does the patient have less than a 3 day supply:  [x] Yes  [] No    Would you like a call back once the refill request has been completed: [] Yes [x] No    If the office needs to give you a call back, can they leave a voicemail: [] Yes [x] No    Little Mcclain Rep   09/08/23 15:35 EDT

## 2023-09-13 ENCOUNTER — TELEPHONE (OUTPATIENT)
Dept: CARDIOLOGY | Facility: CLINIC | Age: 88
End: 2023-09-13
Payer: MEDICARE

## 2023-09-13 NOTE — TELEPHONE ENCOUNTER
Spoke with pt dtr Paigeofelia will have a new machine to the pt in a week. Advised okay INR has been WNL if they need checked sooner to contact our office otherwise we are fine to wait for the new machine apLPN

## 2023-09-13 NOTE — TELEPHONE ENCOUNTER
Caller: Shea Nam    Relationship to patient: Emergency Contact    Best call back number: [phone]    Patient is needing: PATIENT'S DAUGHTER IS UNABLE TO DO PATIENTS INR. MACHINE IS JAMMED AND WAITING ON A REPLACEMENT.

## 2023-09-14 DIAGNOSIS — M81.0 AGE RELATED OSTEOPOROSIS, UNSPECIFIED PATHOLOGICAL FRACTURE PRESENCE: Primary | ICD-10-CM

## 2023-09-15 ENCOUNTER — LAB (OUTPATIENT)
Dept: LAB | Facility: HOSPITAL | Age: 88
End: 2023-09-15
Payer: MEDICARE

## 2023-09-15 DIAGNOSIS — M81.0 AGE RELATED OSTEOPOROSIS, UNSPECIFIED PATHOLOGICAL FRACTURE PRESENCE: ICD-10-CM

## 2023-09-15 LAB
ALBUMIN SERPL-MCNC: 4.1 G/DL (ref 3.5–5.2)
ALBUMIN/GLOB SERPL: 1.9 G/DL
ALP SERPL-CCNC: 71 U/L (ref 39–117)
ALT SERPL W P-5'-P-CCNC: 22 U/L (ref 1–33)
ANION GAP SERPL CALCULATED.3IONS-SCNC: 7 MMOL/L (ref 5–15)
AST SERPL-CCNC: 30 U/L (ref 1–32)
BILIRUB SERPL-MCNC: 1.1 MG/DL (ref 0–1.2)
BUN SERPL-MCNC: 15 MG/DL (ref 8–23)
BUN/CREAT SERPL: 23.4 (ref 7–25)
CALCIUM SPEC-SCNC: 9.6 MG/DL (ref 8.2–9.6)
CHLORIDE SERPL-SCNC: 102 MMOL/L (ref 98–107)
CO2 SERPL-SCNC: 30 MMOL/L (ref 22–29)
CREAT SERPL-MCNC: 0.64 MG/DL (ref 0.57–1)
EGFRCR SERPLBLD CKD-EPI 2021: 81.5 ML/MIN/1.73
GLOBULIN UR ELPH-MCNC: 2.2 GM/DL
GLUCOSE SERPL-MCNC: 89 MG/DL (ref 65–99)
POTASSIUM SERPL-SCNC: 4.6 MMOL/L (ref 3.5–5.2)
PROT SERPL-MCNC: 6.3 G/DL (ref 6–8.5)
SODIUM SERPL-SCNC: 139 MMOL/L (ref 136–145)

## 2023-09-15 PROCEDURE — 80053 COMPREHEN METABOLIC PANEL: CPT

## 2023-09-15 PROCEDURE — 36415 COLL VENOUS BLD VENIPUNCTURE: CPT

## 2023-09-20 LAB — INR PPP: 2

## 2023-09-21 ENCOUNTER — ANTICOAGULATION VISIT (OUTPATIENT)
Dept: CARDIOLOGY | Facility: CLINIC | Age: 88
End: 2023-09-21
Payer: MEDICARE

## 2023-09-21 DIAGNOSIS — Z79.01 CHRONIC ANTICOAGULATION: ICD-10-CM

## 2023-09-21 DIAGNOSIS — I48.91 ATRIAL FIBRILLATION, UNSPECIFIED TYPE: Primary | Chronic | ICD-10-CM

## 2023-09-25 RX ORDER — DIGOXIN 125 MCG
125 TABLET ORAL DAILY
Qty: 90 TABLET | Refills: 1 | Status: SHIPPED | OUTPATIENT
Start: 2023-09-25

## 2023-09-25 RX ORDER — METOPROLOL SUCCINATE 25 MG/1
TABLET, EXTENDED RELEASE ORAL
Qty: 180 TABLET | Refills: 1 | Status: SHIPPED | OUTPATIENT
Start: 2023-09-25

## 2023-09-25 NOTE — TELEPHONE ENCOUNTER
Rx Refill Note  Requested Prescriptions     Pending Prescriptions Disp Refills    metoprolol succinate XL (TOPROL-XL) 25 MG 24 hr tablet [Pharmacy Med Name: METOPROLOL SUCC ER 25 MG TAB] 180 tablet 1     Sig: TAKE 1 TABLET BY MOUTH TWICE A DAY    digoxin (LANOXIN) 125 MCG tablet [Pharmacy Med Name: DIGOXIN 125 MCG TABLET] 90 tablet 1     Sig: TAKE 1 TABLET BY MOUTH DAILY. INDICATIONS: CHRONIC ATRIAL FIBRILLATION      Last office visit with prescribing clinician: 5/9/2023   Last telemedicine visit with prescribing clinician: Visit date not found   Next office visit with prescribing clinician: 11/9/2023                         Would you like a call back once the refill request has been completed: [] Yes [] No    If the office needs to give you a call back, can they leave a voicemail: [] Yes [] No    Julianne Ross MA  09/25/23, 16:30 EDT

## 2023-09-26 LAB — INR PPP: 3

## 2023-09-27 ENCOUNTER — ANTICOAGULATION VISIT (OUTPATIENT)
Dept: CARDIOLOGY | Facility: CLINIC | Age: 88
End: 2023-09-27
Payer: MEDICARE

## 2023-09-27 DIAGNOSIS — I48.91 ATRIAL FIBRILLATION, UNSPECIFIED TYPE: Primary | Chronic | ICD-10-CM

## 2023-09-27 DIAGNOSIS — Z79.01 CHRONIC ANTICOAGULATION: ICD-10-CM

## 2023-10-03 ENCOUNTER — TELEPHONE (OUTPATIENT)
Dept: ENDOCRINOLOGY | Facility: CLINIC | Age: 88
End: 2023-10-03
Payer: MEDICARE

## 2023-10-03 NOTE — TELEPHONE ENCOUNTER
Caller: NEIDA ARRIETA    Relationship: DAUGHTER    Best call back number: [phone]    What is the best time to reach you: ANY    Who are you requesting to speak with (clinical staff, provider,  specific staff member): ANY      What was the call regarding: PATIENT WOULD LIKE A CALL BACK REGARDING GETTING HER PROLIA SHOTS.    Is it okay if the provider responds through MyChart: NO

## 2023-10-04 ENCOUNTER — ANTICOAGULATION VISIT (OUTPATIENT)
Dept: CARDIOLOGY | Facility: CLINIC | Age: 88
End: 2023-10-04
Payer: MEDICARE

## 2023-10-04 DIAGNOSIS — I48.91 ATRIAL FIBRILLATION, UNSPECIFIED TYPE: Primary | Chronic | ICD-10-CM

## 2023-10-04 DIAGNOSIS — Z79.01 CHRONIC ANTICOAGULATION: ICD-10-CM

## 2023-10-04 LAB — INR PPP: 3

## 2023-10-10 ENCOUNTER — TELEPHONE (OUTPATIENT)
Dept: ENDOCRINOLOGY | Facility: CLINIC | Age: 88
End: 2023-10-10
Payer: MEDICARE

## 2023-10-10 NOTE — TELEPHONE ENCOUNTER
Caller: Shea Nam    Relationship to patient: Emergency Contact    Best call back number: [phone]     Patient is needing:  PATIENT'S DAUGHTER CALLED SAYING SHE WAS DUE FOR HER PROLIA INJECTION.  PLEASE CONTACT PATIENT TO SCHEDULE, THANK YOU.

## 2023-10-12 ENCOUNTER — ANTICOAGULATION VISIT (OUTPATIENT)
Dept: CARDIOLOGY | Facility: CLINIC | Age: 88
End: 2023-10-12
Payer: MEDICARE

## 2023-10-12 DIAGNOSIS — I48.91 ATRIAL FIBRILLATION, UNSPECIFIED TYPE: Primary | Chronic | ICD-10-CM

## 2023-10-12 DIAGNOSIS — Z79.01 CHRONIC ANTICOAGULATION: ICD-10-CM

## 2023-10-12 LAB — INR PPP: 2.9

## 2023-10-18 ENCOUNTER — ANTICOAGULATION VISIT (OUTPATIENT)
Dept: CARDIOLOGY | Facility: CLINIC | Age: 88
End: 2023-10-18
Payer: MEDICARE

## 2023-10-18 DIAGNOSIS — Z79.01 CHRONIC ANTICOAGULATION: ICD-10-CM

## 2023-10-18 DIAGNOSIS — I48.91 ATRIAL FIBRILLATION, UNSPECIFIED TYPE: Primary | Chronic | ICD-10-CM

## 2023-10-18 LAB — INR PPP: 3.1

## 2023-10-19 NOTE — PROGRESS NOTES
"Pharmacy dosing service  Anticoagulant  Warfarin     Subjective:    Shruthi Amin is a 89 y.o.female being continued on warfarin for atrial fibrillation.    INR Goal: 2 - 3  Home medication?: Yes, warfarin 4 mg PO on MonFri and warfarin 2 mg PO on TuWeThSaSu (last night's dose = patient did not take on 10/1)  Bridge Therapy Present?:  No  Interacting Medications Evaluation (New/Present/Discontinued):   doxycycline, ceftriaxone -- may increase INR  Additional Contributing Factors:       Assessment/Plan:    INR therapeutic today. Will give warfarin 2 mg PO today based on home dose and monitor closely due to acute illness and antibiotics that may increase INR.     Continue to monitor and adjust based on INR.         Date 10/1 10/2          INR 2.98 2.56          Dose hold 2 mg            Objective:  [Ht: 157.5 cm (62\"); Wt: 49.9 kg (110 lb); BMI: Body mass index is 20.12 kg/m².]    Lab Results   Component Value Date    ALBUMIN 2.50 (L) 10/01/2022     Lab Results   Component Value Date    INR 2.56 10/02/2022    INR 2.98 10/01/2022    INR 2.40 09/27/2022    PROTIME 25.0 10/02/2022    PROTIME 28.9 (H) 10/01/2022    PROTIME 28.2 07/18/2022     Lab Results   Component Value Date    HGB 12.4 10/02/2022    HGB 12.7 10/01/2022    HGB 14.5 07/18/2022     Lab Results   Component Value Date    HCT 37.5 10/02/2022    HCT 38.3 10/01/2022    HCT 42.6 07/18/2022     Myles Silva RPH  10/02/22 16:00 EDT     " LOS 2d   IDR patient is improving discussion possible discharge tomorrow if patient is medically stable for discharge. PT/OT notes state no further needs at discharge. Discharge plan is home with family assist on 10/19/2023 if medically stable. Discharge plan ongoing, no further concerns as of present. Please consult  if any new issues arise. Pt is for discharge home today with no needs/supportive care orders received for CM at this time. Pt will have close follow up with PCP    Milestones met    ASSESSMENT NOTE    Attending Physician: Luciana Tyler MD  Admit Problem: Colonic diverticular abscess [K57.20]  Date/Time of Admission: 10/16/2023  4:48 PM  Problem List:  Patient Active Problem List   Diagnosis    Encounter for post surgical wound check    Paroxysmal atrial fibrillation ([address])    Ventral hernia without obstruction or gangrene    Essential (primary) hypertension    Chest pain    GERD (gastroesophageal reflux disease)    Colonic diverticular abscess    Diverticulitis       Service Assessment  Patient Orientation Alert and Oriented   Cognition Alert   History Provided By Patient   Primary Caregiver Self   Accompanied By/Relationship    Support Systems Spouse/Significant Other, [address] is: Named in [address]   nurses notes/old records/vital signs

## 2023-10-24 ENCOUNTER — ANTICOAGULATION VISIT (OUTPATIENT)
Dept: CARDIOLOGY | Facility: CLINIC | Age: 88
End: 2023-10-24
Payer: MEDICARE

## 2023-10-24 DIAGNOSIS — I48.91 ATRIAL FIBRILLATION, UNSPECIFIED TYPE: Primary | Chronic | ICD-10-CM

## 2023-10-24 DIAGNOSIS — Z79.01 CHRONIC ANTICOAGULATION: ICD-10-CM

## 2023-10-24 LAB — INR PPP: 2.6

## 2023-11-01 ENCOUNTER — ANTICOAGULATION VISIT (OUTPATIENT)
Dept: CARDIOLOGY | Facility: CLINIC | Age: 88
End: 2023-11-01
Payer: MEDICARE

## 2023-11-01 DIAGNOSIS — I48.91 ATRIAL FIBRILLATION, UNSPECIFIED TYPE: Primary | Chronic | ICD-10-CM

## 2023-11-01 DIAGNOSIS — Z79.01 CHRONIC ANTICOAGULATION: ICD-10-CM

## 2023-11-01 DIAGNOSIS — Z79.01 LONG TERM (CURRENT) USE OF ANTICOAGULANTS: ICD-10-CM

## 2023-11-01 DIAGNOSIS — I48.91 UNSPECIFIED ATRIAL FIBRILLATION: ICD-10-CM

## 2023-11-01 LAB — INR PPP: 2.5

## 2023-11-01 RX ORDER — WARFARIN SODIUM 2 MG/1
TABLET ORAL
Qty: 116 TABLET | Refills: 0 | Status: SHIPPED | OUTPATIENT
Start: 2023-11-01

## 2023-11-01 NOTE — TELEPHONE ENCOUNTER
Rx Refill Note  Requested Prescriptions     Pending Prescriptions Disp Refills    warfarin (COUMADIN) 2 MG tablet [Pharmacy Med Name: WARFARIN SODIUM 2 MG TABLET] 116 tablet 0     Sig: TAKE 2 TABLETS BY MOUTH ON MONDAY AND FRIDAY AND ONE TABLET BY MOUTH ALL OTHER DAYS OR AS DIRECTED      Last office visit with prescribing clinician: 5/9/2023   Last telemedicine visit with prescribing clinician: Visit date not found   Next office visit with prescribing clinician: Visit date not found     Anticoagulation Visit with Aleksey Mathew MD (11/01/2023)     Debbie Enamorado LPN  11/01/23, 13:28 EDT

## 2023-11-07 LAB — INR PPP: 3

## 2023-11-08 ENCOUNTER — ANTICOAGULATION VISIT (OUTPATIENT)
Dept: CARDIOLOGY | Facility: CLINIC | Age: 88
End: 2023-11-08
Payer: MEDICARE

## 2023-11-08 DIAGNOSIS — Z79.01 CHRONIC ANTICOAGULATION: ICD-10-CM

## 2023-11-08 DIAGNOSIS — I48.91 ATRIAL FIBRILLATION, UNSPECIFIED TYPE: Primary | Chronic | ICD-10-CM

## 2023-11-14 ENCOUNTER — TELEPHONE (OUTPATIENT)
Dept: ENDOCRINOLOGY | Facility: CLINIC | Age: 88
End: 2023-11-14
Payer: MEDICARE

## 2023-11-14 NOTE — TELEPHONE ENCOUNTER
I SPOKE TO MS. HAMILTON, SHE STATED SHE IS NO LONGER INTERESTED IN COMING TO THIS OFFICE FOR ANYTHING.

## 2023-11-15 ENCOUNTER — ANTICOAGULATION VISIT (OUTPATIENT)
Dept: CARDIOLOGY | Facility: CLINIC | Age: 88
End: 2023-11-15
Payer: MEDICARE

## 2023-11-15 DIAGNOSIS — I48.91 ATRIAL FIBRILLATION, UNSPECIFIED TYPE: Primary | Chronic | ICD-10-CM

## 2023-11-15 DIAGNOSIS — Z79.01 CHRONIC ANTICOAGULATION: ICD-10-CM

## 2023-11-15 LAB — INR PPP: 3.4

## 2023-11-21 LAB — INR PPP: 1.9

## 2023-11-22 ENCOUNTER — ANTICOAGULATION VISIT (OUTPATIENT)
Dept: CARDIOLOGY | Facility: CLINIC | Age: 88
End: 2023-11-22
Payer: MEDICARE

## 2023-11-22 DIAGNOSIS — I48.91 ATRIAL FIBRILLATION, UNSPECIFIED TYPE: Primary | Chronic | ICD-10-CM

## 2023-11-22 DIAGNOSIS — Z79.01 CHRONIC ANTICOAGULATION: ICD-10-CM

## 2023-11-27 LAB — INR PPP: 1.8

## 2023-11-28 NOTE — PROGRESS NOTES
Subjective:     Encounter Date:11/29/2023      Patient ID: Shruthi Amin is a 89 y.o. female.    Chief Complaint:  History of Present Illness    Shruthi Amin is a pleasant 89 y.o. female who presents to the office today for routine 6-month follow up.   Patient seen and examined, labs and chart reviewed. Patient states she is doing well today from cardiology standpoint. Patient currently denies chest pain, fatigue, palpitations, lightheadedness/dizziness, N/V, syncope. Patient does experience shortness of breath but states this is her baseline and wears home oxygen as needed. EKG today shows atrial fibrillation with controlled ventricular response. Vital signs are stable and she takes all medications as prescribed.       The following portions of the patient's history were reviewed and updated as appropriate: allergies, current medications, past family history, past medical history, past social history, past surgical history, and problem list.    Past Medical History:   Diagnosis Date    Atrial fibrillation     Coronary artery disease     Atrial fibrillation    GERD (gastroesophageal reflux disease)     Glaucoma     Hiatal hernia with gastroesophageal reflux     History of osteoporotic pathological fracture     Right intertroch (7/2019)    Hx of compression fracture of spine     T12 and L1(2013); T6 (1/2022)    Hyperlipidemia     Hypertension     Osteoarthritis     Osteoporosis     on prolia(3/21/22)    Stroke     off and on dizziness from the stroke.     Past Surgical History:   Procedure Laterality Date    BACK SURGERY      kyphoplasty    EYE SURGERY      cataract surgery    FIXATION KYPHOPLASTY LUMBAR SPINE  2013    HIP TROCHANTERIC NAILING WITH INTRAMEDULLARY HIP SCREW Right 7/20/2019    Procedure: HIP TROCHANTERIC NAILING SHORT WITH INTRAMEDULLARY HIP SCREW;  Surgeon: Edward Centeno MD;  Location: [facility] MAIN OR;  Service: Orthopedics    RECTAL SURGERY      x 3     /83 (BP Location:  "Right arm, Patient Position: Sitting, Cuff Size: Adult)   Pulse 54   Ht 157.5 cm (62\")   Wt 50.8 kg (112 lb)   SpO2 99%   BMI 20.49 kg/m²   Family History   Problem Relation Age of Onset    Heart disease Mother     Hypertension Mother     Osteoporosis Mother     Cancer Father         colon cancer    Osteoporosis Father     Cancer Sister         lung    Cancer Brother         colon cancer       Current Outpatient Medications:     B Complex Vitamins (VITAMIN B COMPLEX PO), Take 1 tablet by mouth Daily. Indications: suppliment, Disp: , Rfl:     cholecalciferol (VITAMIN D3) 400 units tablet, Take 1 tablet by mouth Daily. Indications: Osteoporosis, Vitamin D Deficiency, Disp: , Rfl:     digoxin (LANOXIN) 125 MCG tablet, TAKE 1 TABLET BY MOUTH DAILY. INDICATIONS: CHRONIC ATRIAL FIBRILLATION, Disp: 90 tablet, Rfl: 1    docusate sodium (COLACE) 100 MG capsule, Take 2 capsules by mouth 2 (Two) Times a Day As Needed for Constipation. Indications: Constipation, Disp: , Rfl:     furosemide (LASIX) 20 MG tablet, Take 1 tablet by mouth As Needed (swelling). Indications: Edema, Disp: 90 tablet, Rfl: 2    latanoprost (XALATAN) 0.005 % ophthalmic solution, Administer 1 drop to the right eye Every Night. Indications: Wide-Angle Glaucoma, Disp: , Rfl:     Lutein 20 MG capsule, Take 20 mg by mouth Daily. Indications: vision , Disp: , Rfl:     magnesium hydroxide (MILK OF MAGNESIA) 800 MG/5ML suspension, Take 30 mL by mouth Daily As Needed for Constipation. Indications: constipation, Disp: , Rfl:     metoprolol succinate XL (TOPROL-XL) 25 MG 24 hr tablet, TAKE 1 TABLET BY MOUTH TWICE A DAY, Disp: 180 tablet, Rfl: 1    O2 (OXYGEN), Inhale 2 L/min Continuous As Needed. Indications: scar tissue in lungs , Disp: , Rfl:     potassium chloride (KLOR-CON) 10 MEQ CR tablet, Take 1 tablet by mouth As Needed (as needed when taking water pill). ONLY WHEN TAKING WATER PILL, Disp: 90 tablet, Rfl: 1    Sennosides 25 MG tablet, Take 25 mg by " mouth 2 (Two) Times a Day As Needed (constipation). Indications: Constipation, Disp: , Rfl:     vitamin C (ASCORBIC ACID) 500 MG tablet, Take 1 tablet by mouth Daily. Indications: Inadequate Vitamin C, Disp: , Rfl:     warfarin (COUMADIN) 2 MG tablet, TAKE 2 TABLETS BY MOUTH ON MONDAY AND FRIDAY AND ONE TABLET BY MOUTH ALL OTHER DAYS OR AS DIRECTED, Disp: 116 tablet, Rfl: 0    Zinc Sulfate (ZINC 15 PO), Take 2 tablets by mouth Daily. WITH ELDERBERRY--GUMMIES   Indications: suppliment, Disp: , Rfl:   No current facility-administered medications for this visit.  Allergies   Allergen Reactions    Penicillins Hives    Codeine Nausea And Vomiting    Latex Rash     Social History     Socioeconomic History    Marital status:    Tobacco Use    Smoking status: Former     Types: Cigarettes     Quit date:      Years since quittin.9     Passive exposure: Past    Smokeless tobacco: Never   Vaping Use    Vaping Use: Never used   Substance and Sexual Activity    Alcohol use: No    Drug use: No    Sexual activity: Defer     Review of Systems   Constitutional: Negative for chills, fever and malaise/fatigue.   Cardiovascular:  Positive for leg swelling. Negative for chest pain, dyspnea on exertion and palpitations.   Respiratory:  Positive for shortness of breath. Negative for cough.    Gastrointestinal:  Negative for abdominal pain, nausea and vomiting.   Neurological:  Positive for numbness. Negative for dizziness, focal weakness, headaches and light-headedness.   All other systems reviewed and are negative.         Objective:     Vitals reviewed.   Constitutional:       Appearance: Not in distress.   Eyes:      Pupils: Pupils are equal, round, and reactive to light.   Pulmonary:      Effort: Pulmonary effort is normal.      Breath sounds: Normal breath sounds.   Cardiovascular:      Bradycardia present. Irregularly irregular rhythm.   Pulses:     Intact distal pulses.   Edema:     Peripheral edema  present.  Abdominal:      General: Bowel sounds are normal.   Musculoskeletal: Normal range of motion.      Cervical back: Normal range of motion and neck supple. Skin:     General: Skin is warm.   Neurological:      General: No focal deficit present.      Mental Status: Alert and oriented to person, place and time.         ECG 12 Lead    Date/Time: 11/29/2023 12:46 PM  Performed by: Anai Moise APRN    Authorized by: Anai Moise APRN  Comparison: compared with previous ECG from 10/3/2022  Rhythm: atrial fibrillation  Rate: bradycardic  Other findings: non-specific ST-T wave changes    Clinical impression: abnormal EKG        Lab Review:    Diagnosis Plan   1. Longstanding persistent atrial fibrillation  ECG 12 Lead      2. Pure hypercholesterolemia        3. Primary hypertension        4. Chronic anticoagulation        5. Nonrheumatic mitral valve regurgitation        LAB RESULTS (LAST 7 DAYS)    Echocardiogram   Results for orders placed during the hospital encounter of 10/01/22    Adult Transthoracic Echo Complete W/ Cont if Necessary Per Protocol    Interpretation Summary  Normal LV size and contractility EF of 60 to 65%  Normal RV size  Severe biatrial enlargement seen  Aortic valve is trileaflet has mobile density present on leaflet consistent with fibrin strands versus vegetation.  Would recommend transesophageal echo to better evaluate if clinically indicated  Mitral valve, tricuspid valve appears structurally normal, moderate mitral and mild tricuspid regurgitation seen.  Calculated RV systolic pressure 37 mmHg  No pericardial effusion seen.  Proximal aorta appears normal in size.      CBC        BMP        CMP         BNP        TROPONIN        CoAg        Creatinine Clearance  CrCl cannot be calculated (Patient's most recent lab result is older than the maximum 30 days allowed.).    ABG        Radiology  No radiology results for the last day        MDM    Plan/Recommendations:  Coronary artery  disease  History of nonobstructive coronary disease per chart review  No recent ischemic workup available for review  Echocardiogram from 10/2022 shows normal LVEF with severe biatrial enlargement and mild MR and TR noted   Continue beta blocker therapy  Not current on ASA to reduce bleeding risk as patient is on warfarin   Denies chest pain    Atrial fibrillation   Warfarin for anticoagulation   [id number] VASC score elevated   INR goal 2.0-2.5  Continue digoxin and beta blocker for rate control   EKG today shows atrial fibrillation with slow ventricular response   Advised to monitor HR and call office is consistently <55 bpm for further medication management     Hypertension   Blood pressure today 135/83   Continue metoprolol succinate 25 mg daily     Hyperlipidemia   Not currently on statin therapy   Managed by PCP     Patient's previous medical records, labs, and EKG were reviewed and discussed with the patient at today's visit.     Patient to be seen as needed or in 6 months with Dr. Mathew     Electronically signed by ALMA Bello, 11/29/23, 12:45 PM EST.

## 2023-11-29 ENCOUNTER — ANTICOAGULATION VISIT (OUTPATIENT)
Dept: CARDIOLOGY | Facility: CLINIC | Age: 88
End: 2023-11-29
Payer: MEDICARE

## 2023-11-29 ENCOUNTER — OFFICE VISIT (OUTPATIENT)
Dept: CARDIOLOGY | Facility: CLINIC | Age: 88
End: 2023-11-29
Payer: MEDICARE

## 2023-11-29 VITALS
OXYGEN SATURATION: 99 % | SYSTOLIC BLOOD PRESSURE: 135 MMHG | BODY MASS INDEX: 20.61 KG/M2 | WEIGHT: 112 LBS | HEART RATE: 54 BPM | DIASTOLIC BLOOD PRESSURE: 83 MMHG | HEIGHT: 62 IN

## 2023-11-29 DIAGNOSIS — E78.00 PURE HYPERCHOLESTEROLEMIA: Chronic | ICD-10-CM

## 2023-11-29 DIAGNOSIS — Z79.01 CHRONIC ANTICOAGULATION: ICD-10-CM

## 2023-11-29 DIAGNOSIS — I34.0 NONRHEUMATIC MITRAL VALVE REGURGITATION: ICD-10-CM

## 2023-11-29 DIAGNOSIS — I10 PRIMARY HYPERTENSION: Chronic | ICD-10-CM

## 2023-11-29 DIAGNOSIS — I48.11 LONGSTANDING PERSISTENT ATRIAL FIBRILLATION: Primary | Chronic | ICD-10-CM

## 2023-11-29 DIAGNOSIS — I48.91 ATRIAL FIBRILLATION, UNSPECIFIED TYPE: Primary | Chronic | ICD-10-CM

## 2023-12-05 ENCOUNTER — ANTICOAGULATION VISIT (OUTPATIENT)
Dept: CARDIOLOGY | Facility: CLINIC | Age: 88
End: 2023-12-05
Payer: MEDICARE

## 2023-12-05 DIAGNOSIS — Z79.01 CHRONIC ANTICOAGULATION: ICD-10-CM

## 2023-12-05 DIAGNOSIS — I48.91 ATRIAL FIBRILLATION, UNSPECIFIED TYPE: Primary | Chronic | ICD-10-CM

## 2023-12-05 LAB — INR PPP: 2.3

## 2023-12-12 LAB — INR PPP: 2.2

## 2023-12-13 ENCOUNTER — ANTICOAGULATION VISIT (OUTPATIENT)
Dept: CARDIOLOGY | Facility: CLINIC | Age: 88
End: 2023-12-13
Payer: MEDICARE

## 2023-12-13 DIAGNOSIS — Z79.01 CHRONIC ANTICOAGULATION: ICD-10-CM

## 2023-12-13 DIAGNOSIS — I48.91 ATRIAL FIBRILLATION, UNSPECIFIED TYPE: Primary | Chronic | ICD-10-CM

## 2023-12-19 LAB — INR PPP: 1.9

## 2023-12-20 ENCOUNTER — ANTICOAGULATION VISIT (OUTPATIENT)
Dept: CARDIOLOGY | Facility: CLINIC | Age: 88
End: 2023-12-20
Payer: MEDICARE

## 2023-12-20 DIAGNOSIS — I48.91 ATRIAL FIBRILLATION, UNSPECIFIED TYPE: Primary | Chronic | ICD-10-CM

## 2023-12-20 DIAGNOSIS — Z79.01 CHRONIC ANTICOAGULATION: ICD-10-CM

## 2023-12-28 ENCOUNTER — ANTICOAGULATION VISIT (OUTPATIENT)
Dept: CARDIOLOGY | Facility: CLINIC | Age: 88
End: 2023-12-28
Payer: MEDICARE

## 2023-12-28 DIAGNOSIS — I48.91 ATRIAL FIBRILLATION, UNSPECIFIED TYPE: Primary | Chronic | ICD-10-CM

## 2023-12-28 DIAGNOSIS — Z79.01 CHRONIC ANTICOAGULATION: ICD-10-CM

## 2023-12-28 LAB — INR PPP: 2.9

## 2024-01-03 ENCOUNTER — ANTICOAGULATION VISIT (OUTPATIENT)
Dept: CARDIOLOGY | Facility: CLINIC | Age: 89
End: 2024-01-03
Payer: MEDICARE

## 2024-01-03 DIAGNOSIS — Z79.01 CHRONIC ANTICOAGULATION: ICD-10-CM

## 2024-01-03 DIAGNOSIS — I48.91 ATRIAL FIBRILLATION, UNSPECIFIED TYPE: Primary | Chronic | ICD-10-CM

## 2024-01-03 LAB — INR PPP: 3

## 2024-01-09 LAB — INR PPP: 2.6

## 2024-01-11 ENCOUNTER — ANTICOAGULATION VISIT (OUTPATIENT)
Dept: CARDIOLOGY | Facility: CLINIC | Age: 89
End: 2024-01-11
Payer: MEDICARE

## 2024-01-11 DIAGNOSIS — I48.91 ATRIAL FIBRILLATION, UNSPECIFIED TYPE: Primary | Chronic | ICD-10-CM

## 2024-01-11 DIAGNOSIS — Z79.01 CHRONIC ANTICOAGULATION: ICD-10-CM

## 2024-01-16 LAB — INR PPP: 2.7

## 2024-01-17 ENCOUNTER — ANTICOAGULATION VISIT (OUTPATIENT)
Dept: CARDIOLOGY | Facility: CLINIC | Age: 89
End: 2024-01-17
Payer: MEDICARE

## 2024-01-17 DIAGNOSIS — Z79.01 CHRONIC ANTICOAGULATION: ICD-10-CM

## 2024-01-17 DIAGNOSIS — I48.91 ATRIAL FIBRILLATION, UNSPECIFIED TYPE: Primary | Chronic | ICD-10-CM

## 2024-01-29 DIAGNOSIS — I48.91 UNSPECIFIED ATRIAL FIBRILLATION: ICD-10-CM

## 2024-01-29 DIAGNOSIS — Z79.01 LONG TERM (CURRENT) USE OF ANTICOAGULANTS: ICD-10-CM

## 2024-01-29 RX ORDER — WARFARIN SODIUM 2 MG/1
TABLET ORAL
Qty: 116 TABLET | Refills: 0 | Status: SHIPPED | OUTPATIENT
Start: 2024-01-29

## 2024-01-29 NOTE — TELEPHONE ENCOUNTER
Rx Refill Note  Requested Prescriptions     Pending Prescriptions Disp Refills    warfarin (COUMADIN) 2 MG tablet [Pharmacy Med Name: WARFARIN SODIUM 2 MG TABLET] 116 tablet 0     Sig: TAKE 2 TABLETS BY MOUTH ON MONDAY AND FRIDAY AND ONE TABLET BY MOUTH ALL OTHER DAYS OR AS DIRECTED    Last INR 1/17/24  Last office visit with prescribing clinician: 5/9/2023   Last telemedicine visit with prescribing clinician: Visit date not found   Next office visit with prescribing clinician: 6/4/2024                         Would you like a call back once the refill request has been completed: [] Yes [] No    If the office needs to give you a call back, can they leave a voicemail: [] Yes [] No    Sandy Dean RN  01/29/24, 11:11 EST

## 2024-01-31 ENCOUNTER — ANTICOAGULATION VISIT (OUTPATIENT)
Dept: CARDIOLOGY | Facility: CLINIC | Age: 89
End: 2024-01-31
Payer: MEDICARE

## 2024-01-31 DIAGNOSIS — Z79.01 CHRONIC ANTICOAGULATION: ICD-10-CM

## 2024-01-31 DIAGNOSIS — I48.91 ATRIAL FIBRILLATION, UNSPECIFIED TYPE: Primary | Chronic | ICD-10-CM

## 2024-01-31 LAB — INR PPP: 2.6

## 2024-02-06 ENCOUNTER — TELEPHONE (OUTPATIENT)
Dept: CARDIOLOGY | Facility: CLINIC | Age: 89
End: 2024-02-06
Payer: MEDICARE

## 2024-02-06 DIAGNOSIS — Z79.01 LONG TERM (CURRENT) USE OF ANTICOAGULANTS: ICD-10-CM

## 2024-02-06 DIAGNOSIS — I48.91 UNSPECIFIED ATRIAL FIBRILLATION: ICD-10-CM

## 2024-02-06 RX ORDER — WARFARIN SODIUM 2 MG/1
TABLET ORAL
Qty: 116 TABLET | Refills: 0 | Status: SHIPPED | OUTPATIENT
Start: 2024-02-06

## 2024-02-06 NOTE — TELEPHONE ENCOUNTER
Incoming Refill Request      Medication requested (name and dose): WARFARIN 2 MG    Pharmacy where request should be sent: [facility] [address]    Additional details provided by patient:     Best call back number: [phone]    Does the patient have less than a 3 day supply:  [] Yes  [x] No    Little Mijares Rep  02/06/24, 16:18 EST

## 2024-02-06 NOTE — TELEPHONE ENCOUNTER
Rx Refill Note  Requested Prescriptions     Signed Prescriptions Disp Refills    warfarin (COUMADIN) 2 MG tablet 116 tablet 0     Sig: TAKE 2 TABLETS BY MOUTH ON MONDAY AND FRIDAY AND ONE TABLET BY MOUTH ALL OTHER DAYS OR AS DIRECTED     Authorizing Provider: ALEKSEY GARZON     Ordering User: DEBBIE ENAMORADO      Last office visit with prescribing clinician: 5/9/2023   Last telemedicine visit with prescribing clinician: Visit date not found   Next office visit with prescribing clinician: 6/4/2024     Anticoagulation Visit with Aleksey Garzon MD (01/31/2024)     Debbie Enamorado LPN  02/06/24, 17:00 EST

## 2024-02-14 ENCOUNTER — ANTICOAGULATION VISIT (OUTPATIENT)
Dept: CARDIOLOGY | Facility: CLINIC | Age: 89
End: 2024-02-14
Payer: MEDICARE

## 2024-02-14 DIAGNOSIS — Z79.01 CHRONIC ANTICOAGULATION: ICD-10-CM

## 2024-02-14 DIAGNOSIS — I48.91 ATRIAL FIBRILLATION, UNSPECIFIED TYPE: Primary | Chronic | ICD-10-CM

## 2024-02-14 LAB — INR PPP: 2.5

## 2024-02-20 LAB — INR PPP: 2.8

## 2024-02-21 ENCOUNTER — ANTICOAGULATION VISIT (OUTPATIENT)
Dept: CARDIOLOGY | Facility: CLINIC | Age: 89
End: 2024-02-21
Payer: MEDICARE

## 2024-02-21 DIAGNOSIS — I48.91 ATRIAL FIBRILLATION, UNSPECIFIED TYPE: Primary | Chronic | ICD-10-CM

## 2024-02-21 DIAGNOSIS — Z79.01 CHRONIC ANTICOAGULATION: ICD-10-CM

## 2024-02-23 ENCOUNTER — APPOINTMENT (OUTPATIENT)
Dept: GENERAL RADIOLOGY | Facility: HOSPITAL | Age: 89
DRG: 193 | End: 2024-02-23
Payer: MEDICARE

## 2024-02-23 ENCOUNTER — HOSPITAL ENCOUNTER (INPATIENT)
Facility: HOSPITAL | Age: 89
LOS: 8 days | Discharge: HOME | DRG: 193 | End: 2024-03-02
Attending: EMERGENCY MEDICINE | Admitting: HOSPITALIST
Payer: MEDICARE

## 2024-02-23 DIAGNOSIS — M54.6 LEFT-SIDED THORACIC BACK PAIN, UNSPECIFIED CHRONICITY: ICD-10-CM

## 2024-02-23 DIAGNOSIS — M54.9 CHRONIC BACK PAIN, UNSPECIFIED BACK LOCATION, UNSPECIFIED BACK PAIN LATERALITY: ICD-10-CM

## 2024-02-23 DIAGNOSIS — G89.29 CHRONIC BACK PAIN, UNSPECIFIED BACK LOCATION, UNSPECIFIED BACK PAIN LATERALITY: ICD-10-CM

## 2024-02-23 DIAGNOSIS — R06.00 DYSPNEA, UNSPECIFIED TYPE: ICD-10-CM

## 2024-02-23 DIAGNOSIS — J90 BILATERAL PLEURAL EFFUSION: ICD-10-CM

## 2024-02-23 DIAGNOSIS — N39.0 URINARY TRACT INFECTION WITHOUT HEMATURIA, SITE UNSPECIFIED: ICD-10-CM

## 2024-02-23 DIAGNOSIS — I50.9 ACUTE ON CHRONIC CONGESTIVE HEART FAILURE, UNSPECIFIED HEART FAILURE TYPE: Primary | ICD-10-CM

## 2024-02-23 LAB
ALBUMIN SERPL-MCNC: 4 G/DL (ref 3.5–5.2)
ALBUMIN/GLOB SERPL: 1.5 G/DL
ALP SERPL-CCNC: 78 U/L (ref 39–117)
ALT SERPL W P-5'-P-CCNC: 16 U/L (ref 1–33)
ANION GAP SERPL CALCULATED.3IONS-SCNC: 8 MMOL/L (ref 5–15)
AST SERPL-CCNC: 30 U/L (ref 1–32)
BACTERIA UR QL AUTO: ABNORMAL /HPF
BASOPHILS # BLD MANUAL: 0.08 10*3/MM3 (ref 0–0.2)
BASOPHILS NFR BLD MANUAL: 1 % (ref 0–1.5)
BILIRUB SERPL-MCNC: 1.3 MG/DL (ref 0–1.2)
BILIRUB UR QL STRIP: NEGATIVE
BUN SERPL-MCNC: 20 MG/DL (ref 8–23)
BUN/CREAT SERPL: 30.3 (ref 7–25)
CALCIUM SPEC-SCNC: 9.1 MG/DL (ref 8.2–9.6)
CHLORIDE SERPL-SCNC: 103 MMOL/L (ref 98–107)
CLARITY UR: CLEAR
CO2 SERPL-SCNC: 29 MMOL/L (ref 22–29)
COLOR UR: YELLOW
CREAT SERPL-MCNC: 0.66 MG/DL (ref 0.57–1)
DEPRECATED RDW RBC AUTO: 54.7 FL (ref 37–54)
EGFRCR SERPLBLD CKD-EPI 2021: 80.9 ML/MIN/1.73
EOSINOPHIL # BLD MANUAL: 0.23 10*3/MM3 (ref 0–0.4)
EOSINOPHIL NFR BLD MANUAL: 3 % (ref 0.3–6.2)
ERYTHROCYTE [DISTWIDTH] IN BLOOD BY AUTOMATED COUNT: 14.8 % (ref 12.3–15.4)
FLUAV SUBTYP SPEC NAA+PROBE: NOT DETECTED
FLUBV RNA ISLT QL NAA+PROBE: NOT DETECTED
GLOBULIN UR ELPH-MCNC: 2.6 GM/DL
GLUCOSE SERPL-MCNC: 92 MG/DL (ref 65–99)
GLUCOSE UR STRIP-MCNC: NEGATIVE MG/DL
HCT VFR BLD AUTO: 36.8 % (ref 34–46.6)
HGB BLD-MCNC: 12 G/DL (ref 12–15.9)
HGB UR QL STRIP.AUTO: NEGATIVE
HYALINE CASTS UR QL AUTO: ABNORMAL /LPF
INR PPP: 2.57 (ref 2–3)
KETONES UR QL STRIP: ABNORMAL
LEUKOCYTE ESTERASE UR QL STRIP.AUTO: ABNORMAL
LIPASE SERPL-CCNC: 21 U/L (ref 13–60)
LYMPHOCYTES # BLD MANUAL: 1.79 10*3/MM3 (ref 0.7–3.1)
LYMPHOCYTES NFR BLD MANUAL: 12 % (ref 5–12)
MACROCYTES BLD QL SMEAR: ABNORMAL
MCH RBC QN AUTO: 32.6 PG (ref 26.6–33)
MCHC RBC AUTO-ENTMCNC: 32.6 G/DL (ref 31.5–35.7)
MCV RBC AUTO: 100 FL (ref 79–97)
MONOCYTES # BLD: 0.94 10*3/MM3 (ref 0.1–0.9)
MYELOCYTES NFR BLD MANUAL: 3 % (ref 0–0)
NEUTROPHILS # BLD AUTO: 4.52 10*3/MM3 (ref 1.7–7)
NEUTROPHILS NFR BLD MANUAL: 48 % (ref 42.7–76)
NEUTS BAND NFR BLD MANUAL: 10 % (ref 0–5)
NITRITE UR QL STRIP: NEGATIVE
NT-PROBNP SERPL-MCNC: 3136 PG/ML (ref 0–1800)
PH UR STRIP.AUTO: <=5 [PH] (ref 5–8)
PLAT MORPH BLD: NORMAL
PLATELET # BLD AUTO: 163 10*3/MM3 (ref 140–450)
PMV BLD AUTO: 9.6 FL (ref 6–12)
POIKILOCYTOSIS BLD QL SMEAR: ABNORMAL
POTASSIUM SERPL-SCNC: 4.7 MMOL/L (ref 3.5–5.2)
PROCALCITONIN SERPL-MCNC: <0.02 NG/ML (ref 0–0.25)
PROT SERPL-MCNC: 6.6 G/DL (ref 6–8.5)
PROT UR QL STRIP: ABNORMAL
PROTHROMBIN TIME: 26.1 SECONDS (ref 19.4–28.5)
RBC # BLD AUTO: 3.68 10*6/MM3 (ref 3.77–5.28)
RBC # UR STRIP: ABNORMAL /HPF
REF LAB TEST METHOD: ABNORMAL
SARS-COV-2 RNA RESP QL NAA+PROBE: NOT DETECTED
SCAN SLIDE: NORMAL
SODIUM SERPL-SCNC: 140 MMOL/L (ref 136–145)
SP GR UR STRIP: 1.02 (ref 1–1.03)
SQUAMOUS #/AREA URNS HPF: ABNORMAL /HPF
TOXIC GRANULATION: ABNORMAL
TROPONIN T SERPL HS-MCNC: 14 NG/L
TROPONIN T SERPL HS-MCNC: 14 NG/L
UROBILINOGEN UR QL STRIP: ABNORMAL
VARIANT LYMPHS NFR BLD MANUAL: 23 % (ref 19.6–45.3)
WBC # UR STRIP: ABNORMAL /HPF
WBC NRBC COR # BLD AUTO: 7.8 10*3/MM3 (ref 3.4–10.8)

## 2024-02-23 PROCEDURE — 85007 BL SMEAR W/DIFF WBC COUNT: CPT | Performed by: PHYSICIAN ASSISTANT

## 2024-02-23 PROCEDURE — 87086 URINE CULTURE/COLONY COUNT: CPT | Performed by: EMERGENCY MEDICINE

## 2024-02-23 PROCEDURE — 84145 PROCALCITONIN (PCT): CPT | Performed by: PHYSICIAN ASSISTANT

## 2024-02-23 PROCEDURE — 25010000002 CEFTRIAXONE PER 250 MG: Performed by: PHYSICIAN ASSISTANT

## 2024-02-23 PROCEDURE — 84484 ASSAY OF TROPONIN QUANT: CPT | Performed by: PHYSICIAN ASSISTANT

## 2024-02-23 PROCEDURE — 72072 X-RAY EXAM THORAC SPINE 3VWS: CPT

## 2024-02-23 PROCEDURE — 36415 COLL VENOUS BLD VENIPUNCTURE: CPT

## 2024-02-23 PROCEDURE — 83690 ASSAY OF LIPASE: CPT | Performed by: PHYSICIAN ASSISTANT

## 2024-02-23 PROCEDURE — 93005 ELECTROCARDIOGRAM TRACING: CPT | Performed by: PHYSICIAN ASSISTANT

## 2024-02-23 PROCEDURE — 99285 EMERGENCY DEPT VISIT HI MDM: CPT

## 2024-02-23 PROCEDURE — 25010000002 FUROSEMIDE PER 20 MG: Performed by: PHYSICIAN ASSISTANT

## 2024-02-23 PROCEDURE — 71045 X-RAY EXAM CHEST 1 VIEW: CPT

## 2024-02-23 PROCEDURE — 81001 URINALYSIS AUTO W/SCOPE: CPT | Performed by: PHYSICIAN ASSISTANT

## 2024-02-23 PROCEDURE — 87040 BLOOD CULTURE FOR BACTERIA: CPT | Performed by: PHYSICIAN ASSISTANT

## 2024-02-23 PROCEDURE — 83880 ASSAY OF NATRIURETIC PEPTIDE: CPT | Performed by: PHYSICIAN ASSISTANT

## 2024-02-23 PROCEDURE — 85610 PROTHROMBIN TIME: CPT | Performed by: PHYSICIAN ASSISTANT

## 2024-02-23 PROCEDURE — 85025 COMPLETE CBC W/AUTO DIFF WBC: CPT | Performed by: PHYSICIAN ASSISTANT

## 2024-02-23 PROCEDURE — 87636 SARSCOV2 & INF A&B AMP PRB: CPT | Performed by: PHYSICIAN ASSISTANT

## 2024-02-23 PROCEDURE — 80053 COMPREHEN METABOLIC PANEL: CPT | Performed by: PHYSICIAN ASSISTANT

## 2024-02-23 RX ORDER — BISACODYL 5 MG/1
5 TABLET, DELAYED RELEASE ORAL DAILY PRN
Status: DISCONTINUED | OUTPATIENT
Start: 2024-02-23 | End: 2024-03-02 | Stop reason: HOSPADM

## 2024-02-23 RX ORDER — WARFARIN SODIUM 2 MG/1
4 TABLET ORAL
COMMUNITY

## 2024-02-23 RX ORDER — SODIUM CHLORIDE 0.9 % (FLUSH) 0.9 %
10 SYRINGE (ML) INJECTION AS NEEDED
Status: DISCONTINUED | OUTPATIENT
Start: 2024-02-23 | End: 2024-03-02 | Stop reason: HOSPADM

## 2024-02-23 RX ORDER — BISACODYL 10 MG
10 SUPPOSITORY, RECTAL RECTAL DAILY PRN
Status: DISCONTINUED | OUTPATIENT
Start: 2024-02-23 | End: 2024-03-02 | Stop reason: HOSPADM

## 2024-02-23 RX ORDER — ACETAMINOPHEN 500 MG
500 TABLET ORAL EVERY 6 HOURS PRN
Status: DISCONTINUED | OUTPATIENT
Start: 2024-02-23 | End: 2024-03-02 | Stop reason: HOSPADM

## 2024-02-23 RX ORDER — WARFARIN SODIUM 2 MG/1
2 TABLET ORAL
COMMUNITY

## 2024-02-23 RX ORDER — ACETAMINOPHEN 500 MG
1000 TABLET ORAL ONCE
Status: COMPLETED | OUTPATIENT
Start: 2024-02-23 | End: 2024-02-23

## 2024-02-23 RX ORDER — AMOXICILLIN 250 MG
2 CAPSULE ORAL 2 TIMES DAILY PRN
Status: DISCONTINUED | OUTPATIENT
Start: 2024-02-23 | End: 2024-03-02 | Stop reason: HOSPADM

## 2024-02-23 RX ORDER — FUROSEMIDE 10 MG/ML
40 INJECTION INTRAMUSCULAR; INTRAVENOUS ONCE
Status: COMPLETED | OUTPATIENT
Start: 2024-02-23 | End: 2024-02-23

## 2024-02-23 RX ORDER — SODIUM CHLORIDE 0.9 % (FLUSH) 0.9 %
10 SYRINGE (ML) INJECTION EVERY 12 HOURS SCHEDULED
Status: DISCONTINUED | OUTPATIENT
Start: 2024-02-23 | End: 2024-03-02 | Stop reason: HOSPADM

## 2024-02-23 RX ORDER — POLYETHYLENE GLYCOL 3350 17 G/17G
17 POWDER, FOR SOLUTION ORAL DAILY PRN
Status: DISCONTINUED | OUTPATIENT
Start: 2024-02-23 | End: 2024-03-02 | Stop reason: HOSPADM

## 2024-02-23 RX ORDER — LIDOCAINE 4 G/G
1 PATCH TOPICAL
Status: COMPLETED | OUTPATIENT
Start: 2024-02-23 | End: 2024-02-24

## 2024-02-23 RX ORDER — DOCUSATE SODIUM 100 MG/1
100 CAPSULE, LIQUID FILLED ORAL NIGHTLY
COMMUNITY

## 2024-02-23 RX ORDER — SODIUM CHLORIDE 9 MG/ML
40 INJECTION, SOLUTION INTRAVENOUS AS NEEDED
Status: DISCONTINUED | OUTPATIENT
Start: 2024-02-23 | End: 2024-03-02 | Stop reason: HOSPADM

## 2024-02-23 RX ADMIN — CEFTRIAXONE 1000 MG: 2 INJECTION, POWDER, FOR SOLUTION INTRAMUSCULAR; INTRAVENOUS at 16:00

## 2024-02-23 RX ADMIN — ACETAMINOPHEN 500 MG: 500 TABLET ORAL at 23:08

## 2024-02-23 RX ADMIN — LIDOCAINE 1 PATCH: 4 PATCH TOPICAL at 14:18

## 2024-02-23 RX ADMIN — ACETAMINOPHEN 1000 MG: 500 TABLET ORAL at 14:14

## 2024-02-23 RX ADMIN — FUROSEMIDE 40 MG: 10 INJECTION, SOLUTION INTRAMUSCULAR; INTRAVENOUS at 17:19

## 2024-02-23 RX ADMIN — Medication 10 ML: at 23:08

## 2024-02-23 NOTE — H&P
[facility] Medicine Services  History & Physical    Patient Name: Shruthi Amin  : 6/10/1928  MRN: [id number]  Primary Care Physician:  Provider, No Known  Date of admission: 2024  Date and Time of Service: 2024 at 4:59 PM EST      Subjective      Chief Complaint: Back pain, shortness of breath    History of Present Illness: Shruthi Amin is a 89 y.o. female with a CMH of A-fib, CAD, GERD, osteo-, HTN, CHF who presented to [facility] on 2024 with mid back pain which radiates to the upper abdomen.  Patient states that she woke up this morning felt fine and apparently had a doctor's appointment this morning but was unable to go because her ride was unavailable.  She said when she started getting up and movement after she put on her close she started having some back pain that radiated to her chest and causing pain on inspiration..  Patient denies any dizziness, focal chest pain, headache, syncope, arm pain.  Patient does state that she does have some dependent RLE edema which is baseline.    In the emergency department, thoracic and chest x-ray obtained.  Thoracic x-ray showed a large hiatal hernia with bibasilar airspace and pleural effusion which is unchanged from previous exams.  Chest x-ray showed pulmonary edema and CHF and opacities which cannot exclude pneumonia.  Patient lab work unremarkable except for troponin 14, BNP 3136.  Patient received Rocephin for questionable pneumonia, and Lasix      Review of Systems   Constitutional:  Negative for activity change, appetite change and fatigue.   HENT: Negative.     Eyes: Negative.    Respiratory:  Positive for shortness of breath.    Cardiovascular:  Positive for leg swelling. Negative for chest pain.   Gastrointestinal: Negative.    Endocrine: Negative.    Genitourinary: Negative.    Musculoskeletal: Negative.    Skin: Negative.    Neurological: Negative.    Hematological: Negative.    Psychiatric/Behavioral: Negative.          Personal History     Past Medical History:   Diagnosis Date    Atrial fibrillation     Coronary artery disease     Atrial fibrillation    GERD (gastroesophageal reflux disease)     Glaucoma     Hiatal hernia with gastroesophageal reflux     History of osteoporotic pathological fracture     Right intertroch (7/2019)    Hx of compression fracture of spine     T12 and L1(2013); T6 (1/2022)    Hyperlipidemia     Hypertension     Osteoarthritis     Osteoporosis     on prolia(3/21/22)    Stroke     off and on dizziness from the stroke.       Past Surgical History:   Procedure Laterality Date    BACK SURGERY      kyphoplasty    EYE SURGERY      cataract surgery    FIXATION KYPHOPLASTY LUMBAR SPINE  2013    HIP TROCHANTERIC NAILING WITH INTRAMEDULLARY HIP SCREW Right 7/20/2019    Procedure: HIP TROCHANTERIC NAILING SHORT WITH INTRAMEDULLARY HIP SCREW;  Surgeon: Edward Centeno MD;  Location: [facility] MAIN OR;  Service: Orthopedics    RECTAL SURGERY      x 3       Family History: family history includes Cancer in her brother, father, and sister; Heart disease in her mother; Hypertension in her mother; Osteoporosis in her father and mother. Otherwise pertinent FHx was reviewed and not pertinent to current issue.    Social History:  reports that she quit smoking about 76 years ago. Her smoking use included cigarettes. She has been exposed to tobacco smoke. She has never used smokeless tobacco. She reports that she does not drink alcohol and does not use drugs.    Home Medications:  Prior to Admission Medications       Prescriptions Last Dose Informant Patient Reported? Taking?    B Complex Vitamins (VITAMIN B COMPLEX PO)   Yes No    Take 1 tablet by mouth Daily. Indications: suppliment    cholecalciferol (VITAMIN D3) 400 units tablet   Yes No    Take 1 tablet by mouth Daily. Indications: Osteoporosis, Vitamin D Deficiency    digoxin (LANOXIN) 125 MCG tablet   No No    TAKE 1 TABLET BY MOUTH DAILY. INDICATIONS:  CHRONIC ATRIAL FIBRILLATION    docusate sodium (COLACE) 100 MG capsule   Yes No    Take 2 capsules by mouth 2 (Two) Times a Day As Needed for Constipation. Indications: Constipation    furosemide (LASIX) 20 MG tablet   No No    Take 1 tablet by mouth As Needed (swelling). Indications: Edema    latanoprost (XALATAN) 0.005 % ophthalmic solution   Yes No    Administer 1 drop to the right eye Every Night. Indications: Wide-Angle Glaucoma    Lutein 20 MG capsule   Yes No    Take 20 mg by mouth Daily. Indications: vision     magnesium hydroxide (MILK OF MAGNESIA) 800 MG/5ML suspension   Yes No    Take 30 mL by mouth Daily As Needed for Constipation. Indications: constipation    metoprolol succinate XL (TOPROL-XL) 25 MG 24 hr tablet   No No    TAKE 1 TABLET BY MOUTH TWICE A DAY    O2 (OXYGEN)   Yes No    Inhale 2 L/min Continuous As Needed. Indications: scar tissue in lungs     potassium chloride (KLOR-CON) 10 MEQ CR tablet   No No    Take 1 tablet by mouth As Needed (as needed when taking water pill). ONLY WHEN TAKING WATER PILL    Sennosides 25 MG tablet   Yes No    Take 25 mg by mouth 2 (Two) Times a Day As Needed (constipation). Indications: Constipation    vitamin C (ASCORBIC ACID) 500 MG tablet   Yes No    Take 1 tablet by mouth Daily. Indications: Inadequate Vitamin C    warfarin (COUMADIN) 2 MG tablet   No No    TAKE 2 TABLETS BY MOUTH ON MONDAY AND FRIDAY AND ONE TABLET BY MOUTH ALL OTHER DAYS OR AS DIRECTED    Zinc Sulfate (ZINC 15 PO)   Yes No    Take 2 tablets by mouth Daily. WITH ELDERBERRY--GUMMIES   Indications: suppliment              Allergies:  Allergies   Allergen Reactions    Penicillins Hives    Codeine Nausea And Vomiting    Latex Rash       Objective      Vitals:   Temp:  [98.8 °F (37.1 °C)] 98.8 °F (37.1 °C)  Heart Rate:  [70-81] 70  Resp:  [16-17] 17  BP: (108-142)/(72-77) 142/76  Body mass index is 19.31 kg/m².    Physical Exam  Physical Exam  Vital signs and triage nurse note  reviewed.  Constitutional: Awake, alert; well-developed and well-nourished. No acute distress is noted.  HEENT: Normocephalic, atraumatic; pupils are PERRL with intact EOM; oropharynx is pink and moist without exudate or erythema.  No drooling or pooling of oral secretions.  Neck: Supple, full range of motion without pain; no cervical lymphadenopathy. Normal phonation.  Cardiovascular: Regular rate and rhythm, normal S1-S2.  No murmur noted.  Dependent 2+ RLE edema  Pulmonary: Respiratory effort regular nonlabored, clear to auscultation bilaterally however mildly diminished  Abdomen: Soft, nontender, nondistended with normoactive bowel sounds; no rebound or guarding.  Musculoskeletal: Independent range of motion of all extremities with no palpable tenderness or edema.  Neuro: Alert oriented x3, speech is clear and appropriate, GCS 15.    Skin: Flesh tone, warm, dry, intact; no erythematous or petechial rash or lesion.       Diagnostic Data:  Lab Results (last 24 hours)       Procedure Component Value Units Date/Time    COVID-19 and FLU A/B PCR, 1 HR TAT - Swab, Nasopharynx [[id number]]  (Normal) Collected: 02/23/24 1726    Specimen: Swab from Nasopharynx Updated: 02/23/24 1757     COVID19 Not Detected     Influenza A PCR Not Detected     Influenza B PCR Not Detected    Narrative:      Fact sheet for providers: https://www.fda.gov/media/891910/download    Fact sheet for patients: https://www.fda.gov/media/736139/download    Test performed by PCR.    Single High Sensitivity Troponin T [[id number]]  (Abnormal) Collected: 02/23/24 1657    Specimen: Blood Updated: 02/23/24 1732     HS Troponin T 14 ng/L     Narrative:      High Sensitive Troponin T Reference Range:  <14.0 ng/L- Negative Female for AMI  <22.0 ng/L- Negative Male for AMI  >=14 - Abnormal Female indicating possible myocardial injury.  >=22 - Abnormal Male indicating possible myocardial injury.   Clinicians would have to utilize clinical acumen, EKG, Troponin,  "and serial changes to determine if it is an Acute Myocardial Infarction or myocardial injury due to an underlying chronic condition.         Urine Culture - Urine, Urine, Clean Catch [[id number]] Collected: 02/23/24 1434    Specimen: Urine, Clean Catch Updated: 02/23/24 1611    Blood Culture - Blood, Arm, Left [[id number]] Collected: 02/23/24 1524    Specimen: Blood from Arm, Left Updated: 02/23/24 1531    Procalcitonin [[id number]]  (Normal) Collected: 02/23/24 1344    Specimen: Blood Updated: 02/23/24 1526     Procalcitonin <0.02 ng/mL     Narrative:      As a Marker for Sepsis (Non-Neonates):    1. <0.5 ng/mL represents a low risk of severe sepsis and/or septic shock.  2. >2 ng/mL represents a high risk of severe sepsis and/or septic shock.    As a Marker for Lower Respiratory Tract Infections that require antibiotic therapy:    PCT on Admission    Antibiotic Therapy       6-12 Hrs later    >0.5                Strongly Recommended  >0.25 - <0.5        Recommended   0.1 - 0.25          Discouraged              Remeasure/reassess PCT  <0.1                Strongly Discouraged     Remeasure/reassess PCT    As 28 day mortality risk marker: \"Change in Procalcitonin Result\" (>80% or <=80%) if Day 0 (or Day 1) and Day 4 values are available. Refer to http://www.[employer]-pct-calculator.com    Change in PCT <=80%  A decrease of PCT levels below or equal to 80% defines a positive change in PCT test result representing a higher risk for 28-day all-cause mortality of patients diagnosed with severe sepsis for septic shock.    Change in PCT >80%  A decrease of PCT levels of more than 80% defines a negative change in PCT result representing a lower risk for 28-day all-cause mortality of patients diagnosed with severe sepsis or septic shock.       Single High Sensitivity Troponin T [[id number]]  (Abnormal) Collected: 02/23/24 1344    Specimen: Blood Updated: 02/23/24 1523     HS Troponin T 14 ng/L     Narrative:      High Sensitive " Troponin T Reference Range:  <14.0 ng/L- Negative Female for AMI  <22.0 ng/L- Negative Male for AMI  >=14 - Abnormal Female indicating possible myocardial injury.  >=22 - Abnormal Male indicating possible myocardial injury.   Clinicians would have to utilize clinical acumen, EKG, Troponin, and serial changes to determine if it is an Acute Myocardial Infarction or myocardial injury due to an underlying chronic condition.         BNP [[id number]]  (Abnormal) Collected: 02/23/24 1344    Specimen: Blood Updated: 02/23/24 1523     proBNP 3,136.0 pg/mL     Narrative:      This assay is used as an aid in the diagnosis of individuals suspected of having heart failure. It can be used as an aid in the diagnosis of acute decompensated heart failure (ADHF) in patients presenting with signs and symptoms of ADHF to the emergency department (ED). In addition, NT-proBNP of <300 pg/mL indicates ADHF is not likely.    Age Range Result Interpretation  NT-proBNP Concentration (pg/mL:      <50             Positive            >450                   Gray                 300-450                    Negative             <300    50-75           Positive            >900                  Gray                300-900                  Negative            <300      >75             Positive            >1800                  Gray                300-1800                  Negative            <300    Urinalysis, Microscopic Only - Urine, Clean Catch [[id number]]  (Abnormal) Collected: 02/23/24 1434    Specimen: Urine, Clean Catch Updated: 02/23/24 1510     RBC, UA 0-2 /HPF      WBC, UA 3-5 /HPF      Comment: Urine culture not indicated.        Bacteria, UA 2+ /HPF      Squamous Epithelial Cells, UA 3-6 /HPF      Hyaline Casts, UA None Seen /LPF      Methodology Manual Light Microscopy    Blood Culture - Blood, Arm, Right [[id number]] Collected: 02/23/24 1454    Specimen: Blood from Arm, Right Updated: 02/23/24 1510    Urinalysis With Culture If Indicated -  Urine, Clean Catch [[id number]]  (Abnormal) Collected: 02/23/24 1434    Specimen: Urine, Clean Catch Updated: 02/23/24 1454     Color, UA Yellow     Appearance, UA Clear     pH, UA <=5.0     Specific Gravity, UA 1.024     Glucose, UA Negative     Ketones, UA Trace     Bilirubin, UA Negative     Blood, UA Negative     Protein, UA Trace     Leuk Esterase, UA Moderate (2+)     Nitrite, UA Negative     Urobilinogen, UA 1.0 E.U./dL    Narrative:      In absence of clinical symptoms, the presence of pyuria, bacteria, and/or nitrites on the urinalysis result does not correlate with infection.    Comprehensive Metabolic Panel [[id number]]  (Abnormal) Collected: 02/23/24 1344    Specimen: Blood Updated: 02/23/24 1428     Glucose 92 mg/dL      BUN 20 mg/dL      Creatinine 0.66 mg/dL      Sodium 140 mmol/L      Potassium 4.7 mmol/L      Comment: Slight hemolysis detected by analyzer. Result may be falsely elevated.        Chloride 103 mmol/L      CO2 29.0 mmol/L      Calcium 9.1 mg/dL      Total Protein 6.6 g/dL      Albumin 4.0 g/dL      ALT (SGPT) 16 U/L      AST (SGOT) 30 U/L      Comment: Slight hemolysis detected by analyzer. Result may be falsely elevated.        Alkaline Phosphatase 78 U/L      Total Bilirubin 1.3 mg/dL      Globulin 2.6 gm/dL      A/G Ratio 1.5 g/dL      BUN/Creatinine Ratio 30.3     Anion Gap 8.0 mmol/L      eGFR 80.9 mL/min/1.73     Narrative:      GFR Normal >60  Chronic Kidney Disease <60  Kidney Failure <15    The GFR formula is only valid for adults with stable renal function between ages 18 and 70.    Lipase [[id number]]  (Normal) Collected: 02/23/24 1344    Specimen: Blood Updated: 02/23/24 1426     Lipase 21 U/L     Manual Differential [[id number]]  (Abnormal) Collected: 02/23/24 1344    Specimen: Blood Updated: 02/23/24 1414     Neutrophil % 48.0 %      Lymphocyte % 23.0 %      Monocyte % 12.0 %      Eosinophil % 3.0 %      Basophil % 1.0 %      Bands %  10.0 %      Myelocyte % 3.0 %       Neutrophils Absolute 4.52 10*3/mm3      Lymphocytes Absolute 1.79 10*3/mm3      Monocytes Absolute 0.94 10*3/mm3      Eosinophils Absolute 0.23 10*3/mm3      Basophils Absolute 0.08 10*3/mm3      Macrocytes Slight/1+     Poikilocytes Slight/1+     Toxic Granulation Slight/1+     Platelet Morphology Normal    CBC & Differential [[id number]]  (Abnormal) Collected: 02/23/24 1344    Specimen: Blood Updated: 02/23/24 1414    Narrative:      The following orders were created for panel order CBC & Differential.  Procedure                               Abnormality         Status                     ---------                               -----------         ------                     CBC Auto Differential[[id number]]        Abnormal            Final result               Scan Slide[[id number]]                                       Final result                 Please view results for these tests on the individual orders.    CBC Auto Differential [[id number]]  (Abnormal) Collected: 02/23/24 1344    Specimen: Blood Updated: 02/23/24 1414     WBC 7.80 10*3/mm3      RBC 3.68 10*6/mm3      Hemoglobin 12.0 g/dL      Hematocrit 36.8 %      .0 fL      MCH 32.6 pg      MCHC 32.6 g/dL      RDW 14.8 %      RDW-SD 54.7 fl      MPV 9.6 fL      Platelets 163 10*3/mm3     Narrative:      The previously reported component NRBC is no longer being reported. Previous result was 0.1 /100 WBC (Reference Range: 0.0-0.2 /100 WBC) on 2/23/2024 at 1352 EST.    Scan Slide [[id number]] Collected: 02/23/24 1344    Specimen: Blood Updated: 02/23/24 1414     Scan Slide --     Comment: See Manual Differential Results       Protime-INR [[id number]]  (Normal) Collected: 02/23/24 1344    Specimen: Blood Updated: 02/23/24 1407     Protime 26.1 Seconds      INR 2.57             Imaging Results (Last 24 Hours)       Procedure Component Value Units Date/Time    XR Spine Thoracic 3 View [[id number]] Collected: 02/23/24 1415     Updated: 02/23/24 1420    Narrative:       XR SPINE THORACIC 3 VW    Date of Exam: 2/23/2024 2:10 PM EST    Indication: back pain    Comparison: Chest radiograph 2/23/2024, thoracic spine radiographs 10/5/2022    Findings:  Chronic compression fracture T12 status post kyphoplasty. Moderate chronic compression fracture T6 unchanged. No new fracture. Mild superior endplate height loss at L1 stable. Exaggerated thoracic kyphosis. Unchanged large hiatal hernia with bibasilar   airspace disease and bilateral pleural effusions partially imaged.      Impression:      Impression:  1. No new fracture.  2. Moderate chronic compression fracture T6 unchanged.  3. Chronic compression fracture at T12 status post kyphoplasty.  4. Large hiatal hernia with bibasilar airspace disease and bilateral pleural effusions partially imaged, similar to prior studies.      Electronically Signed: Elver Machuca MD    2/23/2024 2:18 PM EST    Workstation ID: [id number]    XR Chest 1 View [[id number]] Collected: 02/23/24 1414     Updated: 02/23/24 1419    Narrative:      XR CHEST 1 VW    Date of Exam: 2/23/2024 2:09 PM EST    Indication: dyspnea    Comparison: 10/1/2022    Findings:  There is marked cardiomegaly. There is central pulmonary vascular congestion and bilateral perihilar interstitial haziness suggesting pulmonary edema or less likely atypical infection pattern. There is a moderate right and a small left pleural effusion   with overlying consolidation either due to atelectasis or pneumonia. No pneumothorax is seen. Aortic vascular calcification is noted.      Impression:      Impression:    1. Cardiomegaly with central pulmonary vascular congestion and bilateral perihilar haziness suggesting pulmonary edema/CHF pattern. Atypical infection pattern felt less likely.  2. Moderate right and small left pleural effusions with overlying consolidation due to atelectasis or infiltrates.      Electronically Signed: Sterling Hicks MD    2/23/2024 2:16 PM EST    Workstation ID: [id number]               Assessment & Plan        This is a 89 y.o. female with:    Active and Resolved Problems  Active Hospital Problems    Diagnosis  POA    Back pain [M54.9]  Yes      Resolved Hospital Problems   No resolved problems to display.     Pneumonia  -Chest x-ray shows moderate right and small left pleural effusion with overlying consolidation due to atelectasis or infiltrates  - Rocephin IV ordered prophylactically for pneumonia  -Nasal cannula O2 to keep sats between 90 and 95%    Back pain  -Tylenol for pain control  -PT OT for evaluation    A-fib  CHF  -Echocardiogram ordered  -Nasal cannula O2 to keep sats between 90 and 95%  -Continue Coumadin, pharmacy to dose  -Cardiology consult    CAD  -Continue home regimen    GERD  -Continue home regimen    HTN  -Continue home regimen    Osteo  -Continue home regimen      DVT prophylaxis:  No DVT prophylaxis order currently exists.        The patient desires to be as follows:    CODE STATUS:           Shea Nam, who can be contacted at [phone], is the designated person to make medical decisions on the patient's behalf if She is incapable of doing so. This was clarified with patient and/or next of kin on 2/23/2024 during the course of this H&P.    Admission Status:  I believe this patient meets inpatient status.    Expected Length of Stay: 1-2    PDMP and Medication Dispenses via Sidebar reviewed and consistent with patient reported medications.    I discussed the patient's findings and my recommendations with patient.      Signature:     This document has been electronically signed by ALMA Omalley on February 23, 2024 18:39 [facility] Hospitalist Team

## 2024-02-23 NOTE — Clinical Note
Level of Care: Med/Surg [1]   Diagnosis: Back pain [191320]   Isolate for COVID?: No [0]   Certification: I Certify That Inpatient Hospital Services Are Medically Necessary For Greater Than 2 Midnights

## 2024-02-23 NOTE — ED PROVIDER NOTES
Subjective   History of Present Illness  Chief Complaint: Back pain    Patient is a 89-year-old female with history of A-fib, CAD, GERD, osteoporosis, hypertension presents to the ER with complaints of back pain that started this morning.  Patient states that she got out of bed and started to notice some mid back pain.  She reports no fall or injury.  She denies any pop or sharp pain sensation.  She also reports that the pain from the mid back radiates around to her upper abdomen occasionally.  She denies chest pain but does report some intermittent shortness of breath which she states is normal for her.  No cough or congestion.  Denies abdominal pain nausea vomiting or diarrhea.  No lower extremity pain or swelling that is new.  No headache or lightheadedness.    PCP: None    History provided by:  Patient      Review of Systems   Constitutional:  Negative for fever.   HENT:  Negative for sore throat and trouble swallowing.    Eyes: Negative.    Respiratory:  Positive for shortness of breath. Negative for cough, chest tightness and wheezing.    Cardiovascular:  Negative for chest pain.   Gastrointestinal:  Negative for abdominal pain, diarrhea, nausea and vomiting.   Endocrine: Negative.    Genitourinary:  Negative for dysuria.   Musculoskeletal:  Positive for back pain. Negative for neck pain.   Skin:  Negative for rash.   Allergic/Immunologic: Negative.    Neurological:  Negative for weakness and headaches.   Psychiatric/Behavioral:  Negative for behavioral problems.    All other systems reviewed and are negative.      Past Medical History:   Diagnosis Date    Atrial fibrillation     Coronary artery disease     Atrial fibrillation    GERD (gastroesophageal reflux disease)     Glaucoma     Hiatal hernia with gastroesophageal reflux     History of osteoporotic pathological fracture     Right intertroch (7/2019)    Hx of compression fracture of spine     T12 and L1(2013); T6 (1/2022)    Hyperlipidemia      Hypertension     Osteoarthritis     Osteoporosis     on prolia(3/21/22)    Stroke     off and on dizziness from the stroke.       Allergies   Allergen Reactions    Penicillins Hives    Codeine Nausea And Vomiting    Latex Rash       Past Surgical History:   Procedure Laterality Date    BACK SURGERY      kyphoplasty    EYE SURGERY      cataract surgery    FIXATION KYPHOPLASTY LUMBAR SPINE  2013    HIP TROCHANTERIC NAILING WITH INTRAMEDULLARY HIP SCREW Right 2019    Procedure: HIP TROCHANTERIC NAILING SHORT WITH INTRAMEDULLARY HIP SCREW;  Surgeon: Edward Centeno MD;  Location: [facility] MAIN OR;  Service: Orthopedics    RECTAL SURGERY      x 3       Family History   Problem Relation Age of Onset    Heart disease Mother     Hypertension Mother     Osteoporosis Mother     Cancer Father         colon cancer    Osteoporosis Father     Cancer Sister         lung    Cancer Brother         colon cancer       Social History     Socioeconomic History    Marital status:    Tobacco Use    Smoking status: Former     Types: Cigarettes     Quit date:      Years since quittin.1     Passive exposure: Past    Smokeless tobacco: Never   Vaping Use    Vaping Use: Never used   Substance and Sexual Activity    Alcohol use: No    Drug use: No    Sexual activity: Defer           Objective   Physical Exam  Vitals and nursing note reviewed.   Constitutional:       Appearance: Normal appearance. She is well-developed and normal weight. She is not ill-appearing or toxic-appearing.      Comments: Frail in appearance   HENT:      Head: Normocephalic and atraumatic.   Eyes:      Pupils: Pupils are equal, round, and reactive to light.   Neck:      Comments: Cervical spine: No midline tenderness to palpation. No step-offs or deformities. Pain-free range of motion.  Cardiovascular:      Rate and Rhythm: Normal rate. Rhythm irregular.      Pulses: Normal pulses.      Heart sounds: Normal heart sounds. No murmur  heard.  Pulmonary:      Effort: Pulmonary effort is normal. No respiratory distress.      Breath sounds: Normal breath sounds. No wheezing.   Abdominal:      General: Bowel sounds are normal. There is no distension.      Palpations: Abdomen is soft.      Tenderness: There is no abdominal tenderness. There is left CVA tenderness.   Musculoskeletal:         General: Tenderness present.      Cervical back: Normal range of motion. No rigidity.      Right lower leg: Edema present.      Left lower leg: No edema.      Comments: Mild right lower extremity edema, nonpitting, this is stable compared to patient's baseline    Lumbar spine: No step-offs or deformities, no midline tenderness to palpation.  Bilateral lower extremities: Negative straight leg raise.  5 out of 5 strength.  Sensation intact to light touch.  2+ reflexes.  No clonus.  Negative Babinski sign.    No spinous process tenderness.  Tenderness to palpation in the paraspinal muscles of the left thoracic and lumbar spine   Skin:     General: Skin is warm and dry.      Capillary Refill: Capillary refill takes less than 2 seconds.      Findings: No rash.   Neurological:      General: No focal deficit present.      Mental Status: She is alert and oriented to person, place, and time.      Motor: No weakness.   Psychiatric:         Mood and Affect: Mood normal.         Behavior: Behavior normal.         ECG 12 Lead      Date/Time: 2/23/2024 2:19 PM    Performed by: Lynn Hunter PA  Authorized by: Willy Moraes MD  Interpreted by ED physician  Previous ECG: no previous ECG available  Rhythm: atrial fibrillation  Ectopy: PVCs  Rate: normal  BPM: 67  Conduction: conduction normal  Clinical impression: non-specific ECG               ED Course  ED Course as of 02/23/24 1711 Fri Feb 23, 2024   1555 Updated on lab requesting urine culture be added [MC]   1705 Spoke with Dr. Rivera regarding admission [MC]      ED Course User Index  [MC] Lynn Hunter PA    BP  "142/76 (BP Location: Left arm, Patient Position: Lying)   Pulse 70   Temp 98.8 °F (37.1 °C) (Oral)   Resp 17   Ht 160 cm (63\")   Wt 49.4 kg (109 lb)   SpO2 93%   BMI 19.31 kg/m²   Labs Reviewed   COMPREHENSIVE METABOLIC PANEL - Abnormal; Notable for the following components:       Result Value    Total Bilirubin 1.3 (*)     BUN/Creatinine Ratio 30.3 (*)     All other components within normal limits    Narrative:     GFR Normal >60  Chronic Kidney Disease <60  Kidney Failure <15    The GFR formula is only valid for adults with stable renal function between ages 18 and 70.   URINALYSIS W/ CULTURE IF INDICATED - Abnormal; Notable for the following components:    Ketones, UA Trace (*)     Protein, UA Trace (*)     Leuk Esterase, UA Moderate (2+) (*)     All other components within normal limits    Narrative:     In absence of clinical symptoms, the presence of pyuria, bacteria, and/or nitrites on the urinalysis result does not correlate with infection.   SINGLE HSTROPONIN T - Abnormal; Notable for the following components:    HS Troponin T 14 (*)     All other components within normal limits    Narrative:     High Sensitive Troponin T Reference Range:  <14.0 ng/L- Negative Female for AMI  <22.0 ng/L- Negative Male for AMI  >=14 - Abnormal Female indicating possible myocardial injury.  >=22 - Abnormal Male indicating possible myocardial injury.   Clinicians would have to utilize clinical acumen, EKG, Troponin, and serial changes to determine if it is an Acute Myocardial Infarction or myocardial injury due to an underlying chronic condition.        CBC WITH AUTO DIFFERENTIAL - Abnormal; Notable for the following components:    RBC 3.68 (*)     .0 (*)     RDW-SD 54.7 (*)     All other components within normal limits    Narrative:     The previously reported component NRBC is no longer being reported. Previous result was 0.1 /100 WBC (Reference Range: 0.0-0.2 /100 WBC) on 2/23/2024 at 1352 EST.   MANUAL " DIFFERENTIAL - Abnormal; Notable for the following components:    Bands %  10.0 (*)     Myelocyte % 3.0 (*)     Monocytes Absolute 0.94 (*)     All other components within normal limits   BNP (IN-HOUSE) - Abnormal; Notable for the following components:    proBNP 3,136.0 (*)     All other components within normal limits    Narrative:     This assay is used as an aid in the diagnosis of individuals suspected of having heart failure. It can be used as an aid in the diagnosis of acute decompensated heart failure (ADHF) in patients presenting with signs and symptoms of ADHF to the emergency department (ED). In addition, NT-proBNP of <300 pg/mL indicates ADHF is not likely.    Age Range Result Interpretation  NT-proBNP Concentration (pg/mL:      <50             Positive            >450                   Gray                 300-450                    Negative             <300    50-75           Positive            >900                  Gray                300-900                  Negative            <300      >75             Positive            >1800                  Gray                300-1800                  Negative            <300   URINALYSIS, MICROSCOPIC ONLY - Abnormal; Notable for the following components:    WBC, UA 3-5 (*)     Bacteria, UA 2+ (*)     Squamous Epithelial Cells, UA 3-6 (*)     All other components within normal limits   LIPASE - Normal   PROTIME-INR - Normal   PROCALCITONIN - Normal    Narrative:     As a Marker for Sepsis (Non-Neonates):    1. <0.5 ng/mL represents a low risk of severe sepsis and/or septic shock.  2. >2 ng/mL represents a high risk of severe sepsis and/or septic shock.    As a Marker for Lower Respiratory Tract Infections that require antibiotic therapy:    PCT on Admission    Antibiotic Therapy       6-12 Hrs later    >0.5                Strongly Recommended  >0.25 - <0.5        Recommended   0.1 - 0.25          Discouraged              Remeasure/reassess PCT  <0.1              "   Strongly Discouraged     Remeasure/reassess PCT    As 28 day mortality risk marker: \"Change in Procalcitonin Result\" (>80% or <=80%) if Day 0 (or Day 1) and Day 4 values are available. Refer to http://[email or web address]-pct-calculator.com    Change in PCT <=80%  A decrease of PCT levels below or equal to 80% defines a positive change in PCT test result representing a higher risk for 28-day all-cause mortality of patients diagnosed with severe sepsis for septic shock.    Change in PCT >80%  A decrease of PCT levels of more than 80% defines a negative change in PCT result representing a lower risk for 28-day all-cause mortality of patients diagnosed with severe sepsis or septic shock.      BLOOD CULTURE   BLOOD CULTURE   URINE CULTURE   COVID-19 AND FLU A/B, NP SWAB IN TRANSPORT MEDIA 1 HR TAT   SCAN SLIDE   SINGLE HSTROPONIN T   CBC AND DIFFERENTIAL    Narrative:     The following orders were created for panel order CBC & Differential.  Procedure                               Abnormality         Status                     ---------                               -----------         ------                     CBC Auto Differential[[id number]]        Abnormal            Final result               Scan Slide[[id number]]                                       Final result                 Please view results for these tests on the individual orders.     Medications   sodium chloride 0.9 % flush 10 mL (has no administration in time range)   Lidocaine 4 % 1 patch (1 patch Transdermal Medication Applied 2/23/24 1418)   furosemide (LASIX) injection 40 mg (has no administration in time range)   sodium chloride 0.9 % flush 10 mL (has no administration in time range)   sodium chloride 0.9 % flush 10 mL (has no administration in time range)   sodium chloride 0.9 % infusion 40 mL (has no administration in time range)   Potassium Replacement - Follow Nurse / BPA Driven Protocol (has no administration in time range)   Magnesium Cardiology " Dose Replacement - Follow Nurse / BPA Driven Protocol (has no administration in time range)   Phosphorus Replacement - Follow Nurse / BPA Driven Protocol (has no administration in time range)   Calcium Replacement - Follow Nurse / BPA Driven Protocol (has no administration in time range)   sennosides-docusate (PERICOLACE) 8.6-50 MG per tablet 2 tablet (has no administration in time range)     And   polyethylene glycol (MIRALAX) packet 17 g (has no administration in time range)     And   bisacodyl (DULCOLAX) EC tablet 5 mg (has no administration in time range)     And   bisacodyl (DULCOLAX) suppository 10 mg (has no administration in time range)   acetaminophen (TYLENOL) tablet 1,000 mg (1,000 mg Oral Given 2/23/24 1414)   cefTRIAXone (ROCEPHIN) 1,000 mg in sodium chloride 0.9 % 100 mL IVPB (0 mg Intravenous Stopped 2/23/24 1628)     XR Spine Thoracic 3 View    Result Date: 2/23/2024  Impression: 1. No new fracture. 2. Moderate chronic compression fracture T6 unchanged. 3. Chronic compression fracture at T12 status post kyphoplasty. 4. Large hiatal hernia with bibasilar airspace disease and bilateral pleural effusions partially imaged, similar to prior studies. Electronically Signed: Elver Machuca MD  2/23/2024 2:18 PM EST  Workstation ID: [id number]    XR Chest 1 View    Result Date: 2/23/2024  Impression: 1. Cardiomegaly with central pulmonary vascular congestion and bilateral perihilar haziness suggesting pulmonary edema/CHF pattern. Atypical infection pattern felt less likely. 2. Moderate right and small left pleural effusions with overlying consolidation due to atelectasis or infiltrates. Electronically Signed: Sterling Hicks MD  2/23/2024 2:16 PM EST  Workstation ID: [id number]                                            Medical Decision Making  Differential Dx (Includes but not limited to): Pneumonia fracture pleural effusion CHF exacerbation UTI  Medical Records Reviewed: 2D Echo 10/03/2022  Normal LV size and  contractility EF of 60 to 65%  Normal RV size  Severe biatrial enlargement seen  Aortic valve is trileaflet has mobile density present on leaflet consistent with fibrin strands versus vegetation.  Would recommend transesophageal echo to better evaluate if clinically indicated  Mitral valve, tricuspid valve appears structurally normal, moderate mitral and mild tricuspid regurgitation seen.  Calculated RV systolic pressure 37 mmHg  No pericardial effusion seen.  Proximal aorta appears normal in size.    Labs: On my interpretation urinalysis positive leukocytes 3-5 WBCs 2+ bacteria.  Culture pending.  CBC no leukocytosis.  CMP relatively unremarkable.  Lipase 21.  Troponin 14.  BNP 3136.  Imaging: On my interpretation chest x-ray shows cardiomegaly with central pulmonary vascular congestion suggestive of pulmonary edema/CHF.  Moderate right and left pleural effusions.  Telemetry: On my interpretation, EKG shows atrial fibrillation rate of 67 with PVCs.  No acute ST changes  Testing considered but not ordered: CT abdomen pelvis patient denies abdominal  Nature of Complaint: Acute  Admission vs Discharge: Admission  Discussion: While in the ED IV was placed and labs were obtained appropriate PPE was worn during exam and throughout all encounters with the patient.  Patient had another evaluation.  She is afebrile, nontoxic-appearing and in no acute respiratory distress.  Patient is a complaints of midthoracic back pain but points to left-sided paraspinal muscles, not spinous process, she also complains of shortness of breath.  EKG shows atrial fibrillation with no acute ST changes.  Patient does have an elevated BNP and troponin.  Chest x-ray shows CHF and pulmonary edema pattern with bilateral pleural effusions.  Patient was given IV Rocephin and Lasix in the emergency department.  Patient will be admitted for IV diuresis and further treatment.  I spoke with Dr. Rivera regarding admission.    Based on the clinical  findings at this time I anticipate that the patient will require 2 midnight stay.    Problems Addressed:  Acute on chronic congestive heart failure, unspecified heart failure type: acute illness or injury  Dyspnea, unspecified type: acute illness or injury  Left-sided thoracic back pain, unspecified chronicity: acute illness or injury  Urinary tract infection without hematuria, site unspecified: acute illness or injury    Amount and/or Complexity of Data Reviewed  Labs: ordered. Decision-making details documented in ED Course.  Radiology: ordered. Decision-making details documented in ED Course.  ECG/medicine tests: ordered. Decision-making details documented in ED Course.    Risk  OTC drugs.  Prescription drug management.  Decision regarding hospitalization.        Final diagnoses:   Acute on chronic congestive heart failure, unspecified heart failure type   Dyspnea, unspecified type   Urinary tract infection without hematuria, site unspecified   Left-sided thoracic back pain, unspecified chronicity   Bilateral pleural effusion       ED Disposition  ED Disposition       ED Disposition   Decision to Admit    Condition   --    Comment   Level of Care: Telemetry [5]   Admitting Physician: VIVIEN BEDOYA [[id number]]   Attending Physician: VIVIEN BEDOYA [[id number]]                 No follow-up provider specified.       Medication List      No changes were made to your prescriptions during this visit.            Lynn Hunter PA  02/23/24 [id number]

## 2024-02-24 ENCOUNTER — APPOINTMENT (OUTPATIENT)
Dept: CARDIOLOGY | Facility: HOSPITAL | Age: 89
DRG: 193 | End: 2024-02-24
Payer: MEDICARE

## 2024-02-24 LAB
ALBUMIN SERPL-MCNC: 4 G/DL (ref 3.5–5.2)
ALBUMIN/GLOB SERPL: 1.5 G/DL
ALP SERPL-CCNC: 81 U/L (ref 39–117)
ALT SERPL W P-5'-P-CCNC: 17 U/L (ref 1–33)
ANION GAP SERPL CALCULATED.3IONS-SCNC: 10 MMOL/L (ref 5–15)
AST SERPL-CCNC: 29 U/L (ref 1–32)
BACTERIA SPEC AEROBE CULT: NO GROWTH
BH CV ECHO MEAS - ACS: 1.9 CM
BH CV ECHO MEAS - AO MAX PG: 5.1 MMHG
BH CV ECHO MEAS - AO MEAN PG: 3 MMHG
BH CV ECHO MEAS - AO V2 MAX: 113 CM/SEC
BH CV ECHO MEAS - AO V2 VTI: 23.3 CM
BH CV ECHO MEAS - AVA(I,D): 1.78 CM2
BH CV ECHO MEAS - EDV(CUBED): 117.6 ML
BH CV ECHO MEAS - EDV(MOD-SP4): 58.2 ML
BH CV ECHO MEAS - EF(MOD-SP4): 51.7 %
BH CV ECHO MEAS - ESV(CUBED): 24.4 ML
BH CV ECHO MEAS - ESV(MOD-SP4): 28.1 ML
BH CV ECHO MEAS - FS: 40.8 %
BH CV ECHO MEAS - IVS/LVPW: 1.11 CM
BH CV ECHO MEAS - IVSD: 1 CM
BH CV ECHO MEAS - LA DIMENSION: 5.1 CM
BH CV ECHO MEAS - LAT PEAK E' VEL: 12 CM/SEC
BH CV ECHO MEAS - LV DIASTOLIC VOL/BSA (35-75): 37.8 CM2
BH CV ECHO MEAS - LV MASS(C)D: 164.3 GRAMS
BH CV ECHO MEAS - LV MAX PG: 1.45 MMHG
BH CV ECHO MEAS - LV MEAN PG: 1 MMHG
BH CV ECHO MEAS - LV SYSTOLIC VOL/BSA (12-30): 18.2 CM2
BH CV ECHO MEAS - LV V1 MAX: 60.3 CM/SEC
BH CV ECHO MEAS - LV V1 VTI: 12 CM
BH CV ECHO MEAS - LVIDD: 4.9 CM
BH CV ECHO MEAS - LVIDS: 2.9 CM
BH CV ECHO MEAS - LVOT AREA: 3.5 CM2
BH CV ECHO MEAS - LVOT DIAM: 2.1 CM
BH CV ECHO MEAS - LVPWD: 0.9 CM
BH CV ECHO MEAS - MED PEAK E' VEL: 7.9 CM/SEC
BH CV ECHO MEAS - MR MAX PG: 98.4 MMHG
BH CV ECHO MEAS - MR MAX VEL: 496 CM/SEC
BH CV ECHO MEAS - MR MEAN PG: 66 MMHG
BH CV ECHO MEAS - MR MEAN VEL: 389 CM/SEC
BH CV ECHO MEAS - MR VTI: 158 CM
BH CV ECHO MEAS - MV DEC SLOPE: 418 CM/SEC2
BH CV ECHO MEAS - MV DEC TIME: 0.13 SEC
BH CV ECHO MEAS - MV E MAX VEL: 120 CM/SEC
BH CV ECHO MEAS - MV MAX PG: 7.2 MMHG
BH CV ECHO MEAS - MV MEAN PG: 2 MMHG
BH CV ECHO MEAS - MV P1/2T: 100.9 MSEC
BH CV ECHO MEAS - MV V2 VTI: 29 CM
BH CV ECHO MEAS - MVA(P1/2T): 2.18 CM2
BH CV ECHO MEAS - MVA(VTI): 1.43 CM2
BH CV ECHO MEAS - PA ACC TIME: 0.07 SEC
BH CV ECHO MEAS - PA V2 MAX: 99.8 CM/SEC
BH CV ECHO MEAS - PI END-D VEL: 133.5 CM/SEC
BH CV ECHO MEAS - PULM DIAS VEL: 48.8 CM/SEC
BH CV ECHO MEAS - PULM S/D: 0.92
BH CV ECHO MEAS - PULM SYS VEL: 45 CM/SEC
BH CV ECHO MEAS - RAP SYSTOLE: 8 MMHG
BH CV ECHO MEAS - RV MAX PG: 2.07 MMHG
BH CV ECHO MEAS - RV V1 MAX: 71.9 CM/SEC
BH CV ECHO MEAS - RV V1 VTI: 14.8 CM
BH CV ECHO MEAS - RVDD: 2.7 CM
BH CV ECHO MEAS - RVSP: 41.6 MMHG
BH CV ECHO MEAS - SI(MOD-SP4): 19.5 ML/M2
BH CV ECHO MEAS - SV(LVOT): 41.6 ML
BH CV ECHO MEAS - SV(MOD-SP4): 30.1 ML
BH CV ECHO MEAS - TAPSE (>1.6): 1.93 CM
BH CV ECHO MEAS - TR MAX PG: 33.6 MMHG
BH CV ECHO MEAS - TR MAX VEL: 290 CM/SEC
BH CV ECHO MEASUREMENTS AVERAGE E/E' RATIO: 12.06
BH CV XLRA - TDI S': 11.6 CM/SEC
BILIRUB SERPL-MCNC: 0.9 MG/DL (ref 0–1.2)
BUN SERPL-MCNC: 21 MG/DL (ref 8–23)
BUN/CREAT SERPL: 30.4 (ref 7–25)
CALCIUM SPEC-SCNC: 8.9 MG/DL (ref 8.2–9.6)
CHLORIDE SERPL-SCNC: 100 MMOL/L (ref 98–107)
CO2 SERPL-SCNC: 31 MMOL/L (ref 22–29)
CREAT SERPL-MCNC: 0.69 MG/DL (ref 0.57–1)
DEPRECATED RDW RBC AUTO: 53.8 FL (ref 37–54)
EGFRCR SERPLBLD CKD-EPI 2021: 80 ML/MIN/1.73
ERYTHROCYTE [DISTWIDTH] IN BLOOD BY AUTOMATED COUNT: 15.3 % (ref 12.3–15.4)
GLOBULIN UR ELPH-MCNC: 2.6 GM/DL
GLUCOSE SERPL-MCNC: 111 MG/DL (ref 65–99)
HCT VFR BLD AUTO: 38.4 % (ref 34–46.6)
HGB BLD-MCNC: 12.8 G/DL (ref 12–15.9)
INR PPP: 2.28 (ref 2–3)
INR PPP: 2.51 (ref 2–3)
LEFT ATRIUM VOLUME INDEX: 75.3 ML/M2
MCH RBC QN AUTO: 33.4 PG (ref 26.6–33)
MCHC RBC AUTO-ENTMCNC: 33.3 G/DL (ref 31.5–35.7)
MCV RBC AUTO: 100.4 FL (ref 79–97)
PLATELET # BLD AUTO: 169 10*3/MM3 (ref 140–450)
PMV BLD AUTO: 9.7 FL (ref 6–12)
POTASSIUM SERPL-SCNC: 4.2 MMOL/L (ref 3.5–5.2)
PROT SERPL-MCNC: 6.6 G/DL (ref 6–8.5)
PROTHROMBIN TIME: 23.4 SECONDS (ref 19.4–28.5)
PROTHROMBIN TIME: 25.6 SECONDS (ref 19.4–28.5)
QT INTERVAL: 383 MS
QTC INTERVAL: 404 MS
RBC # BLD AUTO: 3.83 10*6/MM3 (ref 3.77–5.28)
SINUS: 2.8 CM
SODIUM SERPL-SCNC: 141 MMOL/L (ref 136–145)
WBC NRBC COR # BLD AUTO: 9 10*3/MM3 (ref 3.4–10.8)

## 2024-02-24 PROCEDURE — 25010000002 KETOROLAC TROMETHAMINE PER 15 MG: Performed by: INTERNAL MEDICINE

## 2024-02-24 PROCEDURE — 85027 COMPLETE CBC AUTOMATED: CPT | Performed by: NURSE PRACTITIONER

## 2024-02-24 PROCEDURE — 25010000002 CEFTRIAXONE PER 250 MG: Performed by: NURSE PRACTITIONER

## 2024-02-24 PROCEDURE — 93306 TTE W/DOPPLER COMPLETE: CPT | Performed by: INTERNAL MEDICINE

## 2024-02-24 PROCEDURE — 36415 COLL VENOUS BLD VENIPUNCTURE: CPT | Performed by: NURSE PRACTITIONER

## 2024-02-24 PROCEDURE — 85610 PROTHROMBIN TIME: CPT | Performed by: INTERNAL MEDICINE

## 2024-02-24 PROCEDURE — 80053 COMPREHEN METABOLIC PANEL: CPT | Performed by: NURSE PRACTITIONER

## 2024-02-24 PROCEDURE — 93306 TTE W/DOPPLER COMPLETE: CPT

## 2024-02-24 PROCEDURE — 85610 PROTHROMBIN TIME: CPT | Performed by: NURSE PRACTITIONER

## 2024-02-24 RX ORDER — KETOROLAC TROMETHAMINE 30 MG/ML
15 INJECTION, SOLUTION INTRAMUSCULAR; INTRAVENOUS 3 TIMES DAILY
Status: DISCONTINUED | OUTPATIENT
Start: 2024-02-24 | End: 2024-02-26

## 2024-02-24 RX ORDER — WARFARIN SODIUM 4 MG/1
4 TABLET ORAL
Status: DISCONTINUED | OUTPATIENT
Start: 2024-02-26 | End: 2024-02-28

## 2024-02-24 RX ORDER — METOPROLOL SUCCINATE 25 MG/1
25 TABLET, EXTENDED RELEASE ORAL 2 TIMES DAILY
Status: DISCONTINUED | OUTPATIENT
Start: 2024-02-24 | End: 2024-03-02 | Stop reason: HOSPADM

## 2024-02-24 RX ORDER — LATANOPROST 50 UG/ML
1 SOLUTION/ DROPS OPHTHALMIC NIGHTLY
Status: DISCONTINUED | OUTPATIENT
Start: 2024-02-24 | End: 2024-03-02 | Stop reason: HOSPADM

## 2024-02-24 RX ORDER — DOCUSATE SODIUM 100 MG/1
100 CAPSULE, LIQUID FILLED ORAL NIGHTLY
Status: DISCONTINUED | OUTPATIENT
Start: 2024-02-24 | End: 2024-02-29 | Stop reason: SDUPTHER

## 2024-02-24 RX ORDER — WARFARIN SODIUM 2 MG/1
2 TABLET ORAL
Status: DISCONTINUED | OUTPATIENT
Start: 2024-02-24 | End: 2024-02-28

## 2024-02-24 RX ORDER — DIGOXIN 125 MCG
125 TABLET ORAL
Status: DISCONTINUED | OUTPATIENT
Start: 2024-02-24 | End: 2024-03-02 | Stop reason: HOSPADM

## 2024-02-24 RX ADMIN — KETOROLAC TROMETHAMINE 15 MG: 30 INJECTION, SOLUTION INTRAMUSCULAR; INTRAVENOUS at 20:05

## 2024-02-24 RX ADMIN — KETOROLAC TROMETHAMINE 15 MG: 30 INJECTION, SOLUTION INTRAMUSCULAR; INTRAVENOUS at 15:48

## 2024-02-24 RX ADMIN — METOPROLOL SUCCINATE 25 MG: 25 TABLET, EXTENDED RELEASE ORAL at 13:08

## 2024-02-24 RX ADMIN — CEFTRIAXONE SODIUM 1000 MG: 1 INJECTION, POWDER, FOR SOLUTION INTRAMUSCULAR; INTRAVENOUS at 08:39

## 2024-02-24 RX ADMIN — Medication 10 ML: at 20:05

## 2024-02-24 RX ADMIN — ACETAMINOPHEN 500 MG: 500 TABLET ORAL at 08:52

## 2024-02-24 RX ADMIN — Medication 10 ML: at 08:39

## 2024-02-24 RX ADMIN — DIGOXIN 125 MCG: 125 TABLET ORAL at 13:08

## 2024-02-24 RX ADMIN — WARFARIN SODIUM 2 MG: 2 TABLET ORAL at 17:48

## 2024-02-24 RX ADMIN — LATANOPROST 1 DROP: 50 SOLUTION/ DROPS OPHTHALMIC at 20:10

## 2024-02-24 RX ADMIN — DOCUSATE SODIUM 100 MG: 100 CAPSULE, LIQUID FILLED ORAL at 20:05

## 2024-02-24 NOTE — PLAN OF CARE
Goal Outcome Evaluation:  Patient is pleasant. C/O pain in back- tx with PRN Tylenol, IV Toradol TID added. IV Rocephin continued. Echo today- EF 50-55% JEFF recommended to r/o vegetation. No further complaints. Will continue to monitor.

## 2024-02-24 NOTE — PROGRESS NOTES
"[facility] MEDICINE SERVICE  DAILY PROGRESS NOTE    NAME: Shruthi Amin  : 6/10/1928  MRN: [id number]      LOS: 1 day     PROVIDER OF SERVICE: Jamir Patrick MD    Chief Complaint: <principal problem not specified>     SUBJECTIVE     Patient was seen and evaluated bedside.  Her family is present.  Patient complaining of back pain.      OBJECTIVE   /55 (BP Location: Left arm, Patient Position: Lying)   Pulse 83   Temp 98.1 °F (36.7 °C) (Oral)   Resp 20   Ht 165.1 cm (65\")   Wt 50.3 kg (111 lb)   SpO2 93%   BMI 18.47 kg/m²         Scheduled Meds   cefTRIAXone, 1,000 mg, Intravenous, Q24H  digoxin, 125 mcg, Oral, Daily  docusate sodium, 100 mg, Oral, Nightly  ketorolac, 15 mg, Intravenous, TID  latanoprost, 1 drop, Right Eye, Nightly  metoprolol succinate XL, 25 mg, Oral, BID  sodium chloride, 10 mL, Intravenous, Q12H  warfarin, 2 mg, Oral, Once per day on   [START ON 2024] warfarin, 4 mg, Oral, Once per day on        PRN Meds     acetaminophen    senna-docusate sodium **AND** polyethylene glycol **AND** bisacodyl **AND** bisacodyl    Calcium Replacement - Follow Nurse / BPA Driven Protocol    Magnesium Cardiology Dose Replacement - Follow Nurse / BPA Driven Protocol    Pharmacy to dose warfarin    Phosphorus Replacement - Follow Nurse / BPA Driven Protocol    Potassium Replacement - Follow Nurse / BPA Driven Protocol    [COMPLETED] Insert Peripheral IV **AND** sodium chloride    sodium chloride    sodium chloride   Infusions  Pharmacy to dose warfarin,           EXAM:    General: Not in any acute distress  HEENT: Normocephalic, atraumatic  Neck: Supple, No JVD  CV: Regular rate and rhythm, S1 and S2 are normal, no murmurs/rubs/or gallops  Lungs: Clear to auscultation bilaterally, no rales/rhonchi/wheezes  Abdomen: Soft, non-distended, non-tender, bowel sounds present  EXT: No edema of lower extremities  Neuro: Cranial nerves " II through XII intact  Skin: Intact, no rashes, no lesions, no erythema    Medications reviewed: yes.  Labs reviewed: yes.    Result Review:  I have personally reviewed the results from the time of this admission to 2/24/2024 15:29 EST and agree with these findings:  [x]  Laboratory  [x]  Microbiology  [x]  Radiology  []  EKG/Telemetry   []  Cardiology/Vascular   []  Pathology  []  Old records  []  Other:      ASSESSMENT/PLAN     Community-acquired pneumonia  Imaging showed moderate right and small left pleural effusion with overlying consolidation due to atelectasis or infiltrates  Rocephin start date February 23  Supplemental oxygen    Back pain  Acetaminophen  Physical therapy and Occupational Therapy consult and evaluation appreciated    Atrial fibrillation chronic  Chronic and stable  Coumadin; pharmacy to dose    Congestive heart failure  Echocardiogram    CAD  Chronic and stable    Venous thromboembolism prophylaxis: Coumadin  Code: Full

## 2024-02-25 ENCOUNTER — APPOINTMENT (OUTPATIENT)
Dept: CT IMAGING | Facility: HOSPITAL | Age: 89
DRG: 193 | End: 2024-02-25
Payer: MEDICARE

## 2024-02-25 LAB
ANION GAP SERPL CALCULATED.3IONS-SCNC: 10 MMOL/L (ref 5–15)
BASOPHILS # BLD AUTO: 0 10*3/MM3 (ref 0–0.2)
BASOPHILS NFR BLD AUTO: 0.5 % (ref 0–1.5)
BUN SERPL-MCNC: 30 MG/DL (ref 8–23)
BUN/CREAT SERPL: 37.5 (ref 7–25)
CALCIUM SPEC-SCNC: 9.3 MG/DL (ref 8.2–9.6)
CHLORIDE SERPL-SCNC: 101 MMOL/L (ref 98–107)
CO2 SERPL-SCNC: 28 MMOL/L (ref 22–29)
CREAT SERPL-MCNC: 0.8 MG/DL (ref 0.57–1)
DEPRECATED RDW RBC AUTO: 53.8 FL (ref 37–54)
EGFRCR SERPLBLD CKD-EPI 2021: 67.9 ML/MIN/1.73
EOSINOPHIL # BLD AUTO: 0.6 10*3/MM3 (ref 0–0.4)
EOSINOPHIL NFR BLD AUTO: 6.9 % (ref 0.3–6.2)
ERYTHROCYTE [DISTWIDTH] IN BLOOD BY AUTOMATED COUNT: 15.3 % (ref 12.3–15.4)
GLUCOSE SERPL-MCNC: 99 MG/DL (ref 65–99)
HCT VFR BLD AUTO: 39.3 % (ref 34–46.6)
HGB BLD-MCNC: 13 G/DL (ref 12–15.9)
INR PPP: 2.23 (ref 2–3)
LYMPHOCYTES # BLD AUTO: 1.7 10*3/MM3 (ref 0.7–3.1)
LYMPHOCYTES NFR BLD AUTO: 20.2 % (ref 19.6–45.3)
MCH RBC QN AUTO: 33.6 PG (ref 26.6–33)
MCHC RBC AUTO-ENTMCNC: 33 G/DL (ref 31.5–35.7)
MCV RBC AUTO: 101.6 FL (ref 79–97)
MONOCYTES # BLD AUTO: 1.5 10*3/MM3 (ref 0.1–0.9)
MONOCYTES NFR BLD AUTO: 17.2 % (ref 5–12)
NEUTROPHILS NFR BLD AUTO: 4.8 10*3/MM3 (ref 1.7–7)
NEUTROPHILS NFR BLD AUTO: 55.2 % (ref 42.7–76)
NRBC BLD AUTO-RTO: 0.1 /100 WBC (ref 0–0.2)
PLATELET # BLD AUTO: 160 10*3/MM3 (ref 140–450)
PMV BLD AUTO: 9.8 FL (ref 6–12)
POTASSIUM SERPL-SCNC: 4.5 MMOL/L (ref 3.5–5.2)
PROTHROMBIN TIME: 22.9 SECONDS (ref 19.4–28.5)
RBC # BLD AUTO: 3.87 10*6/MM3 (ref 3.77–5.28)
SODIUM SERPL-SCNC: 139 MMOL/L (ref 136–145)
WBC NRBC COR # BLD AUTO: 8.6 10*3/MM3 (ref 3.4–10.8)

## 2024-02-25 PROCEDURE — 85610 PROTHROMBIN TIME: CPT | Performed by: INTERNAL MEDICINE

## 2024-02-25 PROCEDURE — 80048 BASIC METABOLIC PNL TOTAL CA: CPT | Performed by: INTERNAL MEDICINE

## 2024-02-25 PROCEDURE — 71250 CT THORAX DX C-: CPT

## 2024-02-25 PROCEDURE — 25010000002 KETOROLAC TROMETHAMINE PER 15 MG: Performed by: INTERNAL MEDICINE

## 2024-02-25 PROCEDURE — 99223 1ST HOSP IP/OBS HIGH 75: CPT | Performed by: INTERNAL MEDICINE

## 2024-02-25 PROCEDURE — 25010000002 CEFTRIAXONE PER 250 MG: Performed by: NURSE PRACTITIONER

## 2024-02-25 PROCEDURE — 97166 OT EVAL MOD COMPLEX 45 MIN: CPT

## 2024-02-25 PROCEDURE — 97162 PT EVAL MOD COMPLEX 30 MIN: CPT

## 2024-02-25 PROCEDURE — 85025 COMPLETE CBC W/AUTO DIFF WBC: CPT | Performed by: INTERNAL MEDICINE

## 2024-02-25 RX ADMIN — Medication 10 ML: at 08:56

## 2024-02-25 RX ADMIN — ACETAMINOPHEN 500 MG: 500 TABLET ORAL at 04:59

## 2024-02-25 RX ADMIN — KETOROLAC TROMETHAMINE 15 MG: 30 INJECTION, SOLUTION INTRAMUSCULAR; INTRAVENOUS at 08:55

## 2024-02-25 RX ADMIN — WARFARIN SODIUM 2 MG: 2 TABLET ORAL at 17:43

## 2024-02-25 RX ADMIN — METOPROLOL SUCCINATE 25 MG: 25 TABLET, EXTENDED RELEASE ORAL at 20:22

## 2024-02-25 RX ADMIN — KETOROLAC TROMETHAMINE 15 MG: 30 INJECTION, SOLUTION INTRAMUSCULAR; INTRAVENOUS at 20:22

## 2024-02-25 RX ADMIN — DIGOXIN 125 MCG: 125 TABLET ORAL at 12:23

## 2024-02-25 RX ADMIN — Medication 10 ML: at 20:22

## 2024-02-25 RX ADMIN — KETOROLAC TROMETHAMINE 15 MG: 30 INJECTION, SOLUTION INTRAMUSCULAR; INTRAVENOUS at 17:43

## 2024-02-25 RX ADMIN — CEFTRIAXONE SODIUM 1000 MG: 1 INJECTION, POWDER, FOR SOLUTION INTRAMUSCULAR; INTRAVENOUS at 08:55

## 2024-02-25 RX ADMIN — DOCUSATE SODIUM 100 MG: 100 CAPSULE, LIQUID FILLED ORAL at 20:22

## 2024-02-25 RX ADMIN — LATANOPROST 1 DROP: 50 SOLUTION/ DROPS OPHTHALMIC at 20:22

## 2024-02-25 RX ADMIN — METOPROLOL SUCCINATE 25 MG: 25 TABLET, EXTENDED RELEASE ORAL at 08:55

## 2024-02-25 NOTE — CONSULTS
Infectious Diseases Consult Note    Referring Provider: Jamir Patrick MD    Reason for Consultation: Pneumonia, possible aortic valve endocarditis    Patient Care Team:  Provider, No Known as PCP - Aleksey Washburn MD as Consulting Physician (Cardiology)  Anai Moise APRN as Nurse Practitioner (Cardiology)    Chief complaint shortness of breath, back pain    Subjective     The patient has been afebrile for the last 24 hours.  The patient is on room air, hemodynamically stable, and is tolerating antimicrobial therapy.    History of present illness:      This is a 89-year-old female presents to the hospital 2/23/2024 with complaints of shortness of breath and back pain that extends up into her stomach.  Patient had some bilateral lower leg edema admission but this is resolved.  Denies significant fever or chills.  Continues to have some shortness of breath with a nonproductive cough.  Denies GI symptoms or urinary symptoms.  Patient denies ever having cardiac valve surgery in the past.    Review of Systems   Review of Systems   Constitutional: Negative.    HENT: Negative.     Eyes: Negative.    Respiratory: Negative.  Positive for cough and shortness of breath.    Cardiovascular: Negative.    Gastrointestinal: Negative.    Endocrine: Negative.    Genitourinary: Negative.    Musculoskeletal: Negative.  Positive for back pain.   Skin: Negative.    Neurological: Negative.    Psychiatric/Behavioral: Negative.     All other systems reviewed and are negative.      Medications  Medications Prior to Admission   Medication Sig Dispense Refill Last Dose    B Complex Vitamins (VITAMIN B COMPLEX PO) Take 1 tablet by mouth Daily. Indications: suppliment   2/21/2024    digoxin (LANOXIN) 125 MCG tablet TAKE 1 TABLET BY MOUTH DAILY. INDICATIONS: CHRONIC ATRIAL FIBRILLATION 90 tablet 1 2/22/2024    docusate sodium (COLACE) 100 MG capsule Take 1 capsule by mouth Every Night.   2/22/2024    furosemide (LASIX) 20 MG  tablet Take 1 tablet by mouth As Needed (swelling). Indications: Edema 90 tablet 2     latanoprost (XALATAN) 0.005 % ophthalmic solution Administer 1 drop to the right eye Every Night. Indications: Wide-Angle Glaucoma   2/22/2024    metoprolol succinate XL (TOPROL-XL) 25 MG 24 hr tablet TAKE 1 TABLET BY MOUTH TWICE A  tablet 1 2/23/2024    O2 (OXYGEN) Inhale 2 L/min As Needed. Indications: scar tissue in lungs       potassium chloride (KLOR-CON) 10 MEQ CR tablet Take 1 tablet by mouth As Needed (as needed when taking water pill). ONLY WHEN TAKING WATER PILL 90 tablet 1     Sennosides 25 MG tablet Take 25 mg by mouth 2 (Two) Times a Day As Needed (constipation). Indications: Constipation   2/22/2024    VITAMIN A PO Take 1 tablet by mouth Daily.   2/22/2024    warfarin (COUMADIN) 2 MG tablet Take 2 tablets by mouth. On Monday and Friday.   2/19/2024    warfarin (COUMADIN) 2 MG tablet Take 1 tablet by mouth. On Tues,  Wed, Thurs,  Sat, Sun,   2/22/2024    Zinc Sulfate (ZINC 15 PO) Take 2 tablets by mouth Daily. WITH ELDERBERRY--GUMMIES   Indications: suppliment   2/22/2024    ascorbic acid (VITAMIN C) 1000 MG tablet Take 1 tablet by mouth Daily. Indications: Inadequate Vitamin C          History  Past Medical History:   Diagnosis Date    Atrial fibrillation     Coronary artery disease     Atrial fibrillation    GERD (gastroesophageal reflux disease)     Glaucoma     Hiatal hernia with gastroesophageal reflux     History of osteoporotic pathological fracture     Right intertroch (7/2019)    Hx of compression fracture of spine     T12 and L1(2013); T6 (1/2022)    Hyperlipidemia     Hypertension     Osteoarthritis     Osteoporosis     on prolia(3/21/22)    Stroke     off and on dizziness from the stroke.     Past Surgical History:   Procedure Laterality Date    BACK SURGERY      kyphoplasty    EYE SURGERY      cataract surgery    FIXATION KYPHOPLASTY LUMBAR SPINE  2013    HIP TROCHANTERIC NAILING WITH  INTRAMEDULLARY HIP SCREW Right 7/20/2019    Procedure: HIP TROCHANTERIC NAILING SHORT WITH INTRAMEDULLARY HIP SCREW;  Surgeon: Edward Centeno MD;  Location: [facility] MAIN OR;  Service: Orthopedics    RECTAL SURGERY      x 3       Family History  Family History   Problem Relation Age of Onset    Heart disease Mother     Hypertension Mother     Osteoporosis Mother     Cancer Father         colon cancer    Osteoporosis Father     Cancer Sister         lung    Cancer Brother         colon cancer       Social History   reports that she quit smoking about 76 years ago. Her smoking use included cigarettes. She has been exposed to tobacco smoke. She has never used smokeless tobacco. She reports that she does not drink alcohol and does not use drugs.    Allergies  Penicillins, Codeine, and Latex    Objective     Vital Signs   Vital Signs (last 24 hours)         02/24 0700  02/25 0659 02/25 0700 02/25 1826   Most Recent      Temp (°F) 97.6 -  98.4    97.7 -  98     98 (36.7) 02/25 1534    Heart Rate 57 -  83    55 -  70     60 02/25 1534    Resp 18 -  22    20 -  22     22 02/25 1534    /56 -  146/73    135/62 -  137/56     137/56 02/25 1534    SpO2 (%) 89 -  93    92 -  100     100 02/25 1534    Flow (L/min)     2     2 02/25 1534            Physical Exam:  Physical Exam  Vitals and nursing note reviewed.   Constitutional:       General: She is not in acute distress.     Appearance: Normal appearance. She is well-developed and normal weight. She is not diaphoretic.   HENT:      Head: Normocephalic and atraumatic.   Eyes:      General: No scleral icterus.     Extraocular Movements: Extraocular movements intact.      Conjunctiva/sclera: Conjunctivae normal.      Pupils: Pupils are equal, round, and reactive to light.   Cardiovascular:      Rate and Rhythm: Normal rate and regular rhythm.      Heart sounds: Normal heart sounds, S1 normal and S2 normal. No murmur heard.  Pulmonary:      Effort: Pulmonary effort is  normal. No respiratory distress.      Breath sounds: No stridor. Rales present. No wheezing.   Chest:      Chest wall: No tenderness.   Abdominal:      General: Bowel sounds are normal. There is no distension.      Palpations: Abdomen is soft. There is no mass.      Tenderness: There is no abdominal tenderness. There is no guarding.   Genitourinary:     Comments: External catheter  Musculoskeletal:         General: No swelling, tenderness or deformity. Normal range of motion.      Cervical back: Neck supple.   Skin:     General: Skin is warm and dry.      Coloration: Skin is not pale.      Findings: No bruising, erythema or rash.      Comments: No obvious signs of  embolic phenomena   Neurological:      General: No focal deficit present.      Mental Status: She is alert and oriented to person, place, and time. Mental status is at baseline.      Cranial Nerves: No cranial nerve deficit.   Psychiatric:         Mood and Affect: Mood normal.         Microbiology  Microbiology Results (last 10 days)       Procedure Component Value - Date/Time    COVID-19 and FLU A/B PCR, 1 HR TAT - Swab, Nasopharynx [[id number]]  (Normal) Collected: 02/23/24 1726    Lab Status: Final result Specimen: Swab from Nasopharynx Updated: 02/23/24 1757     COVID19 Not Detected     Influenza A PCR Not Detected     Influenza B PCR Not Detected    Narrative:      Fact sheet for providers: https://www.fda.gov/media/674935/download    Fact sheet for patients: https://www.fda.gov/media/655409/download    Test performed by PCR.    Blood Culture - Blood, Arm, Left [[id number]]  (Normal) Collected: 02/23/24 1524    Lab Status: Preliminary result Specimen: Blood from Arm, Left Updated: 02/25/24 1532     Blood Culture No growth at 2 days    Narrative:      Less than seven (7) mL's of blood was collected.  Insufficient quantity may yield false negative results.    Blood Culture - Blood, Arm, Right [[id number]]  (Normal) Collected: 02/23/24 1454    Lab Status:  Preliminary result Specimen: Blood from Arm, Right Updated: 02/25/24 1517     Blood Culture No growth at 2 days    Narrative:      Less than seven (7) mL's of blood was collected.  Insufficient quantity may yield false negative results.    Urine Culture - Urine, Urine, Clean Catch [[id number]]  (Normal) Collected: 02/23/24 1434    Lab Status: Final result Specimen: Urine, Clean Catch Updated: 02/24/24 1947     Urine Culture No growth            Laboratory  Results from last 7 days   Lab Units 02/25/24  0355   WBC 10*3/mm3 8.60   HEMOGLOBIN g/dL 13.0   HEMATOCRIT % 39.3   PLATELETS 10*3/mm3 160     Results from last 7 days   Lab Units 02/25/24  0355   SODIUM mmol/L 139   POTASSIUM mmol/L 4.5   CHLORIDE mmol/L 101   CO2 mmol/L 28.0   BUN mg/dL 30*   CREATININE mg/dL 0.80   GLUCOSE mg/dL 99   CALCIUM mg/dL 9.3     Results from last 7 days   Lab Units 02/25/24  0355   SODIUM mmol/L 139   POTASSIUM mmol/L 4.5   CHLORIDE mmol/L 101   CO2 mmol/L 28.0   BUN mg/dL 30*   CREATININE mg/dL 0.80   GLUCOSE mg/dL 99   CALCIUM mg/dL 9.3                   Radiology  Imaging Results (Last 72 Hours)       Procedure Component Value Units Date/Time    XR Spine Thoracic 3 View [[id number]] Collected: 02/23/24 1415     Updated: 02/23/24 1420    Narrative:      XR SPINE THORACIC 3 VW    Date of Exam: 2/23/2024 2:10 PM EST    Indication: back pain    Comparison: Chest radiograph 2/23/2024, thoracic spine radiographs 10/5/2022    Findings:  Chronic compression fracture T12 status post kyphoplasty. Moderate chronic compression fracture T6 unchanged. No new fracture. Mild superior endplate height loss at L1 stable. Exaggerated thoracic kyphosis. Unchanged large hiatal hernia with bibasilar   airspace disease and bilateral pleural effusions partially imaged.      Impression:      Impression:  1. No new fracture.  2. Moderate chronic compression fracture T6 unchanged.  3. Chronic compression fracture at T12 status post kyphoplasty.  4. Large  hiatal hernia with bibasilar airspace disease and bilateral pleural effusions partially imaged, similar to prior studies.      Electronically Signed: Elver Machuca MD    2/23/2024 2:18 PM EST    Workstation ID: [id number]    XR Chest 1 View [[id number]] Collected: 02/23/24 1414     Updated: 02/23/24 1419    Narrative:      XR CHEST 1 VW    Date of Exam: 2/23/2024 2:09 PM EST    Indication: dyspnea    Comparison: 10/1/2022    Findings:  There is marked cardiomegaly. There is central pulmonary vascular congestion and bilateral perihilar interstitial haziness suggesting pulmonary edema or less likely atypical infection pattern. There is a moderate right and a small left pleural effusion   with overlying consolidation either due to atelectasis or pneumonia. No pneumothorax is seen. Aortic vascular calcification is noted.      Impression:      Impression:    1. Cardiomegaly with central pulmonary vascular congestion and bilateral perihilar haziness suggesting pulmonary edema/CHF pattern. Atypical infection pattern felt less likely.  2. Moderate right and small left pleural effusions with overlying consolidation due to atelectasis or infiltrates.      Electronically Signed: Sterling Hicks MD    2/23/2024 2:16 PM EST    Workstation ID: [id number]            Cardiology      Results Review:  I have reviewed all clinical data, test, lab, and imaging results.       Schedule Meds  cefTRIAXone, 1,000 mg, Intravenous, Q24H  digoxin, 125 mcg, Oral, Daily  docusate sodium, 100 mg, Oral, Nightly  ketorolac, 15 mg, Intravenous, TID  latanoprost, 1 drop, Right Eye, Nightly  metoprolol succinate XL, 25 mg, Oral, BID  sodium chloride, 10 mL, Intravenous, Q12H  warfarin, 2 mg, Oral, Once per day on Sunday Tuesday Wednesday Thursday Saturday  [START ON 2/26/2024] warfarin, 4 mg, Oral, Once per day on Monday Friday        Infusion Meds  Pharmacy to dose warfarin,         PRN Meds    acetaminophen    senna-docusate sodium **AND** polyethylene  glycol **AND** bisacodyl **AND** bisacodyl    Calcium Replacement - Follow Nurse / BPA Driven Protocol    Magnesium Cardiology Dose Replacement - Follow Nurse / BPA Driven Protocol    Pharmacy to dose warfarin    Phosphorus Replacement - Follow Nurse / BPA Driven Protocol    Potassium Replacement - Follow Nurse / BPA Driven Protocol    [COMPLETED] Insert Peripheral IV **AND** sodium chloride    sodium chloride    sodium chloride      Assessment & Plan       Assessment    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  2D echo shows a mobile vegetation on the aortic valve.  Initial blood cultures are negative so far.  Patient denies any history of cardiac valve surgery or replacement.  Cardiology following    Possible community-acquired pneumonia.  Patient is currently switches between room air and 2 L of oxygen by nasal cannula.  Has a nonproductive cough.  COVID screen and influenza screen are negative.  Patient does have a large hiatal hernia    Worsening acute on chronic back pain.  X-ray shows a chronic T6 and T12 compression fracture    History of CVA    Plan    Continue IV Rocephin 1 g every 24 hours for now  CT of the chest without contrast  Blood cultures x 2  Would recommend a JEFF to further evaluate the aortic valve mass-patient would like to speak with her granddaughter first  MRSA of the nares screen  Pneumococcal and Legionella urine antigen  Continue supportive care  A.m. labs  Case discussed with patient and RN at bedside      Nereida Dhillon, [place]  02/25/24  18:26 EST    Note is dictated utilizing voice recognition software/Dragon

## 2024-02-25 NOTE — CONSULTS
Cardiology Consult Note    Patient Identification:  Name: Shruthi Amin  Age: 89 y.o.  Sex: female  :  6/10/1928  MRN: [id number]             Requesting Physician :  Jamir Patrick MD     Reason for Consultation / Chief Complaint :   Abnormal echo     History of Present Illness:        Ms. Shruthi Amin has PMH of    A-fib  [id number] SCORE age over 75, female gender, CHF, hypertension, CVA, vascular disease  [id number] Score: 8 (2024 10:52 AM)     CAD, nonobstructive  Hypertension  Dyslipidemia  History of CVA  GERD, osteoporosis, glaucoma  Back surgery  Family history of heart disease in mother  Allergies/intolerance to penicillin latex and codeine  Former smoker    Presented through emergency room 2024 with complaint of back pain.  Patient has been having shortness of breath.  Denies any chest pain, but has back pain when she takes breath suggestive of pleuritic chest pain..  Was found to have community-acquired pneumonia with moderate right and small left pleural effusion with overlying consolidation.    Data:  EKG done 2023 reviewed/interpreted by me reveals A-fib with a rate of 67 bpm.  Labs 2024 reveal HS troponin 14 and 14.  proBNP 3136.  Normal CMP except mildly elevated bilirubin at 1.3.  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       Assessment:  :     Back pain/pleuritic chest pain  Elevated proBNP  Acute HFpEF probably due to valvular heart disease and right  heart failure  Pleural effusion  Chronic atrial fibrillation, long-term anticoagulation  Hypertension  Pneumonia, pleural effusion           Recommendations / Plan:         Telemetry to monitor rhythm  Diuresis as tolerated  Monitor renal function and urine output    Patient has highly [id number] score of 8 due to female gender, age over 75, CVA, PAD CAD, hypertension, CHF, will benefit from long-term anticoagulation to prevent thromboembolic events.  Patient is on warfarin.  Continue warfarin to keep INR between 2 and 3 as tolerated.  Patient is DNR status we will continue conservative management.  Patient's primary cardiologist will follow up in a.m.                  Diagnosis Plan   1. Acute on chronic congestive heart failure, unspecified heart failure type        2. Dyspnea, unspecified type        3. Urinary tract infection without hematuria, site unspecified        4. Left-sided thoracic back pain, unspecified chronicity        5. Bilateral pleural effusion                   Past Medical History:  Past Medical History:   Diagnosis Date    Atrial fibrillation     Coronary artery disease     Atrial fibrillation    GERD (gastroesophageal reflux disease)     Glaucoma     Hiatal hernia with gastroesophageal reflux     History of osteoporotic pathological fracture     Right intertroch (7/2019)    Hx of compression fracture of spine     T12 and L1(2013); T6 (1/2022)    Hyperlipidemia     Hypertension     Osteoarthritis     Osteoporosis     on prolia(3/21/22)    Stroke     off and on dizziness from the stroke.     Past Surgical History:  Past Surgical History:   Procedure Laterality Date    BACK SURGERY      kyphoplasty    EYE SURGERY      cataract surgery    FIXATION KYPHOPLASTY LUMBAR SPINE  2013    HIP TROCHANTERIC NAILING WITH INTRAMEDULLARY HIP SCREW Right 7/20/2019    Procedure: HIP TROCHANTERIC NAILING SHORT WITH INTRAMEDULLARY HIP SCREW;  Surgeon: Edward Centeno MD;  Location: [facility] MAIN OR;   Service: Orthopedics    RECTAL SURGERY      x 3      Allergies:  Allergies   Allergen Reactions    Penicillins Hives    Codeine Nausea And Vomiting    Latex Rash     Home Meds:  Medications Prior to Admission   Medication Sig Dispense Refill Last Dose    B Complex Vitamins (VITAMIN B COMPLEX PO) Take 1 tablet by mouth Daily. Indications: suppliment   2/21/2024    digoxin (LANOXIN) 125 MCG tablet TAKE 1 TABLET BY MOUTH DAILY. INDICATIONS: CHRONIC ATRIAL FIBRILLATION 90 tablet 1 2/22/2024    docusate sodium (COLACE) 100 MG capsule Take 1 capsule by mouth Every Night.   2/22/2024    furosemide (LASIX) 20 MG tablet Take 1 tablet by mouth As Needed (swelling). Indications: Edema 90 tablet 2     latanoprost (XALATAN) 0.005 % ophthalmic solution Administer 1 drop to the right eye Every Night. Indications: Wide-Angle Glaucoma   2/22/2024    metoprolol succinate XL (TOPROL-XL) 25 MG 24 hr tablet TAKE 1 TABLET BY MOUTH TWICE A  tablet 1 2/23/2024    O2 (OXYGEN) Inhale 2 L/min As Needed. Indications: scar tissue in lungs       potassium chloride (KLOR-CON) 10 MEQ CR tablet Take 1 tablet by mouth As Needed (as needed when taking water pill). ONLY WHEN TAKING WATER PILL 90 tablet 1     Sennosides 25 MG tablet Take 25 mg by mouth 2 (Two) Times a Day As Needed (constipation). Indications: Constipation   2/22/2024    VITAMIN A PO Take 1 tablet by mouth Daily.   2/22/2024    warfarin (COUMADIN) 2 MG tablet Take 2 tablets by mouth. On Monday and Friday.   2/19/2024    warfarin (COUMADIN) 2 MG tablet Take 1 tablet by mouth. On Tues,  Wed, Thurs,  Sat, Sun,   2/22/2024    Zinc Sulfate (ZINC 15 PO) Take 2 tablets by mouth Daily. WITH ELDERBERRY--GUMMIES   Indications: suppliment   2/22/2024    ascorbic acid (VITAMIN C) 1000 MG tablet Take 1 tablet by mouth Daily. Indications: Inadequate Vitamin C        Current Meds:     Current Facility-Administered Medications:     acetaminophen (TYLENOL) tablet 500 mg, 500 mg, Oral, Q6H PRN,  Maribel Ballard APRN, 500 mg at 02/25/24 0459    sennosides-docusate (PERICOLACE) 8.6-50 MG per tablet 2 tablet, 2 tablet, Oral, BID PRN **AND** polyethylene glycol (MIRALAX) packet 17 g, 17 g, Oral, Daily PRN **AND** bisacodyl (DULCOLAX) EC tablet 5 mg, 5 mg, Oral, Daily PRN **AND** bisacodyl (DULCOLAX) suppository 10 mg, 10 mg, Rectal, Daily PRN, Maribel Ballard APRN    Calcium Replacement - Follow Nurse / BPA Driven Protocol, , Does not apply, PRN, Maribel Ballard APRN    cefTRIAXone (ROCEPHIN) 1,000 mg in sodium chloride 0.9 % 100 mL IVPB, 1,000 mg, Intravenous, Q24H, Maribel Ballard APRN, Last Rate: 200 mL/hr at 02/25/24 0855, 1,000 mg at 02/25/24 0855    digoxin (LANOXIN) tablet 125 mcg, 125 mcg, Oral, Daily, Jaimr Patrick MD, 125 mcg at 02/24/24 1308    docusate sodium (COLACE) capsule 100 mg, 100 mg, Oral, Nightly, Jamir Patrick MD, 100 mg at 02/24/24 2005    ketorolac (TORADOL) injection 15 mg, 15 mg, Intravenous, TID, Jamir Patrick MD, 15 mg at 02/25/24 0855    latanoprost (XALATAN) 0.005 % ophthalmic solution 1 drop, 1 drop, Right Eye, Nightly, Jamir Patrick MD, 1 drop at 02/24/24 2010    Magnesium Cardiology Dose Replacement - Follow Nurse / BPA Driven Protocol, , Does not apply, PRN, Maribel Ballard, APRN    metoprolol succinate XL (TOPROL-XL) 24 hr tablet 25 mg, 25 mg, Oral, BID, Jamir Patrick MD, 25 mg at 02/25/24 0855    Pharmacy to dose warfarin, , Does not apply, Continuous PRN, Priyampolskiy, Garri, MD    Phosphorus Replacement - Follow Nurse / BPA Driven Protocol, , Does not apply, Elio MORENO Adrianne P, APRN    Potassium Replacement - Follow Nurse / BPA Driven Protocol, , Does not apply, Elio MORENO Adrianne P, APRN    [COMPLETED] Insert Peripheral IV, , , Once **AND** sodium chloride 0.9 % flush 10 mL, 10 mL, Intravenous, PRN, Lynn Hunter PA    sodium chloride 0.9 % flush 10 mL, 10 mL, Intravenous, Q12H, Maribel Ballard,  "APRN, 10 mL at 24 0856    sodium chloride 0.9 % flush 10 mL, 10 mL, Intravenous, PRN, Jose Ballarde P, APRN    sodium chloride 0.9 % infusion 40 mL, 40 mL, Intravenous, PRN, Maribel Ballard P, APRN    warfarin (COUMADIN) tablet 2 mg, 2 mg, Oral, Once per day on , Jamir Patrick MD, 2 mg at 24 1748    [START ON 2024] warfarin (COUMADIN) tablet 4 mg, 4 mg, Oral, Once per day on , Jamir Patrick MD  Social History:   Social History     Tobacco Use    Smoking status: Former     Types: Cigarettes     Quit date:      Years since quittin.2     Passive exposure: Past    Smokeless tobacco: Never   Substance Use Topics    Alcohol use: No      Family History:  Family History   Problem Relation Age of Onset    Heart disease Mother     Hypertension Mother     Osteoporosis Mother     Cancer Father         colon cancer    Osteoporosis Father     Cancer Sister         lung    Cancer Brother         colon cancer        Review of Systems : Review of Systems   All other systems reviewed and are negative.                 Constitutional:  Temp:  [97.8 °F (36.6 °C)-98.4 °F (36.9 °C)] 97.9 °F (36.6 °C)  Heart Rate:  [60-83] 70  Resp:  [18-22] 20  BP: (109-135)/(55-69) 135/69    Physical Exam   /69 (BP Location: Left arm, Patient Position: Lying)   Pulse 70   Temp 97.9 °F (36.6 °C) (Oral)   Resp 20   Ht 165.1 cm (65\")   Wt 50.3 kg (111 lb)   SpO2 93%   BMI 18.47 kg/m²   Physical Exam  General:  Appears in no acute distress  Eyes: Sclerae are anicteric,  conjunctivae are clear   HEENT:  No JVD. Thyroid not visibly enlarged. No mucosal pallor or cyanosis  Respiratory: Respirations regular and unlabored at rest.  Decreased breath sounds at bases  Cardiovascular: S1,S2, irregularly irregular rate and rhythm.  3/6 holosystolic murmur  Gastrointestinal: Abdomen soft, flat, nontender. Bowel sounds present.   Musculoskeletal:  No " abnormal movements  Extremities: No digital clubbing or cyanosis  Skin: Color pink. Skin warm and dry to touch. No rashes  No xanthoma  Neuro: Alert and awake, no lateralizing deficits appreciated    Cardiographics  ECG: EKG tracing was  personally reviewed/interpreted by me  ECG 12 Lead   ED Interpretation   Lynn Hunter PA     2/23/2024  5:11 PM   ECG 12 Lead         Date/Time: 2/23/2024 2:19 PM      Performed by: Lynn Hunter PA   Authorized by: Willy Moraes MD  Interpreted by ED physician   Previous ECG: no previous ECG available   Rhythm: atrial fibrillation   Ectopy: PVCs   Rate: normal   BPM: 67   Conduction: conduction normal   Clinical impression: non-specific ECG      ECG 12 Lead Chest Pain   Final Result   HEART RATE= 67  bpm   RR Interval= 897  ms   NE Interval=   ms   P Horizontal Axis=   deg   P Front Axis=   deg   QRSD Interval= 95  ms   QT Interval= 383  ms   QTcB= 404  ms   QRS Axis= 168  deg   T Wave Axis= -18  deg   - ABNORMAL ECG -   Atrial fibrillation   Ventricular premature complex   Anteroseptal infarct, age indeterminate   When compared with ECG of 01-Oct-2022 20:22:21,   No significant change   Electronically Signed By: Willy Moraes (Juve) 24-Feb-2024 09:19:29   Date and Time of Study: 2024-02-23 14:16:51          Telemetry: A-fib    Echocardiogram:   Results for orders placed during the hospital encounter of 02/23/24    Adult Transthoracic Echo Complete W/ Cont if Necessary Per Protocol    Interpretation Summary    Left ventricular ejection fraction appears to be 51 - 55%.    The right ventricular cavity is dilated.    The left atrial cavity is severely dilated.    The right atrial cavity is severely  dilated.    There is a mass on the aortic valve.    Severe mitral valve regurgitation is present.    Severe tricuspid valve regurgitation is present.    Estimated right ventricular systolic pressure from tricuspid regurgitation is mildly elevated (35-45  mmHg).    Conclusion      Normal LV size with lower limits of normal  LV contractility EF of 50 to 55%  Severe RV enlargement seen  Severe left atrial enlargement seen.  Severe right atrial enlargement seen.  Aortic valve is trileaflet.  There is a mobile density.  There is mobile density floating in and out in short axis of attic valve probably vegetation cannot rule out aortic plaque.  Would recommend JEFF to better evaluate.  Mitral valve appears structurally normal.  There is eccentric severe mitral regurgitation seen.  Tricuspid valve appears structurally normal, there is severe tricuspid regurgitation seen.  Calculated RV systolic pressure of 38 mmHg  Pulmonic valve appears structurally normal.  Mild pulmonic regurgitation seen.  No pericardial effusion seen.  Proximal aorta appears normal in size.      Imaging  Chest X-ray:   Imaging Results (Last 24 Hours)       ** No results found for the last 24 hours. **            Lab Review: I have reviewed the labs  Results from last 7 days   Lab Units 02/23/24  1657 02/23/24  1344   HSTROP T ng/L 14* 14*         Results from last 7 days   Lab Units 02/25/24  0355   SODIUM mmol/L 139   POTASSIUM mmol/L 4.5   BUN mg/dL 30*   CREATININE mg/dL 0.80   CALCIUM mg/dL 9.3         Results from last 7 days   Lab Units 02/23/24  1344   PROBNP pg/mL 3,136.0*     Results from last 7 days   Lab Units 02/25/24  0355 02/24/24  0133 02/23/24  1344   WBC 10*3/mm3 8.60 9.00 7.80   HEMOGLOBIN g/dL 13.0 12.8 12.0   HEMATOCRIT % 39.3 38.4 36.8   PLATELETS 10*3/mm3 160 169 163     Results from last 7 days   Lab Units 02/25/24  0355 02/24/24  1246 02/24/24  0133   INR  2.23 2.28 2.51             Nadir Harris MD  2/25/2024, 10:52 EST      EMR Dragon/Transcription:   Dictated utilizing Dragon dictation

## 2024-02-25 NOTE — PLAN OF CARE
Goal Outcome Evaluation:  Patient is pleasant. C/O lower back pain-tx with scheduled Toradol. Cardiology consulted-echo results. ID consult- Echo results and IV abx management for PNA. Patient is on RA, 2L O2 @bedside PRN for exacerbation. No further complaints. Will continue to monitor.

## 2024-02-25 NOTE — PLAN OF CARE
Goal Outcome Evaluation:  Plan of Care Reviewed With: patient, family     Outcome Evaluation: Pt is a 89 y/o female admitted to [facility] on 2/23/24 with mid back pain which radiating to the upper abdomen. PMH of A-fib, CAD, GERD, osteo-, HTN, and CHF. XR chest Cardiomegaly with central pulmonary vascular congestion and bilateral perihilar haziness. XR spine no new fracture. PLOF Pt resides with adult daughter in [facility] with x1 LINO. Pt dependent for community mobility/driving, and adult daughter assists with shopping (IADLs). Pt requires min assist to complete some dressing and bathing tasks, utilizes shower stool, and mod I using rollator and walker to ambulate throughout home. Pt with good family support and able to provide 24/7 assist. Pt A&O x4 wiht no reprots of pain, presenting on 2L O2 NC, and vital WNL. Pt completed bed mobiltiy with SBA and comes to standing with CGA and FWW. Pt ambulated to bathroom, where she completed quique-care and toielt hygeine with min asist. Pt noted with forwards flexed posture, requires verbal cues for increased head/neck extension. Pt noted with decreased activity tolerance, exhibiting some SOA requiring increased time to complete ADL activity. Based on assessment, pt would benefit from continued skilled OT services while admitted for increased activity tolerance in preparation for ADLs for a safe discharge home. OT recommends home with family assist when medically appropraite for d/c.    Anticipated Discharge Disposition (OT): home with assist

## 2024-02-25 NOTE — CONSULTS
Nutrition Services    Patient Name: Shruthi Amin  YOB: 1928  MRN: [id number]  Admission date: 2/23/2024    Comment:  -- Add Boost Plus BID (Provides 720 kcals, 28 g protein if consumed)   Boost Glucose Control may be substituted for Boost Plus at this time, due to national shortage of many ONS products. If substituted, each Boost Glucose Control will provide 190 kcal and 16g PRO.    -- Severe chronic disease related malnutrition related to inadequate oral intake in the context of CHF as evidenced by physical exam revealing overall severe muscle/fat loss with po intake providing less than 75% of needs for at least 1 month PTA.  See MSA below.        CLINICAL NUTRITION ASSESSMENT      Reason for Assessment 2/25: BMI less than 19     H&P      Past Medical History:   Diagnosis Date    Atrial fibrillation     Coronary artery disease     Atrial fibrillation    GERD (gastroesophageal reflux disease)     Glaucoma     Hiatal hernia with gastroesophageal reflux     History of osteoporotic pathological fracture     Right intertroch (7/2019)    Hx of compression fracture of spine     T12 and L1(2013); T6 (1/2022)    Hyperlipidemia     Hypertension     Osteoarthritis     Osteoporosis     on prolia(3/21/22)    Stroke     off and on dizziness from the stroke.       Past Surgical History:   Procedure Laterality Date    BACK SURGERY      kyphoplasty    EYE SURGERY      cataract surgery    FIXATION KYPHOPLASTY LUMBAR SPINE  2013    HIP TROCHANTERIC NAILING WITH INTRAMEDULLARY HIP SCREW Right 7/20/2019    Procedure: HIP TROCHANTERIC NAILING SHORT WITH INTRAMEDULLARY HIP SCREW;  Surgeon: Edward Centeno MD;  Location: [facility] MAIN OR;  Service: Orthopedics    RECTAL SURGERY      x 3        Current Problems   Pneumonia     Back pain  - PT/OT consulted      A-fib  CHF  - Cardiology consulted      CAD     GERD     HTN     Osteo       Encounter Information        Trending Narrative     2/25: Admitted for back  "pain, SOB.  RD visited patient at bedside.  Patient reports ok appetite, no N/V, no chewing/swallowing issue.  Patient worried about her oxygen status, RN placed pulse-ox to show patient she is in the 90s.  Patient without oxygen at this time but reports she has oxygen at home.  NFPE completed, consistent with nutrition diagnosis of malnutrition using AND/ASPEN criteria. See MSA below. Patient declines Boost at time of RD visit due to eating breakfast but wants one later.  Patient requested water and RD obtained.         Anthropometrics        Current Height, Weight Height: 165.1 cm (65\")  Weight: 50.3 kg (111 lb) (02/24/24 1029)       Usual Body Weight (UBW) Unable to obtain from patient        Trending Weight Hx     This admission: 2/25: 111#             PTA: Stable weight history     Wt Readings from Last 30 Encounters:   02/24/24 1029 50.3 kg (111 lb)   02/24/24 0204 50.8 kg (111 lb 15.9 oz)   02/23/24 1252 49.4 kg (109 lb)   11/29/23 1157 50.8 kg (112 lb)   08/03/23 1340 52.2 kg (115 lb)   05/09/23 1602 52.6 kg (116 lb)   01/31/23 1134 52.6 kg (116 lb)   12/02/22 1131 49.4 kg (109 lb)   11/21/22 1103 49 kg (108 lb)   11/14/22 1143 50.3 kg (111 lb)   11/11/22 0950 49.9 kg (110 lb)   11/21/22 1024 49.2 kg (108 lb 7.8 oz)   11/20/22 1203 49.7 kg (109 lb 9.8 oz)   11/19/22 1043 50 kg (110 lb 2.3 oz)   11/18/22 1516 60.8 kg (134 lb 0.3 oz)   11/18/22 1515 61 kg (134 lb 5.9 oz)   11/18/22 1052 49.6 kg (109 lb 5.6 oz)   11/18/22 1051 4.65 kg (10 lb 4 oz)   11/17/22 1010 50.4 kg (111 lb 1.1 oz)   11/16/22 1011 49.8 kg (109 lb 13 oz)   11/15/22 0959 49.5 kg (109 lb 0.6 oz)   11/14/22 1113 49.2 kg (108 lb 9.2 oz)   11/14/22 1105 49.6 kg (109 lb 5.9 oz)   11/13/22 0954 48.9 kg (107 lb 11.5 oz)   11/12/22 2300 61.3 kg (135 lb 0.9 oz)   11/12/22 1019 49.2 kg (108 lb 7.8 oz)   11/11/22 1017 49.6 kg (109 lb 6.6 oz)   11/11/22 0744 55.6 kg (122 lb 8.9 oz)   11/10/22 1008 49.9 kg (109 lb 15.1 oz)   11/10/22 1008 49.9 kg (110 lb " 1.6 oz)   11/09/22 1840 56.4 kg (124 lb 5.4 oz)   11/09/22 0932 49.7 kg (109 lb 10.2 oz)   11/09/22 0932 50 kg (110 lb 2.6 oz)   11/08/22 2255 60.8 kg (134 lb 2.1 oz)   11/08/22 2255 60.5 kg (133 lb 4.3 oz)   11/08/22 2254 60.3 kg (133 lb 0.1 oz)   11/08/22 2253 60.4 kg (133 lb 3.9 oz)   11/08/22 1015 51.5 kg (113 lb 8.9 oz)   11/08/22 1014 51.5 kg (113 lb 9.3 oz)   11/07/22 1428 50.1 kg (110 lb 8.3 oz)   11/07/22 1427 50.7 kg (111 lb 11.7 oz)   11/04/22 1143 49.9 kg (110 lb)   11/03/22 1416 48.1 kg (106 lb)   10/29/22 1239 50.3 kg (111 lb)   10/27/22 1447 51.7 kg (114 lb)   10/06/22 0402 51.3 kg (113 lb 1.5 oz)   10/05/22 0355 48.2 kg (106 lb 4.2 oz)   10/04/22 0346 51.8 kg (114 lb 3.2 oz)   10/03/22 0335 50 kg (110 lb 3.7 oz)   10/03/22 0112 50 kg (110 lb 3.7 oz)   10/02/22 1103 49.9 kg (110 lb)   10/01/22 1940 49.9 kg (110 lb 0.2 oz)   09/22/22 1354 51.2 kg (112 lb 12.8 oz)   07/18/22 1252 49.4 kg (109 lb)   05/03/22 1422 50.8 kg (112 lb)   04/19/22 1420 50.3 kg (111 lb)   03/30/22 1130 50.3 kg (111 lb)   03/22/22 1339 50.7 kg (111 lb 12.8 oz)   03/21/22 1345 50.3 kg (111 lb)   03/14/22 1407 50.3 kg (111 lb)   03/11/22 1025 49.9 kg (110 lb)   02/25/22 1353 52.2 kg (115 lb)   02/23/22 1514 49.4 kg (109 lb)   02/17/22 1155 52.2 kg (115 lb)   02/10/22 1223 52.2 kg (115 lb)   02/01/22 1022 52.2 kg (115 lb)   01/29/22 1442 52.5 kg (115 lb 11.9 oz)      BMI kg/m2 Body mass index is 18.47 kg/m².       Labs        Pertinent Labs    Results from last 7 days   Lab Units 02/25/24  0355 02/24/24  0133 02/23/24  1344   SODIUM mmol/L 139 141 140   POTASSIUM mmol/L 4.5 4.2 4.7   CHLORIDE mmol/L 101 100 103   CO2 mmol/L 28.0 31.0* 29.0   BUN mg/dL 30* 21 20   CREATININE mg/dL 0.80 0.69 0.66   CALCIUM mg/dL 9.3 8.9 9.1   BILIRUBIN mg/dL  --  0.9 1.3*   ALK PHOS U/L  --  81 78   ALT (SGPT) U/L  --  17 16   AST (SGOT) U/L  --  29 30   GLUCOSE mg/dL 99 111* 92     Results from last 7 days   Lab Units 02/25/24  0355   HEMOGLOBIN g/dL  "13.0   HEMATOCRIT % 39.3     No results found for: \"HGBA1C\"     Medications    Scheduled Medications cefTRIAXone, 1,000 mg, Intravenous, Q24H  digoxin, 125 mcg, Oral, Daily  docusate sodium, 100 mg, Oral, Nightly  ketorolac, 15 mg, Intravenous, TID  latanoprost, 1 drop, Right Eye, Nightly  metoprolol succinate XL, 25 mg, Oral, BID  sodium chloride, 10 mL, Intravenous, Q12H  warfarin, 2 mg, Oral, Once per day on Sunday Tuesday Wednesday Thursday Saturday  [START ON 2/26/2024] warfarin, 4 mg, Oral, Once per day on Monday Friday        Infusions Pharmacy to dose warfarin,         PRN Medications   acetaminophen    senna-docusate sodium **AND** polyethylene glycol **AND** bisacodyl **AND** bisacodyl    Calcium Replacement - Follow Nurse / BPA Driven Protocol    Magnesium Cardiology Dose Replacement - Follow Nurse / BPA Driven Protocol    Pharmacy to dose warfarin    Phosphorus Replacement - Follow Nurse / BPA Driven Protocol    Potassium Replacement - Follow Nurse / BPA Driven Protocol    [COMPLETED] Insert Peripheral IV **AND** sodium chloride    sodium chloride    sodium chloride     Physical Findings        Trending Physical   Appearance, NFPE 2/25: NFPE completed, consistent with nutrition diagnosis of malnutrition using AND/ASPEN criteria. See MSA below.      --  Edema  No edema documented      Bowel Function Last documented BM 2/23 (x 2 days ago)     Tubes No feeding tube      Chewing/Swallowing Patient denies issues      Skin Intact      --  Current Nutrition Orders & Evaluation of Intake       Oral Nutrition     Food Allergies NKFA   Current PO Diet Diet: Regular/House Diet; Texture: Regular Texture (IDDSI 7); Fluid Consistency: Thin (IDDSI 0)   Supplement None ordered    PO Evaluation     Trending % PO Intake 2/25: 67% average PO intakes x 3 documented meals since admission    --  Nutritional Risk Screening        NRS-2002 Score          Nutrition Diagnosis         Nutrition Dx Problem 1 Severe chronic disease " related malnutrition related to inadequate oral intake in the context of CHF as evidenced by physical exam revealing overall severe muscle/fat loss with po intake providing less than 75% of needs for at least 1 month PTA.      Nutrition Dx Problem 2        Intervention Goal         Intervention Goal(s) Accept ONS  No weight loss  PO intakes at least 75%     Nutrition Intervention        RD Action Add ONS  Completed NFPE  Obtained water for patient      Nutrition Prescription          Diet Prescription Regular    Supplement Prescription Boost Plus BID    --  Monitor/Evaluation        Monitor Per protocol, I&O, PO intake, Supplement intake, Pertinent labs, Weight, Skin status, GI status, Symptoms, POC/GOC     Malnutrition Severity Assessment      Patient meets criteria for : Severe Malnutrition  Malnutrition Type (last 8 hours)       Malnutrition Severity Assessment       Row Name 02/25/24 0725       Malnutrition Severity Assessment    Malnutrition Type Chronic Disease - Related Malnutrition      Row Name 02/25/24 0725       Insufficient Energy Intake     Insufficient Energy Intake Findings Severe    Insufficient Energy Intake  <75% of est. energy requirement for > or equal to 1 month      Row Name 02/25/24 0725       Muscle Loss    Loss of Muscle Mass Findings Severe    [place] Region Severe - deep hollowing/scooping, lack of muscle to touch, facial bones well defined    Clavicle Bone Region Severe - protruding prominent bone    Acromion Bone Region Severe - squared shoulders, bones, and acromion process protrusion prominent    Scapular Bone Region Severe - prominent bones, depressions easily visible between ribs, scapula, spine, shoulders    Dorsal Hand Region Severe - prominent depression    Patellar Region Severe - prominent bone, square looking, very little muscle definition    Anterior Thigh Region Severe - line/depression along thigh, obviously thin    Posterior Calf Region Severe - thin with very little  definition/firmness      Row Name 02/25/24 0725       Fat Loss    Subcutaneous Fat Loss Findings Severe    Orbital Region  Severe - pronounced hollowness/depression, dark circles, loose saggy skin    Upper Arm Region Severe - mostly skin, very little space between folds, fingers touch    Thoracic & Lumbar Region Severe - ribs visible with prominent depressions, iliac crest very prominent      Row Name 02/25/24 0725       Criteria Met (Must meet criteria for severity in at least 2 of these categories: M Wasting, Fat Loss, Fluid, Secondary Signs, Wt. Status, Intake)    Patient meets criteria for  Severe Malnutrition                     Electronically signed by:  Kristen Almonte RD  02/25/24 07:12 EST

## 2024-02-25 NOTE — THERAPY EVALUATION
Patient Name: Shruthi Amin  : 6/10/1928    MRN: [id number]                              Today's Date: 2024       Admit Date: 2024    Visit Dx:     ICD-10-CM ICD-9-CM   1. Acute on chronic congestive heart failure, unspecified heart failure type  I50.9 428.0   2. Dyspnea, unspecified type  R06.00 786.09   3. Urinary tract infection without hematuria, site unspecified  N39.0 599.0   4. Left-sided thoracic back pain, unspecified chronicity  M54.6 724.1   5. Bilateral pleural effusion  J90 511.9     Patient Active Problem List   Diagnosis    Atrial fibrillation    Chronic anticoagulation    Cerebrovascular accident (CVA)    Hiatal hernia    Hyperlipidemia    Hypertension    Osteoarthritis    Compression fracture of thoracic vertebra with delayed healing    History of osteoporotic pathological fracture    Acute on chronic congestive heart failure, unspecified heart failure type    Severe malnutrition    Nonrheumatic mitral valve regurgitation    Back pain     Past Medical History:   Diagnosis Date    Atrial fibrillation     Coronary artery disease     Atrial fibrillation    GERD (gastroesophageal reflux disease)     Glaucoma     Hiatal hernia with gastroesophageal reflux     History of osteoporotic pathological fracture     Right intertroch (2019)    Hx of compression fracture of spine     T12 and L1(); T6 (2022)    Hyperlipidemia     Hypertension     Osteoarthritis     Osteoporosis     on prolia(3/21/22)    Stroke     off and on dizziness from the stroke.     Past Surgical History:   Procedure Laterality Date    BACK SURGERY      kyphoplasty    EYE SURGERY      cataract surgery    FIXATION KYPHOPLASTY LUMBAR SPINE      HIP TROCHANTERIC NAILING WITH INTRAMEDULLARY HIP SCREW Right 2019    Procedure: HIP TROCHANTERIC NAILING SHORT WITH INTRAMEDULLARY HIP SCREW;  Surgeon: Edward Centeno MD;  Location: [facility] MAIN OR;  Service: Orthopedics    RECTAL SURGERY      x 3      General  Information       Row Name 02/25/24 1349          Physical Therapy Time and Intention    Document Type evaluation  -     Mode of Treatment physical therapy  -       Row Name 02/25/24 1349          General Information    Patient Profile Reviewed yes  -SS     Prior Level of Function min assist:;bathing;dressing;dependent:;shopping;cleaning;cooking;independent:;all household mobility  mod I for household ambulation with RW; rollator in community, daughter assists with ADLs and I ADLs  -       Row Name 02/25/24 1349          Living Environment    People in Home child(juan francisco), adult  daughter lives with her, if daughter is gone, someone will be with her  -       Row Name 02/25/24 1349          Home Main Entrance    Number of Stairs, Main Entrance one  -       Row Name 02/25/24 1349          Stairs Within Home, Primary    Number of Stairs, Within Home, Primary none  -       Row Name 02/25/24 1349          Cognition    Orientation Status (Cognition) oriented x 4  -       Row Name 02/25/24 1349          Safety Issues, Functional Mobility    Impairments Affecting Function (Mobility) endurance/activity tolerance  -               User Key  (r) = Recorded By, (t) = Taken By, (c) = Cosigned By      Initials Name Provider Type    SS Tasia Magallanes, PT Physical Therapist                   Mobility       Row Name 02/25/24 1353          Bed Mobility    Bed Mobility bed mobility (all) activities  -     All Activities, [place] (Bed Mobility) contact guard  -       Row Name 02/25/24 1353          Sit-Stand Transfer    Sit-Stand [place] (Transfers) contact guard  -     Assistive Device (Sit-Stand Transfers) walker, front-wheeled  -       Row Name 02/25/24 1353          Gait/Stairs (Locomotion)    [place] Level (Gait) contact guard  -     Assistive Device (Gait) walker, front-wheeled  -SS     Distance in Feet (Gait) 20'  -     Deviations/Abnormal Patterns (Gait) gait speed decreased  -      Bilateral Gait Deviations forward flexed posture  -     Comment, (Gait/Stairs) fatigues quickly  -               User Key  (r) = Recorded By, (t) = Taken By, (c) = Cosigned By      Initials Name Provider Type    Tasia Bartlett PT Physical Therapist                   Obj/Interventions       Row Name 02/25/24 [facility]3          Range of Motion Comprehensive    Comment, General Range of Motion B hip AROM decreased, requires assist with lower body dresing due to decreased hip ER;otherwise LE ROM WFL  -       Row Name 02/25/24 [facility]5          Strength Comprehensive (MMT)    Comment, General Manual Muscle Testing (MMT) Assessment B LE >3/5  -       Row Name 02/25/24 [facility]5          Balance    Balance Assessment sitting static balance;standing static balance  -     Static Sitting Balance supervision  -     Static Standing Balance contact guard  -       Row Name 02/25/24 [facility]8          Sensory Assessment (Somatosensory)    Sensory Assessment (Somatosensory) sensation intact  -               User Key  (r) = Recorded By, (t) = Taken By, (c) = Cosigned By      Initials Name Provider Type    Tasia Bartlett PT Physical Therapist                   Goals/Plan       Row Name 02/25/24 5928          Bed Mobility Goal 1 (PT)    Activity/Assistive Device (Bed Mobility Goal 1, PT) bed mobility activities, all  -     [place] Level/Cues Needed (Bed Mobility Goal 1, PT) modified independence  -SS     Time Frame (Bed Mobility Goal 1, PT) long term goal (LTG);2 weeks  -     Strategies/Barriers (Bed Mobility Goal 1, PT) with log roll technique to reduce back pain  -       Row Name 02/25/24 8865          Transfer Goal 1 (PT)    Activity/Assistive Device (Transfer Goal 1, PT) transfers, all;walker, rolling  -     [place] Level/Cues Needed (Transfer Goal 1, PT) modified independence  -SS     Time Frame (Transfer Goal 1, PT) long term goal (LTG);2 weeks  -       Row Name 02/25/24 1358          Gait  Training Goal 1 (PT)    Activity/Assistive Device (Gait Training Goal 1, PT) gait (walking locomotion);walker, rolling  -SS     [place] Level (Gait Training Goal 1, PT) modified independence  -SS     Distance (Gait Training Goal 1, PT) 75'  -SS     Time Frame (Gait Training Goal 1, PT) long term goal (LTG);2 weeks  -       Row Name 02/25/24 2019          Therapy Assessment/Plan (PT)    Planned Therapy Interventions (PT) balance training;bed mobility training;gait training;home exercise program;strengthening;patient/family education;transfer training  -               User Key  (r) = Recorded By, (t) = Taken By, (c) = Cosigned By      Initials Name Provider Type     Tasia Magallanes, PT Physical Therapist                   Clinical Impression       Row Name 02/25/24 1359          Pain    Additional Documentation Pain Scale: FACES Pre/Post-Treatment (Group)  -       Row Name 02/25/24 5152          Pain Scale: FACES Pre/Post-Treatment    Pain: FACES Scale, Pretreatment 4-->hurts little more  -     Posttreatment Pain Rating 4-->hurts little more  -       Row Name 02/25/24 1408 02/25/24 2021       Plan of Care Review    Plan of Care Reviewed With -- patient  -SS    Outcome Evaluation 89 y/o F who presented with c/o new onset back pain diagnosed with PNA. PMH CHF, a fib. Patient lives with daughter who is her primary caregiver but us currently out of town. Her son and granddaughter assist also and pt is not left alone. Patient's daughter performs cooking and cleaning, assists with dressing as needed, is present with bathing. Pt uses shower stool. Pt denies falls since 5 years ago when she experienced a femur fracture. She uses a RW for ambulation. She has 1 LINO her 1 story home. She was able to perform bed mobility with CGA with cues for log roll. Max A for LBD. CGA for transfer. Ambulation 20' with RW with decreased gait speed, forward flexed posture, fatigues quickly. Anticipate patient will be safe  for d/c home with family assist. She is agreeable to HHPT due to decreased endurance.  - --      Row Name 02/25/24 1356          Therapy Assessment/Plan (PT)    Rehab Potential (PT) good, to achieve stated therapy goals  -     Criteria for Skilled Interventions Met (PT) yes;meets criteria  -     Therapy Frequency (PT) 3 times/wk  -     Predicted Duration of Therapy Intervention (PT) until d/c  -SS       Row Name 02/25/24 1356          Positioning and Restraints    Pre-Treatment Position in bed  -SS     Post Treatment Position chair  -SS     In Chair exit alarm on  -               User Key  (r) = Recorded By, (t) = Taken By, (c) = Cosigned By      Initials Name Provider Type     Tasia Magallanes, PT Physical Therapist                   Outcome Measures       Row Name 02/25/24 0813          How much help from another person do you currently need...    Turning from your back to your side while in flat bed without using bedrails? 3  -MD     Moving from lying on back to sitting on the side of a flat bed without bedrails? 3  -MD     Moving to and from a bed to a chair (including a wheelchair)? 3  -MD     Standing up from a chair using your arms (e.g., wheelchair, bedside chair)? 3  -MD     Climbing 3-5 steps with a railing? 2  -MD     To walk in hospital room? 3  -MD     AM-PAC 6 Clicks Score (PT) 17  -MD     Highest Level of Mobility Goal 5 --> Static standing  -MD               User Key  (r) = Recorded By, (t) = Taken By, (c) = Cosigned By      Initials Name Provider Type    Marilyn Quiñonez, RN Registered Nurse                                 Physical Therapy Education       Title: PT OT SLP Therapies (Done)       Topic: Physical Therapy (Done)       Point: Mobility training (Done)       Learning Progress Summary             Patient Acceptance, E, VU by  at 2/25/2024 1402                                         User Key       Initials Effective Dates Name Provider Type [facility] 06/16/21 -   Tasia Magallanes, PT Physical Therapist PT                  PT Recommendation and Plan  Planned Therapy Interventions (PT): balance training, bed mobility training, gait training, home exercise program, strengthening, patient/family education, transfer training  Plan of Care Reviewed With: patient  Outcome Evaluation: 89 y/o F who presented with c/o new onset back pain diagnosed with PNA. PMH CHF, a fib. Patient lives with daughter who is her primary caregiver but us currently out of town. Her son and granddaughter assist also and pt is not left alone. Patient's daughter performs cooking and cleaning, assists with dressing as needed, is present with bathing. Pt uses shower stool. Pt denies falls since 5 years ago when she experienced a femur fracture. She uses a RW for ambulation. She has 1 LINO her 1 story home. She was able to perform bed mobility with CGA with cues for log roll. Max A for LBD. CGA for transfer. Ambulation 20' with RW with decreased gait speed, forward flexed posture, fatigues quickly. Anticipate patient will be safe for d/c home with family assist. She is agreeable to HHPT due to decreased endurance.     Time Calculation:   PT Evaluation Complexity  History, PT Evaluation Complexity: 3 or more personal factors and/or comorbidities  Examination of Body Systems (PT Eval Complexity): total of 3 or more elements  Clinical Presentation (PT Evaluation Complexity): evolving  Clinical Decision Making (PT Evaluation Complexity): moderate complexity  Overall Complexity (PT Evaluation Complexity): moderate complexity     PT Charges       Row Name 02/25/24 1402             Time Calculation    Start Time 1130  -SS      Stop Time 1201  -SS      Time Calculation (min) 31 min  -SS      PT Received On 02/25/24  -      PT - Next Appointment 02/27/24  -      PT Goal Re-Cert Due Date 03/10/24  -         Time Calculation- PT    Total Timed Code Minutes- PT 0 minute(s)  -SS                User Key  (r) =  Recorded By, (t) = Taken By, (c) = Cosigned By      Initials Name Provider Type    SS Tasia Magallanes, PT Physical Therapist                  Therapy Charges for Today       Code Description Service Date Service Provider Modifiers Qty    [id number] HC PT EVAL MOD COMPLEXITY 4 2/25/2024 Tasia Magallanes, PT GP 1            PT G-Codes  AM-PAC 6 Clicks Score (PT): 17  PT Discharge Summary  Anticipated Discharge Disposition (PT): home with home health    Tasia Magallanes, PT  2/25/2024

## 2024-02-25 NOTE — PROGRESS NOTES
"    [facility] MEDICINE SERVICE  DAILY PROGRESS NOTE    NAME: Shruthi Amin  : 6/10/1928  MRN: [id number]      LOS: 2 days     PROVIDER OF SERVICE: Jamir Patrick MD    Chief Complaint: <principal problem not specified>     SUBJECTIVE     Patient was seen and evaluated bedside.  Her family is present.  Patient complaining of back pain again today.    Echocardiogram showed an ejection fraction 50-55%.  Echocardiogram also showed severe right ventricular enlargement with a possible vegetation.  Patient may need a transesophageal echocardiogram to evaluate these vegetations.  Cardiology consult.    OBJECTIVE   /62 (BP Location: Left arm, Patient Position: Lying)   Pulse 66   Temp 97.7 °F (36.5 °C) (Oral)   Resp 20   Ht 165.1 cm (65\")   Wt 50.3 kg (111 lb)   SpO2 92%   BMI 18.47 kg/m²         Scheduled Meds   cefTRIAXone, 1,000 mg, Intravenous, Q24H  digoxin, 125 mcg, Oral, Daily  docusate sodium, 100 mg, Oral, Nightly  ketorolac, 15 mg, Intravenous, TID  latanoprost, 1 drop, Right Eye, Nightly  metoprolol succinate XL, 25 mg, Oral, BID  sodium chloride, 10 mL, Intravenous, Q12H  warfarin, 2 mg, Oral, Once per day on   [START ON 2024] warfarin, 4 mg, Oral, Once per day on        PRN Meds     acetaminophen    senna-docusate sodium **AND** polyethylene glycol **AND** bisacodyl **AND** bisacodyl    Calcium Replacement - Follow Nurse / BPA Driven Protocol    Magnesium Cardiology Dose Replacement - Follow Nurse / BPA Driven Protocol    Pharmacy to dose warfarin    Phosphorus Replacement - Follow Nurse / BPA Driven Protocol    Potassium Replacement - Follow Nurse / BPA Driven Protocol    [COMPLETED] Insert Peripheral IV **AND** sodium chloride    sodium chloride    sodium chloride   Infusions  Pharmacy to dose warfarin,           EXAM:    General: Not in any acute distress  HEENT: Normocephalic, atraumatic  Neck: Supple, No JVD  CV: " Regular rate and rhythm, S1 and S2 are normal, no murmurs/rubs/or gallops  Lungs: Clear to auscultation bilaterally, no rales/rhonchi/wheezes  Abdomen: Soft, non-distended, non-tender, bowel sounds present  EXT: No edema of lower extremities  Neuro: Cranial nerves II through XII intact  Skin: Intact, no rashes, no lesions, no erythema    Medications reviewed: yes.  Labs reviewed: yes.    Result Review:  I have personally reviewed the results from the time of this admission to 2/25/2024 14:37 EST and agree with these findings:  [x]  Laboratory  [x]  Microbiology  [x]  Radiology  []  EKG/Telemetry   []  Cardiology/Vascular   []  Pathology  []  Old records  []  Other:      ASSESSMENT/PLAN     Community-acquired pneumonia  Imaging showed moderate right and small left pleural effusion with overlying consolidation due to atelectasis or infiltrates  Rocephin start date February 23  Supplemental oxygen    Severe right ventricular enlargement with a possible vegetation seen on echocardiogram  May need a transesophageal echocardiogram  Cardiology consulted    Back pain  Acetaminophen  Physical therapy and Occupational Therapy consult and evaluation appreciated    Atrial fibrillation chronic  Chronic and stable  Coumadin; pharmacy to dose  Digoxin    Congestive heart failure  Echocardiogram with normal EF of 50-55 %    CAD  Chronic and stable    Venous thromboembolism prophylaxis: Coumadin  Code: Full

## 2024-02-25 NOTE — PLAN OF CARE
Goal Outcome Evaluation:  Plan of Care Reviewed With: patient              89 y/o F who presented with c/o new onset back pain diagnosed with PNA. PMH CHF, a fib. Patient lives with daughter who is her primary caregiver but us currently out of town. Her son and granddaughter assist also and pt is not left alone. Patient's daughter performs cooking and cleaning, assists with dressing as needed, is present with bathing. Pt uses shower stool. Pt denies falls since 5 years ago when she experienced a femur fracture. She uses a RW for ambulation. She has 1 LINO her 1 story home. She was able to perform bed mobility with CGA with cues for log roll. Max A for LBD. CGA for transfer. Ambulation 20' with RW with decreased gait speed, forward flexed posture, fatigues quickly. Anticipate patient will be safe for d/c home with family assist. She is agreeable to HHPT due to decreased endurance.     Anticipated Discharge Disposition (PT): home with home health

## 2024-02-25 NOTE — THERAPY EVALUATION
Patient Name: Shruthi Amin  : 6/10/1928    MRN: [id number]                              Today's Date: 2024       Admit Date: 2024    Visit Dx:     ICD-10-CM ICD-9-CM   1. Acute on chronic congestive heart failure, unspecified heart failure type  I50.9 428.0   2. Dyspnea, unspecified type  R06.00 786.09   3. Urinary tract infection without hematuria, site unspecified  N39.0 599.0   4. Left-sided thoracic back pain, unspecified chronicity  M54.6 724.1   5. Bilateral pleural effusion  J90 511.9     Patient Active Problem List   Diagnosis    Atrial fibrillation    Chronic anticoagulation    Cerebrovascular accident (CVA)    Hiatal hernia    Hyperlipidemia    Hypertension    Osteoarthritis    Compression fracture of thoracic vertebra with delayed healing    History of osteoporotic pathological fracture    Acute on chronic congestive heart failure, unspecified heart failure type    Severe malnutrition    Nonrheumatic mitral valve regurgitation    Back pain     Past Medical History:   Diagnosis Date    Atrial fibrillation     Coronary artery disease     Atrial fibrillation    GERD (gastroesophageal reflux disease)     Glaucoma     Hiatal hernia with gastroesophageal reflux     History of osteoporotic pathological fracture     Right intertroch (2019)    Hx of compression fracture of spine     T12 and L1(); T6 (2022)    Hyperlipidemia     Hypertension     Osteoarthritis     Osteoporosis     on prolia(3/21/22)    Stroke     off and on dizziness from the stroke.     Past Surgical History:   Procedure Laterality Date    BACK SURGERY      kyphoplasty    EYE SURGERY      cataract surgery    FIXATION KYPHOPLASTY LUMBAR SPINE      HIP TROCHANTERIC NAILING WITH INTRAMEDULLARY HIP SCREW Right 2019    Procedure: HIP TROCHANTERIC NAILING SHORT WITH INTRAMEDULLARY HIP SCREW;  Surgeon: Edward Centeno MD;  Location: [facility] MAIN OR;  Service: Orthopedics    RECTAL SURGERY      x 3      General  Information       Row Name 02/25/24 1500          OT Time and Intention    Document Type evaluation  -SP     Mode of Treatment occupational therapy  -SP       Row Name 02/25/24 1500          General Information    Patient Profile Reviewed yes  -SP     Prior Level of Function min assist:;bathing;dressing;dependent:;driving;shopping;independent:;all household mobility  Pt's adult daughter drives and completes shopping tasks, pt requires min assist for bathing/dressing, and pt utilizes rollator and walker for ambulation wihtin home  -SP       Row Name 02/25/24 1500          Occupational Profile    Reason for Services/Referral (Occupational Profile) Pt is a 89 y/o female admitted to [facility] on 2/23/24 with mid back pain which radiates to the upper abdomen. PMH of A-fib, CAD, GERD, osteo-, HTN, and CHF. XR chest Cardiomegaly with central pulmonary vascular congestion and bilateral perihilar haziness. XR spine no new fracture. PLOF pt dependent for community mobility, does not drive and adult daughter assists with shopping. Pt requires min assist to complete some dressing and bathing tasks, and mod I using rollator and walker to ambulate throughout home.  -SP       Row Name 02/25/24 1500          Living Environment    People in Home child(juan francisco), adult  Adult daughter  -SP       Row Name 02/25/24 1500          Home Main Entrance    Number of Stairs, Main Entrance one  -SP       Row Name 02/25/24 1500          Stairs Within Home, Primary    Number of Stairs, Within Home, Primary none  -SP       Row Name 02/25/24 1500          Cognition    Orientation Status (Cognition) oriented x 4  -SP       Row Name 02/25/24 1500          Safety Issues, Functional Mobility    Impairments Affecting Function (Mobility) endurance/activity tolerance;shortness of breath  -SP               User Key  (r) = Recorded By, (t) = Taken By, (c) = Cosigned By      Initials Name Provider Type    SP Abelardo Rolon, SAMREEN Occupational Therapist                      Mobility/ADL's       Row Name 02/25/24 1506          Bed Mobility    Bed Mobility bed mobility (all) activities  -SP     All Activities, [place] (Bed Mobility) contact guard  -SP       Row Name 02/25/24 1506          Sit-Stand Transfer    Sit-Stand [place] (Transfers) contact guard  -SP     Assistive Device (Sit-Stand Transfers) walker, front-wheeled  -SP       Row Name 02/25/24 1506          Activities of Daily Living    BADL Assessment/Intervention toileting  -SP       Row Name 02/25/24 1506          Toileting Assessment/Training    [place] Level (Toileting) toileting skills;minimum assist (75% patient effort)  -SP     Position (Toileting) supported sitting  -SP               User Key  (r) = Recorded By, (t) = Taken By, (c) = Cosigned By      Initials Name Provider Type    SP Abelardo Rolon OT Occupational Therapist                   Obj/Interventions       Row Name 02/25/24 1506          Sensory Assessment (Somatosensory)    Sensory Assessment (Somatosensory) UE sensation intact  -SP       Row Name 02/25/24 1506          Vision Assessment/Intervention    Visual Impairment/Limitations corrective lenses full-time  -SP     Vision Assessment Comment Pt reports wearing glassess full time with no recent changes in vision. Pt reprots she has glaucoma and visits eye doctor every 6 months. Utilizes eye drops.  -SP       Row Name 02/25/24 1506          Range of Motion Comprehensive    General Range of Motion bilateral upper extremity ROM WFL  -SP       Row Name 02/25/24 1506          Strength Comprehensive (MMT)    Comment, General Manual Muscle Testing (MMT) Assessment BUE grossly 4/5  -SP       Row Name 02/25/24 1506          Balance    Balance Assessment sitting static balance;sitting dynamic balance  -SP     Static Sitting Balance supervision  -SP     Dynamic Sitting Balance supervision  -SP     Static Standing Balance contact guard  -SP               User Key  (r) = Recorded By, (t) = Taken By,  (c) = Cosigned By      Initials Name Provider Type    SP Abelardo Rolon, OT Occupational Therapist                   Goals/Plan       Row Name 02/25/24 1518          Bathing Goal 1 (OT)    Activity/Device (Bathing Goal 1, OT) bathing skills, all  -SP     [place] Level/Cues Needed (Bathing Goal 1, OT) modified independence  -SP     Time Frame (Bathing Goal 1, OT) 2 weeks  -SP     Strategies/Barriers (Bathing Goal 1, OT) until d/c.  -SP       Row Name 02/25/24 1517          Dressing Goal 1 (OT)    Activity/Device (Dressing Goal 1, OT) dressing skills, all  -SP     [place]/Cues Needed (Dressing Goal 1, OT) minimum assist (75% or more patient effort)  -SP     Time Frame (Dressing Goal 1, OT) 2 weeks  -SP     Strategies/Barriers (Dressing Goal 1, OT) until d/c  -SP       Row Name 02/25/24 1513          Therapy Assessment/Plan (OT)    Planned Therapy Interventions (OT) activity tolerance training;occupation/activity based interventions;patient/caregiver education/training;ROM/therapeutic exercise;strengthening exercise;transfer/mobility retraining  -SP               User Key  (r) = Recorded By, (t) = Taken By, (c) = Cosigned By      Initials Name Provider Type    SP Abelardo Rolon, SAMREEN Occupational Therapist                   Clinical Impression       Row Name 02/25/24 1508          Pain Assessment    Pretreatment Pain Rating 0/10 - no pain  -SP     Posttreatment Pain Rating 0/10 - no pain  -SP       Row Name 02/25/24 1503          Plan of Care Review    Plan of Care Reviewed With patient;family  -SP     Outcome Evaluation Pt is a 89 y/o female admitted to [facility] on 2/23/24 with mid back pain which radiating to the upper abdomen. PMH of A-fib, CAD, GERD, osteo-, HTN, and CHF. XR chest Cardiomegaly with central pulmonary vascular congestion and bilateral perihilar haziness. XR spine no new fracture. PLOF Pt resides with adult daughter in [facility] with x1 LINO. Pt dependent for community mobility/driving, and adult  daughter assists with shopping (IADLs). Pt requires min assist to complete some dressing and bathing tasks, utilizes shower stool, and mod I using rollator and walker to ambulate throughout home. Pt with good family support and able to provide 24/7 assist. Pt A&O x4 wiht no reprots of pain, presenting on 2L O2 NC, and vital WNL. Pt completed bed mobiltiy with SBA and comes to standing with CGA and FWW. Pt ambulated to bathroom, where she completed quique-care and toielt hygeine with min asist. Pt noted with forwards flexed posture, requires verbal cues for increased head/neck extension. Pt noted with decreased activity tolerance, exhibiting some SOA requiring increased time to complete ADL activity. Based on assessment, pt would benefit from continued skilled OT services while admitted for increased activity tolerance in preparation for ADLs for a safe discharge home. OT recommends home with family assist when medically appropraite for d/c.  -SP       Row Name 02/25/24 1508          Therapy Assessment/Plan (OT)    Criteria for Skilled Therapeutic Interventions Met (OT) yes;meets criteria;skilled treatment is necessary  -SP     Therapy Frequency (OT) 3 times/wk  -SP     Predicted Duration of Therapy Intervention (OT) until d/c.  -SP       Row Name 02/25/24 1508          Therapy Plan Review/Discharge Plan (OT)    Anticipated Discharge Disposition (OT) home with assist  -SP       Row Name 02/25/24 1508          Vital Signs    O2 Delivery Pre Treatment nasal cannula  -SP     O2 Delivery Intra Treatment room air  -SP     O2 Delivery Post Treatment nasal cannula  -SP     Pre Patient Position Supine  -SP     Intra Patient Position Standing  -SP     Post Patient Position --  Fowlers  -SP       Row Name 02/25/24 1508          Positioning and Restraints    Pre-Treatment Position in bed  -SP     Post Treatment Position bed  -SP     In Bed notified nsdoris;yonathan;call light within reach;encouraged to call for assist;exit alarm  on;with family/caregiver  -SP               User Key  (r) = Recorded By, (t) = Taken By, (c) = Cosigned By      Initials Name Provider Type    Abelardo Barnett OT Occupational Therapist                   Outcome Measures       Row Name 02/25/24 1517          How much help from another is currently needed...    Putting on and taking off regular lower body clothing? 2  -SP     Bathing (including washing, rinsing, and drying) 2  -SP     Toileting (which includes using toilet bed pan or urinal) 3  -SP     Putting on and taking off regular upper body clothing 3  -SP     Taking care of personal grooming (such as brushing teeth) 3  -SP     Eating meals 4  -SP     AM-PAC 6 Clicks Score (OT) 17  -SP       Row Name 02/25/24 0855          How much help from another person do you currently need...    Turning from your back to your side while in flat bed without using bedrails? 3  -MD     Moving from lying on back to sitting on the side of a flat bed without bedrails? 3  -MD     Moving to and from a bed to a chair (including a wheelchair)? 3  -MD     Standing up from a chair using your arms (e.g., wheelchair, bedside chair)? 3  -MD     Climbing 3-5 steps with a railing? 2  -MD     To walk in hospital room? 3  -MD     AM-PAC 6 Clicks Score (PT) 17  -MD     Highest Level of Mobility Goal 5 --> Static standing  -MD       Row Name 02/25/24 1517          Functional Assessment    Outcome Measure Options AM-PAC 6 Clicks Daily Activity (OT)  -SP               User Key  (r) = Recorded By, (t) = Taken By, (c) = Cosigned By      Initials Name Provider Type    Marilyn Quiñonez, RN Registered Nurse    Abelardo Barnett OT Occupational Therapist                    Occupational Therapy Education       Title: PT OT SLP Therapies (In Progress)       Topic: Occupational Therapy (In Progress)       Point: ADL training (Done)       Description:   Instruct learner(s) on proper safety adaptation and remediation techniques during self care  or transfers.   Instruct in proper use of assistive devices.                  Learning Progress Summary             Patient Acceptance, E,TB, VU by SP at 2/25/2024 1518   Family Acceptance, E,TB, VU by SP at 2/25/2024 1518                         Point: Home exercise program (Not Started)       Description:   Instruct learner(s) on appropriate technique for monitoring, assisting and/or progressing therapeutic exercises/activities.                  Learner Progress:  Not documented in this visit.              Point: Precautions (Done)       Description:   Instruct learner(s) on prescribed precautions during self-care and functional transfers.                  Learning Progress Summary             Patient Acceptance, E,TB, VU by SP at 2/25/2024 1518   Family Acceptance, E,TB, VU by SP at 2/25/2024 1518                         Point: Body mechanics (Done)       Description:   Instruct learner(s) on proper positioning and spine alignment during self-care, functional mobility activities and/or exercises.                  Learning Progress Summary             Patient Acceptance, E,TB, VU by SP at 2/25/2024 1518   Family Acceptance, E,TB, VU by SP at 2/25/2024 1518                                         User Key       Initials Effective Dates Name Provider Type Discipline    SP 11/15/23 -  Abelardo Rolon OT Occupational Therapist OT                  OT Recommendation and Plan  Planned Therapy Interventions (OT): activity tolerance training, occupation/activity based interventions, patient/caregiver education/training, ROM/therapeutic exercise, strengthening exercise, transfer/mobility retraining  Therapy Frequency (OT): 3 times/wk  Plan of Care Review  Plan of Care Reviewed With: patient, family  Outcome Evaluation: Pt is a 89 y/o female admitted to [facility] on 2/23/24 with mid back pain which radiating to the upper abdomen. PMH of A-fib, CAD, GERD, osteo-, HTN, and CHF. XR chest Cardiomegaly with central pulmonary vascular  congestion and bilateral perihilar haziness. XR spine no new fracture. PLOF Pt resides with adult daughter in [facility] with x1 LINO. Pt dependent for community mobility/driving, and adult daughter assists with shopping (IADLs). Pt requires min assist to complete some dressing and bathing tasks, utilizes shower stool, and mod I using rollator and walker to ambulate throughout home. Pt with good family support and able to provide 24/7 assist. Pt A&O x4 wiht no reprots of pain, presenting on 2L O2 NC, and vital WNL. Pt completed bed mobiltiy with SBA and comes to standing with CGA and FWW. Pt ambulated to bathroom, where she completed quique-care and toielt hygeine with min asist. Pt noted with forwards flexed posture, requires verbal cues for increased head/neck extension. Pt noted with decreased activity tolerance, exhibiting some SOA requiring increased time to complete ADL activity. Based on assessment, pt would benefit from continued skilled OT services while admitted for increased activity tolerance in preparation for ADLs for a safe discharge home. OT recommends home with family assist when medically appropraite for d/c.     Time Calculation:         Time Calculation- OT       Row Name 02/25/24 1518             Time Calculation- OT    OT Start Time 1424  -SP      OT Stop Time 1457  -SP      OT Time Calculation (min) 33 min  -SP      OT Received On 02/25/24  -SP      OT - Next Appointment 02/27/24  -SP      OT Goal Re-Cert Due Date 03/10/24  -SP                User Key  (r) = Recorded By, (t) = Taken By, (c) = Cosigned By      Initials Name Provider Type    SP Abelardo Rolon OT Occupational Therapist                  Therapy Charges for Today       Code Description Service Date Service Provider Modifiers Qty    [id number]  OT EVAL MOD COMPLEXITY 4 2/25/2024 Abelardo Rolon OT GO 1                 Abelardo Rolon OT  2/25/2024

## 2024-02-25 NOTE — PLAN OF CARE
Goal Outcome Evaluation:  Pt states back pain is improved since starting new pain med. Pt rested well during the night. Will continue current plan of care.

## 2024-02-26 LAB
ANION GAP SERPL CALCULATED.3IONS-SCNC: 8 MMOL/L (ref 5–15)
BASOPHILS # BLD AUTO: 0.1 10*3/MM3 (ref 0–0.2)
BASOPHILS NFR BLD AUTO: 1.3 % (ref 0–1.5)
BUN SERPL-MCNC: 29 MG/DL (ref 8–23)
BUN/CREAT SERPL: 40.3 (ref 7–25)
CALCIUM SPEC-SCNC: 9 MG/DL (ref 8.2–9.6)
CHLORIDE SERPL-SCNC: 101 MMOL/L (ref 98–107)
CO2 SERPL-SCNC: 27 MMOL/L (ref 22–29)
CREAT SERPL-MCNC: 0.72 MG/DL (ref 0.57–1)
DEPRECATED RDW RBC AUTO: 58.2 FL (ref 37–54)
EGFRCR SERPLBLD CKD-EPI 2021: 77.1 ML/MIN/1.73
EOSINOPHIL # BLD AUTO: 0.7 10*3/MM3 (ref 0–0.4)
EOSINOPHIL NFR BLD AUTO: 8.6 % (ref 0.3–6.2)
ERYTHROCYTE [DISTWIDTH] IN BLOOD BY AUTOMATED COUNT: 15.3 % (ref 12.3–15.4)
GLUCOSE SERPL-MCNC: 90 MG/DL (ref 65–99)
HCT VFR BLD AUTO: 38.3 % (ref 34–46.6)
HGB BLD-MCNC: 12.3 G/DL (ref 12–15.9)
INR PPP: 1.91 (ref 2–3)
L PNEUMO1 AG UR QL IA: NEGATIVE
LYMPHOCYTES # BLD AUTO: 1.8 10*3/MM3 (ref 0.7–3.1)
LYMPHOCYTES NFR BLD AUTO: 20.3 % (ref 19.6–45.3)
MCH RBC QN AUTO: 33 PG (ref 26.6–33)
MCHC RBC AUTO-ENTMCNC: 32 G/DL (ref 31.5–35.7)
MCV RBC AUTO: 102.9 FL (ref 79–97)
MONOCYTES # BLD AUTO: 1.3 10*3/MM3 (ref 0.1–0.9)
MONOCYTES NFR BLD AUTO: 15.4 % (ref 5–12)
MRSA DNA SPEC QL NAA+PROBE: NORMAL
NEUTROPHILS NFR BLD AUTO: 4.7 10*3/MM3 (ref 1.7–7)
NEUTROPHILS NFR BLD AUTO: 54.4 % (ref 42.7–76)
NRBC BLD AUTO-RTO: 0.2 /100 WBC (ref 0–0.2)
PLATELET # BLD AUTO: 144 10*3/MM3 (ref 140–450)
PMV BLD AUTO: 10.3 FL (ref 6–12)
POTASSIUM SERPL-SCNC: 4.5 MMOL/L (ref 3.5–5.2)
PROTHROMBIN TIME: 19.8 SECONDS (ref 19.4–28.5)
RBC # BLD AUTO: 3.72 10*6/MM3 (ref 3.77–5.28)
S PNEUM AG SPEC QL LA: NEGATIVE
SODIUM SERPL-SCNC: 136 MMOL/L (ref 136–145)
WBC NRBC COR # BLD AUTO: 8.6 10*3/MM3 (ref 3.4–10.8)

## 2024-02-26 PROCEDURE — 97116 GAIT TRAINING THERAPY: CPT

## 2024-02-26 PROCEDURE — 25010000002 KETOROLAC TROMETHAMINE PER 15 MG: Performed by: INTERNAL MEDICINE

## 2024-02-26 PROCEDURE — 99233 SBSQ HOSP IP/OBS HIGH 50: CPT | Performed by: INTERNAL MEDICINE

## 2024-02-26 PROCEDURE — 87899 AGENT NOS ASSAY W/OPTIC: CPT | Performed by: INTERNAL MEDICINE

## 2024-02-26 PROCEDURE — 87040 BLOOD CULTURE FOR BACTERIA: CPT | Performed by: NURSE PRACTITIONER

## 2024-02-26 PROCEDURE — 87449 NOS EACH ORGANISM AG IA: CPT | Performed by: INTERNAL MEDICINE

## 2024-02-26 PROCEDURE — 25010000002 CEFTRIAXONE PER 250 MG: Performed by: NURSE PRACTITIONER

## 2024-02-26 PROCEDURE — 80048 BASIC METABOLIC PNL TOTAL CA: CPT | Performed by: INTERNAL MEDICINE

## 2024-02-26 PROCEDURE — 85610 PROTHROMBIN TIME: CPT | Performed by: INTERNAL MEDICINE

## 2024-02-26 PROCEDURE — 87641 MR-STAPH DNA AMP PROBE: CPT | Performed by: NURSE PRACTITIONER

## 2024-02-26 PROCEDURE — 85025 COMPLETE CBC W/AUTO DIFF WBC: CPT | Performed by: INTERNAL MEDICINE

## 2024-02-26 PROCEDURE — 97530 THERAPEUTIC ACTIVITIES: CPT

## 2024-02-26 RX ORDER — FAMOTIDINE 20 MG/1
20 TABLET, FILM COATED ORAL DAILY
Status: DISCONTINUED | OUTPATIENT
Start: 2024-02-26 | End: 2024-02-28

## 2024-02-26 RX ORDER — HYDROCODONE BITARTRATE AND ACETAMINOPHEN 5; 325 MG/1; MG/1
1 TABLET ORAL EVERY 6 HOURS PRN
Status: DISCONTINUED | OUTPATIENT
Start: 2024-02-26 | End: 2024-03-02 | Stop reason: HOSPADM

## 2024-02-26 RX ORDER — LIDOCAINE 4 G/G
1 PATCH TOPICAL
Status: DISCONTINUED | OUTPATIENT
Start: 2024-02-26 | End: 2024-03-02 | Stop reason: HOSPADM

## 2024-02-26 RX ADMIN — CEFTRIAXONE SODIUM 1000 MG: 1 INJECTION, POWDER, FOR SOLUTION INTRAMUSCULAR; INTRAVENOUS at 08:51

## 2024-02-26 RX ADMIN — DOCUSATE SODIUM 100 MG: 100 CAPSULE, LIQUID FILLED ORAL at 20:16

## 2024-02-26 RX ADMIN — KETOROLAC TROMETHAMINE 15 MG: 30 INJECTION, SOLUTION INTRAMUSCULAR; INTRAVENOUS at 08:53

## 2024-02-26 RX ADMIN — Medication 10 ML: at 08:54

## 2024-02-26 RX ADMIN — DOCUSATE SODIUM 50MG AND SENNOSIDES 8.6MG 2 TABLET: 8.6; 5 TABLET, FILM COATED ORAL at 15:59

## 2024-02-26 RX ADMIN — DIGOXIN 125 MCG: 125 TABLET ORAL at 12:44

## 2024-02-26 RX ADMIN — LATANOPROST 1 DROP: 50 SOLUTION/ DROPS OPHTHALMIC at 20:16

## 2024-02-26 RX ADMIN — FAMOTIDINE 20 MG: 20 TABLET, FILM COATED ORAL at 15:59

## 2024-02-26 RX ADMIN — LIDOCAINE 1 PATCH: 4 PATCH TOPICAL at 16:00

## 2024-02-26 RX ADMIN — BISACODYL 5 MG: 5 TABLET, COATED ORAL at 08:59

## 2024-02-26 RX ADMIN — METOPROLOL SUCCINATE 25 MG: 25 TABLET, EXTENDED RELEASE ORAL at 08:54

## 2024-02-26 RX ADMIN — METOPROLOL SUCCINATE 25 MG: 25 TABLET, EXTENDED RELEASE ORAL at 20:16

## 2024-02-26 RX ADMIN — Medication 10 ML: at 22:03

## 2024-02-26 NOTE — PROGRESS NOTES
Infectious Diseases Progress Note      LOS: 3 days   Patient Care Team:  Cristal Goodwin MD as PCP - General (Family Medicine)  Aleksey Mathew MD as Consulting Physician (Cardiology)  Anai Moise APRN as Nurse Practitioner (Cardiology)    Chief Complaint: Shortness of breath, back pain    Subjective     The patient has been afebrile for the last 24 hours.  The patient is on room air, hemodynamically stable, and is tolerating antimicrobial therapy.  Patient is in the chair with no new complaints    Review of Systems:   Review of Systems   Constitutional: Negative.    HENT: Negative.     Eyes: Negative.    Respiratory:  Positive for cough and shortness of breath.    Cardiovascular: Negative.    Gastrointestinal: Negative.    Endocrine: Negative.    Genitourinary: Negative.    Musculoskeletal:  Positive for back pain.   Skin: Negative.    Neurological: Negative.    Psychiatric/Behavioral: Negative.     All other systems reviewed and are negative.       Objective     Vital Signs  Temp:  [97.4 °F (36.3 °C)-98.1 °F (36.7 °C)] 97.6 °F (36.4 °C)  Heart Rate:  [55-76] 68  Resp:  [17-23] 23  BP: (133-148)/(63-82) 137/63    Physical Exam:  Physical Exam  Vitals and nursing note reviewed.   Constitutional:       General: She is not in acute distress.     Appearance: Normal appearance. She is well-developed and normal weight. She is not diaphoretic.   HENT:      Head: Normocephalic and atraumatic.   Eyes:      General: No scleral icterus.     Extraocular Movements: Extraocular movements intact.      Conjunctiva/sclera: Conjunctivae normal.      Pupils: Pupils are equal, round, and reactive to light.   Cardiovascular:      Rate and Rhythm: Normal rate and regular rhythm.      Heart sounds: S1 normal and S2 normal. Murmur heard.   Pulmonary:      Effort: Pulmonary effort is normal. No respiratory distress.      Breath sounds: No stridor. Rales present. No wheezing.   Chest:      Chest wall: No tenderness.   Abdominal:       General: Bowel sounds are normal. There is no distension.      Palpations: Abdomen is soft. There is no mass.      Tenderness: There is no abdominal tenderness. There is no guarding.   Genitourinary:     Comments: External catheter  Musculoskeletal:         General: No swelling, tenderness or deformity. Normal range of motion.      Cervical back: Neck supple.   Skin:     General: Skin is warm and dry.      Coloration: Skin is not pale.      Findings: No bruising, erythema or rash.      Comments: No obvious signs of embolic phenomena   Neurological:      General: No focal deficit present.      Mental Status: She is alert and oriented to person, place, and time. Mental status is at baseline.      Cranial Nerves: No cranial nerve deficit.   Psychiatric:         Mood and Affect: Mood normal.          Results Review:    I have reviewed all clinical data, test, lab, and imaging results.     Radiology  CT Chest Without Contrast Diagnostic    Result Date: 2/26/2024  CT CHEST WO CONTRAST DIAGNOSTIC Date of Exam: 2/25/2024 9:53 PM EST Indication: pna. Comparison: Chest x-ray 2/23/2024 and CTA chest 10/1/2022 Technique: Axial CT images were obtained of the chest without contrast administration.  Sagittal and coronal reconstructions were performed.  Automated exposure control and iterative reconstruction methods were used. Findings: MEDIASTINUM: The heart is enlarged and there is trace pericardial effusion. There is moderate atherosclerotic calcification of the aorta. A large hiatal hernia is present. CORONARY ARTERIES: There is calcified atherosclerotic disease. LUNGS: There is interlobular septal thickening similar to prior study. Consolidation in the right greater than left lower lobes. PLEURAL SPACE: Moderate to large layering right pleural effusion with trace effusion on the left. LYMPH NODES: There are no pathologically enlarged lymph nodes. UPPER ABDOMEN: Large hiatal hernia with nearly the entire stomach within the  thorax. OSSEOUS STRUCTURES: The bones are osteopenic. There is prominent thoracic kyphosis. Chronic compression fracture with vertebral body augmentation at T12. A moderate compression deformity of T6 is unchanged. There is a mild compression fracture at T7 which is new since the previous CT.     Impression: 1. Large right and trace left pleural effusions with bibasilar atelectasis or consolidation which may represent pneumonia. 2. Large hiatal hernia. 3. Cardiomegaly with interlobular septal thickening similar to prior. 4. There is a mild compression fracture of the T7 vertebral body which was not present on the prior CT though is age indeterminant. Correlate clinically for any recent trauma and point tenderness. Electronically Signed: Tiffani Guzman MD  2/26/2024 8:22 AM EST  Workstation ID: [id number]     Cardiology    Laboratory    Results from last 7 days   Lab Units 02/26/24  0407 02/25/24  0355 02/24/24  0133 02/23/24  1344   WBC 10*3/mm3 8.60 8.60 9.00 7.80   HEMOGLOBIN g/dL 12.3 13.0 12.8 12.0   HEMATOCRIT % 38.3 39.3 38.4 36.8   PLATELETS 10*3/mm3 144 160 169 163     Results from last 7 days   Lab Units 02/26/24  0407 02/25/24  0355 02/24/24  0133 02/23/24  1344   SODIUM mmol/L 136 139 141 140   POTASSIUM mmol/L 4.5 4.5 4.2 4.7   CHLORIDE mmol/L 101 101 100 103   CO2 mmol/L 27.0 28.0 31.0* 29.0   BUN mg/dL 29* 30* 21 20   CREATININE mg/dL 0.72 0.80 0.69 0.66   GLUCOSE mg/dL 90 99 111* 92   ALBUMIN g/dL  --   --  4.0 4.0   BILIRUBIN mg/dL  --   --  0.9 1.3*   ALK PHOS U/L  --   --  81 78   AST (SGOT) U/L  --   --  29 30   ALT (SGPT) U/L  --   --  17 16   LIPASE U/L  --   --   --  21   CALCIUM mg/dL 9.0 9.3 8.9 9.1                 Microbiology   Microbiology Results (last 10 days)       Procedure Component Value - Date/Time    COVID-19 and FLU A/B PCR, 1 HR TAT - Swab, Nasopharynx [[id number]]  (Normal) Collected: 02/23/24 1729    Lab Status: Final result Specimen: Swab from Nasopharynx Updated: 02/23/24 5603      COVID19 Not Detected     Influenza A PCR Not Detected     Influenza B PCR Not Detected    Narrative:      Fact sheet for providers: https://www.fda.gov/media/657674/download    Fact sheet for patients: https://www.fda.gov/media/089431/download    Test performed by PCR.    Blood Culture - Blood, Arm, Left [[id number]]  (Normal) Collected: 02/23/24 1524    Lab Status: Preliminary result Specimen: Blood from Arm, Left Updated: 02/25/24 1532     Blood Culture No growth at 2 days    Narrative:      Less than seven (7) mL's of blood was collected.  Insufficient quantity may yield false negative results.    Blood Culture - Blood, Arm, Right [[id number]]  (Normal) Collected: 02/23/24 1454    Lab Status: Preliminary result Specimen: Blood from Arm, Right Updated: 02/26/24 1515     Blood Culture No growth at 3 days    Narrative:      Less than seven (7) mL's of blood was collected.  Insufficient quantity may yield false negative results.    Urine Culture - Urine, Urine, Clean Catch [[id number]]  (Normal) Collected: 02/23/24 1434    Lab Status: Final result Specimen: Urine, Clean Catch Updated: 02/24/24 1947     Urine Culture No growth            Medication Review:       Schedule Meds  cefTRIAXone, 1,000 mg, Intravenous, Q24H  digoxin, 125 mcg, Oral, Daily  docusate sodium, 100 mg, Oral, Nightly  famotidine, 20 mg, Oral, Daily  latanoprost, 1 drop, Right Eye, Nightly  Lidocaine, 1 patch, Transdermal, Q24H  metoprolol succinate XL, 25 mg, Oral, BID  sodium chloride, 10 mL, Intravenous, Q12H  [Held by provider] warfarin, 2 mg, Oral, Once per day on Sunday Tuesday Wednesday Thursday Saturday  [Held by provider] warfarin, 4 mg, Oral, Once per day on Monday Friday        Infusion Meds  Pharmacy to dose warfarin,         PRN Meds    acetaminophen    senna-docusate sodium **AND** polyethylene glycol **AND** bisacodyl **AND** bisacodyl    Calcium Replacement - Follow Nurse / BPA Driven Protocol    HYDROcodone-acetaminophen     Magnesium Cardiology Dose Replacement - Follow Nurse / BPA Driven Protocol    Pharmacy to dose warfarin    Phosphorus Replacement - Follow Nurse / BPA Driven Protocol    Potassium Replacement - Follow Nurse / BPA Driven Protocol    [COMPLETED] Insert Peripheral IV **AND** sodium chloride    sodium chloride    sodium chloride        Assessment & Plan       Antimicrobial Therapy   1.  IV Rocephin        2.        3.        4.        5.          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  2D echo shows a mobile vegetation on the aortic valve.  Initial blood cultures are negative so far.  Patient denies any history of cardiac valve surgery or replacement.  Cardiology following     Possible community-acquired pneumonia.  Patient is currently switches between room air and 2 L of oxygen by nasal cannula.  Has a nonproductive cough.  COVID screen and influenza screen are negative.  Patient does have a large hiatal hernia so there is some's concern for chronic aspiration with a hiatal hernia.  CT of the chest  did show a right pleural effusion     Worsening acute on chronic back pain.  X-ray shows a chronic T6 and T12 compression fracture     History of CVA     Plan     Continue IV Rocephin 1 g every 24 hours for now  Blood cultures x 2-pending  Plans for JEFF tomorrow  Consult pulmonology for right pleural effusion  Continue supportive care  A.m. labs  Case discussed with patient and granddaughter at bedside      Nereida Dhillon, APRN  02/26/24  15:34 EST    Note is dictated utilizing voice recognition software/Dragon

## 2024-02-26 NOTE — DISCHARGE PLACEMENT REQUEST
"Shruthi Cartwright (89 y.o. Female)       Date of Birth   06/10/1928    Social Security Number       Address   [address] [place] [address] [place] [facility]    Home Phone   [phone]    MRN   [id number]       Yarsanism   None    Marital Status                               Admission Date   2/23/24    Admission Type   Emergency    Admitting Provider   Patty Rivera MD    Attending Provider   Jamir Patrick MD    Department, Room/Bed   [facility] 3C MEDICAL INPATIENT, 359/1       Discharge Date       Discharge Disposition       Discharge Destination                                 Attending Provider: Jamir Patrick MD    Allergies: Penicillins, Codeine, Latex    Isolation: None   Infection: None   Code Status: Prior    Ht: 165.1 cm (65\")   Wt: 50.3 kg (111 lb)    Admission Cmt: None   Principal Problem: None                  Active Insurance as of 2/23/2024       Primary Coverage       Payor Plan Insurance Group Employer/Plan Group    MEDICARE MEDICARE A & B        Payor Plan Address Payor Plan Phone Number Payor Plan Fax Number Effective Dates    [address] [phone]  6/1/1993 - None Entered    [facility] [id number]         Subscriber Name Subscriber Birth Date Member ID       SHRUTHI CARTWRIGHT 6/10/1928 [id number]               Secondary Coverage       Payor Plan Insurance Group Employer/Plan Group    ANTHEM BLUE CROSS ANTHEM BLUE CROSS BLUE SHIELD PPO [id number]       Payor Plan Address Payor Plan Phone Number Payor Plan Fax Number Effective Dates    [address] [phone]  1/1/2021 - None Entered    [facility] [id number]         Subscriber Name Subscriber Birth Date Member ID       SHRUTHI CARTWRIGHT JARVIS 6/10/1928 [id number]                     Emergency Contacts        (Rel.) Home Phone Work Phone Mobile Phone    manojfritz delgadillo (Grandchild) [phone] -- [phone]    sonya cartwright (Son) [phone] -- [phone]    CyrilShea (Daughter) [phone] -- " [phone]

## 2024-02-26 NOTE — PROGRESS NOTES
LOS: 3 days   Patient Care Team:  Cristal Goodwin MD as PCP - General (Family Medicine)  Aleksey Mathew MD as Consulting Physician (Cardiology)  Anai Moise APRN as Nurse Practitioner (Cardiology)    Subjective     Interval History: Stable.     Patient Complaints: SUAREZ, constipation    History taken from: patient    Review of Systems   Constitutional:  Positive for activity change. Negative for appetite change, chills, diaphoresis, fatigue and fever.   HENT:  Negative for facial swelling.    Eyes:  Negative for visual disturbance.   Respiratory:  Positive for shortness of breath. Negative for cough, wheezing and stridor.    Cardiovascular:  Negative for chest pain, palpitations and leg swelling.   Gastrointestinal:  Positive for abdominal pain and constipation. Negative for diarrhea, nausea and vomiting.   Endocrine: Negative for polyuria.   Genitourinary:  Negative for dysuria.   Musculoskeletal:  Positive for back pain. Negative for arthralgias and myalgias.   Skin:  Negative for rash and wound.   Neurological:  Negative for dizziness, light-headedness and headaches.   Psychiatric/Behavioral:  Negative for agitation and confusion.            Objective     Vital Signs  Temp:  [97.4 °F (36.3 °C)-98.1 °F (36.7 °C)] 97.6 °F (36.4 °C)  Heart Rate:  [55-76] 68  Resp:  [17-23] 23  BP: (133-148)/(56-82) 137/63    Physical Exam:     General Appearance:    Alert, cooperative, in no acute distress, younger than stated age   Head:    Normocephalic, without obvious abnormality, atraumatic   Eyes:            Lids and lashes normal, conjunctivae and sclerae normal, no   icterus, no pallor, corneas clear, PERRLA   Ears:    Ears appear intact with no abnormalities noted   Throat:   No oral lesions, no thrush, oral mucosa moist   Neck:   No adenopathy, supple, trachea midline, no thyromegaly, no   carotid bruit, no JVD   Lungs:     Clear to auscultation,respirations regular, even and                  unlabored    Heart:     Irregularly irregular,  II/VI systolic murmur at RUSB   Chest Wall:    No abnormalities observed   Abdomen:     Normal bowel sounds, no masses, no organomegaly, soft        Non-tender non-distended, no guarding,   Extremities:   Moves all extremities well, no edema, no cyanosis, no             Redness   Pulses:   Pulses palpable and equal bilaterally   Skin:   No bleeding, bruising or rash   Lymph nodes:   No palpable adenopathy   Neurologic:   Cranial nerves 2 - 12 grossly intact, sensation intact, DTR       present and equal bilaterally        Results Review:    Lab Results (last 24 hours)       Procedure Component Value Units Date/Time    Basic Metabolic Panel [[id number]]  (Abnormal) Collected: 02/26/24 0407    Specimen: Blood from Arm, Right Updated: 02/26/24 0455     Glucose 90 mg/dL      BUN 29 mg/dL      Creatinine 0.72 mg/dL      Sodium 136 mmol/L      Potassium 4.5 mmol/L      Chloride 101 mmol/L      CO2 27.0 mmol/L      Calcium 9.0 mg/dL      BUN/Creatinine Ratio 40.3     Anion Gap 8.0 mmol/L      eGFR 77.1 mL/min/1.73     Narrative:      GFR Normal >60  Chronic Kidney Disease <60  Kidney Failure <15    The GFR formula is only valid for adults with stable renal function between ages 18 and 70.    Protime-INR [[id number]]  (Abnormal) Collected: 02/26/24 0407    Specimen: Blood from Arm, Right Updated: 02/26/24 0435     Protime 19.8 Seconds      INR 1.91    CBC & Differential [[id number]]  (Abnormal) Collected: 02/26/24 0407    Specimen: Blood from Arm, Right Updated: 02/26/24 0424    Narrative:      The following orders were created for panel order CBC & Differential.  Procedure                               Abnormality         Status                     ---------                               -----------         ------                     CBC Auto Differential[[id number]]        Abnormal            Final result                 Please view results for these tests on the individual orders.    CBC Auto  Differential [[id number]]  (Abnormal) Collected: 02/26/24 0407    Specimen: Blood from Arm, Right Updated: 02/26/24 0424     WBC 8.60 10*3/mm3      RBC 3.72 10*6/mm3      Hemoglobin 12.3 g/dL      Hematocrit 38.3 %      .9 fL      MCH 33.0 pg      MCHC 32.0 g/dL      RDW 15.3 %      RDW-SD 58.2 fl      MPV 10.3 fL      Platelets 144 10*3/mm3      Neutrophil % 54.4 %      Lymphocyte % 20.3 %      Monocyte % 15.4 %      Eosinophil % 8.6 %      Basophil % 1.3 %      Neutrophils, Absolute 4.70 10*3/mm3      Lymphocytes, Absolute 1.80 10*3/mm3      Monocytes, Absolute 1.30 10*3/mm3      Eosinophils, Absolute 0.70 10*3/mm3      Basophils, Absolute 0.10 10*3/mm3      nRBC 0.2 /100 WBC     Blood Culture - Blood, Arm, Left [[id number]] Collected: 02/26/24 0412    Specimen: Blood from Arm, Left Updated: 02/26/24 0419    Blood Culture - Blood, Arm, Right [[id number]] Collected: 02/26/24 0407    Specimen: Blood from Arm, Right Updated: 02/26/24 0419    Blood Culture - Blood, Arm, Left [[id number]]  (Normal) Collected: 02/23/24 1524    Specimen: Blood from Arm, Left Updated: 02/25/24 1532     Blood Culture No growth at 2 days    Narrative:      Less than seven (7) mL's of blood was collected.  Insufficient quantity may yield false negative results.    Blood Culture - Blood, Arm, Right [[id number]]  (Normal) Collected: 02/23/24 1454    Specimen: Blood from Arm, Right Updated: 02/25/24 1517     Blood Culture No growth at 2 days    Narrative:      Less than seven (7) mL's of blood was collected.  Insufficient quantity may yield false negative results.             Imaging Results (Last 24 Hours)       Procedure Component Value Units Date/Time    CT Chest Without Contrast Diagnostic [[id number]] Collected: 02/26/24 0815     Updated: 02/26/24 0824    Narrative:      CT CHEST WO CONTRAST DIAGNOSTIC    Date of Exam: 2/25/2024 9:53 PM EST    Indication: pna.    Comparison: Chest x-ray 2/23/2024 and CTA chest 10/1/2022    Technique:  Axial CT images were obtained of the chest without contrast administration.  Sagittal and coronal reconstructions were performed.  Automated exposure control and iterative reconstruction methods were used.      Findings:  MEDIASTINUM: The heart is enlarged and there is trace pericardial effusion. There is moderate atherosclerotic calcification of the aorta. A large hiatal hernia is present.  CORONARY ARTERIES: There is calcified atherosclerotic disease.  LUNGS: There is interlobular septal thickening similar to prior study. Consolidation in the right greater than left lower lobes.  PLEURAL SPACE: Moderate to large layering right pleural effusion with trace effusion on the left.  LYMPH NODES: There are no pathologically enlarged lymph nodes.    UPPER ABDOMEN: Large hiatal hernia with nearly the entire stomach within the thorax.    OSSEOUS STRUCTURES: The bones are osteopenic. There is prominent thoracic kyphosis. Chronic compression fracture with vertebral body augmentation at T12. A moderate compression deformity of T6 is unchanged. There is a mild compression fracture at T7   which is new since the previous CT.          Impression:      Impression:    1. Large right and trace left pleural effusions with bibasilar atelectasis or consolidation which may represent pneumonia.  2. Large hiatal hernia.  3. Cardiomegaly with interlobular septal thickening similar to prior.  4. There is a mild compression fracture of the T7 vertebral body which was not present on the prior CT though is age indeterminant. Correlate clinically for any recent trauma and point tenderness.      Electronically Signed: Tiffani Guzman MD    2/26/2024 8:22 AM EST    Workstation ID: [id number]                 I reviewed the patient's new clinical results.    Medication Review:   Scheduled Meds:cefTRIAXone, 1,000 mg, Intravenous, Q24H  digoxin, 125 mcg, Oral, Daily  docusate sodium, 100 mg, Oral, Nightly  ketorolac, 15 mg, Intravenous,  TID  latanoprost, 1 drop, Right Eye, Nightly  metoprolol succinate XL, 25 mg, Oral, BID  sodium chloride, 10 mL, Intravenous, Q12H  [Held by provider] warfarin, 2 mg, Oral, Once per day on Sunday Tuesday Wednesday Thursday Saturday  [Held by provider] warfarin, 4 mg, Oral, Once per day on Monday Friday      Continuous Infusions:Pharmacy to dose warfarin,       PRN Meds:.  acetaminophen    senna-docusate sodium **AND** polyethylene glycol **AND** bisacodyl **AND** bisacodyl    Calcium Replacement - Follow Nurse / BPA Driven Protocol    Magnesium Cardiology Dose Replacement - Follow Nurse / BPA Driven Protocol    Pharmacy to dose warfarin    Phosphorus Replacement - Follow Nurse / BPA Driven Protocol    Potassium Replacement - Follow Nurse / BPA Driven Protocol    [COMPLETED] Insert Peripheral IV **AND** sodium chloride    sodium chloride    sodium chloride     Assessment & Plan       Back pain  - prior compression fractures; no further workup planned as pain does not appear related to spinal fractures  CAP - Rocephin  Aortic valve mass - JEFF tomorrow  Chronic CAD - ruled out for acute coronary syndrome; no further ischemic workup planned  Essential hypertension - metoprolol,  Permanent afib - rate is controlled; holding anticoagulation in anticipation of JEFF tomorrow    Famotidine for stress ulcer prophy    Plan for disposition:MARTY Roman MD  02/26/24  14:44 EST

## 2024-02-26 NOTE — PLAN OF CARE
Goal Outcome Evaluation:  Plan of Care Reviewed With: patient           Outcome Evaluation: pt voiced no concerns this shift; up with assistance; O2 worn PRN at 2L per n/c; continue to monitor

## 2024-02-26 NOTE — PLAN OF CARE
Pt presents with functional mobility impairments which indicate the need for skilled intervention. Tolerating session today without incident. Pt on 2L O2 and telemetry.  Pt required CGA/Min A for bed mobility, CGA for transfers with RW and ambulated 30' with RW with CGA with decreased gait speed and flexed trunk.  Pt completed 10 reps BLE strengthening exercises in sitting.  Will continue to follow and progress as tolerated.

## 2024-02-26 NOTE — CASE MANAGEMENT/SOCIAL WORK
Discharge Planning Assessment  [facility]     Patient Name: Shruthi Amin  MRN: [id number]  Today's Date: 2/26/2024    Admit Date: 2/23/2024    Plan: Return home with daughter, referral to [facility] pending.   Discharge Needs Assessment       Row Name 02/26/24 1111       Living Environment    People in Home child(juan francisco), adult    Name(s) of People in Home Daughter-Shea    Current Living Arrangements home    Potentially Unsafe Housing Conditions none    In the past 12 months has the electric, gas, oil, or water company threatened to shut off services in your home? No    Primary Care Provided by self;child(juan francisco)    Provides Primary Care For no one, unable/limited ability to care for self    Family Caregiver if Needed child(juan francisco), adult;grandchild(juan francisco), adult    Family Caregiver Names Daughter-Shea, Granddaughter-Inessa    Quality of Family Relationships helpful;involved;supportive    Able to Return to Prior Arrangements yes       Resource/Environmental Concerns    Resource/Environmental Concerns none    Transportation Concerns none       Transportation Needs    In the past 12 months, has lack of transportation kept you from medical appointments or from getting medications? no    In the past 12 months, has lack of transportation kept you from meetings, work, or from getting things needed for daily living? No       Food Insecurity    Within the past 12 months, you worried that your food would run out before you got the money to buy more. Never true    Within the past 12 months, the food you bought just didn't last and you didn't have money to get more. Never true       Transition Planning    Patient/Family Anticipates Transition to home with family;home with help/services    Patient/Family Anticipated Services at Transition home health care    Transportation Anticipated family or friend will provide       Discharge Needs Assessment    Readmission Within the Last 30 Days no previous admission in last 30 days    Equipment  Currently Used at Home cane, straight;rollator;shower chair;grab bar;oxygen;commode    Concerns to be Addressed discharge planning;care coordination/care conferences    Anticipated Changes Related to Illness none    Equipment Needed After Discharge none    Outpatient/Agency/Support Group Needs homecare agency    Discharge Facility/Level of Care Needs home with home health    Provided Post Acute Provider List? Yes    Post Acute Provider List Home Health    Delivered To Patient    Method of Delivery In person                   Discharge Plan       Row Name 02/26/24 1113       Plan    Plan Return home with daughter, referral to [facility] pending.    Patient/Family in Agreement with Plan yes    Plan Comments CM met with patient and family at bedside. Pt lives at home with her daughter Shea (who will be out of town until Thurs. 2/29). Pt does not drive and requires some assistance with ADL's. PCP is Dr Goodwin (updated in chart) and pharmacy confirmed. Secure chat sent to hospitalist, Dr Roman, and ALMA Galindo regarding patient's care being taken over by PCP. DME at home includes cane, rollator, BSC, shower stool, grab bars, and 2L O2 PRN supplied by [facility]. Pt is not current with HHC/PT services at this time, but has used [facility] previously and therapy recommending home health at discharge. Referral sent in [employer] basket and liaison Arlin notified. Family to provide transportation at discharge. DC barriers: ID and Cardio following, CT chest completed, possible JEFF. Continues on IV Rocephin.                  Continued Care and Services - Admitted Since 2/23/2024       Home Medical Care       Service Provider Request Status Selected Services Address Phone Fax Patient Preferred    On license of [facility] Pending - [facility] N[facility]A [address] [facility] [place] [address] [phone]                    Demographic Summary       Row Name 02/26/24 1113       General Information    Admission Type inpatient    Arrived From  emergency department    Referral Source admission list    Reason for Consult care coordination/care conference;discharge planning    Preferred Language English       Contact Information    Permission Granted to Share Info With                    Functional Status       Row Name 02/26/24 1111       Functional Status    Usual Activity Tolerance moderate    Current Activity Tolerance moderate       Functional Status, IADL    Medications assistive person    Meal Preparation assistive person    Housekeeping assistive person    Laundry assistive person    Shopping assistive person             Megan Naegele, RN     Office Phone: [phone]  Office Cell: [phone]

## 2024-02-26 NOTE — SIGNIFICANT NOTE
02/26/24 1510   OTHER   Discipline occupational therapist   Rehab Time/Intention   Session Not Performed other (see comments)  (Pt reports she is feeling nauseated, OT will follow up as appropriate.)   Therapy Assessment/Plan (PT)   Criteria for Skilled Interventions Met (PT) yes;meets criteria;skilled treatment is necessary   Recommendation   OT - Next Appointment 02/27/24

## 2024-02-26 NOTE — THERAPY TREATMENT NOTE
Subjective: Pt agreeable to therapeutic plan of care.    Objective:     Bed mobility -  CGA/Min A  Transfers -  CGA with RW  Ambulation -  30' with RW with CGA with decreased gait speed and trunk flexion     Therapeutic Exercise -  Completed 10 reps BLE strengthening exercises sitting in b/s chair:  AP, LAQs, hip flexion, GS.    Vitals: WNL    Pain: 3 VAS   Location: Back pain   Intervention for pain: Repositioned, Increased Activity, and Therapeutic Presence    Education: Provided education on the importance of mobility in the acute care setting, Verbal/Tactile Cues, Transfer Training, Gait Training, and Energy conservation strategies    Assessment: Shruthi Amin presents with functional mobility impairments which indicate the need for skilled intervention. Tolerating session today without incident. Pt on 2L O2 and telemetry.  Pt required CGA/Min A for bed mobility, CGA for transfers with RW and ambulated 30' with RW with CGA with decreased gait speed and flexed trunk.  Pt completed 10 reps BLE strengthening exercises in sitting.  Will continue to follow and progress as tolerated.     Plan/Recommendations:   If medically appropriate HHPT Pt requires no DME at discharge.     Pt desires Home with Home Health at discharge. Pt cooperative; agreeable to therapeutic recommendations and plan of care.         Basic Mobility 6-click:  Rollin = Total, A lot = 2, A little = 3; 4 = None  Supine>Sit:   1 = Total, A lot = 2, A little = 3; 4 = None   Sit>Stand with arms:  1 = Total, A lot = 2, A little = 3; 4 = None  Bed>Chair:   1 = Total, A lot = 2, A little = 3; 4 = None  Ambulate in room:  1 = Total, A lot = 2, A little = 3; 4 = None  3-5 Steps with railin = Total, A lot = 2, A little = 3; 4 = None  Score: 17      Post-Tx Position: Up in Chair, Staff Present, Alarms activated, and Call light and personal items within reach  PPE: gloves and surgical mask

## 2024-02-26 NOTE — PROGRESS NOTES
CARDIOLOGY PROGRESS NOTE:    Shruthi Amin  89 y.o.  female  6/10/1928  [id number]      Referring Provider: Hospitalist    Reason for follow-up: Abnormal echo     Patient Care Team:  Cristal Goodwin MD as PCP - General (Family Medicine)  Aleksey Mathew MD as Consulting Physician (Cardiology)  Anai Moise APRN as Nurse Practitioner (Cardiology)    Subjective .  Patient presents with back pain which is better and is lying in bed comfortable    Objective lying in bed comfortably     Review of Systems   Constitutional: Negative for fever and malaise/fatigue.   HENT:  Negative for ear pain and nosebleeds.    Eyes:  Negative for blurred vision and double vision.   Cardiovascular:  Negative for chest pain, dyspnea on exertion and palpitations.   Respiratory:  Negative for cough and shortness of breath.    Skin:  Negative for rash.   Musculoskeletal:  Negative for joint pain.   Gastrointestinal:  Negative for abdominal pain, nausea and vomiting.   Neurological:  Negative for focal weakness and headaches.   Psychiatric/Behavioral:  Negative for depression. The patient is not nervous/anxious.    All other systems reviewed and are negative.      Allergies: Penicillins, Codeine, and Latex    Scheduled Meds:cefTRIAXone, 1,000 mg, Intravenous, Q24H  digoxin, 125 mcg, Oral, Daily  docusate sodium, 100 mg, Oral, Nightly  ketorolac, 15 mg, Intravenous, TID  latanoprost, 1 drop, Right Eye, Nightly  metoprolol succinate XL, 25 mg, Oral, BID  sodium chloride, 10 mL, Intravenous, Q12H  [Held by provider] warfarin, 2 mg, Oral, Once per day on Sunday Tuesday Wednesday Thursday Saturday  [Held by provider] warfarin, 4 mg, Oral, Once per day on Monday Friday      Continuous Infusions:Pharmacy to dose warfarin,       PRN Meds:.  acetaminophen    senna-docusate sodium **AND** polyethylene glycol **AND** bisacodyl **AND** bisacodyl    Calcium Replacement - Follow Nurse / BPA Driven Protocol    Magnesium Cardiology Dose Replacement -  "Follow Nurse / BPA Driven Protocol    Pharmacy to dose warfarin    Phosphorus Replacement - Follow Nurse / BPA Driven Protocol    Potassium Replacement - Follow Nurse / BPA Driven Protocol    [COMPLETED] Insert Peripheral IV **AND** sodium chloride    sodium chloride    sodium chloride        VITAL SIGNS  Vitals:    02/25/24 1941 02/25/24 2352 02/26/24 0356 02/26/24 0753   BP: 148/76 133/69 139/82 143/71   BP Location: Left arm Left arm Left arm Left arm   Patient Position: Lying Lying Lying Lying   Pulse: 76 64 55 61   Resp: 20 17 18 17   Temp: 97.9 °F (36.6 °C) 98.1 °F (36.7 °C) 97.4 °F (36.3 °C) 97.5 °F (36.4 °C)   TempSrc: Oral Oral Oral Oral   SpO2: 99% 94% 94% 90%   Weight:       Height:           Flowsheet Rows      Flowsheet Row First Filed Value   Admission Height 160 cm (63\") Documented at 02/23/2024 1252   Admission Weight 49.4 kg (109 lb) Documented at 02/23/2024 1252             TELEMETRY: Atrial fibrillation with controlled ventricular response    Physical Exam:  Constitutional:       Appearance: Well-developed.   Eyes:      General: No scleral icterus.     Conjunctiva/sclera: Conjunctivae normal.      Pupils: Pupils are equal, round, and reactive to light.   HENT:      Head: Normocephalic and atraumatic.   Neck:      Vascular: No carotid bruit or JVD.   Pulmonary:      Effort: Pulmonary effort is normal.      Breath sounds: Normal breath sounds. No wheezing. No rales.   Cardiovascular:      Normal rate. Regular rhythm.      Murmurs: There is a systolic murmur.   Pulses:     Intact distal pulses.   Abdominal:      General: Bowel sounds are normal.      Palpations: Abdomen is soft.   Musculoskeletal: Normal range of motion.      Cervical back: Normal range of motion and neck supple. Skin:     General: Skin is warm and dry.      Findings: No rash.   Neurological:      Mental Status: Alert.      Comments: No focal deficits          Results Review:   I reviewed the patient's new clinical results.  Lab " Results (last 24 hours)       Procedure Component Value Units Date/Time    Basic Metabolic Panel [[id number]]  (Abnormal) Collected: 02/26/24 0407    Specimen: Blood from Arm, Right Updated: 02/26/24 0455     Glucose 90 mg/dL      BUN 29 mg/dL      Creatinine 0.72 mg/dL      Sodium 136 mmol/L      Potassium 4.5 mmol/L      Chloride 101 mmol/L      CO2 27.0 mmol/L      Calcium 9.0 mg/dL      BUN/Creatinine Ratio 40.3     Anion Gap 8.0 mmol/L      eGFR 77.1 mL/min/1.73     Narrative:      GFR Normal >60  Chronic Kidney Disease <60  Kidney Failure <15    The GFR formula is only valid for adults with stable renal function between ages 18 and 70.    Protime-INR [[id number]]  (Abnormal) Collected: 02/26/24 0407    Specimen: Blood from Arm, Right Updated: 02/26/24 0435     Protime 19.8 Seconds      INR 1.91    CBC & Differential [[id number]]  (Abnormal) Collected: 02/26/24 0407    Specimen: Blood from Arm, Right Updated: 02/26/24 0424    Narrative:      The following orders were created for panel order CBC & Differential.  Procedure                               Abnormality         Status                     ---------                               -----------         ------                     CBC Auto Differential[[id number]]        Abnormal            Final result                 Please view results for these tests on the individual orders.    CBC Auto Differential [[id number]]  (Abnormal) Collected: 02/26/24 0407    Specimen: Blood from Arm, Right Updated: 02/26/24 0424     WBC 8.60 10*3/mm3      RBC 3.72 10*6/mm3      Hemoglobin 12.3 g/dL      Hematocrit 38.3 %      .9 fL      MCH 33.0 pg      MCHC 32.0 g/dL      RDW 15.3 %      RDW-SD 58.2 fl      MPV 10.3 fL      Platelets 144 10*3/mm3      Neutrophil % 54.4 %      Lymphocyte % 20.3 %      Monocyte % 15.4 %      Eosinophil % 8.6 %      Basophil % 1.3 %      Neutrophils, Absolute 4.70 10*3/mm3      Lymphocytes, Absolute 1.80 10*3/mm3      Monocytes, Absolute 1.30  10*3/mm3      Eosinophils, Absolute 0.70 10*3/mm3      Basophils, Absolute 0.10 10*3/mm3      nRBC 0.2 /100 WBC     Blood Culture - Blood, Arm, Left [[id number]] Collected: 02/26/24 0412    Specimen: Blood from Arm, Left Updated: 02/26/24 0419    Blood Culture - Blood, Arm, Right [[id number]] Collected: 02/26/24 0407    Specimen: Blood from Arm, Right Updated: 02/26/24 0419    Blood Culture - Blood, Arm, Left [[id number]]  (Normal) Collected: 02/23/24 1524    Specimen: Blood from Arm, Left Updated: 02/25/24 1532     Blood Culture No growth at 2 days    Narrative:      Less than seven (7) mL's of blood was collected.  Insufficient quantity may yield false negative results.    Blood Culture - Blood, Arm, Right [[id number]]  (Normal) Collected: 02/23/24 1454    Specimen: Blood from Arm, Right Updated: 02/25/24 1517     Blood Culture No growth at 2 days    Narrative:      Less than seven (7) mL's of blood was collected.  Insufficient quantity may yield false negative results.            Imaging Results (Last 24 Hours)       Procedure Component Value Units Date/Time    CT Chest Without Contrast Diagnostic [[id number]] Collected: 02/26/24 0815     Updated: 02/26/24 0824    Narrative:      CT CHEST WO CONTRAST DIAGNOSTIC    Date of Exam: 2/25/2024 9:53 PM EST    Indication: pna.    Comparison: Chest x-ray 2/23/2024 and CTA chest 10/1/2022    Technique: Axial CT images were obtained of the chest without contrast administration.  Sagittal and coronal reconstructions were performed.  Automated exposure control and iterative reconstruction methods were used.      Findings:  MEDIASTINUM: The heart is enlarged and there is trace pericardial effusion. There is moderate atherosclerotic calcification of the aorta. A large hiatal hernia is present.  CORONARY ARTERIES: There is calcified atherosclerotic disease.  LUNGS: There is interlobular septal thickening similar to prior study. Consolidation in the right greater than left lower  lobes.  PLEURAL SPACE: Moderate to large layering right pleural effusion with trace effusion on the left.  LYMPH NODES: There are no pathologically enlarged lymph nodes.    UPPER ABDOMEN: Large hiatal hernia with nearly the entire stomach within the thorax.    OSSEOUS STRUCTURES: The bones are osteopenic. There is prominent thoracic kyphosis. Chronic compression fracture with vertebral body augmentation at T12. A moderate compression deformity of T6 is unchanged. There is a mild compression fracture at T7   which is new since the previous CT.          Impression:      Impression:    1. Large right and trace left pleural effusions with bibasilar atelectasis or consolidation which may represent pneumonia.  2. Large hiatal hernia.  3. Cardiomegaly with interlobular septal thickening similar to prior.  4. There is a mild compression fracture of the T7 vertebral body which was not present on the prior CT though is age indeterminant. Correlate clinically for any recent trauma and point tenderness.      Electronically Signed: Tiffani Guzman MD    2/26/2024 8:22 AM EST    Workstation ID: [id number]            EKG      I personally viewed and interpreted the patient's EKG/Telemetry data:    ECHOCARDIOGRAM:  Results for orders placed during the hospital encounter of 02/23/24    Adult Transthoracic Echo Complete W/ Cont if Necessary Per Protocol    Interpretation Summary    Left ventricular ejection fraction appears to be 51 - 55%.    The right ventricular cavity is dilated.    The left atrial cavity is severely dilated.    The right atrial cavity is severely  dilated.    There is a mass on the aortic valve.    Severe mitral valve regurgitation is present.    Severe tricuspid valve regurgitation is present.    Estimated right ventricular systolic pressure from tricuspid regurgitation is mildly elevated (35-45 mmHg).    Conclusion      Normal LV size with lower limits of normal  LV contractility EF of 50 to 55%  Severe RV  enlargement seen  Severe left atrial enlargement seen.  Severe right atrial enlargement seen.  Aortic valve is trileaflet.  There is a mobile density.  There is mobile density floating in and out in short axis of attic valve probably vegetation cannot rule out aortic plaque.  Would recommend JEFF to better evaluate.  Mitral valve appears structurally normal.  There is eccentric severe mitral regurgitation seen.  Tricuspid valve appears structurally normal, there is severe tricuspid regurgitation seen.  Calculated RV systolic pressure of 38 mmHg  Pulmonic valve appears structurally normal.  Mild pulmonic regurgitation seen.  No pericardial effusion seen.  Proximal aorta appears normal in size.       STRESS MYOVIEW:       CARDIAC CATHETERIZATION:  No results found for this or any previous visit.       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  Patient is currently on warfarin which will be held.    CAD  Patient has nonobstructive coronary disease and is ruled out for MI by EKG and enzymes and is currently stable without any angina    Hypertension  Patient blood pressure currently stable on medical therapy including metoprolol    Atrial fibrillation  Patient has history of atrial fibrillation is currently on digoxin and metoprolol and also warfarin and keep his INR on 2-2.5 and will hold it for her PE.    History of CVA  Patient is currently stable without any symptoms    Back problems  Patient had back surgery and is followed by the primary care doctor.    I discussed the patients findings and my recommendations with patient and nurse    Aleksey Mathew,  MD  02/26/24  11:33 EST

## 2024-02-26 NOTE — PROGRESS NOTES
"Pharmacy dosing service  Anticoagulant  Warfarin     Subjective:    Shruthi Amin is a 89 y.o.female being continued on warfarin for Atrial Fibrillation.    INR Goal: 2 - 3  Home medication?:  Warfarin 4mg Mon/Fri and 2mg warfarin all other days  Bridge Therapy Present?:  No  Interacting Medications Evaluation (New/Present/Discontinued): CTX (2/23-2/28; may cause INR increase)         Assessment/Plan:    Warfarin placed on hold by Dr. Mathew for JEFF. Will monitor for plans to resume after procedure.      Continue to monitor and adjust based on INR.         Date 2/23 2/24 2/25 2/26        INR 2.57 2.51 2.23 1.91        Dose Missed dose 2mg 2mg  HELD             Objective:  [Ht: 165.1 cm (65\"); Wt: 50.3 kg (111 lb); BMI: Body mass index is 18.47 kg/m².]    Lab Results   Component Value Date    ALBUMIN 4.0 02/24/2024     Lab Results   Component Value Date    INR 2.23 02/25/2024    INR 2.28 02/24/2024    INR 2.51 02/24/2024    PROTIME 22.9 02/25/2024    PROTIME 23.4 02/24/2024    PROTIME 25.6 02/24/2024     Lab Results   Component Value Date    HGB 13.0 02/25/2024    HGB 12.8 02/24/2024    HGB 12.0 02/23/2024     Lab Results   Component Value Date    HCT 39.3 02/25/2024    HCT 38.4 02/24/2024    HCT 36.8 02/23/2024       Aida Pavon, PharmD  02/25/24 14:20 EST         "

## 2024-02-26 NOTE — PLAN OF CARE
Goal Outcome Evaluation: Patient will discharge home with daughter and home health.

## 2024-02-27 ENCOUNTER — ANESTHESIA EVENT (OUTPATIENT)
Dept: CARDIOLOGY | Facility: HOSPITAL | Age: 89
End: 2024-02-27
Payer: MEDICARE

## 2024-02-27 ENCOUNTER — ANESTHESIA (OUTPATIENT)
Dept: CARDIOLOGY | Facility: HOSPITAL | Age: 89
End: 2024-02-27
Payer: MEDICARE

## 2024-02-27 ENCOUNTER — APPOINTMENT (OUTPATIENT)
Dept: CARDIOLOGY | Facility: HOSPITAL | Age: 89
DRG: 193 | End: 2024-02-27
Payer: MEDICARE

## 2024-02-27 LAB
ANION GAP SERPL CALCULATED.3IONS-SCNC: 6 MMOL/L (ref 5–15)
ANISOCYTOSIS BLD QL: ABNORMAL
BASOPHILS # BLD MANUAL: 0.09 10*3/MM3 (ref 0–0.2)
BASOPHILS NFR BLD MANUAL: 1 % (ref 0–1.5)
BUN SERPL-MCNC: 18 MG/DL (ref 8–23)
BUN/CREAT SERPL: 31.6 (ref 7–25)
CALCIUM SPEC-SCNC: 9 MG/DL (ref 8.2–9.6)
CHLORIDE SERPL-SCNC: 98 MMOL/L (ref 98–107)
CO2 SERPL-SCNC: 31 MMOL/L (ref 22–29)
CREAT SERPL-MCNC: 0.57 MG/DL (ref 0.57–1)
DEPRECATED RDW RBC AUTO: 52.1 FL (ref 37–54)
EGFRCR SERPLBLD CKD-EPI 2021: 83.8 ML/MIN/1.73
ELLIPTOCYTES BLD QL SMEAR: ABNORMAL
EOSINOPHIL # BLD MANUAL: 0.18 10*3/MM3 (ref 0–0.4)
EOSINOPHIL NFR BLD MANUAL: 2 % (ref 0.3–6.2)
ERYTHROCYTE [DISTWIDTH] IN BLOOD BY AUTOMATED COUNT: 14.8 % (ref 12.3–15.4)
GLUCOSE SERPL-MCNC: 100 MG/DL (ref 65–99)
HCT VFR BLD AUTO: 38.4 % (ref 34–46.6)
HGB BLD-MCNC: 12.7 G/DL (ref 12–15.9)
INR PPP: 1.81 (ref 2–3)
LYMPHOCYTES # BLD MANUAL: 1.6 10*3/MM3 (ref 0.7–3.1)
LYMPHOCYTES NFR BLD MANUAL: 9 % (ref 5–12)
MACROCYTES BLD QL SMEAR: ABNORMAL
MCH RBC QN AUTO: 33.7 PG (ref 26.6–33)
MCHC RBC AUTO-ENTMCNC: 33.1 G/DL (ref 31.5–35.7)
MCV RBC AUTO: 101.8 FL (ref 79–97)
MONOCYTES # BLD: 0.8 10*3/MM3 (ref 0.1–0.9)
MYELOCYTES NFR BLD MANUAL: 2 % (ref 0–0)
NEUTROPHILS # BLD AUTO: 6.05 10*3/MM3 (ref 1.7–7)
NEUTROPHILS NFR BLD MANUAL: 66 % (ref 42.7–76)
NEUTS BAND NFR BLD MANUAL: 2 % (ref 0–5)
PLAT MORPH BLD: NORMAL
PLATELET # BLD AUTO: 170 10*3/MM3 (ref 140–450)
PMV BLD AUTO: 9.7 FL (ref 6–12)
POIKILOCYTOSIS BLD QL SMEAR: ABNORMAL
POTASSIUM SERPL-SCNC: 4.8 MMOL/L (ref 3.5–5.2)
PROTHROMBIN TIME: 18.9 SECONDS (ref 19.4–28.5)
RBC # BLD AUTO: 3.77 10*6/MM3 (ref 3.77–5.28)
SCAN SLIDE: NORMAL
SODIUM SERPL-SCNC: 135 MMOL/L (ref 136–145)
VARIANT LYMPHS NFR BLD MANUAL: 16 % (ref 19.6–45.3)
VARIANT LYMPHS NFR BLD MANUAL: 2 % (ref 0–5)
WBC MORPH BLD: NORMAL
WBC NRBC COR # BLD AUTO: 8.9 10*3/MM3 (ref 3.4–10.8)

## 2024-02-27 PROCEDURE — 25010000002 CEFTRIAXONE PER 250 MG: Performed by: NURSE PRACTITIONER

## 2024-02-27 PROCEDURE — 94664 DEMO&/EVAL PT USE INHALER: CPT

## 2024-02-27 PROCEDURE — 93325 DOPPLER ECHO COLOR FLOW MAPG: CPT | Performed by: INTERNAL MEDICINE

## 2024-02-27 PROCEDURE — 94799 UNLISTED PULMONARY SVC/PX: CPT

## 2024-02-27 PROCEDURE — 25010000002 FUROSEMIDE PER 20 MG: Performed by: NURSE PRACTITIONER

## 2024-02-27 PROCEDURE — 85007 BL SMEAR W/DIFF WBC COUNT: CPT | Performed by: INTERNAL MEDICINE

## 2024-02-27 PROCEDURE — 80048 BASIC METABOLIC PNL TOTAL CA: CPT | Performed by: INTERNAL MEDICINE

## 2024-02-27 PROCEDURE — 85610 PROTHROMBIN TIME: CPT | Performed by: INTERNAL MEDICINE

## 2024-02-27 PROCEDURE — 99232 SBSQ HOSP IP/OBS MODERATE 35: CPT | Performed by: INTERNAL MEDICINE

## 2024-02-27 PROCEDURE — 93320 DOPPLER ECHO COMPLETE: CPT

## 2024-02-27 PROCEDURE — 94761 N-INVAS EAR/PLS OXIMETRY MLT: CPT

## 2024-02-27 PROCEDURE — 93312 ECHO TRANSESOPHAGEAL: CPT | Performed by: INTERNAL MEDICINE

## 2024-02-27 PROCEDURE — 93312 ECHO TRANSESOPHAGEAL: CPT

## 2024-02-27 PROCEDURE — 25010000002 PROPOFOL 200 MG/20ML EMULSION

## 2024-02-27 PROCEDURE — 85025 COMPLETE CBC W/AUTO DIFF WBC: CPT | Performed by: INTERNAL MEDICINE

## 2024-02-27 PROCEDURE — 93325 DOPPLER ECHO COLOR FLOW MAPG: CPT

## 2024-02-27 PROCEDURE — 25810000003 SODIUM CHLORIDE 0.9 % SOLUTION

## 2024-02-27 PROCEDURE — 25010000002 ONDANSETRON PER 1 MG: Performed by: INTERNAL MEDICINE

## 2024-02-27 PROCEDURE — 93320 DOPPLER ECHO COMPLETE: CPT | Performed by: INTERNAL MEDICINE

## 2024-02-27 PROCEDURE — 94640 AIRWAY INHALATION TREATMENT: CPT

## 2024-02-27 RX ORDER — PROPOFOL 10 MG/ML
INJECTION, EMULSION INTRAVENOUS AS NEEDED
Status: DISCONTINUED | OUTPATIENT
Start: 2024-02-27 | End: 2024-02-27 | Stop reason: SURG

## 2024-02-27 RX ORDER — SODIUM CHLORIDE 9 MG/ML
INJECTION, SOLUTION INTRAVENOUS CONTINUOUS PRN
Status: DISCONTINUED | OUTPATIENT
Start: 2024-02-27 | End: 2024-02-27 | Stop reason: SURG

## 2024-02-27 RX ORDER — FUROSEMIDE 10 MG/ML
20 INJECTION INTRAMUSCULAR; INTRAVENOUS DAILY
Status: DISCONTINUED | OUTPATIENT
Start: 2024-02-27 | End: 2024-02-29

## 2024-02-27 RX ORDER — IPRATROPIUM BROMIDE AND ALBUTEROL SULFATE 2.5; .5 MG/3ML; MG/3ML
3 SOLUTION RESPIRATORY (INHALATION)
Status: DISCONTINUED | OUTPATIENT
Start: 2024-02-27 | End: 2024-03-02 | Stop reason: HOSPADM

## 2024-02-27 RX ORDER — CEFDINIR 300 MG/1
300 CAPSULE ORAL EVERY 12 HOURS SCHEDULED
Status: COMPLETED | OUTPATIENT
Start: 2024-02-28 | End: 2024-02-29

## 2024-02-27 RX ORDER — LIDOCAINE HYDROCHLORIDE 20 MG/ML
INJECTION, SOLUTION EPIDURAL; INFILTRATION; INTRACAUDAL; PERINEURAL AS NEEDED
Status: DISCONTINUED | OUTPATIENT
Start: 2024-02-27 | End: 2024-02-27 | Stop reason: SURG

## 2024-02-27 RX ORDER — ONDANSETRON 2 MG/ML
4 INJECTION INTRAMUSCULAR; INTRAVENOUS EVERY 6 HOURS PRN
Status: DISCONTINUED | OUTPATIENT
Start: 2024-02-27 | End: 2024-03-02 | Stop reason: HOSPADM

## 2024-02-27 RX ORDER — BUDESONIDE 0.5 MG/2ML
0.5 INHALANT ORAL
Status: DISCONTINUED | OUTPATIENT
Start: 2024-02-27 | End: 2024-03-02 | Stop reason: HOSPADM

## 2024-02-27 RX ADMIN — METOPROLOL SUCCINATE 25 MG: 25 TABLET, EXTENDED RELEASE ORAL at 12:39

## 2024-02-27 RX ADMIN — IPRATROPIUM BROMIDE AND ALBUTEROL SULFATE 3 ML: .5; 3 SOLUTION RESPIRATORY (INHALATION) at 16:05

## 2024-02-27 RX ADMIN — HYDROCODONE BITARTRATE AND ACETAMINOPHEN 1 TABLET: 5; 325 TABLET ORAL at 12:39

## 2024-02-27 RX ADMIN — ACETAMINOPHEN 500 MG: 500 TABLET ORAL at 20:49

## 2024-02-27 RX ADMIN — LIDOCAINE 1 PATCH: 4 PATCH TOPICAL at 12:25

## 2024-02-27 RX ADMIN — LIDOCAINE HYDROCHLORIDE 40 MG: 20 INJECTION, SOLUTION EPIDURAL; INFILTRATION; INTRACAUDAL; PERINEURAL at 11:30

## 2024-02-27 RX ADMIN — CEFTRIAXONE SODIUM 1000 MG: 1 INJECTION, POWDER, FOR SOLUTION INTRAMUSCULAR; INTRAVENOUS at 09:30

## 2024-02-27 RX ADMIN — LATANOPROST 1 DROP: 50 SOLUTION/ DROPS OPHTHALMIC at 20:49

## 2024-02-27 RX ADMIN — Medication 10 ML: at 10:39

## 2024-02-27 RX ADMIN — IPRATROPIUM BROMIDE AND ALBUTEROL SULFATE 3 ML: .5; 3 SOLUTION RESPIRATORY (INHALATION) at 18:52

## 2024-02-27 RX ADMIN — DIGOXIN 125 MCG: 125 TABLET ORAL at 12:39

## 2024-02-27 RX ADMIN — SODIUM CHLORIDE: 9 INJECTION, SOLUTION INTRAVENOUS at 11:15

## 2024-02-27 RX ADMIN — FAMOTIDINE 20 MG: 20 TABLET, FILM COATED ORAL at 12:39

## 2024-02-27 RX ADMIN — FUROSEMIDE 20 MG: 10 INJECTION, SOLUTION INTRAMUSCULAR; INTRAVENOUS at 17:08

## 2024-02-27 RX ADMIN — Medication 10 ML: at 20:54

## 2024-02-27 RX ADMIN — PROPOFOL 50 MG: 10 INJECTION, EMULSION INTRAVENOUS at 11:31

## 2024-02-27 RX ADMIN — BUDESONIDE 0.5 MG: 0.5 INHALANT RESPIRATORY (INHALATION) at 18:58

## 2024-02-27 RX ADMIN — MAGNESIUM HYDROXIDE 10 ML: 2400 SUSPENSION ORAL at 15:17

## 2024-02-27 RX ADMIN — ONDANSETRON 4 MG: 2 INJECTION INTRAMUSCULAR; INTRAVENOUS at 15:17

## 2024-02-27 RX ADMIN — DOCUSATE SODIUM 100 MG: 100 CAPSULE, LIQUID FILLED ORAL at 20:49

## 2024-02-27 NOTE — SIGNIFICANT NOTE
02/27/24 1658   OTHER   Discipline physical therapist   Rehab Time/Intention   Session Not Performed patient unavailable for treatment  (off the floor for JEFF at time of attempt for PT treatment)   Recommendation   PT - Next Appointment 02/28/24

## 2024-02-27 NOTE — ANESTHESIA PREPROCEDURE EVALUATION
Anesthesia Evaluation     Patient summary reviewed   no history of anesthetic complications:   NPO Solid Status: > 8 hours  NPO Liquid Status: > 2 hours           Airway   Mallampati: II  TM distance: >3 FB  Neck ROM: limited  Dental    (+) upper dentures    Pulmonary - normal exam   Cardiovascular - normal exam    ECG reviewed    (+) hypertension, valvular problems/murmurs MR, CAD, dysrhythmias Atrial Fib, CHF , hyperlipidemia      Neuro/Psych  (+) CVA, numbness  GI/Hepatic/Renal/Endo    (+) hiatal hernia, GERD    Musculoskeletal     (+) back pain  Abdominal    Substance History      OB/GYN          Other   arthritis,     ROS/Med Hx Other: Assessment & Plan  Abnormal echo  Patient presents with back pain and had an abnormal echocardiogram which 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  Patient is currently on warfarin which will be held.     CAD  Patient has nonobstructive coronary disease and is ruled out for MI by EKG and enzymes and is currently stable without any angina     Hypertension  Patient blood pressure currently stable on medical therapy including metoprolol     Atrial fibrillation  Patient has history of atrial fibrillation is currently on digoxin and metoprolol and also warfarin and keep his INR on 2-2.5 and will hold it for her PE.     History of CVA  Patient is currently stable without any symptoms     Back problems  Patient had back surgery and is followed by the primary care doctor.     I discussed the patients findings and my recommendations with patient and nurse     Aleksey Mathew MD                  Anesthesia Plan    ASA 3     MAC   total IV anesthesia  (ERAS hip protocol.  NO block due to high INR)  intravenous induction     Anesthetic plan, risks, benefits,  and alternatives have been provided, discussed and informed consent has been obtained with: patient.    Plan discussed with CRNA and CAA.

## 2024-02-27 NOTE — PROGRESS NOTES
"Pharmacy dosing service  Anticoagulant  Warfarin     Subjective:    Shruthi Amin is a 89 y.o.female being continued on warfarin for Atrial Fibrillation.    INR Goal: 2 - 3  Home medication?:  Warfarin 4mg Mon/Fri and 2mg warfarin all other days  Bridge Therapy Present?:  No  Interacting Medications Evaluation (New/Present/Discontinued): CTX (2/23-2/28; may cause INR increase)         Assessment/Plan:    Warfarin placed on hold by Dr. Mathew for JEFF. Will monitor for plans to resume after procedure.      Continue to monitor and adjust based on INR.         Date 2/23 2/24 2/25 2/26 2/27       INR 2.57 2.51 2.23 1.91 1.81       Dose Missed dose 2mg 2mg  HELD  HELD           Objective:  [Ht: 165.1 cm (65\"); Wt: 49.9 kg (110 lb); BMI: Body mass index is 18.3 kg/m².]    Lab Results   Component Value Date    ALBUMIN 4.0 02/24/2024     Lab Results   Component Value Date    INR 1.81 (L) 02/27/2024    INR 1.91 (L) 02/26/2024    INR 2.23 02/25/2024    PROTIME 18.9 (L) 02/27/2024    PROTIME 19.8 02/26/2024    PROTIME 22.9 02/25/2024     Lab Results   Component Value Date    HGB 12.7 02/27/2024    HGB 12.3 02/26/2024    HGB 13.0 02/25/2024     Lab Results   Component Value Date    HCT 38.4 02/27/2024    HCT 38.3 02/26/2024    HCT 39.3 02/25/2024       Renetta Suarez [facility]  02/27/24 15:24 EST           "

## 2024-02-27 NOTE — CONSULTS
PULMONARY CRITICAL CARE CONSULT NOTE      PATIENT IDENTIFICATION:  Name: Shruthi Amin  MRN: [id number]  :  6/10/1928     Age: 89 y.o.  Sex: female        DATE OF CONSULTATION:  2024  PRIMARY CARE PHYSICIAN    Cristal Goodwin MD                  CHIEF COMPLAINT: SOB    History of Present Illness:   Shruthi Amin is a 89 y.o. female with a history of COPD, A-fib, CAD, Hiatal hernia, GERD, Osteoporosis, Osteoarthritis, HLD, HTN, and Hx CVA who came to the ER with complaints of mid back pain which radiates to the upper abdomen. She said when she started getting up and movement after she put on her close she started having some back pain that radiated to her chest and causing pain on inspiration. In the ER, Thoracic x-ray showed a large hiatal hernia with bibasilar airspace and pleural effusion which is unchanged from previous exams.  Chest x-ray showed pulmonary edema and CHF and opacities which cannot exclude pneumonia. Patient admitted for further treatment.       Review of Systems:   Constitutional: As above    Eyes: negative   ENT/oropharynx: negative   Cardiovascular: As above    Respiratory: As above    Gastrointestinal: negative   Genitourinary: negative   Neurological: negative   Musculoskeletal: negative   Integument/breast: negative   Endocrine: negative   Allergic/Immunologic: negative     Past Medical History:  Past Medical History:   Diagnosis Date    Atrial fibrillation     Coronary artery disease     Atrial fibrillation    GERD (gastroesophageal reflux disease)     Glaucoma     Hiatal hernia with gastroesophageal reflux     History of osteoporotic pathological fracture     Right intertroch (2019)    Hx of compression fracture of spine     T12 and L1(); T6 (2022)    Hyperlipidemia     Hypertension     Osteoarthritis     Osteoporosis     on prolia(3/21/22)    Stroke     off and on dizziness from the stroke.       Past Surgical History:  Past Surgical History:   Procedure Laterality  Date    BACK SURGERY      kyphoplasty    EYE SURGERY      cataract surgery    FIXATION KYPHOPLASTY LUMBAR SPINE  2013    HIP TROCHANTERIC NAILING WITH INTRAMEDULLARY HIP SCREW Right 2019    Procedure: HIP TROCHANTERIC NAILING SHORT WITH INTRAMEDULLARY HIP SCREW;  Surgeon: Edward Centeno MD;  Location: [facility] MAIN OR;  Service: Orthopedics    RECTAL SURGERY      x 3        Family History:  Family History   Problem Relation Age of Onset    Heart disease Mother     Hypertension Mother     Osteoporosis Mother     Cancer Father         colon cancer    Osteoporosis Father     Cancer Sister         lung    Cancer Brother         colon cancer        Social History:   Social History     Tobacco Use    Smoking status: Former     Types: Cigarettes     Quit date:      Years since quittin.2     Passive exposure: Past    Smokeless tobacco: Never   Substance Use Topics    Alcohol use: No        Allergies:  Allergies   Allergen Reactions    Penicillins Hives    Codeine Nausea And Vomiting    Latex Rash       Home Meds:  Medications Prior to Admission   Medication Sig Dispense Refill Last Dose    B Complex Vitamins (VITAMIN B COMPLEX PO) Take 1 tablet by mouth Daily. Indications: suppliment   2024    digoxin (LANOXIN) 125 MCG tablet TAKE 1 TABLET BY MOUTH DAILY. INDICATIONS: CHRONIC ATRIAL FIBRILLATION 90 tablet 1 2024    docusate sodium (COLACE) 100 MG capsule Take 1 capsule by mouth Every Night.   2024    furosemide (LASIX) 20 MG tablet Take 1 tablet by mouth As Needed (swelling). Indications: Edema 90 tablet 2     latanoprost (XALATAN) 0.005 % ophthalmic solution Administer 1 drop to the right eye Every Night. Indications: Wide-Angle Glaucoma   2024    metoprolol succinate XL (TOPROL-XL) 25 MG 24 hr tablet TAKE 1 TABLET BY MOUTH TWICE A  tablet 1 2024    O2 (OXYGEN) Inhale 2 L/min As Needed. Indications: scar tissue in lungs       potassium chloride (KLOR-CON) 10 MEQ CR  "tablet Take 1 tablet by mouth As Needed (as needed when taking water pill). ONLY WHEN TAKING WATER PILL 90 tablet 1     Sennosides 25 MG tablet Take 25 mg by mouth 2 (Two) Times a Day As Needed (constipation). Indications: Constipation   2024    VITAMIN A PO Take 1 tablet by mouth Daily.   2024    warfarin (COUMADIN) 2 MG tablet Take 2 tablets by mouth. On Monday and Friday.   2024    warfarin (COUMADIN) 2 MG tablet Take 1 tablet by mouth. On Tues,  Wed, Thurs,  Sat, Sun,   2024    Zinc Sulfate (ZINC 15 PO) Take 2 tablets by mouth Daily. WITH ELDERBERRY--GUMMIES   Indications: suppliment   2024    ascorbic acid (VITAMIN C) 1000 MG tablet Take 1 tablet by mouth Daily. Indications: Inadequate Vitamin C          Objective:  tMax 24 hrs: Temp (24hrs), Av.7 °F (36.5 °C), Min:97.4 °F (36.3 °C), Max:97.9 °F (36.6 °C)      Vitals Ranges:   Temp:  [97.4 °F (36.3 °C)-97.9 °F (36.6 °C)] 97.7 °F (36.5 °C)  Heart Rate:  [53-74] 53  Resp:  [14-22] 20  BP: (104-157)/() 104/69    Intake and Output Last 3 Shifts:   I/O last 3 completed shifts:  In: 340 [P.O.:340]  Out: 350 [Urine:350]    Exam:  /69 (BP Location: Left arm, Patient Position: Lying)   Pulse 53   Temp 97.7 °F (36.5 °C) (Oral)   Resp 20   Ht 165.1 cm (65\")   Wt 49.9 kg (110 lb)   SpO2 92%   BMI 18.30 kg/m²       24  1106 24  1201   Weight: 50.3 kg (110 lb 14.3 oz) 49.9 kg (110 lb)     General Appearance:      HEENT:  Normocephalic, without obvious abnormality, atraumatic. Conjunctivae/corneas clear.  Normal external ear canals. Nares normal, no drainage.  Neck:  Supple, symmetrical, trachea midline. No JVD.  Lungs /Chest wall:   Bilateral basal rhonchi, respirations unlabored, symmetrical wall movement.     Heart:  Regular rate and rhythm, systolic murmur PMI left sternal border  Abdomen: Soft, nontender, no masses, no organomegaly.    Extremities: Trace edema, no clubbing or cyanosis        Data Review:  All " labs (24hrs):   Recent Results (from the past 24 hour(s))   MRSA Screen, PCR (Inpatient) - Swab, Nares    Collection Time: 02/26/24  5:56 PM    Specimen: Nares; Swab   Result Value Ref Range    MRSA PCR No MRSA Detected No MRSA Detected   Legionella Antigen, Urine - Urine, Urine, Clean Catch    Collection Time: 02/26/24  6:38 PM    Specimen: Urine, Clean Catch   Result Value Ref Range    LEGIONELLA ANTIGEN, URINE Negative Negative   S. Pneumo Ag Urine or CSF - Urine, Urine, Clean Catch    Collection Time: 02/26/24  6:38 PM    Specimen: Urine, Clean Catch   Result Value Ref Range    Strep Pneumo Ag Negative Negative   Protime-INR    Collection Time: 02/27/24  1:11 PM    Specimen: Blood   Result Value Ref Range    Protime 18.9 (L) 19.4 - 28.5 Seconds    INR 1.81 (L) 2.00 - 3.00   Basic Metabolic Panel    Collection Time: 02/27/24  1:11 PM    Specimen: Blood   Result Value Ref Range    Glucose 100 (H) 65 - 99 mg/dL    BUN 18 8 - 23 mg/dL    Creatinine 0.57 0.57 - 1.00 mg/dL    Sodium 135 (L) 136 - 145 mmol/L    Potassium 4.8 3.5 - 5.2 mmol/L    Chloride 98 98 - 107 mmol/L    CO2 31.0 (H) 22.0 - 29.0 mmol/L    Calcium 9.0 8.2 - 9.6 mg/dL    BUN/Creatinine Ratio 31.6 (H) 7.0 - 25.0    Anion Gap 6.0 5.0 - 15.0 mmol/L    eGFR 83.8 >60.0 mL/min/1.73   CBC Auto Differential    Collection Time: 02/27/24  1:11 PM    Specimen: Blood   Result Value Ref Range    WBC 8.90 3.40 - 10.80 10*3/mm3    RBC 3.77 3.77 - 5.28 10*6/mm3    Hemoglobin 12.7 12.0 - 15.9 g/dL    Hematocrit 38.4 34.0 - 46.6 %    .8 (H) 79.0 - 97.0 fL    MCH 33.7 (H) 26.6 - 33.0 pg    MCHC 33.1 31.5 - 35.7 g/dL    RDW 14.8 12.3 - 15.4 %    RDW-SD 52.1 37.0 - 54.0 fl    MPV 9.7 6.0 - 12.0 fL    Platelets 170 140 - 450 10*3/mm3   Scan Slide    Collection Time: 02/27/24  1:11 PM    Specimen: Blood   Result Value Ref Range    Scan Slide     Manual Differential    Collection Time: 02/27/24  1:11 PM    Specimen: Blood   Result Value Ref Range    Neutrophil % 66.0  42.7 - 76.0 %    Lymphocyte % 16.0 (L) 19.6 - 45.3 %    Monocyte % 9.0 5.0 - 12.0 %    Eosinophil % 2.0 0.3 - 6.2 %    Basophil % 1.0 0.0 - 1.5 %    Bands %  2.0 0.0 - 5.0 %    Myelocyte % 2.0 (H) 0.0 - 0.0 %    Atypical Lymphocyte % 2.0 0.0 - 5.0 %    Neutrophils Absolute 6.05 1.70 - 7.00 10*3/mm3    Lymphocytes Absolute 1.60 0.70 - 3.10 10*3/mm3    Monocytes Absolute 0.80 0.10 - 0.90 10*3/mm3    Eosinophils Absolute 0.18 0.00 - 0.40 10*3/mm3    Basophils Absolute 0.09 0.00 - 0.20 10*3/mm3    Anisocytosis Slight/1+ None Seen    Elliptocytes Slight/1+ None Seen    Macrocytes Slight/1+ None Seen    Poikilocytes Slight/1+ None Seen    WBC Morphology Normal Normal    Platelet Morphology Normal Normal        Imaging:  CT Chest Without Contrast Diagnostic    Result Date: 2/26/2024  CT CHEST WO CONTRAST DIAGNOSTIC Date of Exam: 2/25/2024 9:53 PM EST Indication: pna. Comparison: Chest x-ray 2/23/2024 and CTA chest 10/1/2022 Technique: Axial CT images were obtained of the chest without contrast administration.  Sagittal and coronal reconstructions were performed.  Automated exposure control and iterative reconstruction methods were used. Findings: MEDIASTINUM: The heart is enlarged and there is trace pericardial effusion. There is moderate atherosclerotic calcification of the aorta. A large hiatal hernia is present. CORONARY ARTERIES: There is calcified atherosclerotic disease. LUNGS: There is interlobular septal thickening similar to prior study. Consolidation in the right greater than left lower lobes. PLEURAL SPACE: Moderate to large layering right pleural effusion with trace effusion on the left. LYMPH NODES: There are no pathologically enlarged lymph nodes. UPPER ABDOMEN: Large hiatal hernia with nearly the entire stomach within the thorax. OSSEOUS STRUCTURES: The bones are osteopenic. There is prominent thoracic kyphosis. Chronic compression fracture with vertebral body augmentation at T12. A moderate compression  deformity of T6 is unchanged. There is a mild compression fracture at T7 which is new since the previous CT.     Impression: 1. Large right and trace left pleural effusions with bibasilar atelectasis or consolidation which may represent pneumonia. 2. Large hiatal hernia. 3. Cardiomegaly with interlobular septal thickening similar to prior. 4. There is a mild compression fracture of the T7 vertebral body which was not present on the prior CT though is age indeterminant. Correlate clinically for any recent trauma and point tenderness. Electronically Signed: Tiffani Guzman MD  2/26/2024 8:22 AM EST  Workstation ID: [id number]    Adult Transthoracic Echo Complete W/ Cont if Necessary Per Protocol    Result Date: 2/24/2024    Left ventricular ejection fraction appears to be 51 - 55%.   The right ventricular cavity is dilated.   The left atrial cavity is severely dilated.   The right atrial cavity is severely  dilated.   There is a mass on the aortic valve.   Severe mitral valve regurgitation is present.   Severe tricuspid valve regurgitation is present.   Estimated right ventricular systolic pressure from tricuspid regurgitation is mildly elevated (35-45 mmHg).         Conclusion Normal LV size with lower limits of normal  LV contractility EF of 50 to 55% Severe RV enlargement seen Severe left atrial enlargement seen.  Severe right atrial enlargement seen. Aortic valve is trileaflet.  There is a mobile density.  There is mobile density floating in and out in short axis of attic valve probably vegetation cannot rule out aortic plaque.  Would recommend JEFF to better evaluate. Mitral valve appears structurally normal.  There is eccentric severe mitral regurgitation seen. Tricuspid valve appears structurally normal, there is severe tricuspid regurgitation seen.  Calculated RV systolic pressure of 38 mmHg Pulmonic valve appears structurally normal.  Mild pulmonic regurgitation seen. No pericardial effusion seen. Proximal aorta  appears normal in size.     XR Spine Thoracic 3 View    Result Date: 2/23/2024  XR SPINE THORACIC 3 VW Date of Exam: 2/23/2024 2:10 PM EST Indication: back pain Comparison: Chest radiograph 2/23/2024, thoracic spine radiographs 10/5/2022 Findings: Chronic compression fracture T12 status post kyphoplasty. Moderate chronic compression fracture T6 unchanged. No new fracture. Mild superior endplate height loss at L1 stable. Exaggerated thoracic kyphosis. Unchanged large hiatal hernia with bibasilar airspace disease and bilateral pleural effusions partially imaged.     Impression: 1. No new fracture. 2. Moderate chronic compression fracture T6 unchanged. 3. Chronic compression fracture at T12 status post kyphoplasty. 4. Large hiatal hernia with bibasilar airspace disease and bilateral pleural effusions partially imaged, similar to prior studies. Electronically Signed: Elver Machuca MD  2/23/2024 2:18 PM EST  Workstation ID: [id number]    XR Chest 1 View    Result Date: 2/23/2024  XR CHEST 1 VW Date of Exam: 2/23/2024 2:09 PM EST Indication: dyspnea Comparison: 10/1/2022 Findings: There is marked cardiomegaly. There is central pulmonary vascular congestion and bilateral perihilar interstitial haziness suggesting pulmonary edema or less likely atypical infection pattern. There is a moderate right and a small left pleural effusion with overlying consolidation either due to atelectasis or pneumonia. No pneumothorax is seen. Aortic vascular calcification is noted.     Impression: 1. Cardiomegaly with central pulmonary vascular congestion and bilateral perihilar haziness suggesting pulmonary edema/CHF pattern. Atypical infection pattern felt less likely. 2. Moderate right and small left pleural effusions with overlying consolidation due to atelectasis or infiltrates. Electronically Signed: Sterling Hicks MD  2/23/2024 2:16 PM EST  Workstation ID: [id number]       Current:  cefTRIAXone, 1,000 mg, Intravenous, Q24H  digoxin, 125 mcg,  Oral, Daily  docusate sodium, 100 mg, Oral, Nightly  famotidine, 20 mg, Oral, Daily  latanoprost, 1 drop, Right Eye, Nightly  Lidocaine, 1 patch, Transdermal, Q24H  metoprolol succinate XL, 25 mg, Oral, BID  sodium chloride, 10 mL, Intravenous, Q12H  [Held by provider] warfarin, 2 mg, Oral, Once per day on Sunday Tuesday Wednesday Thursday Saturday  [Held by provider] warfarin, 4 mg, Oral, Once per day on Monday Friday        Infusion:  Pharmacy to dose warfarin,          PRN:    acetaminophen    senna-docusate sodium **AND** polyethylene glycol **AND** bisacodyl **AND** bisacodyl    Calcium Replacement - Follow Nurse / BPA Driven Protocol    HYDROcodone-acetaminophen    Magnesium Cardiology Dose Replacement - Follow Nurse / BPA Driven Protocol    magnesium hydroxide    ondansetron    Pharmacy to dose warfarin    Phosphorus Replacement - Follow Nurse / BPA Driven Protocol    Potassium Replacement - Follow Nurse / BPA Driven Protocol    [COMPLETED] Insert Peripheral IV **AND** sodium chloride    sodium chloride    sodium chloride    ASSESSMENT:  ? Pneumonia   Pleural effusion  COPD  Back pain  CHF  A-fib  CAD  GERD  Osteoarthritis  Hiatal hernia          PLAN:  OOB daily, use IS more   Antibiotics complete tomorrow   Bronchodilators  Inhaled corticosteroids  Diuresis and monitor QUINTIN's   Monitor effusion for now   Incentive spirometer/Flutter valve  Electrolytes/ glycemic control  No DVT and GI prophylaxis    Discussed with Dr Rancho Mendoza, APRN  2/27/2024  15:34 EST      I personally have examined  and interviewed the patient. I have reviewed the history, data, problems, assessment and plan with our NP.  Total Critical care time in direct medical management (   ) minutes, This time specifically excludes time spent performing procedures.    Sulma Vickers MD   2/27/2024  21:51 EST

## 2024-02-27 NOTE — PROGRESS NOTES
LOS: 4 days   Patient Care Team:  Cristal Goodwin MD as PCP - General (Family Medicine)  Aleksey Mathew MD as Consulting Physician (Cardiology)  Anai Moise APRN as Nurse Practitioner (Cardiology)    Subjective     Patient complains of left side chest discomfort and shortness of breath with exertion    Review of Systems   Constitutional:  Positive for activity change and fatigue.   HENT: Negative.     Respiratory:  Positive for shortness of breath.    Cardiovascular:  Positive for chest pain.   Gastrointestinal: Negative.    Genitourinary: Negative.    Musculoskeletal: Negative.    Neurological:  Positive for weakness.   Psychiatric/Behavioral: Negative.             Objective     Vital Signs  Temp:  [97.4 °F (36.3 °C)-97.9 °F (36.6 °C)] 97.7 °F (36.5 °C)  Heart Rate:  [61-74] 67  Resp:  [16-23] 21  BP: (123-157)/() 123/109      Physical Exam  Vitals reviewed.   Constitutional:       Appearance: She is not ill-appearing.   HENT:      Head: Normocephalic and atraumatic.      Right Ear: External ear normal.      Left Ear: External ear normal.      Nose: Nose normal.      Mouth/Throat:      Mouth: Mucous membranes are moist.   Eyes:      General:         Right eye: No discharge.         Left eye: No discharge.   Cardiovascular:      Rate and Rhythm: Normal rate and regular rhythm.      Pulses: Normal pulses.      Heart sounds: Normal heart sounds.   Pulmonary:      Effort: Pulmonary effort is normal.      Breath sounds: Normal breath sounds.   Abdominal:      General: Bowel sounds are normal.      Palpations: Abdomen is soft.   Musculoskeletal:         General: Normal range of motion.      Cervical back: Normal range of motion.   Skin:     General: Skin is warm and dry.   Neurological:      Mental Status: She is alert and oriented to person, place, and time.   Psychiatric:         Behavior: Behavior normal.              Results Review:    Lab Results (last 24 hours)       Procedure Component Value Units  Date/Time    Blood Culture - Blood, Arm, Left [[id number]]  (Normal) Collected: 02/26/24 0412    Specimen: Blood from Arm, Left Updated: 02/27/24 0430     Blood Culture No growth at 24 hours    Blood Culture - Blood, Arm, Right [[id number]]  (Normal) Collected: 02/26/24 0407    Specimen: Blood from Arm, Right Updated: 02/27/24 0430     Blood Culture No growth at 24 hours    Narrative:      Less than seven (7) mL's of blood was collected.  Insufficient quantity may yield false negative results.    Legionella Antigen, Urine - Urine, Urine, Clean Catch [[id number]]  (Normal) Collected: 02/26/24 1838    Specimen: Urine, Clean Catch Updated: 02/26/24 1935     LEGIONELLA ANTIGEN, URINE Negative    S. Pneumo Ag Urine or CSF - Urine, Urine, Clean Catch [[id number]]  (Normal) Collected: 02/26/24 1838    Specimen: Urine, Clean Catch Updated: 02/26/24 1935     Strep Pneumo Ag Negative    MRSA Screen, PCR (Inpatient) - Swab, Nares [[id number]]  (Normal) Collected: 02/26/24 1756    Specimen: Swab from Nares Updated: 02/26/24 1923     MRSA PCR No MRSA Detected    Narrative:      The negative predictive value of this diagnostic test is high and should only be used to consider de-escalating anti-MRSA therapy. A positive result may indicate colonization with MRSA and must be correlated clinically.    Blood Culture - Blood, Arm, Left [[id number]]  (Normal) Collected: 02/23/24 1524    Specimen: Blood from Arm, Left Updated: 02/26/24 1545     Blood Culture No growth at 3 days    Narrative:      Less than seven (7) mL's of blood was collected.  Insufficient quantity may yield false negative results.    Blood Culture - Blood, Arm, Right [[id number]]  (Normal) Collected: 02/23/24 1454    Specimen: Blood from Arm, Right Updated: 02/26/24 1515     Blood Culture No growth at 3 days    Narrative:      Less than seven (7) mL's of blood was collected.  Insufficient quantity may yield false negative results.             Imaging Results (Last 24 Hours)        ** No results found for the last 24 hours. **                 I reviewed the patient's new clinical results.    Medication Review:   Scheduled Meds:cefTRIAXone, 1,000 mg, Intravenous, Q24H  digoxin, 125 mcg, Oral, Daily  docusate sodium, 100 mg, Oral, Nightly  famotidine, 20 mg, Oral, Daily  latanoprost, 1 drop, Right Eye, Nightly  Lidocaine, 1 patch, Transdermal, Q24H  metoprolol succinate XL, 25 mg, Oral, BID  sodium chloride, 10 mL, Intravenous, Q12H  [Held by provider] warfarin, 2 mg, Oral, Once per day on Sunday Tuesday Wednesday Thursday Saturday  [Held by provider] warfarin, 4 mg, Oral, Once per day on Monday Friday      Continuous Infusions:Pharmacy to dose warfarin,       PRN Meds:.  acetaminophen    senna-docusate sodium **AND** polyethylene glycol **AND** bisacodyl **AND** bisacodyl    Calcium Replacement - Follow Nurse / BPA Driven Protocol    HYDROcodone-acetaminophen    Magnesium Cardiology Dose Replacement - Follow Nurse / BPA Driven Protocol    Pharmacy to dose warfarin    Phosphorus Replacement - Follow Nurse / BPA Driven Protocol    Potassium Replacement - Follow Nurse / BPA Driven Protocol    [COMPLETED] Insert Peripheral IV **AND** sodium chloride    sodium chloride    sodium chloride     Interval History:    Assessment & Plan     Back pain  -prior compression fractures; no further workup planned as pain does not appear related to spinal fractures    CAP  - Rocephin    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

## 2024-02-27 NOTE — PROGRESS NOTES
Infectious Diseases Progress Note      LOS: 4 days   Patient Care Team:  Cristal Goodwin MD as PCP - General (Family Medicine)  Aleksey Mathew MD as Consulting Physician (Cardiology)  Anai Moise APRN as Nurse Practitioner (Cardiology)    Chief Complaint: Shortness of breath, back pain    Subjective     The patient has been afebrile for the last 24 hours.  The patient is on room air, hemodynamically stable, and is tolerating antimicrobial therapy.  Patient is back from a JEFF    Review of Systems:   Review of Systems   Constitutional: Negative.    HENT: Negative.     Eyes: Negative.    Respiratory:  Positive for cough and shortness of breath.    Cardiovascular: Negative.    Gastrointestinal: Negative.    Endocrine: Negative.    Genitourinary: Negative.    Musculoskeletal:  Positive for back pain.   Skin: Negative.    Neurological: Negative.    Psychiatric/Behavioral: Negative.     All other systems reviewed and are negative.       Objective     Vital Signs  Temp:  [97.4 °F (36.3 °C)-97.9 °F (36.6 °C)] 97.7 °F (36.5 °C)  Heart Rate:  [53-74] 57  Resp:  [14-22] 20  BP: (104-157)/() 104/69    Physical Exam:  Physical Exam  Vitals and nursing note reviewed.   Constitutional:       General: She is not in acute distress.     Appearance: Normal appearance. She is well-developed and normal weight. She is not diaphoretic.   HENT:      Head: Normocephalic and atraumatic.   Eyes:      General: No scleral icterus.     Extraocular Movements: Extraocular movements intact.      Conjunctiva/sclera: Conjunctivae normal.      Pupils: Pupils are equal, round, and reactive to light.   Cardiovascular:      Rate and Rhythm: Normal rate and regular rhythm.      Heart sounds: S1 normal and S2 normal. Murmur heard.   Pulmonary:      Effort: Pulmonary effort is normal. No respiratory distress.      Breath sounds: No stridor. Rales present. No wheezing.   Chest:      Chest wall: No tenderness.   Abdominal:      General: Bowel sounds  are normal. There is no distension.      Palpations: Abdomen is soft. There is no mass.      Tenderness: There is no abdominal tenderness. There is no guarding.   Genitourinary:     Comments: External catheter  Musculoskeletal:         General: No swelling, tenderness or deformity. Normal range of motion.      Cervical back: Neck supple.   Skin:     General: Skin is warm and dry.      Coloration: Skin is not pale.      Findings: No bruising, erythema or rash.      Comments: No obvious signs of embolic phenomena   Neurological:      General: No focal deficit present.      Mental Status: She is alert and oriented to person, place, and time. Mental status is at baseline.      Cranial Nerves: No cranial nerve deficit.   Psychiatric:         Mood and Affect: Mood normal.          Results Review:    I have reviewed all clinical data, test, lab, and imaging results.     Radiology  No Radiology Exams Resulted Within Past 24 Hours    Cardiology    Laboratory    Results from last 7 days   Lab Units 02/27/24  1311 02/26/24  0407 02/25/24  0355 02/24/24  0133 02/23/24  1344   WBC 10*3/mm3 8.90 8.60 8.60 9.00 7.80   HEMOGLOBIN g/dL 12.7 12.3 13.0 12.8 12.0   HEMATOCRIT % 38.4 38.3 39.3 38.4 36.8   PLATELETS 10*3/mm3 170 144 160 169 163     Results from last 7 days   Lab Units 02/27/24  1311 02/26/24  0407 02/25/24  0355 02/24/24  0133 02/23/24  1344   SODIUM mmol/L 135* 136 139 141 140   POTASSIUM mmol/L 4.8 4.5 4.5 4.2 4.7   CHLORIDE mmol/L 98 101 101 100 103   CO2 mmol/L 31.0* 27.0 28.0 31.0* 29.0   BUN mg/dL 18 29* 30* 21 20   CREATININE mg/dL 0.57 0.72 0.80 0.69 0.66   GLUCOSE mg/dL 100* 90 99 111* 92   ALBUMIN g/dL  --   --   --  4.0 4.0   BILIRUBIN mg/dL  --   --   --  0.9 1.3*   ALK PHOS U/L  --   --   --  81 78   AST (SGOT) U/L  --   --   --  29 30   ALT (SGPT) U/L  --   --   --  17 16   LIPASE U/L  --   --   --   --  21   CALCIUM mg/dL 9.0 9.0 9.3 8.9 9.1                 Microbiology   Microbiology Results (last 10  days)       Procedure Component Value - Date/Time    Legionella Antigen, Urine - Urine, Urine, Clean Catch [[id number]]  (Normal) Collected: 02/26/24 1838    Lab Status: Final result Specimen: Urine, Clean Catch Updated: 02/26/24 1935     LEGIONELLA ANTIGEN, URINE Negative    S. Pneumo Ag Urine or CSF - Urine, Urine, Clean Catch [[id number]]  (Normal) Collected: 02/26/24 1838    Lab Status: Final result Specimen: Urine, Clean Catch Updated: 02/26/24 1935     Strep Pneumo Ag Negative    MRSA Screen, PCR (Inpatient) - Swab, Nares [[id number]]  (Normal) Collected: 02/26/24 1756    Lab Status: Final result Specimen: Swab from Nares Updated: 02/26/24 1923     MRSA PCR No MRSA Detected    Narrative:      The negative predictive value of this diagnostic test is high and should only be used to consider de-escalating anti-MRSA therapy. A positive result may indicate colonization with MRSA and must be correlated clinically.    Blood Culture - Blood, Arm, Left [[id number]]  (Normal) Collected: 02/26/24 0412    Lab Status: Preliminary result Specimen: Blood from Arm, Left Updated: 02/27/24 0430     Blood Culture No growth at 24 hours    Blood Culture - Blood, Arm, Right [[id number]]  (Normal) Collected: 02/26/24 0407    Lab Status: Preliminary result Specimen: Blood from Arm, Right Updated: 02/27/24 0430     Blood Culture No growth at 24 hours    Narrative:      Less than seven (7) mL's of blood was collected.  Insufficient quantity may yield false negative results.    COVID-19 and FLU A/B PCR, 1 HR TAT - Swab, Nasopharynx [[id number]]  (Normal) Collected: 02/23/24 1726    Lab Status: Final result Specimen: Swab from Nasopharynx Updated: 02/23/24 1757     COVID19 Not Detected     Influenza A PCR Not Detected     Influenza B PCR Not Detected    Narrative:      Fact sheet for providers: https://www.fda.gov/media/990871/download    Fact sheet for patients: https://www.fda.gov/media/703849/download    Test performed by PCR.    Blood  Culture - Blood, Arm, Left [[id number]]  (Normal) Collected: 02/23/24 1524    Lab Status: Preliminary result Specimen: Blood from Arm, Left Updated: 02/27/24 1545     Blood Culture No growth at 4 days    Narrative:      Less than seven (7) mL's of blood was collected.  Insufficient quantity may yield false negative results.    Blood Culture - Blood, Arm, Right [[id number]]  (Normal) Collected: 02/23/24 1454    Lab Status: Preliminary result Specimen: Blood from Arm, Right Updated: 02/27/24 1515     Blood Culture No growth at 4 days    Narrative:      Less than seven (7) mL's of blood was collected.  Insufficient quantity may yield false negative results.    Urine Culture - Urine, Urine, Clean Catch [[id number]]  (Normal) Collected: 02/23/24 1434    Lab Status: Final result Specimen: Urine, Clean Catch Updated: 02/24/24 1947     Urine Culture No growth            Medication Review:       Schedule Meds  budesonide, 0.5 mg, Nebulization, BID - RT  cefTRIAXone, 1,000 mg, Intravenous, Q24H  digoxin, 125 mcg, Oral, Daily  docusate sodium, 100 mg, Oral, Nightly  famotidine, 20 mg, Oral, Daily  furosemide, 20 mg, Intravenous, Daily  ipratropium-albuterol, 3 mL, Nebulization, 4x Daily - RT  latanoprost, 1 drop, Right Eye, Nightly  Lidocaine, 1 patch, Transdermal, Q24H  metoprolol succinate XL, 25 mg, Oral, BID  sodium chloride, 10 mL, Intravenous, Q12H  [Held by provider] warfarin, 2 mg, Oral, Once per day on Sunday Tuesday Wednesday Thursday Saturday  [Held by provider] warfarin, 4 mg, Oral, Once per day on Monday Friday        Infusion Meds  Pharmacy to dose warfarin,         PRN Meds    acetaminophen    senna-docusate sodium **AND** polyethylene glycol **AND** bisacodyl **AND** bisacodyl    Calcium Replacement - Follow Nurse / BPA Driven Protocol    HYDROcodone-acetaminophen    Magnesium Cardiology Dose Replacement - Follow Nurse / BPA Driven Protocol    magnesium hydroxide    ondansetron    Pharmacy to dose warfarin     Phosphorus Replacement - Follow Nurse / BPA Driven Protocol    Potassium Replacement - Follow Nurse / BPA Driven Protocol    [COMPLETED] Insert Peripheral IV **AND** sodium chloride    sodium chloride    sodium chloride        Assessment & Plan       Antimicrobial Therapy   1.  IV ceftriaxone        2.        3.        4.        5.          Assessment     Abnormal 2D echo which was concerning for possible mobile vegetation on the aortic valve.  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  Patient had to MRSA blood pressure on February 23 and 2 sets on February 26 and all were negative.     Probable community-acquired pneumonia.  Patient is currently switches between room air and 2 L of oxygen by nasal cannula.  Has a nonproductive cough.  COVID screen and influenza screen are negative.  Patient does have a large hiatal hernia so there is some's concern for chronic aspiration with a hiatal hernia.  CT of the chest  did show a right pleural effusion     Worsening acute on chronic back pain.  X-ray shows a chronic T6 and T12 compression fracture     History of CVA     Plan     Discontinue IV ceftriaxone after today's dose  Starting tomorrow patient will be on p.o. Omnicef 300 mg twice daily for 2 days to finish a total of 7 days of treatment for pneumonia  Case was discussed with cardiology service about the JEFF findings  Case discussed with patient and granddaughter at bedside  Not much more to add from infectious disease standpoint-we will sign off at this time-please call with any questions.    Nereida Dhillon, APRN  02/27/24  16:42 EST    Note is dictated utilizing voice recognition software/Dragon

## 2024-02-27 NOTE — SIGNIFICANT NOTE
02/27/24 1200   OTHER   Discipline occupational therapist   Rehab Time/Intention   Session Not Performed other (see comments)  (JEFF)   Recommendation   OT - Next Appointment 02/28/24

## 2024-02-27 NOTE — CASE MANAGEMENT/SOCIAL WORK
Continued Stay Note  [facility]     Patient Name: Shruthi Amin  MRN: [id number]  Today's Date: 2/27/2024    Admit Date: 2/23/2024    Plan: Return home with daughter and [facility] (accepted, order in place).   Discharge Plan       Row Name 02/27/24 1339       Plan    Plan Return home with daughter and [facility] (accepted, order in place).    Patient/Family in Agreement with Plan yes    Plan Comments CM received update from [facility] liaison Arlin that pt is accepted for services and she will make sure order is in place. Pt to have JEFF today.             Megan Naegele, RN     Office Phone: [phone]  Office Cell: [phone]

## 2024-02-27 NOTE — PLAN OF CARE
Goal Outcome Evaluation:  Plan of Care Reviewed With: patient           Outcome Evaluation: Patient has been sleeping between care. Patient has asked about JEFF, this nurse explained they will call when they are ready to do the procedure.

## 2024-02-27 NOTE — PROGRESS NOTES
CARDIOLOGY PROGRESS NOTE:    Shruthi Amin  89 y.o.  female  6/10/1928  [id number]      Referring Provider: Hospitalist    Reason for follow-up: Abnormal echo     Patient Care Team:  Cristal Goodwin MD as PCP - General (Family Medicine)  Aleksey Mathew MD as Consulting Physician (Cardiology)  Anai Moise APRN as Nurse Practitioner (Cardiology)    Subjective .  Patient seen in JEFF area and does not have any symptoms today    Objective lying in bed comfortably     Review of Systems   Constitutional: Negative for fever and malaise/fatigue.   HENT:  Negative for ear pain and nosebleeds.    Eyes:  Negative for blurred vision and double vision.   Cardiovascular:  Negative for chest pain, dyspnea on exertion and palpitations.   Respiratory:  Negative for cough and shortness of breath.    Skin:  Negative for rash.   Musculoskeletal:  Negative for joint pain.   Gastrointestinal:  Negative for abdominal pain, nausea and vomiting.   Neurological:  Negative for focal weakness and headaches.   Psychiatric/Behavioral:  Negative for depression. The patient is not nervous/anxious.    All other systems reviewed and are negative.      Allergies: Penicillins, Codeine, and Latex    Scheduled Meds:budesonide, 0.5 mg, Nebulization, BID - RT  [START ON 2/28/2024] cefdinir, 300 mg, Oral, Q12H  digoxin, 125 mcg, Oral, Daily  docusate sodium, 100 mg, Oral, Nightly  famotidine, 20 mg, Oral, Daily  furosemide, 20 mg, Intravenous, Daily  ipratropium-albuterol, 3 mL, Nebulization, 4x Daily - RT  latanoprost, 1 drop, Right Eye, Nightly  Lidocaine, 1 patch, Transdermal, Q24H  metoprolol succinate XL, 25 mg, Oral, BID  sodium chloride, 10 mL, Intravenous, Q12H  [Held by provider] warfarin, 2 mg, Oral, Once per day on Sunday Tuesday Wednesday Thursday Saturday  [Held by provider] warfarin, 4 mg, Oral, Once per day on Monday Friday      Continuous Infusions:Pharmacy to dose warfarin,       PRN Meds:.  acetaminophen    senna-docusate sodium  "**AND** polyethylene glycol **AND** bisacodyl **AND** bisacodyl    Calcium Replacement - Follow Nurse / BPA Driven Protocol    HYDROcodone-acetaminophen    Magnesium Cardiology Dose Replacement - Follow Nurse / BPA Driven Protocol    magnesium hydroxide    ondansetron    Pharmacy to dose warfarin    Phosphorus Replacement - Follow Nurse / BPA Driven Protocol    Potassium Replacement - Follow Nurse / BPA Driven Protocol    [COMPLETED] Insert Peripheral IV **AND** sodium chloride    sodium chloride    sodium chloride        VITAL SIGNS  Vitals:    02/27/24 1210 02/27/24 1512 02/27/24 1605 02/27/24 1611   BP: 136/67 104/69     BP Location:  Left arm     Patient Position:  Lying     Pulse: 60 53 56 57   Resp: 20 20 20 20   Temp:  97.7 °F (36.5 °C)     TempSrc:  Oral     SpO2: 100% 92% 100% 95%   Weight:       Height:           Flowsheet Rows      Flowsheet Row First Filed Value   Admission Height 160 cm (63\") Documented at 02/23/2024 1252   Admission Weight 49.4 kg (109 lb) Documented at 02/23/2024 1252             TELEMETRY: Atrial fibrillation with controlled ventricular response    Physical Exam:  Constitutional:       Appearance: Well-developed.   Eyes:      General: No scleral icterus.     Conjunctiva/sclera: Conjunctivae normal.      Pupils: Pupils are equal, round, and reactive to light.   HENT:      Head: Normocephalic and atraumatic.   Neck:      Vascular: No carotid bruit or JVD.   Pulmonary:      Effort: Pulmonary effort is normal.      Breath sounds: Normal breath sounds. No wheezing. No rales.   Cardiovascular:      Normal rate. Regular rhythm.      Murmurs: There is a systolic murmur.   Pulses:     Intact distal pulses.   Abdominal:      General: Bowel sounds are normal.      Palpations: Abdomen is soft.   Musculoskeletal: Normal range of motion.      Cervical back: Normal range of motion and neck supple. Skin:     General: Skin is warm and dry.      Findings: No rash.   Neurological:      Mental Status: " Alert.      Comments: No focal deficits          Results Review:   I reviewed the patient's new clinical results.  Lab Results (last 24 hours)       Procedure Component Value Units Date/Time    Blood Culture - Blood, Arm, Left [[id number]]  (Normal) Collected: 02/23/24 1524    Specimen: Blood from Arm, Left Updated: 02/27/24 1545     Blood Culture No growth at 4 days    Narrative:      Less than seven (7) mL's of blood was collected.  Insufficient quantity may yield false negative results.    Blood Culture - Blood, Arm, Right [[id number]]  (Normal) Collected: 02/23/24 1454    Specimen: Blood from Arm, Right Updated: 02/27/24 1515     Blood Culture No growth at 4 days    Narrative:      Less than seven (7) mL's of blood was collected.  Insufficient quantity may yield false negative results.    CBC & Differential [[id number]]  (Abnormal) Collected: 02/27/24 1311    Specimen: Blood Updated: 02/27/24 1426    Narrative:      The following orders were created for panel order CBC & Differential.  Procedure                               Abnormality         Status                     ---------                               -----------         ------                     CBC Auto Differential[[id number]]        Abnormal            Final result               Scan Slide[[id number]]                                       Final result                 Please view results for these tests on the individual orders.    CBC Auto Differential [[id number]]  (Abnormal) Collected: 02/27/24 1311    Specimen: Blood Updated: 02/27/24 1426     WBC 8.90 10*3/mm3      RBC 3.77 10*6/mm3      Hemoglobin 12.7 g/dL      Hematocrit 38.4 %      .8 fL      MCH 33.7 pg      MCHC 33.1 g/dL      RDW 14.8 %      RDW-SD 52.1 fl      MPV 9.7 fL      Platelets 170 10*3/mm3     Narrative:      The previously reported component NRBC is no longer being reported. Previous result was 0.2 /100 WBC (Reference Range: 0.0-0.2 /100 WBC) on 2/27/2024 at 1357 EST.    Scan  Slide [[id number]] Collected: 02/27/24 1311    Specimen: Blood Updated: 02/27/24 1426     Scan Slide --     Comment: See Manual Differential Results       Manual Differential [[id number]]  (Abnormal) Collected: 02/27/24 1311    Specimen: Blood Updated: 02/27/24 1426     Neutrophil % 66.0 %      Lymphocyte % 16.0 %      Monocyte % 9.0 %      Eosinophil % 2.0 %      Basophil % 1.0 %      Bands %  2.0 %      Myelocyte % 2.0 %      Atypical Lymphocyte % 2.0 %      Neutrophils Absolute 6.05 10*3/mm3      Lymphocytes Absolute 1.60 10*3/mm3      Monocytes Absolute 0.80 10*3/mm3      Eosinophils Absolute 0.18 10*3/mm3      Basophils Absolute 0.09 10*3/mm3      Anisocytosis Slight/1+     Elliptocytes Slight/1+     Macrocytes Slight/1+     Poikilocytes Slight/1+     WBC Morphology Normal     Platelet Morphology Normal    Basic Metabolic Panel [[id number]]  (Abnormal) Collected: 02/27/24 1311    Specimen: Blood Updated: 02/27/24 1408     Glucose 100 mg/dL      BUN 18 mg/dL      Creatinine 0.57 mg/dL      Sodium 135 mmol/L      Potassium 4.8 mmol/L      Chloride 98 mmol/L      CO2 31.0 mmol/L      Calcium 9.0 mg/dL      BUN/Creatinine Ratio 31.6     Anion Gap 6.0 mmol/L      eGFR 83.8 mL/min/1.73     Narrative:      GFR Normal >60  Chronic Kidney Disease <60  Kidney Failure <15    The GFR formula is only valid for adults with stable renal function between ages 18 and 70.    Protime-INR [[id number]]  (Abnormal) Collected: 02/27/24 1311    Specimen: Blood Updated: 02/27/24 1354     Protime 18.9 Seconds      INR 1.81    Blood Culture - Blood, Arm, Left [[id number]]  (Normal) Collected: 02/26/24 0412    Specimen: Blood from Arm, Left Updated: 02/27/24 0430     Blood Culture No growth at 24 hours    Blood Culture - Blood, Arm, Right [[id number]]  (Normal) Collected: 02/26/24 0407    Specimen: Blood from Arm, Right Updated: 02/27/24 0430     Blood Culture No growth at 24 hours    Narrative:      Less than seven (7) mL's of blood was  collected.  Insufficient quantity may yield false negative results.    Legionella Antigen, Urine - Urine, Urine, Clean Catch [[id number]]  (Normal) Collected: 02/26/24 1838    Specimen: Urine, Clean Catch Updated: 02/26/24 1935     LEGIONELLA ANTIGEN, URINE Negative    S. Pneumo Ag Urine or CSF - Urine, Urine, Clean Catch [[id number]]  (Normal) Collected: 02/26/24 1838    Specimen: Urine, Clean Catch Updated: 02/26/24 1935     Strep Pneumo Ag Negative    MRSA Screen, PCR (Inpatient) - Swab, Nares [[id number]]  (Normal) Collected: 02/26/24 1756    Specimen: Swab from Nares Updated: 02/26/24 1923     MRSA PCR No MRSA Detected    Narrative:      The negative predictive value of this diagnostic test is high and should only be used to consider de-escalating anti-MRSA therapy. A positive result may indicate colonization with MRSA and must be correlated clinically.            Imaging Results (Last 24 Hours)       ** No results found for the last 24 hours. **            EKG      I personally viewed and interpreted the patient's EKG/Telemetry data:    ECHOCARDIOGRAM:  Results for orders placed during the hospital encounter of 02/23/24    Adult Transesophageal Echo (JEFF) W/ Cont if Necessary Per Protocol    Interpretation Summary    Left ventricular ejection fraction appears to be 56 - 60%.    Severe mitral valve regurgitation is present.    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    Clinical correlation required       STRESS MYOVIEW:       CARDIAC CATHETERIZATION:  No results found for this or any previous visit.       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  Patient is currently on warfarin which will be held.  Patient had a JEFF which showed a possible healed vegetation versus a fibroblastoma or calcified mass on the aortic valve but also she had severe mitral regurgitation and hence will be on medical therapy.    CAD  Patient has nonobstructive coronary disease and is ruled out for MI by EKG and enzymes and is currently stable without any angina    Hypertension  Patient blood pressure currently stable on medical therapy including metoprolol    Atrial fibrillation  Patient has history of atrial fibrillation is currently on digoxin and metoprolol and also warfarin and keep his INR on 2-2.5 and will hold it for her JEFF  Patient can restart her Coumadin tonight    History of CVA  Patient is currently stable without any symptoms    Back problems  Patient had back surgery and is followed by the primary care doctor.    Severe mitral regurgitation  Patient has severe mitral regurgitation with normal function but because of her age we will do conservative medical therapy only    I discussed the patients findings and my recommendations with patient and nurse    Aleksey Mathew MD  02/27/24  18:07 EST

## 2024-02-27 NOTE — ANESTHESIA POSTPROCEDURE EVALUATION
Patient: Shruthi Amin    Procedure Summary       Date: 02/27/24 Room / Location: [facility]    Anesthesia Start: 1117 Anesthesia Stop: 1139    Procedure: ADULT TRANSESOPHAGEAL ECHO (JEFF) W/ CONT IF NECESSARY PER PROTOCOL Diagnosis: (Valvular Disease)    Scheduled Providers:  Provider: Aria Ingram MD    Anesthesia Type: MAC ASA Status: 3            Anesthesia Type: MAC    Vitals  Vitals Value Taken Time   /75 02/27/24 1307   Temp     Pulse 51 02/27/24 1424   Resp 20 02/27/24 1210   SpO2 98 % 02/27/24 1424   Vitals shown include unfiled device data.        Post Anesthesia Care and Evaluation    Patient location during evaluation: PACU  Patient participation: complete - patient participated  Level of consciousness: awake and alert  Pain management: satisfactory to patient    Airway patency: patent  Anesthetic complications: No anesthetic complications  PONV Status: none  Cardiovascular status: acceptable  Respiratory status: acceptable  Hydration status: acceptable

## 2024-02-28 LAB
BACTERIA SPEC AEROBE CULT: NORMAL
BACTERIA SPEC AEROBE CULT: NORMAL
INR PPP: 1.77 (ref 2–3)
PROTHROMBIN TIME: 18.5 SECONDS (ref 19.4–28.5)

## 2024-02-28 PROCEDURE — 85610 PROTHROMBIN TIME: CPT | Performed by: INTERNAL MEDICINE

## 2024-02-28 PROCEDURE — 97116 GAIT TRAINING THERAPY: CPT

## 2024-02-28 PROCEDURE — 94799 UNLISTED PULMONARY SVC/PX: CPT

## 2024-02-28 PROCEDURE — 99232 SBSQ HOSP IP/OBS MODERATE 35: CPT | Performed by: INTERNAL MEDICINE

## 2024-02-28 PROCEDURE — 97112 NEUROMUSCULAR REEDUCATION: CPT

## 2024-02-28 PROCEDURE — 94761 N-INVAS EAR/PLS OXIMETRY MLT: CPT

## 2024-02-28 PROCEDURE — 25010000002 FUROSEMIDE PER 20 MG: Performed by: NURSE PRACTITIONER

## 2024-02-28 PROCEDURE — 94664 DEMO&/EVAL PT USE INHALER: CPT

## 2024-02-28 PROCEDURE — 97530 THERAPEUTIC ACTIVITIES: CPT

## 2024-02-28 RX ORDER — WARFARIN SODIUM 4 MG/1
4 TABLET ORAL
Status: COMPLETED | OUTPATIENT
Start: 2024-02-28 | End: 2024-02-28

## 2024-02-28 RX ADMIN — IPRATROPIUM BROMIDE AND ALBUTEROL SULFATE 3 ML: .5; 3 SOLUTION RESPIRATORY (INHALATION) at 14:54

## 2024-02-28 RX ADMIN — BISACODYL 10 MG: 10 SUPPOSITORY RECTAL at 17:11

## 2024-02-28 RX ADMIN — CEFDINIR 300 MG: 300 CAPSULE ORAL at 08:23

## 2024-02-28 RX ADMIN — Medication 10 ML: at 20:06

## 2024-02-28 RX ADMIN — ACETAMINOPHEN 500 MG: 500 TABLET ORAL at 20:05

## 2024-02-28 RX ADMIN — IPRATROPIUM BROMIDE AND ALBUTEROL SULFATE 3 ML: .5; 3 SOLUTION RESPIRATORY (INHALATION) at 20:07

## 2024-02-28 RX ADMIN — IPRATROPIUM BROMIDE AND ALBUTEROL SULFATE 3 ML: .5; 3 SOLUTION RESPIRATORY (INHALATION) at 07:05

## 2024-02-28 RX ADMIN — WARFARIN SODIUM 4 MG: 4 TABLET ORAL at 17:11

## 2024-02-28 RX ADMIN — LIDOCAINE 1 PATCH: 4 PATCH TOPICAL at 08:23

## 2024-02-28 RX ADMIN — METOPROLOL SUCCINATE 25 MG: 25 TABLET, EXTENDED RELEASE ORAL at 20:06

## 2024-02-28 RX ADMIN — DOCUSATE SODIUM 100 MG: 100 CAPSULE, LIQUID FILLED ORAL at 20:05

## 2024-02-28 RX ADMIN — BUDESONIDE 0.5 MG: 0.5 INHALANT RESPIRATORY (INHALATION) at 20:13

## 2024-02-28 RX ADMIN — DIGOXIN 125 MCG: 125 TABLET ORAL at 11:52

## 2024-02-28 RX ADMIN — FUROSEMIDE 20 MG: 10 INJECTION, SOLUTION INTRAMUSCULAR; INTRAVENOUS at 08:23

## 2024-02-28 RX ADMIN — IPRATROPIUM BROMIDE AND ALBUTEROL SULFATE 3 ML: .5; 3 SOLUTION RESPIRATORY (INHALATION) at 10:38

## 2024-02-28 RX ADMIN — ACETAMINOPHEN 500 MG: 500 TABLET ORAL at 09:52

## 2024-02-28 RX ADMIN — CEFDINIR 300 MG: 300 CAPSULE ORAL at 20:05

## 2024-02-28 RX ADMIN — Medication 10 ML: at 08:23

## 2024-02-28 RX ADMIN — METOPROLOL SUCCINATE 25 MG: 25 TABLET, EXTENDED RELEASE ORAL at 08:22

## 2024-02-28 RX ADMIN — FAMOTIDINE 20 MG: 20 TABLET, FILM COATED ORAL at 08:22

## 2024-02-28 RX ADMIN — LATANOPROST 1 DROP: 50 SOLUTION/ DROPS OPHTHALMIC at 20:07

## 2024-02-28 RX ADMIN — BUDESONIDE 0.5 MG: 0.5 INHALANT RESPIRATORY (INHALATION) at 07:01

## 2024-02-28 NOTE — PLAN OF CARE
Goal Outcome Evaluation:  Plan of Care Reviewed With: patient           Outcome Evaluation: Patient has slept off and on. Complained of back pain occassionally, treated per MAR. Removed lidocaine patch, applied heat pack. Patient did dental care.

## 2024-02-28 NOTE — PROGRESS NOTES
LOS: 5 days   Patient Care Team:  Cristal Goodwin MD as PCP - General (Family Medicine)  Aleksey Mathew MD as Consulting Physician (Cardiology)  Anai Moise APRN as Nurse Practitioner (Cardiology)    Subjective     Complains of constipation.  Reports upper back pain with movement    Review of Systems   Constitutional:  Positive for activity change and fatigue.   HENT: Negative.     Respiratory:  Positive for shortness of breath.    Cardiovascular:  Positive for chest pain.   Gastrointestinal:  Positive for constipation.   Genitourinary: Negative.    Musculoskeletal:  Positive for back pain.   Neurological:  Positive for weakness.   Psychiatric/Behavioral: Negative.             Objective     Vital Signs  Temp:  [97.7 °F (36.5 °C)-98 °F (36.7 °C)] 98 °F (36.7 °C)  Heart Rate:  [53-81] 81  Resp:  [12-22] 13  BP: (100-140)/() 126/63      Physical Exam  Vitals reviewed.   Constitutional:       Appearance: She is not ill-appearing.   HENT:      Head: Normocephalic and atraumatic.      Right Ear: External ear normal.      Left Ear: External ear normal.      Nose: Nose normal.      Mouth/Throat:      Mouth: Mucous membranes are moist.   Eyes:      General:         Right eye: No discharge.         Left eye: No discharge.   Cardiovascular:      Rate and Rhythm: Normal rate and regular rhythm.      Pulses: Normal pulses.      Heart sounds: Normal heart sounds.   Pulmonary:      Effort: Pulmonary effort is normal.      Breath sounds: Normal breath sounds.   Abdominal:      General: Bowel sounds are normal.      Palpations: Abdomen is soft.   Musculoskeletal:         General: Normal range of motion.      Cervical back: Normal range of motion.   Skin:     General: Skin is warm and dry.   Neurological:      Mental Status: She is alert and oriented to person, place, and time.   Psychiatric:         Behavior: Behavior normal.              Results Review:    Lab Results (last 24 hours)       Procedure Component Value  Units Date/Time    Protime-INR [[id number]]  (Abnormal) Collected: 02/28/24 0350    Specimen: Blood Updated: 02/28/24 0431     Protime 18.5 Seconds      INR 1.77    Blood Culture - Blood, Arm, Left [[id number]]  (Normal) Collected: 02/26/24 0412    Specimen: Blood from Arm, Left Updated: 02/28/24 0430     Blood Culture No growth at 2 days    Blood Culture - Blood, Arm, Right [[id number]]  (Normal) Collected: 02/26/24 0407    Specimen: Blood from Arm, Right Updated: 02/28/24 0430     Blood Culture No growth at 2 days    Narrative:      Less than seven (7) mL's of blood was collected.  Insufficient quantity may yield false negative results.    Blood Culture - Blood, Arm, Left [[id number]]  (Normal) Collected: 02/23/24 1524    Specimen: Blood from Arm, Left Updated: 02/27/24 1545     Blood Culture No growth at 4 days    Narrative:      Less than seven (7) mL's of blood was collected.  Insufficient quantity may yield false negative results.    Blood Culture - Blood, Arm, Right [[id number]]  (Normal) Collected: 02/23/24 1454    Specimen: Blood from Arm, Right Updated: 02/27/24 1515     Blood Culture No growth at 4 days    Narrative:      Less than seven (7) mL's of blood was collected.  Insufficient quantity may yield false negative results.    CBC & Differential [[id number]]  (Abnormal) Collected: 02/27/24 1311    Specimen: Blood Updated: 02/27/24 1426    Narrative:      The following orders were created for panel order CBC & Differential.  Procedure                               Abnormality         Status                     ---------                               -----------         ------                     CBC Auto Differential[[id number]]        Abnormal            Final result               Scan Slide[[id number]]                                       Final result                 Please view results for these tests on the individual orders.    CBC Auto Differential [[id number]]  (Abnormal) Collected: 02/27/24 1311     Specimen: Blood Updated: 02/27/24 1426     WBC 8.90 10*3/mm3      RBC 3.77 10*6/mm3      Hemoglobin 12.7 g/dL      Hematocrit 38.4 %      .8 fL      MCH 33.7 pg      MCHC 33.1 g/dL      RDW 14.8 %      RDW-SD 52.1 fl      MPV 9.7 fL      Platelets 170 10*3/mm3     Narrative:      The previously reported component NRBC is no longer being reported. Previous result was 0.2 /100 WBC (Reference Range: 0.0-0.2 /100 WBC) on 2/27/2024 at 1357 EST.    Scan Slide [[id number]] Collected: 02/27/24 1311    Specimen: Blood Updated: 02/27/24 1426     Scan Slide --     Comment: See Manual Differential Results       Manual Differential [[id number]]  (Abnormal) Collected: 02/27/24 1311    Specimen: Blood Updated: 02/27/24 1426     Neutrophil % 66.0 %      Lymphocyte % 16.0 %      Monocyte % 9.0 %      Eosinophil % 2.0 %      Basophil % 1.0 %      Bands %  2.0 %      Myelocyte % 2.0 %      Atypical Lymphocyte % 2.0 %      Neutrophils Absolute 6.05 10*3/mm3      Lymphocytes Absolute 1.60 10*3/mm3      Monocytes Absolute 0.80 10*3/mm3      Eosinophils Absolute 0.18 10*3/mm3      Basophils Absolute 0.09 10*3/mm3      Anisocytosis Slight/1+     Elliptocytes Slight/1+     Macrocytes Slight/1+     Poikilocytes Slight/1+     WBC Morphology Normal     Platelet Morphology Normal    Basic Metabolic Panel [[id number]]  (Abnormal) Collected: 02/27/24 1311    Specimen: Blood Updated: 02/27/24 1408     Glucose 100 mg/dL      BUN 18 mg/dL      Creatinine 0.57 mg/dL      Sodium 135 mmol/L      Potassium 4.8 mmol/L      Chloride 98 mmol/L      CO2 31.0 mmol/L      Calcium 9.0 mg/dL      BUN/Creatinine Ratio 31.6     Anion Gap 6.0 mmol/L      eGFR 83.8 mL/min/1.73     Narrative:      GFR Normal >60  Chronic Kidney Disease <60  Kidney Failure <15    The GFR formula is only valid for adults with stable renal function between ages 18 and 70.    Protime-INR [[id number]]  (Abnormal) Collected: 02/27/24 1311    Specimen: Blood Updated: 02/27/24 2446      Protime 18.9 Seconds      INR 1.81             Imaging Results (Last 24 Hours)       ** No results found for the last 24 hours. **                 I reviewed the patient's new clinical results.    Medication Review:   Scheduled Meds:budesonide, 0.5 mg, Nebulization, BID - RT  cefdinir, 300 mg, Oral, Q12H  digoxin, 125 mcg, Oral, Daily  docusate sodium, 100 mg, Oral, Nightly  famotidine, 20 mg, Oral, Daily  furosemide, 20 mg, Intravenous, Daily  ipratropium-albuterol, 3 mL, Nebulization, 4x Daily - RT  latanoprost, 1 drop, Right Eye, Nightly  Lidocaine, 1 patch, Transdermal, Q24H  metoprolol succinate XL, 25 mg, Oral, BID  sodium chloride, 10 mL, Intravenous, Q12H  [Held by provider] warfarin, 2 mg, Oral, Once per day on Sunday Tuesday Wednesday Thursday Saturday  [Held by provider] warfarin, 4 mg, Oral, Once per day on Monday Friday      Continuous Infusions:Pharmacy to dose warfarin,       PRN Meds:.  acetaminophen    senna-docusate sodium **AND** polyethylene glycol **AND** bisacodyl **AND** bisacodyl    Calcium Replacement - Follow Nurse / BPA Driven Protocol    HYDROcodone-acetaminophen    Magnesium Cardiology Dose Replacement - Follow Nurse / BPA Driven Protocol    magnesium hydroxide    ondansetron    Pharmacy to dose warfarin    Phosphorus Replacement - Follow Nurse / BPA Driven Protocol    Potassium Replacement - Follow Nurse / BPA Driven Protocol    [COMPLETED] Insert Peripheral IV **AND** sodium chloride    sodium chloride    sodium chloride     Interval History:    Assessment & Plan     Back pain  -prior compression fractures; no further workup planned as pain does not appear related to spinal fractures    CAP  -Off Rocephin, ID recommends Omnicef 300 mg twice a day for 2 days for a total of 7 days of antibiotic treatment     Aortic valve mass, 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 Continue medical management    Chronic  CAD - ruled out for acute coronary syndrome; no further ischemic workup planned    Essential hypertension - metoprolol,    Permanent afib - rate is controlled; anticoagulated on Coumadin    Plan for disposition: Home    ALMA Horan  02/28/24  09:35 EST

## 2024-02-28 NOTE — PROGRESS NOTES
"Nutrition Services    Patient Name: Shruthi Amin  YOB: 1928  MRN: [id number]  Admission date: 2/23/2024    PROGRESS NOTE      Encounter Information: 2/28 Checking in on Nutrition POC and clinical course for po diet and ONS tolerance. No N/V/D/C reported at this time. Pt is tolerating po diet without noted issues at this time.        PO Diet: Diet: Cardiac Diets; Healthy Heart (2-3 Na+); Texture: Regular Texture (IDDSI 7); Fluid Consistency: Thin (IDDSI 0)   PO Supplements: Boost Plus BID (Provides 720 kcals, 28 g protein if consumed)    **May be substituted with Boost Glucose Control BID (Provides 380 kcals, 32 g protein if consumed) due to National ONS supply shortages   PO Intake:  50% average intake x last 10 documented meals       Current nutrition support: none   Nutrition support review: none       Labs (reviewed below): Reviewed. Management per attending.        GI Function:  Last documented BM 2/27       Nutrition Intervention Updates: Continue to encourage good po intake.    Continue to encourage ONS intake.       Results from last 7 days   Lab Units 02/27/24  1311 02/26/24  0407 02/25/24  0355 02/24/24  0133 02/23/24  1344   SODIUM mmol/L 135* 136 139 141 140   POTASSIUM mmol/L 4.8 4.5 4.5 4.2 4.7   CHLORIDE mmol/L 98 101 101 100 103   CO2 mmol/L 31.0* 27.0 28.0 31.0* 29.0   BUN mg/dL 18 29* 30* 21 20   CREATININE mg/dL 0.57 0.72 0.80 0.69 0.66   CALCIUM mg/dL 9.0 9.0 9.3 8.9 9.1   BILIRUBIN mg/dL  --   --   --  0.9 1.3*   ALK PHOS U/L  --   --   --  81 78   ALT (SGPT) U/L  --   --   --  17 16   AST (SGOT) U/L  --   --   --  29 30   GLUCOSE mg/dL 100* 90 99 111* 92     Results from last 7 days   Lab Units 02/27/24  1311   HEMOGLOBIN g/dL 12.7   HEMATOCRIT % 38.4     COVID19   Date Value Ref Range Status   02/23/2024 Not Detected Not Detected - Ref. Range Final     No results found for: \"HGBA1C\"        RD to follow up per protocol.    Electronically signed by:  Arely Camacho " TASHA Florentino  02/28/24 07:28 EST

## 2024-02-28 NOTE — PLAN OF CARE
Objective:     Bed mobility - SBA increased time for supine to sit and seated scooting. Pt c/o back and L thorax pain.   Transfers - CGA and with rolling walker sit to/from stand training from EOB and arm chair. Cues for proximity to chair and safe AD management with approach to chair.   Ambulation - 40' and 60' CGA and with rolling walker, seated rest breaks between trials  Standing balance - pt with brief trials of no UE support on walker but with c/o poor tolerance due to back pain and noted to have more flexed thoracic posture. [place] for balance without AD. PT encourages continued AD use for decreased fall risk and for postural support .

## 2024-02-28 NOTE — PROGRESS NOTES
"Pharmacy dosing service  Anticoagulant  Warfarin     Subjective:    Shruthi Amin is a 89 y.o.female being continued on warfarin for Atrial Fibrillation.    INR Goal: 2 - 3  Home medication?:  Warfarin 4mg Mon/Fri and 2mg warfarin all other days  Bridge Therapy Present?:  No  Interacting Medications Evaluation (New/Present/Discontinued): CTX (2/23-2/28; may cause INR increase)         Assessment/Plan:    JEFF completed 2/28, will now resume Warfarin. Will give 4mg dose today due to held doses and subtherapeutic INR.    Continue to monitor and adjust based on INR.         Date 2/23 2/24 2/25 2/26 2/27 2/28      INR 2.57 2.51 2.23 1.91 1.81 1.77      Dose Missed dose 2mg 2mg  HELD  HELD 4mg          Objective:  [Ht: 165.1 cm (65\"); Wt: 49.9 kg (110 lb); BMI: Body mass index is 18.3 kg/m².]    Lab Results   Component Value Date    ALBUMIN 4.0 02/24/2024     Lab Results   Component Value Date    INR 1.77 (L) 02/28/2024    INR 1.81 (L) 02/27/2024    INR 1.91 (L) 02/26/2024    PROTIME 18.5 (L) 02/28/2024    PROTIME 18.9 (L) 02/27/2024    PROTIME 19.8 02/26/2024     Lab Results   Component Value Date    HGB 12.7 02/27/2024    HGB 12.3 02/26/2024    HGB 13.0 02/25/2024     Lab Results   Component Value Date    HCT 38.4 02/27/2024    HCT 38.3 02/26/2024    HCT 39.3 02/25/2024       Tad Holland, Pharmacy Intern  02/28/24 15:57 EST               "

## 2024-02-28 NOTE — PROGRESS NOTES
"PULMONARY CRITICAL CARE PROGRESS  NOTE      PATIENT IDENTIFICATION:  Name: Shruthi Amin  MRN: [id number]  :  6/10/1928     Age: 89 y.o.  Sex: female    DATE OF Note:  2024   Referring Physician: Patty Rivera MD                  Subjective:   Feeling better, no SOB, on room air, no chest or abd pain, no bowel or bladder issues     Objective:  tMax 24 hrs: Temp (24hrs), Av.8 °F (36.6 °C), Min:97.5 °F (36.4 °C), Max:98 °F (36.7 °C)      Vitals Ranges:   Temp:  [97.5 °F (36.4 °C)-98 °F (36.7 °C)] 97.5 °F (36.4 °C)  Heart Rate:  [53-85] 85  Resp:  [12-20] 20  BP: (100-143)/(47-84) 143/84    Intake and Output Last 3 Shifts:   I/O last 3 completed shifts:  In: 680 [P.O.:480; I.V.:200]  Out: 200 [Urine:200]    Exam:  /84 (BP Location: Left arm, Patient Position: Lying)   Pulse 85   Temp 97.5 °F (36.4 °C) (Oral)   Resp 20   Ht 165.1 cm (65\")   Wt 49.9 kg (110 lb)   SpO2 99%   BMI 18.30 kg/m²     General Appearance: Alert  HEENT:  Normocephalic, without obvious abnormality. Conjunctivae/corneas clear.  Normal external ear canals. Nares normal, no drainage     Neck:  Supple, symmetrical, trachea midline. No JVD.  Lungs /Chest wall:   Bilateral basal rhonchi, respirations unlabored, symmetrical wall movement.     Heart:  Regular rate and rhythm, systolic murmur PMI left sternal border  Abdomen: Soft, nontender, no masses, no organomegaly.    Extremities: Trace edema, no clubbing or cyanosis        Medications:  budesonide, 0.5 mg, Nebulization, BID - RT  cefdinir, 300 mg, Oral, Q12H  digoxin, 125 mcg, Oral, Daily  docusate sodium, 100 mg, Oral, Nightly  famotidine, 20 mg, Oral, Daily  furosemide, 20 mg, Intravenous, Daily  ipratropium-albuterol, 3 mL, Nebulization, 4x Daily - RT  latanoprost, 1 drop, Right Eye, Nightly  Lidocaine, 1 patch, Transdermal, Q24H  metoprolol succinate XL, 25 mg, Oral, BID  sodium chloride, 10 mL, Intravenous, Q12H  [Held by provider] warfarin, 2 mg, Oral, Once per " day on Sunday Tuesday Wednesday Thursday Saturday  [Held by provider] warfarin, 4 mg, Oral, Once per day on Monday Friday        Infusion:  Pharmacy to dose warfarin,          PRN:    acetaminophen    senna-docusate sodium **AND** polyethylene glycol **AND** bisacodyl **AND** bisacodyl    Calcium Replacement - Follow Nurse / BPA Driven Protocol    HYDROcodone-acetaminophen    Magnesium Cardiology Dose Replacement - Follow Nurse / BPA Driven Protocol    magnesium hydroxide    ondansetron    Pharmacy to dose warfarin    Phosphorus Replacement - Follow Nurse / BPA Driven Protocol    Potassium Replacement - Follow Nurse / BPA Driven Protocol    [COMPLETED] Insert Peripheral IV **AND** sodium chloride    sodium chloride    sodium chloride  Data Review:  All labs (24hrs):   Recent Results (from the past 24 hour(s))   Protime-INR    Collection Time: 02/27/24  1:11 PM    Specimen: Blood   Result Value Ref Range    Protime 18.9 (L) 19.4 - 28.5 Seconds    INR 1.81 (L) 2.00 - 3.00   Basic Metabolic Panel    Collection Time: 02/27/24  1:11 PM    Specimen: Blood   Result Value Ref Range    Glucose 100 (H) 65 - 99 mg/dL    BUN 18 8 - 23 mg/dL    Creatinine 0.57 0.57 - 1.00 mg/dL    Sodium 135 (L) 136 - 145 mmol/L    Potassium 4.8 3.5 - 5.2 mmol/L    Chloride 98 98 - 107 mmol/L    CO2 31.0 (H) 22.0 - 29.0 mmol/L    Calcium 9.0 8.2 - 9.6 mg/dL    BUN/Creatinine Ratio 31.6 (H) 7.0 - 25.0    Anion Gap 6.0 5.0 - 15.0 mmol/L    eGFR 83.8 >60.0 mL/min/1.73   CBC Auto Differential    Collection Time: 02/27/24  1:11 PM    Specimen: Blood   Result Value Ref Range    WBC 8.90 3.40 - 10.80 10*3/mm3    RBC 3.77 3.77 - 5.28 10*6/mm3    Hemoglobin 12.7 12.0 - 15.9 g/dL    Hematocrit 38.4 34.0 - 46.6 %    .8 (H) 79.0 - 97.0 fL    MCH 33.7 (H) 26.6 - 33.0 pg    MCHC 33.1 31.5 - 35.7 g/dL    RDW 14.8 12.3 - 15.4 %    RDW-SD 52.1 37.0 - 54.0 fl    MPV 9.7 6.0 - 12.0 fL    Platelets 170 140 - 450 10*3/mm3   Scan Slide    Collection Time:  02/27/24  1:11 PM    Specimen: Blood   Result Value Ref Range    Scan Slide     Manual Differential    Collection Time: 02/27/24  1:11 PM    Specimen: Blood   Result Value Ref Range    Neutrophil % 66.0 42.7 - 76.0 %    Lymphocyte % 16.0 (L) 19.6 - 45.3 %    Monocyte % 9.0 5.0 - 12.0 %    Eosinophil % 2.0 0.3 - 6.2 %    Basophil % 1.0 0.0 - 1.5 %    Bands %  2.0 0.0 - 5.0 %    Myelocyte % 2.0 (H) 0.0 - 0.0 %    Atypical Lymphocyte % 2.0 0.0 - 5.0 %    Neutrophils Absolute 6.05 1.70 - 7.00 10*3/mm3    Lymphocytes Absolute 1.60 0.70 - 3.10 10*3/mm3    Monocytes Absolute 0.80 0.10 - 0.90 10*3/mm3    Eosinophils Absolute 0.18 0.00 - 0.40 10*3/mm3    Basophils Absolute 0.09 0.00 - 0.20 10*3/mm3    Anisocytosis Slight/1+ None Seen    Elliptocytes Slight/1+ None Seen    Macrocytes Slight/1+ None Seen    Poikilocytes Slight/1+ None Seen    WBC Morphology Normal Normal    Platelet Morphology Normal Normal   Protime-INR    Collection Time: 02/28/24  3:50 AM    Specimen: Blood   Result Value Ref Range    Protime 18.5 (L) 19.4 - 28.5 Seconds    INR 1.77 (L) 2.00 - 3.00        Imaging:  Adult Transesophageal Echo (JEFF) W/ Cont if Necessary Per Protocol    Left ventricular ejection fraction appears to be 56 - 60%.    Severe mitral valve regurgitation is present.    Technically very difficult study with limited views because of body   habitus    A calcified mass present on the aortic valve which could be a   fibroblastoma or a healed vegetation.    Clinical correlation required       ASSESSMENT:  ? Pneumonia   Pleural effusion  COPD  Back pain  CHF  A-fib  CAD  GERD  Osteoarthritis  Hiatal hernia            PLAN:  Monitor intake and encourage to eat more  OOB daily, use IS more   Bronchodilators  Inhaled corticosteroids  Diuresis and monitor QUINTIN's   Monitor effusion for now   Incentive spirometer/Flutter valve  Electrolytes/ glycemic control  No DVT and GI prophylaxis     Discussed with Dr Rancho Mendoza, APRN    2/28/2024  12:50 EST    I personally have examined  and interviewed the patient. I have reviewed the history, data, problems, assessment and plan with our NP.  Total Critical care time in direct medical management (   ) minutes, This time specifically excludes time spent performing procedures.    Sulma Vickers MD   2/28/2024  23:52 EST

## 2024-02-28 NOTE — PROGRESS NOTES
CARDIOLOGY PROGRESS NOTE:    Shruthi Amin  89 y.o.  female  6/10/1928  [id number]      Referring Provider: Hospitalist    Reason for follow-up: Abnormal echo     Patient Care Team:  Cristal Goodwin MD as PCP - General (Family Medicine)  Aleksey Mathew MD as Consulting Physician (Cardiology)  Anai Moise APRN as Nurse Practitioner (Cardiology)    Subjective .  Doing well without any chest pain or shortness of breath but complains of back pain    Objective   sitting in chair comfortable     Review of Systems   Constitutional: Negative for fever and malaise/fatigue.   HENT:  Negative for ear pain and nosebleeds.    Eyes:  Negative for blurred vision and double vision.   Cardiovascular:  Negative for chest pain, dyspnea on exertion and palpitations.   Respiratory:  Negative for cough and shortness of breath.    Skin:  Negative for rash.   Musculoskeletal:  Positive for back pain. Negative for joint pain.   Gastrointestinal:  Negative for abdominal pain, nausea and vomiting.   Neurological:  Negative for focal weakness and headaches.   Psychiatric/Behavioral:  Negative for depression. The patient is not nervous/anxious.    All other systems reviewed and are negative.      Allergies: Penicillins, Codeine, and Latex    Scheduled Meds:budesonide, 0.5 mg, Nebulization, BID - RT  cefdinir, 300 mg, Oral, Q12H  digoxin, 125 mcg, Oral, Daily  docusate sodium, 100 mg, Oral, Nightly  famotidine, 20 mg, Oral, Daily  furosemide, 20 mg, Intravenous, Daily  ipratropium-albuterol, 3 mL, Nebulization, 4x Daily - RT  latanoprost, 1 drop, Right Eye, Nightly  Lidocaine, 1 patch, Transdermal, Q24H  metoprolol succinate XL, 25 mg, Oral, BID  sodium chloride, 10 mL, Intravenous, Q12H  [Held by provider] warfarin, 2 mg, Oral, Once per day on Sunday Tuesday Wednesday Thursday Saturday  [Held by provider] warfarin, 4 mg, Oral, Once per day on Monday Friday      Continuous Infusions:Pharmacy to dose warfarin,       PRN Meds:.   "acetaminophen    senna-docusate sodium **AND** polyethylene glycol **AND** bisacodyl **AND** bisacodyl    Calcium Replacement - Follow Nurse / BPA Driven Protocol    HYDROcodone-acetaminophen    Magnesium Cardiology Dose Replacement - Follow Nurse / BPA Driven Protocol    magnesium hydroxide    ondansetron    Pharmacy to dose warfarin    Phosphorus Replacement - Follow Nurse / BPA Driven Protocol    Potassium Replacement - Follow Nurse / BPA Driven Protocol    [COMPLETED] Insert Peripheral IV **AND** sodium chloride    sodium chloride    sodium chloride        VITAL SIGNS  Vitals:    02/28/24 0820 02/28/24 1038 02/28/24 1042 02/28/24 1152   BP: 126/63   143/84   BP Location:    Left arm   Patient Position:    Lying   Pulse: 81 64 63 85   Resp:  15 16 20   Temp:    97.5 °F (36.4 °C)   TempSrc:    Oral   SpO2:  99% 100% 99%   Weight:       Height:           Flowsheet Rows      Flowsheet Row First Filed Value   Admission Height 160 cm (63\") Documented at 02/23/2024 1252   Admission Weight 49.4 kg (109 lb) Documented at 02/23/2024 1252             TELEMETRY: Atrial fibrillation with controlled ventricular response    Physical Exam:  Constitutional:       Appearance: Well-developed.   Eyes:      General: No scleral icterus.     Conjunctiva/sclera: Conjunctivae normal.      Pupils: Pupils are equal, round, and reactive to light.   HENT:      Head: Normocephalic and atraumatic.   Neck:      Vascular: No carotid bruit or JVD.   Pulmonary:      Effort: Pulmonary effort is normal.      Breath sounds: Normal breath sounds. No wheezing. No rales.   Cardiovascular:      Normal rate. Regular rhythm.      Murmurs: There is a systolic murmur.   Pulses:     Intact distal pulses.   Abdominal:      General: Bowel sounds are normal.      Palpations: Abdomen is soft.   Musculoskeletal: Normal range of motion.      Cervical back: Normal range of motion and neck supple. Skin:     General: Skin is warm and dry.      Findings: No rash. "   Neurological:      Mental Status: Alert.      Comments: No focal deficits          Results Review:   I reviewed the patient's new clinical results.  Lab Results (last 24 hours)       Procedure Component Value Units Date/Time    Protime-INR [[id number]]  (Abnormal) Collected: 02/28/24 0350    Specimen: Blood Updated: 02/28/24 0431     Protime 18.5 Seconds      INR 1.77    Blood Culture - Blood, Arm, Left [[id number]]  (Normal) Collected: 02/26/24 0412    Specimen: Blood from Arm, Left Updated: 02/28/24 0430     Blood Culture No growth at 2 days    Blood Culture - Blood, Arm, Right [[id number]]  (Normal) Collected: 02/26/24 0407    Specimen: Blood from Arm, Right Updated: 02/28/24 0430     Blood Culture No growth at 2 days    Narrative:      Less than seven (7) mL's of blood was collected.  Insufficient quantity may yield false negative results.    Blood Culture - Blood, Arm, Left [[id number]]  (Normal) Collected: 02/23/24 1524    Specimen: Blood from Arm, Left Updated: 02/27/24 1545     Blood Culture No growth at 4 days    Narrative:      Less than seven (7) mL's of blood was collected.  Insufficient quantity may yield false negative results.    Blood Culture - Blood, Arm, Right [[id number]]  (Normal) Collected: 02/23/24 1454    Specimen: Blood from Arm, Right Updated: 02/27/24 1515     Blood Culture No growth at 4 days    Narrative:      Less than seven (7) mL's of blood was collected.  Insufficient quantity may yield false negative results.    CBC & Differential [[id number]]  (Abnormal) Collected: 02/27/24 1311    Specimen: Blood Updated: 02/27/24 1426    Narrative:      The following orders were created for panel order CBC & Differential.  Procedure                               Abnormality         Status                     ---------                               -----------         ------                     CBC Auto Differential[[id number]]        Abnormal            Final result               Scan Slide[[id number]]                                        Final result                 Please view results for these tests on the individual orders.    CBC Auto Differential [[id number]]  (Abnormal) Collected: 02/27/24 1311    Specimen: Blood Updated: 02/27/24 1426     WBC 8.90 10*3/mm3      RBC 3.77 10*6/mm3      Hemoglobin 12.7 g/dL      Hematocrit 38.4 %      .8 fL      MCH 33.7 pg      MCHC 33.1 g/dL      RDW 14.8 %      RDW-SD 52.1 fl      MPV 9.7 fL      Platelets 170 10*3/mm3     Narrative:      The previously reported component NRBC is no longer being reported. Previous result was 0.2 /100 WBC (Reference Range: 0.0-0.2 /100 WBC) on 2/27/2024 at 1357 EST.    Scan Slide [[id number]] Collected: 02/27/24 1311    Specimen: Blood Updated: 02/27/24 1426     Scan Slide --     Comment: See Manual Differential Results       Manual Differential [[id number]]  (Abnormal) Collected: 02/27/24 1311    Specimen: Blood Updated: 02/27/24 1426     Neutrophil % 66.0 %      Lymphocyte % 16.0 %      Monocyte % 9.0 %      Eosinophil % 2.0 %      Basophil % 1.0 %      Bands %  2.0 %      Myelocyte % 2.0 %      Atypical Lymphocyte % 2.0 %      Neutrophils Absolute 6.05 10*3/mm3      Lymphocytes Absolute 1.60 10*3/mm3      Monocytes Absolute 0.80 10*3/mm3      Eosinophils Absolute 0.18 10*3/mm3      Basophils Absolute 0.09 10*3/mm3      Anisocytosis Slight/1+     Elliptocytes Slight/1+     Macrocytes Slight/1+     Poikilocytes Slight/1+     WBC Morphology Normal     Platelet Morphology Normal    Basic Metabolic Panel [[id number]]  (Abnormal) Collected: 02/27/24 1311    Specimen: Blood Updated: 02/27/24 1408     Glucose 100 mg/dL      BUN 18 mg/dL      Creatinine 0.57 mg/dL      Sodium 135 mmol/L      Potassium 4.8 mmol/L      Chloride 98 mmol/L      CO2 31.0 mmol/L      Calcium 9.0 mg/dL      BUN/Creatinine Ratio 31.6     Anion Gap 6.0 mmol/L      eGFR 83.8 mL/min/1.73     Narrative:      GFR Normal >60  Chronic Kidney Disease <60  Kidney Failure  <15    The GFR formula is only valid for adults with stable renal function between ages 18 and 70.    Protime-INR [[id number]]  (Abnormal) Collected: 02/27/24 1311    Specimen: Blood Updated: 02/27/24 1354     Protime 18.9 Seconds      INR 1.81            Imaging Results (Last 24 Hours)       ** No results found for the last 24 hours. **            EKG      I personally viewed and interpreted the patient's EKG/Telemetry data:    ECHOCARDIOGRAM:  Results for orders placed during the hospital encounter of 02/23/24    Adult Transesophageal Echo (JEFF) W/ Cont if Necessary Per Protocol    Interpretation Summary    Left ventricular ejection fraction appears to be 56 - 60%.    Severe mitral valve regurgitation is present.    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    Clinical correlation required       STRESS MYOVIEW:       CARDIAC CATHETERIZATION:  No results found for this or any previous visit.       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  Patient is currently on warfarin which will be held.  Patient had a JEFF which showed a possible healed vegetation versus a fibroblastoma or calcified mass on the aortic valve but also she had severe mitral regurgitation and hence will be on medical therapy.    CAD  Patient has nonobstructive coronary disease and is ruled out for MI by EKG and enzymes and is currently stable without any angina    Hypertension  Patient blood pressure currently stable on medical therapy including  metoprolol    Atrial fibrillation  Patient has history of atrial fibrillation is currently on digoxin and metoprolol and also warfarin and keep his INR on 2-2.5 and will hold it for her JEFF  Patient patient will restart the Coumadin tonight and maintain INR of 2-2.5    History of CVA  Patient is currently stable without any symptoms    Back problems  Patient had back surgery and is followed by the primary care doctor.    Severe mitral regurgitation  Patient has severe mitral regurgitation with normal function but because of her age we will do conservative medical therapy only    I discussed the patients findings and my recommendations with patient and nurse    Aleksey Mathew MD  02/28/24  13:41 EST

## 2024-02-28 NOTE — THERAPY TREATMENT NOTE
"Subjective: Pt agreeable to therapeutic plan of care.    Objective:     Bed mobility - SBA increased time for supine to sit and seated scooting. Pt c/o back and L thorax pain.   Transfers - CGA and with rolling walker sit to/from stand training from EOB and arm chair. Cues for proximity to chair and safe AD management with approach to chair.   Ambulation - 40' and 60' CGA and with rolling walker, seated rest breaks between trials  Standing balance - pt with brief trials of no UE support on walker but with c/o poor tolerance due to back pain and noted to have more flexed thoracic posture. [place] for balance without AD. PT encourages continued AD use for decreased fall risk and for postural support .    Vitals: WNL    Pain: 4 FACES   Location: mid back and L thorax   Intervention for pain: Repositioned, Increased Activity, and Therapeutic Presence    Education: Provided education on the importance of mobility in the acute care setting, Verbal/Tactile Cues, Transfer Training, Gait Training, and Energy conservation strategies, posture, safety with AD    Assessment: Shruthi Amin presents with improving endurance. Tolerating increased gait distance and 2 bouts. Pt with continued functional mobility impairments which indicate the need for skilled intervention. Tolerating session today without incident. Will continue to follow and progress as tolerated.     Plan/Recommendations:   If medically appropriate, Low Intensity Therapy recommended post-acute care - This is recommended as therapy feels this patient would require 2-3 visits per week. OP or HH would be the best option depending on patient's home bound status. Consider, if the patient has other  \"skilled\" needs such as wounds, IV antibiotics, etc. Combined with \"low intensity\" could also equate to a SNF. If patient is medically complex, consider LTAC. Pt requires no DME at discharge.     Pt desires Home with Home Health and Home with family assist at discharge. Pt " cooperative; agreeable to therapeutic recommendations and plan of care.         Basic Mobility 6-click:  Rollin = Total, A lot = 2, A little = 3; 4 = None  Supine>Sit:   1 = Total, A lot = 2, A little = 3; 4 = None   Sit>Stand with arms:  1 = Total, A lot = 2, A little = 3; 4 = None  Bed>Chair:   1 = Total, A lot = 2, A little = 3; 4 = None  Ambulate in room:  1 = Total, A lot = 2, A little = 3; 4 = None  3-5 Steps with railin = Total, A lot = 2, A little = 3; 4 = None  Score: 19    Modified [place]: N/A = No pre-op stroke/TIA    Post-Tx Position: Up in Chair, Alarms activated, and Call light and personal items within reach  PPE: gloves

## 2024-02-28 NOTE — PLAN OF CARE
Problem: Adult Inpatient Plan of Care  Goal: Plan of Care Review  Outcome: Ongoing, Progressing  Goal: Patient-Specific Goal (Individualized)  Outcome: Ongoing, Progressing  Goal: Absence of Hospital-Acquired Illness or Injury  Outcome: Ongoing, Progressing  Intervention: Identify and Manage Fall Risk  Recent Flowsheet Documentation  Taken 2/28/2024 1800 by Alyce Lozano RN  Safety Promotion/Fall Prevention: safety round/check completed  Taken 2/28/2024 1600 by Alyce Lozano RN  Safety Promotion/Fall Prevention: safety round/check completed  Taken 2/28/2024 1400 by Alyce Lozano RN  Safety Promotion/Fall Prevention: safety round/check completed  Taken 2/28/2024 1200 by Alyce Lozano RN  Safety Promotion/Fall Prevention: safety round/check completed  Taken 2/28/2024 1000 by Alyce Lozano RN  Safety Promotion/Fall Prevention: safety round/check completed  Taken 2/28/2024 0830 by Alyce Lozano RN  Safety Promotion/Fall Prevention: safety round/check completed  Goal: Optimal Comfort and Wellbeing  Outcome: Ongoing, Progressing  Goal: Readiness for Transition of Care  Outcome: Ongoing, Progressing     Problem: Pain Acute  Goal: Acceptable Pain Control and Functional Ability  Outcome: Ongoing, Progressing     Problem: Heart Failure Comorbidity  Goal: Maintenance of Heart Failure Symptom Control  Outcome: Ongoing, Progressing  Goal: Maintenance of Heart Failure Symptom Control  Outcome: Ongoing, Progressing     Problem: Hypertension Comorbidity  Goal: Blood Pressure in Desired Range  Outcome: Ongoing, Progressing     Problem: Skin Injury Risk Increased  Goal: Skin Health and Integrity  Outcome: Ongoing, Progressing     Problem: Fall Injury Risk  Goal: Absence of Fall and Fall-Related Injury  Outcome: Ongoing, Progressing  Intervention: Promote Injury-Free Environment  Recent Flowsheet Documentation  Taken 2/28/2024 1800 by Alyce Lozano RN  Safety Promotion/Fall Prevention: safety round/check  completed  Taken 2/28/2024 1600 by Alyce Lozano, RN  Safety Promotion/Fall Prevention: safety round/check completed  Taken 2/28/2024 1400 by Alyce Lozano RN  Safety Promotion/Fall Prevention: safety round/check completed  Taken 2/28/2024 1200 by Alyce Lozano, RN  Safety Promotion/Fall Prevention: safety round/check completed  Taken 2/28/2024 1000 by Alyce Lozano, RN  Safety Promotion/Fall Prevention: safety round/check completed  Taken 2/28/2024 0830 by Alyce Lozano, RN  Safety Promotion/Fall Prevention: safety round/check completed     Problem: Malnutrition  Goal: Improved Nutritional Intake  Outcome: Ongoing, Progressing   Goal Outcome Evaluation:

## 2024-02-29 ENCOUNTER — APPOINTMENT (OUTPATIENT)
Dept: GENERAL RADIOLOGY | Facility: HOSPITAL | Age: 89
DRG: 193 | End: 2024-02-29
Payer: MEDICARE

## 2024-02-29 ENCOUNTER — TELEPHONE (OUTPATIENT)
Dept: CARDIOLOGY | Facility: CLINIC | Age: 89
End: 2024-02-29
Payer: MEDICARE

## 2024-02-29 LAB
ANION GAP SERPL CALCULATED.3IONS-SCNC: 6 MMOL/L (ref 5–15)
BUN SERPL-MCNC: 16 MG/DL (ref 8–23)
BUN/CREAT SERPL: 27.6 (ref 7–25)
CALCIUM SPEC-SCNC: 8.9 MG/DL (ref 8.2–9.6)
CHLORIDE SERPL-SCNC: 94 MMOL/L (ref 98–107)
CO2 SERPL-SCNC: 31 MMOL/L (ref 22–29)
CREAT SERPL-MCNC: 0.58 MG/DL (ref 0.57–1)
EGFRCR SERPLBLD CKD-EPI 2021: 83.5 ML/MIN/1.73
GLUCOSE SERPL-MCNC: 98 MG/DL (ref 65–99)
INR PPP: 1.64 (ref 2–3)
POTASSIUM SERPL-SCNC: 4.9 MMOL/L (ref 3.5–5.2)
PROTHROMBIN TIME: 17.2 SECONDS (ref 19.4–28.5)
SODIUM SERPL-SCNC: 131 MMOL/L (ref 136–145)
WHOLE BLOOD HOLD SPECIMEN: NORMAL

## 2024-02-29 PROCEDURE — 25010000002 ONDANSETRON PER 1 MG: Performed by: INTERNAL MEDICINE

## 2024-02-29 PROCEDURE — 94799 UNLISTED PULMONARY SVC/PX: CPT

## 2024-02-29 PROCEDURE — 99232 SBSQ HOSP IP/OBS MODERATE 35: CPT | Performed by: INTERNAL MEDICINE

## 2024-02-29 PROCEDURE — 94761 N-INVAS EAR/PLS OXIMETRY MLT: CPT

## 2024-02-29 PROCEDURE — 85610 PROTHROMBIN TIME: CPT | Performed by: INTERNAL MEDICINE

## 2024-02-29 PROCEDURE — 74018 RADEX ABDOMEN 1 VIEW: CPT

## 2024-02-29 PROCEDURE — 94664 DEMO&/EVAL PT USE INHALER: CPT

## 2024-02-29 PROCEDURE — 25010000002 FUROSEMIDE PER 20 MG: Performed by: NURSE PRACTITIONER

## 2024-02-29 PROCEDURE — 80048 BASIC METABOLIC PNL TOTAL CA: CPT | Performed by: INTERNAL MEDICINE

## 2024-02-29 RX ORDER — DOCUSATE SODIUM 100 MG/1
100 CAPSULE, LIQUID FILLED ORAL 2 TIMES DAILY
Status: DISCONTINUED | OUTPATIENT
Start: 2024-02-29 | End: 2024-03-02 | Stop reason: HOSPADM

## 2024-02-29 RX ORDER — WARFARIN SODIUM 2 MG/1
2 TABLET ORAL
Status: DISCONTINUED | OUTPATIENT
Start: 2024-02-29 | End: 2024-03-02 | Stop reason: HOSPADM

## 2024-02-29 RX ORDER — SORBITOL SOLUTION 70 %
30 SOLUTION, ORAL MISCELLANEOUS ONCE
Status: DISCONTINUED | OUTPATIENT
Start: 2024-02-29 | End: 2024-02-29

## 2024-02-29 RX ORDER — SIMETHICONE 80 MG
80 TABLET,CHEWABLE ORAL 4 TIMES DAILY PRN
Status: DISCONTINUED | OUTPATIENT
Start: 2024-02-29 | End: 2024-03-02 | Stop reason: HOSPADM

## 2024-02-29 RX ORDER — WARFARIN SODIUM 4 MG/1
4 TABLET ORAL
Status: DISCONTINUED | OUTPATIENT
Start: 2024-03-01 | End: 2024-03-02 | Stop reason: HOSPADM

## 2024-02-29 RX ADMIN — DIGOXIN 125 MCG: 125 TABLET ORAL at 11:19

## 2024-02-29 RX ADMIN — CEFDINIR 300 MG: 300 CAPSULE ORAL at 08:28

## 2024-02-29 RX ADMIN — IPRATROPIUM BROMIDE AND ALBUTEROL SULFATE 3 ML: .5; 3 SOLUTION RESPIRATORY (INHALATION) at 06:25

## 2024-02-29 RX ADMIN — FUROSEMIDE 20 MG: 10 INJECTION, SOLUTION INTRAMUSCULAR; INTRAVENOUS at 08:28

## 2024-02-29 RX ADMIN — ACETAMINOPHEN 500 MG: 500 TABLET ORAL at 08:28

## 2024-02-29 RX ADMIN — Medication 10 ML: at 08:29

## 2024-02-29 RX ADMIN — BUDESONIDE 0.5 MG: 0.5 INHALANT RESPIRATORY (INHALATION) at 06:30

## 2024-02-29 RX ADMIN — HYDROCODONE BITARTRATE AND ACETAMINOPHEN 1 TABLET: 5; 325 TABLET ORAL at 11:19

## 2024-02-29 RX ADMIN — ONDANSETRON 4 MG: 2 INJECTION INTRAMUSCULAR; INTRAVENOUS at 11:19

## 2024-02-29 RX ADMIN — WARFARIN SODIUM 2 MG: 2 TABLET ORAL at 17:18

## 2024-02-29 RX ADMIN — ONDANSETRON 4 MG: 2 INJECTION INTRAMUSCULAR; INTRAVENOUS at 19:18

## 2024-02-29 RX ADMIN — IPRATROPIUM BROMIDE AND ALBUTEROL SULFATE 3 ML: .5; 3 SOLUTION RESPIRATORY (INHALATION) at 15:10

## 2024-02-29 RX ADMIN — SIMETHICONE 80 MG: 80 TABLET, CHEWABLE ORAL at 16:36

## 2024-02-29 RX ADMIN — HYDROCODONE BITARTRATE AND ACETAMINOPHEN 1 TABLET: 5; 325 TABLET ORAL at 23:14

## 2024-02-29 RX ADMIN — CEFDINIR 300 MG: 300 CAPSULE ORAL at 20:18

## 2024-02-29 RX ADMIN — METOPROLOL SUCCINATE 25 MG: 25 TABLET, EXTENDED RELEASE ORAL at 20:18

## 2024-02-29 RX ADMIN — LATANOPROST 1 DROP: 50 SOLUTION/ DROPS OPHTHALMIC at 20:18

## 2024-02-29 RX ADMIN — METOPROLOL SUCCINATE 25 MG: 25 TABLET, EXTENDED RELEASE ORAL at 08:28

## 2024-02-29 RX ADMIN — Medication 10 ML: at 20:18

## 2024-02-29 RX ADMIN — MAGNESIUM HYDROXIDE 10 ML: 2400 SUSPENSION ORAL at 08:29

## 2024-02-29 RX ADMIN — HYDROCODONE BITARTRATE AND ACETAMINOPHEN 1 TABLET: 5; 325 TABLET ORAL at 17:17

## 2024-02-29 RX ADMIN — LIDOCAINE 1 PATCH: 4 PATCH TOPICAL at 09:00

## 2024-02-29 NOTE — PROGRESS NOTES
"Pharmacy dosing service  Anticoagulant  Warfarin     Subjective:    Shruthi Amin is a 89 y.o.female being continued on warfarin for Atrial Fibrillation.    INR Goal: 2 - 3  Home medication?:  Warfarin 4mg Mon/Fri and 2mg warfarin all other days  Bridge Therapy Present?:  No  Interacting Medications Evaluation (New/Present/Discontinued): CTX (2/23-2/28; may cause INR increase)         Assessment/Plan:    JEFF completed 2/28, Warfarin resumed on 2/28.    INR subtherapeutic and trending down 2/2 held doses. Will resume home regimen starting tonight. (Due for 2mg dose)     Continue to monitor and adjust based on INR.         Date 2/23 2/24 2/25 2/26 2/27 2/28 2/29     INR 2.57 2.51 2.23 1.91 1.81 1.77 1.64     Dose Missed dose 2mg 2mg  HELD  HELD 4mg 2mg          Objective:  [Ht: 165.1 cm (65\"); Wt: 49.9 kg (110 lb); BMI: Body mass index is 18.3 kg/m².]    Lab Results   Component Value Date    ALBUMIN 4.0 02/24/2024     Lab Results   Component Value Date    INR 1.64 (L) 02/29/2024    INR 1.77 (L) 02/28/2024    INR 1.81 (L) 02/27/2024    PROTIME 17.2 (L) 02/29/2024    PROTIME 18.5 (L) 02/28/2024    PROTIME 18.9 (L) 02/27/2024     Lab Results   Component Value Date    HGB 12.7 02/27/2024    HGB 12.3 02/26/2024    HGB 13.0 02/25/2024     Lab Results   Component Value Date    HCT 38.4 02/27/2024    HCT 38.3 02/26/2024    HCT 39.3 02/25/2024       Aida Pavon, PharmD  02/29/24 15:30 EST                 "

## 2024-02-29 NOTE — TELEPHONE ENCOUNTER
Caller: Shruthi Amin     Best call back number: [phone]     What is your medical concern? PT REACHING OUT AS SHE WAS CONTACTED BY INR ABOUT HER BLOOD CHECK - SHE IS WANTING TO LET OFFICE KNOW SHE IS CURRENTLY IN THE HOSPITAL WITH PNEUMONIA BUT DR GARZON HAS BEEN BY TO SEE HER AND THEY ARE CHECKING AND TRACKING HER BLOOD -

## 2024-02-29 NOTE — PROGRESS NOTES
"PULMONARY CRITICAL CARE PROGRESS  NOTE      PATIENT IDENTIFICATION:  Name: Shruthi Amin  MRN: [id number]  :  6/10/1928     Age: 89 y.o.  Sex: female    DATE OF Note:  2024   Referring Physician: Patty Rivera MD                  Subjective:   Feeling better, no SOB, on 2 L O2, no chest or abd pain, no bowel or bladder issues     Objective:  tMax 24 hrs: Temp (24hrs), Av.8 °F (36.6 °C), Min:97.2 °F (36.2 °C), Max:98.4 °F (36.9 °C)      Vitals Ranges:   Temp:  [97.2 °F (36.2 °C)-98.4 °F (36.9 °C)] 98.4 °F (36.9 °C)  Heart Rate:  [67-95] 67  Resp:  [14-19] 15  BP: (108-160)/(58-82) 112/69    Intake and Output Last 3 Shifts:   I/O last 3 completed shifts:  In: 720 [P.O.:720]  Out: 1200 [Urine:1200]    Exam:  /69 (BP Location: Left arm, Patient Position: Lying)   Pulse 67   Temp 98.4 °F (36.9 °C) (Oral)   Resp 15   Ht 165.1 cm (65\")   Wt 49.9 kg (110 lb)   SpO2 100%   BMI 18.30 kg/m²     General Appearance: Alert  HEENT:  Normocephalic, without obvious abnormality. Conjunctivae/corneas clear.  Normal external ear canals. Nares normal, no drainage     Neck:  Supple, symmetrical, trachea midline. No JVD.  Lungs /Chest wall:   Bilateral basal rhonchi, respirations unlabored, symmetrical wall movement.     Heart:  Regular rate and rhythm, systolic murmur PMI left sternal border  Abdomen: Soft, nontender, no masses, no organomegaly.    Extremities: Trace edema, no clubbing or cyanosis        Medications:  budesonide, 0.5 mg, Nebulization, BID - RT  cefdinir, 300 mg, Oral, Q12H  digoxin, 125 mcg, Oral, Daily  docusate sodium, 100 mg, Oral, Nightly  furosemide, 20 mg, Intravenous, Daily  ipratropium-albuterol, 3 mL, Nebulization, 4x Daily - RT  latanoprost, 1 drop, Right Eye, Nightly  Lidocaine, 1 patch, Transdermal, Q24H  metoprolol succinate XL, 25 mg, Oral, BID  sodium chloride, 10 mL, Intravenous, Q12H        Infusion:  Pharmacy to dose warfarin,          PRN:    acetaminophen    " senna-docusate sodium **AND** polyethylene glycol **AND** bisacodyl **AND** bisacodyl    Calcium Replacement - Follow Nurse / BPA Driven Protocol    HYDROcodone-acetaminophen    Magnesium Cardiology Dose Replacement - Follow Nurse / BPA Driven Protocol    magnesium hydroxide    ondansetron    Pharmacy to dose warfarin    Phosphorus Replacement - Follow Nurse / BPA Driven Protocol    Potassium Replacement - Follow Nurse / BPA Driven Protocol    [COMPLETED] Insert Peripheral IV **AND** sodium chloride    sodium chloride    sodium chloride  Data Review:  All labs (24hrs):   Recent Results (from the past 24 hour(s))   Protime-INR    Collection Time: 02/29/24  3:20 AM    Specimen: Blood   Result Value Ref Range    Protime 17.2 (L) 19.4 - 28.5 Seconds    INR 1.64 (L) 2.00 - 3.00   Basic Metabolic Panel    Collection Time: 02/29/24  3:20 AM    Specimen: Blood   Result Value Ref Range    Glucose 98 65 - 99 mg/dL    BUN 16 8 - 23 mg/dL    Creatinine 0.58 0.57 - 1.00 mg/dL    Sodium 131 (L) 136 - 145 mmol/L    Potassium 4.9 3.5 - 5.2 mmol/L    Chloride 94 (L) 98 - 107 mmol/L    CO2 31.0 (H) 22.0 - 29.0 mmol/L    Calcium 8.9 8.2 - 9.6 mg/dL    BUN/Creatinine Ratio 27.6 (H) 7.0 - 25.0    Anion Gap 6.0 5.0 - 15.0 mmol/L    eGFR 83.5 >60.0 mL/min/1.73   Lavender Top    Collection Time: 02/29/24  3:20 AM   Result Value Ref Range    Extra Tube hold for add-on         Imaging:  Adult Transesophageal Echo (JEFF) W/ Cont if Necessary Per Protocol    Left ventricular ejection fraction appears to be 56 - 60%.    Severe mitral valve regurgitation is present.    Technically very difficult study with limited views because of body   habitus    A calcified mass present on the aortic valve which could be a   fibroblastoma or a healed vegetation.    Clinical correlation required       ASSESSMENT:  ? Pneumonia   Pleural effusion  COPD  Back pain  CHF  A-fib  CAD  GERD  Osteoarthritis  Hiatal hernia            PLAN:  Encourage to move more    Monitor intake and encourage to eat more  OOB daily, use IS more   Bronchodilators  Inhaled corticosteroids  Diuresis and monitor QUINTIN's   Monitor effusion for now   Incentive spirometer/Flutter valve  Electrolytes/ glycemic control  No DVT and GI prophylaxis     Discussed with Dr Rancho Mendoza, APRN   2/29/2024  12:28 EST    I personally have examined  and interviewed the patient. I have reviewed the history, data, problems, assessment and plan with our NP.  Total Critical care time in direct medical management (   ) minutes, This time specifically excludes time spent performing procedures.    Sulma Vickers MD   2/29/2024  23:02 EST

## 2024-02-29 NOTE — PROGRESS NOTES
LOS: 6 days   Patient Care Team:  Cristal Goodwin MD as PCP - General (Family Medicine)  Aleksey Mathew MD as Consulting Physician (Cardiology)  Anai Moise APRN as Nurse Practitioner (Cardiology)    Subjective     Patient complains of constipation new onset of nausea no vomiting    Review of Systems   Constitutional:  Positive for activity change and fatigue.   HENT: Negative.     Respiratory:  Negative for shortness of breath.    Cardiovascular:  Negative for chest pain.   Gastrointestinal:  Positive for abdominal distention, abdominal pain and constipation.   Genitourinary: Negative.    Musculoskeletal: Negative.    Neurological:  Positive for weakness.   Psychiatric/Behavioral: Negative.             Objective     Vital Signs  Temp:  [97.2 °F (36.2 °C)-98.4 °F (36.9 °C)] 98.4 °F (36.9 °C)  Heart Rate:  [67-95] 67  Resp:  [14-19] 15  BP: (108-160)/(58-82) 112/69      Physical Exam  Vitals reviewed.   Constitutional:       Appearance: She is not ill-appearing.   HENT:      Head: Normocephalic and atraumatic.      Right Ear: External ear normal.      Left Ear: External ear normal.      Nose: Nose normal.      Mouth/Throat:      Mouth: Mucous membranes are moist.   Eyes:      General:         Right eye: No discharge.         Left eye: No discharge.   Cardiovascular:      Rate and Rhythm: Normal rate and regular rhythm.      Pulses: Normal pulses.      Heart sounds: Normal heart sounds.   Pulmonary:      Effort: Pulmonary effort is normal.      Breath sounds: Normal breath sounds.   Abdominal:      General: Bowel sounds are normal.      Palpations: Abdomen is soft.   Musculoskeletal:         General: Normal range of motion.      Cervical back: Normal range of motion.   Skin:     General: Skin is warm and dry.   Neurological:      Mental Status: She is alert and oriented to person, place, and time.   Psychiatric:         Behavior: Behavior normal.              Results Review:    Lab Results (last 24 hours)        Procedure Component Value Units Date/Time    Extra Tubes [[id number]] Collected: 02/29/24 0320    Specimen: Blood, Venous Line Updated: 02/29/24 0430    Narrative:      The following orders were created for panel order Extra Tubes.  Procedure                               Abnormality         Status                     ---------                               -----------         ------                     Lavender Top[[id number]]                                     Final result                 Please view results for these tests on the individual orders.    Lavender Top [[id number]] Collected: 02/29/24 0320    Specimen: Blood Updated: 02/29/24 0430     Extra Tube hold for add-on     Comment: Auto resulted       Blood Culture - Blood, Arm, Left [[id number]]  (Normal) Collected: 02/26/24 0412    Specimen: Blood from Arm, Left Updated: 02/29/24 0430     Blood Culture No growth at 3 days    Blood Culture - Blood, Arm, Right [[id number]]  (Normal) Collected: 02/26/24 0407    Specimen: Blood from Arm, Right Updated: 02/29/24 0430     Blood Culture No growth at 3 days    Narrative:      Less than seven (7) mL's of blood was collected.  Insufficient quantity may yield false negative results.    Basic Metabolic Panel [[id number]]  (Abnormal) Collected: 02/29/24 0320    Specimen: Blood Updated: 02/29/24 0422     Glucose 98 mg/dL      BUN 16 mg/dL      Creatinine 0.58 mg/dL      Sodium 131 mmol/L      Potassium 4.9 mmol/L      Chloride 94 mmol/L      CO2 31.0 mmol/L      Calcium 8.9 mg/dL      BUN/Creatinine Ratio 27.6     Anion Gap 6.0 mmol/L      eGFR 83.5 mL/min/1.73     Narrative:      GFR Normal >60  Chronic Kidney Disease <60  Kidney Failure <15    The GFR formula is only valid for adults with stable renal function between ages 18 and 70.    Protime-INR [[id number]]  (Abnormal) Collected: 02/29/24 0320    Specimen: Blood Updated: 02/29/24 0415     Protime 17.2 Seconds      INR 1.64    Blood Culture - Blood, Arm, Left  [[id number]]  (Normal) Collected: 02/23/24 1524    Specimen: Blood from Arm, Left Updated: 02/28/24 1545     Blood Culture No growth at 5 days    Narrative:      Less than seven (7) mL's of blood was collected.  Insufficient quantity may yield false negative results.    Blood Culture - Blood, Arm, Right [[id number]]  (Normal) Collected: 02/23/24 1454    Specimen: Blood from Arm, Right Updated: 02/28/24 1515     Blood Culture No growth at 5 days    Narrative:      Less than seven (7) mL's of blood was collected.  Insufficient quantity may yield false negative results.             Imaging Results (Last 24 Hours)       Procedure Component Value Units Date/Time    XR Abdomen KUB [[id number]] Resulted: 02/29/24 1359     Updated: 02/29/24 1359                 I reviewed the patient's new clinical results.    Medication Review:   Scheduled Meds:budesonide, 0.5 mg, Nebulization, BID - RT  cefdinir, 300 mg, Oral, Q12H  digoxin, 125 mcg, Oral, Daily  docusate sodium, 100 mg, Oral, BID  furosemide, 20 mg, Intravenous, Daily  ipratropium-albuterol, 3 mL, Nebulization, 4x Daily - RT  latanoprost, 1 drop, Right Eye, Nightly  Lidocaine, 1 patch, Transdermal, Q24H  metoprolol succinate XL, 25 mg, Oral, BID  sodium chloride, 10 mL, Intravenous, Q12H      Continuous Infusions:Pharmacy to dose warfarin,       PRN Meds:.  acetaminophen    senna-docusate sodium **AND** polyethylene glycol **AND** bisacodyl **AND** bisacodyl    Calcium Replacement - Follow Nurse / BPA Driven Protocol    HYDROcodone-acetaminophen    Magnesium Cardiology Dose Replacement - Follow Nurse / BPA Driven Protocol    magnesium hydroxide    ondansetron    Pharmacy to dose warfarin    Phosphorus Replacement - Follow Nurse / BPA Driven Protocol    Potassium Replacement - Follow Nurse / BPA Driven Protocol    [COMPLETED] Insert Peripheral IV **AND** sodium chloride    sodium chloride    sodium chloride     Interval History:    Assessment & Plan     Constipation   -new  onset nausea  -kub await results and treat according    Back pain  -prior compression fractures; no further workup planned as pain does not appear related to spinal fractures    CAP  - cefdinir  300 mg twice a day for 2 days for a total of 7 days of antibiotic treatment     Pleural effusion  -treated with diuretics    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      Chronic CAD - ruled out for acute coronary syndrome; no further ischemic workup planned    Essential hypertension - metoprolol,    Permanent afib - rate is controlled; pharm dosing warfarin    Plan for disposition:Home hopefully tomorrow she has follow up with Dr. Goodwin on 3/13 at 1040 am    ALMA Heard  02/29/24  14:09 EST

## 2024-03-01 ENCOUNTER — TRANSCRIBE ORDERS (OUTPATIENT)
Dept: HOME HEALTH SERVICES | Facility: HOME HEALTHCARE | Age: 89
End: 2024-03-01
Payer: MEDICARE

## 2024-03-01 ENCOUNTER — APPOINTMENT (OUTPATIENT)
Dept: CT IMAGING | Facility: HOSPITAL | Age: 89
DRG: 193 | End: 2024-03-01
Payer: MEDICARE

## 2024-03-01 ENCOUNTER — DOCUMENTATION (OUTPATIENT)
Dept: HOME HEALTH SERVICES | Facility: HOME HEALTHCARE | Age: 89
End: 2024-03-01
Payer: MEDICARE

## 2024-03-01 DIAGNOSIS — I50.9 ACUTE CONGESTIVE HEART FAILURE, UNSPECIFIED HEART FAILURE TYPE: Primary | ICD-10-CM

## 2024-03-01 LAB
INR PPP: 1.83 (ref 2–3)
PROTHROMBIN TIME: 19.1 SECONDS (ref 19.4–28.5)

## 2024-03-01 PROCEDURE — 94664 DEMO&/EVAL PT USE INHALER: CPT

## 2024-03-01 PROCEDURE — 94761 N-INVAS EAR/PLS OXIMETRY MLT: CPT

## 2024-03-01 PROCEDURE — 99232 SBSQ HOSP IP/OBS MODERATE 35: CPT | Performed by: INTERNAL MEDICINE

## 2024-03-01 PROCEDURE — 94799 UNLISTED PULMONARY SVC/PX: CPT

## 2024-03-01 PROCEDURE — 74176 CT ABD & PELVIS W/O CONTRAST: CPT

## 2024-03-01 PROCEDURE — 97530 THERAPEUTIC ACTIVITIES: CPT

## 2024-03-01 RX ORDER — BISACODYL 10 MG
10 SUPPOSITORY, RECTAL RECTAL DAILY
Status: DISCONTINUED | OUTPATIENT
Start: 2024-03-01 | End: 2024-03-02 | Stop reason: HOSPADM

## 2024-03-01 RX ORDER — SORBITOL SOLUTION 70 %
50 SOLUTION, ORAL MISCELLANEOUS ONCE
Status: COMPLETED | OUTPATIENT
Start: 2024-03-01 | End: 2024-03-01

## 2024-03-01 RX ORDER — METOCLOPRAMIDE 5 MG/1
5 TABLET ORAL
Status: DISCONTINUED | OUTPATIENT
Start: 2024-03-01 | End: 2024-03-02 | Stop reason: HOSPADM

## 2024-03-01 RX ORDER — PANTOPRAZOLE SODIUM 40 MG/1
40 TABLET, DELAYED RELEASE ORAL
Status: DISCONTINUED | OUTPATIENT
Start: 2024-03-01 | End: 2024-03-02 | Stop reason: HOSPADM

## 2024-03-01 RX ADMIN — BUDESONIDE 0.5 MG: 0.5 INHALANT RESPIRATORY (INHALATION) at 07:56

## 2024-03-01 RX ADMIN — SIMETHICONE 80 MG: 80 TABLET, CHEWABLE ORAL at 09:32

## 2024-03-01 RX ADMIN — WARFARIN SODIUM 4 MG: 4 TABLET ORAL at 17:31

## 2024-03-01 RX ADMIN — PANTOPRAZOLE SODIUM 40 MG: 40 TABLET, DELAYED RELEASE ORAL at 17:31

## 2024-03-01 RX ADMIN — ACETAMINOPHEN 500 MG: 500 TABLET ORAL at 22:30

## 2024-03-01 RX ADMIN — IPRATROPIUM BROMIDE AND ALBUTEROL SULFATE 3 ML: .5; 3 SOLUTION RESPIRATORY (INHALATION) at 07:56

## 2024-03-01 RX ADMIN — BISACODYL 10 MG: 10 SUPPOSITORY RECTAL at 13:49

## 2024-03-01 RX ADMIN — METOPROLOL SUCCINATE 25 MG: 25 TABLET, EXTENDED RELEASE ORAL at 20:44

## 2024-03-01 RX ADMIN — METOCLOPRAMIDE 5 MG: 5 TABLET ORAL at 20:46

## 2024-03-01 RX ADMIN — METOCLOPRAMIDE 5 MG: 5 TABLET ORAL at 13:49

## 2024-03-01 RX ADMIN — IPRATROPIUM BROMIDE AND ALBUTEROL SULFATE 3 ML: .5; 3 SOLUTION RESPIRATORY (INHALATION) at 20:33

## 2024-03-01 RX ADMIN — HYDROCODONE BITARTRATE AND ACETAMINOPHEN 1 TABLET: 5; 325 TABLET ORAL at 08:32

## 2024-03-01 RX ADMIN — DOCUSATE SODIUM 100 MG: 100 CAPSULE, LIQUID FILLED ORAL at 08:32

## 2024-03-01 RX ADMIN — LIDOCAINE 1 PATCH: 4 PATCH TOPICAL at 08:32

## 2024-03-01 RX ADMIN — DOCUSATE SODIUM 100 MG: 100 CAPSULE, LIQUID FILLED ORAL at 20:44

## 2024-03-01 RX ADMIN — Medication 10 ML: at 20:44

## 2024-03-01 RX ADMIN — SORBITOL SOLUTION (BULK) 50 ML: 70 SOLUTION at 13:49

## 2024-03-01 RX ADMIN — BUDESONIDE 0.5 MG: 0.5 INHALANT RESPIRATORY (INHALATION) at 20:28

## 2024-03-01 RX ADMIN — METOCLOPRAMIDE 5 MG: 5 TABLET ORAL at 17:34

## 2024-03-01 RX ADMIN — Medication 10 ML: at 08:33

## 2024-03-01 RX ADMIN — LATANOPROST 1 DROP: 50 SOLUTION/ DROPS OPHTHALMIC at 20:46

## 2024-03-01 RX ADMIN — METOPROLOL SUCCINATE 25 MG: 25 TABLET, EXTENDED RELEASE ORAL at 08:32

## 2024-03-01 RX ADMIN — DIGOXIN 125 MCG: 125 TABLET ORAL at 12:29

## 2024-03-01 NOTE — PLAN OF CARE
Goal Outcome Evaluation:              Outcome Evaluation: Pt resting well thoughout shift. Pain treated per MAR. Up to BSC with 1 assist. Continue plan of care.

## 2024-03-01 NOTE — THERAPY TREATMENT NOTE
Subjective: Pt agreeable to therapeutic plan of care.  Cognition: oriented to Person, Place, and Time    Objective:     Bed Mobility: CGA  Functional Transfers: CGA     Balance: static and standing CGA  Functional Ambulation: CGA    Lower Body Dressing: Min-A  ADL Position: supported sitting and supported standing      Vitals: WNL    Pain: 4 VAS  Location: back  Interventions for pain: Repositioned  Education: Provided education on the importance of mobility in the acute care setting      Assessment: Shruthi Amin presents with ADL impairments affecting function including balance, endurance / activity tolerance, and strength. Demonstrated functioning below baseline abilities indicate the need for continued skilled intervention while inpatient. Tolerating session today without incident. Will continue to follow and progress as tolerated.     Plan/Recommendations:   No ongoing therapy recommended post-acute care. No therapy needs.. Pt requires no DME at discharge.     Pt desires Home with family assist at discharge. Pt cooperative; agreeable to therapeutic recommendations and plan of care.     Modified Geovany: N/A = No pre-op stroke/TIA    Post-Tx Position: Supine with HOB Elevated  PPE: gloves

## 2024-03-01 NOTE — PROGRESS NOTES
"Pharmacy dosing service  Anticoagulant  Warfarin     Subjective:    Shruthi Amin is a 89 y.o.female being continued on warfarin for Atrial Fibrillation.    INR Goal: 2 - 3  Home medication?:  Warfarin 4mg Mon/Fri and 2mg warfarin all other days  Bridge Therapy Present?:  No  Interacting Medications Evaluation (New/Present/Discontinued): CTX (2/23-2/28; Cefdinir (2/28-2/29) may cause INR increase)         Assessment/Plan:    JEFF completed 2/28, Warfarin resumed on 2/28.    INR subtherapeutic due to held doses. Home regimen now resumed. Patient scheduled to receive 4 mg tonight.     Continue to monitor and adjust based on INR.         Date 2/23 2/24 2/25 2/26 2/27 2/28 2/29 3/1    INR 2.57 2.51 2.23 1.91 1.81 1.77 1.64 1.83    Dose Missed dose 2mg 2mg  HELD  HELD 4mg 2mg  4 mg         Objective:  [Ht: 165.1 cm (65\"); Wt: 49.9 kg (110 lb); BMI: Body mass index is 18.3 kg/m².]    Lab Results   Component Value Date    ALBUMIN 4.0 02/24/2024     Lab Results   Component Value Date    INR 1.83 (L) 02/29/2024    INR 1.64 (L) 02/29/2024    INR 1.77 (L) 02/28/2024    PROTIME 19.1 (L) 02/29/2024    PROTIME 17.2 (L) 02/29/2024    PROTIME 18.5 (L) 02/28/2024     Lab Results   Component Value Date    HGB 12.7 02/27/2024    HGB 12.3 02/26/2024    HGB 13.0 02/25/2024     Lab Results   Component Value Date    HCT 38.4 02/27/2024    HCT 38.3 02/26/2024    HCT 39.3 02/25/2024       Sol Strong, PharmD  03/01/24 08:41 EST                   "

## 2024-03-01 NOTE — CASE MANAGEMENT/SOCIAL WORK
Continued Stay Note   J[facility]     Patient Name: Shruthi Amin  MRN: [id number]  Today's Date: 3/1/2024    Admit Date: 2/23/2024    Plan: Return home with daughter and [facility] (accepted, order in). Home O2 2L PRN w/ [facility].   Discharge Plan       Row Name 03/01/24 1634       Plan    Plan Return home with daughter and [facility] (accepted, order in). Home O2 2L PRN w/ [facility].    Patient/Family in Agreement with Plan yes    Plan Comments DC barriers: pt c/o constipation and nausea. CT scan completed. Cardio and Pulm following.             Megan Naegele, RN     Office Phone: [phone]  Office Cell: [phone]

## 2024-03-01 NOTE — PROGRESS NOTES
CARDIOLOGY PROGRESS NOTE:    Shruthi Amin  89 y.o.  female  6/10/1928  [id number]      Referring Provider: Hospitalist    Reason for follow-up: Abnormal echo     Patient Care Team:  Cristal Goodwin MD as PCP - General (Family Medicine)  Aleksey Mathew MD as Consulting Physician (Cardiology)  Anai Moise APRN as Nurse Practitioner (Cardiology)    Subjective .  Doing well without any chest pain or shortness of breath but complains of back pain    Objective   sitting in chair comfortable     Review of Systems   Constitutional: Negative for fever and malaise/fatigue.   HENT:  Negative for ear pain and nosebleeds.    Eyes:  Negative for blurred vision and double vision.   Cardiovascular:  Negative for chest pain, dyspnea on exertion and palpitations.   Respiratory:  Negative for cough and shortness of breath.    Skin:  Negative for rash.   Musculoskeletal:  Positive for back pain. Negative for joint pain.   Gastrointestinal:  Negative for abdominal pain, nausea and vomiting.   Neurological:  Negative for focal weakness and headaches.   Psychiatric/Behavioral:  Negative for depression. The patient is not nervous/anxious.    All other systems reviewed and are negative.      Allergies: Penicillins, Codeine, and Latex    Scheduled Meds:bisacodyl, 10 mg, Rectal, Daily  budesonide, 0.5 mg, Nebulization, BID - RT  digoxin, 125 mcg, Oral, Daily  docusate sodium, 100 mg, Oral, BID  ipratropium-albuterol, 3 mL, Nebulization, 4x Daily - RT  latanoprost, 1 drop, Right Eye, Nightly  Lidocaine, 1 patch, Transdermal, Q24H  metoclopramide, 5 mg, Oral, 4x Daily AC & at Bedtime  metoprolol succinate XL, 25 mg, Oral, BID  pantoprazole, 40 mg, Oral, BID AC  sodium chloride, 10 mL, Intravenous, Q12H  sorbitol, 50 mL, Oral, Once  warfarin, 2 mg, Oral, Once per day on Sunday Tuesday Wednesday Thursday Saturday  warfarin, 4 mg, Oral, Once per day on Monday Friday      Continuous Infusions:Pharmacy to dose warfarin,       PRN Meds:.   "acetaminophen    senna-docusate sodium **AND** polyethylene glycol **AND** bisacodyl **AND** bisacodyl    Calcium Replacement - Follow Nurse / BPA Driven Protocol    HYDROcodone-acetaminophen    Magnesium Cardiology Dose Replacement - Follow Nurse / BPA Driven Protocol    magnesium hydroxide    ondansetron    Pharmacy to dose warfarin    Phosphorus Replacement - Follow Nurse / BPA Driven Protocol    Potassium Replacement - Follow Nurse / BPA Driven Protocol    simethicone    [COMPLETED] Insert Peripheral IV **AND** sodium chloride    sodium chloride    sodium chloride        VITAL SIGNS  Vitals:    03/01/24 0800 03/01/24 0801 03/01/24 0805 03/01/24 1145   BP:    129/61   BP Location:    Left arm   Patient Position:    Lying   Pulse: 59 68 75 80   Resp: 16 16 16 14   Temp:    97.3 °F (36.3 °C)   TempSrc:    Oral   SpO2: 97% 98% 96% 91%   Weight:       Height:           Flowsheet Rows      Flowsheet Row First Filed Value   Admission Height 160 cm (63\") Documented at 02/23/2024 1252   Admission Weight 49.4 kg (109 lb) Documented at 02/23/2024 1252             TELEMETRY: Atrial fibrillation with controlled ventricular response    Physical Exam:  Constitutional:       Appearance: Well-developed.   Eyes:      General: No scleral icterus.     Conjunctiva/sclera: Conjunctivae normal.      Pupils: Pupils are equal, round, and reactive to light.   HENT:      Head: Normocephalic and atraumatic.   Neck:      Vascular: No carotid bruit or JVD.   Pulmonary:      Effort: Pulmonary effort is normal.      Breath sounds: Normal breath sounds. No wheezing. No rales.   Cardiovascular:      Normal rate. Regular rhythm.      Murmurs: There is a systolic murmur.   Pulses:     Intact distal pulses.   Abdominal:      General: Bowel sounds are normal.      Palpations: Abdomen is soft.   Musculoskeletal: Normal range of motion.      Cervical back: Normal range of motion and neck supple. Skin:     General: Skin is warm and dry.      Findings: " No rash.   Neurological:      Mental Status: Alert.      Comments: No focal deficits          Results Review:   I reviewed the patient's new clinical results.  Lab Results (last 24 hours)       Procedure Component Value Units Date/Time    Blood Culture - Blood, Arm, Left [[id number]]  (Normal) Collected: 02/26/24 0412    Specimen: Blood from Arm, Left Updated: 03/01/24 0430     Blood Culture No growth at 4 days    Blood Culture - Blood, Arm, Right [[id number]]  (Normal) Collected: 02/26/24 0407    Specimen: Blood from Arm, Right Updated: 03/01/24 0430     Blood Culture No growth at 4 days    Narrative:      Less than seven (7) mL's of blood was collected.  Insufficient quantity may yield false negative results.    Protime-INR [[id number]]  (Abnormal) Collected: 02/29/24 2330    Specimen: Blood Updated: 03/01/24 0011     Protime 19.1 Seconds      INR 1.83            Imaging Results (Last 24 Hours)       Procedure Component Value Units Date/Time    CT Abdomen Pelvis Without Contrast [[id number]] Collected: 03/01/24 1137     Updated: 03/01/24 1147    Narrative:      CT ABDOMEN PELVIS WO CONTRAST    Date of Exam: 3/1/2024 11:19 AM EST    Indication: Nausea/vomiting.    Comparison: CT abdomen and pelvis 3/9/2017    Technique: Axial CT images were obtained of the abdomen and pelvis without the administration of contrast. Sagittal and coronal reconstructions were performed.  Automated exposure control and iterative reconstruction methods were used.      Findings:  LUNG BASES: There is bibasilar airspace disease and pleural effusions right greater than left. There is a large hiatal hernia with the majority of the gastric body in the thoracic cavity. The heart is enlarged. There are    LIVER:  Unremarkable parenchyma without focal lesion.  BILIARY/GALLBLADDER:  Unremarkable  SPLEEN:  Unremarkable  PANCREAS:  Unremarkable  ADRENAL:  Unremarkable  KIDNEYS:  Unremarkable parenchyma with no solid mass identified. No obstruction.  No  calculus identified.  GASTROINTESTINAL/MESENTERY: Evaluation of the gastrointestinal track demonstrates fecal stasis throughout compatible with constipation. There are few scattered diverticula. No evidence for acute inflammatory changes. Free fluid is present in the pelvic   cul-de-sac.  AORTA/IVC: There is calcified atherosclerotic disease.    RETROPERITONEUM/LYMPH NODES: No pathologic lymphadenopathy noted.    REPRODUCTIVE: The uterus is atrophied.  BLADDER:  Unremarkable    OSSEUS STRUCTURES: There is multilevel degenerative disc and facet disease accentuated by lumbar scoliosis. Kyphoplasty is noted at T12. Right hip surgical hardware is noted.      Impression:      Impression:    1. Large hiatal hernia with the gastric body in the thoracic cavity.  2. Bibasilar airspace disease and pleural effusions.  3. Constipation.            Electronically Signed: Michelle Fowler MD    3/1/2024 11:44 AM EST    Workstation ID: [id number]    XR Abdomen KUB [[id number]] Collected: 02/29/24 1430     Updated: 02/29/24 1435    Narrative:      XR ABDOMEN KUB    Date of Exam: 2/29/2024 1:58 PM EST    Indication: Left upper quadrant abdominal pain. Constipation.    Comparison: CT abdomen and pelvis 3/9/2017    Findings: Moderate gas distention of multiple clustered bowel loops in the mid to lower abdomen, favored to represent colon. No pneumatosis or gross free intraperitoneal air. No large retained colonic stool burden. Thoracolumbar scoliotic curvature with   lower thoracic vertebroplasty. Dense opacity in the lung bases thought to correspond to patient's known large esophageal hiatal hernia. Right hip open reduction internal fixation changes.      Impression:      1. No large retained colonic stool burden.  2. Moderate gas distention of clustered bowel loops in the mid to lower abdomen, favored to represent colonic distention, is nonspecific.  If patient's symptoms persist, CT abdomen would be recommended      Electronically  Signed: Sonia España MD    2/29/2024 2:33 PM EST    Workstation ID: [id number]            EKG      I personally viewed and interpreted the patient's EKG/Telemetry data:    ECHOCARDIOGRAM:  Results for orders placed during the hospital encounter of 02/23/24    Adult Transesophageal Echo (JEFF) W/ Cont if Necessary Per Protocol    Interpretation Summary    Left ventricular ejection fraction appears to be 56 - 60%.    Severe mitral valve regurgitation is present.    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    Clinical correlation required       STRESS MYOVIEW:       CARDIAC CATHETERIZATION:  No results found for this or any previous visit.       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  Patient is currently on warfarin which will be held.  Patient had a JEFF which showed a possible healed vegetation versus a fibroblastoma or calcified mass on the aortic valve but also she had severe mitral regurgitation and hence will be on medical therapy.    CAD  Patient has nonobstructive coronary disease and is ruled out for MI by EKG and enzymes and is currently stable without any angina    Hypertension  Patient blood pressure currently stable on medical therapy including metoprolol    Atrial fibrillation  Patient has history of atrial fibrillation is currently on digoxin and metoprolol and also warfarin and keep his INR on 2-2.5 and will hold it for her JEFF  Patient patient will restart the Coumadin tonight and maintain INR of  2-2.5    History of CVA  Patient is currently stable without any symptoms    Back problems  Patient had back surgery and is followed by the primary care doctor.  Patient is on pain medicines but still has back problems with pain and needs an evaluation done before she goes home  Physical therapy and Occupational Therapy are also working with patient  Patient probably has significant constipation and probably some kidney stones also  Primary care following that with a lupus    Severe mitral regurgitation  Patient has severe mitral regurgitation with normal function but because of her age we will do conservative medical therapy only    I discussed the patients findings and my recommendations with patient and nurse    Aleksey Mathew MD  03/01/24  12:59 EST

## 2024-03-01 NOTE — PROGRESS NOTES
"PULMONARY CRITICAL CARE PROGRESS  NOTE      PATIENT IDENTIFICATION:  Name: Shruthi Amin  MRN: [id number]  :  6/10/1928     Age: 89 y.o.  Sex: female    DATE OF Note:  3/1/2024   Referring Physician: Patty Rivera MD                  Subjective:   Feeling better, no SOB, remains on 2 L O2, no chest or abd pain, no bowel or bladder issues     Objective:  tMax 24 hrs: Temp (24hrs), Av.8 °F (36.6 °C), Min:97.5 °F (36.4 °C), Max:98.4 °F (36.9 °C)      Vitals Ranges:   Temp:  [97.5 °F (36.4 °C)-98.4 °F (36.9 °C)] 97.5 °F (36.4 °C)  Heart Rate:  [59-83] 75  Resp:  [11-17] 16  BP: (112-159)/(62-80) 156/71    Intake and Output Last 3 Shifts:   I/O last 3 completed shifts:  In: 180 [P.O.:180]  Out: 1450 [Urine:1450]    Exam:  /71 (BP Location: Left arm, Patient Position: Lying)   Pulse 75   Temp 97.5 °F (36.4 °C) (Oral)   Resp 16   Ht 165.1 cm (65\")   Wt 49.9 kg (110 lb)   SpO2 96%   BMI 18.30 kg/m²     General Appearance: Alert  HEENT:  Normocephalic, without obvious abnormality. Conjunctivae/corneas clear.  Normal external ear canals. Nares normal, no drainage     Neck:  Supple, symmetrical, trachea midline. No JVD.  Lungs /Chest wall:   Bilateral basal rhonchi, respirations unlabored, symmetrical wall movement.     Heart:  Regular rate and rhythm, systolic murmur PMI left sternal border  Abdomen: Soft, nontender, no masses, no organomegaly.    Extremities: Trace edema, no clubbing or cyanosis        Medications:  budesonide, 0.5 mg, Nebulization, BID - RT  digoxin, 125 mcg, Oral, Daily  docusate sodium, 100 mg, Oral, BID  ipratropium-albuterol, 3 mL, Nebulization, 4x Daily - RT  latanoprost, 1 drop, Right Eye, Nightly  Lidocaine, 1 patch, Transdermal, Q24H  metoprolol succinate XL, 25 mg, Oral, BID  sodium chloride, 10 mL, Intravenous, Q12H  warfarin, 2 mg, Oral, Once per day on   warfarin, 4 mg, Oral, Once per day on Monday " Friday        Infusion:  Pharmacy to dose warfarin,          PRN:    acetaminophen    senna-docusate sodium **AND** polyethylene glycol **AND** bisacodyl **AND** bisacodyl    Calcium Replacement - Follow Nurse / BPA Driven Protocol    HYDROcodone-acetaminophen    Magnesium Cardiology Dose Replacement - Follow Nurse / BPA Driven Protocol    magnesium hydroxide    ondansetron    Pharmacy to dose warfarin    Phosphorus Replacement - Follow Nurse / BPA Driven Protocol    Potassium Replacement - Follow Nurse / BPA Driven Protocol    simethicone    [COMPLETED] Insert Peripheral IV **AND** sodium chloride    sodium chloride    sodium chloride  Data Review:  All labs (24hrs):   Recent Results (from the past 24 hour(s))   Protime-INR    Collection Time: 02/29/24 11:30 PM    Specimen: Blood   Result Value Ref Range    Protime 19.1 (L) 19.4 - 28.5 Seconds    INR 1.83 (L) 2.00 - 3.00        Imaging:  XR Abdomen KUB  Narrative: XR ABDOMEN KUB    Date of Exam: 2/29/2024 1:58 PM EST    Indication: Left upper quadrant abdominal pain. Constipation.    Comparison: CT abdomen and pelvis 3/9/2017    Findings: Moderate gas distention of multiple clustered bowel loops in the mid to lower abdomen, favored to represent colon. No pneumatosis or gross free intraperitoneal air. No large retained colonic stool burden. Thoracolumbar scoliotic curvature with   lower thoracic vertebroplasty. Dense opacity in the lung bases thought to correspond to patient's known large esophageal hiatal hernia. Right hip open reduction internal fixation changes.  Impression: 1. No large retained colonic stool burden.  2. Moderate gas distention of clustered bowel loops in the mid to lower abdomen, favored to represent colonic distention, is nonspecific.  If patient's symptoms persist, CT abdomen would be recommended    Electronically Signed: Sonia España MD    2/29/2024 2:33 PM EST    Workstation ID: [id number]       ASSESSMENT:  ? Pneumonia   Pleural  effusion  COPD  Back pain  CHF  A-fib  CAD  GERD  Osteoarthritis  Hiatal hernia            PLAN:  PT/OT  Monitor intake and encourage to eat more  OOB daily, use IS more   Bronchodilators  Inhaled corticosteroids  Diuresis and monitor QUINTIN's   Monitor effusion for now   Incentive spirometer/Flutter valve  Electrolytes/ glycemic control  No DVT and GI prophylaxis     Discussed with Dr Rancho Mendoza, [facility]   3/1/2024  09:20 EST    I personally have examined  and interviewed the patient. I have reviewed the history, data, problems, assessment and plan with our NP.  Total Critical care time in direct medical management (   ) minutes, This time specifically excludes time spent performing procedures.    Sulma Vickers MD   3/1/2024  09:37 EST

## 2024-03-01 NOTE — PROGRESS NOTES
Demographics verified. Pt is agreeable to services and has no other agency    Nursing and PT    Dr. Cristal Goodwin will be the following provider    Pharmacy: [facility] patient

## 2024-03-01 NOTE — PROGRESS NOTES
LOS: 7 days   Patient Care Team:  Cristal Goodwin MD as PCP - General (Family Medicine)  Aleksey Mathew MD as Consulting Physician (Cardiology)  Anai Moise APRN as Nurse Practitioner (Cardiology)    Subjective     Patient complains of constipation with nausea      Review of Systems   Constitutional:  Positive for activity change and fatigue.   HENT: Negative.     Respiratory:  Negative for shortness of breath.    Cardiovascular:  Negative for chest pain.   Gastrointestinal:  Positive for abdominal distention, abdominal pain and constipation.   Genitourinary: Negative.    Musculoskeletal: Negative.    Neurological:  Positive for weakness.   Psychiatric/Behavioral: Negative.             Objective     Vital Signs  Temp:  [97.3 °F (36.3 °C)-97.6 °F (36.4 °C)] 97.3 °F (36.3 °C)  Heart Rate:  [59-83] 80  Resp:  [11-17] 14  BP: (127-159)/(61-80) 129/61      Physical Exam  Vitals reviewed.   Constitutional:       Appearance: She is not ill-appearing.   HENT:      Head: Normocephalic and atraumatic.      Right Ear: External ear normal.      Left Ear: External ear normal.      Nose: Nose normal.      Mouth/Throat:      Mouth: Mucous membranes are moist.   Eyes:      General:         Right eye: No discharge.         Left eye: No discharge.   Cardiovascular:      Rate and Rhythm: Normal rate and regular rhythm.      Pulses: Normal pulses.      Heart sounds: Normal heart sounds.   Pulmonary:      Effort: Pulmonary effort is normal.      Breath sounds: Normal breath sounds.   Abdominal:      General: Bowel sounds are normal.      Palpations: Abdomen is soft.   Musculoskeletal:         General: Normal range of motion.      Cervical back: Normal range of motion.   Skin:     General: Skin is warm and dry.   Neurological:      Mental Status: She is alert and oriented to person, place, and time.   Psychiatric:         Behavior: Behavior normal.              Results Review:    Lab Results (last 24 hours)       Procedure  Component Value Units Date/Time    Blood Culture - Blood, Arm, Left [[id number]]  (Normal) Collected: 02/26/24 0412    Specimen: Blood from Arm, Left Updated: 03/01/24 0430     Blood Culture No growth at 4 days    Blood Culture - Blood, Arm, Right [[id number]]  (Normal) Collected: 02/26/24 0407    Specimen: Blood from Arm, Right Updated: 03/01/24 0430     Blood Culture No growth at 4 days    Narrative:      Less than seven (7) mL's of blood was collected.  Insufficient quantity may yield false negative results.    Protime-INR [[id number]]  (Abnormal) Collected: 02/29/24 2330    Specimen: Blood Updated: 03/01/24 0011     Protime 19.1 Seconds      INR 1.83             Imaging Results (Last 24 Hours)       Procedure Component Value Units Date/Time    CT Abdomen Pelvis Without Contrast [[id number]] Collected: 03/01/24 1137     Updated: 03/01/24 1147    Narrative:      CT ABDOMEN PELVIS WO CONTRAST    Date of Exam: 3/1/2024 11:19 AM EST    Indication: Nausea/vomiting.    Comparison: CT abdomen and pelvis 3/9/2017    Technique: Axial CT images were obtained of the abdomen and pelvis without the administration of contrast. Sagittal and coronal reconstructions were performed.  Automated exposure control and iterative reconstruction methods were used.      Findings:  LUNG BASES: There is bibasilar airspace disease and pleural effusions right greater than left. There is a large hiatal hernia with the majority of the gastric body in the thoracic cavity. The heart is enlarged. There are    LIVER:  Unremarkable parenchyma without focal lesion.  BILIARY/GALLBLADDER:  Unremarkable  SPLEEN:  Unremarkable  PANCREAS:  Unremarkable  ADRENAL:  Unremarkable  KIDNEYS:  Unremarkable parenchyma with no solid mass identified. No obstruction.  No calculus identified.  GASTROINTESTINAL/MESENTERY: Evaluation of the gastrointestinal track demonstrates fecal stasis throughout compatible with constipation. There are few scattered diverticula. No  evidence for acute inflammatory changes. Free fluid is present in the pelvic   cul-de-sac.  AORTA/IVC: There is calcified atherosclerotic disease.    RETROPERITONEUM/LYMPH NODES: No pathologic lymphadenopathy noted.    REPRODUCTIVE: The uterus is atrophied.  BLADDER:  Unremarkable    OSSEUS STRUCTURES: There is multilevel degenerative disc and facet disease accentuated by lumbar scoliosis. Kyphoplasty is noted at T12. Right hip surgical hardware is noted.      Impression:      Impression:    1. Large hiatal hernia with the gastric body in the thoracic cavity.  2. Bibasilar airspace disease and pleural effusions.  3. Constipation.            Electronically Signed: Michelle Fowler MD    3/1/2024 11:44 AM EST    Workstation ID: [id number]    XR Abdomen KUB [[id number]] Collected: 02/29/24 1430     Updated: 02/29/24 1435    Narrative:      XR ABDOMEN KUB    Date of Exam: 2/29/2024 1:58 PM EST    Indication: Left upper quadrant abdominal pain. Constipation.    Comparison: CT abdomen and pelvis 3/9/2017    Findings: Moderate gas distention of multiple clustered bowel loops in the mid to lower abdomen, favored to represent colon. No pneumatosis or gross free intraperitoneal air. No large retained colonic stool burden. Thoracolumbar scoliotic curvature with   lower thoracic vertebroplasty. Dense opacity in the lung bases thought to correspond to patient's known large esophageal hiatal hernia. Right hip open reduction internal fixation changes.      Impression:      1. No large retained colonic stool burden.  2. Moderate gas distention of clustered bowel loops in the mid to lower abdomen, favored to represent colonic distention, is nonspecific.  If patient's symptoms persist, CT abdomen would be recommended      Electronically Signed: Sonia España MD    2/29/2024 2:33 PM EST    Workstation ID: [id number]                 I reviewed the patient's new clinical results.    Medication Review:   Scheduled Meds:budesonide, 0.5 mg,  Nebulization, BID - RT  digoxin, 125 mcg, Oral, Daily  docusate sodium, 100 mg, Oral, BID  ipratropium-albuterol, 3 mL, Nebulization, 4x Daily - RT  latanoprost, 1 drop, Right Eye, Nightly  Lidocaine, 1 patch, Transdermal, Q24H  metoprolol succinate XL, 25 mg, Oral, BID  sodium chloride, 10 mL, Intravenous, Q12H  warfarin, 2 mg, Oral, Once per day on Sunday Tuesday Wednesday Thursday Saturday  warfarin, 4 mg, Oral, Once per day on Monday Friday      Continuous Infusions:Pharmacy to dose warfarin,       PRN Meds:.  acetaminophen    senna-docusate sodium **AND** polyethylene glycol **AND** bisacodyl **AND** bisacodyl    Calcium Replacement - Follow Nurse / BPA Driven Protocol    HYDROcodone-acetaminophen    Magnesium Cardiology Dose Replacement - Follow Nurse / BPA Driven Protocol    magnesium hydroxide    ondansetron    Pharmacy to dose warfarin    Phosphorus Replacement - Follow Nurse / BPA Driven Protocol    Potassium Replacement - Follow Nurse / BPA Driven Protocol    simethicone    [COMPLETED] Insert Peripheral IV **AND** sodium chloride    sodium chloride    sodium chloride     Interval History:    Assessment & Plan     Constipation   -new onset nausea  -kub await results and treat according  -ct abdomen today with constipation/large hiatal hernia with gastric body in the thoracic cavity  -add reglan/protonix bid  -bowel regimen    Back pain  -prior compression fractures; no further workup planned as pain does not appear related to spinal fractures    CAP  - cefdinir  300 mg twice a day for 2 days for a total of 7 days of antibiotic treatment     Pleural effusion  -treated with diuretics    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      Chronic CAD - ruled out for acute coronary syndrome; no further ischemic workup  planned    Essential hypertension - metoprolol,    Permanent afib - rate is controlled; pharm dosing warfarin    Plan for disposition:Home hopefully tomorrow she has follow up with Dr. Goodwin on 3/13 at 1040 am    ALMA Heard  03/01/24  12:34 EST

## 2024-03-01 NOTE — PLAN OF CARE
Goal Outcome Evaluation:      Pt. Demonstrates progress w/ functional mobility this date, CGA for safety ambulatory transfer w/ rolling walker support. Pt. W/ limited standing tolerance secondary to fatigue and SOA. Pt. Continues to require min ADL support/assist for toileting/dressing. Recommend d/c home w/ family support/assist at d/c.

## 2024-03-02 ENCOUNTER — HOME HEALTH ADMISSION (OUTPATIENT)
Dept: HOME HEALTH SERVICES | Facility: HOME HEALTHCARE | Age: 89
End: 2024-03-02
Payer: MEDICARE

## 2024-03-02 ENCOUNTER — READMISSION MANAGEMENT (OUTPATIENT)
Dept: CALL CENTER | Facility: HOSPITAL | Age: 89
End: 2024-03-02
Payer: MEDICARE

## 2024-03-02 VITALS
WEIGHT: 110 LBS | SYSTOLIC BLOOD PRESSURE: 146 MMHG | BODY MASS INDEX: 18.33 KG/M2 | HEART RATE: 79 BPM | TEMPERATURE: 98.2 F | OXYGEN SATURATION: 97 % | DIASTOLIC BLOOD PRESSURE: 80 MMHG | RESPIRATION RATE: 16 BRPM | HEIGHT: 65 IN

## 2024-03-02 LAB
BACTERIA SPEC AEROBE CULT: NORMAL
BACTERIA SPEC AEROBE CULT: NORMAL
INR PPP: 2.01 (ref 2–3)
PROTHROMBIN TIME: 20.8 SECONDS (ref 19.4–28.5)

## 2024-03-02 PROCEDURE — 94799 UNLISTED PULMONARY SVC/PX: CPT

## 2024-03-02 PROCEDURE — 94761 N-INVAS EAR/PLS OXIMETRY MLT: CPT

## 2024-03-02 PROCEDURE — 94664 DEMO&/EVAL PT USE INHALER: CPT

## 2024-03-02 PROCEDURE — 85610 PROTHROMBIN TIME: CPT | Performed by: INTERNAL MEDICINE

## 2024-03-02 RX ORDER — METOCLOPRAMIDE 5 MG/1
5 TABLET ORAL
Qty: 120 TABLET | Refills: 1 | Status: SHIPPED | OUTPATIENT
Start: 2024-03-02

## 2024-03-02 RX ORDER — ONDANSETRON 8 MG/1
8 TABLET, ORALLY DISINTEGRATING ORAL EVERY 8 HOURS PRN
Qty: 30 TABLET | Refills: 0 | Status: SHIPPED | OUTPATIENT
Start: 2024-03-02

## 2024-03-02 RX ORDER — LIDOCAINE 4 G/G
1 PATCH TOPICAL
Qty: 20 PATCH | Refills: 0 | Status: SHIPPED | OUTPATIENT
Start: 2024-03-03 | End: 2024-03-02

## 2024-03-02 RX ORDER — HYDROCODONE BITARTRATE AND ACETAMINOPHEN 5; 325 MG/1; MG/1
1 TABLET ORAL EVERY 6 HOURS PRN
Qty: 20 TABLET | Refills: 0 | Status: SHIPPED | OUTPATIENT
Start: 2024-03-02 | End: 2024-03-02

## 2024-03-02 RX ORDER — PANTOPRAZOLE SODIUM 40 MG/1
40 TABLET, DELAYED RELEASE ORAL
Qty: 60 TABLET | Refills: 1 | Status: SHIPPED | OUTPATIENT
Start: 2024-03-02

## 2024-03-02 RX ORDER — ONDANSETRON 8 MG/1
8 TABLET, ORALLY DISINTEGRATING ORAL EVERY 8 HOURS PRN
Qty: 30 TABLET | Refills: 0 | Status: SHIPPED | OUTPATIENT
Start: 2024-03-02 | End: 2024-03-02

## 2024-03-02 RX ORDER — METOCLOPRAMIDE 5 MG/1
5 TABLET ORAL
Qty: 120 TABLET | Refills: 1 | Status: SHIPPED | OUTPATIENT
Start: 2024-03-02 | End: 2024-03-02

## 2024-03-02 RX ORDER — LIDOCAINE 4 G/G
1 PATCH TOPICAL
Qty: 20 PATCH | Refills: 0 | Status: SHIPPED | OUTPATIENT
Start: 2024-03-03

## 2024-03-02 RX ORDER — HYDROCODONE BITARTRATE AND ACETAMINOPHEN 5; 325 MG/1; MG/1
1 TABLET ORAL EVERY 6 HOURS PRN
Qty: 20 TABLET | Refills: 0 | Status: SHIPPED | OUTPATIENT
Start: 2024-03-02

## 2024-03-02 RX ORDER — POLYETHYLENE GLYCOL 3350 17 G/17G
17 POWDER, FOR SOLUTION ORAL DAILY
Qty: 30 PACKET | Refills: 1 | Status: SHIPPED | OUTPATIENT
Start: 2024-03-02 | End: 2024-03-02

## 2024-03-02 RX ORDER — POLYETHYLENE GLYCOL 3350 17 G/17G
17 POWDER, FOR SOLUTION ORAL DAILY
Qty: 30 PACKET | Refills: 1 | Status: SHIPPED | OUTPATIENT
Start: 2024-03-02

## 2024-03-02 RX ORDER — PANTOPRAZOLE SODIUM 40 MG/1
40 TABLET, DELAYED RELEASE ORAL
Qty: 60 TABLET | Refills: 1 | Status: SHIPPED | OUTPATIENT
Start: 2024-03-02 | End: 2024-03-02

## 2024-03-02 RX ADMIN — IPRATROPIUM BROMIDE AND ALBUTEROL SULFATE 3 ML: .5; 3 SOLUTION RESPIRATORY (INHALATION) at 07:12

## 2024-03-02 RX ADMIN — METOPROLOL SUCCINATE 25 MG: 25 TABLET, EXTENDED RELEASE ORAL at 08:44

## 2024-03-02 RX ADMIN — METOCLOPRAMIDE 5 MG: 5 TABLET ORAL at 08:44

## 2024-03-02 RX ADMIN — DOCUSATE SODIUM 100 MG: 100 CAPSULE, LIQUID FILLED ORAL at 08:44

## 2024-03-02 RX ADMIN — LIDOCAINE 1 PATCH: 4 PATCH TOPICAL at 08:43

## 2024-03-02 RX ADMIN — PANTOPRAZOLE SODIUM 40 MG: 40 TABLET, DELAYED RELEASE ORAL at 08:44

## 2024-03-02 RX ADMIN — METOCLOPRAMIDE 5 MG: 5 TABLET ORAL at 12:16

## 2024-03-02 RX ADMIN — Medication 10 ML: at 08:44

## 2024-03-02 RX ADMIN — BISACODYL 10 MG: 10 SUPPOSITORY RECTAL at 08:44

## 2024-03-02 RX ADMIN — ACETAMINOPHEN 500 MG: 500 TABLET ORAL at 13:08

## 2024-03-02 RX ADMIN — DIGOXIN 125 MCG: 125 TABLET ORAL at 12:16

## 2024-03-02 RX ADMIN — BUDESONIDE 0.5 MG: 0.5 INHALANT RESPIRATORY (INHALATION) at 07:16

## 2024-03-02 NOTE — DISCHARGE SUMMARY
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          Hospital Course    Patient is a 89 y.o. female presented with past medical history of atrial fibs, CAD, GERD, hypertension, constipation and large hiatal hernia.  She presented to the emergency department 2/23 with mild pain that radiates to the upper abdomen and back.  She is known to have a large hiatal hernia, she had pleural effusion and pneumonia.  She was treated with antibiotics and supportive care.  She had a bowel purge.  She has a follow-up with her PCP she has been started on Reglan, protonix, and will have zofran as needed.     Procedures Performed         Consults:   Consults       Date and Time Order Name Status Description    2/26/2024  3:37 PM Inpatient Pulmonology Consult Completed     2/25/2024  2:54 PM Inpatient Infectious Diseases Consult Completed     2/25/2024  8:52 AM Inpatient Cardiology Consult Completed             Pertinent Test Results:    Lab Results (most recent)       Procedure Component Value Units Date/Time    Protime-INR [[id number]]  (Normal) Collected: 03/02/24 0405    Specimen: Blood Updated: 03/02/24 0447     Protime 20.8 Seconds      INR 2.01    Blood Culture - Blood, Arm, Left [[id number]]  (Normal) Collected: 02/26/24 0412    Specimen: Blood from Arm, Left Updated: 03/02/24 0430     Blood Culture No growth at 5 days    Blood Culture - Blood, Arm, Right [[id number]]  (Normal) Collected: 02/26/24 0407    Specimen: Blood from Arm, Right Updated: 03/02/24 0430     Blood Culture No growth at 5 days    Narrative:      Less than  seven (7) mL's of blood was collected.  Insufficient quantity may yield false negative results.    Protime-INR [[id number]]  (Abnormal) Collected: 02/29/24 2330    Specimen: Blood Updated: 03/01/24 0011     Protime 19.1 Seconds      INR 1.83    Extra Tubes [[id number]] Collected: 02/29/24 0320    Specimen: Blood, Venous Line Updated: 02/29/24 0430    Narrative:      The following orders were created for panel order Extra Tubes.  Procedure                               Abnormality         Status                     ---------                               -----------         ------                     Lavender Top[[id number]]                                     Final result                 Please view results for these tests on the individual orders.    Lavender Top [[id number]] Collected: 02/29/24 0320    Specimen: Blood Updated: 02/29/24 0430     Extra Tube hold for add-on     Comment: Auto resulted       Basic Metabolic Panel [[id number]]  (Abnormal) Collected: 02/29/24 0320    Specimen: Blood Updated: 02/29/24 0422     Glucose 98 mg/dL      BUN 16 mg/dL      Creatinine 0.58 mg/dL      Sodium 131 mmol/L      Potassium 4.9 mmol/L      Chloride 94 mmol/L      CO2 31.0 mmol/L      Calcium 8.9 mg/dL      BUN/Creatinine Ratio 27.6     Anion Gap 6.0 mmol/L      eGFR 83.5 mL/min/1.73     Narrative:      GFR Normal >60  Chronic Kidney Disease <60  Kidney Failure <15    The GFR formula is only valid for adults with stable renal function between ages 18 and 70.    CBC & Differential [[id number]]  (Abnormal) Collected: 02/27/24 1311    Specimen: Blood Updated: 02/27/24 1426    Narrative:      The following orders were created for panel order CBC & Differential.  Procedure                               Abnormality         Status                     ---------                               -----------         ------                     CBC Auto Differential[[id number]]        Abnormal            Final result               Scan  Slide[[id number]]                                       Final result                 Please view results for these tests on the individual orders.    CBC Auto Differential [[id number]]  (Abnormal) Collected: 02/27/24 1311    Specimen: Blood Updated: 02/27/24 1426     WBC 8.90 10*3/mm3      RBC 3.77 10*6/mm3      Hemoglobin 12.7 g/dL      Hematocrit 38.4 %      .8 fL      MCH 33.7 pg      MCHC 33.1 g/dL      RDW 14.8 %      RDW-SD 52.1 fl      MPV 9.7 fL      Platelets 170 10*3/mm3     Narrative:      The previously reported component NRBC is no longer being reported. Previous result was 0.2 /100 WBC (Reference Range: 0.0-0.2 /100 WBC) on 2/27/2024 at 1357 EST.    Scan Slide [[id number]] Collected: 02/27/24 1311    Specimen: Blood Updated: 02/27/24 1426     Scan Slide --     Comment: See Manual Differential Results       Manual Differential [[id number]]  (Abnormal) Collected: 02/27/24 1311    Specimen: Blood Updated: 02/27/24 1426     Neutrophil % 66.0 %      Lymphocyte % 16.0 %      Monocyte % 9.0 %      Eosinophil % 2.0 %      Basophil % 1.0 %      Bands %  2.0 %      Myelocyte % 2.0 %      Atypical Lymphocyte % 2.0 %      Neutrophils Absolute 6.05 10*3/mm3      Lymphocytes Absolute 1.60 10*3/mm3      Monocytes Absolute 0.80 10*3/mm3      Eosinophils Absolute 0.18 10*3/mm3      Basophils Absolute 0.09 10*3/mm3      Anisocytosis Slight/1+     Elliptocytes Slight/1+     Macrocytes Slight/1+     Poikilocytes Slight/1+     WBC Morphology Normal     Platelet Morphology Normal    Basic Metabolic Panel [[id number]]  (Abnormal) Collected: 02/27/24 1311    Specimen: Blood Updated: 02/27/24 1408     Glucose 100 mg/dL      BUN 18 mg/dL      Creatinine 0.57 mg/dL      Sodium 135 mmol/L      Potassium 4.8 mmol/L      Chloride 98 mmol/L      CO2 31.0 mmol/L      Calcium 9.0 mg/dL      BUN/Creatinine Ratio 31.6     Anion Gap 6.0 mmol/L      eGFR 83.8 mL/min/1.73     Narrative:      GFR Normal >60  Chronic Kidney Disease  <60  Kidney Failure <15    The GFR formula is only valid for adults with stable renal function between ages 18 and 70.    Legionella Antigen, Urine - Urine, Urine, Clean Catch [[id number]]  (Normal) Collected: 02/26/24 1838    Specimen: Urine, Clean Catch Updated: 02/26/24 1935     LEGIONELLA ANTIGEN, URINE Negative    S. Pneumo Ag Urine or CSF - Urine, Urine, Clean Catch [[id number]]  (Normal) Collected: 02/26/24 1838    Specimen: Urine, Clean Catch Updated: 02/26/24 1935     Strep Pneumo Ag Negative    MRSA Screen, PCR (Inpatient) - Swab, Nares [[id number]]  (Normal) Collected: 02/26/24 1756    Specimen: Swab from Nares Updated: 02/26/24 1923     MRSA PCR No MRSA Detected    Narrative:      The negative predictive value of this diagnostic test is high and should only be used to consider de-escalating anti-MRSA therapy. A positive result may indicate colonization with MRSA and must be correlated clinically.    CBC & Differential [[id number]]  (Abnormal) Collected: 02/26/24 0407    Specimen: Blood from Arm, Right Updated: 02/26/24 0424    Narrative:      The following orders were created for panel order CBC & Differential.  Procedure                               Abnormality         Status                     ---------                               -----------         ------                     CBC Auto Differential[[id number]]        Abnormal            Final result                 Please view results for these tests on the individual orders.    CBC Auto Differential [[id number]]  (Abnormal) Collected: 02/26/24 0407    Specimen: Blood from Arm, Right Updated: 02/26/24 0424     WBC 8.60 10*3/mm3      RBC 3.72 10*6/mm3      Hemoglobin 12.3 g/dL      Hematocrit 38.3 %      .9 fL      MCH 33.0 pg      MCHC 32.0 g/dL      RDW 15.3 %      RDW-SD 58.2 fl      MPV 10.3 fL      Platelets 144 10*3/mm3      Neutrophil % 54.4 %      Lymphocyte % 20.3 %      Monocyte % 15.4 %      Eosinophil % 8.6 %      Basophil % 1.3 %       Neutrophils, Absolute 4.70 10*3/mm3      Lymphocytes, Absolute 1.80 10*3/mm3      Monocytes, Absolute 1.30 10*3/mm3      Eosinophils, Absolute 0.70 10*3/mm3      Basophils, Absolute 0.10 10*3/mm3      nRBC 0.2 /100 WBC     Urine Culture - Urine, Urine, Clean Catch [[id number]]  (Normal) Collected: 02/23/24 1434    Specimen: Urine, Clean Catch Updated: 02/24/24 1947     Urine Culture No growth    Comprehensive Metabolic Panel [[id number]]  (Abnormal) Collected: 02/24/24 0133    Specimen: Blood Updated: 02/24/24 0210     Glucose 111 mg/dL      BUN 21 mg/dL      Creatinine 0.69 mg/dL      Sodium 141 mmol/L      Potassium 4.2 mmol/L      Chloride 100 mmol/L      CO2 31.0 mmol/L      Calcium 8.9 mg/dL      Total Protein 6.6 g/dL      Albumin 4.0 g/dL      ALT (SGPT) 17 U/L      AST (SGOT) 29 U/L      Alkaline Phosphatase 81 U/L      Total Bilirubin 0.9 mg/dL      Globulin 2.6 gm/dL      A/G Ratio 1.5 g/dL      BUN/Creatinine Ratio 30.4     Anion Gap 10.0 mmol/L      eGFR 80.0 mL/min/1.73     Narrative:      GFR Normal >60  Chronic Kidney Disease <60  Kidney Failure <15    The GFR formula is only valid for adults with stable renal function between ages 18 and 70.    CBC (No Diff) [[id number]]  (Abnormal) Collected: 02/24/24 0133    Specimen: Blood Updated: 02/24/24 0146     WBC 9.00 10*3/mm3      RBC 3.83 10*6/mm3      Hemoglobin 12.8 g/dL      Hematocrit 38.4 %      .4 fL      MCH 33.4 pg      MCHC 33.3 g/dL      RDW 15.3 %      RDW-SD 53.8 fl      MPV 9.7 fL      Platelets 169 10*3/mm3     COVID-19 and FLU A/B PCR, 1 HR TAT - Swab, Nasopharynx [[id number]]  (Normal) Collected: 02/23/24 1726    Specimen: Swab from Nasopharynx Updated: 02/23/24 1757     COVID19 Not Detected     Influenza A PCR Not Detected     Influenza B PCR Not Detected    Narrative:      Fact sheet for providers: https://www.fda.gov/media/585581/download    Fact sheet for patients: https://www.fda.gov/media/043826/download    Test performed by PCR.  "   Single High Sensitivity Troponin T [[id number]]  (Abnormal) Collected: 02/23/24 1657    Specimen: Blood Updated: 02/23/24 1732     HS Troponin T 14 ng/L     Narrative:      High Sensitive Troponin T Reference Range:  <14.0 ng/L- Negative Female for AMI  <22.0 ng/L- Negative Male for AMI  >=14 - Abnormal Female indicating possible myocardial injury.  >=22 - Abnormal Male indicating possible myocardial injury.   Clinicians would have to utilize clinical acumen, EKG, Troponin, and serial changes to determine if it is an Acute Myocardial Infarction or myocardial injury due to an underlying chronic condition.         Procalcitonin [[id number]]  (Normal) Collected: 02/23/24 1344    Specimen: Blood Updated: 02/23/24 1526     Procalcitonin <0.02 ng/mL     Narrative:      As a Marker for Sepsis (Non-Neonates):    1. <0.5 ng/mL represents a low risk of severe sepsis and/or septic shock.  2. >2 ng/mL represents a high risk of severe sepsis and/or septic shock.    As a Marker for Lower Respiratory Tract Infections that require antibiotic therapy:    PCT on Admission    Antibiotic Therapy       6-12 Hrs later    >0.5                Strongly Recommended  >0.25 - <0.5        Recommended   0.1 - 0.25          Discouraged              Remeasure/reassess PCT  <0.1                Strongly Discouraged     Remeasure/reassess PCT    As 28 day mortality risk marker: \"Change in Procalcitonin Result\" (>80% or <=80%) if Day 0 (or Day 1) and Day 4 values are available. Refer to http://www.[facility]-pct-calculator.com    Change in PCT <=80%  A decrease of PCT levels below or equal to 80% defines a positive change in PCT test result representing a higher risk for 28-day all-cause mortality of patients diagnosed with severe sepsis for septic shock.    Change in PCT >80%  A decrease of PCT levels of more than 80% defines a negative change in PCT result representing a lower risk for 28-day all-cause mortality of patients diagnosed with severe sepsis " or septic shock.       Single High Sensitivity Troponin T [[id number]]  (Abnormal) Collected: 02/23/24 1344    Specimen: Blood Updated: 02/23/24 1523     HS Troponin T 14 ng/L     Narrative:      High Sensitive Troponin T Reference Range:  <14.0 ng/L- Negative Female for AMI  <22.0 ng/L- Negative Male for AMI  >=14 - Abnormal Female indicating possible myocardial injury.  >=22 - Abnormal Male indicating possible myocardial injury.   Clinicians would have to utilize clinical acumen, EKG, Troponin, and serial changes to determine if it is an Acute Myocardial Infarction or myocardial injury due to an underlying chronic condition.         BNP [[id number]]  (Abnormal) Collected: 02/23/24 1344    Specimen: Blood Updated: 02/23/24 1523     proBNP 3,136.0 pg/mL     Narrative:      This assay is used as an aid in the diagnosis of individuals suspected of having heart failure. It can be used as an aid in the diagnosis of acute decompensated heart failure (ADHF) in patients presenting with signs and symptoms of ADHF to the emergency department (ED). In addition, NT-proBNP of <300 pg/mL indicates ADHF is not likely.    Age Range Result Interpretation  NT-proBNP Concentration (pg/mL:      <50             Positive            >450                   Gray                 300-450                    Negative             <300    50-75           Positive            >900                  Gray                300-900                  Negative            <300      >75             Positive            >1800                  Gray                300-1800                  Negative            <300    Urinalysis, Microscopic Only - Urine, Clean Catch [[id number]]  (Abnormal) Collected: 02/23/24 1434    Specimen: Urine, Clean Catch Updated: 02/23/24 1510     RBC, UA 0-2 /HPF      WBC, UA 3-5 /HPF      Comment: Urine culture not indicated.        Bacteria, UA 2+ /HPF      Squamous Epithelial Cells, UA 3-6 /HPF      Hyaline Casts, UA None Seen /LPF       Methodology Manual Light Microscopy    Urinalysis With Culture If Indicated - Urine, Clean Catch [[id number]]  (Abnormal) Collected: 02/23/24 1434    Specimen: Urine, Clean Catch Updated: 02/23/24 1454     Color, UA Yellow     Appearance, UA Clear     pH, UA <=5.0     Specific Gravity, UA 1.024     Glucose, UA Negative     Ketones, UA Trace     Bilirubin, UA Negative     Blood, UA Negative     Protein, UA Trace     Leuk Esterase, UA Moderate (2+)     Nitrite, UA Negative     Urobilinogen, UA 1.0 E.U./dL    Narrative:      In absence of clinical symptoms, the presence of pyuria, bacteria, and/or nitrites on the urinalysis result does not correlate with infection.    Comprehensive Metabolic Panel [[id number]]  (Abnormal) Collected: 02/23/24 1344    Specimen: Blood Updated: 02/23/24 1428     Glucose 92 mg/dL      BUN 20 mg/dL      Creatinine 0.66 mg/dL      Sodium 140 mmol/L      Potassium 4.7 mmol/L      Comment: Slight hemolysis detected by analyzer. Result may be falsely elevated.        Chloride 103 mmol/L      CO2 29.0 mmol/L      Calcium 9.1 mg/dL      Total Protein 6.6 g/dL      Albumin 4.0 g/dL      ALT (SGPT) 16 U/L      AST (SGOT) 30 U/L      Comment: Slight hemolysis detected by analyzer. Result may be falsely elevated.        Alkaline Phosphatase 78 U/L      Total Bilirubin 1.3 mg/dL      Globulin 2.6 gm/dL      A/G Ratio 1.5 g/dL      BUN/Creatinine Ratio 30.3     Anion Gap 8.0 mmol/L      eGFR 80.9 mL/min/1.73     Narrative:      GFR Normal >60  Chronic Kidney Disease <60  Kidney Failure <15    The GFR formula is only valid for adults with stable renal function between ages 18 and 70.    Lipase [[id number]]  (Normal) Collected: 02/23/24 1344    Specimen: Blood Updated: 02/23/24 1426     Lipase 21 U/L     Manual Differential [[id number]]  (Abnormal) Collected: 02/23/24 1344    Specimen: Blood Updated: 02/23/24 1414     Neutrophil % 48.0 %      Lymphocyte % 23.0 %      Monocyte % 12.0 %      Eosinophil % 3.0 %       Basophil % 1.0 %      Bands %  10.0 %      Myelocyte % 3.0 %      Neutrophils Absolute 4.52 10*3/mm3      Lymphocytes Absolute 1.79 10*3/mm3      Monocytes Absolute 0.94 10*3/mm3      Eosinophils Absolute 0.23 10*3/mm3      Basophils Absolute 0.08 10*3/mm3      Macrocytes Slight/1+     Poikilocytes Slight/1+     Toxic Granulation Slight/1+     Platelet Morphology Normal    Scan Slide [[id number]] Collected: 02/23/24 1344    Specimen: Blood Updated: 02/23/24 1414     Scan Slide --     Comment: See Manual Differential Results                Results for orders placed during the hospital encounter of 02/23/24    Adult Transesophageal Echo (JEFF) W/ Cont if Necessary Per Protocol    Interpretation Summary    Left ventricular ejection fraction appears to be 56 - 60%.    Severe mitral valve regurgitation is present.    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    Clinical correlation required       Condition on Discharge:  Stable    Vital Signs  Temp:  [97.2 °F (36.2 °C)-98.2 °F (36.8 °C)] 98.1 °F (36.7 °C)  Heart Rate:  [67-90] 67  Resp:  [13-18] 15  BP: (115-133)/(59-67) 127/64      Physical Exam  Vitals reviewed.   Constitutional:       Appearance: She is not ill-appearing.   HENT:      Head: Normocephalic and atraumatic.      Right Ear: External ear normal.      Left Ear: External ear normal.      Nose: Nose normal.      Mouth/Throat:      Mouth: Mucous membranes are moist.   Eyes:      General:         Right eye: No discharge.         Left eye: No discharge.   Cardiovascular:      Rate and Rhythm: Normal rate and regular rhythm.      Pulses: Normal pulses.      Heart sounds: Normal heart sounds.   Pulmonary:      Effort: Pulmonary effort is normal.      Breath sounds: Normal breath sounds.   Abdominal:      General: Bowel sounds are normal.      Palpations: Abdomen is soft.    Musculoskeletal:         General: Normal range of motion.      Cervical back: Normal range of motion.   Skin:     General: Skin is warm and dry.      Coloration: Skin is pale.   Neurological:      Mental Status: She is alert and oriented to person, place, and time.   Psychiatric:         Behavior: Behavior normal.              Discharge Disposition  Home or Self Care    Discharge Medications     Discharge Medications        New Medications        Instructions Start Date   HYDROcodone-acetaminophen 5-325 MG per tablet  Commonly known as: NORCO   1 tablet, Oral, Every 6 Hours PRN      Lidocaine 4 %   1 patch, Transdermal, Every 24 Hours Scheduled, Remove & Discard patch within 12 hours or as directed by MD   Start Date: March 3, 2024     metoclopramide 5 MG tablet  Commonly known as: REGLAN   5 mg, Oral, 4 Times Daily Before Meals & Nightly      ondansetron ODT 8 MG disintegrating tablet  Commonly known as: ZOFRAN-ODT   8 mg, Translingual, Every 8 Hours PRN      pantoprazole 40 MG EC tablet  Commonly known as: PROTONIX   40 mg, Oral, 2 Times Daily Before Meals      polyethylene glycol 17 g packet  Commonly known as: MIRALAX   17 g, Oral, Daily             Continue These Medications        Instructions Start Date   ascorbic acid 1000 MG tablet  Commonly known as: VITAMIN C   1 tablet, Oral, Daily      digoxin 125 MCG tablet  Commonly known as: LANOXIN   125 mcg, Oral, Daily      docusate sodium 100 MG capsule  Commonly known as: COLACE   100 mg, Oral, Nightly      furosemide 20 MG tablet  Commonly known as: LASIX   20 mg, Oral, As Needed      latanoprost 0.005 % ophthalmic solution  Commonly known as: XALATAN   1 drop, Right Eye, Nightly      metoprolol succinate XL 25 MG 24 hr tablet  Commonly known as: TOPROL-XL   TAKE 1 TABLET BY MOUTH TWICE A DAY      O2  Commonly known as: OXYGEN   2 L/min, Inhalation, As Needed      potassium chloride 10 MEQ CR tablet  Commonly known as: KLOR-CON   10 mEq, Oral, As Needed, ONLY  WHEN TAKING WATER PILL      Sennosides 25 MG tablet   25 mg, Oral, 2 Times Daily PRN      VITAMIN A PO   1 tablet, Oral, Daily      VITAMIN B COMPLEX PO   1 tablet, Oral, Daily      warfarin 2 MG tablet  Commonly known as: COUMADIN   4 mg, Oral, On Monday and Friday.      warfarin 2 MG tablet  Commonly known as: COUMADIN   2 mg, Oral, On Tues,  Wed, Thurs,  Sat, Sun,      ZINC 15 PO   2 tablets, Oral, Daily, WITH ELDERBERRY--GUMMIES                Discharge Diet:   Diet Instructions       Diet: Regular/House Diet; Regular (IDDSI 7); Thin (IDDSI 0)      Discharge Diet: Regular/House Diet    Texture: Regular (IDDSI 7)    Fluid Consistency: Thin (IDDSI 0)            Activity at Discharge:   Activity Instructions       Gradually Increase Activity Until at Pre-Hospitalization Level              Follow-up Appointments  Future Appointments   Date Time Provider Department [place]   6/4/2024  1:40 PM Aleksey Mathew MD [facility]     Additional Instructions for the Follow-ups that You Need to Schedule       Discharge Follow-up with PCP   As directed       Currently Documented PCP:    Cristal Goodwin MD    PCP Phone Number:    [phone]     Follow Up Details: Follow-up appointment scheduled for 3/13 at 10:40 AM                Test Results Pending at Discharge       ALMA Heard  03/02/24  09:09 EST    Time: Discharge 25 min

## 2024-03-02 NOTE — PROGRESS NOTES
"Pharmacy dosing service  Anticoagulant  Warfarin     Subjective:    Shruthi Amin is a 89 y.o.female being continued on warfarin for Atrial Fibrillation.    INR Goal: 2 - 3  Home medication?:  Warfarin 4mg Mon/Fri and 2mg warfarin all other days  Bridge Therapy Present?:  No  Interacting Medications Evaluation (New/Present/Discontinued): CTX 2/23-2/28, cefdinir 2/28-2/29 (may increase INR)   Additional Contributing Factors: n/a      Assessment/Plan:    Warfarin previously held 2/26 and 2/27 for JEFF completed 2/28. INR today therapeutic. Continue home regimen (i.e. 2 mg today).    Continue to monitor and adjust based on INR.         Date 2/23 2/24 2/25 2/26 2/27 2/28 2/29 3/1 3/2   INR 2.57 2.51 2.23 1.91 1.81 1.77 1.64 1.83 2.01   Dose Missed dose 2mg 2mg  HELD  HELD 4mg 2mg  4 mg  2 mg       Objective:  [Ht: 165.1 cm (65\"); Wt: 49.9 kg (110 lb); BMI: Body mass index is 18.3 kg/m².]    Lab Results   Component Value Date    ALBUMIN 4.0 02/24/2024     Lab Results   Component Value Date    INR 2.01 03/02/2024    INR 1.83 (L) 02/29/2024    INR 1.64 (L) 02/29/2024    PROTIME 20.8 03/02/2024    PROTIME 19.1 (L) 02/29/2024    PROTIME 17.2 (L) 02/29/2024     Lab Results   Component Value Date    HGB 12.7 02/27/2024    HGB 12.3 02/26/2024    HGB 13.0 02/25/2024     Lab Results   Component Value Date    HCT 38.4 02/27/2024    HCT 38.3 02/26/2024    HCT 39.3 02/25/2024       Fanta Lopez, PharmD, BCPS  03/02/24 13:56 EST    "

## 2024-03-02 NOTE — PROGRESS NOTES
"PULMONARY CRITICAL CARE PROGRESS  NOTE      PATIENT IDENTIFICATION:  Name: Shruthi Amin  MRN: [id number]  :  6/10/1928     Age: 89 y.o.  Sex: female    DATE OF Note:  3/2/2024   Referring Physician: Patty Rivera MD                  Subjective:   Feeling better, no SOB, remains on 2 L O2, no chest or abd pain, no bowel or bladder issues     Objective:  tMax 24 hrs: Temp (24hrs), Av.7 °F (36.5 °C), Min:97.2 °F (36.2 °C), Max:98.2 °F (36.8 °C)      Vitals Ranges:   Temp:  [97.2 °F (36.2 °C)-98.2 °F (36.8 °C)] 98.1 °F (36.7 °C)  Heart Rate:  [67-90] 67  Resp:  [13-18] 15  BP: (115-133)/(59-67) 127/64    Intake and Output Last 3 Shifts:   I/O last 3 completed shifts:  In: 120 [P.O.:120]  Out: 600 [Urine:600]    Exam:  /64 (BP Location: Left arm, Patient Position: Lying)   Pulse 67   Temp 98.1 °F (36.7 °C) (Oral)   Resp 15   Ht 165.1 cm (65\")   Wt 49.9 kg (110 lb)   SpO2 99%   BMI 18.30 kg/m²     General Appearance: Alert  HEENT:  Normocephalic, without obvious abnormality. Conjunctivae/corneas clear.  Normal external ear canals. Nares normal, no drainage     Neck:  Supple, symmetrical, trachea midline. No JVD.  Lungs /Chest wall:   Bilateral basal rhonchi, respirations unlabored, symmetrical wall movement.     Heart:  Regular rate and rhythm, systolic murmur PMI left sternal border  Abdomen: Soft, nontender, no masses, no organomegaly.    Extremities: Trace edema, no clubbing or cyanosis        Medications:  bisacodyl, 10 mg, Rectal, Daily  budesonide, 0.5 mg, Nebulization, BID - RT  digoxin, 125 mcg, Oral, Daily  docusate sodium, 100 mg, Oral, BID  ipratropium-albuterol, 3 mL, Nebulization, 4x Daily - RT  latanoprost, 1 drop, Right Eye, Nightly  Lidocaine, 1 patch, Transdermal, Q24H  metoclopramide, 5 mg, Oral, 4x Daily AC & at Bedtime  metoprolol succinate XL, 25 mg, Oral, BID  pantoprazole, 40 mg, Oral, BID AC  sodium chloride, 10 mL, Intravenous, Q12H  warfarin, 2 mg, Oral, Once per day " on Sunday Tuesday Wednesday Thursday Saturday  warfarin, 4 mg, Oral, Once per day on Monday Friday        Infusion:  Pharmacy to dose warfarin,          PRN:    acetaminophen    senna-docusate sodium **AND** polyethylene glycol **AND** bisacodyl **AND** bisacodyl    Calcium Replacement - Follow Nurse / BPA Driven Protocol    HYDROcodone-acetaminophen    Magnesium Cardiology Dose Replacement - Follow Nurse / BPA Driven Protocol    magnesium hydroxide    ondansetron    Pharmacy to dose warfarin    Phosphorus Replacement - Follow Nurse / BPA Driven Protocol    Potassium Replacement - Follow Nurse / BPA Driven Protocol    simethicone    [COMPLETED] Insert Peripheral IV **AND** sodium chloride    sodium chloride    sodium chloride  Data Review:  All labs (24hrs):   Recent Results (from the past 24 hour(s))   Protime-INR    Collection Time: 03/02/24  4:05 AM    Specimen: Blood   Result Value Ref Range    Protime 20.8 19.4 - 28.5 Seconds    INR 2.01 2.00 - 3.00        Imaging:  CT Abdomen Pelvis Without Contrast  Narrative: CT ABDOMEN PELVIS WO CONTRAST    Date of Exam: 3/1/2024 11:19 AM EST    Indication: Nausea/vomiting.    Comparison: CT abdomen and pelvis 3/9/2017    Technique: Axial CT images were obtained of the abdomen and pelvis without the administration of contrast. Sagittal and coronal reconstructions were performed.  Automated exposure control and iterative reconstruction methods were used.    Findings:  LUNG BASES: There is bibasilar airspace disease and pleural effusions right greater than left. There is a large hiatal hernia with the majority of the gastric body in the thoracic cavity. The heart is enlarged. There are    LIVER:  Unremarkable parenchyma without focal lesion.  BILIARY/GALLBLADDER:  Unremarkable  SPLEEN:  Unremarkable  PANCREAS:  Unremarkable  ADRENAL:  Unremarkable  KIDNEYS:  Unremarkable parenchyma with no solid mass identified. No obstruction.  No calculus  identified.  GASTROINTESTINAL/MESENTERY: Evaluation of the gastrointestinal track demonstrates fecal stasis throughout compatible with constipation. There are few scattered diverticula. No evidence for acute inflammatory changes. Free fluid is present in the pelvic   cul-de-sac.  AORTA/IVC: There is calcified atherosclerotic disease.    RETROPERITONEUM/LYMPH NODES: No pathologic lymphadenopathy noted.    REPRODUCTIVE: The uterus is atrophied.  BLADDER:  Unremarkable    OSSEUS STRUCTURES: There is multilevel degenerative disc and facet disease accentuated by lumbar scoliosis. Kyphoplasty is noted at T12. Right hip surgical hardware is noted.  Impression: Impression:    1. Large hiatal hernia with the gastric body in the thoracic cavity.  2. Bibasilar airspace disease and pleural effusions.  3. Constipation.    Electronically Signed: Michelle Fowler MD    3/1/2024 11:44 AM EST    Workstation ID: [id number]       ASSESSMENT:  ? Pneumonia   Pleural effusion  COPD  Back pain  CHF  A-fib  CAD  GERD  Osteoarthritis  Hiatal hernia            PLAN:  May discharge home and follow up in 3 weeks   Monitor intake and encourage to eat more  OOB daily, use IS more   Bronchodilators  Inhaled corticosteroids  Diuresis and monitor QUINTIN's   Monitor effusion for now   Incentive spirometer/Flutter valve  Electrolytes/ glycemic control  No DVT and GI prophylaxis     Discussed with Dr Rancho Mendoza, [facility]   3/2/2024  09:23 EST     I personally have examined  and interviewed the patient. I have reviewed the history, data, problems, assessment and plan with our NP.  Total Critical care time in direct medical management (   ) minutes, This time specifically excludes time spent performing procedures.    Sulma Vickers MD   3/2/2024  09:33 EST

## 2024-03-02 NOTE — PLAN OF CARE
Goal Outcome Evaluation:  Plan of Care Reviewed With: patient        Progress: improving  Outcome Evaluation: Patient had difficulty getting comfortable and falling asleep. Patient wanted to take the Tylenol instead of Norco. Did have a large bowel movement yesterday. Will d/c home when medically stable.

## 2024-03-03 ENCOUNTER — HOME CARE VISIT (OUTPATIENT)
Dept: HOME HEALTH SERVICES | Facility: HOME HEALTHCARE | Age: 89
End: 2024-03-03

## 2024-03-03 NOTE — OUTREACH NOTE
Prep Survey      Flowsheet Row Responses   [facility] facility patient discharged from? [facility]   Is LACE score < 7 ? No   Eligibility Readm Mgmt   Discharge diagnosis Back painpleural effusion and pneumonia.   Does the patient have one of the following disease processes/diagnoses(primary or secondary)? Pneumonia   Does the patient have Home health ordered? Yes   What is the Home health agency?  [facility]   Is there a DME ordered? No   Prep survey completed? Yes            KATHY MARTÍNEZ - Registered Nurse

## 2024-03-04 ENCOUNTER — HOME CARE VISIT (OUTPATIENT)
Dept: HOME HEALTH SERVICES | Facility: HOME HEALTHCARE | Age: 89
End: 2024-03-04
Payer: MEDICARE

## 2024-03-04 VITALS
TEMPERATURE: 97.6 F | HEART RATE: 61 BPM | OXYGEN SATURATION: 93 % | SYSTOLIC BLOOD PRESSURE: 124 MMHG | WEIGHT: 111.6 LBS | BODY MASS INDEX: 18.57 KG/M2 | DIASTOLIC BLOOD PRESSURE: 62 MMHG

## 2024-03-04 PROCEDURE — G0299 HHS/HOSPICE OF RN EA 15 MIN: HCPCS

## 2024-03-04 NOTE — CASE MANAGEMENT/SOCIAL WORK
Case Management Discharge Note      Final Note: Home w/ [facility].    Selected Continued Care - Discharged on 3/2/2024 Admission date: 2/23/2024 - Discharge disposition: Home or Self Care      Home Medical Care Coordination complete.      Service Provider Selected Services Address Phone Fax Patient Preferred    [facility] [address] [facility] [place] [address] [phone]              Transportation Services  Private: Car    Final Discharge Disposition Code: 06 - home with home health care

## 2024-03-05 ENCOUNTER — ANTICOAGULATION VISIT (OUTPATIENT)
Dept: CARDIOLOGY | Facility: CLINIC | Age: 89
End: 2024-03-05
Payer: MEDICARE

## 2024-03-05 ENCOUNTER — TELEPHONE (OUTPATIENT)
Dept: CARDIOLOGY | Facility: CLINIC | Age: 89
End: 2024-03-05
Payer: MEDICARE

## 2024-03-05 ENCOUNTER — HOME CARE VISIT (OUTPATIENT)
Dept: HOME HEALTH SERVICES | Facility: HOME HEALTHCARE | Age: 89
End: 2024-03-05
Payer: MEDICARE

## 2024-03-05 ENCOUNTER — TRANSCRIBE ORDERS (OUTPATIENT)
Dept: ADMINISTRATIVE | Facility: HOSPITAL | Age: 89
End: 2024-03-05
Payer: MEDICARE

## 2024-03-05 DIAGNOSIS — Z79.01 CHRONIC ANTICOAGULATION: ICD-10-CM

## 2024-03-05 DIAGNOSIS — J84.10 LUNG FIBROSIS: ICD-10-CM

## 2024-03-05 DIAGNOSIS — I48.91 ATRIAL FIBRILLATION, UNSPECIFIED TYPE: Primary | Chronic | ICD-10-CM

## 2024-03-05 DIAGNOSIS — R06.02 SHORTNESS OF BREATH: Primary | ICD-10-CM

## 2024-03-05 LAB — INR PPP: 4.2 (ref 2–3)

## 2024-03-05 PROCEDURE — G0151 HHCP-SERV OF PT,EA 15 MIN: HCPCS

## 2024-03-05 NOTE — HOME HEALTH
"Eval Note    Patient's goal(s): \"To get stronger and walk by myself\"    Services required to achieve goals: PT and SN    Potential Issues for goal attainment: None    Problems identified: Generalized weakness with significant decline in functional skills/tolerance post 1 week hospitalization. Patient was independent in indoor ambulation without assistive device prior hospitalization.    Describe the Functional status and safety: Patient needs min assist coming to stand and ambulates about 50ft with rollator walker and min assist, showing slow and discontinous steps    Describe any environmental issues: Patient lives in one-story home with daughter and adult granddaughter.     Any equipment needs: N/A    POC confirmed with patient/daughter/granddaughter on 3/5/23"

## 2024-03-05 NOTE — HOME HEALTH
"SOC Note:KATELIN SEEN 3/4/24 FOR SKILLED NURSES START OF CARE. PATIENT WAS RECENTLYU HOSPITALIZED FOR HEART FAILURE RESULTING IN PNEUMONIA. PATIENT WAS ON O2 PRIOR TO HOSPITALIZATION. NURSING WILL SEE PATIENT 2WK2, 1WK3 FOR MED TEACHING, DISEASE PROCESS TEACHING, AND DISEASE MANAGEMENT.     Home Health ordered for: disciplines PT/SN    Reason for Hosp/Primary Dx/Co-morbidities: HEART FAILURE, UNSPECIFIED    Focus of Care: HEART FAILURE    Patient's goal(s):\" TO GET BETTER\"    Current Functional status/mobility/DME: WALKER, O2    HB status/Living Arrangements: PATIENT LIVES WITH DAUGHTER    Skin Integrity/wound status: NA    Code Status: FULL CODE    Fall Risk/Safety concerns: HIGH RISK    Educated on Emergency Plan, steps to take prior to going to the ER and when to Call Home Health First:  PATIENT EDUCATED TO CALL HH BEFORE GOING TO THE ER    Medication issues/Concerns:SEPERATE NOTE    Additional Problems/Concerns: NA    SDOH Barriers (i.e. caregiver concerns, social isolation, transportation, food insecurity, environment, income etc.)/Need for MSW: NA    Plan for next visit: WEIGH IN, CARDIOPULMONARY ASSESSMENT, GASTROINTESTINAL ASSESSMENT, SKIN ASSESSMENT, SAFETY ASSESSMENT, PAIN ASSESSMENT, MEDICATION ASSESSMENT"

## 2024-03-06 ENCOUNTER — READMISSION MANAGEMENT (OUTPATIENT)
Dept: CALL CENTER | Facility: HOSPITAL | Age: 89
End: 2024-03-06
Payer: MEDICARE

## 2024-03-06 VITALS
RESPIRATION RATE: 18 BRPM | SYSTOLIC BLOOD PRESSURE: 122 MMHG | DIASTOLIC BLOOD PRESSURE: 70 MMHG | OXYGEN SATURATION: 98 % | HEART RATE: 82 BPM

## 2024-03-06 NOTE — OUTREACH NOTE
COPD/PN Week 1 Survey      Flowsheet Row Responses   [facility] facility patient discharged from? [facility]   Does the patient have one of the following disease processes/diagnoses(primary or secondary)? Pneumonia   Week 1 attempt successful? No   Unsuccessful attempts Attempt 1  [All numbers listed were attempted-no answer]            Tierra H - Registered Nurse

## 2024-03-07 ENCOUNTER — HOME CARE VISIT (OUTPATIENT)
Dept: HOME HEALTH SERVICES | Facility: HOME HEALTHCARE | Age: 89
End: 2024-03-07
Payer: MEDICARE

## 2024-03-07 VITALS
OXYGEN SATURATION: 93 % | TEMPERATURE: 97.6 F | RESPIRATION RATE: 18 BRPM | HEART RATE: 83 BPM | DIASTOLIC BLOOD PRESSURE: 60 MMHG | SYSTOLIC BLOOD PRESSURE: 120 MMHG

## 2024-03-07 PROCEDURE — G0162 HHC RN E&M PLAN SVS, 15 MIN: HCPCS

## 2024-03-08 NOTE — HOME HEALTH
Routine Visit Note:  Pt's INR was 4.8 yesterday.  INR is done at home by pt's daughter.  Coumadin dose was decreased to 2mg qd with repeat INR on 3/13.  Pt says she has not had BM since Friday.  SN suggested pt use Miralax everyday and add bisacodyl daily until she has BM.  Pt given note with name of med so daughter can purchase.  Pt did take a dose of lasix yesterday because she noticed edema in right leg.  No edema today.  Pt's weight is 105.9 (last weight was 111 several days ago).  Pt also says she has a poor appetite and is probably not hydrated.  SN suggested that pt drink a supplement once or twice per day.      Skill/education provided: CP assess, CHF ed and management, GI/ assess, Nutrition assess and ed, Med ed and management    Patient/caregiver response: Pt was able to reteach dx s/s to call MD/HH clinician    Plan for next visit: CP assess, CHF ed and management, GI/ assess, Nutrition assess, Med ed and management    Other pertinent info: NA

## 2024-03-11 ENCOUNTER — HOME CARE VISIT (OUTPATIENT)
Dept: HOME HEALTH SERVICES | Facility: HOME HEALTHCARE | Age: 89
End: 2024-03-11
Payer: MEDICARE

## 2024-03-11 VITALS
DIASTOLIC BLOOD PRESSURE: 82 MMHG | SYSTOLIC BLOOD PRESSURE: 132 MMHG | OXYGEN SATURATION: 93 % | TEMPERATURE: 97.9 F | RESPIRATION RATE: 16 BRPM

## 2024-03-11 PROCEDURE — G0162 HHC RN E&M PLAN SVS, 15 MIN: HCPCS

## 2024-03-12 ENCOUNTER — HOME CARE VISIT (OUTPATIENT)
Dept: HOME HEALTH SERVICES | Facility: HOME HEALTHCARE | Age: 89
End: 2024-03-12
Payer: MEDICARE

## 2024-03-12 LAB — INR PPP: 3.4

## 2024-03-12 PROCEDURE — G0157 HHC PT ASSISTANT EA 15: HCPCS

## 2024-03-12 NOTE — HOME HEALTH
Appetite stimulant  Next check up is 3/25    Pressure ulcer duoderm    Shea Nam  [phone],    Routine Visit Note:  Pt has a poor appetite.  Pt encouraged to eat small, frequent meals.  Pt also encouraged to drink high protein supplement.  Duoderm placed on sacrum to prevent pressure ulcer r/t pt's limited mobility.      Skill/education provided: CP assess, Skin assess, Nutrition assess, CHF ed and management, Pressure ulcer prevention ed    Patient/caregiver response: CG able to reteach pressure ulcer prevention strategies    Plan for next visit: CP assess, Skin assess, Nutrition assess, CHF ed and management    Other pertinent info:  Duoderm ordered

## 2024-03-13 ENCOUNTER — ANTICOAGULATION VISIT (OUTPATIENT)
Dept: CARDIOLOGY | Facility: CLINIC | Age: 89
End: 2024-03-13
Payer: MEDICARE

## 2024-03-13 ENCOUNTER — READMISSION MANAGEMENT (OUTPATIENT)
Dept: CALL CENTER | Facility: HOSPITAL | Age: 89
End: 2024-03-13
Payer: MEDICARE

## 2024-03-13 VITALS
HEART RATE: 58 BPM | SYSTOLIC BLOOD PRESSURE: 124 MMHG | DIASTOLIC BLOOD PRESSURE: 76 MMHG | TEMPERATURE: 96.9 F | OXYGEN SATURATION: 96 %

## 2024-03-13 DIAGNOSIS — Z79.01 CHRONIC ANTICOAGULATION: ICD-10-CM

## 2024-03-13 DIAGNOSIS — I48.91 ATRIAL FIBRILLATION, UNSPECIFIED TYPE: Primary | Chronic | ICD-10-CM

## 2024-03-13 NOTE — HOME HEALTH
Routine Visit Note:   Patient was in the living room and said come in.   Patient rated her pain as 3/10.    Skill/education provided: Patient was instructed in therapeutic exercises, safe transfers and ambulation with an appropriate assist device.    Patient/caregiver response: Patient/caregiver understood instructions and performed exercises well.    Plan for next visit:  Advance gait and strengthening exercise as patient pain and tolerance allows.

## 2024-03-13 NOTE — OUTREACH NOTE
COPD/PN Week 2 Survey      Flowsheet Row Responses   [facility] facility patient discharged from? [facility]   Does the patient have one of the following disease processes/diagnoses(primary or secondary)? Pneumonia   Week 2 attempt successful? No   Unsuccessful attempts Attempt 1   Discharge diagnosis Back painpleural effusion and pneumonia.            Pat ARELLANO - Registered Nurse

## 2024-03-13 NOTE — PROGRESS NOTES
Pts INR was still high at 3.4. Pt will hold Warfarin today, then continue current dosage and recheck in a week. Spoke to daughter Luanne. Leonardo

## 2024-03-14 ENCOUNTER — HOME CARE VISIT (OUTPATIENT)
Dept: HOME HEALTH SERVICES | Facility: HOME HEALTHCARE | Age: 89
End: 2024-03-14
Payer: MEDICARE

## 2024-03-14 VITALS
DIASTOLIC BLOOD PRESSURE: 68 MMHG | SYSTOLIC BLOOD PRESSURE: 106 MMHG | OXYGEN SATURATION: 93 % | TEMPERATURE: 97.3 F | HEART RATE: 74 BPM | RESPIRATION RATE: 18 BRPM

## 2024-03-14 PROCEDURE — G0162 HHC RN E&M PLAN SVS, 15 MIN: HCPCS

## 2024-03-14 NOTE — HOME HEALTH
Routine Visit Note:  INR 3.4 on 3/12 per daughter.  Warfarin held 3/12 then 2mg qd and repeat INR on 3/19.  Digoxin held yesterday for HR 50's.  Pt started on remeron for lack of appetite.  Pt reports drowsiness.  SN advised pt to giver herself 1-2 weeks to get used to med and see if drowsiness subsides.      Skill/education provided:  CP assess, Skin assess, Med ed and management, GI/ assess, Nutrition assess and ed    Patient/caregiver response: Pt/CG able to reteach remeron dose, indications, and possible side effects    Plan for next visit: CP assess, Skin assess, Med ed and management, GI/ assess, Nutrition assess and ed    Other pertinent info: NA

## 2024-03-19 ENCOUNTER — HOME CARE VISIT (OUTPATIENT)
Dept: HOME HEALTH SERVICES | Facility: HOME HEALTHCARE | Age: 89
End: 2024-03-19
Payer: MEDICARE

## 2024-03-19 PROCEDURE — G0157 HHC PT ASSISTANT EA 15: HCPCS

## 2024-03-20 ENCOUNTER — TELEPHONE (OUTPATIENT)
Dept: CARDIOLOGY | Facility: CLINIC | Age: 89
End: 2024-03-20
Payer: MEDICARE

## 2024-03-20 ENCOUNTER — ANTICOAGULATION VISIT (OUTPATIENT)
Dept: CARDIOLOGY | Facility: CLINIC | Age: 89
End: 2024-03-20
Payer: MEDICARE

## 2024-03-20 ENCOUNTER — READMISSION MANAGEMENT (OUTPATIENT)
Dept: CALL CENTER | Facility: HOSPITAL | Age: 89
End: 2024-03-20
Payer: MEDICARE

## 2024-03-20 VITALS
TEMPERATURE: 96.9 F | OXYGEN SATURATION: 92 % | SYSTOLIC BLOOD PRESSURE: 126 MMHG | HEART RATE: 89 BPM | DIASTOLIC BLOOD PRESSURE: 78 MMHG

## 2024-03-20 DIAGNOSIS — Z79.01 CHRONIC ANTICOAGULATION: ICD-10-CM

## 2024-03-20 DIAGNOSIS — I48.91 ATRIAL FIBRILLATION, UNSPECIFIED TYPE: Primary | Chronic | ICD-10-CM

## 2024-03-20 LAB — INR PPP: 2.7

## 2024-03-20 NOTE — TELEPHONE ENCOUNTER
Caller: Shea Nam    Relationship: Emergency Contact    Best call back number: [phone]    Who is your current provider: DR. GARZON    Is your current provider offboarding? NO    Who would you like your new provider to be: DR. WHITE    What are your reasons for transferring care: NO AVAILABILITY    Additional notes: PT'S DAUGHTER WANTED TO TRANSFER BECAUSE SHE WASN'T ABLE TO GET IN WITH THE DOCTOR UNTIL JUNE.

## 2024-03-20 NOTE — TELEPHONE ENCOUNTER
Caller: Shea Nam     Relationship: Emergency Contact     Best call back number: [phone]     Who is your current provider: DR. GARZON     Is your current provider offboarding? NO     Who would you like your new provider to be: DR. WHITE     What are your reasons for transferring care: NO AVAILABILITY     Additional notes: PT'S DAUGHTER WANTED TO TRANSFER BECAUSE SHE WASN'T ABLE TO GET IN WITH THE DOCTOR UNTIL JUNE.

## 2024-03-20 NOTE — TELEPHONE ENCOUNTER
Dr. Mathew asked me to reach out to Shea and get patient scheduled for a hospital follow up.     I called Shea and asked her to call me back. Hub please WT to office.

## 2024-03-20 NOTE — OUTREACH NOTE
COPD/PN Week 3 Survey      Flowsheet Row Responses   [facility] [place] patient discharged from? [facility]   Does the patient have one of the following disease processes/diagnoses(primary or secondary)? Pneumonia   Week 3 attempt successful? Yes   Call start time 0829   Call end time 0834   Discharge diagnosis Back painpleural effusion and pneumonia.   Is the patient taking all medications as directed (includes completed medication regime)? Yes   Does the patient have a primary care provider?  Yes   Has the patient kept scheduled appointments due by today? Yes   What is the Home health agency?  [facility]   Has home health visited the patient within 72 hours of discharge? Yes   Pulse Ox monitoring None   Psychosocial issues? No   What is the patient's perception of their health status since discharge? Improving   If the patient is a current smoker, are they able to teach back resources for cessation? Not a smoker   Is the patient/caregiver able to teach back the hierarchy of who to call/visit for symptoms/problems? PCP, Specialist, Home health nurse, Urgent Care, ED, 911 Yes   Additional teach back comments States she is using her oxygen at 2L sometimes when she feels like she need it.  States not all the time.  Does not have a working pulse ox and encourage to get one to keep an eye on her oxygen levels to make sure they are stay at and above 90%   Patient reports what zone on this call? Green Zone   Green Zone Slept well last night, Reports doing well, Breathing without shortness of breath, Usual activity and exercise level   Green Zone interventions: Take daily medications, Use oxygen as prescribed, Continue regular exercise/diet plan, At all times avoid cigarette smoking, vaping and inhaled irritants   Week 3 call completed? Yes   Graduated Yes   Call end time 0834            Ernestina JEFFERSON - Licensed Nurse

## 2024-03-21 ENCOUNTER — HOME CARE VISIT (OUTPATIENT)
Dept: HOME HEALTH SERVICES | Facility: HOME HEALTHCARE | Age: 89
End: 2024-03-21
Payer: MEDICARE

## 2024-03-21 VITALS
OXYGEN SATURATION: 98 % | DIASTOLIC BLOOD PRESSURE: 74 MMHG | TEMPERATURE: 97.5 F | HEART RATE: 67 BPM | RESPIRATION RATE: 16 BRPM | SYSTOLIC BLOOD PRESSURE: 142 MMHG

## 2024-03-21 PROCEDURE — G0162 HHC RN E&M PLAN SVS, 15 MIN: HCPCS

## 2024-03-21 NOTE — HOME HEALTH
Routine Visit Note:    Skill/education provided:  CP assess, Skin assess, Med ed and management, Nutrition assess and ed, Fall prevention strategies    Patient/caregiver response: Pt and CG able to reteach fall prevention strategies    Plan for next visit: CP assess, Skin assess, Med ed and management, Nutrition assess and ed, Fall prevention ed, Pressure ulcer preventione d    Other pertinent info: NA

## 2024-03-22 ENCOUNTER — HOME CARE VISIT (OUTPATIENT)
Dept: HOME HEALTH SERVICES | Facility: HOME HEALTHCARE | Age: 89
End: 2024-03-22
Payer: MEDICARE

## 2024-03-25 ENCOUNTER — OFFICE VISIT (OUTPATIENT)
Dept: CARDIOLOGY | Facility: CLINIC | Age: 89
End: 2024-03-25
Payer: MEDICARE

## 2024-03-25 VITALS
DIASTOLIC BLOOD PRESSURE: 64 MMHG | WEIGHT: 102 LBS | HEIGHT: 62 IN | HEART RATE: 74 BPM | RESPIRATION RATE: 18 BRPM | BODY MASS INDEX: 18.77 KG/M2 | SYSTOLIC BLOOD PRESSURE: 112 MMHG

## 2024-03-25 DIAGNOSIS — I48.91 ATRIAL FIBRILLATION, UNSPECIFIED TYPE: Primary | ICD-10-CM

## 2024-03-25 DIAGNOSIS — I34.0 NONRHEUMATIC MITRAL VALVE REGURGITATION: ICD-10-CM

## 2024-03-25 DIAGNOSIS — I48.11 LONGSTANDING PERSISTENT ATRIAL FIBRILLATION: ICD-10-CM

## 2024-03-25 DIAGNOSIS — Z79.01 CHRONIC ANTICOAGULATION: ICD-10-CM

## 2024-03-25 DIAGNOSIS — E78.00 PURE HYPERCHOLESTEROLEMIA: ICD-10-CM

## 2024-03-25 DIAGNOSIS — I10 PRIMARY HYPERTENSION: ICD-10-CM

## 2024-03-25 DIAGNOSIS — Z79.01 LONG TERM (CURRENT) USE OF ANTICOAGULANTS: ICD-10-CM

## 2024-03-25 RX ORDER — METOPROLOL SUCCINATE 25 MG/1
TABLET, EXTENDED RELEASE ORAL
Qty: 180 TABLET | Refills: 1 | OUTPATIENT
Start: 2024-03-25

## 2024-03-25 NOTE — TELEPHONE ENCOUNTER
Rx Refill Note  Requested Prescriptions     Refused Prescriptions Disp Refills    metoprolol succinate XL (TOPROL-XL) 25 MG 24 hr tablet [Pharmacy Med Name: METOPROLOL SUCC ER 25 MG TAB] 180 tablet 1     Sig: TAKE 1 TABLET BY MOUTH TWICE A DAY      Last office visit with prescribing clinician: 5/9/2023   Last telemedicine visit with prescribing clinician: Visit date not found   Next office visit with prescribing clinician: Visit date not found                         Would you like a call back once the refill request has been completed: [] Yes [] No    If the office needs to give you a call back, can they leave a voicemail: [] Yes [] No    Julianne Ross MA  03/25/24, 07:39 EDT

## 2024-03-26 ENCOUNTER — HOME CARE VISIT (OUTPATIENT)
Dept: HOME HEALTH SERVICES | Facility: HOME HEALTHCARE | Age: 89
End: 2024-03-26
Payer: MEDICARE

## 2024-03-26 ENCOUNTER — ANTICOAGULATION VISIT (OUTPATIENT)
Dept: PHARMACY | Facility: HOSPITAL | Age: 89
End: 2024-03-26
Payer: MEDICARE

## 2024-03-26 DIAGNOSIS — Z79.01 CHRONIC ANTICOAGULATION: ICD-10-CM

## 2024-03-26 DIAGNOSIS — I48.0 PAROXYSMAL ATRIAL FIBRILLATION: Primary | Chronic | ICD-10-CM

## 2024-03-26 LAB — INR PPP: 2.2 (ref 2–3)

## 2024-03-26 PROCEDURE — G0151 HHCP-SERV OF PT,EA 15 MIN: HCPCS

## 2024-03-26 NOTE — PROGRESS NOTES
Anticoagulation Clinic Progress Note - Home INR Testing     INR Goal: 2 - 3  Today's INR: 2.2 (therapeutic). INR result was called in today by Shea, patient's daughter.   Current warfarin dose: warfarin 2 mg PO daily Current total weekly dose = 14 mg per week.     -Of note: Patient is switching from Dr. Mathew to Dr. Vital.  Debbie, nurse, from Dr. Mathew called to transition this patient.  She currently has MD INR and using Coagusense device.      -Patient tested normally on     INR History:      Latest Ref Rng & Units 3/5/2024     4:48 PM 3/12/2024    12:00 AM 3/13/2024     4:37 PM 3/20/2024    12:00 AM 3/20/2024     2:06 PM 3/26/2024    12:00 AM 3/26/2024     2:10 PM   Anticoagulation Monitoring   INR  4.20  3.40  2.70  2.20   INR Date  3/5/2024  3/12/2024  3/20/2024  3/26/2024   INR Goal  2.0-3.0  2.0-3.0  2.0-3.0  2.0-3.0   Trend  Down  Same  Same  Same   Last Week Total  18 mg  14 mg  12 mg  14 mg   Next Week Total  12 mg  12 mg  14 mg  14 mg   Sun  2 mg  2 mg  2 mg  2 mg   Mon  2 mg  2 mg  2 mg  2 mg   Tue  Hold (3/5)  -  2 mg  2 mg   Wed  2 mg  Hold (3/13)  2 mg  2 mg   Thu  2 mg  2 mg  2 mg  2 mg   Fri  2 mg  2 mg  2 mg  2 mg   Sat  2 mg  2 mg  2 mg  2 mg   Historical INR 2.00 - 3.00  3.40      2.70      2.20            This result is from an external source.       Anticoagulation Summary  As of 3/26/2024      INR goal:  2.0-3.0   TTR:  71.2% (4.4 y)   INR used for dosin.20 (3/26/2024)   Warfarin maintenance plan:  2 mg every day   Weekly warfarin total:  14 mg   No change documented:  Ruby Ferrari, PharmD   Plan last modified:  Debbie Enamorado, LPN (3/5/2024)   Next INR check:  2024   Priority:  High   Target end date:  Indefinite    Indications    Atrial fibrillation [I48.91]  Chronic anticoagulation [Z79.01]                 Anticoagulation Episode Summary       INR check location:  Anticoagulation Clinic    Preferred lab:      Send INR reminders to:  [facility] CLINICAL  [facility]    Comments:  Patient Contract:     : 4/23/24, next               Anticoagulation Care Providers       Provider Role Specialty Phone number    Theodore Vital Len Mckay MD Referring Cardiology [phone]            Clinic Interview:   Patient Findings     Negatives:  Signs/symptoms of thrombosis, Signs/symptoms of bleeding,   Laboratory test error suspected, Change in health, Change in alcohol use,   Change in activity, Upcoming invasive procedure, Emergency department   visit, Upcoming dental procedure, Missed doses, Extra doses, Change in   medications, Change in diet/appetite, Hospital admission, Bruising, Other   complaints      Clinical Outcomes     Negatives:  Major bleeding event, Thromboembolic event,   Anticoagulation-related hospital admission, Anticoagulation-related ED   visit, Anticoagulation-related fatality        Current Medications:   Prior to Admission medications    Medication Sig Start Date End Date Taking? Authorizing Provider   digoxin (LANOXIN) 125 MCG tablet TAKE 1 TABLET BY MOUTH DAILY. INDICATIONS: CHRONIC ATRIAL FIBRILLATION 9/25/23   Aleksey Mathew MD   docusate sodium (COLACE) 100 MG capsule Take 1 capsule by mouth Every Night. Indications: Constipation    ProviderMinh MD   furosemide (LASIX) 20 MG tablet Take 1 tablet by mouth As Needed (swelling). Indications: Edema 9/8/23   Aleksey Mathew MD   latanoprost (XALATAN) 0.005 % ophthalmic solution Administer 1 drop to the right eye Every Night. Indications: Wide-Angle Glaucoma    ProviderMinh MD   Lidocaine 4 % Place 1 patch on the skin as directed by provider Daily. Remove & Discard patch within 12 hours or as directed by MD  Patient not taking: Reported on 3/25/2024 3/3/24   Josie Solano, ALMA   magnesium hydroxide (Milk of Magnesia) 400 MG/5ML suspension Take 30 mL by mouth Daily As Needed for Constipation. Indications: Constipation 3/15/24   Minh Bass MD   metoclopramide (REGLAN) 5 MG tablet  Take 1 tablet by mouth 4 (Four) Times a Day Before Meals & at Bedtime. 3/2/24   Josie Solano APRN   metoprolol succinate XL (TOPROL-XL) 25 MG 24 hr tablet TAKE 1 TABLET BY MOUTH TWICE A DAY 23   Aleksey Mathew MD   mirtazapine (REMERON) 15 MG tablet Take 1 tablet by mouth Every Night. Indications: Appetite Stimulant 3/13/24   Minh Bass MD   O2 (OXYGEN) Inhale 2 L/min As Needed. Indications: scar tissue in lungs 22   Minh Bass MD   pantoprazole (PROTONIX) 40 MG EC tablet Take 1 tablet by mouth 2 (Two) Times a Day Before Meals. 3/2/24   Josie Solano APRN   potassium chloride (KLOR-CON) 10 MEQ CR tablet Take 1 tablet by mouth As Needed (as needed when taking water pill). ONLY WHEN TAKING WATER PILL 23   Aleksey Mathew MD   VITAMIN A PO Take 1 tablet by mouth Daily. Indications: suppliment     Minh Bass MD   warfarin (COUMADIN) 2 MG tablet Take 1 tablet by mouth Every Night. Everyday.  Daughter to recheck INR 3/13  Indications: Atrial Fibrillation 3/6/24   Minh Bass MD       Medical history:   Past Medical History:   Diagnosis Date    Atrial fibrillation     Coronary artery disease     Atrial fibrillation    GERD (gastroesophageal reflux disease)     Glaucoma     Hiatal hernia with gastroesophageal reflux     History of osteoporotic pathological fracture     Right intertroch (2019)    Hx of compression fracture of spine     T12 and L1(); T6 (2022)    Hyperlipidemia     Hypertension     Osteoarthritis     Osteoporosis     on prolia(3/21/22)    Stroke     off and on dizziness from the stroke.       Social history:   Social History     Tobacco Use    Smoking status: Former     Current packs/day: 0.00     Types: Cigarettes     Quit date:      Years since quittin.2     Passive exposure: Past    Smokeless tobacco: Never   Substance Use Topics    Alcohol use: No       Allergies:    Penicillins, Codeine, and Latex    CHADS2-VASc score for  atrial fibrillation:  Age >75 (2)  Sex Female (1)  CHF history  Yes (1)  HTN history  Yes (1)  Vascular disease history  No (0)  DM history  No (0)  Stroke/TIA/Thromboembolism history Yes (2)  Total Score 7  Adjusted Stroke Risk (% per year): 7: 9.6%      This patient is managed via a collaborative agreement, pursuant to IC 25-26-16. The signed protocol is kept in the [facility] at [address] [place] [address].    Education: Shruthi Amin has been instructed to call if any changes in medications, doses, concerns, etc. Patient was provided dosing instructions and expressed verbal understanding and has no further questions at this time.    Plan:  1. no change; warfarin 2 mg PO daily  Total weekly dose = 14 mg.   2. INR should be tested weekly and called into clinic.     -Instructed patient's daughter to come to clinic to  Coagusense device and supplies, and she can return the MD INR device.   Will not need training since she already uses same device.Will need to sign patient contract when picking up the device.       Micha Ferrari, PharmD  3/26/2024  14:13 EDT

## 2024-03-28 ENCOUNTER — HOME CARE VISIT (OUTPATIENT)
Dept: HOME HEALTH SERVICES | Facility: HOME HEALTHCARE | Age: 89
End: 2024-03-28
Payer: MEDICARE

## 2024-03-28 VITALS
OXYGEN SATURATION: 98 % | SYSTOLIC BLOOD PRESSURE: 108 MMHG | TEMPERATURE: 97.6 F | DIASTOLIC BLOOD PRESSURE: 62 MMHG | RESPIRATION RATE: 18 BRPM | HEART RATE: 62 BPM

## 2024-03-28 VITALS
RESPIRATION RATE: 16 BRPM | OXYGEN SATURATION: 91 % | WEIGHT: 104 LBS | HEART RATE: 78 BPM | SYSTOLIC BLOOD PRESSURE: 106 MMHG | BODY MASS INDEX: 19.02 KG/M2 | TEMPERATURE: 97 F | DIASTOLIC BLOOD PRESSURE: 62 MMHG

## 2024-03-28 PROCEDURE — G0162 HHC RN E&M PLAN SVS, 15 MIN: HCPCS

## 2024-03-28 NOTE — HOME HEALTH
"Routine Visit Note:  Pt's daughter is changing duoderm to prevent skin breakdown on sacrum.  Pt reports that her appetite has increased and that she gets hungry during the night.  Pt says she doesn't wear her dentures at night.  SN suggested a soft snack at bedside such as a banana.  Pt reports that her daughter is her paid caregiver.  Pt is happy with this arrangement.  Pt saw Dr. Vital on Monday as a new cardiology pt.  Daughter will now call INRs to Dr. Vital's office.  Last INR was 2.6 and no changes were made to coumadin.  Pt still feels like she needs to have a large BM.  Pt reports that she is having small BMs.  Pt took \"colon cleanse\" this am.  SN encouraged hydration.  Pt has gained 3# in 24 hours.  Daughter is going to give a dose of lasix.      Skill/education provided: CP assess, Skin assess, Med ed and management, GI/ assess, Dx ed and management.      Patient/caregiver response: Pt able to reteach fall prevention strategies    Plan for next visit: CP assess, Skin assess, Med ed and management, GI/ assess, Dx ed and management    Other pertinent info: NA"

## 2024-03-28 NOTE — HOME HEALTH
Routine Visit Note:    Skill/education provided: Therapeutic/home exercise program to ble, transfer teaching, and gait training with rollator walker    Patient/caregiver response: Patient denied pain and making good improvement toward meeting sit to stand and indoor ambulation goals with rollator walker    Plan for next visit: Therapuetic/home exercsie program, transfer teaching and gait training with rollator walker    Other pertinent info: PT Reassessment completed this visit.

## 2024-04-01 RX ORDER — METOPROLOL SUCCINATE 25 MG/1
25 TABLET, EXTENDED RELEASE ORAL 2 TIMES DAILY
Qty: 180 TABLET | Refills: 1 | Status: SHIPPED | OUTPATIENT
Start: 2024-04-01

## 2024-04-01 NOTE — TELEPHONE ENCOUNTER
Caller: Shea Nam    Relationship: Emergency Contact    Best call back number:  [phone]    Requested Prescriptions:   Requested Prescriptions     Pending Prescriptions Disp Refills    metoprolol succinate XL (TOPROL-XL) 25 MG 24 hr tablet 180 tablet 1     Sig: Take 1 tablet by mouth 2 (Two) Times a Day. Indications: Atrial Fibrillation        Pharmacy where request should be sent: "[id number] [address] AT [address] [phone] [facility] [phone]      Last office visit with prescribing clinician: 3/25/2024   Last telemedicine visit with prescribing clinician: Visit date not found   Next office visit with prescribing clinician: 6/25/2024     Additional details provided by patient: NEW PATIENT    Does the patient have less than a 3 day supply:  [] Yes  [x] No    Would you like a call back once the refill request has been completed: [] Yes [] No    If the office needs to give you a call back, can they leave a voicemail: [] Yes [] No    Little Whitman Rep   04/01/24 11:49 EDT

## 2024-04-02 ENCOUNTER — HOME CARE VISIT (OUTPATIENT)
Dept: HOME HEALTH SERVICES | Facility: HOME HEALTHCARE | Age: 89
End: 2024-04-02
Payer: MEDICARE

## 2024-04-02 ENCOUNTER — TRANSCRIBE ORDERS (OUTPATIENT)
Dept: ADMINISTRATIVE | Facility: HOSPITAL | Age: 89
End: 2024-04-02
Payer: MEDICARE

## 2024-04-02 ENCOUNTER — ANTICOAGULATION VISIT (OUTPATIENT)
Dept: PHARMACY | Facility: HOSPITAL | Age: 89
End: 2024-04-02
Payer: MEDICARE

## 2024-04-02 DIAGNOSIS — Z79.01 CHRONIC ANTICOAGULATION: ICD-10-CM

## 2024-04-02 DIAGNOSIS — J84.112 FIBROSIS, IDIOPATHIC PULMONARY: Primary | ICD-10-CM

## 2024-04-02 DIAGNOSIS — I48.91 ATRIAL FIBRILLATION, UNSPECIFIED TYPE: Primary | Chronic | ICD-10-CM

## 2024-04-02 LAB — INR PPP: 1.8 (ref 2–3)

## 2024-04-02 PROCEDURE — G0157 HHC PT ASSISTANT EA 15: HCPCS

## 2024-04-02 NOTE — PROGRESS NOTES
Anticoagulation Clinic Progress Note - Home INR Testing     INR Goal: 2 - 3  Today's INR: 1.8 (subtherapeutic). INR result was called in today by Shea (patient's daughter).   Current warfarin dose: warfarin 2 mg PO daily. Current total weekly dose = 14 mg per week.     INR History:      Latest Ref Rng & Units 3/13/2024     4:37 PM 3/20/2024    12:00 AM 3/20/2024     2:06 PM 3/26/2024    12:00 AM 3/26/2024     2:10 PM 2024    12:00 AM 2024     4:19 PM   Anticoagulation Monitoring   INR  3.40  2.70  2.20  1.80   INR Date  3/12/2024  3/20/2024  3/26/2024  2024   INR Goal  2.0-3.0  2.0-3.0  2.0-3.0  2.0-3.0   Trend  Same  Same  Same  Same   Last Week Total  14 mg  12 mg  14 mg  14 mg   Next Week Total  12 mg  14 mg  14 mg  16 mg   Sun  2 mg  2 mg  2 mg  2 mg   Mon  2 mg  2 mg  2 mg  2 mg   Tue  -  2 mg  2 mg  4 mg ()   Wed  Hold (3/13)  2 mg  2 mg  2 mg   Thu  2 mg  2 mg  2 mg  2 mg   Fri  2 mg  2 mg  2 mg  2 mg   Sat  2 mg  2 mg  2 mg  2 mg   Historical INR 2.00 - 3.00  2.70      2.20      1.80            This result is from an external source.       Anticoagulation Summary  As of 2024      INR goal:  2.0-3.0   TTR:  71.1% (4.4 y)   INR used for dosin.80 (2024)   Warfarin maintenance plan:  2 mg every day   Weekly warfarin total:  14 mg   Plan last modified:  Debbie Enamorado LPN (3/5/2024)   Next INR check:  2024   Priority:  High   Target end date:  Indefinite    Indications    Atrial fibrillation [I48.91]  Chronic anticoagulation [Z79.01]                 Anticoagulation Episode Summary       INR check location:  Anticoagulation Clinic    Preferred lab:      Send INR reminders to:  [facility] CLINICAL POOL    Comments:  Patient Contract:     : 24              Anticoagulation Care Providers       Provider Role Specialty Phone number    Theodore Vital MD Referring Cardiology [phone]            Clinic Interview:   Patient Findings     Positives:   Change in diet/appetite (Increased appetite and consumption   (may decrease INR))    Negatives:  Signs/symptoms of thrombosis, Signs/symptoms of bleeding,   Laboratory test error suspected, Change in health, Change in alcohol use,   Change in activity, Upcoming invasive procedure, Emergency department   visit, Upcoming dental procedure, Missed doses, Extra doses, Change in   medications, Hospital admission, Bruising, Other complaints      Clinical Outcomes     Negatives:  Major bleeding event, Thromboembolic event,   Anticoagulation-related hospital admission, Anticoagulation-related ED   visit, Anticoagulation-related fatality        Current Medications:   Prior to Admission medications    Medication Sig Start Date End Date Taking? Authorizing Provider   digoxin (LANOXIN) 125 MCG tablet TAKE 1 TABLET BY MOUTH DAILY. INDICATIONS: CHRONIC ATRIAL FIBRILLATION 9/25/23   Aleksey Mathew MD   docusate sodium (COLACE) 100 MG capsule Take 1 capsule by mouth Every Night. Indications: Constipation    ProviderMinh MD   furosemide (LASIX) 20 MG tablet Take 1 tablet by mouth As Needed (swelling). Indications: Edema 9/8/23   Aleksey Mathew MD   latanoprost (XALATAN) 0.005 % ophthalmic solution Administer 1 drop to the right eye Every Night. Indications: Wide-Angle Glaucoma    ProviderMinh MD   Lidocaine 4 % Place 1 patch on the skin as directed by provider Daily. Remove & Discard patch within 12 hours or as directed by MD  Patient not taking: Reported on 3/25/2024 3/3/24   Josie Solano APRN   magnesium hydroxide (Milk of Magnesia) 400 MG/5ML suspension Take 30 mL by mouth Daily As Needed for Constipation. Indications: Constipation 3/15/24   Minh Bass MD   metoclopramide (REGLAN) 5 MG tablet Take 1 tablet by mouth 4 (Four) Times a Day Before Meals & at Bedtime. 3/2/24   Josie Solano APRN   metoprolol succinate XL (TOPROL-XL) 25 MG 24 hr tablet Take 1 tablet by mouth 2 (Two) Times a Day.  Indications: Atrial Fibrillation 24   Theodore Vital MD   mirtazapine (REMERON) 15 MG tablet Take 1 tablet by mouth Every Night. Indications: Appetite Stimulant 3/13/24   Minh Bass MD   O2 (OXYGEN) Inhale 2 L/min As Needed. Indications: scar tissue in lungs 22   Minh Bass MD   pantoprazole (PROTONIX) 40 MG EC tablet Take 1 tablet by mouth 2 (Two) Times a Day Before Meals. 3/2/24   Josie Solano APRN   potassium chloride (KLOR-CON) 10 MEQ CR tablet Take 1 tablet by mouth As Needed (as needed when taking water pill). ONLY WHEN TAKING WATER PILL 23   Aleksey Mathew MD   VITAMIN A PO Take 1 tablet by mouth Daily. Indications: suppliment     Minh Bass MD   warfarin (COUMADIN) 2 MG tablet Take 1 tablet by mouth Every Night. Everyday.  Daughter to recheck INR 3/13  Indications: Atrial Fibrillation 3/6/24   Minh Bass MD       Medical history:   Past Medical History:   Diagnosis Date    Atrial fibrillation     Coronary artery disease     Atrial fibrillation    GERD (gastroesophageal reflux disease)     Glaucoma     Hiatal hernia with gastroesophageal reflux     History of osteoporotic pathological fracture     Right intertroch (2019)    Hx of compression fracture of spine     T12 and L1(); T6 (2022)    Hyperlipidemia     Hypertension     Osteoarthritis     Osteoporosis     on prolia(3/21/22)    Stroke     off and on dizziness from the stroke.       Social history:   Social History     Tobacco Use    Smoking status: Former     Current packs/day: 0.00     Types: Cigarettes     Quit date:      Years since quittin.3     Passive exposure: Past    Smokeless tobacco: Never   Substance Use Topics    Alcohol use: No       Allergies:    Penicillins, Codeine, and Latex    CHADS2-VASc score for atrial fibrillation:  Age >75 (2)  Sex Female (1)  CHF history  Yes (1)  HTN history  Yes (1)  Vascular disease history  Yes (1)  DM history  No  (0)  Stroke/TIA/Thromboembolism history Yes (2)  Total Score 8  Adjusted Stroke Risk (% per year): 8: 6.7%      This patient is managed via a collaborative agreement, pursuant to IC 25-26-16. The signed protocol is kept in the [facility] at [address] [place] [address].    Education: Shruthi Amin has been instructed to call if any changes in medications, doses, concerns, etc. Patient was provided dosing instructions and expressed verbal understanding and has no further questions at this time.    Plan:  1. Bolus dose of warfarin 4 mg today then warfarin 2 mg PO daily. Total weekly dose of next 7 days = 16 mg (14% increase).   2. INR should be tested in 1 week and called into clinic.     Chel Muñoz, PharmD  4/2/2024  16:20 EDT

## 2024-04-03 VITALS
SYSTOLIC BLOOD PRESSURE: 122 MMHG | TEMPERATURE: 97.6 F | DIASTOLIC BLOOD PRESSURE: 78 MMHG | OXYGEN SATURATION: 99 % | HEART RATE: 57 BPM

## 2024-04-04 ENCOUNTER — HOME CARE VISIT (OUTPATIENT)
Dept: HOME HEALTH SERVICES | Facility: HOME HEALTHCARE | Age: 89
End: 2024-04-04
Payer: MEDICARE

## 2024-04-04 VITALS
DIASTOLIC BLOOD PRESSURE: 60 MMHG | TEMPERATURE: 97.8 F | BODY MASS INDEX: 17.92 KG/M2 | SYSTOLIC BLOOD PRESSURE: 92 MMHG | RESPIRATION RATE: 18 BRPM | OXYGEN SATURATION: 96 % | HEART RATE: 64 BPM | WEIGHT: 98 LBS

## 2024-04-04 PROCEDURE — G0162 HHC RN E&M PLAN SVS, 15 MIN: HCPCS

## 2024-04-04 NOTE — HOME HEALTH
Routine Visit Note:    Skill/education provided: CP assess, Skin assess, Med ed and management, Dx ed and management    Patient/caregiver response: Pt able to reteach s/s to call HH clinician    Plan for next visit: CP assess, Skin assess, Med ed and management, Dx ed and management    Other pertinent info: NA

## 2024-04-09 ENCOUNTER — HOSPITAL ENCOUNTER (OUTPATIENT)
Dept: RESPIRATORY THERAPY | Facility: HOSPITAL | Age: 89
Discharge: HOME | End: 2024-04-09
Payer: MEDICARE

## 2024-04-09 VITALS — RESPIRATION RATE: 20 BRPM | OXYGEN SATURATION: 92 % | HEART RATE: 98 BPM

## 2024-04-09 DIAGNOSIS — J84.112 FIBROSIS, IDIOPATHIC PULMONARY: ICD-10-CM

## 2024-04-09 PROCEDURE — 94640 AIRWAY INHALATION TREATMENT: CPT

## 2024-04-09 PROCEDURE — A9270 NON-COVERED ITEM OR SERVICE: HCPCS | Performed by: INTERNAL MEDICINE

## 2024-04-09 PROCEDURE — 94060 EVALUATION OF WHEEZING: CPT

## 2024-04-09 PROCEDURE — 94760 N-INVAS EAR/PLS OXIMETRY 1: CPT

## 2024-04-09 PROCEDURE — 63710000001 ALBUTEROL SULFATE HFA 108 (90 BASE) MCG/ACT AEROSOL SOLUTION 6.7 G INHALER: Performed by: INTERNAL MEDICINE

## 2024-04-09 PROCEDURE — 94799 UNLISTED PULMONARY SVC/PX: CPT

## 2024-04-09 PROCEDURE — 94664 DEMO&/EVAL PT USE INHALER: CPT

## 2024-04-09 RX ORDER — ALBUTEROL SULFATE 90 UG/1
2 AEROSOL, METERED RESPIRATORY (INHALATION) ONCE
Status: COMPLETED | OUTPATIENT
Start: 2024-04-09 | End: 2024-04-09

## 2024-04-09 RX ADMIN — ALBUTEROL SULFATE 2 PUFF: 108 AEROSOL, METERED RESPIRATORY (INHALATION) at 16:42

## 2024-04-10 ENCOUNTER — ANTICOAGULATION VISIT (OUTPATIENT)
Dept: PHARMACY | Facility: HOSPITAL | Age: 89
End: 2024-04-10
Payer: MEDICARE

## 2024-04-10 ENCOUNTER — HOME CARE VISIT (OUTPATIENT)
Dept: HOME HEALTH SERVICES | Facility: HOME HEALTHCARE | Age: 89
End: 2024-04-10
Payer: MEDICARE

## 2024-04-10 VITALS
TEMPERATURE: 97.7 F | DIASTOLIC BLOOD PRESSURE: 62 MMHG | SYSTOLIC BLOOD PRESSURE: 140 MMHG | HEART RATE: 60 BPM | WEIGHT: 100 LBS | OXYGEN SATURATION: 94 % | RESPIRATION RATE: 18 BRPM | BODY MASS INDEX: 18.29 KG/M2

## 2024-04-10 DIAGNOSIS — Z79.01 CHRONIC ANTICOAGULATION: Primary | ICD-10-CM

## 2024-04-10 DIAGNOSIS — I48.91 ATRIAL FIBRILLATION, UNSPECIFIED TYPE: Chronic | ICD-10-CM

## 2024-04-10 LAB — INR PPP: 1.7

## 2024-04-10 PROCEDURE — G0162 HHC RN E&M PLAN SVS, 15 MIN: HCPCS

## 2024-04-10 NOTE — HOME HEALTH
"Routine Visit Note:  Pt's INR yesterday was 1.7.  Daughter called results to MD.  Pt missed Sunday dose.  Pt reports that she takes dose when she eats dinner, but they eat at different times and she forgets sometimes.  SN suggested pt set an alarm on her phone for that time to help her remember.  Pt and daughter agreed.  Pt's daughter called SN yesterday and was tearful because daughter reported that she was trying to encourage her mom to do things for herself to maintain pt's independence, and pt now thought daughter did not want to care for her.  SN discussed importance of pt doing as many things for herself as possible.  Pt agreed.  Pt had PFTs yesterday, but was unable to complete testing because of SOB.  P>t reports that she hasn't needed her supplemental oxygen very much this week and has maintained an SpO2 >90%.  Pt also reports that she feels constipated.  Pt does not want to take Miralax because it is \"antifreeze\".  Pt's son has ordered something from the internet but is unsure of what the name is.    Skill/education provided: CP assess, Skin assess, Med ed and management, Psychosocial assess, Dx ed and management    Patient/caregiver response: Pt able to reteach bleeding precautions    Plan for next visit: CP assess, Skin assess, Med ed and management, Psychosocial assess, Dx ed and management    Other pertinent info: NA"

## 2024-04-10 NOTE — PROGRESS NOTES
Anticoagulation Clinic Progress Note - Home INR Testing     INR Goal: 2 - 3  Today's INR: 1.7 (subtherapeutic). INR result was called in today by Shea (daughter).   Current warfarin dose: Bolus of warfarin 4 mg on  then warfarin 2 mg PO daily. However, patient missed dose on . Total weekly dose of past 7 days = 12 mg (12.5% decrease from instructed dose of 16 mg).    Patient's daughter states patient has not experienced any signs/symptoms of a stroke.    INR History:      3/20/2024     2:06 PM 3/26/2024    12:00 AM 3/26/2024     2:10 PM 2024    12:00 AM 2024     4:19 PM 4/10/2024    12:00 AM 4/10/2024    12:20 PM   Anticoagulation Monitoring   INR 2.70  2.20  1.80  1.70   INR Date 3/20/2024  3/26/2024  2024  4/10/2024   INR Goal 2.0-3.0  2.0-3.0  2.0-3.0  2.0-3.0   Trend Same  Same  Same  Same   Last Week Total 12 mg  14 mg  14 mg  12 mg   Next Week Total 14 mg  14 mg  16 mg  16 mg   Sun 2 mg  2 mg  2 mg  2 mg   Mon 2 mg  2 mg  2 mg  2 mg   Tue 2 mg  2 mg  4 mg ()  2 mg   Wed 2 mg  2 mg  2 mg  4 mg (4/10)   Thu 2 mg  2 mg  2 mg  2 mg   Fri 2 mg  2 mg  2 mg  2 mg   Sat 2 mg  2 mg  2 mg  2 mg   Historical INR  2.20      1.80      1.70            This result is from an external source.       Anticoagulation Summary  As of 4/10/2024      INR goal:  2.0-3.0   TTR:  70.8% (4.5 y)   INR used for dosin.70 (4/10/2024)   Warfarin maintenance plan:  2 mg every day   Weekly warfarin total:  14 mg   Plan last modified:  Debbie Enamorado LPN (3/5/2024)   Next INR check:  2024   Priority:  High   Target end date:  Indefinite    Indications    Atrial fibrillation [I48.91]  Chronic anticoagulation [Z79.01]                 Anticoagulation Episode Summary       INR check location:  Anticoagulation Clinic    Preferred lab:      Send INR reminders to:  [facility] CLINICAL POOL    Comments:  Patient Contract:     : 24              Anticoagulation Care Providers       Provider Role  Specialty Phone number    Theodore Vital Len Mckay MD Referring Cardiology [phone]            Clinic Interview:     Clinical Outcomes    Negatives: Major bleeding event, Thromboembolic event, Anticoagulation-related hospital admission, Anticoagulation-related ED visit, Anticoagulation-related fatality     Patient Findings    Positives: Missed doses (Patient missed dose on 4/7 (likely reflected in today's INR)), Change in diet/appetite (Appetite continues to be increased (could decrease INR))   Negatives: Signs/symptoms of thrombosis, Signs/symptoms of bleeding, Laboratory test error suspected, Change in health, Change in alcohol use, Change in activity, Upcoming invasive procedure, Emergency department visit, Upcoming dental procedure, Extra doses, Change in medications, Hospital admission, Bruising, Other complaints          Current Medications:   Prior to Admission medications    Medication Sig Start Date End Date Taking? Authorizing Provider   digoxin (LANOXIN) 125 MCG tablet TAKE 1 TABLET BY MOUTH DAILY. INDICATIONS: CHRONIC ATRIAL FIBRILLATION 9/25/23   Aleksey Mathew MD   docusate sodium (COLACE) 100 MG capsule Take 1 capsule by mouth Every Night. Indications: Constipation    ProviderMinh MD   furosemide (LASIX) 20 MG tablet Take 1 tablet by mouth As Needed (swelling). Indications: Edema 9/8/23   Aleksey Mathew MD   latanoprost (XALATAN) 0.005 % ophthalmic solution Administer 1 drop to the right eye Every Night. Indications: Wide-Angle Glaucoma    Minh Bass MD   Lidocaine 4 % Place 1 patch on the skin as directed by provider Daily. Remove & Discard patch within 12 hours or as directed by MD  Patient not taking: Reported on 3/25/2024 3/3/24   Josie Solano, [place]   magnesium hydroxide (Milk of Magnesia) 400 MG/5ML suspension Take 30 mL by mouth Daily As Needed for Constipation. Indications: Constipation 3/15/24   Minh Bass MD   metoclopramide (REGLAN) 5 MG tablet Take 1  tablet by mouth 4 (Four) Times a Day Before Meals & at Bedtime. 3/2/24   Josie Solano APRN   metoprolol succinate XL (TOPROL-XL) 25 MG 24 hr tablet Take 1 tablet by mouth 2 (Two) Times a Day. Indications: Atrial Fibrillation 24   Theodore Vital MD   mirtazapine (REMERON) 15 MG tablet Take 1 tablet by mouth Every Night. Indications: Appetite Stimulant 3/13/24   Minh Bass MD   O2 (OXYGEN) Inhale 2 L/min As Needed. Indications: scar tissue in lungs 22   Minh Bass MD   pantoprazole (PROTONIX) 40 MG EC tablet Take 1 tablet by mouth 2 (Two) Times a Day Before Meals. 3/2/24   Josie Solano APRN   potassium chloride (KLOR-CON) 10 MEQ CR tablet Take 1 tablet by mouth As Needed (as needed when taking water pill). ONLY WHEN TAKING WATER PILL 23   Aleksey Mathew MD   VITAMIN A PO Take 1 tablet by mouth Daily. Indications: suppliment     Minh Bass MD   warfarin (COUMADIN) 2 MG tablet Take 1 tablet by mouth Every Night. Everyday.  Daughter to recheck INR 3/13  Indications: Atrial Fibrillation 3/6/24   Minh Bass MD       Medical history:   Past Medical History:   Diagnosis Date    Atrial fibrillation     Coronary artery disease     Atrial fibrillation    GERD (gastroesophageal reflux disease)     Glaucoma     Hiatal hernia with gastroesophageal reflux     History of osteoporotic pathological fracture     Right intertroch (2019)    Hx of compression fracture of spine     T12 and L1(); T6 (2022)    Hyperlipidemia     Hypertension     Osteoarthritis     Osteoporosis     on prolia(3/21/22)    Stroke     off and on dizziness from the stroke.       Social history:   Social History     Tobacco Use    Smoking status: Former     Current packs/day: 0.00     Types: Cigarettes     Quit date:      Years since quittin.3     Passive exposure: Past    Smokeless tobacco: Never   Substance Use Topics    Alcohol use: No       Allergies:    Penicillins,  Codeine, and Latex    CHADS2-VASc score for atrial fibrillation:  Age >75 (2)  Sex Female (1)  CHF history  Yes (1)  HTN history  Yes (1)  Vascular disease history  Yes (1)  DM history  No (0)  Stroke/TIA/Thromboembolism history Yes (2)  Total Score 8  Adjusted Stroke Risk (% per year): 8: 6.7%      This patient is managed via a collaborative agreement, pursuant to [id number]. The signed protocol is kept in the [facility] at [address] [place] [address].    Education: Shruthi Amin has been instructed to call if any changes in medications, doses, concerns, etc. Patient was provided dosing instructions and expressed verbal understanding and has no further questions at this time.    Plan:  1. Bolus dose of warfarin 4 mg x 1 today (4/10) then resume maintenance regimen; warfarin 2 mg PO daily. Total mg strength for next 7 days = 16 mg (33.3% increase from previous 7 days)  - Education provided to patient's daughter to monitor and report any signs/symptoms of stroke from patient.  2. INR should be tested in 1 week and called into clinic.     Matthew Cooley, Pharmacy Intern  4/10/2024  12:41 EDT

## 2024-04-10 NOTE — PROGRESS NOTES
I have reviewed the notes, assessments, and/or procedures performed by Matthew Cooley, pharmacy intern. I concur with his documentation of Shruthi Amin.

## 2024-04-11 ENCOUNTER — HOME CARE VISIT (OUTPATIENT)
Dept: HOME HEALTH SERVICES | Facility: HOME HEALTHCARE | Age: 89
End: 2024-04-11
Payer: MEDICARE

## 2024-04-11 PROCEDURE — G0151 HHCP-SERV OF PT,EA 15 MIN: HCPCS

## 2024-04-11 NOTE — PROGRESS NOTES
Cardiology Clinic Note  Theodore Vital MD, PhD    Subjective:     Encounter Date:03/25/2024      Patient ID: Shruthi Amin is a 89 y.o. female.    Chief Complaint:  Chief Complaint   Patient presents with    Atrial Fibrillation       HPI:    I had the pleasure to see this 89-year-old female wishing to switch care to our clinic.  History of stroke 2010, hypertension hyperlipidemia, underlying atrial fibrillation, history of coronary artery disease remotely.  Recently with transesophageal echo demonstrating EF of 55 to 60% with severe mitral valve regurgitation Limited view secondary to body habitus, calcified mass in the aortic valve which could be fibroblastoma or healed vegetation.  They are presenting with questions regarding this device.  We did discuss that history of stroke and usually fibroblastoma would be removed however it 89 years old advancing age would recommend more likely anticoagulation rather than open heart surgery for this which is likely been there over a long period of time.  There was also severe tricuspid valve regurgitation with severe biatrial enlargement secondary to longstanding atrial fibrillation.  Blood pressures are okay at 1 10-1 20 systolic.  She is asymptomatic with no chest pain or shortness of breath.  She has moderate frailty with weight of 100 pounds.  No recent fevers chills sweats nausea vomiting or diarrhea, no history of bacteremia     review of systems otherwise negative x 14 point review of systems except as mentioned above    Historical data copied forward from previous encounters in EMR including the history, exam, and assessment/plan has been reviewed and is unchanged unless noted otherwise.    Cardiac medicines reviewed with risk, benefits, and necessity of each discussed.    Risk and benefit of cardiac testing reviewed including death heart attack stroke pain bleeding infection need for vascular /cardiovascular surgery were discussed and the patient     Objective:  "        /64 (BP Location: Right arm, Patient Position: Sitting)   Pulse 74   Resp 18   Ht 157.5 cm (62\")   Wt 46.3 kg (102 lb)   BMI 18.66 kg/m²     Physical Exam  Irregularly irregular, no rubs gallops heave or lift  2 out of 6 stock ejection murmur left and right sternal borders  No clubbing cyanosis or edema  Positive JVD HJR with tricuspid V wave  Radial pulses equal bilaterally  Skin warm and dry  Intact grossly  Appears stated age    Assessment:       Manage medical conditions today independently    Chronic persistent atrial fibrillation  On digoxin for rate control  Coumadin INR goal 2-3    Heart failure preserved ejection fraction secondary to valvular heart disease and chronic atrial fibrillation  Lasix as needed for swelling  Metoprolol on board    Remote coronary artery disease, asymptomatic  Secondary prevention    Essential hypertension, well-controlled  History of hyperlipidemia, off meds at this point agree    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  Please call with any questions or concerns  Theodore Vital MD, PhD    Most recent EKG as reviewed and interpreted by me:  Procedures     Most recent echo as reviewed and interpreted by me:  Results for orders placed during the hospital encounter of 02/23/24    Adult Transesophageal Echo (JEFF) W/ Cont if Necessary Per Protocol    Interpretation Summary    Left ventricular ejection fraction appears to be 56 - 60%.    Severe mitral valve regurgitation is present.    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    Clinical correlation required      Most recent stress test as reviewed and " interpreted by me:      Most recent cardiac catheterization as reviewed interpreted by me:  No results found for this or any previous visit.    The following portions of the patient's history were reviewed and updated as appropriate: allergies, current medications, past family history, past medical history, past social history, past surgical history, and problem list.      ROS:  14 point review of systems negative except as mentioned above    Current Outpatient Medications:     digoxin (LANOXIN) 125 MCG tablet, TAKE 1 TABLET BY MOUTH DAILY. INDICATIONS: CHRONIC ATRIAL FIBRILLATION, Disp: 90 tablet, Rfl: 1    docusate sodium (COLACE) 100 MG capsule, Take 1 capsule by mouth Every Night. Indications: Constipation, Disp: , Rfl:     furosemide (LASIX) 20 MG tablet, Take 1 tablet by mouth As Needed (swelling). Indications: Edema, Disp: 90 tablet, Rfl: 2    latanoprost (XALATAN) 0.005 % ophthalmic solution, Administer 1 drop to the right eye Every Night. Indications: Wide-Angle Glaucoma, Disp: , Rfl:     magnesium hydroxide (Milk of Magnesia) 400 MG/5ML suspension, Take 30 mL by mouth Daily As Needed for Constipation. Indications: Constipation, Disp: , Rfl:     metoclopramide (REGLAN) 5 MG tablet, Take 1 tablet by mouth 4 (Four) Times a Day Before Meals & at Bedtime., Disp: 120 tablet, Rfl: 1    mirtazapine (REMERON) 15 MG tablet, Take 1 tablet by mouth Every Night. Indications: Appetite Stimulant, Disp: , Rfl:     O2 (OXYGEN), Inhale 2 L/min As Needed. Indications: scar tissue in lungs, Disp: , Rfl:     pantoprazole (PROTONIX) 40 MG EC tablet, Take 1 tablet by mouth 2 (Two) Times a Day Before Meals., Disp: 60 tablet, Rfl: 1    potassium chloride (KLOR-CON) 10 MEQ CR tablet, Take 1 tablet by mouth As Needed (as needed when taking water pill). ONLY WHEN TAKING WATER PILL, Disp: 90 tablet, Rfl: 1    VITAMIN A PO, Take 1 tablet by mouth Daily. Indications: suppliment , Disp: , Rfl:     warfarin (COUMADIN) 2 MG tablet, Take 1  tablet by mouth Every Night. Everyday.  Daughter to recheck INR 3/13  Indications: Atrial Fibrillation, Disp: , Rfl:     Lidocaine 4 %, Place 1 patch on the skin as directed by provider Daily. Remove & Discard patch within 12 hours or as directed by MD (Patient not taking: Reported on 3/25/2024), Disp: 20 patch, Rfl: 0    metoprolol succinate XL (TOPROL-XL) 25 MG 24 hr tablet, Take 1 tablet by mouth 2 (Two) Times a Day. Indications: Atrial Fibrillation, Disp: 180 tablet, Rfl: 1    Problem List:  Patient Active Problem List   Diagnosis    Atrial fibrillation    Chronic anticoagulation    Cerebrovascular accident (CVA)    Hiatal hernia    Hyperlipidemia    Hypertension    Osteoarthritis    Compression fracture of thoracic vertebra with delayed healing    History of osteoporotic pathological fracture    Acute on chronic congestive heart failure, unspecified heart failure type    Severe malnutrition    Nonrheumatic mitral valve regurgitation    Back pain     Past Medical History:  Past Medical History:   Diagnosis Date    Atrial fibrillation     Coronary artery disease     Atrial fibrillation    GERD (gastroesophageal reflux disease)     Glaucoma     Hiatal hernia with gastroesophageal reflux     History of osteoporotic pathological fracture     Right intertroch (7/2019)    Hx of compression fracture of spine     T12 and L1(2013); T6 (1/2022)    Hyperlipidemia     Hypertension     Osteoarthritis     Osteoporosis     on prolia(3/21/22)    Stroke     off and on dizziness from the stroke.     Past Surgical History:  Past Surgical History:   Procedure Laterality Date    BACK SURGERY      kyphoplasty    EYE SURGERY      cataract surgery    FIXATION KYPHOPLASTY LUMBAR SPINE  2013    HIP TROCHANTERIC NAILING WITH INTRAMEDULLARY HIP SCREW Right 7/20/2019    Procedure: HIP TROCHANTERIC NAILING SHORT WITH INTRAMEDULLARY HIP SCREW;  Surgeon: Edward Centeno MD;  Location: [facility] MAIN OR;  Service: Orthopedics    RECTAL  SURGERY      x 3     Social History:  Social History     Socioeconomic History    Marital status:    Tobacco Use    Smoking status: Former     Current packs/day: 0.00     Types: Cigarettes     Quit date: 1948     Years since quittin.3     Passive exposure: Past    Smokeless tobacco: Never   Vaping Use    Vaping status: Never Used   Substance and Sexual Activity    Alcohol use: No    Drug use: No    Sexual activity: Defer     Allergies:  Allergies   Allergen Reactions    Penicillins Hives    Codeine Nausea And Vomiting    Latex Rash     Immunizations:  Immunization History   Administered Date(s) Administered    Shingrix 2020, 2020    Tdap 2022            In-Office Procedure(s):  No orders to display        ASCVD RIsk Score::  The ASCVD Risk score (Dania SCOTT, et al., 2019) failed to calculate for the following reasons:    The 2019 ASCVD risk score is only valid for ages 40 to 79    The patient has a prior MI or stroke diagnosis    Imaging:    Results for orders placed during the hospital encounter of 24    XR Abdomen KUB    Narrative  XR ABDOMEN KUB    Date of Exam: 2024 1:58 PM EST    Indication: Left upper quadrant abdominal pain. Constipation.    Comparison: CT abdomen and pelvis 3/9/2017    Findings: Moderate gas distention of multiple clustered bowel loops in the mid to lower abdomen, favored to represent colon. No pneumatosis or gross free intraperitoneal air. No large retained colonic stool burden. Thoracolumbar scoliotic curvature with  lower thoracic vertebroplasty. Dense opacity in the lung bases thought to correspond to patient's known large esophageal hiatal hernia. Right hip open reduction internal fixation changes.    Impression  1. No large retained colonic stool burden.  2. Moderate gas distention of clustered bowel loops in the mid to lower abdomen, favored to represent colonic distention, is nonspecific.  If patient's symptoms persist, CT abdomen would be  recommended      Electronically Signed: Sonia España MD  2/29/2024 2:33 PM EST  Workstation ID: [id number]       Results for orders placed during the hospital encounter of 02/23/24    CT Abdomen Pelvis Without Contrast    Narrative  CT ABDOMEN PELVIS WO CONTRAST    Date of Exam: 3/1/2024 11:19 AM EST    Indication: Nausea/vomiting.    Comparison: CT abdomen and pelvis 3/9/2017    Technique: Axial CT images were obtained of the abdomen and pelvis without the administration of contrast. Sagittal and coronal reconstructions were performed.  Automated exposure control and iterative reconstruction methods were used.      Findings:  LUNG BASES: There is bibasilar airspace disease and pleural effusions right greater than left. There is a large hiatal hernia with the majority of the gastric body in the thoracic cavity. The heart is enlarged. There are    LIVER:  Unremarkable parenchyma without focal lesion.  BILIARY/GALLBLADDER:  Unremarkable  SPLEEN:  Unremarkable  PANCREAS:  Unremarkable  ADRENAL:  Unremarkable  KIDNEYS:  Unremarkable parenchyma with no solid mass identified. No obstruction.  No calculus identified.  GASTROINTESTINAL/MESENTERY: Evaluation of the gastrointestinal track demonstrates fecal stasis throughout compatible with constipation. There are few scattered diverticula. No evidence for acute inflammatory changes. Free fluid is present in the pelvic  cul-de-sac.  AORTA/IVC: There is calcified atherosclerotic disease.    RETROPERITONEUM/LYMPH NODES: No pathologic lymphadenopathy noted.    REPRODUCTIVE: The uterus is atrophied.  BLADDER:  Unremarkable    OSSEUS STRUCTURES: There is multilevel degenerative disc and facet disease accentuated by lumbar scoliosis. Kyphoplasty is noted at T12. Right hip surgical hardware is noted.    Impression  Impression:    1. Large hiatal hernia with the gastric body in the thoracic cavity.  2. Bibasilar airspace disease and pleural effusions.  3.  Constipation.            Electronically Signed: Michelle Fowler MD  3/1/2024 11:44 AM EST  Workstation ID: [id number]      Results for orders placed during the hospital encounter of 02/23/24    CT Abdomen Pelvis Without Contrast    Narrative  CT ABDOMEN PELVIS WO CONTRAST    Date of Exam: 3/1/2024 11:19 AM EST    Indication: Nausea/vomiting.    Comparison: CT abdomen and pelvis 3/9/2017    Technique: Axial CT images were obtained of the abdomen and pelvis without the administration of contrast. Sagittal and coronal reconstructions were performed.  Automated exposure control and iterative reconstruction methods were used.      Findings:  LUNG BASES: There is bibasilar airspace disease and pleural effusions right greater than left. There is a large hiatal hernia with the majority of the gastric body in the thoracic cavity. The heart is enlarged. There are    LIVER:  Unremarkable parenchyma without focal lesion.  BILIARY/GALLBLADDER:  Unremarkable  SPLEEN:  Unremarkable  PANCREAS:  Unremarkable  ADRENAL:  Unremarkable  KIDNEYS:  Unremarkable parenchyma with no solid mass identified. No obstruction.  No calculus identified.  GASTROINTESTINAL/MESENTERY: Evaluation of the gastrointestinal track demonstrates fecal stasis throughout compatible with constipation. There are few scattered diverticula. No evidence for acute inflammatory changes. Free fluid is present in the pelvic  cul-de-sac.  AORTA/IVC: There is calcified atherosclerotic disease.    RETROPERITONEUM/LYMPH NODES: No pathologic lymphadenopathy noted.    REPRODUCTIVE: The uterus is atrophied.  BLADDER:  Unremarkable    OSSEUS STRUCTURES: There is multilevel degenerative disc and facet disease accentuated by lumbar scoliosis. Kyphoplasty is noted at T12. Right hip surgical hardware is noted.    Impression  Impression:    1. Large hiatal hernia with the gastric body in the thoracic cavity.  2. Bibasilar airspace disease and pleural effusions.  3.  Constipation.            Electronically Signed: Michelle Fowler MD  3/1/2024 11:44 AM EST  Workstation ID: [id number]      Lab Review:   Anticoagulation Visit on 03/20/2024   Component Date Value    INR 03/20/2024 2.70    Anticoagulation Visit on 03/13/2024   Component Date Value    INR 03/12/2024 3.40    Anticoagulation Visit on 03/05/2024   Component Date Value    INR 03/05/2024 4.20 (A)    No results displayed because visit has over 200 results.      Anticoagulation Visit on 02/21/2024   Component Date Value    INR 02/20/2024 2.80    Anticoagulation Visit on 02/14/2024   Component Date Value    INR 02/13/2024 2.50    Anticoagulation Visit on 01/31/2024   Component Date Value    INR 01/30/2024 2.60    Anticoagulation Visit on 01/17/2024   Component Date Value    INR 01/16/2024 2.70    Anticoagulation Visit on 01/11/2024   Component Date Value    INR 01/09/2024 2.60    Anticoagulation Visit on 01/03/2024   Component Date Value    INR 01/02/2024 3.00    There may be more visits with results that are not included.     Recent labs reviewed and interpreted for clinical significance and application          Theodore Vital MD  04/11/24  .

## 2024-04-12 VITALS
OXYGEN SATURATION: 94 % | RESPIRATION RATE: 18 BRPM | HEART RATE: 58 BPM | SYSTOLIC BLOOD PRESSURE: 122 MMHG | TEMPERATURE: 97.5 F | DIASTOLIC BLOOD PRESSURE: 58 MMHG

## 2024-04-12 NOTE — HOME HEALTH
Routine Visit Note:    Skill/education provided: Therapeutic/home exercise program to ble, transfer teaching  and gait training with rollator walker    Patient/caregiver response: Patient met sit to stand goal and about meeting indoor goal with supervision and rollator walker    Plan for next visit: Therapeutic/home exercise program to ble, gait training and discharge to the care of family.

## 2024-04-15 DIAGNOSIS — J90 RECURRENT PLEURAL EFFUSION ON RIGHT: Primary | ICD-10-CM

## 2024-04-16 ENCOUNTER — ANTICOAGULATION VISIT (OUTPATIENT)
Dept: PHARMACY | Facility: HOSPITAL | Age: 89
End: 2024-04-16
Payer: MEDICARE

## 2024-04-16 DIAGNOSIS — I48.0 PAROXYSMAL ATRIAL FIBRILLATION: Primary | Chronic | ICD-10-CM

## 2024-04-16 DIAGNOSIS — Z79.01 CHRONIC ANTICOAGULATION: ICD-10-CM

## 2024-04-16 LAB — INR PPP: 2 (ref 2–3)

## 2024-04-16 NOTE — PROGRESS NOTES
Anticoagulation Clinic Progress Note - Home INR Testing     INR Goal: 2 - 3  Today's INR: 2.0 (therapeutic). INR result was called in today by Shea, patient's daughter.   Current warfarin dose: warfarin 2 mg PO daily, except warfarin 4 mg PO on Wednesday.  Current total weekly dose = 14 mg per week.     -Daughter stated patient is having a thoracentesis this Friday and she is told 2 days prior on Wednesday and Thursday.  They instructed her to test Friday morning to check to make sure INR is around 1.5 prior to procedure.   She can re-start warfarin at 5 pm on Friday after procedure.      INR History:      Latest Ref Rng & Units 3/26/2024     2:10 PM 2024    12:00 AM 2024     4:19 PM 4/10/2024    12:00 AM 4/10/2024    12:20 PM 2024    12:00 AM 2024     4:43 PM   Anticoagulation Monitoring   INR  2.20  1.80  1.70  2.00   INR Date  3/26/2024  2024  4/10/2024  2024   INR Goal  2.0-3.0  2.0-3.0  2.0-3.0  2.0-3.0   Trend  Same  Same  Same  Same   Last Week Total  14 mg  14 mg  12 mg  16 mg   Next Week Total  14 mg  16 mg  16 mg  14 mg   Sun  2 mg  2 mg  2 mg  2 mg   Mon  2 mg  2 mg  2 mg  2 mg   Tue  2 mg  4 mg ()  2 mg  4 mg ()   Wed  2 mg  2 mg  4 mg (4/10)  Hold ()   Thu  2 mg  2 mg  2 mg  Hold ()   Fri  2 mg  2 mg  2 mg  4 mg ()   Sat  2 mg  2 mg  2 mg  2 mg   Historical INR 2.00 - 3.00  1.80      1.70      2.00            This result is from an external source.       Anticoagulation Summary  As of 2024      INR goal:  2.0-3.0   TTR:  70.5% (4.5 y)   INR used for dosin.00 (2024)   Warfarin maintenance plan:  2 mg every day   Weekly warfarin total:  14 mg   Plan last modified:  Debbie Enamorado LPN (3/5/2024)   Next INR check:  2024   Priority:  High   Target end date:  Indefinite    Indications    Atrial fibrillation [I48.91]  Chronic anticoagulation [Z79.01]                 Anticoagulation Episode Summary       INR check location:  Anticoagulation  Clinic    Preferred lab:      Send INR reminders to:  [facility] CLINICAL POOL    Comments:  Patient Contract: **Still need**     due 4/23/24              Anticoagulation Care Providers       Provider Role Specialty Phone number    Theodore Vital Len Mckay MD Referring Cardiology [phone]            Clinic Interview:   Patient Findings     Positives:  Upcoming invasive procedure (4/19 Thoracentesis)    Negatives:  Signs/symptoms of thrombosis, Signs/symptoms of bleeding,   Laboratory test error suspected, Change in health, Change in alcohol use,   Change in activity, Emergency department visit, Upcoming dental procedure,   Missed doses, Extra doses, Change in medications, Change in diet/appetite,   Hospital admission, Bruising, Other complaints      Clinical Outcomes     Negatives:  Major bleeding event, Thromboembolic event,   Anticoagulation-related hospital admission, Anticoagulation-related ED   visit, Anticoagulation-related fatality        Current Medications:   Prior to Admission medications    Medication Sig Start Date End Date Taking? Authorizing Provider   digoxin (LANOXIN) 125 MCG tablet TAKE 1 TABLET BY MOUTH DAILY. INDICATIONS: CHRONIC ATRIAL FIBRILLATION 9/25/23   Aleksey Mathew MD   docusate sodium (COLACE) 100 MG capsule Take 1 capsule by mouth Every Night. Indications: Constipation    ProviderMinh MD   furosemide (LASIX) 20 MG tablet Take 1 tablet by mouth As Needed (swelling). Indications: Edema 9/8/23   lAeksey Mathew MD   latanoprost (XALATAN) 0.005 % ophthalmic solution Administer 1 drop to the right eye Every Night. Indications: Wide-Angle Glaucoma    ProviderMinh MD   Lidocaine 4 % Place 1 patch on the skin as directed by provider Daily. Remove & Discard patch within 12 hours or as directed by MD  Patient not taking: Reported on 3/25/2024 3/3/24   Josie Solano APRN   magnesium hydroxide (Milk of Magnesia) 400 MG/5ML suspension Take 30 mL by mouth Daily  As Needed for Constipation. Indications: Constipation 3/15/24   Minh Bass MD   metoclopramide (REGLAN) 5 MG tablet Take 1 tablet by mouth 4 (Four) Times a Day Before Meals & at Bedtime. 3/2/24   Josie Solano APRN   metoprolol succinate XL (TOPROL-XL) 25 MG 24 hr tablet Take 1 tablet by mouth 2 (Two) Times a Day. Indications: Atrial Fibrillation 24   Theodore Vital MD   mirtazapine (REMERON) 15 MG tablet Take 1 tablet by mouth Every Night. Indications: Appetite Stimulant 3/13/24   Minh Bass MD   O2 (OXYGEN) Inhale 2 L/min As Needed. Indications: scar tissue in lungs 22   Minh Bass MD   pantoprazole (PROTONIX) 40 MG EC tablet Take 1 tablet by mouth 2 (Two) Times a Day Before Meals. 3/2/24   Josie Solano APRN   potassium chloride (KLOR-CON) 10 MEQ CR tablet Take 1 tablet by mouth As Needed (as needed when taking water pill). ONLY WHEN TAKING WATER PILL 23   Aleksey Mathew MD   VITAMIN A PO Take 1 tablet by mouth Daily. Indications: suppliment     Minh Bass MD   warfarin (COUMADIN) 2 MG tablet Take 1 tablet by mouth Every Night. Everyday.  Daughter to recheck INR 3/13  Indications: Atrial Fibrillation 3/6/24   Minh Bass MD       Medical history:   Past Medical History:   Diagnosis Date    Atrial fibrillation     Coronary artery disease     Atrial fibrillation    GERD (gastroesophageal reflux disease)     Glaucoma     Hiatal hernia with gastroesophageal reflux     History of osteoporotic pathological fracture     Right intertroch (2019)    Hx of compression fracture of spine     T12 and L1(); T6 (2022)    Hyperlipidemia     Hypertension     Osteoarthritis     Osteoporosis     on prolia(3/21/22)    Stroke     off and on dizziness from the stroke.       Social history:   Social History     Tobacco Use    Smoking status: Former     Current packs/day: 0.00     Types: Cigarettes     Quit date:      Years since quittin.3      Passive exposure: Past    Smokeless tobacco: Never   Substance Use Topics    Alcohol use: No       Allergies:    Penicillins, Codeine, and Latex    [id number] SCORE   [id number] Score for Atrial Fibrillation Stroke Risk  Age in Years: 75+  Sex: Female  CHF History: Yes  Hypertension History: Yes  Stroke/TIA/Thromboembolism History: Yes  Vascular Disease History: Yes  Diabetes History: No  [id number] Score: 8  Stroke risks -: 8 Points. Risk of ischemic stroke: 10.8%. Risk of stroke/TIA/embolism: 15.2%          This patient is managed via a collaborative agreement, pursuant to IC 25-26-16. The signed protocol is kept in the [facility] at [address] [place] [address].    Education: Shruthi Amin has been instructed to call if any changes in medications, doses, concerns, etc. Patient was provided dosing instructions and expressed verbal understanding and has no further questions at this time.    Plan:  1. Instructed patient to continue 2 mg daily except 4 mg on Tuesdays Total weekly dose = 14 mg.    Instructed patient take 4 mg today, the hold next 2 days, the on Friday take 4 mg then 2 mg daily until test next Tuesday.    2. INR should be tested weekly and called into clinic.       Micha Ferrari, PharmD  4/16/2024  16:45 EDT

## 2024-04-17 ENCOUNTER — HOME CARE VISIT (OUTPATIENT)
Dept: HOME HEALTH SERVICES | Facility: HOME HEALTHCARE | Age: 89
End: 2024-04-17
Payer: MEDICARE

## 2024-04-17 VITALS
RESPIRATION RATE: 16 BRPM | SYSTOLIC BLOOD PRESSURE: 116 MMHG | TEMPERATURE: 97.7 F | HEART RATE: 59 BPM | DIASTOLIC BLOOD PRESSURE: 70 MMHG | OXYGEN SATURATION: 95 %

## 2024-04-17 PROCEDURE — G0162 HHC RN E&M PLAN SVS, 15 MIN: HCPCS

## 2024-04-17 NOTE — HOME HEALTH
Routine Visit Note:  Pt saw Dr. Curtis this week.  Pt is to have RLL pericentesis on Friday at [facility].      Skill/education provided: CP assess, Skin assess, Med ed and management, Dx ed and management    Patient/caregiver response: Pt able to reteach oxygen safety guidelines    Plan for next visit: CP assess, Skin assess, Med ed and management, Dx ed and management, FU pericentesis    Other pertinent info: NA

## 2024-04-18 ENCOUNTER — HOME CARE VISIT (OUTPATIENT)
Dept: HOME HEALTH SERVICES | Facility: HOME HEALTHCARE | Age: 89
End: 2024-04-18
Payer: MEDICARE

## 2024-04-18 PROCEDURE — G0151 HHCP-SERV OF PT,EA 15 MIN: HCPCS

## 2024-04-19 ENCOUNTER — HOSPITAL ENCOUNTER (OUTPATIENT)
Dept: GENERAL RADIOLOGY | Facility: HOSPITAL | Age: 89
Discharge: HOME | End: 2024-04-19
Payer: MEDICARE

## 2024-04-19 ENCOUNTER — HOSPITAL ENCOUNTER (OUTPATIENT)
Dept: INTERVENTIONAL RADIOLOGY/VASCULAR | Facility: HOSPITAL | Age: 89
Discharge: HOME | End: 2024-04-19
Payer: MEDICARE

## 2024-04-19 VITALS
HEART RATE: 62 BPM | DIASTOLIC BLOOD PRESSURE: 66 MMHG | OXYGEN SATURATION: 94 % | TEMPERATURE: 97.6 F | RESPIRATION RATE: 18 BRPM | SYSTOLIC BLOOD PRESSURE: 110 MMHG

## 2024-04-19 VITALS
BODY MASS INDEX: 18.4 KG/M2 | RESPIRATION RATE: 20 BRPM | WEIGHT: 100 LBS | SYSTOLIC BLOOD PRESSURE: 143 MMHG | DIASTOLIC BLOOD PRESSURE: 71 MMHG | HEIGHT: 62 IN | OXYGEN SATURATION: 95 % | HEART RATE: 72 BPM

## 2024-04-19 DIAGNOSIS — J90 RECURRENT PLEURAL EFFUSION ON RIGHT: ICD-10-CM

## 2024-04-19 LAB
APPEARANCE FLD: CLEAR
COLOR FLD: YELLOW
HOLD SPECIMEN: NORMAL
LDH FLD-CCNC: 69 U/L
LYMPHOCYTES NFR FLD MANUAL: 83 %
MONOCYTES NFR FLD: 10 %
NEUTROPHILS NFR FLD MANUAL: 7 %
NUC CELL # FLD: 378 /MM3
PROT FLD-MCNC: 2.4 G/DL
RBC # FLD AUTO: 1000 /MM3

## 2024-04-19 PROCEDURE — 25010000002 LIDOCAINE 1 % SOLUTION

## 2024-04-19 PROCEDURE — 71045 X-RAY EXAM CHEST 1 VIEW: CPT

## 2024-04-19 PROCEDURE — 89051 BODY FLUID CELL COUNT: CPT | Performed by: INTERNAL MEDICINE

## 2024-04-19 PROCEDURE — 87205 SMEAR GRAM STAIN: CPT | Performed by: INTERNAL MEDICINE

## 2024-04-19 PROCEDURE — 83615 LACTATE (LD) (LDH) ENZYME: CPT | Performed by: INTERNAL MEDICINE

## 2024-04-19 PROCEDURE — 87070 CULTURE OTHR SPECIMN AEROBIC: CPT | Performed by: INTERNAL MEDICINE

## 2024-04-19 PROCEDURE — 84157 ASSAY OF PROTEIN OTHER: CPT | Performed by: INTERNAL MEDICINE

## 2024-04-19 PROCEDURE — C1729 CATH, DRAINAGE: HCPCS

## 2024-04-19 PROCEDURE — 76942 ECHO GUIDE FOR BIOPSY: CPT

## 2024-04-19 RX ORDER — LIDOCAINE HYDROCHLORIDE 10 MG/ML
INJECTION, SOLUTION INFILTRATION; PERINEURAL AS NEEDED
Status: COMPLETED | OUTPATIENT
Start: 2024-04-19 | End: 2024-04-19

## 2024-04-19 RX ADMIN — LIDOCAINE HYDROCHLORIDE 3 ML: 10 INJECTION, SOLUTION INFILTRATION; PERINEURAL at 14:23

## 2024-04-19 NOTE — DISCHARGE INSTRUCTIONS
KEEP DRESSING ON FOR THE NEXT 48 HOURS. YOU MAY THEN REMOVE IT AND SHOWER AS USUAL. NO HEAVY LIFTING GREATER THAN 10 POUNDS FOR THE NEXT 24 HOURS. WATCH FOR SIGNS AND SYMPTOMS OF INFECTION SUCH AS REDNESS, SWELLING, DRAINAGE, OR UNEXPLAINED FEVER. IF ANY SYMPTOMS ARISE. SEE YOUR PHYSICIAN. FOLLOW UP WITH YOUR PHYSICIAN FOR YOUR LAB RESULTS IN A FEW DAYS. ANY QUESTIONS, YOU CAN CALL THE INTERVENTIONAL RADIOLOGY DEPT [phone], M-F 8-4:30. IF AFTER HOURS, FOLLOW PROMPTS ON PHONE LINE.    IF YOU GET HOME AND DEVELOP ANY INCREASING CHEST PAIN OR DIFFICULTY BREATHING, GO TO NEAREST ER FOR EVALUATION.

## 2024-04-23 ENCOUNTER — TELEPHONE (OUTPATIENT)
Dept: CARDIOLOGY | Facility: CLINIC | Age: 89
End: 2024-04-23
Payer: MEDICARE

## 2024-04-23 LAB
LAB AP CASE REPORT: NORMAL
PATH REPORT.FINAL DX SPEC: NORMAL
PATH REPORT.GROSS SPEC: NORMAL

## 2024-04-23 RX ORDER — DIGOXIN 125 MCG
125 TABLET ORAL DAILY
Qty: 90 TABLET | Refills: 1 | Status: SHIPPED | OUTPATIENT
Start: 2024-04-23

## 2024-04-23 NOTE — TELEPHONE ENCOUNTER
Caller: Shea Nam    Relationship: Emergency Contact    Best call back number: [phone]     Requested Prescriptions:   Requested Prescriptions     Pending Prescriptions Disp Refills    digoxin (LANOXIN) 125 MCG tablet 90 tablet 1     Sig: Take 1 tablet by mouth Daily. Indications: Chronic Atrial Fibrillation        Pharmacy where request should be sent: [id number] [place] [address] [place]  AT [address] [phone] [facility] [phone]      Last office visit with prescribing clinician: 3/25/2024   Last telemedicine visit with prescribing clinician: Visit date not found   Next office visit with prescribing clinician: 6/25/2024     Additional details provided by patient: PATIENT DAUGHTER REACHING OUT TO CALL IN PRESCRIPTION - SHE STATES THAT IT WAS STARTED WITH DR GARZON BUT PATIENT TRANSFERRED CARE AND WAS SEEN 03.25.24 - THEY ARE REQUESTING 90 DAY REFILLS IF POSSIBLE - PT WILL BE OUT ON FRIDAY     Little Bolanos Rep   04/23/24 11:17 EDT

## 2024-04-24 LAB
BACTERIA FLD CULT: NORMAL
GRAM STN SPEC: NORMAL
GRAM STN SPEC: NORMAL

## 2024-04-25 ENCOUNTER — ANTICOAGULATION VISIT (OUTPATIENT)
Dept: PHARMACY | Facility: HOSPITAL | Age: 89
End: 2024-04-25
Payer: MEDICARE

## 2024-04-25 DIAGNOSIS — Z79.01 CHRONIC ANTICOAGULATION: ICD-10-CM

## 2024-04-25 DIAGNOSIS — I48.91 ATRIAL FIBRILLATION, UNSPECIFIED TYPE: Primary | Chronic | ICD-10-CM

## 2024-04-25 LAB — INR PPP: 2.7 (ref 2–3)

## 2024-04-25 PROCEDURE — G0249 PROVIDE INR TEST MATER/EQUIP: HCPCS

## 2024-04-25 NOTE — PROGRESS NOTES
"Anticoagulation Clinic Progress Note - Home INR Testing     INR Goal: 2 - 3  Today's INR: 2.7 (therapeutic). INR result was obtained on  but patient's daughter, Shea, forgot to call in until yesterday after hours of operation. Appointment conducted telephonically with Shea today.  Current warfarin dose:  Doses of past 7 days are as follows:      : held dose  : warfarin 4 mg  -: warfarin 2 mg  : warfarin 4 mg  : warfarin 2 mg    Total weekly dose of past 7 days = 16 mg    Shea reports that patient took a supplement for constipation called \"Pallavi\" -. Several ingredients in this supplement that interact with warfarin including garlic, quercetin, resveratrol, and niacin (all can increase INR and/or bleeding risk).    INR History:      Latest Ref Rng & Units 2024     4:19 PM 4/10/2024    12:00 AM 4/10/2024    12:20 PM 2024    12:00 AM 2024     4:43 PM 2024    12:00 AM 2024     8:30 AM   Anticoagulation Monitoring   INR  1.80  1.70  2.00  2.70   INR Date  2024  4/10/2024  2024  2024   INR Goal  2.0-3.0  2.0-3.0  2.0-3.0  2.0-3.0   Trend  Same  Same  Same  Up   Last Week Total  14 mg  12 mg  16 mg  16 mg   Next Week Total  16 mg  16 mg  14 mg  16 mg   Sun  2 mg  2 mg  2 mg  2 mg   Mon  2 mg  2 mg  2 mg  2 mg   Tue  4 mg ()  2 mg  4 mg ()  -   Wed  2 mg  4 mg (4/10)  Hold ()  -   Thu  2 mg  2 mg  Hold ()  2 mg   Fri  2 mg  2 mg  4 mg ()  2 mg   Sat  2 mg  2 mg  2 mg  2 mg   Historical INR 2.00 - 3.00  1.70      2.00      2.70            This result is from an external source.       Anticoagulation Summary  As of 2024      INR goal:  2.0-3.0   TTR:  70.6% (4.5 y)   INR used for dosin.70 (2024)   Warfarin maintenance plan:  4 mg every Tue; 2 mg all other days   Weekly warfarin total:  16 mg   Plan last modified:  Chel Muñoz, PharmD (2024)   Next INR check:  2024   Priority:  High   Target end date:  " "Indefinite    Indications    Atrial fibrillation [I48.91]  Chronic anticoagulation [Z79.01]                 Anticoagulation Episode Summary       INR check location:  Anticoagulation Clinic    Preferred lab:      Send INR reminders to:   [facility] CLINICAL POOL    Comments:  Patient Contract: **Still need**    Last  = 4/25; next due 5/23              Anticoagulation Care Providers       Provider Role Specialty Phone number    Theodore Vital MD Referring Cardiology [phone]            Clinic Interview:   Patient Findings     Positives:  Missed doses (Held doses 4/17 and 4/18), Change in   medications (Took a supplement for constipation called \"Pallavi\" 4/19 but   discontinued 4/21 because she didn't think it was helping (multiple   ingredients that can increase INR and/or bleeding risk))    Negatives:  Signs/symptoms of thrombosis, Signs/symptoms of bleeding,   Laboratory test error suspected, Change in health, Change in alcohol use,   Change in activity, Upcoming invasive procedure, Emergency department   visit, Upcoming dental procedure, Extra doses, Change in diet/appetite,   Hospital admission, Bruising, Other complaints      Clinical Outcomes     Negatives:  Major bleeding event, Thromboembolic event,   Anticoagulation-related hospital admission, Anticoagulation-related ED   visit, Anticoagulation-related fatality        Current Medications:   Prior to Admission medications    Medication Sig Start Date End Date Taking? Authorizing Provider   digoxin (LANOXIN) 125 MCG tablet Take 1 tablet by mouth Daily. Indications: Chronic Atrial Fibrillation 4/23/24   Theodore Vital MD   docusate sodium (COLACE) 100 MG capsule Take 1 capsule by mouth Every Night. Indications: Constipation    ProviderMinh MD   furosemide (LASIX) 20 MG tablet Take 1 tablet by mouth As Needed (swelling). Indications: Edema 9/8/23   Aleksey Mathew MD   latanoprost (XALATAN) 0.005 % ophthalmic solution " Administer 1 drop to the right eye Every Night. Indications: Wide-Angle Glaucoma    Minh Bass MD   Lidocaine 4 % Place 1 patch on the skin as directed by provider Daily. Remove & Discard patch within 12 hours or as directed by MD  Patient not taking: Reported on 3/25/2024 3/3/24   Josie Solano APRN   magnesium hydroxide (Milk of Magnesia) 400 MG/5ML suspension Take 30 mL by mouth Daily As Needed for Constipation. Indications: Constipation 3/15/24   Minh Bass MD   metoclopramide (REGLAN) 5 MG tablet Take 1 tablet by mouth 4 (Four) Times a Day Before Meals & at Bedtime. 3/2/24   Josie Solano APRN   metoprolol succinate XL (TOPROL-XL) 25 MG 24 hr tablet Take 1 tablet by mouth 2 (Two) Times a Day. Indications: Atrial Fibrillation 4/1/24   Theodore Vital MD   mirtazapine (REMERON) 15 MG tablet Take 1 tablet by mouth Every Night. Indications: Appetite Stimulant 3/13/24   Minh Bass MD   O2 (OXYGEN) Inhale 2 L/min As Needed. Indications: scar tissue in lungs 2/23/22   Minh Bass MD   pantoprazole (PROTONIX) 40 MG EC tablet Take 1 tablet by mouth 2 (Two) Times a Day Before Meals. 3/2/24   Josie Solano APRN   potassium chloride (KLOR-CON) 10 MEQ CR tablet Take 1 tablet by mouth As Needed (as needed when taking water pill). ONLY WHEN TAKING WATER PILL 9/8/23   Aleksey Mathew MD   Unable to find Take 2 each by mouth Daily. SONYA supplement.  2 tabs daily  Indications: Constipation 4/13/24   Minh Bass MD   VITAMIN A PO Take 1 tablet by mouth Daily. Indications: suppliment     Minh Bass MD   warfarin (COUMADIN) 2 MG tablet Take 1 tablet by mouth Every Night. Everyday.  Daughter to recheck INR 3/13  Indications: Atrial Fibrillation 3/6/24   Minh Bass MD       Medical history:   Past Medical History:   Diagnosis Date    Atrial fibrillation     Coronary artery disease     Atrial fibrillation    GERD (gastroesophageal reflux  "disease)     Glaucoma     Hiatal hernia with gastroesophageal reflux     History of osteoporotic pathological fracture     Right intertroch (2019)    Hx of compression fracture of spine     T12 and L1(); T6 (2022)    Hyperlipidemia     Hypertension     Osteoarthritis     Osteoporosis     on prolia(3/21/22)    Stroke     off and on dizziness from the stroke.       Social history:   Social History     Tobacco Use    Smoking status: Former     Current packs/day: 0.00     Types: Cigarettes     Quit date:      Years since quittin.3     Passive exposure: Past    Smokeless tobacco: Never   Substance Use Topics    Alcohol use: No       Allergies:    Penicillins, Codeine, and Latex    [id number] SCORE   [id number] Score for Atrial Fibrillation Stroke Risk  Age in Years: 75+  Sex: Female  CHF History: Yes  Hypertension History: Yes  Stroke/TIA/Thromboembolism History: Yes  Vascular Disease History: Yes  Diabetes History: No  [id number] Score: 8  Stroke risks -: 8 Points. Risk of ischemic stroke: 10.8%. Risk of stroke/TIA/embolism: 15.2%      This patient is managed via a collaborative agreement, pursuant to IC 25-26-16. The signed protocol is kept in the [facility] at [address] [place] [address].    Education: Shruthi Amin has been instructed to call if any changes in medications, doses, concerns, etc. Patient was provided dosing instructions and expressed verbal understanding and has no further questions at this time.    Plan:  1. no change; warfarin 2 mg PO daily, except warfarin 4 mg PO on Tuesday.  Total weekly dose = 16 mg.   2. INR should be tested on  (one week from last INR check) and called into clinic.   3. Patient was provided supplies for self-testing of INR at home.     - Discussed with Shea that \"Pallavi\" supplement contains multiple ingredients that interact with warfarin and to contact PCP for recommendations for treatment of constipation prior to " initiating any additional medications in the future.    Chel Muñoz, Yue  4/25/2024  10:59 EDT

## 2024-04-26 ENCOUNTER — HOME CARE VISIT (OUTPATIENT)
Dept: HOME HEALTH SERVICES | Facility: HOME HEALTHCARE | Age: 89
End: 2024-04-26
Payer: MEDICARE

## 2024-04-26 PROCEDURE — G0162 HHC RN E&M PLAN SVS, 15 MIN: HCPCS

## 2024-04-27 VITALS
DIASTOLIC BLOOD PRESSURE: 64 MMHG | TEMPERATURE: 97.4 F | SYSTOLIC BLOOD PRESSURE: 110 MMHG | HEART RATE: 68 BPM | RESPIRATION RATE: 18 BRPM

## 2024-04-27 NOTE — HOME HEALTH
Routine Visit Note:  Per daughter, INR was 2.7 this week.  Pt was to take Warfarin 2mg on Tuesday, then 4mg all other days.  Repeat INR next week.  Pt was instructed to stop take SONYA supplement because it can increase INR.  Pt has pleural fluid drained in Dr. Curtis's office on Friday.  Pt reports that she is breathing easier and needs to use her oxygen less afterward.  Pt and daughter are aware that this is pt's predischarge visit.  They are agreeable to discharge at next SN visit.    Skill/education provided: CP assess, Skin assess, Med ed and management, Dx ed, Fall prevention ed, Predischarge visit    Patient/caregiver response: Pt able to reteach bleeding precautions    Plan for next visit: CP assess, Skin assess, Med ed and management, Dx ed, Discharge    Other pertinent info: NA

## 2024-04-30 ENCOUNTER — HOME CARE VISIT (OUTPATIENT)
Dept: HOME HEALTH SERVICES | Facility: HOME HEALTHCARE | Age: 89
End: 2024-04-30
Payer: MEDICARE

## 2024-04-30 ENCOUNTER — ANTICOAGULATION VISIT (OUTPATIENT)
Dept: PHARMACY | Facility: HOSPITAL | Age: 89
End: 2024-04-30
Payer: MEDICARE

## 2024-04-30 DIAGNOSIS — I48.91 ATRIAL FIBRILLATION, UNSPECIFIED TYPE: Primary | Chronic | ICD-10-CM

## 2024-04-30 DIAGNOSIS — Z79.01 CHRONIC ANTICOAGULATION: ICD-10-CM

## 2024-04-30 LAB — INR PPP: 2.6 (ref 2–3)

## 2024-04-30 PROCEDURE — G0322: HCPCS

## 2024-04-30 NOTE — PROGRESS NOTES
Anticoagulation Clinic Progress Note - Home INR Testing     INR Goal: 2 - 3  Today's INR: 2.6 (therapeutic). INR result was called in today by Shea (patient's daughter).   Current warfarin dose: warfarin 2 mg PO daily, except warfarin 4 mg PO on Tuesday.  Current total weekly dose = 16 mg per week.     INR History:      Latest Ref Rng & Units 4/10/2024    12:20 PM 2024    12:00 AM 2024     4:43 PM 2024    12:00 AM 2024     8:30 AM 2024    12:00 AM 2024     1:42 PM   Anticoagulation Monitoring   INR  1.70  2.00  2.70  2.60   INR Date  4/10/2024  2024  2024  2024   INR Goal  2.0-3.0  2.0-3.0  2.0-3.0  2.0-3.0   Trend  Same  Same  Up  Same   Last Week Total  12 mg  16 mg  16 mg  16 mg   Next Week Total  16 mg  14 mg  16 mg  16 mg   Sun  2 mg  2 mg  2 mg  2 mg   Mon  2 mg  2 mg  2 mg  2 mg   Tue  2 mg  4 mg ()  -  4 mg   Wed  4 mg (4/10)  Hold ()  -  2 mg   Thu  2 mg  Hold ()  2 mg  2 mg   Fri  2 mg  4 mg ()  2 mg  2 mg   Sat  2 mg  2 mg  2 mg  2 mg   Historical INR 2.00 - 3.00  2.00      2.70      2.60            This result is from an external source.       Anticoagulation Summary  As of 2024      INR goal:  2.0-3.0   TTR:  70.7% (4.5 y)   INR used for dosin.60 (2024)   Warfarin maintenance plan:  4 mg every Tue; 2 mg all other days   Weekly warfarin total:  16 mg   No change documented:  Chel Muñoz, PharmD   Plan last modified:  Chel Muñoz, PharmD (2024)   Next INR check:  2024   Priority:  High   Target end date:  Indefinite    Indications    Atrial fibrillation [I48.91]  Chronic anticoagulation [Z79.01]                 Anticoagulation Episode Summary       INR check location:  Anticoagulation Clinic    Preferred lab:      Send INR reminders to:  [facility] CLINICAL [facility]OL    Comments:  Patient Contract: **Still need**    Last  = ; next due               Anticoagulation Care Providers       Provider Role  Specialty Phone number    Theodore Vital MD Referring Cardiology [phone]            Clinic Interview:   Patient Findings     Negatives:  Signs/symptoms of thrombosis, Signs/symptoms of bleeding,   Laboratory test error suspected, Change in health, Change in alcohol use,   Change in activity, Upcoming invasive procedure, Emergency department   visit, Upcoming dental procedure, Missed doses, Extra doses, Change in   medications, Change in diet/appetite, Hospital admission, Bruising, Other   complaints      Clinical Outcomes     Negatives:  Major bleeding event, Thromboembolic event,   Anticoagulation-related hospital admission, Anticoagulation-related ED   visit, Anticoagulation-related fatality        Current Medications:   Prior to Admission medications    Medication Sig Start Date End Date Taking? Authorizing Provider   digoxin (LANOXIN) 125 MCG tablet Take 1 tablet by mouth Daily. Indications: Chronic Atrial Fibrillation 4/23/24   Theodore Vital MD   docusate sodium (COLACE) 100 MG capsule Take 1 capsule by mouth Every Night. Indications: Constipation    ProviderMinh MD   furosemide (LASIX) 20 MG tablet Take 1 tablet by mouth As Needed (swelling). Indications: Edema 9/8/23   Aleksey Mathew MD   latanoprost (XALATAN) 0.005 % ophthalmic solution Administer 1 drop to the right eye Every Night. Indications: Wide-Angle Glaucoma    ProviderMinh MD   Lidocaine 4 % Place 1 patch on the skin as directed by provider Daily. Remove & Discard patch within 12 hours or as directed by MD  Patient not taking: Reported on 3/25/2024 3/3/24   Josie Solano APRN   magnesium hydroxide (Milk of Magnesia) 400 MG/5ML suspension Take 30 mL by mouth Daily As Needed for Constipation. Indications: Constipation 3/15/24   ProviderMinh MD   metoclopramide (REGLAN) 5 MG tablet Take 1 tablet by mouth 4 (Four) Times a Day Before Meals & at Bedtime. 3/2/24   Josie Solano APRN   metoprolol  succinate XL (TOPROL-XL) 25 MG 24 hr tablet Take 1 tablet by mouth 2 (Two) Times a Day. Indications: Atrial Fibrillation 24   Theodore Vital MD   mirtazapine (REMERON) 15 MG tablet Take 1 tablet by mouth Every Night. Indications: Appetite Stimulant 3/13/24   Minh Bass MD   O2 (OXYGEN) Inhale 2 L/min As Needed. Indications: scar tissue in lungs 22   Minh aBss MD   pantoprazole (PROTONIX) 40 MG EC tablet Take 1 tablet by mouth 2 (Two) Times a Day Before Meals. 3/2/24   Josie Solano APRN   potassium chloride (KLOR-CON) 10 MEQ CR tablet Take 1 tablet by mouth As Needed (as needed when taking water pill). ONLY WHEN TAKING WATER PILL 23   Aleksey Mathew MD   VITAMIN A PO Take 1 tablet by mouth Daily. Indications: suppliment     Minh Bass MD   warfarin (COUMADIN) 2 MG tablet Take 1 tablet by mouth Every Night. 2mg on Tu, 4mg on all other days  Indications: Atrial Fibrillation 3/6/24   Minh Bass MD       Medical history:   Past Medical History:   Diagnosis Date    Atrial fibrillation     Coronary artery disease     Atrial fibrillation    GERD (gastroesophageal reflux disease)     Glaucoma     Hiatal hernia with gastroesophageal reflux     History of osteoporotic pathological fracture     Right intertroch (2019)    Hx of compression fracture of spine     T12 and L1(); T6 (2022)    Hyperlipidemia     Hypertension     Osteoarthritis     Osteoporosis     on prolia(3/21/22)    Stroke     off and on dizziness from the stroke.       Social history:   Social History     Tobacco Use    Smoking status: Former     Current packs/day: 0.00     Types: Cigarettes     Quit date:      Years since quittin.3     Passive exposure: Past    Smokeless tobacco: Never   Substance Use Topics    Alcohol use: No       Allergies:    Penicillins, Codeine, and Latex    [id number] SCORE   [id number] Score for Atrial Fibrillation Stroke Risk  Age in Years:  75+  Sex: Female  CHF History: Yes  Hypertension History: Yes  Stroke/TIA/Thromboembolism History: Yes  Vascular Disease History: Yes  Diabetes History: No  [id number] Score: 8  Stroke risks -: 8 Points. Risk of ischemic stroke: 10.8%. Risk of stroke/TIA/embolism: 15.2%      This patient is managed via a collaborative agreement, pursuant to IC 25-26-16. The signed protocol is kept in the [facility] at [address] [place] [address].    Education: Shruthi Amin has been instructed to call if any changes in medications, doses, concerns, etc. Patient was provided dosing instructions and expressed verbal understanding and has no further questions at this time.    Plan:  1. no change; warfarin 2 mg PO daily, except warfarin 4 mg PO on Tuesday.  Total weekly dose = 16 mg.   2. INR should be tested in 1 week and called into clinic.     Chel Muñoz, PharmD  4/30/2024  13:43 EDT

## 2024-05-01 DIAGNOSIS — Z79.01 LONG TERM (CURRENT) USE OF ANTICOAGULANTS: ICD-10-CM

## 2024-05-01 DIAGNOSIS — I48.91 UNSPECIFIED ATRIAL FIBRILLATION: ICD-10-CM

## 2024-05-01 RX ORDER — WARFARIN SODIUM 2 MG/1
TABLET ORAL
Qty: 116 TABLET | OUTPATIENT
Start: 2024-05-01

## 2024-05-02 ENCOUNTER — HOME CARE VISIT (OUTPATIENT)
Dept: HOME HEALTH SERVICES | Facility: HOME HEALTHCARE | Age: 89
End: 2024-05-02
Payer: MEDICARE

## 2024-05-02 VITALS
SYSTOLIC BLOOD PRESSURE: 110 MMHG | HEART RATE: 70 BPM | RESPIRATION RATE: 16 BRPM | OXYGEN SATURATION: 98 % | DIASTOLIC BLOOD PRESSURE: 70 MMHG | TEMPERATURE: 97.8 F

## 2024-05-02 PROCEDURE — G0162 HHC RN E&M PLAN SVS, 15 MIN: HCPCS

## 2024-05-07 ENCOUNTER — ANTICOAGULATION VISIT (OUTPATIENT)
Dept: PHARMACY | Facility: HOSPITAL | Age: 89
End: 2024-05-07
Payer: MEDICARE

## 2024-05-07 DIAGNOSIS — Z79.01 CHRONIC ANTICOAGULATION: ICD-10-CM

## 2024-05-07 DIAGNOSIS — I48.91 ATRIAL FIBRILLATION, UNSPECIFIED TYPE: Primary | Chronic | ICD-10-CM

## 2024-05-07 LAB — INR PPP: 2.7 (ref 2–3)

## 2024-05-14 ENCOUNTER — ANTICOAGULATION VISIT (OUTPATIENT)
Dept: PHARMACY | Facility: HOSPITAL | Age: 89
End: 2024-05-14
Payer: MEDICARE

## 2024-05-14 DIAGNOSIS — Z79.01 CHRONIC ANTICOAGULATION: ICD-10-CM

## 2024-05-14 DIAGNOSIS — I48.91 ATRIAL FIBRILLATION, UNSPECIFIED TYPE: Primary | Chronic | ICD-10-CM

## 2024-05-14 LAB — INR PPP: 2.8 (ref 2–3)

## 2024-05-14 NOTE — PROGRESS NOTES
Anticoagulation Clinic Progress Note - Home INR Testing     INR Goal: 2 - 3  Today's INR: 2.8 (therapeutic). INR result was called in today by Shea (patient's daughter).   Current warfarin dose: warfarin 2 mg PO daily, except warfarin 4 mg PO on Tuesday.  Current total weekly dose = 16 mg per week.     INR History:      Latest Ref Rng & Units 2024     8:30 AM 2024    12:00 AM 2024     1:42 PM 2024    12:00 AM 2024    10:58 AM 2024    12:00 AM 2024     3:10 PM   Anticoagulation Monitoring   INR  2.70  2.60  2.70  2.80   INR Date  2024   INR Goal  2.0-3.0  2.0-3.0  2.0-3.0  2.0-3.0   Trend  Up  Same  Same  Same   Last Week Total  16 mg  16 mg  16 mg  16 mg   Next Week Total  16 mg  16 mg  16 mg  16 mg   Sun  2 mg  2 mg  2 mg  2 mg   Mon  2 mg  2 mg  2 mg  2 mg   Tue  -  4 mg  4 mg  4 mg   Wed  -  2 mg  2 mg  2 mg   Thu  2 mg  2 mg  2 mg  2 mg   Fri  2 mg  2 mg  2 mg  2 mg   Sat  2 mg  2 mg  2 mg  2 mg   Historical INR 2.00 - 3.00  2.60      2.70      2.80            This result is from an external source.       Anticoagulation Summary  As of 2024      INR goal:  2.0-3.0   TTR:  71.0% (4.6 y)   INR used for dosin.80 (2024)   Warfarin maintenance plan:  4 mg every Tue; 2 mg all other days   Weekly warfarin total:  16 mg   No change documented:  Chel Muñoz, PharmD   Plan last modified:  Chel Muñoz, PharmD (2024)   Next INR check:  2024   Priority:  High   Target end date:  Indefinite    Indications    Atrial fibrillation [I48.91]  Chronic anticoagulation [Z79.01]                 Anticoagulation Episode Summary       INR check location:  Anticoagulation Clinic    Preferred lab:      Send INR reminders to:   [facility]    Comments:  Patient Contract: **Still need**    Last  = ; next due               Anticoagulation Care Providers       Provider Role Specialty Phone number    Amanuel  Theodore Mckay MD Referring Cardiology [phone]            Clinic Interview:   Patient Findings     Positives:  Change in medications (Discontinued pantoprazole on 5/10 (may   decrease INR) and discontinue metoclopramide on 5/13 (no effect on INR))    Negatives:  Signs/symptoms of thrombosis, Signs/symptoms of bleeding,   Laboratory test error suspected, Change in health, Change in alcohol use,   Change in activity, Upcoming invasive procedure, Emergency department   visit, Upcoming dental procedure, Missed doses, Extra doses, Change in   diet/appetite, Hospital admission, Bruising, Other complaints      Clinical Outcomes     Negatives:  Major bleeding event, Thromboembolic event,   Anticoagulation-related hospital admission, Anticoagulation-related ED   visit, Anticoagulation-related fatality        Current Medications:   Prior to Admission medications    Medication Sig Start Date End Date Taking? Authorizing Provider   digoxin (LANOXIN) 125 MCG tablet Take 1 tablet by mouth Daily. Indications: Chronic Atrial Fibrillation 4/23/24   Theodore Vital MD   docusate sodium (COLACE) 100 MG capsule Take 1 capsule by mouth Every Night. Indications: Constipation    ProviderMinh MD   furosemide (LASIX) 20 MG tablet Take 1 tablet by mouth As Needed (swelling). Indications: Edema 9/8/23   Aleksey Mathew MD   latanoprost (XALATAN) 0.005 % ophthalmic solution Administer 1 drop to the right eye Every Night. Indications: Wide-Angle Glaucoma    ProviderMinh MD   Lidocaine 4 % Place 1 patch on the skin as directed by provider Daily. Remove & Discard patch within 12 hours or as directed by MD  Patient not taking: Reported on 3/25/2024 3/3/24   Josie Solano, ALMA   magnesium hydroxide (Milk of Magnesia) 400 MG/5ML suspension Take 30 mL by mouth Daily As Needed for Constipation. Indications: Constipation 3/15/24   Minh Bass MD   metoclopramide (REGLAN) 5 MG tablet Take 1 tablet by mouth  4 (Four) Times a Day Before Meals & at Bedtime. 3/2/24   Josie Solano APRN   metoprolol succinate XL (TOPROL-XL) 25 MG 24 hr tablet Take 1 tablet by mouth 2 (Two) Times a Day. Indications: Atrial Fibrillation 24   Theodore Vital MD   mirtazapine (REMERON) 15 MG tablet Take 1 tablet by mouth Every Night. Indications: Appetite Stimulant 3/13/24   Minh Bass MD   O2 (OXYGEN) Inhale 2 L/min As Needed. Indications: scar tissue in lungs 22   Minh Bass MD   pantoprazole (PROTONIX) 40 MG EC tablet Take 1 tablet by mouth 2 (Two) Times a Day Before Meals. 3/2/24   Josie Solano APRN   potassium chloride (KLOR-CON) 10 MEQ CR tablet Take 1 tablet by mouth As Needed (as needed when taking water pill). ONLY WHEN TAKING WATER PILL 23   Aleksey Mathew MD   VITAMIN A PO Take 1 tablet by mouth Daily. Indications: suppliment     Minh Bass MD   warfarin (COUMADIN) 2 MG tablet Take 1 tablet by mouth Every Night. 2mg on Tu, 4mg on all other days  Indications: Atrial Fibrillation 3/6/24   Minh Bass MD       Medical history:   Past Medical History:   Diagnosis Date    Atrial fibrillation     Coronary artery disease     Atrial fibrillation    GERD (gastroesophageal reflux disease)     Glaucoma     Hiatal hernia with gastroesophageal reflux     History of osteoporotic pathological fracture     Right intertroch (2019)    Hx of compression fracture of spine     T12 and L1(); T6 (2022)    Hyperlipidemia     Hypertension     Osteoarthritis     Osteoporosis     on prolia(3/21/22)    Stroke     off and on dizziness from the stroke.       Social history:   Social History     Tobacco Use    Smoking status: Former     Current packs/day: 0.00     Types: Cigarettes     Quit date:      Years since quittin.4     Passive exposure: Past    Smokeless tobacco: Never   Substance Use Topics    Alcohol use: No       Allergies:    Penicillins, Codeine, and  Latex    [id number] SCORE   [id number] Score for Atrial Fibrillation Stroke Risk  Age in Years: 75+  Sex: Female  CHF History: Yes  Hypertension History: Yes  Stroke/TIA/Thromboembolism History: Yes  Vascular Disease History: Yes  Diabetes History: No  UKQ1PP2-MHEg Score: 8  Stroke risks -: 8 Points. Risk of ischemic stroke: 10.8%. Risk of stroke/TIA/embolism: 15.2%      This patient is managed via a collaborative agreement, pursuant to IC 25-26-16. The signed protocol is kept in the [facility] at [address] [place] [address].    Education: Shruthi Amin has been instructed to call if any changes in medications, doses, concerns, etc. Patient was provided dosing instructions and expressed verbal understanding and has no further questions at this time.    Plan:  1. no change; warfarin 2 mg PO daily, except warfarin 4 mg PO on Tuesday.  Total weekly dose = 16 mg.   2. INR should be tested in 1 week and called into clinic.     Chel Muñoz, PharmD  5/14/2024  15:11 EDT

## 2024-05-21 ENCOUNTER — ANTICOAGULATION VISIT (OUTPATIENT)
Dept: PHARMACY | Facility: HOSPITAL | Age: 89
End: 2024-05-21
Payer: MEDICARE

## 2024-05-21 DIAGNOSIS — Z79.01 CHRONIC ANTICOAGULATION: ICD-10-CM

## 2024-05-21 DIAGNOSIS — I48.0 PAROXYSMAL ATRIAL FIBRILLATION: Primary | Chronic | ICD-10-CM

## 2024-05-21 LAB — INR PPP: 2.2 (ref 2–3)

## 2024-05-21 PROCEDURE — G0249 PROVIDE INR TEST MATER/EQUIP: HCPCS

## 2024-05-21 RX ORDER — WARFARIN SODIUM 2 MG/1
2 TABLET ORAL NIGHTLY
Qty: 96 TABLET | Refills: 3 | Status: SHIPPED | OUTPATIENT
Start: 2024-05-21

## 2024-05-21 NOTE — PROGRESS NOTES
Anticoagulation Clinic Progress Note - Home INR Testing     INR Goal: 2 - 3  Today's INR: 2.2 (therapeutic). INR result was called in today by Shea, patient's daughter.   Current warfarin dose: warfarin 2 mg PO daily, except warfarin 4 mg PO on .  Current total weekly dose = 16 mg per week.     INR History:      Latest Ref Rng & Units 2024     1:42 PM 2024    12:00 AM 2024    10:58 AM 2024    12:00 AM 2024     3:10 PM 2024    12:00 AM 2024     2:15 PM   Anticoagulation Monitoring   INR  2.60  2.70  2.80  2.20   INR Date  2024   INR Goal  2.0-3.0  2.0-3.0  2.0-3.0  2.0-3.0   Trend  Same  Same  Same  Same   Last Week Total  16 mg  16 mg  16 mg  16 mg   Next Week Total  16 mg  16 mg  16 mg  16 mg   Sun  2 mg  2 mg  2 mg  2 mg   Mon  2 mg  2 mg  2 mg  2 mg   Tue  4 mg  4 mg  4 mg  4 mg   Wed  2 mg  2 mg  2 mg  2 mg   Thu  2 mg  2 mg  2 mg  2 mg   Fri  2 mg  2 mg  2 mg  2 mg   Sat  2 mg  2 mg  2 mg  2 mg   Historical INR 2.00 - 3.00  2.70      2.80      2.20            This result is from an external source.       Anticoagulation Summary  As of 2024      INR goal:  2.0-3.0   TTR:  71.1% (4.6 y)   INR used for dosin.20 (2024)   Warfarin maintenance plan:  4 mg every Tue; 2 mg all other days   Weekly warfarin total:  16 mg   No change documented:  Ruby Ferrari, PharmD   Plan last modified:  Chel Muñoz, PharmD (2024)   Next INR check:  2024   Priority:  High   Target end date:  Indefinite    Indications    Atrial fibrillation [I48.91]  Chronic anticoagulation [Z79.01]                 Anticoagulation Episode Summary       INR check location:  Anticoagulation Clinic    Preferred lab:      Send INR reminders to:   [facility] CLINICAL [facility]    Comments:  Patient Contract: **Still need**    Last  = ; next due               Anticoagulation Care Providers       Provider Role Specialty Phone number     Theodore Vital MD Referring Cardiology [phone]            Clinic Interview:   Patient Findings     Negatives:  Signs/symptoms of thrombosis, Signs/symptoms of bleeding,   Laboratory test error suspected, Change in health, Change in alcohol use,   Change in activity, Upcoming invasive procedure, Emergency department   visit, Upcoming dental procedure, Missed doses, Extra doses, Change in   medications, Change in diet/appetite, Hospital admission, Bruising, Other   complaints      Clinical Outcomes     Negatives:  Major bleeding event, Thromboembolic event,   Anticoagulation-related hospital admission, Anticoagulation-related ED   visit, Anticoagulation-related fatality        Current Medications:   Prior to Admission medications    Medication Sig Start Date End Date Taking? Authorizing Provider   digoxin (LANOXIN) 125 MCG tablet Take 1 tablet by mouth Daily. Indications: Chronic Atrial Fibrillation 4/23/24   Theodore Vital MD   docusate sodium (COLACE) 100 MG capsule Take 1 capsule by mouth Every Night. Indications: Constipation    ProviderMinh MD   furosemide (LASIX) 20 MG tablet Take 1 tablet by mouth As Needed (swelling). Indications: Edema 9/8/23   Aleksey Mathew MD   latanoprost (XALATAN) 0.005 % ophthalmic solution Administer 1 drop to the right eye Every Night. Indications: Wide-Angle Glaucoma    ProviderMinh MD   Lidocaine 4 % Place 1 patch on the skin as directed by provider Daily. Remove & Discard patch within 12 hours or as directed by MD  Patient not taking: Reported on 3/25/2024 3/3/24   Josie Solano APRN   magnesium hydroxide (Milk of Magnesia) 400 MG/5ML suspension Take 30 mL by mouth Daily As Needed for Constipation. Indications: Constipation 3/15/24   ProviderMinh MD   metoclopramide (REGLAN) 5 MG tablet Take 1 tablet by mouth 4 (Four) Times a Day Before Meals & at Bedtime. 3/2/24   Josie Solano APRN   metoprolol succinate XL (TOPROL-XL) 25  MG 24 hr tablet Take 1 tablet by mouth 2 (Two) Times a Day. Indications: Atrial Fibrillation 24   Theodore Vital MD   mirtazapine (REMERON) 15 MG tablet Take 1 tablet by mouth Every Night. Indications: Appetite Stimulant 3/13/24   Minh Bass MD   O2 (OXYGEN) Inhale 2 L/min As Needed. Indications: scar tissue in lungs 22   Minh Bass MD   pantoprazole (PROTONIX) 40 MG EC tablet Take 1 tablet by mouth 2 (Two) Times a Day Before Meals. 3/2/24   Josie Solano APRN   potassium chloride (KLOR-CON) 10 MEQ CR tablet Take 1 tablet by mouth As Needed (as needed when taking water pill). ONLY WHEN TAKING WATER PILL 23   Aleksey Mathew MD   VITAMIN A PO Take 1 tablet by mouth Daily. Indications: suppliment     Minh Bass MD   warfarin (COUMADIN) 2 MG tablet Take 1 tablet by mouth Every Night. Take 2 mg by mouth daily except 4 mg ( 2 tabs) on  or as directed by [facility]  Indications: Atrial Fibrillation 24   Theodore Vital MD   warfarin (COUMADIN) 2 MG tablet Take 1 tablet by mouth Every Night. 2mg on Tues, 4mg on all other days  Indications: Atrial Fibrillation 3/6/24 5/21/24  Minh Bass MD       Medical history:   Past Medical History:   Diagnosis Date    Atrial fibrillation     Coronary artery disease     Atrial fibrillation    GERD (gastroesophageal reflux disease)     Glaucoma     Hiatal hernia with gastroesophageal reflux     History of osteoporotic pathological fracture     Right intertroch (2019)    Hx of compression fracture of spine     T12 and L1(); T6 (2022)    Hyperlipidemia     Hypertension     Osteoarthritis     Osteoporosis     on prolia(3/21/22)    Stroke     off and on dizziness from the stroke.       Social history:   Social History     Tobacco Use    Smoking status: Former     Current packs/day: 0.00     Types: Cigarettes     Quit date:      Years since quittin.4     Passive  exposure: Past    Smokeless tobacco: Never   Substance Use Topics    Alcohol use: No       Allergies:    Penicillins, Codeine, and Latex    [id number] SCORE   [id number] Score for Atrial Fibrillation Stroke Risk  Age in Years: 75+  Sex: Female  CHF History: Yes  Hypertension History: Yes  Stroke/TIA/Thromboembolism History: Yes  Vascular Disease History: Yes  Diabetes History: No  [id number] Score: 8  Stroke risks -: 8 Points. Risk of ischemic stroke: 10.8%. Risk of stroke/TIA/embolism: 15.2%        This patient is managed via a collaborative agreement, pursuant to IC 25-26-16. The signed protocol is kept in the [facility] at [address] [place] [address].    Education: Shruthi Amin has been instructed to call if any changes in medications, doses, concerns, etc. Patient was provided dosing instructions and expressed verbal understanding and has no further questions at this time.    Plan:  1. no change; warfarin 2 mg PO daily, except warfarin 4 mg PO on Tuesdays.   Total weekly dose = 16 mg.   2. INR should be tested weekly and called into clinic.   3. Patient was provided supplies for self-testing of INR at home.     Micha Ferrari, PharmD  5/21/2024  14:07 EDT

## 2024-05-29 ENCOUNTER — ANTICOAGULATION VISIT (OUTPATIENT)
Dept: PHARMACY | Facility: HOSPITAL | Age: 89
End: 2024-05-29
Payer: MEDICARE

## 2024-05-29 DIAGNOSIS — Z79.01 CHRONIC ANTICOAGULATION: ICD-10-CM

## 2024-05-29 DIAGNOSIS — I48.0 PAROXYSMAL ATRIAL FIBRILLATION: Primary | Chronic | ICD-10-CM

## 2024-05-29 LAB — INR PPP: 2.5 (ref 2–3)

## 2024-05-29 NOTE — PROGRESS NOTES
Anticoagulation Clinic Progress Note - Home INR Testing     INR Goal: 2 - 3  Today's INR: 2.5 (therapeutic). INR result was called in today by Shea, patient's daughter.   Current warfarin dose: warfarin 2 mg PO daily, except warfarin 4 mg PO on .  Current total weekly dose = 16 mg per week.     INR History:      Latest Ref Rng & Units 2024    10:58 AM 2024    12:00 AM 2024     3:10 PM 2024    12:00 AM 2024     2:15 PM 2024    12:00 AM 2024     9:36 AM   Anticoagulation Monitoring   INR  2.70  2.80  2.20  2.50   INR Date  2024   INR Goal  2.0-3.0  2.0-3.0  2.0-3.0  2.0-3.0   Trend  Same  Same  Same  Same   Last Week Total  16 mg  16 mg  16 mg  16 mg   Next Week Total  16 mg  16 mg  16 mg  16 mg   Sun  2 mg  2 mg  2 mg  2 mg   Mon  2 mg  2 mg  2 mg  2 mg   Tue  4 mg  4 mg  4 mg  4 mg   Wed  2 mg  2 mg  2 mg  2 mg   Thu  2 mg  2 mg  2 mg  2 mg   Fri  2 mg  2 mg  2 mg  2 mg   Sat  2 mg  2 mg  2 mg  2 mg   Historical INR 2.00 - 3.00  2.80      2.20      2.50            This result is from an external source.       Anticoagulation Summary  As of 2024      INR goal:  2.0-3.0   TTR:  71.3% (4.6 y)   INR used for dosin.50 (2024)   Warfarin maintenance plan:  4 mg every Tue; 2 mg all other days   Weekly warfarin total:  16 mg   No change documented:  Ruby Ferrari, PharmD   Plan last modified:  Chel Muñoz, PharmD (2024)   Next INR check:     Priority:  High   Target end date:  Indefinite    Indications    Atrial fibrillation [I48.91]  Chronic anticoagulation [Z79.01]                 Anticoagulation Episode Summary       INR check location:  Anticoagulation Clinic    Preferred lab:      Send INR reminders to:  [facility] CLINICAL POOL    Comments:  Patient Contract: **Still need**    Last  = ; next due               Anticoagulation Care Providers       Provider Role Specialty Phone number    Amanuel  Theodore Mckay MD Referring Cardiology [phone]            Clinic Interview:   Patient Findings     Negatives:  Signs/symptoms of thrombosis, Signs/symptoms of bleeding,   Laboratory test error suspected, Change in health, Change in alcohol use,   Change in activity, Upcoming invasive procedure, Emergency department   visit, Upcoming dental procedure, Missed doses, Extra doses, Change in   medications, Change in diet/appetite, Hospital admission, Bruising, Other   complaints      Clinical Outcomes     Negatives:  Major bleeding event, Thromboembolic event,   Anticoagulation-related hospital admission, Anticoagulation-related ED   visit, Anticoagulation-related fatality        Current Medications:   Prior to Admission medications    Medication Sig Start Date End Date Taking? Authorizing Provider   digoxin (LANOXIN) 125 MCG tablet Take 1 tablet by mouth Daily. Indications: Chronic Atrial Fibrillation 4/23/24   Theodore Vital MD   docusate sodium (COLACE) 100 MG capsule Take 1 capsule by mouth Every Night. Indications: Constipation    ProviderMinh MD   furosemide (LASIX) 20 MG tablet Take 1 tablet by mouth As Needed (swelling). Indications: Edema 9/8/23   Aleksey Mathew MD   latanoprost (XALATAN) 0.005 % ophthalmic solution Administer 1 drop to the right eye Every Night. Indications: Wide-Angle Glaucoma    ProviderMinh MD   Lidocaine 4 % Place 1 patch on the skin as directed by provider Daily. Remove & Discard patch within 12 hours or as directed by MD  Patient not taking: Reported on 3/25/2024 3/3/24   Josie Solano APRN   magnesium hydroxide (Milk of Magnesia) 400 MG/5ML suspension Take 30 mL by mouth Daily As Needed for Constipation. Indications: Constipation 3/15/24   Minh Bass MD   metoclopramide (REGLAN) 5 MG tablet Take 1 tablet by mouth 4 (Four) Times a Day Before Meals & at Bedtime. 3/2/24   Josie Solano APRN   metoprolol succinate XL (TOPROL-XL) 25 MG 24  hr tablet Take 1 tablet by mouth 2 (Two) Times a Day. Indications: Atrial Fibrillation 24   Theodore Vital MD   mirtazapine (REMERON) 15 MG tablet Take 1 tablet by mouth Every Night. Indications: Appetite Stimulant 3/13/24   Minh Bass MD   O2 (OXYGEN) Inhale 2 L/min As Needed. Indications: scar tissue in lungs 22   ProviderMinh MD   pantoprazole (PROTONIX) 40 MG EC tablet Take 1 tablet by mouth 2 (Two) Times a Day Before Meals. 3/2/24   Josie Solano APRN   potassium chloride (KLOR-CON) 10 MEQ CR tablet Take 1 tablet by mouth As Needed (as needed when taking water pill). ONLY WHEN TAKING WATER PILL 23   Aleksey Mathew MD   VITAMIN A PO Take 1 tablet by mouth Daily. Indications: suppliment     ProviderMinh MD   warfarin (COUMADIN) 2 MG tablet Take 1 tablet by mouth Every Night. Take 2 mg by mouth daily except 4 mg ( 2 tabs) on  or as directed by [facility]  Indications: Atrial Fibrillation 24   Theodore Vital MD       Medical history:   Past Medical History:   Diagnosis Date    Atrial fibrillation     Coronary artery disease     Atrial fibrillation    GERD (gastroesophageal reflux disease)     Glaucoma     Hiatal hernia with gastroesophageal reflux     History of osteoporotic pathological fracture     Right intertroch (2019)    Hx of compression fracture of spine     T12 and L1(); T6 (2022)    Hyperlipidemia     Hypertension     Osteoarthritis     Osteoporosis     on prolia(3/21/22)    Stroke     off and on dizziness from the stroke.       Social history:   Social History     Tobacco Use    Smoking status: Former     Current packs/day: 0.00     Types: Cigarettes     Quit date:      Years since quittin.4     Passive exposure: Past    Smokeless tobacco: Never   Substance Use Topics    Alcohol use: No       Allergies:    Penicillins, Codeine, and Latex  [id number] SCORE   [id number] Score for Atrial  Fibrillation Stroke Risk  Age in Years: 75+  Sex: Female  CHF History: Yes  Hypertension History: Yes  Stroke/TIA/Thromboembolism History: Yes  Vascular Disease History: Yes  Diabetes History: No  [id number] Score: 8  Stroke risks -: 8 Points. Risk of ischemic stroke: 10.8%. Risk of stroke/TIA/embolism: 15.2%        This patient is managed via a collaborative agreement, pursuant to IC 25-26-16. The signed protocol is kept in the [facility] at [address] [place] [address].    Education: Shruthi Amin has been instructed to call if any changes in medications, doses, concerns, etc. Patient was provided dosing instructions and expressed verbal understanding and has no further questions at this time.    Plan:  1. no change; warfarin 2 mg PO daily, except warfarin 4 mg PO on Tuesdays.  Total weekly dose = 16 mg.   2. INR should be tested weekly and called into clinic.       Micha Ferrari, PharmD  5/29/2024  09:36 EDT

## 2024-06-04 ENCOUNTER — ANTICOAGULATION VISIT (OUTPATIENT)
Dept: PHARMACY | Facility: HOSPITAL | Age: 89
End: 2024-06-04
Payer: MEDICARE

## 2024-06-04 DIAGNOSIS — Z79.01 CHRONIC ANTICOAGULATION: Primary | ICD-10-CM

## 2024-06-04 LAB — INR PPP: 2.3 (ref 2–3)

## 2024-06-04 NOTE — PROGRESS NOTES
Anticoagulation Clinic Progress Note - Home INR Testing     INR Goal: 2 - 3  Today's INR: 2.3 (therapeutic). INR result was called in today by Shea (patient's daughter).   Current warfarin dose: warfarin 2 mg PO daily, except warfarin 4 mg PO on Tuesday.  Current total weekly dose = 16 mg per week.     INR History:      Latest Ref Rng & Units 2024     3:10 PM 2024    12:00 AM 2024     2:15 PM 2024    12:00 AM 2024     9:36 AM 2024    12:00 AM 2024     3:34 PM   Anticoagulation Monitoring   INR  2.80  2.20  2.50  2.30   INR Date  2024   INR Goal  2.0-3.0  2.0-3.0  2.0-3.0  2.0-3.0   Trend  Same  Same  Same  Same   Last Week Total  16 mg  16 mg  16 mg  16 mg   Next Week Total  16 mg  16 mg  16 mg  16 mg   Sun  2 mg  2 mg  2 mg  2 mg   Mon  2 mg  2 mg  2 mg  2 mg   Tue  4 mg  4 mg  4 mg  4 mg   Wed  2 mg  2 mg  2 mg  2 mg   Thu  2 mg  2 mg  2 mg  2 mg   Fri  2 mg  2 mg  2 mg  2 mg   Sat  2 mg  2 mg  2 mg  2 mg   Historical INR 2.00 - 3.00  2.20      2.50      2.30            This result is from an external source.       Anticoagulation Summary  As of 2024      INR goal:  2.0-3.0   TTR:  71.4% (4.6 y)   INR used for dosin.30 (2024)   Warfarin maintenance plan:  4 mg every Tue; 2 mg all other days   Weekly warfarin total:  16 mg   No change documented:  Tasia Wen, PharmD   Plan last modified:  Chel Muñoz, Yue (2024)   Next INR check:  2024   Priority:  High   Target end date:  Indefinite    Indications    Atrial fibrillation [I48.91]  Chronic anticoagulation [Z79.01]                 Anticoagulation Episode Summary       INR check location:  Anticoagulation Clinic    Preferred lab:      Send INR reminders to:   [facility] CLINICAL POOL    Comments:  Patient Contract: **Still need**    Last  = ; next due               Anticoagulation Care Providers       Provider Role Specialty Phone number     Theodore Vital MD Referring Cardiology [phone]            Clinic Interview:   Patient Findings     Negatives:  Signs/symptoms of thrombosis, Signs/symptoms of bleeding,   Laboratory test error suspected, Change in health, Change in alcohol use,   Change in activity, Upcoming invasive procedure, Emergency department   visit, Upcoming dental procedure, Missed doses, Extra doses, Change in   medications, Change in diet/appetite, Hospital admission, Bruising, Other   complaints      Clinical Outcomes     Negatives:  Major bleeding event, Thromboembolic event,   Anticoagulation-related hospital admission, Anticoagulation-related ED   visit, Anticoagulation-related fatality        Current Medications:   Prior to Admission medications    Medication Sig Start Date End Date Taking? Authorizing Provider   digoxin (LANOXIN) 125 MCG tablet Take 1 tablet by mouth Daily. Indications: Chronic Atrial Fibrillation 4/23/24   Theodore Vital MD   docusate sodium (COLACE) 100 MG capsule Take 1 capsule by mouth Every Night. Indications: Constipation    ProviderMinh MD   furosemide (LASIX) 20 MG tablet Take 1 tablet by mouth As Needed (swelling). Indications: Edema 9/8/23   Aleksey Mathew MD   latanoprost (XALATAN) 0.005 % ophthalmic solution Administer 1 drop to the right eye Every Night. Indications: Wide-Angle Glaucoma    ProviderMinh MD   Lidocaine 4 % Place 1 patch on the skin as directed by provider Daily. Remove & Discard patch within 12 hours or as directed by MD  Patient not taking: Reported on 3/25/2024 3/3/24   Josie Solano APRN   magnesium hydroxide (Milk of Magnesia) 400 MG/5ML suspension Take 30 mL by mouth Daily As Needed for Constipation. Indications: Constipation 3/15/24   ProviderMinh MD   metoclopramide (REGLAN) 5 MG tablet Take 1 tablet by mouth 4 (Four) Times a Day Before Meals & at Bedtime. 3/2/24   Josie Solano APRN   metoprolol succinate XL (TOPROL-XL) 25  MG 24 hr tablet Take 1 tablet by mouth 2 (Two) Times a Day. Indications: Atrial Fibrillation 24   Theodore Vital MD   mirtazapine (REMERON) 15 MG tablet Take 1 tablet by mouth Every Night. Indications: Appetite Stimulant 3/13/24   Minh Bass MD   O2 (OXYGEN) Inhale 2 L/min As Needed. Indications: scar tissue in lungs 22   ProviderMinh MD   pantoprazole (PROTONIX) 40 MG EC tablet Take 1 tablet by mouth 2 (Two) Times a Day Before Meals. 3/2/24   Josie Solano APRN   potassium chloride (KLOR-CON) 10 MEQ CR tablet Take 1 tablet by mouth As Needed (as needed when taking water pill). ONLY WHEN TAKING WATER PILL 23   Aleksey Mathew MD   VITAMIN A PO Take 1 tablet by mouth Daily. Indications: suppliment     ProviderMinh MD   warfarin (COUMADIN) 2 MG tablet Take 1 tablet by mouth Every Night. Take 2 mg by mouth daily except 4 mg ( 2 tabs) on  or as directed by [facility]  Indications: Atrial Fibrillation 24   Theodore Vital MD       Medical history:   Past Medical History:   Diagnosis Date    Atrial fibrillation     Coronary artery disease     Atrial fibrillation    GERD (gastroesophageal reflux disease)     Glaucoma     Hiatal hernia with gastroesophageal reflux     History of osteoporotic pathological fracture     Right intertroch (2019)    Hx of compression fracture of spine     T12 and L1(); T6 (2022)    Hyperlipidemia     Hypertension     Osteoarthritis     Osteoporosis     on prolia(3/21/22)    Stroke     off and on dizziness from the stroke.       Social history:   Social History     Tobacco Use    Smoking status: Former     Current packs/day: 0.00     Types: Cigarettes     Quit date:      Years since quittin.4     Passive exposure: Past    Smokeless tobacco: Never   Substance Use Topics    Alcohol use: No       Allergies:    Penicillins, Codeine, and Latex    [id number] SCORE   [id number] Score for  Atrial Fibrillation Stroke Risk  Age in Years: 75+  Sex: Female  CHF History: Yes  Hypertension History: Yes  Stroke/TIA/Thromboembolism History: Yes  Vascular Disease History: Yes  Diabetes History: No  [id number] Score: 8  Stroke risks -: 8 Points. Risk of ischemic stroke: 10.8%. Risk of stroke/TIA/embolism: 15.2%    This patient is managed via a collaborative agreement, pursuant to IC 25-26-16. The signed protocol is kept in the [facility] at [address] [place] [address].    Education: Shruthi Amin has been instructed to call if any changes in medications, doses, concerns, etc. Patient was provided dosing instructions and expressed verbal understanding and has no further questions at this time.    Plan:  1. no change; warfarin 2 mg PO daily, except warfarin 4 mg PO on Tuesday.  Total weekly dose = 16 mg.   2. INR should be tested weekly and called into clinic.     Tasia Wen, PharmD  6/4/2024  15:36 EDT

## 2024-06-12 ENCOUNTER — ANTICOAGULATION VISIT (OUTPATIENT)
Dept: PHARMACY | Facility: HOSPITAL | Age: 89
End: 2024-06-12
Payer: MEDICARE

## 2024-06-12 DIAGNOSIS — Z79.01 CHRONIC ANTICOAGULATION: ICD-10-CM

## 2024-06-12 DIAGNOSIS — I48.91 ATRIAL FIBRILLATION, UNSPECIFIED TYPE: Primary | Chronic | ICD-10-CM

## 2024-06-12 LAB — INR PPP: 2.2 (ref 2–3)

## 2024-06-12 NOTE — PROGRESS NOTES
Anticoagulation Clinic Progress Note - Home INR Testing     INR Goal: 2 - 3  Today's INR: 2.2 (therapeutic). INR result was obtained yesterday and called in today by Shea (patient's daughter).   Current warfarin dose: warfarin 2 mg PO daily, except warfarin 4 mg PO on Tuesday.  Current total weekly dose = 16 mg per week.     INR History:      Latest Ref Rng & Units 2024     2:15 PM 2024    12:00 AM 2024     9:36 AM 2024    12:00 AM 2024     3:34 PM 2024    12:00 AM 2024     4:25 PM   Anticoagulation Monitoring   INR  2.20  2.50  2.30  2.20   INR Date  2024   INR Goal  2.0-3.0  2.0-3.0  2.0-3.0  2.0-3.0   Trend  Same  Same  Same  Same   Last Week Total  16 mg  16 mg  16 mg  16 mg   Next Week Total  16 mg  16 mg  16 mg  16 mg   Sun  2 mg  2 mg  2 mg  2 mg   Mon  2 mg  2 mg  2 mg  2 mg   Tue  4 mg  4 mg  4 mg  -   Wed  2 mg  2 mg  2 mg  2 mg   Thu  2 mg  2 mg  2 mg  2 mg   Fri  2 mg  2 mg  2 mg  2 mg   Sat  2 mg  2 mg  2 mg  2 mg   Historical INR 2.00 - 3.00  2.50      2.30      2.20            This result is from an external source.       Anticoagulation Summary  As of 2024      INR goal:  2.0-3.0   TTR:  71.5% (4.6 y)   INR used for dosin.20 (2024)   Warfarin maintenance plan:  4 mg every Tu; 2 mg all other days   Weekly warfarin total:  16 mg   No change documented:  Chel Muñoz, PharmD   Plan last modified:  Chel Muñoz, PharmD (2024)   Next INR check:  2024   Priority:  High   Target end date:  Indefinite    Indications    Atrial fibrillation [I48.91]  Chronic anticoagulation [Z79.01]                 Anticoagulation Episode Summary       INR check location:  Anticoagulation Clinic    Preferred lab:      Send INR reminders to:   [facility] CLINICAL POOL    Comments:  Patient Contract: **Still need**    Last  = ; next due               Anticoagulation Care Providers       Provider Role Specialty  Phone number    Theodore Vital MD Referring Cardiology [phone]            Clinic Interview:   Patient Findings     Negatives:  Signs/symptoms of thrombosis, Signs/symptoms of bleeding,   Laboratory test error suspected, Change in health, Change in alcohol use,   Change in activity, Upcoming invasive procedure, Emergency department   visit, Upcoming dental procedure, Missed doses, Extra doses, Change in   medications, Change in diet/appetite, Hospital admission, Bruising, Other   complaints      Clinical Outcomes     Negatives:  Major bleeding event, Thromboembolic event,   Anticoagulation-related hospital admission, Anticoagulation-related ED   visit, Anticoagulation-related fatality        Current Medications:   Prior to Admission medications    Medication Sig Start Date End Date Taking? Authorizing Provider   digoxin (LANOXIN) 125 MCG tablet Take 1 tablet by mouth Daily. Indications: Chronic Atrial Fibrillation 4/23/24   Theodore Vital MD   docusate sodium (COLACE) 100 MG capsule Take 1 capsule by mouth Every Night. Indications: Constipation    ProviderMinh MD   furosemide (LASIX) 20 MG tablet Take 1 tablet by mouth As Needed (swelling). Indications: Edema 9/8/23   Aleksey Mathew MD   latanoprost (XALATAN) 0.005 % ophthalmic solution Administer 1 drop to the right eye Every Night. Indications: Wide-Angle Glaucoma    ProviderMinh MD   Lidocaine 4 % Place 1 patch on the skin as directed by provider Daily. Remove & Discard patch within 12 hours or as directed by MD  Patient not taking: Reported on 3/25/2024 3/3/24   Josie Solano APRN   magnesium hydroxide (Milk of Magnesia) 400 MG/5ML suspension Take 30 mL by mouth Daily As Needed for Constipation. Indications: Constipation 3/15/24   ProviderMinh MD   metoclopramide (REGLAN) 5 MG tablet Take 1 tablet by mouth 4 (Four) Times a Day Before Meals & at Bedtime. 3/2/24   Josie Solano APRN   metoprolol succinate XL  (TOPROL-XL) 25 MG 24 hr tablet Take 1 tablet by mouth 2 (Two) Times a Day. Indications: Atrial Fibrillation 24   Theodore Vital MD   mirtazapine (REMERON) 15 MG tablet Take 1 tablet by mouth Every Night. Indications: Appetite Stimulant 3/13/24   Minh Bass MD   O2 (OXYGEN) Inhale 2 L/min As Needed. Indications: scar tissue in lungs 22   ProviderMinh MD   pantoprazole (PROTONIX) 40 MG EC tablet Take 1 tablet by mouth 2 (Two) Times a Day Before Meals. 3/2/24   Josie Solano APRN   potassium chloride (KLOR-CON) 10 MEQ CR tablet Take 1 tablet by mouth As Needed (as needed when taking water pill). ONLY WHEN TAKING WATER PILL 23   Aleksey Mathew MD   VITAMIN A PO Take 1 tablet by mouth Daily. Indications: suppliment     ProviderMinh MD   warfarin (COUMADIN) 2 MG tablet Take 1 tablet by mouth Every Night. Take 2 mg by mouth daily except 4 mg ( 2 tabs) on  or as directed by [facility]  Indications: Atrial Fibrillation 24   Theodore Vital MD       Medical history:   Past Medical History:   Diagnosis Date    Atrial fibrillation     Coronary artery disease     Atrial fibrillation    GERD (gastroesophageal reflux disease)     Glaucoma     Hiatal hernia with gastroesophageal reflux     History of osteoporotic pathological fracture     Right intertroch (2019)    Hx of compression fracture of spine     T12 and L1(); T6 (2022)    Hyperlipidemia     Hypertension     Osteoarthritis     Osteoporosis     on prolia(3/21/22)    Stroke     off and on dizziness from the stroke.       Social history:   Social History     Tobacco Use    Smoking status: Former     Current packs/day: 0.00     Types: Cigarettes     Quit date:      Years since quittin.4     Passive exposure: Past    Smokeless tobacco: Never   Substance Use Topics    Alcohol use: No       Allergies:    Penicillins, Codeine, and Latex    [id number] SCORE    [id number] Score for Atrial Fibrillation Stroke Risk  Age in Years: 75+  Sex: Female  CHF History: Yes  Hypertension History: Yes  Stroke/TIA/Thromboembolism History: Yes  Vascular Disease History: Yes  Diabetes History: No  [id number] Score: 8  Stroke risks -: 8 Points. Risk of ischemic stroke: 10.8%. Risk of stroke/TIA/embolism: 15.2%      This patient is managed via a collaborative agreement, pursuant to IC 25-26-16. The signed protocol is kept in the [facility] at [address] [place] [address].    Education: Shruthi Amin has been instructed to call if any changes in medications, doses, concerns, etc. Patient was provided dosing instructions and expressed verbal understanding and has no further questions at this time.    Plan:  1. no change; warfarin 2 mg PO daily, except warfarin 4 mg PO on Tuesday.  Total weekly dose = 16 mg.   2. INR should be tested in 1 week and called into clinic.      Chel Muñoz, PharmD  6/12/2024  16:26 EDT

## 2024-06-17 ENCOUNTER — APPOINTMENT (OUTPATIENT)
Dept: GENERAL RADIOLOGY | Facility: HOSPITAL | Age: 89
End: 2024-06-17
Payer: MEDICARE

## 2024-06-17 ENCOUNTER — HOSPITAL ENCOUNTER (INPATIENT)
Facility: HOSPITAL | Age: 89
LOS: 8 days | Discharge: NURSING, REHAB OR LONG-TERM CARE | End: 2024-06-26
Attending: INTERNAL MEDICINE | Admitting: INTERNAL MEDICINE
Payer: MEDICARE

## 2024-06-17 DIAGNOSIS — J90 RECURRENT PLEURAL EFFUSION: ICD-10-CM

## 2024-06-17 DIAGNOSIS — R09.02 HYPOXIA: Primary | ICD-10-CM

## 2024-06-17 PROBLEM — R00.1 BRADYCARDIA: Status: ACTIVE | Noted: 2024-06-17

## 2024-06-17 LAB
ALBUMIN SERPL-MCNC: 3.6 G/DL (ref 3.5–5.2)
ALBUMIN/GLOB SERPL: 1.6 G/DL
ALP SERPL-CCNC: 79 U/L (ref 39–117)
ALT SERPL W P-5'-P-CCNC: 12 U/L (ref 1–33)
ANION GAP SERPL CALCULATED.3IONS-SCNC: 7.1 MMOL/L (ref 5–15)
ARTERIAL PATENCY WRIST A: POSITIVE
AST SERPL-CCNC: 22 U/L (ref 1–32)
ATMOSPHERIC PRESS: ABNORMAL MM[HG]
BASE EXCESS BLDA CALC-SCNC: 5.7 MMOL/L (ref 0–3)
BASOPHILS # BLD AUTO: 0.05 10*3/MM3 (ref 0–0.2)
BASOPHILS NFR BLD AUTO: 0.6 % (ref 0–1.5)
BDY SITE: ABNORMAL
BILIRUB SERPL-MCNC: 0.8 MG/DL (ref 0–1.2)
BUN SERPL-MCNC: 16 MG/DL (ref 8–23)
BUN/CREAT SERPL: 33.3 (ref 7–25)
CALCIUM SPEC-SCNC: 8.8 MG/DL (ref 8.2–9.6)
CHLORIDE SERPL-SCNC: 103 MMOL/L (ref 98–107)
CO2 BLDA-SCNC: 31.6 MMOL/L (ref 22–29)
CO2 SERPL-SCNC: 30.9 MMOL/L (ref 22–29)
CREAT SERPL-MCNC: 0.48 MG/DL (ref 0.57–1)
DEPRECATED RDW RBC AUTO: 59.7 FL (ref 37–54)
DIGOXIN SERPL-MCNC: 1.45 NG/ML (ref 0.6–1.2)
EGFRCR SERPLBLD CKD-EPI 2021: 86.8 ML/MIN/1.73
EOSINOPHIL # BLD AUTO: 0.27 10*3/MM3 (ref 0–0.4)
EOSINOPHIL NFR BLD AUTO: 3.4 % (ref 0.3–6.2)
ERYTHROCYTE [DISTWIDTH] IN BLOOD BY AUTOMATED COUNT: 15.5 % (ref 12.3–15.4)
GLOBULIN UR ELPH-MCNC: 2.2 GM/DL
GLUCOSE SERPL-MCNC: 93 MG/DL (ref 65–99)
HCO3 BLDA-SCNC: 30.2 MMOL/L (ref 21–28)
HCT VFR BLD AUTO: 32.6 % (ref 34–46.6)
HEMODILUTION: NO
HGB BLD-MCNC: 10.3 G/DL (ref 12–15.9)
IMM GRANULOCYTES # BLD AUTO: 0.16 10*3/MM3 (ref 0–0.05)
IMM GRANULOCYTES NFR BLD AUTO: 2 % (ref 0–0.5)
INHALED O2 CONCENTRATION: 28 %
INR PPP: 2.2 (ref 2–3)
LYMPHOCYTES # BLD AUTO: 1.59 10*3/MM3 (ref 0.7–3.1)
LYMPHOCYTES NFR BLD AUTO: 20.2 % (ref 19.6–45.3)
MCH RBC QN AUTO: 33.4 PG (ref 26.6–33)
MCHC RBC AUTO-ENTMCNC: 31.6 G/DL (ref 31.5–35.7)
MCV RBC AUTO: 105.8 FL (ref 79–97)
MODALITY: ABNORMAL
MONOCYTES # BLD AUTO: 1.27 10*3/MM3 (ref 0.1–0.9)
MONOCYTES NFR BLD AUTO: 16.1 % (ref 5–12)
NEUTROPHILS NFR BLD AUTO: 4.55 10*3/MM3 (ref 1.7–7)
NEUTROPHILS NFR BLD AUTO: 57.7 % (ref 42.7–76)
NRBC BLD AUTO-RTO: 0.3 /100 WBC (ref 0–0.2)
NT-PROBNP SERPL-MCNC: 3240 PG/ML (ref 0–1800)
PCO2 BLDA: 43.2 MM HG (ref 35–48)
PH BLDA: 7.45 PH UNITS (ref 7.35–7.45)
PLATELET # BLD AUTO: 168 10*3/MM3 (ref 140–450)
PMV BLD AUTO: 10.4 FL (ref 6–12)
PO2 BLD: 344 MM[HG] (ref 0–500)
PO2 BLDA: 96.2 MM HG (ref 83–108)
POTASSIUM SERPL-SCNC: 4.3 MMOL/L (ref 3.5–5.2)
PROT SERPL-MCNC: 5.8 G/DL (ref 6–8.5)
PROTHROMBIN TIME: 22.6 SECONDS (ref 19.4–28.5)
RBC # BLD AUTO: 3.08 10*6/MM3 (ref 3.77–5.28)
SAO2 % BLDCOA: 97.8 % (ref 94–98)
SODIUM SERPL-SCNC: 141 MMOL/L (ref 136–145)
T4 FREE SERPL-MCNC: 1.36 NG/DL (ref 0.93–1.7)
TROPONIN T SERPL HS-MCNC: 20 NG/L
TSH SERPL DL<=0.05 MIU/L-ACNC: 2.16 UIU/ML (ref 0.27–4.2)
WBC NRBC COR # BLD AUTO: 7.89 10*3/MM3 (ref 3.4–10.8)

## 2024-06-17 PROCEDURE — 93005 ELECTROCARDIOGRAM TRACING: CPT

## 2024-06-17 PROCEDURE — 83880 ASSAY OF NATRIURETIC PEPTIDE: CPT | Performed by: PHYSICIAN ASSISTANT

## 2024-06-17 PROCEDURE — 99285 EMERGENCY DEPT VISIT HI MDM: CPT

## 2024-06-17 PROCEDURE — 80053 COMPREHEN METABOLIC PANEL: CPT | Performed by: PHYSICIAN ASSISTANT

## 2024-06-17 PROCEDURE — 94640 AIRWAY INHALATION TREATMENT: CPT

## 2024-06-17 PROCEDURE — 85610 PROTHROMBIN TIME: CPT | Performed by: PHYSICIAN ASSISTANT

## 2024-06-17 PROCEDURE — 36415 COLL VENOUS BLD VENIPUNCTURE: CPT

## 2024-06-17 PROCEDURE — 82803 BLOOD GASES ANY COMBINATION: CPT | Performed by: PHYSICIAN ASSISTANT

## 2024-06-17 PROCEDURE — 80162 ASSAY OF DIGOXIN TOTAL: CPT | Performed by: PHYSICIAN ASSISTANT

## 2024-06-17 PROCEDURE — 94664 DEMO&/EVAL PT USE INHALER: CPT

## 2024-06-17 PROCEDURE — 25010000002 FUROSEMIDE PER 20 MG: Performed by: INTERNAL MEDICINE

## 2024-06-17 PROCEDURE — G0378 HOSPITAL OBSERVATION PER HR: HCPCS

## 2024-06-17 PROCEDURE — 71045 X-RAY EXAM CHEST 1 VIEW: CPT

## 2024-06-17 PROCEDURE — 85025 COMPLETE CBC W/AUTO DIFF WBC: CPT | Performed by: PHYSICIAN ASSISTANT

## 2024-06-17 PROCEDURE — 93005 ELECTROCARDIOGRAM TRACING: CPT | Performed by: INTERNAL MEDICINE

## 2024-06-17 PROCEDURE — 84484 ASSAY OF TROPONIN QUANT: CPT | Performed by: PHYSICIAN ASSISTANT

## 2024-06-17 PROCEDURE — 94799 UNLISTED PULMONARY SVC/PX: CPT

## 2024-06-17 PROCEDURE — 84443 ASSAY THYROID STIM HORMONE: CPT | Performed by: INTERNAL MEDICINE

## 2024-06-17 PROCEDURE — 84439 ASSAY OF FREE THYROXINE: CPT | Performed by: INTERNAL MEDICINE

## 2024-06-17 PROCEDURE — 36600 WITHDRAWAL OF ARTERIAL BLOOD: CPT | Performed by: PHYSICIAN ASSISTANT

## 2024-06-17 RX ORDER — WARFARIN SODIUM 2 MG/1
2 TABLET ORAL
Status: DISCONTINUED | OUTPATIENT
Start: 2024-06-18 | End: 2024-06-18

## 2024-06-17 RX ORDER — SODIUM CHLORIDE 0.9 % (FLUSH) 0.9 %
10 SYRINGE (ML) INJECTION EVERY 12 HOURS SCHEDULED
Status: DISCONTINUED | OUTPATIENT
Start: 2024-06-17 | End: 2024-06-26 | Stop reason: HOSPADM

## 2024-06-17 RX ORDER — ACETAMINOPHEN 325 MG/1
650 TABLET ORAL EVERY 4 HOURS PRN
Status: DISCONTINUED | OUTPATIENT
Start: 2024-06-17 | End: 2024-06-26 | Stop reason: HOSPADM

## 2024-06-17 RX ORDER — IPRATROPIUM BROMIDE AND ALBUTEROL SULFATE 2.5; .5 MG/3ML; MG/3ML
3 SOLUTION RESPIRATORY (INHALATION) ONCE
Status: COMPLETED | OUTPATIENT
Start: 2024-06-17 | End: 2024-06-17

## 2024-06-17 RX ORDER — WARFARIN SODIUM 2 MG/1
4 TABLET ORAL WEEKLY
COMMUNITY

## 2024-06-17 RX ORDER — METOPROLOL SUCCINATE 25 MG/1
12.5 TABLET, EXTENDED RELEASE ORAL EVERY 12 HOURS SCHEDULED
Status: DISCONTINUED | OUTPATIENT
Start: 2024-06-18 | End: 2024-06-24

## 2024-06-17 RX ORDER — LATANOPROST 50 UG/ML
1 SOLUTION/ DROPS OPHTHALMIC NIGHTLY
Status: DISCONTINUED | OUTPATIENT
Start: 2024-06-17 | End: 2024-06-26 | Stop reason: HOSPADM

## 2024-06-17 RX ORDER — ONDANSETRON 2 MG/ML
4 INJECTION INTRAMUSCULAR; INTRAVENOUS EVERY 6 HOURS PRN
Status: DISCONTINUED | OUTPATIENT
Start: 2024-06-17 | End: 2024-06-26 | Stop reason: HOSPADM

## 2024-06-17 RX ORDER — SENNOSIDES A AND B 8.6 MG/1
1 TABLET, FILM COATED ORAL DAILY PRN
COMMUNITY
End: 2024-06-26 | Stop reason: HOSPADM

## 2024-06-17 RX ORDER — BISACODYL 10 MG
10 SUPPOSITORY, RECTAL RECTAL DAILY PRN
Status: DISCONTINUED | OUTPATIENT
Start: 2024-06-17 | End: 2024-06-26 | Stop reason: HOSPADM

## 2024-06-17 RX ORDER — FUROSEMIDE 10 MG/ML
20 INJECTION INTRAMUSCULAR; INTRAVENOUS EVERY 12 HOURS
Status: DISCONTINUED | OUTPATIENT
Start: 2024-06-17 | End: 2024-06-18

## 2024-06-17 RX ORDER — DOCUSATE SODIUM 100 MG/1
100 CAPSULE, LIQUID FILLED ORAL NIGHTLY
Status: DISCONTINUED | OUTPATIENT
Start: 2024-06-17 | End: 2024-06-26 | Stop reason: HOSPADM

## 2024-06-17 RX ORDER — NITROGLYCERIN 0.4 MG/1
0.4 TABLET SUBLINGUAL
Status: DISCONTINUED | OUTPATIENT
Start: 2024-06-17 | End: 2024-06-26 | Stop reason: HOSPADM

## 2024-06-17 RX ORDER — BISACODYL 5 MG/1
5 TABLET, DELAYED RELEASE ORAL DAILY PRN
Status: DISCONTINUED | OUTPATIENT
Start: 2024-06-17 | End: 2024-06-26 | Stop reason: HOSPADM

## 2024-06-17 RX ORDER — POLYETHYLENE GLYCOL 3350 17 G/17G
17 POWDER, FOR SOLUTION ORAL DAILY PRN
Status: DISCONTINUED | OUTPATIENT
Start: 2024-06-17 | End: 2024-06-26 | Stop reason: HOSPADM

## 2024-06-17 RX ORDER — AMOXICILLIN 250 MG
1 CAPSULE ORAL DAILY PRN
COMMUNITY

## 2024-06-17 RX ORDER — SODIUM CHLORIDE 0.9 % (FLUSH) 0.9 %
10 SYRINGE (ML) INJECTION AS NEEDED
Status: DISCONTINUED | OUTPATIENT
Start: 2024-06-17 | End: 2024-06-26 | Stop reason: HOSPADM

## 2024-06-17 RX ORDER — OMEGA-3S/DHA/EPA/FISH OIL/D3 300MG-1000
400 CAPSULE ORAL DAILY
COMMUNITY

## 2024-06-17 RX ORDER — SODIUM CHLORIDE 9 MG/ML
40 INJECTION, SOLUTION INTRAVENOUS AS NEEDED
Status: DISCONTINUED | OUTPATIENT
Start: 2024-06-17 | End: 2024-06-26 | Stop reason: HOSPADM

## 2024-06-17 RX ORDER — WARFARIN SODIUM 2 MG/1
2 TABLET ORAL
COMMUNITY

## 2024-06-17 RX ORDER — METOCLOPRAMIDE 5 MG/1
5 TABLET ORAL
Status: DISCONTINUED | OUTPATIENT
Start: 2024-06-17 | End: 2024-06-26 | Stop reason: HOSPADM

## 2024-06-17 RX ORDER — PANTOPRAZOLE SODIUM 40 MG/1
40 TABLET, DELAYED RELEASE ORAL
Status: DISCONTINUED | OUTPATIENT
Start: 2024-06-17 | End: 2024-06-17 | Stop reason: ALTCHOICE

## 2024-06-17 RX ORDER — MIRTAZAPINE 15 MG/1
15 TABLET, FILM COATED ORAL NIGHTLY
Status: DISCONTINUED | OUTPATIENT
Start: 2024-06-17 | End: 2024-06-26 | Stop reason: HOSPADM

## 2024-06-17 RX ORDER — AMOXICILLIN 250 MG
2 CAPSULE ORAL 2 TIMES DAILY PRN
Status: DISCONTINUED | OUTPATIENT
Start: 2024-06-17 | End: 2024-06-26 | Stop reason: HOSPADM

## 2024-06-17 RX ADMIN — IPRATROPIUM BROMIDE AND ALBUTEROL SULFATE 3 ML: .5; 3 SOLUTION RESPIRATORY (INHALATION) at 16:02

## 2024-06-17 RX ADMIN — LATANOPROST 1 DROP: 50 SOLUTION/ DROPS OPHTHALMIC at 21:17

## 2024-06-17 RX ADMIN — FUROSEMIDE 20 MG: 10 INJECTION, SOLUTION INTRAMUSCULAR; INTRAVENOUS at 17:12

## 2024-06-17 RX ADMIN — Medication 10 ML: at 21:19

## 2024-06-17 RX ADMIN — MIRTAZAPINE 15 MG: 15 TABLET, FILM COATED ORAL at 21:14

## 2024-06-17 NOTE — Clinical Note
Level of Care: Telemetry [5]   Admitting Physician: ODALIS HAILE [[id number]]   Attending Physician: ODALIS HAILE [[id number]]

## 2024-06-17 NOTE — ED PROVIDER NOTES
Subjective   History of Present Illness  89-year-old female presents to the emergency department by private vehicle with complaint of shortness of breath.  Patient states that she was at her pulmonologist office this morning her O2 sat was 79 to low 80s without oxygen, she normally is on oxygen so they placed her on oxygen and sent to the emergency department for workup.  Patient denies chest pain fevers chills body aches nausea vomiting diarrhea, no orthopnea no complaint of leg swelling.        Review of Systems   Constitutional:  Positive for fatigue. Negative for chills and fever.   Respiratory:  Positive for shortness of breath. Negative for cough and chest tightness.    Cardiovascular:  Negative for chest pain, palpitations and leg swelling.   Gastrointestinal:  Negative for abdominal pain, nausea and vomiting.       Past Medical History:   Diagnosis Date    Atrial fibrillation     Coronary artery disease     Atrial fibrillation    GERD (gastroesophageal reflux disease)     Glaucoma     Hiatal hernia with gastroesophageal reflux     History of osteoporotic pathological fracture     Right intertroch (7/2019)    Hx of compression fracture of spine     T12 and L1(2013); T6 (1/2022)    Hyperlipidemia     Hypertension     Osteoarthritis     Osteoporosis     on prolia(3/21/22)    Stroke     off and on dizziness from the stroke.       Allergies   Allergen Reactions    Penicillins Hives    Codeine Nausea And Vomiting    Latex Rash       Past Surgical History:   Procedure Laterality Date    BACK SURGERY      kyphoplasty    EYE SURGERY      cataract surgery    FIXATION KYPHOPLASTY LUMBAR SPINE  2013    HIP TROCHANTERIC NAILING WITH INTRAMEDULLARY HIP SCREW Right 7/20/2019    Procedure: HIP TROCHANTERIC NAILING SHORT WITH INTRAMEDULLARY HIP SCREW;  Surgeon: Edward Centeno MD;  Location: [facility] MAIN OR;  Service: Orthopedics    RECTAL SURGERY      x 3       Family History   Problem Relation Age of Onset    Heart  "disease Mother     Hypertension Mother     Osteoporosis Mother     Cancer Father         colon cancer    Osteoporosis Father     Cancer Sister         lung    Cancer Brother         colon cancer       Social History     Socioeconomic History    Marital status:    Tobacco Use    Smoking status: Former     Current packs/day: 0.00     Types: Cigarettes     Quit date:      Years since quittin.5     Passive exposure: Past    Smokeless tobacco: Never   Vaping Use    Vaping status: Never Used   Substance and Sexual Activity    Alcohol use: No    Drug use: No    Sexual activity: Defer           Objective   Physical Exam  Vitals and nursing note reviewed.   Constitutional:       General: She is not in acute distress.     Appearance: She is diaphoretic. She is not ill-appearing or toxic-appearing.   HENT:      Head: Normocephalic and atraumatic.   Cardiovascular:      Rate and Rhythm: Regular rhythm. Bradycardia present.   Pulmonary:      Effort: Bradypnea present.      Breath sounds: Decreased breath sounds present. No wheezing, rhonchi or rales.   Abdominal:      General: Bowel sounds are normal.      Palpations: Abdomen is soft.      Tenderness: There is abdominal tenderness.   Musculoskeletal:      Right lower leg: No edema.      Left lower leg: No edema.   Skin:     Coloration: Skin is cyanotic.      Comments: Fingertips slightly blue in color   Neurological:      Mental Status: She is alert and oriented to person, place, and time.   Psychiatric:         Attention and Perception: Attention normal.         Mood and Affect: Mood and affect normal.         Procedures           ED Course      /72 (BP Location: Right arm, Patient Position: Lying)   Pulse 55   Temp 98.5 °F (36.9 °C) (Oral)   Resp 21   Ht 157.5 cm (62\")   Wt 38.5 kg (84 lb 14 oz)   SpO2 97%   BMI 15.52 kg/m²   Labs Reviewed   DIGOXIN LEVEL - Abnormal; Notable for the following components:       Result Value    Digoxin 1.45 (*)     " All other components within normal limits   COMPREHENSIVE METABOLIC PANEL - Abnormal; Notable for the following components:    Creatinine 0.48 (*)     CO2 30.9 (*)     Total Protein 5.8 (*)     BUN/Creatinine Ratio 33.3 (*)     All other components within normal limits    Narrative:     GFR Normal >60  Chronic Kidney Disease <60  Kidney Failure <15    The GFR formula is only valid for adults with stable renal function between ages 18 and 70.   BLOOD GAS, ARTERIAL - Abnormal; Notable for the following components:    pH, Arterial 7.453 (*)     HCO3, Arterial 30.2 (*)     Base Excess, Arterial 5.7 (*)     CO2 Content 31.6 (*)     All other components within normal limits   CBC WITH AUTO DIFFERENTIAL - Abnormal; Notable for the following components:    RBC 3.08 (*)     Hemoglobin 10.3 (*)     Hematocrit 32.6 (*)     .8 (*)     MCH 33.4 (*)     RDW 15.5 (*)     RDW-SD 59.7 (*)     Monocyte % 16.1 (*)     Immature Grans % 2.0 (*)     Monocytes, Absolute 1.27 (*)     Immature Grans, Absolute 0.16 (*)     nRBC 0.3 (*)     All other components within normal limits   SINGLE HS TROPONIN T - Abnormal; Notable for the following components:    HS Troponin T 20 (*)     All other components within normal limits    Narrative:     High Sensitive Troponin T Reference Range:  <14.0 ng/L- Negative Female for AMI  <22.0 ng/L- Negative Male for AMI  >=14 - Abnormal Female indicating possible myocardial injury.  >=22 - Abnormal Male indicating possible myocardial injury.   Clinicians would have to utilize clinical acumen, EKG, Troponin, and serial changes to determine if it is an Acute Myocardial Infarction or myocardial injury due to an underlying chronic condition.        BNP (IN-HOUSE) - Abnormal; Notable for the following components:    proBNP 3,240.0 (*)     All other components within normal limits    Narrative:     This assay is used as an aid in the diagnosis of individuals suspected of having heart failure. It can be used  as an aid in the diagnosis of acute decompensated heart failure (ADHF) in patients presenting with signs and symptoms of ADHF to the emergency department (ED). In addition, NT-proBNP of <300 pg/mL indicates ADHF is not likely.    Age Range Result Interpretation  NT-proBNP Concentration (pg/mL:      <50             Positive            >450                   Gray                 300-450                    Negative             <300    50-75           Positive            >900                  Gray                300-900                  Negative            <300      >75             Positive            >1800                  Gray                300-1800                  Negative            <300   PROTIME-INR - Normal   TSH - Normal   T4, FREE - Normal   PROTIME-INR   BASIC METABOLIC PANEL   CBC WITH AUTO DIFFERENTIAL   CBC AND DIFFERENTIAL    Narrative:     The following orders were created for panel order CBC & Differential.  Procedure                               Abnormality         Status                     ---------                               -----------         ------                     CBC Auto Differential[[id number]]        Abnormal            Final result               Scan Slide[[id number]]                                                                    Please view results for these tests on the individual orders.   CBC AND DIFFERENTIAL    Narrative:     The following orders were created for panel order CBC & Differential.  Procedure                               Abnormality         Status                     ---------                               -----------         ------                     CBC Auto Differential[[id number]]                                                         Please view results for these tests on the individual orders.     Medications   docusate sodium (COLACE) capsule 100 mg (0 mg Oral Hold 6/17/24 2030)   latanoprost (XALATAN) 0.005 % ophthalmic solution 1 drop (1 drop Right Eye  Given 6/17/24 2117)   magnesium hydroxide (MILK OF MAGNESIA) suspension 5 mL (has no administration in time range)   metoclopramide (REGLAN) tablet 5 mg (5 mg Oral Not Given 6/17/24 2115)   mirtazapine (REMERON) tablet 15 mg (15 mg Oral Given 6/17/24 2114)   warfarin (COUMADIN) tablet 2 mg (has no administration in time range)   sodium chloride 0.9 % flush 10 mL (10 mL Intravenous Given 6/17/24 2119)   sodium chloride 0.9 % flush 10 mL (has no administration in time range)   sodium chloride 0.9 % infusion 40 mL (has no administration in time range)   nitroglycerin (NITROSTAT) SL tablet 0.4 mg (has no administration in time range)   acetaminophen (TYLENOL) tablet 650 mg (has no administration in time range)   sennosides-docusate (PERICOLACE) 8.6-50 MG per tablet 2 tablet (has no administration in time range)     And   polyethylene glycol (MIRALAX) packet 17 g (has no administration in time range)     And   bisacodyl (DULCOLAX) EC tablet 5 mg (has no administration in time range)     And   bisacodyl (DULCOLAX) suppository 10 mg (has no administration in time range)   ondansetron (ZOFRAN) injection 4 mg (has no administration in time range)   metoprolol succinate XL (TOPROL-XL) 24 hr tablet 12.5 mg (has no administration in time range)   furosemide (LASIX) injection 20 mg (20 mg Intravenous Given 6/17/24 1712)   ipratropium-albuterol (DUO-NEB) nebulizer solution 3 mL (3 mL Nebulization Given 6/17/24 1602)     XR Chest 1 View    Result Date: 6/17/2024  Impression: 1. Moderate right and small left pleural effusions with bibasilar airspace disease which may relate to compressive atelectasis and/or pneumonia. 2. Cardiomegaly and central pulmonary vascular congestion. 3. Large hiatal hernia, unchanged. Electronically Signed: Elver Machuca MD  6/17/2024 3:09 PM EDT  Workstation ID: [id number]                                          Medical Decision Making  89-year-old female presents with increased shortness of breath sent by  pulmonology office with O2 sats below 80%.  Patient denies any chest pain chest tightness denies any leg swelling orthopnea she states that the pulmonologist says there is most likely fluid on her lungs that needs to be drained.  Patient was placed on 2 L of oxygen O2 sat increased to 97% here in the emergency room chest x-ray shows pleural effusion right side stable cardiomegaly.  BNP stable troponin negative CBC CMP baseline for patient EKG: A-fib heart rate 52 no STEMI.  Patient was admitted to Dr. Haile in stable condition.    Problems Addressed:  Hypoxia: complicated acute illness or injury    Amount and/or Complexity of Data Reviewed  External Data Reviewed: labs, radiology, ECG and notes.  Labs: ordered. Decision-making details documented in ED Course.  Radiology: ordered. Decision-making details documented in ED Course.  ECG/medicine tests: ordered and independent interpretation performed. Decision-making details documented in ED Course.    Risk  Prescription drug management.  Decision regarding hospitalization.        Final diagnoses:   Hypoxia       ED Disposition  ED Disposition       ED Disposition   Decision to Admit    Condition   --    Comment   Level of Care: Med/Surg [1]   Diagnosis: Recurrent pleural effusion [[id number]]   Admitting Physician: ODALIS HAILE [[id number]]   Attending Physician: ODALIS HIALE [[id number]]                 No follow-up provider specified.       Medication List        ASK your doctor about these medications      * warfarin 2 MG tablet  Commonly known as: COUMADIN  Ask about: Which instructions should I use?     * warfarin 2 MG tablet  Commonly known as: COUMADIN  Ask about: Which instructions should I use?           * This list has 2 medication(s) that are the same as other medications prescribed for you. Read the directions carefully, and ask your doctor or other care provider to review them with you.                     Sekou Bradley PA-C  06/17/24 8239

## 2024-06-18 LAB
ANION GAP SERPL CALCULATED.3IONS-SCNC: 8.1 MMOL/L (ref 5–15)
BASOPHILS # BLD AUTO: 0.08 10*3/MM3 (ref 0–0.2)
BASOPHILS NFR BLD AUTO: 1.2 % (ref 0–1.5)
BUN SERPL-MCNC: 16 MG/DL (ref 8–23)
BUN/CREAT SERPL: 29.1 (ref 7–25)
CALCIUM SPEC-SCNC: 8.6 MG/DL (ref 8.2–9.6)
CHLORIDE SERPL-SCNC: 102 MMOL/L (ref 98–107)
CO2 SERPL-SCNC: 30.9 MMOL/L (ref 22–29)
CREAT SERPL-MCNC: 0.55 MG/DL (ref 0.57–1)
DEPRECATED RDW RBC AUTO: 61.1 FL (ref 37–54)
EGFRCR SERPLBLD CKD-EPI 2021: 84 ML/MIN/1.73
EOSINOPHIL # BLD AUTO: 0.3 10*3/MM3 (ref 0–0.4)
EOSINOPHIL NFR BLD AUTO: 4.5 % (ref 0.3–6.2)
ERYTHROCYTE [DISTWIDTH] IN BLOOD BY AUTOMATED COUNT: 15.6 % (ref 12.3–15.4)
GLUCOSE SERPL-MCNC: 84 MG/DL (ref 65–99)
HCT VFR BLD AUTO: 31.9 % (ref 34–46.6)
HGB BLD-MCNC: 9.9 G/DL (ref 12–15.9)
IMM GRANULOCYTES # BLD AUTO: 0.12 10*3/MM3 (ref 0–0.05)
IMM GRANULOCYTES NFR BLD AUTO: 1.8 % (ref 0–0.5)
INR PPP: 2.23 (ref 2–3)
IRON 24H UR-MRATE: 105 MCG/DL (ref 37–145)
IRON SATN MFR SERPL: 55 % (ref 20–50)
LYMPHOCYTES # BLD AUTO: 1.54 10*3/MM3 (ref 0.7–3.1)
LYMPHOCYTES NFR BLD AUTO: 23.2 % (ref 19.6–45.3)
MCH RBC QN AUTO: 33.3 PG (ref 26.6–33)
MCHC RBC AUTO-ENTMCNC: 31 G/DL (ref 31.5–35.7)
MCV RBC AUTO: 107.4 FL (ref 79–97)
MONOCYTES # BLD AUTO: 1.03 10*3/MM3 (ref 0.1–0.9)
MONOCYTES NFR BLD AUTO: 15.5 % (ref 5–12)
NEUTROPHILS NFR BLD AUTO: 3.57 10*3/MM3 (ref 1.7–7)
NEUTROPHILS NFR BLD AUTO: 53.8 % (ref 42.7–76)
NRBC BLD AUTO-RTO: 0.3 /100 WBC (ref 0–0.2)
PLATELET # BLD AUTO: 169 10*3/MM3 (ref 140–450)
PMV BLD AUTO: 11.4 FL (ref 6–12)
POTASSIUM SERPL-SCNC: 4.4 MMOL/L (ref 3.5–5.2)
PROTHROMBIN TIME: 22.9 SECONDS (ref 19.4–28.5)
QT INTERVAL: 567 MS
QTC INTERVAL: 526 MS
RBC # BLD AUTO: 2.97 10*6/MM3 (ref 3.77–5.28)
RETICS # AUTO: 0.1 10*6/MM3 (ref 0.02–0.13)
RETICS/RBC NFR AUTO: 3.6 % (ref 0.7–1.9)
SODIUM SERPL-SCNC: 141 MMOL/L (ref 136–145)
TIBC SERPL-MCNC: 191 MCG/DL (ref 298–536)
TRANSFERRIN SERPL-MCNC: 128 MG/DL (ref 200–360)
WBC NRBC COR # BLD AUTO: 6.64 10*3/MM3 (ref 3.4–10.8)

## 2024-06-18 PROCEDURE — 84466 ASSAY OF TRANSFERRIN: CPT | Performed by: INTERNAL MEDICINE

## 2024-06-18 PROCEDURE — 83540 ASSAY OF IRON: CPT | Performed by: INTERNAL MEDICINE

## 2024-06-18 PROCEDURE — 85045 AUTOMATED RETICULOCYTE COUNT: CPT | Performed by: INTERNAL MEDICINE

## 2024-06-18 PROCEDURE — 85610 PROTHROMBIN TIME: CPT | Performed by: INTERNAL MEDICINE

## 2024-06-18 PROCEDURE — 85025 COMPLETE CBC W/AUTO DIFF WBC: CPT | Performed by: INTERNAL MEDICINE

## 2024-06-18 PROCEDURE — 25010000002 FUROSEMIDE PER 20 MG: Performed by: INTERNAL MEDICINE

## 2024-06-18 PROCEDURE — 80048 BASIC METABOLIC PNL TOTAL CA: CPT | Performed by: INTERNAL MEDICINE

## 2024-06-18 PROCEDURE — 25810000003 SODIUM CHLORIDE 0.9 % SOLUTION: Performed by: INTERNAL MEDICINE

## 2024-06-18 RX ORDER — MIDODRINE HYDROCHLORIDE 5 MG/1
5 TABLET ORAL
Status: DISCONTINUED | OUTPATIENT
Start: 2024-06-18 | End: 2024-06-19

## 2024-06-18 RX ORDER — GUAIFENESIN 600 MG/1
1200 TABLET, EXTENDED RELEASE ORAL EVERY 12 HOURS SCHEDULED
Status: DISCONTINUED | OUTPATIENT
Start: 2024-06-18 | End: 2024-06-26 | Stop reason: HOSPADM

## 2024-06-18 RX ADMIN — METOCLOPRAMIDE 5 MG: 5 TABLET ORAL at 17:31

## 2024-06-18 RX ADMIN — SODIUM CHLORIDE 500 ML: 9 INJECTION, SOLUTION INTRAVENOUS at 11:59

## 2024-06-18 RX ADMIN — FUROSEMIDE 20 MG: 10 INJECTION, SOLUTION INTRAMUSCULAR; INTRAVENOUS at 05:12

## 2024-06-18 RX ADMIN — Medication 10 ML: at 20:41

## 2024-06-18 RX ADMIN — GUAIFENESIN 1200 MG: 600 TABLET, EXTENDED RELEASE ORAL at 11:56

## 2024-06-18 RX ADMIN — DOCUSATE SODIUM 100 MG: 100 CAPSULE, LIQUID FILLED ORAL at 20:39

## 2024-06-18 RX ADMIN — Medication 10 ML: at 08:33

## 2024-06-18 RX ADMIN — GUAIFENESIN 1200 MG: 600 TABLET, EXTENDED RELEASE ORAL at 20:38

## 2024-06-18 RX ADMIN — METOCLOPRAMIDE 5 MG: 5 TABLET ORAL at 11:56

## 2024-06-18 RX ADMIN — MIDODRINE HYDROCHLORIDE 5 MG: 5 TABLET ORAL at 19:55

## 2024-06-18 RX ADMIN — METOPROLOL SUCCINATE 12.5 MG: 25 TABLET, FILM COATED, EXTENDED RELEASE ORAL at 08:32

## 2024-06-18 RX ADMIN — MIRTAZAPINE 15 MG: 15 TABLET, FILM COATED ORAL at 20:39

## 2024-06-18 RX ADMIN — METOCLOPRAMIDE 5 MG: 5 TABLET ORAL at 20:38

## 2024-06-18 RX ADMIN — METOCLOPRAMIDE 5 MG: 5 TABLET ORAL at 08:32

## 2024-06-18 RX ADMIN — LATANOPROST 1 DROP: 50 SOLUTION/ DROPS OPHTHALMIC at 20:41

## 2024-06-18 NOTE — CONSULTS
[facility]: [facility]         CONSULT NOTE    Patient Identification:  Shruthi Amin  89 y.o.  female  6/10/1928  [id number]            Requesting physician: Attending physician    Reason for Consultation: Pleural effusion      History of Present Illness:  89-year-old female with history of CAD, A-fib, HTN and HLD, prior pleural effusion who presented to my office 6/17/2024 complaining of shortness of breath and cyanosis.  Her oxygen saturation was low on room air, decreased breath sounds in the bases indicative of worsening pleural effusion.  Patient was instructed to go to the emergency room for further evaluation.  Patient is afebrile, heart rate in the 50s, blood pressure 128/61, oxygen saturation 92% on 2 L per nasal cannula.  proBNP 3240, creatinine 0.48, BUN 16, WBC 7.8, hemoglobin 10.3.  Digoxin 1.45.  INR 2.2  Chest x-ray showing moderate right and small left pleural effusion with bibasilar airspace disease.        Assessment:  Recurrent pleural effusion: Status post right thoracentesis April 2024 with drainage of 450 mL  Hypoxemia: ABG 7.45/43.2/92.2 while on 2 L nasal cannula  Anemia  A-fib  Bradycardia with mild digoxin toxicity  Large hiatal hernia  COPD  CHF  CAD  Osteoarthritis, kyphosis    Recommendations:  Hold warfarin and when INR is 1.8 or less to proceed with the right-sided chest tube placement with consideration for chemical pleurodesis  Hold digoxin  Oxygen supplement and titration to maintain saturation 90 to 95%  Bronchodilators  Mucinex  Diuresis  Encourage use of incentive spirometry    I personally reviewed the radiological studies        Review of Sytems:  Constitutional: Negative for chills, and fever and positive for malaise/fatigue.   HENT: Negative.    Eyes: Negative.    Cardiovascular: Negative.    Respiratory: Positive for cough and shortness of breath.    Skin: Negative.    Musculoskeletal: Negative.    Gastrointestinal: Negative.    Genitourinary: Negative.     Neurological: Chronic weakness    Past Medical History:  Past Medical History:   Diagnosis Date    Atrial fibrillation     Coronary artery disease     Atrial fibrillation    GERD (gastroesophageal reflux disease)     Glaucoma     Hiatal hernia with gastroesophageal reflux     History of osteoporotic pathological fracture     Right intertroch (7/2019)    Hx of compression fracture of spine     T12 and L1(2013); T6 (1/2022)    Hyperlipidemia     Hypertension     Osteoarthritis     Osteoporosis     on prolia(3/21/22)    Stroke     off and on dizziness from the stroke.       Past Surgical History:  Past Surgical History:   Procedure Laterality Date    BACK SURGERY      kyphoplasty    EYE SURGERY      cataract surgery    FIXATION KYPHOPLASTY LUMBAR SPINE  2013    HIP TROCHANTERIC NAILING WITH INTRAMEDULLARY HIP SCREW Right 7/20/2019    Procedure: HIP TROCHANTERIC NAILING SHORT WITH INTRAMEDULLARY HIP SCREW;  Surgeon: Edward Centeno MD;  Location: [facility] OR;  Service: Orthopedics    RECTAL SURGERY      x 3        Home Meds:  Medications Prior to Admission   Medication Sig Dispense Refill Last Dose    Cholecalciferol 10 MCG (400 UNIT) tablet Take 1 tablet by mouth Daily.       DIGESTIVE ENZYMES PO Take 1 capsule by mouth 3 (Three) Times a Day.       digoxin (LANOXIN) 125 MCG tablet Take 1 tablet by mouth Daily. Indications: Chronic Atrial Fibrillation 90 tablet 1     furosemide (LASIX) 20 MG tablet Take 1 tablet by mouth As Needed (swelling). Indications: Edema 90 tablet 2     Zeinab Dailey 550 MG capsule Take 1 capsule by mouth 3 (Three) Times a Day.       latanoprost (XALATAN) 0.005 % ophthalmic solution Administer 1 drop to both eyes Every Night. Indications: Wide-Angle Glaucoma       metoprolol succinate XL (TOPROL-XL) 25 MG 24 hr tablet Take 1 tablet by mouth 2 (Two) Times a Day. Indications: Atrial Fibrillation 180 tablet 1     mirtazapine (REMERON) 15 MG tablet Take 1 tablet by mouth Every Night.  "Indications: Appetite Stimulant       warfarin (COUMADIN) 2 MG tablet Take 1 tablet by mouth 6 (Six) Times a Week. Monday, Wednesday, Thursday, Friday, Saturday,        warfarin (COUMADIN) 2 MG tablet Take 2 tablets by mouth 1 (One) Time Per Week. On Tuesday only.       magnesium hydroxide (Milk of Magnesia) 400 MG/5ML suspension Take 30 mL by mouth Daily As Needed for Constipation. Indications: Constipation       potassium chloride (KLOR-CON) 10 MEQ CR tablet Take 1 tablet by mouth As Needed (as needed when taking water pill). ONLY WHEN TAKING WATER PILL 90 tablet 1     senna 8.6 MG tablet Take 1 tablet by mouth Daily As Needed for Constipation.       sennosides-docusate (senna-docusate sodium) 8.6-50 MG per tablet Take 1 tablet by mouth Daily As Needed for Constipation.       VITAMIN A PO Take 1 tablet by mouth Daily. Indications: suppliment           Allergies:  Allergies   Allergen Reactions    Penicillins Hives    Codeine Nausea And Vomiting    Latex Rash       Social History:   Social History     Socioeconomic History    Marital status:    Tobacco Use    Smoking status: Former     Current packs/day: 0.00     Types: Cigarettes     Quit date:      Years since quittin.5     Passive exposure: Past    Smokeless tobacco: Never   Vaping Use    Vaping status: Never Used   Substance and Sexual Activity    Alcohol use: No    Drug use: No    Sexual activity: Defer       Family History:  Family History   Problem Relation Age of Onset    Heart disease Mother     Hypertension Mother     Osteoporosis Mother     Cancer Father         colon cancer    Osteoporosis Father     Cancer Sister         lung    Cancer Brother         colon cancer       Physical Exam:  /61 (BP Location: Right arm, Patient Position: Lying)   Pulse 59   Temp 97.2 °F (36.2 °C) (Oral)   Resp 14   Ht 157.5 cm (62\")   Wt 38.5 kg (84 lb 14 oz)   SpO2 92%   BMI 15.52 kg/m²  Body mass index is 15.52 kg/m². 92% 38.5 kg (84 lb " 14 oz)  General Appearance:  Alert   HEENT:  Normocephalic, without obvious abnormality, Conjunctiva/corneas clear,.   Nares normal, no drainage     Neck:  Supple, symmetrical, trachea midline. No JVD.  Lungs /Chest wall:   Bilateral basal rhonchi, respirations unlabored, symmetrical wall movement.     Heart:  Regular rate and rhythm, S1 S2 normal  Abdomen: Soft, non-tender, no masses, no organomegaly.    Extremities: No edema, no clubbing, positive peripheral cyanosis    LABS:  Lab Results   Component Value Date    CALCIUM 8.6 06/18/2024    PHOS 2.5 10/04/2022     Results from last 7 days   Lab Units 06/18/24  0001 06/17/24  1543   SODIUM mmol/L 141 141   POTASSIUM mmol/L 4.4 4.3   CHLORIDE mmol/L 102 103   CO2 mmol/L 30.9* 30.9*   BUN mg/dL 16 16   CREATININE mg/dL 0.55* 0.48*   GLUCOSE mg/dL 84 93   CALCIUM mg/dL 8.6 8.8   WBC 10*3/mm3 6.64 7.89   HEMOGLOBIN g/dL 9.9* 10.3*   PLATELETS 10*3/mm3 169 168   ALT (SGPT) U/L  --  12   AST (SGOT) U/L  --  22   PROBNP pg/mL  --  3,240.0*     Lab Results   Component Value Date    TROPONINT 20 (H) 06/17/2024     Results from last 7 days   Lab Units 06/17/24  1543   HSTROP T ng/L 20*             Results from last 7 days   Lab Units 06/17/24  1600   PH, ARTERIAL pH units 7.453*   PCO2, ARTERIAL mm Hg 43.2   PO2 ART mm Hg 96.2   O2 SATURATION ART % 97.8   MODALITY  Cannula         Results from last 7 days   Lab Units 06/18/24  0001 06/17/24  1543   INR  2.23 2.20         Lab Results   Component Value Date    TSH 2.160 06/17/2024     Estimated Creatinine Clearance: 36.4 mL/min (A) (by C-G formula based on SCr of 0.55 mg/dL (L)).         Imaging:  Imaging Results (Last 24 Hours)       Procedure Component Value Units Date/Time    XR Chest 1 View [[id number]] Collected: 06/17/24 1507     Updated: 06/17/24 1511    Narrative:      XR CHEST 1 VW    Date of Exam: 6/17/2024 3:00 PM EDT    Indication: sob    Comparison: 4/19/2024    Findings:  Heart is enlarged. There is a large hiatal  hernia with likely intrathoracic stomach. No pneumothorax. Moderate right and small left pleural effusions. Bibasilar airspace disease may relate to compressive atelectasis or pneumonia. Central pulmonary   vascular congestion. Negative for pneumothorax. Kyphoplasty noted in the lower thoracic spine near the thoracolumbar junction.      Impression:      Impression:  1. Moderate right and small left pleural effusions with bibasilar airspace disease which may relate to compressive atelectasis and/or pneumonia.  2. Cardiomegaly and central pulmonary vascular congestion.  3. Large hiatal hernia, unchanged.      Electronically Signed: Elver Machuca MD    6/17/2024 3:09 PM EDT    Workstation ID: [id number]              Current Meds:   SCHEDULE  docusate sodium, 100 mg, Oral, Nightly  furosemide, 20 mg, Intravenous, Q12H  latanoprost, 1 drop, Right Eye, Nightly  metoclopramide, 5 mg, Oral, 4x Daily AC & at Bedtime  metoprolol succinate XL, 12.5 mg, Oral, Q12H  mirtazapine, 15 mg, Oral, Nightly  sodium chloride, 10 mL, Intravenous, Q12H      Infusions     PRNs    acetaminophen    senna-docusate sodium **AND** polyethylene glycol **AND** bisacodyl **AND** bisacodyl    magnesium hydroxide    nitroglycerin    ondansetron    sodium chloride    sodium chloride        Lupe Curtis MD  6/18/2024  09:28 EDT      Much of this encounter note is an electronic transcription/translation of spoken language to printed text using Dragon Software.

## 2024-06-18 NOTE — NURSING NOTE
86-year-old female who presents to the hospital with complaints of shortness of breath.  A consult was received to assess her right foot and her sacral area.  Patient reports the area being present for several weeks.  She reports some incontinence.  She reports that her daughter helps take care of her wound that her daughter is a retired nurse.    Patient presents with a healing stage III sacral pressure injury the injury is small is 1 x 0.6 cm with a depth of point 2.  Its pink its clean there is scant serous exudate noted.  No signs of infection are noted.  No erythema no purulence no induration.  Silicone border foam dressing is in place and this is certainly appropriate and would recommend to continue.  I also will recommend an agility offloading surface.  I also provided the patient with a waffle chair cushion.  She has been using a thin eggcrate chair cushion at home.  This is likely not adequate to relieve pressure.  Was also asked to assess the right foot.  There is an area to the right foot.  It does not appear to be over a bony prominence.  There appears to be an abrasion there and overall it appears to be stable.  No recommendations for this area at this time.  Will follow as needed

## 2024-06-18 NOTE — PROGRESS NOTES
LOS: 0 days   Patient Care Team:  Cristal Goodwin MD as PCP - General (Family Medicine)  Anai Moise APRN as Nurse Practitioner (Cardiology)  Theodore Vital MD as Consulting Physician (Cardiology)    Subjective     Interval History: Stable overnight    Patient Complaints: No complaints to me this morning    History taken from: patient    Review of Systems   Constitutional:  Positive for activity change, appetite change and fatigue. Negative for chills, diaphoresis and fever.   HENT:  Negative for facial swelling.    Eyes:  Negative for visual disturbance.   Respiratory:  Positive for shortness of breath. Negative for cough, wheezing and stridor.    Cardiovascular:  Negative for chest pain, palpitations and leg swelling.   Gastrointestinal:  Negative for abdominal pain, constipation, diarrhea, nausea and vomiting.   Endocrine: Negative for polyuria.   Genitourinary:  Negative for dysuria.   Musculoskeletal:  Positive for gait problem. Negative for arthralgias and back pain.   Neurological:  Negative for tremors, syncope, weakness and headaches.   Psychiatric/Behavioral:  Negative for confusion.            Objective     Vital Signs  Temp:  [97.2 °F (36.2 °C)-98.5 °F (36.9 °C)] 97.5 °F (36.4 °C)  Heart Rate:  [42-61] 57  Resp:  [13-26] 13  BP: ()/(44-72) 97/44    Physical Exam:     General Appearance:    Alert, cooperative, in no acute distress, oriented   Head:    Normocephalic, without obvious abnormality, atraumatic   Eyes:            Lids and lashes normal, conjunctivae and sclerae normal, no   icterus, no pallor, corneas clear, PERRLA   Ears:    Ears appear intact with no abnormalities noted   Throat:   No oral lesions, no thrush, oral mucosa moist   Neck:   No adenopathy, supple, trachea midline, no thyromegaly, no   carotid bruit, no JVD   Lungs:   Decreased breath sounds in right lower lobe    Heart:  Irregularly irregular   Chest Wall:    No abnormalities observed   Abdomen:     Normal  bowel sounds, no masses, no organomegaly, soft        Non-tender non-distended, no guarding,   Extremities:   Moves all extremities well, no edema, no cyanosis, no             Redness   Pulses:   Pulses palpable and equal bilaterally   Skin:   No bleeding, bruising or rash   Lymph nodes:   No palpable adenopathy   Neurologic:   Cranial nerves 2 - 12 grossly intact, sensation intact, DTR       present and equal bilaterally        Results Review:    Lab Results (last 24 hours)       Procedure Component Value Units Date/Time    Basic Metabolic Panel [[id number]]  (Abnormal) Collected: 06/18/24 0001    Specimen: Blood from Arm, Left Updated: 06/18/24 0234     Glucose 84 mg/dL      BUN 16 mg/dL      Creatinine 0.55 mg/dL      Sodium 141 mmol/L      Potassium 4.4 mmol/L      Comment: Specimen hemolyzed.  Result may be falsely elevated.        Chloride 102 mmol/L      CO2 30.9 mmol/L      Calcium 8.6 mg/dL      BUN/Creatinine Ratio 29.1     Anion Gap 8.1 mmol/L      eGFR 84.0 mL/min/1.73     Narrative:      GFR Normal >60  Chronic Kidney Disease <60  Kidney Failure <15    The GFR formula is only valid for adults with stable renal function between ages 18 and 70.    Protime-INR [[id number]]  (Normal) Collected: 06/18/24 0001    Specimen: Blood from Arm, Left Updated: 06/18/24 0206     Protime 22.9 Seconds      INR 2.23    CBC & Differential [[id number]]  (Abnormal) Collected: 06/18/24 0001    Specimen: Blood from Arm, Left Updated: 06/18/24 0201    Narrative:      The following orders were created for panel order CBC & Differential.  Procedure                               Abnormality         Status                     ---------                               -----------         ------                     CBC Auto Differential[[id number]]        Abnormal            Final result               Scan Slide[[id number]]                                                                    Please view results for these tests on the  individual orders.    CBC Auto Differential [[id number]]  (Abnormal) Collected: 06/18/24 0001    Specimen: Blood from Arm, Left Updated: 06/18/24 0201     WBC 6.64 10*3/mm3      RBC 2.97 10*6/mm3      Hemoglobin 9.9 g/dL      Hematocrit 31.9 %      .4 fL      MCH 33.3 pg      MCHC 31.0 g/dL      RDW 15.6 %      RDW-SD 61.1 fl      MPV 11.4 fL      Platelets 169 10*3/mm3      Neutrophil % 53.8 %      Lymphocyte % 23.2 %      Monocyte % 15.5 %      Eosinophil % 4.5 %      Basophil % 1.2 %      Immature Grans % 1.8 %      Neutrophils, Absolute 3.57 10*3/mm3      Lymphocytes, Absolute 1.54 10*3/mm3      Monocytes, Absolute 1.03 10*3/mm3      Eosinophils, Absolute 0.30 10*3/mm3      Basophils, Absolute 0.08 10*3/mm3      Immature Grans, Absolute 0.12 10*3/mm3      nRBC 0.3 /100 WBC     TSH [[id number]]  (Normal) Collected: 06/17/24 1543    Specimen: Blood from Arm, Left Updated: 06/17/24 1720     TSH 2.160 uIU/mL     T4, Free [[id number]]  (Normal) Collected: 06/17/24 1543    Specimen: Blood from Arm, Left Updated: 06/17/24 1720     Free T4 1.36 ng/dL     Digoxin Level [[id number]]  (Abnormal) Collected: 06/17/24 1543    Specimen: Blood from Arm, Left Updated: 06/17/24 1620     Digoxin 1.45 ng/mL     Comprehensive Metabolic Panel [[id number]]  (Abnormal) Collected: 06/17/24 1543    Specimen: Blood from Arm, Left Updated: 06/17/24 1620     Glucose 93 mg/dL      BUN 16 mg/dL      Creatinine 0.48 mg/dL      Sodium 141 mmol/L      Potassium 4.3 mmol/L      Chloride 103 mmol/L      CO2 30.9 mmol/L      Calcium 8.8 mg/dL      Total Protein 5.8 g/dL      Albumin 3.6 g/dL      ALT (SGPT) 12 U/L      AST (SGOT) 22 U/L      Alkaline Phosphatase 79 U/L      Total Bilirubin 0.8 mg/dL      Globulin 2.2 gm/dL      A/G Ratio 1.6 g/dL      BUN/Creatinine Ratio 33.3     Anion Gap 7.1 mmol/L      eGFR 86.8 mL/min/1.73     Narrative:      GFR Normal >60  Chronic Kidney Disease <60  Kidney Failure <15    The GFR formula is only valid for  adults with stable renal function between ages 18 and 70.    Single High Sensitivity Troponin T [[id number]]  (Abnormal) Collected: 06/17/24 1543    Specimen: Blood from Arm, Left Updated: 06/17/24 1620     HS Troponin T 20 ng/L     Narrative:      High Sensitive Troponin T Reference Range:  <14.0 ng/L- Negative Female for AMI  <22.0 ng/L- Negative Male for AMI  >=14 - Abnormal Female indicating possible myocardial injury.  >=22 - Abnormal Male indicating possible myocardial injury.   Clinicians would have to utilize clinical acumen, EKG, Troponin, and serial changes to determine if it is an Acute Myocardial Infarction or myocardial injury due to an underlying chronic condition.         BNP [[id number]]  (Abnormal) Collected: 06/17/24 1543    Specimen: Blood from Arm, Left Updated: 06/17/24 1620     proBNP 3,240.0 pg/mL     Narrative:      This assay is used as an aid in the diagnosis of individuals suspected of having heart failure. It can be used as an aid in the diagnosis of acute decompensated heart failure (ADHF) in patients presenting with signs and symptoms of ADHF to the emergency department (ED). In addition, NT-proBNP of <300 pg/mL indicates ADHF is not likely.    Age Range Result Interpretation  NT-proBNP Concentration (pg/mL:      <50             Positive            >450                   Gray                 300-450                    Negative             <300    50-75           Positive            >900                  Gray                300-900                  Negative            <300      >75             Positive            >1800                  Gray                300-1800                  Negative            <300    Protime-INR [[id number]]  (Normal) Collected: 06/17/24 1543    Specimen: Blood from Arm, Left Updated: 06/17/24 1604     Protime 22.6 Seconds      INR 2.20    Blood Gas, Arterial - [[id number]]  (Abnormal) Collected: 06/17/24 1600    Specimen: Arterial Blood Updated: 06/17/24 1602      Site Right Radial     Gulshan's Test Positive     pH, Arterial 7.453 pH units      pCO2, Arterial 43.2 mm Hg      pO2, Arterial 96.2 mm Hg      HCO3, Arterial 30.2 mmol/L      Base Excess, Arterial 5.7 mmol/L      Comment: Serial Number: [id number]:  [id number]        O2 Saturation, Arterial 97.8 %      CO2 Content 31.6 mmol/L      Barometric Pressure for Blood Gas --     Comment: N/A        Modality Cannula     FIO2 28 %      Hemodilution No     PO2/FIO2 344    CBC & Differential [[id number]]  (Abnormal) Collected: 06/17/24 1543    Specimen: Blood from Arm, Left Updated: 06/17/24 8226    Narrative:      The following orders were created for panel order CBC & Differential.  Procedure                               Abnormality         Status                     ---------                               -----------         ------                     CBC Auto Differential[[id number]]        Abnormal            Final result               Scan Slide[[id number]]                                                                    Please view results for these tests on the individual orders.    CBC Auto Differential [[id number]]  (Abnormal) Collected: 06/17/24 1543    Specimen: Blood from Arm, Left Updated: 06/17/24 5093     WBC 7.89 10*3/mm3      RBC 3.08 10*6/mm3      Hemoglobin 10.3 g/dL      Hematocrit 32.6 %      .8 fL      MCH 33.4 pg      MCHC 31.6 g/dL      RDW 15.5 %      RDW-SD 59.7 fl      MPV 10.4 fL      Platelets 168 10*3/mm3      Neutrophil % 57.7 %      Lymphocyte % 20.2 %      Monocyte % 16.1 %      Eosinophil % 3.4 %      Basophil % 0.6 %      Immature Grans % 2.0 %      Neutrophils, Absolute 4.55 10*3/mm3      Lymphocytes, Absolute 1.59 10*3/mm3      Monocytes, Absolute 1.27 10*3/mm3      Eosinophils, Absolute 0.27 10*3/mm3      Basophils, Absolute 0.05 10*3/mm3      Immature Grans, Absolute 0.16 10*3/mm3      nRBC 0.3 /100 WBC              Imaging Results (Last 24 Hours)       Procedure Component Value Units  Date/Time    XR Chest 1 View [[id number]] Collected: 06/17/24 1507     Updated: 06/17/24 1511    Narrative:      XR CHEST 1 VW    Date of Exam: 6/17/2024 3:00 PM EDT    Indication: sob    Comparison: 4/19/2024    Findings:  Heart is enlarged. There is a large hiatal hernia with likely intrathoracic stomach. No pneumothorax. Moderate right and small left pleural effusions. Bibasilar airspace disease may relate to compressive atelectasis or pneumonia. Central pulmonary   vascular congestion. Negative for pneumothorax. Kyphoplasty noted in the lower thoracic spine near the thoracolumbar junction.      Impression:      Impression:  1. Moderate right and small left pleural effusions with bibasilar airspace disease which may relate to compressive atelectasis and/or pneumonia.  2. Cardiomegaly and central pulmonary vascular congestion.  3. Large hiatal hernia, unchanged.      Electronically Signed: Elver Machuca MD    6/17/2024 3:09 PM EDT    Workstation ID: [id number]                 I reviewed the patient's new clinical results.    Medication Review:   Scheduled Meds:docusate sodium, 100 mg, Oral, Nightly  guaiFENesin, 1,200 mg, Oral, Q12H  latanoprost, 1 drop, Right Eye, Nightly  metoclopramide, 5 mg, Oral, 4x Daily AC & at Bedtime  [Held by provider] metoprolol succinate XL, 12.5 mg, Oral, Q12H  mirtazapine, 15 mg, Oral, Nightly  sodium chloride, 500 mL, Intravenous, Once  sodium chloride, 10 mL, Intravenous, Q12H      Continuous Infusions:   PRN Meds:.  acetaminophen    senna-docusate sodium **AND** polyethylene glycol **AND** bisacodyl **AND** bisacodyl    magnesium hydroxide    nitroglycerin    ondansetron    sodium chloride    sodium chloride     Assessment & Plan       Recurrent pleural effusion    Atrial fibrillation    Hypoxia    Bradycardia      Anticipate thoracentesis and possible chest tube placement once INR is below 1.8.    Large hiatal hernia-metoclopramide, reflux precautions  Underweight-nutritional  supplements, mirtazapine  Glaucoma  Secondary hypercoagulable state related to atrial fibs  Paroxysmal atrial fib -currently in a controlled rate; holding digoxin and metoprolol due to bradycardia on admission and elevated drug level.  Holding anticoagulation in anticipation of chest tube  Anemia-checking TIBC, reticulocyte count, vitamin B12, folic acid  Stage II pressure injury on sacrum-healing, continue topical barrier creams and pressure-relief measures    Plan for disposition: To be determined    Tricia Roman MD  06/18/24  12:02 EDT

## 2024-06-18 NOTE — H&P
Patient Care Team:  Cristal Goodwin MD as PCP - General (Family Medicine)  Anai Moise APRN as Nurse Practitioner (Cardiology)  Theodore Vital MD as Consulting Physician (Cardiology)    Chief complaint shortness of breath    Subjective     Patient is a 89 y.o. female with history of atrial fibrillation, GERD, hypertension, hyperlipidemia who presented to the ER from her pulmonologist office with shortness of breath and hypoxia. IT was reported that her oxygen was 79-80's on room air when at the office and they placed her on oxygen and gave her a dose of solumedrol and sent to the ER for further workup. She denies any fever, chills, chest pain, nausea, vomiting, leg swelling.   In the ER troponin 20, bnp 3240, CXR shows moderate right and small left pleural effusions with bibasilar airspace disease, possible pneumonia. Cardiomegaly and central pulmonary vascular congestion. EKG atrial fib rate 52.        Onset of symptoms was unknown    Review of Systems   Constitutional: Negative.    HENT: Negative.     Eyes: Negative.    Respiratory:  Positive for shortness of breath.    Cardiovascular: Negative.    Gastrointestinal: Negative.    Endocrine: Negative.    Genitourinary: Negative.    Musculoskeletal: Negative.    Skin: Negative.    Neurological: Negative.    Psychiatric/Behavioral: Negative.            History  Past Medical History:   Diagnosis Date    Atrial fibrillation     Coronary artery disease     Atrial fibrillation    GERD (gastroesophageal reflux disease)     Glaucoma     Hiatal hernia with gastroesophageal reflux     History of osteoporotic pathological fracture     Right intertroch (7/2019)    Hx of compression fracture of spine     T12 and L1(2013); T6 (1/2022)    Hyperlipidemia     Hypertension     Osteoarthritis     Osteoporosis     on prolia(3/21/22)    Stroke     off and on dizziness from the stroke.     Past Surgical History:   Procedure Laterality Date    BACK SURGERY       kyphoplasty    EYE SURGERY      cataract surgery    FIXATION KYPHOPLASTY LUMBAR SPINE  2013    HIP TROCHANTERIC NAILING WITH INTRAMEDULLARY HIP SCREW Right 2019    Procedure: HIP TROCHANTERIC NAILING SHORT WITH INTRAMEDULLARY HIP SCREW;  Surgeon: Edward Centeno MD;  Location: [facility] MAIN OR;  Service: Orthopedics    RECTAL SURGERY      x 3     Family History   Problem Relation Age of Onset    Heart disease Mother     Hypertension Mother     Osteoporosis Mother     Cancer Father         colon cancer    Osteoporosis Father     Cancer Sister         lung    Cancer Brother         colon cancer     Social History     Tobacco Use    Smoking status: Former     Current packs/day: 0.00     Types: Cigarettes     Quit date:      Years since quittin.5     Passive exposure: Past    Smokeless tobacco: Never   Vaping Use    Vaping status: Never Used   Substance Use Topics    Alcohol use: No    Drug use: No     Medications Prior to Admission   Medication Sig Dispense Refill Last Dose    Cholecalciferol 10 MCG (400 UNIT) tablet Take 1 tablet by mouth Daily.       DIGESTIVE ENZYMES PO Take 1 capsule by mouth 3 (Three) Times a Day.       digoxin (LANOXIN) 125 MCG tablet Take 1 tablet by mouth Daily. Indications: Chronic Atrial Fibrillation 90 tablet 1     furosemide (LASIX) 20 MG tablet Take 1 tablet by mouth As Needed (swelling). Indications: Edema 90 tablet 2     [place] Dailey 550 MG capsule Take 1 capsule by mouth 3 (Three) Times a Day.       latanoprost (XALATAN) 0.005 % ophthalmic solution Administer 1 drop to both eyes Every Night. Indications: Wide-Angle Glaucoma       metoprolol succinate XL (TOPROL-XL) 25 MG 24 hr tablet Take 1 tablet by mouth 2 (Two) Times a Day. Indications: Atrial Fibrillation 180 tablet 1     mirtazapine (REMERON) 15 MG tablet Take 1 tablet by mouth Every Night. Indications: Appetite Stimulant       warfarin (COUMADIN) 2 MG tablet Take 1 tablet by mouth 6 (Six) Times a Week.  Monday, Wednesday, Thursday, Friday, Saturday, Sunday       warfarin (COUMADIN) 2 MG tablet Take 2 tablets by mouth 1 (One) Time Per Week. On Tuesday only.       magnesium hydroxide (Milk of Magnesia) 400 MG/5ML suspension Take 30 mL by mouth Daily As Needed for Constipation. Indications: Constipation       potassium chloride (KLOR-CON) 10 MEQ CR tablet Take 1 tablet by mouth As Needed (as needed when taking water pill). ONLY WHEN TAKING WATER PILL 90 tablet 1     senna 8.6 MG tablet Take 1 tablet by mouth Daily As Needed for Constipation.       sennosides-docusate (senna-docusate sodium) 8.6-50 MG per tablet Take 1 tablet by mouth Daily As Needed for Constipation.       VITAMIN A PO Take 1 tablet by mouth Daily. Indications: suppliment         Allergies:  Penicillins, Codeine, and Latex    Objective     Vital Signs  Temp:  [97.2 °F (36.2 °C)-98.5 °F (36.9 °C)] 98.5 °F (36.9 °C)  Heart Rate:  [42-61] 55  Resp:  [14-26] 21  BP: (114-136)/(46-72) 116/72     Physical Exam:      General Appearance:    Alert, cooperative, in no acute distress   Head:    Normocephalic, without obvious abnormality, atraumatic   Eyes:            Lids and lashes normal, conjunctivae and sclerae normal, no   icterus, no pallor, corneas clear, PERRLA   Ears:    Ears appear intact with no abnormalities noted   Throat:   No oral lesions, no thrush, oral mucosa moist   Neck:   No adenopathy, supple, trachea midline, no thyromegaly, no   carotid bruit, no JVD   Lungs:     Diminished lung sounds ,respirations regular, even and                  unlabored    Heart:    Regular rhythm and normal rate, normal S1 and S2, no            murmur, no gallop, no rub, no click   Chest Wall:    No abnormalities observed   Abdomen:     Normal bowel sounds, no masses, no organomegaly, soft        non-tender, non-distended, no guarding, no rebound                tenderness   Extremities:   Moves all extremities well, no edema, no cyanosis, no             redness    Pulses:   Pulses palpable and equal bilaterally   Skin:   No bleeding, bruising or rash   Lymph nodes:   No palpable adenopathy   Neurologic:   No focal deficits noted       Results Review:     Imaging Results (Last 24 Hours)       Procedure Component Value Units Date/Time    XR Chest 1 View [[id number]] Collected: 06/17/24 1507     Updated: 06/17/24 1511    Narrative:      XR CHEST 1 VW    Date of Exam: 6/17/2024 3:00 PM EDT    Indication: sob    Comparison: 4/19/2024    Findings:  Heart is enlarged. There is a large hiatal hernia with likely intrathoracic stomach. No pneumothorax. Moderate right and small left pleural effusions. Bibasilar airspace disease may relate to compressive atelectasis or pneumonia. Central pulmonary   vascular congestion. Negative for pneumothorax. Kyphoplasty noted in the lower thoracic spine near the thoracolumbar junction.      Impression:      Impression:  1. Moderate right and small left pleural effusions with bibasilar airspace disease which may relate to compressive atelectasis and/or pneumonia.  2. Cardiomegaly and central pulmonary vascular congestion.  3. Large hiatal hernia, unchanged.      Electronically Signed: Elver Machuca MD    6/17/2024 3:09 PM EDT    Workstation ID: [id number]             Lab Results (last 24 hours)       Procedure Component Value Units Date/Time    TSH [[id number]]  (Normal) Collected: 06/17/24 1543    Specimen: Blood from Arm, Left Updated: 06/17/24 1720     TSH 2.160 uIU/mL     T4, Free [[id number]]  (Normal) Collected: 06/17/24 1543    Specimen: Blood from Arm, Left Updated: 06/17/24 1720     Free T4 1.36 ng/dL     Digoxin Level [[id number]]  (Abnormal) Collected: 06/17/24 1543    Specimen: Blood from Arm, Left Updated: 06/17/24 1620     Digoxin 1.45 ng/mL     Comprehensive Metabolic Panel [[id number]]  (Abnormal) Collected: 06/17/24 1543    Specimen: Blood from Arm, Left Updated: 06/17/24 1620     Glucose 93 mg/dL      BUN 16 mg/dL      Creatinine 0.48  mg/dL      Sodium 141 mmol/L      Potassium 4.3 mmol/L      Chloride 103 mmol/L      CO2 30.9 mmol/L      Calcium 8.8 mg/dL      Total Protein 5.8 g/dL      Albumin 3.6 g/dL      ALT (SGPT) 12 U/L      AST (SGOT) 22 U/L      Alkaline Phosphatase 79 U/L      Total Bilirubin 0.8 mg/dL      Globulin 2.2 gm/dL      A/G Ratio 1.6 g/dL      BUN/Creatinine Ratio 33.3     Anion Gap 7.1 mmol/L      eGFR 86.8 mL/min/1.73     Narrative:      GFR Normal >60  Chronic Kidney Disease <60  Kidney Failure <15    The GFR formula is only valid for adults with stable renal function between ages 18 and 70.    Single High Sensitivity Troponin T [[id number]]  (Abnormal) Collected: 06/17/24 1543    Specimen: Blood from Arm, Left Updated: 06/17/24 1620     HS Troponin T 20 ng/L     Narrative:      High Sensitive Troponin T Reference Range:  <14.0 ng/L- Negative Female for AMI  <22.0 ng/L- Negative Male for AMI  >=14 - Abnormal Female indicating possible myocardial injury.  >=22 - Abnormal Male indicating possible myocardial injury.   Clinicians would have to utilize clinical acumen, EKG, Troponin, and serial changes to determine if it is an Acute Myocardial Infarction or myocardial injury due to an underlying chronic condition.         BNP [[id number]]  (Abnormal) Collected: 06/17/24 1543    Specimen: Blood from Arm, Left Updated: 06/17/24 1620     proBNP 3,240.0 pg/mL     Narrative:      This assay is used as an aid in the diagnosis of individuals suspected of having heart failure. It can be used as an aid in the diagnosis of acute decompensated heart failure (ADHF) in patients presenting with signs and symptoms of ADHF to the emergency department (ED). In addition, NT-proBNP of <300 pg/mL indicates ADHF is not likely.    Age Range Result Interpretation  NT-proBNP Concentration (pg/mL:      <50             Positive            >450                   Gray                 300-450                    Negative             <300    50-75            Positive            >900                  Gray                300-900                  Negative            <300      >75             Positive            >1800                  Gray                300-1800                  Negative            <300    Protime-INR [[id number]]  (Normal) Collected: 06/17/24 1543    Specimen: Blood from Arm, Left Updated: 06/17/24 1604     Protime 22.6 Seconds      INR 2.20    Blood Gas, Arterial - [[id number]]  (Abnormal) Collected: 06/17/24 1600    Specimen: Arterial Blood Updated: 06/17/24 1602     Site Right Radial     Gulshan's Test Positive     pH, Arterial 7.453 pH units      pCO2, Arterial 43.2 mm Hg      pO2, Arterial 96.2 mm Hg      HCO3, Arterial 30.2 mmol/L      Base Excess, Arterial 5.7 mmol/L      Comment: Serial Number: [id number]:  [id number]        O2 Saturation, Arterial 97.8 %      CO2 Content 31.6 mmol/L      Barometric Pressure for Blood Gas --     Comment: N/A        Modality Cannula     FIO2 28 %      Hemodilution No     PO2/FIO2 344    CBC & Differential [[id number]]  (Abnormal) Collected: 06/17/24 1543    Specimen: Blood from Arm, Left Updated: 06/17/24 1556    Narrative:      The following orders were created for panel order CBC & Differential.  Procedure                               Abnormality         Status                     ---------                               -----------         ------                     CBC Auto Differential[[id number]]        Abnormal            Final result               Scan Slide[[id number]]                                                                    Please view results for these tests on the individual orders.    CBC Auto Differential [[id number]]  (Abnormal) Collected: 06/17/24 1543    Specimen: Blood from Arm, Left Updated: 06/17/24 1556     WBC 7.89 10*3/mm3      RBC 3.08 10*6/mm3      Hemoglobin 10.3 g/dL      Hematocrit 32.6 %      .8 fL      MCH 33.4 pg      MCHC 31.6 g/dL      RDW 15.5 %      RDW-SD 59.7 fl      MPV  10.4 fL      Platelets 168 10*3/mm3      Neutrophil % 57.7 %      Lymphocyte % 20.2 %      Monocyte % 16.1 %      Eosinophil % 3.4 %      Basophil % 0.6 %      Immature Grans % 2.0 %      Neutrophils, Absolute 4.55 10*3/mm3      Lymphocytes, Absolute 1.59 10*3/mm3      Monocytes, Absolute 1.27 10*3/mm3      Eosinophils, Absolute 0.27 10*3/mm3      Basophils, Absolute 0.05 10*3/mm3      Immature Grans, Absolute 0.16 10*3/mm3      nRBC 0.3 /100 WBC              I reviewed the patient's new clinical results.    Assessment & Plan       Recurrent pleural effusion    Hypoxia  Bradycardia  -hold digoxin and decreased metoprolol due to bradycardia  -consulted pulmonary  -supplemental oxygen  -gentle diuresing      DVT prophylaxis- SCD's  GI prophylaxis- ppi    I discussed the patient's findings and my recommendations with patient.     Mini Lee, APRN  06/17/24  22:32 EDT

## 2024-06-18 NOTE — PROGRESS NOTES
Daily Progress Note          Assessment    Recurrent pleural effusion: Status post right thoracentesis April 2024 with drainage of 450 mL  Hypoxemia: ABG 7.45/43.2/92.2 while on 2 L nasal cannula  Anemia  A-fib  Bradycardia with mild digoxin toxicity  Large hiatal hernia  COPD  CHF  CAD  Osteoarthritis, kyphosis     Recommendations:  Hold warfarin and when INR is 1.8 or less to proceed with the right-sided chest tube placement with consideration for chemical pleurodesis  Hold digoxin  Oxygen supplement and titration to maintain saturation 90 to 95%  Bronchodilators  Mucinex  Diuresis  Encourage use of incentive spirometry     I personally reviewed the radiological studies               LOS: 0 days     Subjective     Patient reports cough and shortness of breath    Objective     Vital signs for last 24 hours:  Vitals:    06/17/24 2100 06/18/24 0059 06/18/24 0519 06/18/24 0800   BP: 116/72 132/69 140/68 128/61   BP Location: Right arm Right arm Right arm Right arm   Patient Position: Lying Lying Lying Lying   Pulse: 55 57 56 59   Resp: 21 15 14 14   Temp: 98.5 °F (36.9 °C) 97.6 °F (36.4 °C) 97.3 °F (36.3 °C) 97.2 °F (36.2 °C)   TempSrc: Oral Axillary Oral Oral   SpO2: 97% 98% 98% 92%   Weight:       Height:           Intake/Output last 3 shifts:  I/O last 3 completed shifts:  In: 240 [P.O.:240]  Out: 2150 [Urine:2150]  Intake/Output this shift:  I/O this shift:  In: 240 [P.O.:240]  Out: -       Radiology  Imaging Results (Last 24 Hours)       Procedure Component Value Units Date/Time    XR Chest 1 View [[id number]] Collected: 06/17/24 1507     Updated: 06/17/24 1511    Narrative:      XR CHEST 1 VW    Date of Exam: 6/17/2024 3:00 PM EDT    Indication: sob    Comparison: 4/19/2024    Findings:  Heart is enlarged. There is a large hiatal hernia with likely intrathoracic stomach. No pneumothorax. Moderate right and small left pleural effusions. Bibasilar airspace disease may relate to compressive atelectasis or pneumonia.  Central pulmonary   vascular congestion. Negative for pneumothorax. Kyphoplasty noted in the lower thoracic spine near the thoracolumbar junction.      Impression:      Impression:  1. Moderate right and small left pleural effusions with bibasilar airspace disease which may relate to compressive atelectasis and/or pneumonia.  2. Cardiomegaly and central pulmonary vascular congestion.  3. Large hiatal hernia, unchanged.      Electronically Signed: Elver Machuca MD    6/17/2024 3:09 PM EDT    Workstation ID: [id number]            Labs:  Results from last 7 days   Lab Units 06/18/24  0001   WBC 10*3/mm3 6.64   HEMOGLOBIN g/dL 9.9*   HEMATOCRIT % 31.9*   PLATELETS 10*3/mm3 169     Results from last 7 days   Lab Units 06/18/24  0001 06/17/24  1543   SODIUM mmol/L 141 141   POTASSIUM mmol/L 4.4 4.3   CHLORIDE mmol/L 102 103   CO2 mmol/L 30.9* 30.9*   BUN mg/dL 16 16   CREATININE mg/dL 0.55* 0.48*   CALCIUM mg/dL 8.6 8.8   BILIRUBIN mg/dL  --  0.8   ALK PHOS U/L  --  79   ALT (SGPT) U/L  --  12   AST (SGOT) U/L  --  22   GLUCOSE mg/dL 84 93     Results from last 7 days   Lab Units 06/17/24  1600   PH, ARTERIAL pH units 7.453*   PO2 ART mm Hg 96.2   PCO2, ARTERIAL mm Hg 43.2   HCO3 ART mmol/L 30.2*     Results from last 7 days   Lab Units 06/17/24  1543   ALBUMIN g/dL 3.6     Results from last 7 days   Lab Units 06/17/24  1543   HSTROP T ng/L 20*             Results from last 7 days   Lab Units 06/18/24  0001 06/17/24  1543   INR  2.23 2.20     Results from last 7 days   Lab Units 06/17/24  1543   TSH uIU/mL 2.160   FREE T4 ng/dL 1.36           Meds:   SCHEDULE  docusate sodium, 100 mg, Oral, Nightly  furosemide, 20 mg, Intravenous, Q12H  latanoprost, 1 drop, Right Eye, Nightly  metoclopramide, 5 mg, Oral, 4x Daily AC & at Bedtime  metoprolol succinate XL, 12.5 mg, Oral, Q12H  mirtazapine, 15 mg, Oral, Nightly  sodium chloride, 10 mL, Intravenous, Q12H      Infusions     PRNs    acetaminophen    senna-docusate sodium **AND**  polyethylene glycol **AND** bisacodyl **AND** bisacodyl    magnesium hydroxide    nitroglycerin    ondansetron    sodium chloride    sodium chloride    Physical Exam:  General Appearance:  Alert   HEENT:  Normocephalic, without obvious abnormality, Conjunctiva/corneas clear,.   Nares normal, no drainage     Neck:  Supple, symmetrical, trachea midline.   Lungs /Chest wall:   Bilateral basal rhonchi, respirations unlabored, symmetrical wall movement.     Heart:  Regular rate and rhythm, S1 S2 normal  Abdomen: Soft, non-tender, no masses, no organomegaly.    Extremities: No edema, no clubbing or cyanosis     ROS  Constitutional: Negative for chills, fever and malaise/fatigue.   HENT: Negative.    Eyes: Negative.    Cardiovascular: Negative.    Respiratory: Positive for cough and shortness of breath.    Skin: Negative.    Musculoskeletal: Negative.    Gastrointestinal: Negative.    Genitourinary: Negative.    Neurological: Generalized weakness      I reviewed the recent clinical results  I personally reviewed the latest radiological studies    Part of this note may be an electronic transcription/translation of spoken language to printed text using the Dragon Dictation System.

## 2024-06-18 NOTE — PLAN OF CARE
Goal Outcome Evaluation:  Plan of Care Reviewed With: patient        Progress: no change  Outcome Evaluation: CONTINUED WATCH ON PRESSURE ULCERS THAT WERE DOCUMENTED ON ADMITTANCE.  PATIENT ADMITTS THAT SHE DOESN'T EAT LIKE SHE SHOULD.

## 2024-06-18 NOTE — CASE MANAGEMENT/SOCIAL WORK
Discharge Planning Assessment   Jamey     Patient Name: Shruthi Amin  MRN: [id number]  Today's Date: 6/18/2024    Admit Date: 6/17/2024    Plan: Anticipate return home w/ daughter. Home O2 2L w/ [employer].   Discharge Needs Assessment       Row Name 06/18/24 1511       Living Environment    People in Home child(juan francisco), adult    Name(s) of People in Home Max    Current Living Arrangements home    Potentially Unsafe Housing Conditions none    In the past 12 months has the electric, gas, oil, or water company threatened to shut off services in your home? No    Primary Care Provided by self;child(juan francisco)    Provides Primary Care For no one, unable/limited ability to care for self    Family Caregiver if Needed child(juan francisco), adult    Family Caregiver Names Max    Quality of Family Relationships helpful;involved;supportive    Able to Return to Prior Arrangements yes       Resource/Environmental Concerns    Resource/Environmental Concerns none    Transportation Concerns none       Transportation Needs    In the past 12 months, has lack of transportation kept you from medical appointments or from getting medications? no    In the past 12 months, has lack of transportation kept you from meetings, work, or from getting things needed for daily living? No       Food Insecurity    Within the past 12 months, you worried that your food would run out before you got the money to buy more. Never true    Within the past 12 months, the food you bought just didn't last and you didn't have money to get more. Never true       Transition Planning    Patient/Family Anticipates Transition to home with family;home with help/services    Patient/Family Anticipated Services at Transition home health care    Transportation Anticipated family or friend will provide       Discharge Needs Assessment    Readmission Within the Last 30 Days no previous admission in last 30 days    Equipment Currently Used at Home rollator;walker,  rolling;wheelchair;shower chair;oxygen;bp cuff    Concerns to be Addressed discharge planning;care coordination/care conferences    Anticipated Changes Related to Illness none    Equipment Needed After Discharge none    Provided Post Acute Provider List? N/A    Provided Post Acute Provider Quality & Resource List? N/A                   Discharge Plan       Row Name 06/18/24 1513       Plan    Plan Anticipate return home w/ daughter. Home O2 2L w/ [employer].    Patient/Family in Agreement with Plan yes    Plan Comments CM met with patient at bedside. Pt lives at home with daughter Shea, no longer drives, and requires assistance with ADL's. PCP and pharmacy confirmed; no issues reported affording medications. Enrolled in [facility] program. DME includes rollator, walker, wheelchair, shower stool, BSC, BP cuff, and 2L O2 HS and PRN supplied by [employer]. Pt reports she has used [facility] in the past but not current at this time. IMM letter provided to patient and signature obtained. Pt reports her daughter Shea will provide transportation at time of discharge.              Demographic Summary       Row Name 06/18/24 1511       General Information    Admission Type inpatient    Arrived From emergency department    Required Notices Provided Important Message from Medicare    Referral Source admission list    Reason for Consult care coordination/care conference;discharge planning    Preferred Language English       Contact Information    Permission Granted to Share Info With                    Functional Status       Row Name 06/18/24 1511       Functional Status    Usual Activity Tolerance moderate    Current Activity Tolerance moderate       Functional Status, IADL    Medications assistive person    Meal Preparation assistive person    Housekeeping assistive person    Laundry assistive person    Shopping assistive person             Megan Naegele, RN     Office Phone: [phone]  Office Cell:  [phone]

## 2024-06-18 NOTE — PLAN OF CARE
Goal Outcome Evaluation:              Outcome Evaluation: Pt. has been resting throughout the shift. Pt. was sen by wound care. Pt. is A&Ox4, makes needs known.

## 2024-06-19 ENCOUNTER — APPOINTMENT (OUTPATIENT)
Dept: GENERAL RADIOLOGY | Facility: HOSPITAL | Age: 89
End: 2024-06-19
Payer: MEDICARE

## 2024-06-19 LAB
ANION GAP SERPL CALCULATED.3IONS-SCNC: 6.5 MMOL/L (ref 5–15)
APPEARANCE FLD: CLEAR
BASOPHILS # BLD AUTO: 0.05 10*3/MM3 (ref 0–0.2)
BASOPHILS NFR BLD AUTO: 0.7 % (ref 0–1.5)
BUN SERPL-MCNC: 17 MG/DL (ref 8–23)
BUN/CREAT SERPL: 37 (ref 7–25)
CALCIUM SPEC-SCNC: 8.6 MG/DL (ref 8.2–9.6)
CHLORIDE SERPL-SCNC: 101 MMOL/L (ref 98–107)
CO2 SERPL-SCNC: 31.5 MMOL/L (ref 22–29)
COLOR FLD: YELLOW
CREAT SERPL-MCNC: 0.46 MG/DL (ref 0.57–1)
DEPRECATED RDW RBC AUTO: 59.3 FL (ref 37–54)
EGFRCR SERPLBLD CKD-EPI 2021: 87.7 ML/MIN/1.73
EOSINOPHIL # BLD AUTO: 0.33 10*3/MM3 (ref 0–0.4)
EOSINOPHIL NFR BLD AUTO: 4.4 % (ref 0.3–6.2)
ERYTHROCYTE [DISTWIDTH] IN BLOOD BY AUTOMATED COUNT: 15.5 % (ref 12.3–15.4)
FOLATE SERPL-MCNC: 10.7 NG/ML (ref 4.78–24.2)
GLUCOSE FLD-MCNC: 103 MG/DL
GLUCOSE SERPL-MCNC: 87 MG/DL (ref 65–99)
HCT VFR BLD AUTO: 31.5 % (ref 34–46.6)
HGB BLD-MCNC: 10 G/DL (ref 12–15.9)
IMM GRANULOCYTES # BLD AUTO: 0.13 10*3/MM3 (ref 0–0.05)
IMM GRANULOCYTES NFR BLD AUTO: 1.7 % (ref 0–0.5)
INR PPP: 1.88 (ref 2–3)
LDH FLD-CCNC: 74 U/L
LYMPHOCYTES # BLD AUTO: 1.88 10*3/MM3 (ref 0.7–3.1)
LYMPHOCYTES NFR BLD AUTO: 25 % (ref 19.6–45.3)
LYMPHOCYTES NFR FLD MANUAL: 89 %
MCH RBC QN AUTO: 33.3 PG (ref 26.6–33)
MCHC RBC AUTO-ENTMCNC: 31.7 G/DL (ref 31.5–35.7)
MCV RBC AUTO: 105 FL (ref 79–97)
MESOTHL CELL NFR FLD MANUAL: 9 %
MONOCYTES # BLD AUTO: 1.38 10*3/MM3 (ref 0.1–0.9)
MONOCYTES NFR BLD AUTO: 18.4 % (ref 5–12)
NEUTROPHILS NFR BLD AUTO: 3.75 10*3/MM3 (ref 1.7–7)
NEUTROPHILS NFR BLD AUTO: 49.8 % (ref 42.7–76)
NEUTROPHILS NFR FLD MANUAL: 2 %
NRBC BLD AUTO-RTO: 0.4 /100 WBC (ref 0–0.2)
NUC CELL # FLD: 166 /MM3
PH FLD: 7.5 [PH] (ref 6.5–7.5)
PLATELET # BLD AUTO: 188 10*3/MM3 (ref 140–450)
PMV BLD AUTO: 11.1 FL (ref 6–12)
POTASSIUM SERPL-SCNC: 3.9 MMOL/L (ref 3.5–5.2)
PROT FLD-MCNC: 1.9 G/DL
PROTHROMBIN TIME: 19.6 SECONDS (ref 19.4–28.5)
RBC # BLD AUTO: 3 10*6/MM3 (ref 3.77–5.28)
SODIUM SERPL-SCNC: 139 MMOL/L (ref 136–145)
VIT B12 BLD-MCNC: >2000 PG/ML (ref 211–946)
WBC NRBC COR # BLD AUTO: 7.52 10*3/MM3 (ref 3.4–10.8)

## 2024-06-19 PROCEDURE — 84157 ASSAY OF PROTEIN OTHER: CPT | Performed by: INTERNAL MEDICINE

## 2024-06-19 PROCEDURE — 87070 CULTURE OTHR SPECIMN AEROBIC: CPT | Performed by: INTERNAL MEDICINE

## 2024-06-19 PROCEDURE — 83615 LACTATE (LD) (LDH) ENZYME: CPT | Performed by: INTERNAL MEDICINE

## 2024-06-19 PROCEDURE — 85025 COMPLETE CBC W/AUTO DIFF WBC: CPT | Performed by: INTERNAL MEDICINE

## 2024-06-19 PROCEDURE — 85610 PROTHROMBIN TIME: CPT | Performed by: INTERNAL MEDICINE

## 2024-06-19 PROCEDURE — 82945 GLUCOSE OTHER FLUID: CPT | Performed by: INTERNAL MEDICINE

## 2024-06-19 PROCEDURE — 87205 SMEAR GRAM STAIN: CPT | Performed by: INTERNAL MEDICINE

## 2024-06-19 PROCEDURE — 82746 ASSAY OF FOLIC ACID SERUM: CPT | Performed by: INTERNAL MEDICINE

## 2024-06-19 PROCEDURE — 83986 ASSAY PH BODY FLUID NOS: CPT | Performed by: INTERNAL MEDICINE

## 2024-06-19 PROCEDURE — 80048 BASIC METABOLIC PNL TOTAL CA: CPT | Performed by: INTERNAL MEDICINE

## 2024-06-19 PROCEDURE — 88108 CYTOPATH CONCENTRATE TECH: CPT | Performed by: INTERNAL MEDICINE

## 2024-06-19 PROCEDURE — 71045 X-RAY EXAM CHEST 1 VIEW: CPT

## 2024-06-19 PROCEDURE — 89051 BODY FLUID CELL COUNT: CPT | Performed by: INTERNAL MEDICINE

## 2024-06-19 PROCEDURE — 0W9930Z DRAINAGE OF RIGHT PLEURAL CAVITY WITH DRAINAGE DEVICE, PERCUTANEOUS APPROACH: ICD-10-PCS | Performed by: INTERNAL MEDICINE

## 2024-06-19 PROCEDURE — 82607 VITAMIN B-12: CPT | Performed by: INTERNAL MEDICINE

## 2024-06-19 RX ADMIN — METOCLOPRAMIDE 5 MG: 5 TABLET ORAL at 16:51

## 2024-06-19 RX ADMIN — METOCLOPRAMIDE 5 MG: 5 TABLET ORAL at 11:53

## 2024-06-19 RX ADMIN — GUAIFENESIN 1200 MG: 600 TABLET, EXTENDED RELEASE ORAL at 09:00

## 2024-06-19 RX ADMIN — GUAIFENESIN 1200 MG: 600 TABLET, EXTENDED RELEASE ORAL at 20:42

## 2024-06-19 RX ADMIN — Medication 10 ML: at 20:42

## 2024-06-19 RX ADMIN — METOCLOPRAMIDE 5 MG: 5 TABLET ORAL at 09:00

## 2024-06-19 RX ADMIN — LATANOPROST 1 DROP: 50 SOLUTION/ DROPS OPHTHALMIC at 20:42

## 2024-06-19 RX ADMIN — Medication 10 ML: at 09:01

## 2024-06-19 RX ADMIN — MIDODRINE HYDROCHLORIDE 5 MG: 5 TABLET ORAL at 09:00

## 2024-06-19 RX ADMIN — MIRTAZAPINE 15 MG: 15 TABLET, FILM COATED ORAL at 20:42

## 2024-06-19 RX ADMIN — METOCLOPRAMIDE 5 MG: 5 TABLET ORAL at 20:42

## 2024-06-19 RX ADMIN — ACETAMINOPHEN 650 MG: 325 TABLET, FILM COATED ORAL at 16:10

## 2024-06-19 NOTE — PROGRESS NOTES
LOS: 1 day   Patient Care Team:  Cristal Goodwin MD as PCP - General (Family Medicine)  Anai Moise APRN as Nurse Practitioner (Cardiology)  Theodore Vital MD as Consulting Physician (Cardiology)    Subjective     Interval History: Stable overnight    Patient Complaints: Status post chest tube placement    History taken from: patient    Review of Systems   Constitutional:  Positive for activity change, appetite change and fatigue. Negative for chills, diaphoresis and fever.   HENT:  Negative for facial swelling.    Eyes:  Negative for visual disturbance.   Respiratory:  Negative for cough, shortness of breath, wheezing and stridor.    Cardiovascular:  Negative for chest pain, palpitations and leg swelling.   Gastrointestinal:  Negative for abdominal pain, constipation, diarrhea, nausea and vomiting.   Endocrine: Negative for polyuria.   Genitourinary:  Negative for dysuria.   Musculoskeletal:  Positive for back pain and gait problem. Negative for arthralgias.   Neurological:  Negative for tremors, syncope, weakness and headaches.   Psychiatric/Behavioral:  Negative for confusion.            Objective     Vital Signs  Temp:  [97.1 °F (36.2 °C)-97.8 °F (36.6 °C)] 97.8 °F (36.6 °C)  Heart Rate:  [52-63] 60  Resp:  [13-24] 15  BP: ()/(41-76) 122/59    Physical Exam:     General Appearance:    Alert, cooperative, in no acute distress, oriented   Head:    Normocephalic, without obvious abnormality, atraumatic   Eyes:            Lids and lashes normal, conjunctivae and sclerae normal, no   icterus, no pallor, corneas clear, PERRLA   Ears:    Ears appear intact with no abnormalities noted   Throat:   No oral lesions, no thrush, oral mucosa moist   Neck:   No adenopathy, supple, trachea midline, no thyromegaly, no   carotid bruit, no JVD   Lungs:   Decreased breath sounds in right lower lobe    Heart:  Irregularly irregular   Chest Wall:    No abnormalities observed   Abdomen:     Normal bowel  sounds, no masses, no organomegaly, soft        Non-tender non-distended, no guarding,   Extremities:   Moves all extremities well, no edema, no cyanosis, no             Redness   Pulses:   Pulses palpable and equal bilaterally   Skin:   No bleeding, bruising or rash   Lymph nodes:   No palpable adenopathy   Neurologic:   Cranial nerves 2 - 12 grossly intact, sensation intact, DTR       present and equal bilaterally        Results Review:    Lab Results (last 24 hours)       Procedure Component Value Units Date/Time    Body Fluid Culture - Body Fluid, Pleural Cavity [[id number]] Collected: 06/19/24 1057    Specimen: Body Fluid from Pleural Cavity Updated: 06/19/24 1240     Gram Stain Rare (1+) WBCs per low power field      No organisms seen    Non-gynecologic Cytology [[id number]] Collected: 06/19/24 1101    Specimen: Pleural Fluid from Pleural Cavity Updated: 06/19/24 1227    pH, Body Fluid - Body Fluid, Pleural Cavity [[id number]]  (Normal) Collected: 06/19/24 1057    Specimen: Body Fluid from Pleural Cavity Updated: 06/19/24 1210     pH, Fluid 7.50    Body Fluid Cell Count With Differential - Body Fluid, Pleural Cavity [[id number]] Collected: 06/19/24 1057    Specimen: Body Fluid from Pleural Cavity Updated: 06/19/24 1202    Narrative:      The following orders were created for panel order Body Fluid Cell Count With Differential - Body Fluid, Pleural Cavity.  Procedure                               Abnormality         Status                     ---------                               -----------         ------                     Body fluid cell count - ...[[id number]]                      Final result               Body fluid differential ...[[id number]]                      In process                   Please view results for these tests on the individual orders.    Body fluid cell count - Body Fluid, Pleural Cavity [[id number]] Collected: 06/19/24 1057    Specimen: Body Fluid from Pleural Cavity Updated: 06/19/24  1202     Color, Fluid Yellow     Appearance, Fluid Clear     Nucleated Cells, Fluid 166 /mm3     Narrative:      No reference range established. Physician to interpret results with clinical findings.    Body fluid differential - Body Fluid, Pleural Cavity [[id number]] Collected: 06/19/24 1057    Specimen: Body Fluid from Pleural Cavity Updated: 06/19/24 1200    Glucose, Body Fluid - Pleural Fluid, Pleural Cavity [[id number]] Collected: 06/19/24 1101    Specimen: Pleural Fluid from Pleural Cavity Updated: 06/19/24 1128    Protein, Body Fluid - Pleural Fluid, Pleural Cavity [[id number]] Collected: 06/19/24 1101    Specimen: Pleural Fluid from Pleural Cavity Updated: 06/19/24 1128    Lactate Dehydrogenase, Body Fluid - Body Fluid, Pleural Cavity [[id number]] Collected: 06/19/24 1057    Specimen: Body Fluid from Pleural Cavity Updated: 06/19/24 1128    Folate [[id number]]  (Normal) Collected: 06/19/24 0000    Specimen: Blood from Arm, Right Updated: 06/19/24 1104     Folate 10.70 ng/mL     Narrative:      Results may be falsely increased if patient taking Biotin.      Vitamin B12 [[id number]]  (Abnormal) Collected: 06/19/24 0000    Specimen: Blood from Arm, Right Updated: 06/19/24 1104     Vitamin B-12 >2,000 pg/mL     Narrative:      Results may be falsely increased if patient taking Biotin.      Basic Metabolic Panel [[id number]]  (Abnormal) Collected: 06/19/24 0000    Specimen: Blood from Arm, Right Updated: 06/19/24 0120     Glucose 87 mg/dL      BUN 17 mg/dL      Creatinine 0.46 mg/dL      Sodium 139 mmol/L      Potassium 3.9 mmol/L      Comment: Specimen hemolyzed.  Result may be falsely elevated.        Chloride 101 mmol/L      CO2 31.5 mmol/L      Calcium 8.6 mg/dL      BUN/Creatinine Ratio 37.0     Anion Gap 6.5 mmol/L      eGFR 87.7 mL/min/1.73     Narrative:      GFR Normal >60  Chronic Kidney Disease <60  Kidney Failure <15    The GFR formula is only valid for adults with stable renal function between ages 18 and  70.    Protime-INR [[id number]]  (Abnormal) Collected: 06/19/24 0000    Specimen: Blood from Arm, Right Updated: 06/19/24 0103     Protime 19.6 Seconds      INR 1.88    CBC & Differential [[id number]]  (Abnormal) Collected: 06/19/24 0000    Specimen: Blood from Arm, Right Updated: 06/19/24 0052    Narrative:      The following orders were created for panel order CBC & Differential.  Procedure                               Abnormality         Status                     ---------                               -----------         ------                     CBC Auto Differential[[id number]]        Abnormal            Final result                 Please view results for these tests on the individual orders.    CBC Auto Differential [[id number]]  (Abnormal) Collected: 06/19/24 0000    Specimen: Blood from Arm, Right Updated: 06/19/24 0052     WBC 7.52 10*3/mm3      RBC 3.00 10*6/mm3      Hemoglobin 10.0 g/dL      Hematocrit 31.5 %      .0 fL      MCH 33.3 pg      MCHC 31.7 g/dL      RDW 15.5 %      RDW-SD 59.3 fl      MPV 11.1 fL      Platelets 188 10*3/mm3      Neutrophil % 49.8 %      Lymphocyte % 25.0 %      Monocyte % 18.4 %      Eosinophil % 4.4 %      Basophil % 0.7 %      Immature Grans % 1.7 %      Neutrophils, Absolute 3.75 10*3/mm3      Lymphocytes, Absolute 1.88 10*3/mm3      Monocytes, Absolute 1.38 10*3/mm3      Eosinophils, Absolute 0.33 10*3/mm3      Basophils, Absolute 0.05 10*3/mm3      Immature Grans, Absolute 0.13 10*3/mm3      nRBC 0.4 /100 WBC              Imaging Results (Last 24 Hours)       ** No results found for the last 24 hours. **                 I reviewed the patient's new clinical results.    Medication Review:   Scheduled Meds:docusate sodium, 100 mg, Oral, Nightly  guaiFENesin, 1,200 mg, Oral, Q12H  latanoprost, 1 drop, Right Eye, Nightly  metoclopramide, 5 mg, Oral, 4x Daily AC & at Bedtime  [Held by provider] metoprolol succinate XL, 12.5 mg, Oral, Q12H  mirtazapine, 15 mg, Oral,  Nightly  sodium chloride, 10 mL, Intravenous, Q12H      Continuous Infusions:   PRN Meds:.  acetaminophen    senna-docusate sodium **AND** polyethylene glycol **AND** bisacodyl **AND** bisacodyl    magnesium hydroxide    nitroglycerin    ondansetron    sodium chloride    sodium chloride     Assessment & Plan       Recurrent pleural effusion    Atrial fibrillation    Hypoxia    Bradycardia    Recurrent pleural effusion, status post chest tube placement  - Pulmonary following follow cultures    Large hiatal hernia-metoclopramide, reflux precautions  Underweight-nutritional supplements, mirtazapine  Glaucoma  Secondary hypercoagulable state related to atrial fibs  Paroxysmal atrial fib -currently in a controlled rate; holding digoxin and metoprolol due to bradycardia on admission and elevated drug level.    Anemia  Stage II pressure injury on sacrum-healing, continue topical barrier creams and pressure-relief measures    Plan for disposition: To be determined    ALMA Horan  06/19/24  12:44 EDT

## 2024-06-19 NOTE — CONSULTS
Nutrition Services    Patient Name: Shruthi Amin  YOB: 1928  MRN: [id number]  Admission date: 6/17/2024    Comment:    Severe chronic disease related malnutrition related to heart failure as evidenced by severe muscle and fat wasting per NFPE.    See MSA below.    CLINICAL NUTRITION ASSESSMENT      Reason for Assessment 6/19: BMI < 19, nursing admission screen consult, MST: 3     H&P      Past Medical History:   Diagnosis Date    Atrial fibrillation     Coronary artery disease     Atrial fibrillation    GERD (gastroesophageal reflux disease)     Glaucoma     Hiatal hernia with gastroesophageal reflux     History of osteoporotic pathological fracture     Right intertroch (7/2019)    Hx of compression fracture of spine     T12 and L1(2013); T6 (1/2022)    Hyperlipidemia     Hypertension     Osteoarthritis     Osteoporosis     on prolia(3/21/22)    Stroke     off and on dizziness from the stroke.       Past Surgical History:   Procedure Laterality Date    BACK SURGERY      kyphoplasty    EYE SURGERY      cataract surgery    FIXATION KYPHOPLASTY LUMBAR SPINE  2013    HIP TROCHANTERIC NAILING WITH INTRAMEDULLARY HIP SCREW Right 7/20/2019    Procedure: HIP TROCHANTERIC NAILING SHORT WITH INTRAMEDULLARY HIP SCREW;  Surgeon: Edward Centeno MD;  Location: [facility] MAIN OR;  Service: Orthopedics    RECTAL SURGERY      x 3        Current Problems   Recurrent pleural effusion  -chest tube in place  -pulmonology following    Underweight  -dietitian consult  -mirtazapine  -ONS       Encounter Information        Trending Narrative     6/19: Pt admitted to [facility] from her pulmonologist's office with SOB and hypoxia. Admitting dx of recurrent pleural effusion. RD visited pt at bedside. Pt reports she has been eating well. Pt looking forward to ordering her breakfast. NFPE completed, consistent with nutrition diagnosis of malnutrition using AND/ASPEN criteria. See MSA below.        Anthropometrics       "  Current Height, Weight Height: 157.5 cm (62\")  Weight: 38.5 kg (84 lb 14 oz) (06/17/24 1940)       Usual Body Weight (UBW) Unknown        Trending Weight Hx     This admission: 6/19: 84#             PTA: 24% wt loss x 3 months     Wt Readings from Last 30 Encounters:   06/17/24 1940 38.5 kg (84 lb 14 oz)   06/17/24 1422 45.4 kg (100 lb 1.4 oz)   04/19/24 1415 45.4 kg (100 lb)   04/10/24 1211 45.4 kg (100 lb)   04/04/24 1324 44.5 kg (98 lb)   03/28/24 1219 47.2 kg (104 lb)   03/25/24 1435 46.3 kg (102 lb)   03/04/24 1229 50.6 kg (111 lb 9.6 oz)   02/27/24 1201 49.9 kg (110 lb)   02/27/24 1106 50.3 kg (110 lb 14.3 oz)   02/24/24 1029 50.3 kg (111 lb)   02/24/24 0204 50.8 kg (111 lb 15.9 oz)   02/23/24 1252 49.4 kg (109 lb)   11/29/23 1157 50.8 kg (112 lb)   08/03/23 1340 52.2 kg (115 lb)   05/09/23 1602 52.6 kg (116 lb)   01/31/23 1134 52.6 kg (116 lb)   12/02/22 1131 49.4 kg (109 lb)   11/21/22 1103 49 kg (108 lb)   11/14/22 1143 50.3 kg (111 lb)   11/11/22 0950 49.9 kg (110 lb)   11/21/22 1024 49.2 kg (108 lb 7.8 oz)   11/20/22 1203 49.7 kg (109 lb 9.8 oz)   11/19/22 1043 50 kg (110 lb 2.3 oz)   11/18/22 1516 60.8 kg (134 lb 0.3 oz)   11/18/22 1515 61 kg (134 lb 5.9 oz)   11/18/22 1052 49.6 kg (109 lb 5.6 oz)   11/18/22 1051 4.65 kg (10 lb 4 oz)   11/17/22 1010 50.4 kg (111 lb 1.1 oz)   11/16/22 1011 49.8 kg (109 lb 13 oz)   11/15/22 0959 49.5 kg (109 lb 0.6 oz)   11/14/22 1113 49.2 kg (108 lb 9.2 oz)   11/14/22 1105 49.6 kg (109 lb 5.9 oz)   11/13/22 0954 48.9 kg (107 lb 11.5 oz)   11/12/22 2300 61.3 kg (135 lb 0.9 oz)   11/12/22 1019 49.2 kg (108 lb 7.8 oz)   11/11/22 1017 49.6 kg (109 lb 6.6 oz)   11/11/22 0744 55.6 kg (122 lb 8.9 oz)   11/10/22 1008 49.9 kg (109 lb 15.1 oz)   11/10/22 1008 49.9 kg (110 lb 1.6 oz)   11/09/22 1840 56.4 kg (124 lb 5.4 oz)   11/09/22 0932 49.7 kg (109 lb 10.2 oz)   11/09/22 0932 50 kg (110 lb 2.6 oz)   11/08/22 2255 60.8 kg (134 lb 2.1 oz)   11/08/22 2255 60.5 kg (133 lb 4.3 oz) " "  11/08/22 2254 60.3 kg (133 lb 0.1 oz)   11/08/22 2253 60.4 kg (133 lb 3.9 oz)   11/08/22 1015 51.5 kg (113 lb 8.9 oz)   11/08/22 1014 51.5 kg (113 lb 9.3 oz)   11/07/22 1428 50.1 kg (110 lb 8.3 oz)   11/07/22 1427 50.7 kg (111 lb 11.7 oz)   11/04/22 1143 49.9 kg (110 lb)   11/03/22 1416 48.1 kg (106 lb)   10/29/22 1239 50.3 kg (111 lb)   10/27/22 1447 51.7 kg (114 lb)   10/06/22 0402 51.3 kg (113 lb 1.5 oz)   10/05/22 0355 48.2 kg (106 lb 4.2 oz)   10/04/22 0346 51.8 kg (114 lb 3.2 oz)   10/03/22 0335 50 kg (110 lb 3.7 oz)   10/03/22 0112 50 kg (110 lb 3.7 oz)   10/02/22 1103 49.9 kg (110 lb)   10/01/22 1940 49.9 kg (110 lb 0.2 oz)   09/22/22 1354 51.2 kg (112 lb 12.8 oz)   07/18/22 1252 49.4 kg (109 lb)   05/03/22 1422 50.8 kg (112 lb)   04/19/22 1420 50.3 kg (111 lb)   03/30/22 1130 50.3 kg (111 lb)   03/22/22 1339 50.7 kg (111 lb 12.8 oz)   03/21/22 1345 50.3 kg (111 lb)   03/14/22 1407 50.3 kg (111 lb)      BMI kg/m2 Body mass index is 15.52 kg/m².       Labs        Pertinent Labs    Results from last 7 days   Lab Units 06/19/24  0000 06/18/24  0001 06/17/24  1543   SODIUM mmol/L 139 141 141   POTASSIUM mmol/L 3.9 4.4 4.3   CHLORIDE mmol/L 101 102 103   CO2 mmol/L 31.5* 30.9* 30.9*   BUN mg/dL 17 16 16   CREATININE mg/dL 0.46* 0.55* 0.48*   CALCIUM mg/dL 8.6 8.6 8.8   BILIRUBIN mg/dL  --   --  0.8   ALK PHOS U/L  --   --  79   ALT (SGPT) U/L  --   --  12   AST (SGOT) U/L  --   --  22   GLUCOSE mg/dL 87 84 93     Results from last 7 days   Lab Units 06/19/24  0000   HEMOGLOBIN g/dL 10.0*   HEMATOCRIT % 31.5*     No results found for: \"HGBA1C\"     Medications    Scheduled Medications docusate sodium, 100 mg, Oral, Nightly  guaiFENesin, 1,200 mg, Oral, Q12H  latanoprost, 1 drop, Right Eye, Nightly  metoclopramide, 5 mg, Oral, 4x Daily AC & at Bedtime  [Held by provider] metoprolol succinate XL, 12.5 mg, Oral, Q12H  mirtazapine, 15 mg, Oral, Nightly  sodium chloride, 10 mL, Intravenous, Q12H        Infusions      " PRN Medications   acetaminophen    senna-docusate sodium **AND** polyethylene glycol **AND** bisacodyl **AND** bisacodyl    magnesium hydroxide    nitroglycerin    ondansetron    sodium chloride    sodium chloride     Physical Findings        Trending Physical   Appearance, NFPE 6/19: NFPE completed, consistent with nutrition diagnosis of malnutrition using AND/ASPEN criteria. See MSA below.      --  Edema  1+ dependent, ankles, L foot  2+ right foot, legs     Bowel Function + BM on 6/17     Tubes None      Chewing/Swallowing No reported difficulty      Skin Per WOCN assessment on 6/18: healing stage 3 sacral pressure injury     --  Current Nutrition Orders & Evaluation of Intake       Oral Nutrition     Food Allergies NKFA   Current PO Diet Diet: Regular/House; Fluid Consistency: Thin (IDDSI 0)   Supplement Boost Plus BID (Provides 720 kcals, 28 g protein if consumed)      PO Evaluation     Trending % PO Intake 6/19: %   --  Nutritional Risk Screening        NRS-2002 Score          Nutrition Diagnosis         Nutrition Dx Problem 1 Severe chronic disease related malnutrition related to heart failure as evidenced by severe muscle and fat wasting per NFPE.      Nutrition Dx Problem 2        Intervention Goal         Intervention Goal(s) PO intake > 75%, ONS acceptance      Nutrition Intervention        RD Action NFPE completed     Nutrition Prescription          Diet Prescription Regular    Supplement Prescription Boost Plus BID (Provides 720 kcals, 28 g protein if consumed)      --  Monitor/Evaluation        Monitor Per protocol, PO intake, Supplement intake, Pertinent labs, Weight     Malnutrition Severity Assessment      Patient meets criteria for : Severe Malnutrition  Malnutrition Type (Last 8 Hours)       Malnutrition Severity Assessment       Row Name 06/19/24 1726       Malnutrition Severity Assessment    Malnutrition Type Chronic Disease - Related Malnutrition      Row Name 06/19/24 1726       Muscle  Loss    Loss of Muscle Mass Findings Severe    [place] Region Severe - deep hollowing/scooping, lack of muscle to touch, facial bones well defined    Clavicle Bone Region Severe - protruding prominent bone    Acromion Bone Region Severe - squared shoulders, bones, and acromion process protrusion prominent    Scapular Bone Region Severe - prominent bones, depressions easily visible between ribs, scapula, spine, shoulders    Dorsal Hand Region Severe - prominent depression    Patellar Region Severe - prominent bone, square looking, very little muscle definition    Anterior Thigh Region Severe - line/depression along thigh, obviously thin    Posterior Calf Region Severe - thin with very little definition/firmness      Row Name 06/19/24 1726       Fat Loss    Subcutaneous Fat Loss Findings Severe    Orbital Region  Severe - pronounced hollowness/depression, dark circles, loose saggy skin    Upper Arm Region Severe - mostly skin, very little space between folds, fingers touch      Row Name 06/19/24 5412       Criteria Met (Must meet criteria for severity in at least 2 of these categories: M Wasting, Fat Loss, Fluid, Secondary Signs, Wt. Status, Intake)    Patient meets criteria for  Severe Malnutrition                         Electronically signed by:  Amparo Graham RD  06/19/24 17:08 EDT

## 2024-06-19 NOTE — PROCEDURES
Right chest tube placement    Indications:  Clinically significant Effusion    Pre-operative Diagnosis: Effusion    Post-operative Diagnosis: Effusion    Procedure Details   Informed consent was obtained for the procedure, including sedation/analgesia if needed.  Risks of lung perforation, hemorrhage, arrhythmia, and adverse drug reaction were discussed.   Timeout was done on the standard manner.    After sterile skin prep, using standard technique,  Ultrasound was used to identify the fluid location which was marked.  Lidocaine 1% local skin infiltration was used,  the needle was introduced above the rib identifying the fluid'  Using Seldinger technique: The insertion needle was introduced and then a guidewire was passed through the needle which was removed, the dilator was used over the guidewire, after removing the dilator the pigtail catheter was placed and the guidewire was removed.  The catheter was secured to place with  dressing.     A 14 [place] of [place] tube was placed in the left lateral fourth rib space.    Findings:  100 ml of serous fluid obtained    Estimated Blood Loss:  Minimal           Specimens: Pleural fluid                Complications:  None; patient tolerated the procedure well.                    Condition: stable    Post op plan:  CXR   -20 cm H2O suction    Attending Attestation: I performed the procedure.

## 2024-06-19 NOTE — PLAN OF CARE
Goal Outcome Evaluation:  Plan of Care Reviewed With: patient        Progress: no change  Outcome Evaluation: PATIENT HAS SLEPT THRU THE NIGHT.  INR AT 1.88. MONITORING OF BLOOD PRESSURE CONTINUES

## 2024-06-19 NOTE — PROGRESS NOTES
Daily Progress Note          Assessment    Recurrent pleural effusion: Status post right thoracentesis April 2024 with drainage of 450 mL  Hypoxemia: ABG 7.45/43.2/92.2 while on 2 L nasal cannula  Anemia  A-fib  Bradycardia with mild digoxin toxicity  Large hiatal hernia  COPD  CHF  CAD  Osteoarthritis, kyphosis     Recommendations:  right-sided chest tube placement today with consideration for chemical pleurodesis later  Hold digoxin and metoprolol  Oxygen supplement and titration to maintain saturation 90 to 95%  Bronchodilators  Mucinex  Diuresis  Encourage use of incentive spirometry     I personally reviewed the radiological studies               LOS: 1 day     Subjective     Patient reports cough and shortness of breath    Objective     Vital signs for last 24 hours:  Vitals:    06/18/24 1615 06/18/24 2119 06/19/24 0052 06/19/24 0511   BP: 91/41 105/48 137/76 130/67   BP Location: Left arm Right arm Right arm Right arm   Patient Position: Lying Lying Lying Lying   Pulse: 58 55 57 52   Resp: 24 16 17 15   Temp: 97.3 °F (36.3 °C) 97.8 °F (36.6 °C) 97.5 °F (36.4 °C) 97.1 °F (36.2 °C)   TempSrc: Oral Oral Oral Oral   SpO2: 97% 97% 100% 97%   Weight:       Height:           Intake/Output last 3 shifts:  I/O last 3 completed shifts:  In: 840 [P.O.:840]  Out: 3350 [Urine:3350]  Intake/Output this shift:  I/O this shift:  In: 240 [P.O.:240]  Out: -       Radiology  Imaging Results (Last 24 Hours)       ** No results found for the last 24 hours. **            Labs:  Results from last 7 days   Lab Units 06/19/24  0000   WBC 10*3/mm3 7.52   HEMOGLOBIN g/dL 10.0*   HEMATOCRIT % 31.5*   PLATELETS 10*3/mm3 188     Results from last 7 days   Lab Units 06/19/24  0000 06/18/24  0001 06/17/24  1543   SODIUM mmol/L 139   < > 141   POTASSIUM mmol/L 3.9   < > 4.3   CHLORIDE mmol/L 101   < > 103   CO2 mmol/L 31.5*   < > 30.9*   BUN mg/dL 17   < > 16   CREATININE mg/dL 0.46*   < > 0.48*   CALCIUM mg/dL 8.6   < > 8.8   BILIRUBIN  mg/dL  --   --  0.8   ALK PHOS U/L  --   --  79   ALT (SGPT) U/L  --   --  12   AST (SGOT) U/L  --   --  22   GLUCOSE mg/dL 87   < > 93    < > = values in this interval not displayed.     Results from last 7 days   Lab Units 06/17/24  1600   PH, ARTERIAL pH units 7.453*   PO2 ART mm Hg 96.2   PCO2, ARTERIAL mm Hg 43.2   HCO3 ART mmol/L 30.2*     Results from last 7 days   Lab Units 06/17/24  1543   ALBUMIN g/dL 3.6     Results from last 7 days   Lab Units 06/17/24  1543   HSTROP T ng/L 20*             Results from last 7 days   Lab Units 06/19/24  0000 06/18/24  0001 06/17/24  1543   INR  1.88* 2.23 2.20     Results from last 7 days   Lab Units 06/17/24  1543   TSH uIU/mL 2.160   FREE T4 ng/dL 1.36           Meds:   SCHEDULE  docusate sodium, 100 mg, Oral, Nightly  guaiFENesin, 1,200 mg, Oral, Q12H  latanoprost, 1 drop, Right Eye, Nightly  metoclopramide, 5 mg, Oral, 4x Daily AC & at Bedtime  [Held by provider] metoprolol succinate XL, 12.5 mg, Oral, Q12H  midodrine, 5 mg, Oral, TID AC  mirtazapine, 15 mg, Oral, Nightly  sodium chloride, 10 mL, Intravenous, Q12H      Infusions     PRNs    acetaminophen    senna-docusate sodium **AND** polyethylene glycol **AND** bisacodyl **AND** bisacodyl    magnesium hydroxide    nitroglycerin    ondansetron    sodium chloride    sodium chloride    Physical Exam:  General Appearance:  Alert   HEENT:  Normocephalic, without obvious abnormality, Conjunctiva/corneas clear,.   Nares normal, no drainage     Neck:  Supple, symmetrical, trachea midline.   Lungs /Chest wall:   Bilateral basal rhonchi, respirations unlabored, symmetrical wall movement.     Heart:  Regular rate and rhythm, S1 S2 normal  Abdomen: Soft, non-tender, no masses, no organomegaly.    Extremities: No edema, no clubbing or cyanosis     ROS  Constitutional: Negative for chills, fever and malaise/fatigue.   HENT: Negative.    Eyes: Negative.    Cardiovascular: Negative.    Respiratory: Positive for cough and shortness  of breath.    Skin: Negative.    Musculoskeletal: Negative.    Gastrointestinal: Negative.    Genitourinary: Negative.    Neurological: Generalized weakness      I reviewed the recent clinical results  I personally reviewed the latest radiological studies    Part of this note may be an electronic transcription/translation of spoken language to printed text using the Dragon Dictation System.

## 2024-06-19 NOTE — PLAN OF CARE
Goal Outcome Evaluation:              Outcome Evaluation: Patient is alert and oriented on 1L NC. Right chest tube in place. Family is at bedside.

## 2024-06-20 ENCOUNTER — APPOINTMENT (OUTPATIENT)
Dept: GENERAL RADIOLOGY | Facility: HOSPITAL | Age: 89
End: 2024-06-20
Payer: MEDICARE

## 2024-06-20 LAB
ANION GAP SERPL CALCULATED.3IONS-SCNC: 7 MMOL/L (ref 5–15)
BASOPHILS # BLD AUTO: 0.07 10*3/MM3 (ref 0–0.2)
BASOPHILS NFR BLD AUTO: 0.7 % (ref 0–1.5)
BUN SERPL-MCNC: 13 MG/DL (ref 8–23)
BUN/CREAT SERPL: 29.5 (ref 7–25)
CALCIUM SPEC-SCNC: 8.9 MG/DL (ref 8.2–9.6)
CHLORIDE SERPL-SCNC: 102 MMOL/L (ref 98–107)
CO2 SERPL-SCNC: 31 MMOL/L (ref 22–29)
CREAT SERPL-MCNC: 0.44 MG/DL (ref 0.57–1)
DEPRECATED RDW RBC AUTO: 58.8 FL (ref 37–54)
EGFRCR SERPLBLD CKD-EPI 2021: 88.6 ML/MIN/1.73
EOSINOPHIL # BLD AUTO: 0.25 10*3/MM3 (ref 0–0.4)
EOSINOPHIL NFR BLD AUTO: 2.5 % (ref 0.3–6.2)
ERYTHROCYTE [DISTWIDTH] IN BLOOD BY AUTOMATED COUNT: 15 % (ref 12.3–15.4)
GLUCOSE SERPL-MCNC: 85 MG/DL (ref 65–99)
HCT VFR BLD AUTO: 38.7 % (ref 34–46.6)
HGB BLD-MCNC: 12.1 G/DL (ref 12–15.9)
IMM GRANULOCYTES # BLD AUTO: 0.14 10*3/MM3 (ref 0–0.05)
IMM GRANULOCYTES NFR BLD AUTO: 1.4 % (ref 0–0.5)
INR PPP: 1.59 (ref 2–3)
LAB AP CASE REPORT: NORMAL
LYMPHOCYTES # BLD AUTO: 1.35 10*3/MM3 (ref 0.7–3.1)
LYMPHOCYTES NFR BLD AUTO: 13.6 % (ref 19.6–45.3)
MCH RBC QN AUTO: 32.8 PG (ref 26.6–33)
MCHC RBC AUTO-ENTMCNC: 31.3 G/DL (ref 31.5–35.7)
MCV RBC AUTO: 104.9 FL (ref 79–97)
MONOCYTES # BLD AUTO: 1.46 10*3/MM3 (ref 0.1–0.9)
MONOCYTES NFR BLD AUTO: 14.7 % (ref 5–12)
NEUTROPHILS NFR BLD AUTO: 6.69 10*3/MM3 (ref 1.7–7)
NEUTROPHILS NFR BLD AUTO: 67.1 % (ref 42.7–76)
NRBC BLD AUTO-RTO: 0 /100 WBC (ref 0–0.2)
PATH REPORT.FINAL DX SPEC: NORMAL
PATH REPORT.GROSS SPEC: NORMAL
PLATELET # BLD AUTO: 185 10*3/MM3 (ref 140–450)
PMV BLD AUTO: 10.7 FL (ref 6–12)
POTASSIUM SERPL-SCNC: 4.2 MMOL/L (ref 3.5–5.2)
PROTHROMBIN TIME: 16.7 SECONDS (ref 19.4–28.5)
RBC # BLD AUTO: 3.69 10*6/MM3 (ref 3.77–5.28)
SODIUM SERPL-SCNC: 140 MMOL/L (ref 136–145)
WBC NRBC COR # BLD AUTO: 9.96 10*3/MM3 (ref 3.4–10.8)

## 2024-06-20 PROCEDURE — 71045 X-RAY EXAM CHEST 1 VIEW: CPT

## 2024-06-20 PROCEDURE — 85025 COMPLETE CBC W/AUTO DIFF WBC: CPT | Performed by: INTERNAL MEDICINE

## 2024-06-20 PROCEDURE — 97530 THERAPEUTIC ACTIVITIES: CPT

## 2024-06-20 PROCEDURE — 97161 PT EVAL LOW COMPLEX 20 MIN: CPT

## 2024-06-20 PROCEDURE — 85610 PROTHROMBIN TIME: CPT | Performed by: INTERNAL MEDICINE

## 2024-06-20 PROCEDURE — 80048 BASIC METABOLIC PNL TOTAL CA: CPT | Performed by: INTERNAL MEDICINE

## 2024-06-20 RX ORDER — WARFARIN SODIUM 4 MG/1
4 TABLET ORAL ONCE
Qty: 1 TABLET | Refills: 0 | Status: COMPLETED | OUTPATIENT
Start: 2024-06-20 | End: 2024-06-20

## 2024-06-20 RX ORDER — WARFARIN SODIUM 2 MG/1
2 TABLET ORAL
Status: DISCONTINUED | OUTPATIENT
Start: 2024-06-21 | End: 2024-06-24

## 2024-06-20 RX ORDER — WARFARIN SODIUM 4 MG/1
4 TABLET ORAL
Status: DISCONTINUED | OUTPATIENT
Start: 2024-06-25 | End: 2024-06-24

## 2024-06-20 RX ADMIN — METOPROLOL SUCCINATE 12.5 MG: 25 TABLET, FILM COATED, EXTENDED RELEASE ORAL at 21:07

## 2024-06-20 RX ADMIN — MIRTAZAPINE 15 MG: 15 TABLET, FILM COATED ORAL at 21:07

## 2024-06-20 RX ADMIN — DOCUSATE SODIUM 100 MG: 100 CAPSULE, LIQUID FILLED ORAL at 21:09

## 2024-06-20 RX ADMIN — METOPROLOL SUCCINATE 12.5 MG: 25 TABLET, FILM COATED, EXTENDED RELEASE ORAL at 10:03

## 2024-06-20 RX ADMIN — Medication 10 ML: at 21:07

## 2024-06-20 RX ADMIN — METOCLOPRAMIDE 5 MG: 5 TABLET ORAL at 07:49

## 2024-06-20 RX ADMIN — Medication 10 ML: at 07:49

## 2024-06-20 RX ADMIN — ACETAMINOPHEN 650 MG: 325 TABLET, FILM COATED ORAL at 15:56

## 2024-06-20 RX ADMIN — GUAIFENESIN 1200 MG: 600 TABLET, EXTENDED RELEASE ORAL at 21:07

## 2024-06-20 RX ADMIN — WARFARIN SODIUM 4 MG: 4 TABLET ORAL at 17:17

## 2024-06-20 RX ADMIN — METOCLOPRAMIDE 5 MG: 5 TABLET ORAL at 21:07

## 2024-06-20 RX ADMIN — METOCLOPRAMIDE 5 MG: 5 TABLET ORAL at 10:02

## 2024-06-20 RX ADMIN — LATANOPROST 1 DROP: 50 SOLUTION/ DROPS OPHTHALMIC at 21:08

## 2024-06-20 RX ADMIN — GUAIFENESIN 1200 MG: 600 TABLET, EXTENDED RELEASE ORAL at 07:48

## 2024-06-20 RX ADMIN — MAGNESIUM HYDROXIDE 5 ML: 2400 SUSPENSION ORAL at 17:30

## 2024-06-20 RX ADMIN — Medication 10 ML: at 08:28

## 2024-06-20 RX ADMIN — METOCLOPRAMIDE 5 MG: 5 TABLET ORAL at 17:18

## 2024-06-20 NOTE — PLAN OF CARE
Goal Outcome Evaluation:  Plan of Care Reviewed With: patient           Outcome Evaluation: Pt is a 89 y/o female with recurrent pleural effusion. Requiring chest tube. Also with severe mitral regurgitation, hypoxemia, Anemia, Afib, Bradycardia, hiatal hernia, COPD, CHF, CAD, OA, and thoracic kyphosis with hx of compression fractures. Hx of R thoracentesis in April 2024.  Pt lives with her dtr, has 24/7 supervision/assist, and is usually independent with household mobility with use a of a rollator walker. She rarely uses home O2. Pt presents for PT evaluation today with impaired endurance impacting sitting tolerance and gait distance. She requires Rose for supine to sit, and CGA for sit to/from stand, gait x10' with walker, and sit to supine. She needs continuous supplemental O2 to maintain sats >90%. Pt is below her baseline and will benefit from acute care PT to address deficits and progress back to baseline. PT anticipates pt will be safe to DC home with family support and home health services. PT will follow.      Anticipated Discharge Disposition (PT): home with 24/7 care, home with home health

## 2024-06-20 NOTE — PLAN OF CARE
Goal Outcome Evaluation:   Patient alert and oriented x4. R chest tube intact connected to suction. Stable throughout the shift. Kept safe and comfortable.

## 2024-06-20 NOTE — PLAN OF CARE
Goal Outcome Evaluation:              Outcome Evaluation: Pt awake and pleasant. Maintains ext cath, verbalized feeling better and expressed thanks for care given.

## 2024-06-20 NOTE — PROGRESS NOTES
"Enter Query Response Below      Query Response: Unable to determine              If applicable, please update the problem list.   Patient: Shruthi Amin        : 6/10/1928  Account: [id number]           Admit Date:         How to Respond to this query:       a. Click New Note     b. Answer query within the yellow box.                c. Update the Problem List, if applicable.      If you have any questions about this query contact me at: [email or web address]  [phone]      Dr. Curtis:     89 y F admitted (2024) with \"recurrent pleural effusion.\" proBNP 324.0 (). CXR (): Moderate right and small left pleural effusions with bibasilar airspace disease which may relate to compressive atelectasis and/or pneumonia. Cardiomegaly and central pulmonary vascular congestion. Echo (24) EF 56-60%. Pulmonary consult notes (-) include, \" Recurrent pleural effusion: Status post right thoracentesis 2024 with drainage of 450 mL. CHF.\" Right chest tube placement () for \"clinically significant effusion\"     Please clarify if the patient was treated/monitored for:    Pleural effusion due to _____  Pleural effusion  Other- specify______  Unable to determine    By submitting this query, we are merely seeking further clarification of documentation to accurately reflect all conditions that you are monitoring, evaluating, treating or that extend the hospitalization or utilize additional resources of care. Please utilize your independent clinical judgment when addressing the question(s) above.     This query and your response, once completed, will be entered into the legal medical record.    Sincerely,    MAURIZIO Paredes, RN, CCDS  Clinical Documentation Integrity Program     "

## 2024-06-20 NOTE — PROGRESS NOTES
"Pharmacy dosing service  Anticoagulant  Warfarin     Subjective:    Shruthi Amin is a 89 y.o.female being continued on warfarin for Atrial Fibrillation / Flutter.    INR Goal: 2 - 3  Home medication?: warfarin 2 mg PO daily, except warfarin 4 mg PO on Tuesday.   Bridge Therapy Present?:  No  Interacting Medications Evaluation (New/Present/Discontinued):   Additional Contributing Factors:       Assessment/Plan:    INR is subtherapeutic today. Will give warfarin 4 mg today to boost INR and plan to resume home dosing tomorrow.      Continue to monitor and adjust based on INR.         Date 6/20           INR 1.59           Dose 4 mg               Objective:  [Ht: 157.5 cm (62\"); Wt: 38.5 kg (84 lb 14 oz); BMI: Body mass index is 15.52 kg/m².]    Lab Results   Component Value Date    ALBUMIN 3.6 06/17/2024     Lab Results   Component Value Date    INR 1.59 (L) 06/20/2024    INR 1.88 (L) 06/19/2024    INR 2.23 06/18/2024    PROTIME 16.7 (L) 06/20/2024    PROTIME 19.6 06/19/2024    PROTIME 22.9 06/18/2024     Lab Results   Component Value Date    HGB 12.1 06/20/2024    HGB 10.0 (L) 06/19/2024    HGB 9.9 (L) 06/18/2024     Lab Results   Component Value Date    HCT 38.7 06/20/2024    HCT 31.5 (L) 06/19/2024    HCT 31.9 (L) 06/18/2024       Renetta Suarez RPH  06/20/24 09:37 EDT    "

## 2024-06-20 NOTE — THERAPY EVALUATION
Patient Name: Shruthi Amin  : 6/10/1928    MRN: [id number]                              Today's Date: 2024       Admit Date: 2024    Visit Dx:     ICD-10-CM ICD-9-CM   1. Hypoxia  R09.02 799.02     Patient Active Problem List   Diagnosis    Atrial fibrillation    Chronic anticoagulation    Cerebrovascular accident (CVA)    Hiatal hernia    Hyperlipidemia    Hypertension    Osteoarthritis    Hypoxia    Compression fracture of thoracic vertebra with delayed healing    History of osteoporotic pathological fracture    Acute on chronic congestive heart failure, unspecified heart failure type    Severe malnutrition    Nonrheumatic mitral valve regurgitation    Back pain    Recurrent pleural effusion    Bradycardia     Past Medical History:   Diagnosis Date    Atrial fibrillation     Coronary artery disease     Atrial fibrillation    GERD (gastroesophageal reflux disease)     Glaucoma     Hiatal hernia with gastroesophageal reflux     History of osteoporotic pathological fracture     Right intertroch (2019)    Hx of compression fracture of spine     T12 and L1(); T6 (2022)    Hyperlipidemia     Hypertension     Osteoarthritis     Osteoporosis     on prolia(3/21/22)    Stroke     off and on dizziness from the stroke.     Past Surgical History:   Procedure Laterality Date    BACK SURGERY      kyphoplasty    EYE SURGERY      cataract surgery    FIXATION KYPHOPLASTY LUMBAR SPINE      HIP TROCHANTERIC NAILING WITH INTRAMEDULLARY HIP SCREW Right 2019    Procedure: HIP TROCHANTERIC NAILING SHORT WITH INTRAMEDULLARY HIP SCREW;  Surgeon: Edward Centeno MD;  Location: [facility] OR;  Service: Orthopedics    RECTAL SURGERY      x 3      General Information       Row Name 24 1327          Physical Therapy Time and Intention    Document Type evaluation  -     Mode of Treatment physical therapy  -       Row Name 24 1327          General Information    Patient Profile Reviewed  yes  -     Prior Level of Function independent:;gait;transfer;bed mobility;ADL's  Dtr assists with IADLs. Pt usually walks with a rollator, only household distances. No falls. She has 24/7 supervision/assist. She has O2 but doesn't consistently use it.  -     Existing Precautions/Restrictions fall;oxygen therapy device and L/min  -     Barriers to Rehab medically complex;hearing deficit  -[facility] Name 06/20/24 1327          Living Environment    People in Home child(juan francisco), adult  -[facility] Name 06/20/24 1327          Home Main Entrance    Number of Stairs, Main Entrance one  -[facility] Name 06/20/24 1327          Stairs Within Home, Primary    Number of Stairs, Within Home, Primary none  -[facility] Name 06/20/24 1327          Cognition    Orientation Status (Cognition) oriented x 3  -[facility] Name 06/20/24 1327          Safety Issues, Functional Mobility    Impairments Affecting Function (Mobility) endurance/activity tolerance;strength;postural/trunk control  -     Comment, Safety Issues/Impairments (Mobility) no safety deficits noted.  -               User Key  (r) = Recorded By, (t) = Taken By, (c) = Cosigned By      Initials Name Provider Type     Maricel Mccray, PT Physical Therapist                   Mobility       [facility] Name 06/20/24 1339          Bed Mobility    Bed Mobility supine-sit;sit-supine  -     Supine-Sit [place] (Bed Mobility) minimum assist (75% patient effort)  [facility]     Sit-Supine [place] (Bed Mobility) contact guard  -[facility] Name 06/20/24 1339          Bed-Chair Transfer    Bed-Chair [place] (Transfers) contact guard  -AH     Assistive Device (Bed-Chair Transfers) walker, front-wheeled  -[facility] Name 06/20/24 1339          Sit-Stand Transfer    Sit-Stand [place] (Transfers) contact guard  -AH     Assistive Device (Sit-Stand Transfers) walker, front-wheeled  -[facility] Name 06/20/24 1339          Gait/Stairs (Locomotion)     [place] Level (Gait) contact guard  -     Assistive Device (Gait) walker, front-wheeled  -     Patient was able to Ambulate yes  -     Distance in Feet (Gait) 10  -     Deviations/Abnormal Patterns (Gait) tessy decreased;stride length decreased  baseline deficit in standing posture with significant thoracic kyphosis.  -               User Key  (r) = Recorded By, (t) = Taken By, (c) = Cosigned By      Initials Name Provider Type     Maricel Mccray, PT Physical Therapist                   Obj/Interventions       [facility] Name 06/20/24 1341          Range of Motion Comprehensive    General Range of Motion bilateral lower extremity ROM WFL  -     Comment, General Range of Motion spinal ROM limited due to chronic kyphotic deformity  -[facility] Name 06/20/24 1341          Strength Comprehensive (MMT)    General Manual Muscle Testing (MMT) Assessment lower extremity strength deficits identified  -     Comment, General Manual Muscle Testing (MMT) Assessment BLE grossly 4/5.  -[facility] Name 06/20/24 1341          Balance    Balance Assessment sitting static balance;sitting dynamic balance;standing dynamic balance;standing static balance  -     Static Sitting Balance standby assist  -     Dynamic Sitting Balance standby assist  -     Position, Sitting Balance sitting edge of bed;unsupported  -     Static Standing Balance standby assist  -     Dynamic Standing Balance contact guard  -     Position/Device Used, Standing Balance unsupported;walker, front-wheeled  -AH       Row Name 06/20/24 1341          Sensory Assessment (Somatosensory)    Sensory Assessment (Somatosensory) sensation intact  -               User Key  (r) = Recorded By, (t) = Taken By, (c) = Cosigned By      Initials Name Provider Type     Maricel Mccray, PT Physical Therapist                   Goals/Plan       [facility] Name 06/20/24 1345          Bed Mobility Goal 1 (PT)    Activity/Assistive Device (Bed Mobility Goal 1, PT)  bed mobility activities, all  -     [place] Level/Cues Needed (Bed Mobility Goal 1, PT) standby assist  -AH     Time Frame (Bed Mobility Goal 1, PT) long term goal (LTG);2 weeks  -       Row Name 06/20/24 1342          Transfer Goal 1 (PT)    Activity/Assistive Device (Transfer Goal 1, PT) transfers, all;walker, rolling  -AH     [place] Level/Cues Needed (Transfer Goal 1, PT) standby assist  -AH     Time Frame (Transfer Goal 1, PT) long term goal (LTG);2 weeks  -       Row Name 06/20/24 134          Gait Training Goal 1 (PT)    Activity/Assistive Device (Gait Training Goal 1, PT) gait (walking locomotion);assistive device use;walker, rolling  -     [place] Level (Gait Training Goal 1, PT) standby assist  -AH     Distance (Gait Training Goal 1, PT) 25'  -     Time Frame (Gait Training Goal 1, PT) long term goal (LTG);2 weeks  -[facility] Name 06/20/24 134          Therapy Assessment/Plan (PT)    Planned Therapy Interventions (PT) balance training;bed mobility training;gait training;home exercise program;patient/family education;transfer training;strengthening  -               User Key  (r) = Recorded By, (t) = Taken By, (c) = Cosigned By      Initials Name Provider Type     Maricel Mccray, JEREMY Physical Therapist                   Clinical Impression       Row Name 06/20/24 3017          Pain    Pretreatment Pain Rating 2/10  -     Posttreatment Pain Rating 2/10  -     Pain Location - other (see comments)  chest tube site R thorax.  -     Pain Intervention(s) Repositioned;Therapeutic presence  -       Row Name 06/20/24 0927          Plan of Care Review    Plan of Care Reviewed With patient  -     Outcome Evaluation Pt is a 89 y/o female with recurrent pleural effusion. Requiring chest tube. Also with severe mitral regurgitation, hypoxemia, Anemia, Afib, Bradycardia, hiatal hernia, COPD, CHF, CAD, OA, and thoracic kyphosis with hx of compression fractures. Hx of R thoracentesis  in April 2024.  Pt lives with her dtr, has 24/7 supervision/assist, and is usually independent with household mobility with use a of a rollator walker. She rarely uses home O2. Pt presents for PT evaluation today with impaired endurance impacting sitting tolerance and gait distance. She requires Rose for supine to sit, and CGA for sit to/from stand, gait x10' with walker, and sit to supine. She needs continuous supplemental O2 to maintain sats >90%. Pt is below her baseline and will benefit from acute care PT to address deficits and progress back to baseline. PT anticipates pt will be safe to DC home with family support and home health services. PT will follow.  -       Row Name 06/20/24 1342          Therapy Assessment/Plan (PT)    Patient/Family Therapy Goals Statement (PT) get better and go home  -     Rehab Potential (PT) good, to achieve stated therapy goals  -     Criteria for Skilled Interventions Met (PT) yes;skilled treatment is necessary  -     Therapy Frequency (PT) 3 times/wk  -       Row Name 06/20/24 1342          Vital Signs    Intra Systolic BP Rehab 135  -AH     Intra Treatment Diastolic BP 79  -AH     Intratreatment Heart Rate (beats/min) 83  -AH     Pre SpO2 (%) 94  -AH     O2 Delivery Pre Treatment room air  -AH     Intra SpO2 (%) 88  -AH     O2 Delivery Intra Treatment room air  -AH     Post SpO2 (%) 95  -AH     O2 Delivery Post Treatment supplemental O2  2L  -AH     Pre Patient Position Supine  -AH     Intra Patient Position Sitting  -AH     Post Patient Position Supine  -AH       Row Name 06/20/24 1342          Positioning and Restraints    Post Treatment Position bed  -AH     In Bed notified nsg;fowlers;call light within reach;encouraged to call for assist;exit alarm on  -               User Key  (r) = Recorded By, (t) = Taken By, (c) = Cosigned By      Initials Name Provider Type    Maricel Monge, PT Physical Therapist                   Outcome Measures       Row Name  06/20/24 1346 06/20/24 0930       How much help from another person do you currently need...    Turning from your back to your side while in flat bed without using bedrails? 4  - 3  -RA    Moving from lying on back to sitting on the side of a flat bed without bedrails? 3  - 3  -RA    Moving to and from a bed to a chair (including a wheelchair)? 3  - 2  -RA    Standing up from a chair using your arms (e.g., wheelchair, bedside chair)? 3  - 2  -RA    Climbing 3-5 steps with a railing? 2  - 2  -RA    To walk in hospital room? 3  - 2  -RA    AM-PAC 6 Clicks Score (PT) 18  - 14  -RA    Highest Level of Mobility Goal 6 --> Walk 10 steps or more  - 4 --> Transfer to chair/commode  -RA      Row Name 06/20/24 1346          Functional Assessment    Outcome Measure Options AM-PAC 6 Clicks Basic Mobility (PT)  -               User Key  (r) = Recorded By, (t) = Taken By, (c) = Cosigned By      Initials Name Provider Type     Maricel Mccray, PT Physical Therapist    Jozef Issa, RN Registered Nurse                                 Physical Therapy Education       Title: PT OT SLP Therapies (Done)       Topic: Physical Therapy (Done)       Point: Mobility training (Done)       Learning Progress Summary             Patient Eager, E, VU by  at 6/20/2024 1346                         Point: Home exercise program (Done)       Learning Progress Summary             Patient Eager, E, VU by  at 6/20/2024 1346                         Point: Body mechanics (Done)       Learning Progress Summary             Patient Eager, E, VU by  at 6/20/2024 1346                         Point: Precautions (Done)       Learning Progress Summary             Patient Eager, E, VU by  at 6/20/2024 1346                                         User Key       Initials Effective Dates Name Provider Type [facility] 12/04/23 -  Maricel Mccray, PT Physical Therapist PT                  PT Recommendation and Plan  Planned Therapy  Interventions (PT): balance training, bed mobility training, gait training, home exercise program, patient/family education, transfer training, strengthening  Plan of Care Reviewed With: patient  Outcome Evaluation: Pt is a 89 y/o female with recurrent pleural effusion. Requiring chest tube. Also with severe mitral regurgitation, hypoxemia, Anemia, Afib, Bradycardia, hiatal hernia, COPD, CHF, CAD, OA, and thoracic kyphosis with hx of compression fractures. Hx of R thoracentesis in April 2024.  Pt lives with her dtr, has 24/7 supervision/assist, and is usually independent with household mobility with use a of a rollator walker. She rarely uses home O2. Pt presents for PT evaluation today with impaired endurance impacting sitting tolerance and gait distance. She requires Rose for supine to sit, and CGA for sit to/from stand, gait x10' with walker, and sit to supine. She needs continuous supplemental O2 to maintain sats >90%. Pt is below her baseline and will benefit from acute care PT to address deficits and progress back to baseline. PT anticipates pt will be safe to DC home with family support and home health services. PT will follow.     Time Calculation:         PT Charges       Row Name 06/20/24 1346             Time Calculation    Start Time 1240  -      Stop Time 1320  -      Time Calculation (min) 40 min  -      PT Non-Billable Time (min) 0 min  -      PT Received On 06/20/24  -      PT - Next Appointment 06/22/24  -      PT Goal Re-Cert Due Date 07/04/24  -         Time Calculation- PT    Total Timed Code Minutes- PT 10 minute(s)  -                User Key  (r) = Recorded By, (t) = Taken By, (c) = Cosigned By      Initials Name Provider Type     Maricel Mccray PT Physical Therapist                  Therapy Charges for Today       Code Description Service Date Service Provider Modifiers Qty    [id number]  PT THERAPEUTIC ACT EA 15 MIN 6/20/2024 Maricel Mccray, PT GP 1    [id number]  PT  EVAL LOW COMPLEXITY 4 6/20/2024 Maricel Mccray, PT GP 1            PT G-Codes  Outcome Measure Options: AM-PAC 6 Clicks Basic Mobility (PT)  AM-PAC 6 Clicks Score (PT): 18  PT Discharge Summary  Anticipated Discharge Disposition (PT): home with 24/7 care, home with home health    Maricel Mccray, PT  6/20/2024

## 2024-06-20 NOTE — PROGRESS NOTES
Daily Progress Note          Assessment    Recurrent pleural effusion: Status post right thoracentesis April 2024 with drainage of 450 mL  Transudative fluid likely related to CHF/severe mitral regurgitation  Hypoxemia: ABG 7.45/43.2/92.2 while on 2 L nasal cannula  Anemia  A-fib  Bradycardia with mild digoxin toxicity  Large hiatal hernia  COPD  CHF  CAD  Osteoarthritis, kyphosis    Results for orders placed during the hospital encounter of 02/23/24    Adult Transesophageal Echo (JEFF) W/ Cont if Necessary Per Protocol    Interpretation Summary    Left ventricular ejection fraction appears to be 56 - 60%.    Severe mitral valve regurgitation is present.    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    Clinical correlation required         Recommendations:  Status post right chest tube placement 6/19/2024: Continue -20 cm H2O suction    BP and heart rate are improving, metoprolol resumed  Oxygen supplement and titration to maintain saturation 90 to 95%: Currently requiring 2 L per nasal cannula    Bronchodilators  Mucinex    Encourage use of incentive spirometry  Chronic anticoagulation: Warfarin     I personally reviewed the radiological studies                 LOS: 2 days     Subjective     Patient reports mild shortness of breath    Objective     Vital signs for last 24 hours:  Vitals:    06/19/24 2300 06/20/24 0321 06/20/24 0841 06/20/24 1003   BP: 145/61 147/72 135/70    BP Location: Right arm Right arm Right arm    Patient Position: Lying Lying Lying    Pulse: 88 84 85 69   Resp: 17 17 16    Temp: 97.8 °F (36.6 °C) 97.3 °F (36.3 °C) 97.5 °F (36.4 °C)    TempSrc: Oral Oral Oral    SpO2:  100% 98%    Weight:       Height:           Intake/Output last 3 shifts:  I/O last 3 completed shifts:  In: 960 [P.O.:960]  Out: 2450 [Urine:2000; Chest Tube:450]  Intake/Output this shift:  I/O  this shift:  In: 480 [P.O.:480]  Out: -       Radiology  Imaging Results (Last 24 Hours)       Procedure Component Value Units Date/Time    XR Chest 1 View [[id number]] Collected: 06/20/24 0715     Updated: 06/20/24 0719    Narrative:      XR CHEST 1 VW    Date of Exam: 6/20/2024 3:50 AM EDT    Indication: Right pleural effusion, right chest tube    Comparison: 6/19/2024.    Findings:  The right pleural effusion has slightly reduced in size. There is a stable right basilar pigtail chest tube in place. There is a new small right apical pneumothorax measuring 4 mm. On the left side, there is increased density in the left lower chest   which may relate to a small pleural effusion with atelectasis. The exam is somewhat difficult to interpret as the patient has a large hiatal hernia. The heart is enlarged. The pulmonary vascular markings are increased and unchanged.      Impression:      Impression:    1. Right pleural effusion has slightly reduced in size. There is a stable right basilar pigtail chest tube. There is a new small right apical pneumothorax measuring 4 mm.  2. Otherwise no interval change from yesterday's exam with abnormalities as described.      Electronically Signed: Emerson Leal MD    6/20/2024 7:17 AM EDT    Workstation ID: [id number]    XR Chest 1 View [[id number]] Collected: 06/19/24 1316     Updated: 06/19/24 1320    Narrative:      XR CHEST 1 VW    Date of Exam: 6/19/2024 12:50 PM EDT    Indication: Right pleural effusion, status post chest tube placement    Comparison: 6/17/2024.    Findings:  The patient is status post placement of a right basilar pigtail chest tube. There is a persistent moderate sized right pleural effusion. There is no pneumothorax. There is presumed compressive atelectasis on the right lung base and atelectasis in the   right perihilar region. On the left side, the patient probably has a smaller pleural effusion. There is a large hiatal hernia. The heart is enlarged. There may be  underlying pulmonary vascular congestion. There are chronic age-related changes involving   the bony thorax and thoracic aorta.      Impression:      Impression:    1. Interval placement of a right-sided chest tube. There continues to be a moderate right pleural effusion. There is no pneumothorax.  2. Additional abnormalities as described above and unchanged from the previous exam.      Electronically Signed: Emerson Leal MD    6/19/2024 1:18 PM EDT    Workstation ID: [id number]            Labs:  Results from last 7 days   Lab Units 06/20/24  0452   WBC 10*3/mm3 9.96   HEMOGLOBIN g/dL 12.1   HEMATOCRIT % 38.7   PLATELETS 10*3/mm3 185     Results from last 7 days   Lab Units 06/20/24  0452 06/18/24  0001 06/17/24  1543   SODIUM mmol/L 140   < > 141   POTASSIUM mmol/L 4.2   < > 4.3   CHLORIDE mmol/L 102   < > 103   CO2 mmol/L 31.0*   < > 30.9*   BUN mg/dL 13   < > 16   CREATININE mg/dL 0.44*   < > 0.48*   CALCIUM mg/dL 8.9   < > 8.8   BILIRUBIN mg/dL  --   --  0.8   ALK PHOS U/L  --   --  79   ALT (SGPT) U/L  --   --  12   AST (SGOT) U/L  --   --  22   GLUCOSE mg/dL 85   < > 93    < > = values in this interval not displayed.     Results from last 7 days   Lab Units 06/17/24  1600   PH, ARTERIAL pH units 7.453*   PO2 ART mm Hg 96.2   PCO2, ARTERIAL mm Hg 43.2   HCO3 ART mmol/L 30.2*     Results from last 7 days   Lab Units 06/17/24  1543   ALBUMIN g/dL 3.6     Results from last 7 days   Lab Units 06/17/24  1543   HSTROP T ng/L 20*             Results from last 7 days   Lab Units 06/20/24  0452 06/19/24  0000 06/18/24  0001   INR  1.59* 1.88* 2.23     Results from last 7 days   Lab Units 06/17/24  1543   TSH uIU/mL 2.160   FREE T4 ng/dL 1.36           Meds:   SCHEDULE  docusate sodium, 100 mg, Oral, Nightly  guaiFENesin, 1,200 mg, Oral, Q12H  latanoprost, 1 drop, Right Eye, Nightly  metoclopramide, 5 mg, Oral, 4x Daily AC & at Bedtime  metoprolol succinate XL, 12.5 mg, Oral, Q12H  mirtazapine, 15 mg, Oral, Nightly  sodium  chloride, 10 mL, Intravenous, Q12H  [START ON 6/21/2024] warfarin, 2 mg, Oral, Once per day on Sunday Monday Wednesday Thursday Friday Saturday   And  [START ON 6/25/2024] warfarin, 4 mg, Oral, Every Tuesday  warfarin, 4 mg, Oral, Once      Infusions  Pharmacy to dose warfarin,       PRNs    acetaminophen    senna-docusate sodium **AND** polyethylene glycol **AND** bisacodyl **AND** bisacodyl    magnesium hydroxide    nitroglycerin    ondansetron    Pharmacy to dose warfarin    sodium chloride    sodium chloride    Physical Exam:  General Appearance:  Alert   HEENT:  Normocephalic, without obvious abnormality, Conjunctiva/corneas clear,.   Nares normal, no drainage     Neck:  Supple, symmetrical, trachea midline.   Lungs /Chest wall:   Bilateral basal rhonchi, respirations unlabored, symmetrical wall movement.     Heart:  Regular rate and rhythm, S1 S2 normal  Abdomen: Soft, non-tender, no masses, no organomegaly.    Extremities: No edema, no clubbing or cyanosis     ROS  Constitutional: Negative for chills, fever and malaise/fatigue.   HENT: Negative.    Eyes: Negative.    Cardiovascular: Negative.    Respiratory: Positive for cough and shortness of breath.    Skin: Negative.    Musculoskeletal: Negative.    Gastrointestinal: Negative.    Genitourinary: Negative.    Neurological: Generalized weakness      I reviewed the recent clinical results  I personally reviewed the latest radiological studies    Part of this note may be an electronic transcription/translation of spoken language to printed text using the Dragon Dictation System.

## 2024-06-20 NOTE — PROGRESS NOTES
LOS: 2 days   Patient Care Team:  Cristal Goodwin MD as PCP - General (Family Medicine)  Anai Moise APRN as Nurse Practitioner (Cardiology)  Theodore Vital MD as Consulting Physician (Cardiology)    Subjective     Patient denies any complaints and states that breathing has improved    Review of Systems   Constitutional:  Positive for activity change, appetite change and fatigue.   HENT: Negative.     Respiratory:  Positive for shortness of breath.    Cardiovascular: Negative.    Gastrointestinal: Negative.    Genitourinary: Negative.    Musculoskeletal: Negative.    Neurological:  Positive for weakness.   Psychiatric/Behavioral: Negative.             Objective     Vital Signs  Temp:  [97.3 °F (36.3 °C)-97.8 °F (36.6 °C)] 97.5 °F (36.4 °C)  Heart Rate:  [60-88] 85  Resp:  [13-19] 16  BP: (122-147)/(59-79) 135/70      Physical Exam  Vitals reviewed.   Constitutional:       Appearance: She is not ill-appearing.   HENT:      Head: Normocephalic and atraumatic.      Right Ear: External ear normal.      Left Ear: External ear normal.      Nose: Nose normal.      Mouth/Throat:      Mouth: Mucous membranes are moist.   Eyes:      General:         Right eye: No discharge.         Left eye: No discharge.   Cardiovascular:      Rate and Rhythm: Normal rate and regular rhythm.      Pulses: Normal pulses.      Heart sounds: Normal heart sounds.   Pulmonary:      Effort: Pulmonary effort is normal.      Breath sounds: Normal breath sounds.   Abdominal:      General: Bowel sounds are normal.      Palpations: Abdomen is soft.   Musculoskeletal:         General: Normal range of motion.      Cervical back: Normal range of motion.   Skin:     General: Skin is warm and dry.   Neurological:      Mental Status: She is alert and oriented to person, place, and time.   Psychiatric:         Behavior: Behavior normal.              Results Review:    Lab Results (last 24 hours)       Procedure Component Value Units  Date/Time    Body Fluid Culture - Body Fluid, Pleural Cavity [[id number]] Collected: 06/19/24 1057    Specimen: Body Fluid from Pleural Cavity Updated: 06/20/24 0625     Body Fluid Culture No growth at less than 24 hours     Gram Stain Rare (1+) WBCs per low power field      No organisms seen    Basic Metabolic Panel [[id number]]  (Abnormal) Collected: 06/20/24 0452    Specimen: Blood from Arm, Right Updated: 06/20/24 0544     Glucose 85 mg/dL      BUN 13 mg/dL      Creatinine 0.44 mg/dL      Sodium 140 mmol/L      Potassium 4.2 mmol/L      Comment: Specimen hemolyzed.  Result may be falsely elevated.        Chloride 102 mmol/L      CO2 31.0 mmol/L      Calcium 8.9 mg/dL      BUN/Creatinine Ratio 29.5     Anion Gap 7.0 mmol/L      eGFR 88.6 mL/min/1.73     Narrative:      GFR Normal >60  Chronic Kidney Disease <60  Kidney Failure <15    The GFR formula is only valid for adults with stable renal function between ages 18 and 70.    Protime-INR [[id number]]  (Abnormal) Collected: 06/20/24 0452    Specimen: Blood from Arm, Right Updated: 06/20/24 0526     Protime 16.7 Seconds      INR 1.59    CBC & Differential [[id number]]  (Abnormal) Collected: 06/20/24 0452    Specimen: Blood from Arm, Right Updated: 06/20/24 0523    Narrative:      The following orders were created for panel order CBC & Differential.  Procedure                               Abnormality         Status                     ---------                               -----------         ------                     CBC Auto Differential[[id number]]        Abnormal            Final result                 Please view results for these tests on the individual orders.    CBC Auto Differential [[id number]]  (Abnormal) Collected: 06/20/24 0452    Specimen: Blood from Arm, Right Updated: 06/20/24 0523     WBC 9.96 10*3/mm3      RBC 3.69 10*6/mm3      Hemoglobin 12.1 g/dL      Hematocrit 38.7 %      .9 fL      MCH 32.8 pg      MCHC 31.3 g/dL      RDW 15.0 %       RDW-SD 58.8 fl      MPV 10.7 fL      Platelets 185 10*3/mm3      Neutrophil % 67.1 %      Lymphocyte % 13.6 %      Monocyte % 14.7 %      Eosinophil % 2.5 %      Basophil % 0.7 %      Immature Grans % 1.4 %      Neutrophils, Absolute 6.69 10*3/mm3      Lymphocytes, Absolute 1.35 10*3/mm3      Monocytes, Absolute 1.46 10*3/mm3      Eosinophils, Absolute 0.25 10*3/mm3      Basophils, Absolute 0.07 10*3/mm3      Immature Grans, Absolute 0.14 10*3/mm3      nRBC 0.0 /100 WBC     Lactate Dehydrogenase, Body Fluid - Body Fluid, Pleural Cavity [[id number]] Collected: 06/19/24 1057    Specimen: Body Fluid from Pleural Cavity Updated: 06/19/24 1618     Lactate Dehydrogenase (LD), Fluid 74 U/L     Narrative:      No Reference Ranges Established.    Serous fluid LDH greater than 60 percent of the serum LDH or serous fluid LDH two-thirds of the upper limit of normal for serum LDH suggests the fluid is an exudate.     1. Pleural TP/Serum TP >0.5  2. Pleural LD/Serum LD >0.6  3. Pleural LD >2/3 of the upper limit of normal for serum LDH    This test was developed, it performance characteristics determined and judged suitable for clinical purposes by [facility].  It has not been cleared or approved by the FDA.  The laboratory is regulated under CLIA as qualified to perform high-complexity testing.     Glucose, Body Fluid - Pleural Fluid, Pleural Cavity [[id number]] Collected: 06/19/24 1101    Specimen: Pleural Fluid from Pleural Cavity Updated: 06/19/24 1608     Glucose, Fluid 103 mg/dL     Narrative:      No Reference Ranges Established.    Serous fluid glucose less than 60 mg/dL or less than 30 mg/dL below serum glucose suggests an infectious or malignant exudate.     This test was developed, it performance characteristics determined and judged suitable for clinical purposes by [facility].  It has not been cleared or approved by the FDA.  The laboratory is regulated under CLIA as  qualified to perform high-complexity testing.     Protein, Body Fluid - Pleural Fluid, Pleural Cavity [[id number]] Collected: 06/19/24 1101    Specimen: Pleural Fluid from Pleural Cavity Updated: 06/19/24 1608     Protein, Total, Fluid 1.9 g/dL     Narrative:      No Reference Ranges Established.    A serous fluid total fluid (TP) greater than 50 percent of the serum TP suggests the fluid is an exudate.      1. Pleural TP/Serum TP >0.5  2. Pleural LD/Serum LD >0.6  3. Pleural LD >2/3 of the upper limit of normal for serum LDH    This test was developed, it performance characteristics determined and judged suitable for clinical purposes by [facility].  It has not been cleared or approved by the FDA.  The laboratory is regulated under CLIA as qualified to perform high-complexity testing.     Body Fluid Cell Count With Differential - Body Fluid, Pleural Cavity [[id number]] Collected: 06/19/24 1057    Specimen: Body Fluid from Pleural Cavity Updated: 06/19/24 1255    Narrative:      The following orders were created for panel order Body Fluid Cell Count With Differential - Body Fluid, Pleural Cavity.  Procedure                               Abnormality         Status                     ---------                               -----------         ------                     Body fluid cell count - ...[[id number]]                      Final result               Body fluid differential ...[[id number]]                      Final result                 Please view results for these tests on the individual orders.    Body fluid differential - Body Fluid, Pleural Cavity [[id number]] Collected: 06/19/24 1057    Specimen: Body Fluid from Pleural Cavity Updated: 06/19/24 1255     Neutrophils, Fluid 2 %      Lymphocytes, Fluid 89 %      Mesothelial Cells, Fluid 9 %     Narrative:      Concentrated Smear by Cytocentrifuge Prep.    Non-gynecologic Cytology [[id number]] Collected: 06/19/24 1101    Specimen: Pleural  Fluid from Pleural Cavity Updated: 06/19/24 1227    pH, Body Fluid - Body Fluid, Pleural Cavity [[id number]]  (Normal) Collected: 06/19/24 1057    Specimen: Body Fluid from Pleural Cavity Updated: 06/19/24 1210     pH, Fluid 7.50    Body fluid cell count - Body Fluid, Pleural Cavity [[id number]] Collected: 06/19/24 1057    Specimen: Body Fluid from Pleural Cavity Updated: 06/19/24 1202     Color, Fluid Yellow     Appearance, Fluid Clear     Nucleated Cells, Fluid 166 /mm3     Narrative:      No reference range established. Physician to interpret results with clinical findings.    Folate [[id number]]  (Normal) Collected: 06/19/24 0000    Specimen: Blood from Arm, Right Updated: 06/19/24 1104     Folate 10.70 ng/mL     Narrative:      Results may be falsely increased if patient taking Biotin.      Vitamin B12 [[id number]]  (Abnormal) Collected: 06/19/24 0000    Specimen: Blood from Arm, Right Updated: 06/19/24 1104     Vitamin B-12 >2,000 pg/mL     Narrative:      Results may be falsely increased if patient taking Biotin.               Imaging Results (Last 24 Hours)       Procedure Component Value Units Date/Time    XR Chest 1 View [[id number]] Collected: 06/20/24 0715     Updated: 06/20/24 0719    Narrative:      XR CHEST 1 VW    Date of Exam: 6/20/2024 3:50 AM EDT    Indication: Right pleural effusion, right chest tube    Comparison: 6/19/2024.    Findings:  The right pleural effusion has slightly reduced in size. There is a stable right basilar pigtail chest tube in place. There is a new small right apical pneumothorax measuring 4 mm. On the left side, there is increased density in the left lower chest   which may relate to a small pleural effusion with atelectasis. The exam is somewhat difficult to interpret as the patient has a large hiatal hernia. The heart is enlarged. The pulmonary vascular markings are increased and unchanged.      Impression:      Impression:    1. Right pleural effusion has slightly reduced in  size. There is a stable right basilar pigtail chest tube. There is a new small right apical pneumothorax measuring 4 mm.  2. Otherwise no interval change from yesterday's exam with abnormalities as described.      Electronically Signed: Emerson Leal MD    6/20/2024 7:17 AM EDT    Workstation ID: [id number]    XR Chest 1 View [[id number]] Collected: 06/19/24 1316     Updated: 06/19/24 1320    Narrative:      XR CHEST 1 VW    Date of Exam: 6/19/2024 12:50 PM EDT    Indication: Right pleural effusion, status post chest tube placement    Comparison: 6/17/2024.    Findings:  The patient is status post placement of a right basilar pigtail chest tube. There is a persistent moderate sized right pleural effusion. There is no pneumothorax. There is presumed compressive atelectasis on the right lung base and atelectasis in the   right perihilar region. On the left side, the patient probably has a smaller pleural effusion. There is a large hiatal hernia. The heart is enlarged. There may be underlying pulmonary vascular congestion. There are chronic age-related changes involving   the bony thorax and thoracic aorta.      Impression:      Impression:    1. Interval placement of a right-sided chest tube. There continues to be a moderate right pleural effusion. There is no pneumothorax.  2. Additional abnormalities as described above and unchanged from the previous exam.      Electronically Signed: Emerson Leal MD    6/19/2024 1:18 PM EDT    Workstation ID: [id number]                 I reviewed the patient's new clinical results.    Medication Review:   Scheduled Meds:docusate sodium, 100 mg, Oral, Nightly  guaiFENesin, 1,200 mg, Oral, Q12H  latanoprost, 1 drop, Right Eye, Nightly  metoclopramide, 5 mg, Oral, 4x Daily AC & at Bedtime  [Held by provider] metoprolol succinate XL, 12.5 mg, Oral, Q12H  mirtazapine, 15 mg, Oral, Nightly  sodium chloride, 10 mL, Intravenous, Q12H      Continuous Infusions:   PRN Meds:.  acetaminophen     senna-docusate sodium **AND** polyethylene glycol **AND** bisacodyl **AND** bisacodyl    magnesium hydroxide    nitroglycerin    ondansetron    sodium chloride    sodium chloride     Interval History:    Assessment & Plan     Recurrent pleural effusion  -status post chest tube placement  - Pulmonary following      Large hiatal hernia  -metoclopramide, reflux precautions    Underweight  -nutritional supplements, mirtazapine    Glaucoma  Secondary hypercoagulable state related to atrial fibs    Paroxysmal atrial fib   -currently in a controlled rate;  restart low dose metoprolol that was previously held for bradycardia      Dig toxicity  -continue to hold  -check lab in am     Anemia    Stage II pressure injury on sacrum  -healing, continue topical barrier creams and pressure-relief measure    Plan for disposition:MARTY Solano, [place]  06/20/24  08:51 EDT

## 2024-06-20 NOTE — PROGRESS NOTES
"Enter Query Response Below      Query Response: Pt has   Stage 3 sacral pressure injury, present on admission .             If applicable, please update the problem list.   Patient: Shruthi Amin        : 6/10/1928  Account: [id number]           Admit Date:         How to Respond to this query:       a. Click New Note     b. Answer query within the yellow box.                c. Update the Problem List, if applicable.      If you have any questions about this query contact me at: [email or web address]  [phone]      Dr. Roman:     89 y F admitted (24) with documentation of \" Patient presents with a healing stage III sacral pressure injury the injury is small is 1 x 0.6 cm with a depth of point 2.  Its pink its clean there is scant serous exudate noted,\" per APRN Wound Care consult (). RN assessment ( @ ) Pressure injury screen: pressure injury present on admission - Yes.\" Provider note () documents, \"Stage II pressure injury on sacrum-healing, continue topical barrier creams and pressure-relief measures.\"    Please clarify conflicting documentation as:    Stage 3 sacral pressure injury, present on admission  Stage 2 sacral pressure injury, present on admission   Other- specify__________  Unable to determine    By submitting this query, we are merely seeking further clarification of documentation to accurately reflect all conditions that you are monitoring, evaluating, treating or that extend the hospitalization or utilize additional resources of care. Please utilize your independent clinical judgment when addressing the question(s) above.     This query and your response, once completed, will be entered into the legal medical record.    Sincerely,    MAURIZIO Paredes, RN, CCDS  Clinical Documentation Integrity Program     "

## 2024-06-21 ENCOUNTER — APPOINTMENT (OUTPATIENT)
Dept: GENERAL RADIOLOGY | Facility: HOSPITAL | Age: 89
End: 2024-06-21
Payer: MEDICARE

## 2024-06-21 LAB
ANION GAP SERPL CALCULATED.3IONS-SCNC: 3.8 MMOL/L (ref 5–15)
BASOPHILS # BLD AUTO: 0.07 10*3/MM3 (ref 0–0.2)
BASOPHILS NFR BLD AUTO: 0.6 % (ref 0–1.5)
BUN SERPL-MCNC: 14 MG/DL (ref 8–23)
BUN/CREAT SERPL: 31.1 (ref 7–25)
CALCIUM SPEC-SCNC: 8.9 MG/DL (ref 8.2–9.6)
CHLORIDE SERPL-SCNC: 100 MMOL/L (ref 98–107)
CO2 SERPL-SCNC: 33.2 MMOL/L (ref 22–29)
CREAT SERPL-MCNC: 0.45 MG/DL (ref 0.57–1)
DEPRECATED RDW RBC AUTO: 59 FL (ref 37–54)
DIGOXIN SERPL-MCNC: 0.47 NG/ML (ref 0.6–1.2)
EGFRCR SERPLBLD CKD-EPI 2021: 88.2 ML/MIN/1.73
EOSINOPHIL # BLD AUTO: 0.44 10*3/MM3 (ref 0–0.4)
EOSINOPHIL NFR BLD AUTO: 3.6 % (ref 0.3–6.2)
ERYTHROCYTE [DISTWIDTH] IN BLOOD BY AUTOMATED COUNT: 14.9 % (ref 12.3–15.4)
GLUCOSE SERPL-MCNC: 100 MG/DL (ref 65–99)
HCT VFR BLD AUTO: 38 % (ref 34–46.6)
HGB BLD-MCNC: 12 G/DL (ref 12–15.9)
IMM GRANULOCYTES # BLD AUTO: 0.21 10*3/MM3 (ref 0–0.05)
IMM GRANULOCYTES NFR BLD AUTO: 1.7 % (ref 0–0.5)
INR PPP: 1.45 (ref 2–3)
LYMPHOCYTES # BLD AUTO: 2.15 10*3/MM3 (ref 0.7–3.1)
LYMPHOCYTES NFR BLD AUTO: 17.4 % (ref 19.6–45.3)
MCH RBC QN AUTO: 33.3 PG (ref 26.6–33)
MCHC RBC AUTO-ENTMCNC: 31.6 G/DL (ref 31.5–35.7)
MCV RBC AUTO: 105.6 FL (ref 79–97)
MONOCYTES # BLD AUTO: 1.83 10*3/MM3 (ref 0.1–0.9)
MONOCYTES NFR BLD AUTO: 14.8 % (ref 5–12)
NEUTROPHILS NFR BLD AUTO: 61.9 % (ref 42.7–76)
NEUTROPHILS NFR BLD AUTO: 7.69 10*3/MM3 (ref 1.7–7)
NRBC BLD AUTO-RTO: 0 /100 WBC (ref 0–0.2)
PLATELET # BLD AUTO: 184 10*3/MM3 (ref 140–450)
PMV BLD AUTO: 10.8 FL (ref 6–12)
POTASSIUM SERPL-SCNC: 4.7 MMOL/L (ref 3.5–5.2)
PROTHROMBIN TIME: 15.4 SECONDS (ref 19.4–28.5)
RBC # BLD AUTO: 3.6 10*6/MM3 (ref 3.77–5.28)
SODIUM SERPL-SCNC: 137 MMOL/L (ref 136–145)
WBC NRBC COR # BLD AUTO: 12.39 10*3/MM3 (ref 3.4–10.8)

## 2024-06-21 PROCEDURE — 97530 THERAPEUTIC ACTIVITIES: CPT | Performed by: PHYSICAL THERAPIST

## 2024-06-21 PROCEDURE — 80162 ASSAY OF DIGOXIN TOTAL: CPT | Performed by: NURSE PRACTITIONER

## 2024-06-21 PROCEDURE — 80048 BASIC METABOLIC PNL TOTAL CA: CPT | Performed by: INTERNAL MEDICINE

## 2024-06-21 PROCEDURE — 85610 PROTHROMBIN TIME: CPT | Performed by: INTERNAL MEDICINE

## 2024-06-21 PROCEDURE — 71045 X-RAY EXAM CHEST 1 VIEW: CPT

## 2024-06-21 PROCEDURE — 85025 COMPLETE CBC W/AUTO DIFF WBC: CPT | Performed by: INTERNAL MEDICINE

## 2024-06-21 RX ORDER — SORBITOL SOLUTION 70 %
30 SOLUTION, ORAL MISCELLANEOUS ONCE
Status: COMPLETED | OUTPATIENT
Start: 2024-06-21 | End: 2024-06-21

## 2024-06-21 RX ADMIN — METOPROLOL SUCCINATE 12.5 MG: 25 TABLET, FILM COATED, EXTENDED RELEASE ORAL at 20:30

## 2024-06-21 RX ADMIN — METOCLOPRAMIDE 5 MG: 5 TABLET ORAL at 11:43

## 2024-06-21 RX ADMIN — WARFARIN SODIUM 2 MG: 2 TABLET ORAL at 17:24

## 2024-06-21 RX ADMIN — MIRTAZAPINE 15 MG: 15 TABLET, FILM COATED ORAL at 20:30

## 2024-06-21 RX ADMIN — METOPROLOL SUCCINATE 12.5 MG: 25 TABLET, FILM COATED, EXTENDED RELEASE ORAL at 08:15

## 2024-06-21 RX ADMIN — METOCLOPRAMIDE 5 MG: 5 TABLET ORAL at 08:14

## 2024-06-21 RX ADMIN — SORBITOL SOLUTION (BULK) 30 ML: 70 SOLUTION at 11:43

## 2024-06-21 RX ADMIN — GUAIFENESIN 1200 MG: 600 TABLET, EXTENDED RELEASE ORAL at 08:15

## 2024-06-21 RX ADMIN — GUAIFENESIN 1200 MG: 600 TABLET, EXTENDED RELEASE ORAL at 20:30

## 2024-06-21 RX ADMIN — DOCUSATE SODIUM 100 MG: 100 CAPSULE, LIQUID FILLED ORAL at 20:30

## 2024-06-21 RX ADMIN — Medication 10 ML: at 08:15

## 2024-06-21 RX ADMIN — Medication 10 ML: at 20:30

## 2024-06-21 RX ADMIN — METOCLOPRAMIDE 5 MG: 5 TABLET ORAL at 20:30

## 2024-06-21 RX ADMIN — LATANOPROST 1 DROP: 50 SOLUTION/ DROPS OPHTHALMIC at 20:30

## 2024-06-21 RX ADMIN — METOCLOPRAMIDE 5 MG: 5 TABLET ORAL at 17:24

## 2024-06-21 NOTE — NURSING NOTE
Informed [facility]olia GARCIA of bright red blood noted leaking from chest tube site. Dressing reinforced. Bleeding has since stopped no s/s of distress noted. Nurse going to change dressing. No new orders cont to monitor.

## 2024-06-21 NOTE — CASE MANAGEMENT/SOCIAL WORK
Continued Stay Note  GISELA Concepcion     Patient Name: Shruthi Amin  MRN: [id number]  Today's Date: 6/21/2024    Admit Date: 6/17/2024    Plan: Return home w/ daughter. Home O2 2L w/ [employer].   Discharge Plan       Row Name 06/21/24 1613       Plan    Plan Return home w/ daughter. Home O2 2L w/ [employer].    Patient/Family in Agreement with Plan yes    Provided Post Acute Provider List? Yes    Post Acute Provider List Home Health    Delivered To Patient    Method of Delivery In person    Plan Comments DC barriers: Pulm following and pt continues with chest tube. Left C list for patient at bedside in case she is interested in services at time of discharge. Will follow up again on Monday.             Megan Naegele, RN     Office Phone: [phone]  Office Cell: [phone]

## 2024-06-21 NOTE — PROGRESS NOTES
"Nutrition Services    Patient Name: Shruthi Amin  YOB: 1928  MRN: [id number]  Admission date: 6/17/2024    PROGRESS NOTE      Encounter Information: Checking in on pt to monitor Nutrition POC and diet tolerance. Pt receiving warfarin for A-fib. On 2 L O2. Pt tolerating po diet.  Reported improved appetite but still consuming < 50% on average at meals. RD will continue to monitor per protocol.        PO Diet: Diet: Regular/House; Fluid Consistency: Thin (IDDSI 0)   PO Supplements: Boost Plus BID (Provides 720 kcals, 28 g protein if consumed)      PO Intake:  25-50% po intake        Current nutrition support: -   Nutrition support review: -       Labs (reviewed below): Reviewed. Management per attending.         GI Function:  + BM 6/21       Nutrition Intervention Updates: Will continue to encourage good po intake    Will continue to encourage ONS     Will continue to monitor per protocol.        Results from last 7 days   Lab Units 06/21/24  0515 06/20/24  0452 06/19/24  0000 06/18/24  0001 06/17/24  1543   SODIUM mmol/L 137 140 139   < > 141   POTASSIUM mmol/L 4.7 4.2 3.9   < > 4.3   CHLORIDE mmol/L 100 102 101   < > 103   CO2 mmol/L 33.2* 31.0* 31.5*   < > 30.9*   BUN mg/dL 14 13 17   < > 16   CREATININE mg/dL 0.45* 0.44* 0.46*   < > 0.48*   CALCIUM mg/dL 8.9 8.9 8.6   < > 8.8   BILIRUBIN mg/dL  --   --   --   --  0.8   ALK PHOS U/L  --   --   --   --  79   ALT (SGPT) U/L  --   --   --   --  12   AST (SGOT) U/L  --   --   --   --  22   GLUCOSE mg/dL 100* 85 87   < > 93    < > = values in this interval not displayed.     Results from last 7 days   Lab Units 06/21/24  0427   HEMOGLOBIN g/dL 12.0   HEMATOCRIT % 38.0     COVID19   Date Value Ref Range Status   02/23/2024 Not Detected Not Detected - Ref. Range Final     No results found for: \"HGBA1C\"    RD to follow up per protocol.    Electronically signed by:  Arely Florentino, TASHA  06/21/24 18:08 EDT    "

## 2024-06-21 NOTE — PLAN OF CARE
Goal Outcome Evaluation:  Plan of Care Reviewed With: patient        Progress: no change  Outcome Evaluation: Patient has been resting well throughout the shift without any complaints. Right sided CT remains in place to suction. Dr. Curtis had to change site dressing d/t leaking of CT d/t too much tension on the tube. Laxatives given since patient has not had a BM since Monday. Remains on warfarin for Afib. On 2L of oxgen. Plans on discharging home with daughter when medically stable. No other complaints at this time. Continuing to monitor.

## 2024-06-21 NOTE — NURSING NOTE
Paged Pulm through answering service again. Spoke with Oz. Awaiting return call. No s/s of distress noted.

## 2024-06-21 NOTE — NURSING NOTE
Call placed to Pulm answering service spoke with Oz. Regarding pt Chest Tube leaking around site. No s/s of distress noted. Awaiting return call.

## 2024-06-21 NOTE — PLAN OF CARE
"Goal Outcome Evaluation:      Assessment: Shruthi Amin presents with functional mobility impairments which indicate the need for skilled intervention. Tolerating session today without incident. Pt required min A with bed mobility this date and CGA with transfers.  She had loose BM this date and [place] [facility] [place]  [place] required assist with quique care.  Chest tube in place, and no leaking noted this session. O2 sats on RA able to stay >90% with activity.  Will continue to follow and progress as tolerated.     Plan/Recommendations:   If medically appropriate, Low Intensity Therapy recommended post-acute care - This is recommended as therapy feels this patient would require 2-3 visits per week. OP or HH would be the best option depending on patient's home bound status. Consider, if the patient has other  \"skilled\" needs such as wounds, IV antibiotics, etc. Combined with \"low intensity\" could also equate to a SNF. If patient is medically complex, consider LTAC. Pt requires no DME at discharge.     Pt desires Home with Home Health and Home with family assist at discharge. Pt cooperative; agreeable to therapeutic recommendations and plan of care.                                         "

## 2024-06-21 NOTE — PROGRESS NOTES
"Pharmacy dosing service  Anticoagulant  Warfarin     Subjective:    Shruthi Amin is a 89 y.o.female being continued on warfarin for Atrial Fibrillation / Flutter.    INR Goal: 2 - 3  Home medication?: warfarin 2 mg PO daily, except warfarin 4 mg PO on Tuesday.   Bridge Therapy Present?:  No  Interacting Medications Evaluation (New/Present/Discontinued):   Additional Contributing Factors:       Assessment/Plan:    INR is subtherapeutic today. Patient received one time boosted dose of warfarin yesterday. Will continue home regimen at this time and plan to give warfarin 2 mg today.    Continue to monitor and adjust based on INR.         Date 6/20 6/21          INR 1.59 1.45          Dose 4 mg 2 mg              Objective:  [Ht: 157.5 cm (62\"); Wt: 38.5 kg (84 lb 14 oz); BMI: Body mass index is 15.52 kg/m².]    Lab Results   Component Value Date    ALBUMIN 3.6 06/17/2024     Lab Results   Component Value Date    INR 1.45 (L) 06/21/2024    INR 1.59 (L) 06/20/2024    INR 1.88 (L) 06/19/2024    PROTIME 15.4 (L) 06/21/2024    PROTIME 16.7 (L) 06/20/2024    PROTIME 19.6 06/19/2024     Lab Results   Component Value Date    HGB 12.0 06/21/2024    HGB 12.1 06/20/2024    HGB 10.0 (L) 06/19/2024     Lab Results   Component Value Date    HCT 38.0 06/21/2024    HCT 38.7 06/20/2024    HCT 31.5 (L) 06/19/2024       Renetta Suarez RPH  06/21/24 09:12 EDT      "

## 2024-06-21 NOTE — THERAPY TREATMENT NOTE
"Subjective: Pt agreeable to therapeutic plan of care.    Objective:     Bed mobility - Min-A supine to sit   Transfers - CGA and with rolling walker   Ambulation - 0 feet N/A or Not attempted.  *Gait belt applied and non-skid socks worn during treatment.       Vitals: WNL.  Pt on 2L O2 NC.  O2 sats 96% at rest.  On RA pt able to maintain >90% throughout treatment with transfers.     Pain: 3 VAS   Location: chest tube placement   Intervention for pain: Repositioned and Therapeutic Presence    Education: Provided education on the importance of mobility in the acute care setting and Verbal/Tactile Cues    Assessment: Shruthi Amin presents with functional mobility impairments which indicate the need for skilled intervention. Tolerating session today without incident. Pt required min A with bed mobility this date and CGA with transfers.  She had loose BM this date and was able to transfer to [facility] [place].  Pt required assist with quique care.  Chest tube in place, and no leaking noted this session. O2 sats on RA able to stay >90% with activity.  Will continue to follow and progress as tolerated.     Plan/Recommendations:   If medically appropriate, Low Intensity Therapy recommended post-acute care - This is recommended as therapy feels this patient would require 2-3 visits per week. OP or HH would be the best option depending on patient's home bound status. Consider, if the patient has other  \"skilled\" needs such as wounds, IV antibiotics, etc. Combined with \"low intensity\" could also equate to a SNF. If patient is medically complex, consider LTAC. Pt requires no DME at discharge.     Pt desires Home with Home Health and Home with family assist at discharge. Pt cooperative; agreeable to therapeutic recommendations and plan of care.       Basic Mobility 6-click:  Rollin = Total, A lot = 2, A little = 3; 4 = None  Supine>Sit:   1 = Total, A lot = 2, A little = 3; 4 = None   Sit>Stand with arms:  1 = Total, A lot = 2, " A little = 3; 4 = None  Bed>Chair:   1 = Total, A lot = 2, A little = 3; 4 = None  Ambulate in room:  1 = Total, A lot = 2, A little = 3; 4 = None  3-5 Steps with railin = Total, A lot = 2, A little = 3; 4 = None  Score: 18    Post-Tx Position: Up in Chair, Alarms activated, and Call light and personal items within reach, family present   PPE: gloves

## 2024-06-21 NOTE — PROGRESS NOTES
LOS: 3 days   Patient Care Team:  Cristal Goodwin MD as PCP - General (Family Medicine)  Anai Moise APRN as Nurse Practitioner (Cardiology)  Theodore Vital MD as Consulting Physician (Cardiology)    Subjective     Patient denies any complaints and states that she did get out of bed and sit in chair yesterday; appetite is improved    Review of Systems   Constitutional:  Positive for activity change, appetite change and fatigue.   HENT: Negative.     Respiratory:  Negative for shortness of breath.    Cardiovascular: Negative.    Gastrointestinal: Negative.    Genitourinary: Negative.    Musculoskeletal: Negative.    Neurological:  Positive for weakness.   Psychiatric/Behavioral: Negative.             Objective     Vital Signs  Temp:  [97.3 °F (36.3 °C)-98.3 °F (36.8 °C)] 97.8 °F (36.6 °C)  Heart Rate:  [69-98] 91  Resp:  [15-19] 19  BP: (132-152)/(70-91) 152/90      Physical Exam  Vitals reviewed.   Constitutional:       Appearance: She is not ill-appearing.   HENT:      Head: Normocephalic and atraumatic.      Right Ear: External ear normal.      Left Ear: External ear normal.      Nose: Nose normal.      Mouth/Throat:      Mouth: Mucous membranes are moist.   Eyes:      General:         Right eye: No discharge.         Left eye: No discharge.   Cardiovascular:      Rate and Rhythm: Normal rate and regular rhythm.      Pulses: Normal pulses.      Heart sounds: Normal heart sounds.   Pulmonary:      Effort: Pulmonary effort is normal.      Breath sounds: Normal breath sounds.   Abdominal:      General: Bowel sounds are normal.      Palpations: Abdomen is soft.   Musculoskeletal:         General: Normal range of motion.      Cervical back: Normal range of motion.   Skin:     General: Skin is warm and dry.   Neurological:      Mental Status: She is alert and oriented to person, place, and time.   Psychiatric:         Behavior: Behavior normal.              Results Review:    Lab Results (last 24  hours)       Procedure Component Value Units Date/Time    Body Fluid Culture - Body Fluid, Pleural Cavity [[id number]] Collected: 06/19/24 1057    Specimen: Body Fluid from Pleural Cavity Updated: 06/21/24 0620     Body Fluid Culture No growth at 2 days     Gram Stain Rare (1+) WBCs per low power field      No organisms seen    Digoxin Level [[id number]]  (Abnormal) Collected: 06/21/24 0515    Specimen: Blood from Arm, Right Updated: 06/21/24 0551     Digoxin 0.47 ng/mL     Basic Metabolic Panel [[id number]]  (Abnormal) Collected: 06/21/24 0515    Specimen: Blood from Arm, Right Updated: 06/21/24 0551     Glucose 100 mg/dL      BUN 14 mg/dL      Creatinine 0.45 mg/dL      Sodium 137 mmol/L      Potassium 4.7 mmol/L      Chloride 100 mmol/L      CO2 33.2 mmol/L      Calcium 8.9 mg/dL      BUN/Creatinine Ratio 31.1     Anion Gap 3.8 mmol/L      eGFR 88.2 mL/min/1.73     Narrative:      GFR Normal >60  Chronic Kidney Disease <60  Kidney Failure <15    The GFR formula is only valid for adults with stable renal function between ages 18 and 70.    Protime-INR [[id number]]  (Abnormal) Collected: 06/21/24 0427    Specimen: Blood from Arm, Right Updated: 06/21/24 0449     Protime 15.4 Seconds      INR 1.45    CBC & Differential [[id number]]  (Abnormal) Collected: 06/21/24 0427    Specimen: Blood from Arm, Right Updated: 06/21/24 0446    Narrative:      The following orders were created for panel order CBC & Differential.  Procedure                               Abnormality         Status                     ---------                               -----------         ------                     CBC Auto Differential[[id number]]        Abnormal            Final result               Scan Slide[[id number]]                                                                    Please view results for these tests on the individual orders.    CBC Auto Differential [[id number]]  (Abnormal) Collected: 06/21/24 0427    Specimen: Blood from Arm,  "Right Updated: 06/21/24 0446     WBC 12.39 10*3/mm3      RBC 3.60 10*6/mm3      Hemoglobin 12.0 g/dL      Hematocrit 38.0 %      .6 fL      MCH 33.3 pg      MCHC 31.6 g/dL      RDW 14.9 %      RDW-SD 59.0 fl      MPV 10.8 fL      Platelets 184 10*3/mm3      Neutrophil % 61.9 %      Lymphocyte % 17.4 %      Monocyte % 14.8 %      Eosinophil % 3.6 %      Basophil % 0.6 %      Immature Grans % 1.7 %      Neutrophils, Absolute 7.69 10*3/mm3      Lymphocytes, Absolute 2.15 10*3/mm3      Monocytes, Absolute 1.83 10*3/mm3      Eosinophils, Absolute 0.44 10*3/mm3      Basophils, Absolute 0.07 10*3/mm3      Immature Grans, Absolute 0.21 10*3/mm3      nRBC 0.0 /100 WBC     Non-gynecologic Cytology [[id number]] Collected: 06/19/24 1101    Specimen: Pleural Fluid from Pleural Cavity Updated: 06/20/24 1250     Case Report --     Medical Cytology Report                           Case: [id number]                                  Authorizing Provider:  Lupe Curits MD         Collected:           06/19/2024 11:01 AM          Ordering Location:     [address]    Received:            06/19/2024 12:27 PM                                 MEDICAL INPATIENT                                                            Pathologist:           Orestes Fair MD                                                             Specimen:    Pleural Cavity                                                                              Final Diagnosis --     Pleural fluid, smears and cytospin preparation:    Abundant reactive mesothelial cells, histiocytes and scattered acute and chronic inflammatory cells    Negative for malignant cells    JPR       Gross Description --     1. Pleural Cavity.  Received in [facility] and designated \"pleural fluid\" are 20 mL of clear, green fluid. Particulate matter is not present. This specimen is processed as per protocol.                   Imaging Results (Last 24 Hours)       Procedure Component Value " Units Date/Time    XR Chest 1 View [[id number]] Collected: 06/21/24 0722     Updated: 06/21/24 0726    Narrative:      XR CHEST 1 VW    Date of Exam: 6/21/2024 4:20 AM EDT    Indication: Right pleural effusion    Comparison: 6/20/2024    Findings:  Right basilar pleural catheter remains in place. Right apical pneumothorax redemonstrated, appearing slightly larger with 8 mm separation of the lung from the chest wall. Stable enlargement of the cardiac silhouette. Stable small right pleural effusion   with right basilar atelectasis. Stable left basilar opacity compatible with airspace disease and probable effusion      Impression:      Impression:  There is persistent right apical pneumothorax which appears slightly larger. There is otherwise stable appearance of the chest with small right pleural effusion and basilar atelectasis, and left basilar opacity likely representing combination of airspace   disease and effusion      Electronically Signed: Alex Juarez    6/21/2024 7:24 AM EDT    Workstation ID: [id number]                 I reviewed the patient's new clinical results.    Medication Review:   Scheduled Meds:docusate sodium, 100 mg, Oral, Nightly  guaiFENesin, 1,200 mg, Oral, Q12H  latanoprost, 1 drop, Right Eye, Nightly  metoclopramide, 5 mg, Oral, 4x Daily AC & at Bedtime  metoprolol succinate XL, 12.5 mg, Oral, Q12H  mirtazapine, 15 mg, Oral, Nightly  sodium chloride, 10 mL, Intravenous, Q12H  warfarin, 2 mg, Oral, Once per day on Sunday Monday Wednesday Thursday Friday Saturday   And  [START ON 6/25/2024] warfarin, 4 mg, Oral, Every Tuesday      Continuous Infusions:Pharmacy to dose warfarin,       PRN Meds:.  acetaminophen    senna-docusate sodium **AND** polyethylene glycol **AND** bisacodyl **AND** bisacodyl    magnesium hydroxide    nitroglycerin    ondansetron    Pharmacy to dose warfarin    sodium chloride    sodium chloride     Interval History:    Assessment & Plan     Recurrent pleural  effusion  -status post chest tube placement  - Pulmonary following      Large hiatal hernia  -metoclopramide, reflux precautions    Constipation  -bowel regimen    Underweight  -nutritional supplements, mirtazapine    Glaucoma  Secondary hypercoagulable state related to atrial fibs    Paroxysmal atrial fib  HTN   -currently in a controlled rate;  restart low dose metoprolol that was previously held for bradycardia      Dig toxicity-resolved monitor off  Anemia    Stage II pressure injury on sacrum  -healing, continue topical barrier creams and pressure-relief measure    Plan for disposition:Home    ALMA Heard  06/21/24  09:54 EDT

## 2024-06-21 NOTE — PROGRESS NOTES
Daily Progress Note          Assessment    Recurrent pleural effusion: Status post right thoracentesis April 2024 with drainage of 450 mL  Transudative fluid likely related to CHF/severe mitral regurgitation  Hypoxemia: ABG 7.45/43.2/92.2 while on 2 L nasal cannula  Anemia  A-fib  Bradycardia with mild digoxin toxicity  Large hiatal hernia  COPD  CHF  CAD  Osteoarthritis, kyphosis    Results for orders placed during the hospital encounter of 02/23/24    Adult Transesophageal Echo (JEFF) W/ Cont if Necessary Per Protocol    Interpretation Summary    Left ventricular ejection fraction appears to be 56 - 60%.    Severe mitral valve regurgitation is present.    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    Clinical correlation required         Recommendations:  Status post right chest tube placement 6/19/2024: The pleural effusion has slowly decreasing but there is a small right apical pneumothorax, continue -20 cm H2O suction    BP and heart rate are improving, metoprolol resumed  Oxygen supplement and titration to maintain saturation 90 to 95%: Currently requiring 2 L per nasal cannula    Bronchodilators  Mucinex    Encourage use of incentive spirometry  Chronic anticoagulation: Warfarin     I personally reviewed the radiological studies                 LOS: 3 days     Subjective     Patient reports mild shortness of breath    Objective     Vital signs for last 24 hours:  Vitals:    06/21/24 0600 06/21/24 0815 06/21/24 0838 06/21/24 1146   BP:  152/90  138/69   BP Location:    Right arm   Patient Position:    Lying   Pulse: 74 82 91    Resp:   19 22   Temp:   97.8 °F (36.6 °C)    TempSrc:   Axillary    SpO2: 98%      Weight:       Height:           Intake/Output last 3 shifts:  I/O last 3 completed shifts:  In: 970 [P.O.:970]  Out: 1940 [Urine:1400; Chest Tube:540]  Intake/Output this  shift:  No intake/output data recorded.      Radiology  Imaging Results (Last 24 Hours)       Procedure Component Value Units Date/Time    XR Chest 1 View [[id number]] Collected: 06/21/24 0722     Updated: 06/21/24 0726    Narrative:      XR CHEST 1 VW    Date of Exam: 6/21/2024 4:20 AM EDT    Indication: Right pleural effusion    Comparison: 6/20/2024    Findings:  Right basilar pleural catheter remains in place. Right apical pneumothorax redemonstrated, appearing slightly larger with 8 mm separation of the lung from the chest wall. Stable enlargement of the cardiac silhouette. Stable small right pleural effusion   with right basilar atelectasis. Stable left basilar opacity compatible with airspace disease and probable effusion      Impression:      Impression:  There is persistent right apical pneumothorax which appears slightly larger. There is otherwise stable appearance of the chest with small right pleural effusion and basilar atelectasis, and left basilar opacity likely representing combination of airspace   disease and effusion      Electronically Signed: Alex Juarez    6/21/2024 7:24 AM EDT    Workstation ID: [id number]            Labs:  Results from last 7 days   Lab Units 06/21/24  0427   WBC 10*3/mm3 12.39*   HEMOGLOBIN g/dL 12.0   HEMATOCRIT % 38.0   PLATELETS 10*3/mm3 184     Results from last 7 days   Lab Units 06/21/24  0515 06/18/24  0001 06/17/24  1543   SODIUM mmol/L 137   < > 141   POTASSIUM mmol/L 4.7   < > 4.3   CHLORIDE mmol/L 100   < > 103   CO2 mmol/L 33.2*   < > 30.9*   BUN mg/dL 14   < > 16   CREATININE mg/dL 0.45*   < > 0.48*   CALCIUM mg/dL 8.9   < > 8.8   BILIRUBIN mg/dL  --   --  0.8   ALK PHOS U/L  --   --  79   ALT (SGPT) U/L  --   --  12   AST (SGOT) U/L  --   --  22   GLUCOSE mg/dL 100*   < > 93    < > = values in this interval not displayed.     Results from last 7 days   Lab Units 06/17/24  1600   PH, ARTERIAL pH units 7.453*   PO2 ART mm Hg 96.2   PCO2, ARTERIAL mm Hg 43.2   HCO3  ART mmol/L 30.2*     Results from last 7 days   Lab Units 06/17/24  1543   ALBUMIN g/dL 3.6     Results from last 7 days   Lab Units 06/17/24  1543   HSTROP T ng/L 20*             Results from last 7 days   Lab Units 06/21/24  0427 06/20/24  0452 06/19/24  0000   INR  1.45* 1.59* 1.88*     Results from last 7 days   Lab Units 06/17/24  1543   TSH uIU/mL 2.160   FREE T4 ng/dL 1.36           Meds:   SCHEDULE  docusate sodium, 100 mg, Oral, Nightly  guaiFENesin, 1,200 mg, Oral, Q12H  latanoprost, 1 drop, Right Eye, Nightly  metoclopramide, 5 mg, Oral, 4x Daily AC & at Bedtime  metoprolol succinate XL, 12.5 mg, Oral, Q12H  mirtazapine, 15 mg, Oral, Nightly  sodium chloride, 10 mL, Intravenous, Q12H  warfarin, 2 mg, Oral, Once per day on Sunday Monday Wednesday Thursday Friday Saturday   And  [START ON 6/25/2024] warfarin, 4 mg, Oral, Every Tuesday      Infusions  Pharmacy to dose warfarin,       PRNs    acetaminophen    senna-docusate sodium **AND** polyethylene glycol **AND** bisacodyl **AND** bisacodyl    magnesium hydroxide    nitroglycerin    ondansetron    Pharmacy to dose warfarin    sodium chloride    sodium chloride    Physical Exam:  General Appearance:  Alert   HEENT:  Normocephalic, without obvious abnormality, Conjunctiva/corneas clear,.   Nares normal, no drainage     Neck:  Supple, symmetrical, trachea midline.   Lungs /Chest wall:   Bilateral basal rhonchi, respirations unlabored, symmetrical wall movement.     Heart:  Regular rate and rhythm, S1 S2 normal  Abdomen: Soft, non-tender, no masses, no organomegaly.    Extremities: No edema, no clubbing or cyanosis     ROS  Constitutional: Negative for chills, fever and malaise/fatigue.   HENT: Negative.    Eyes: Negative.    Cardiovascular: Negative.    Respiratory: Positive for cough and shortness of breath.    Skin: Negative.    Musculoskeletal: Negative.    Gastrointestinal: Negative.    Genitourinary: Negative.    Neurological: Generalized weakness      I  reviewed the recent clinical results  I personally reviewed the latest radiological studies    Part of this note may be an electronic transcription/translation of spoken language to printed text using the Dragon Dictation System.

## 2024-06-22 ENCOUNTER — APPOINTMENT (OUTPATIENT)
Dept: GENERAL RADIOLOGY | Facility: HOSPITAL | Age: 89
End: 2024-06-22
Payer: MEDICARE

## 2024-06-22 LAB
ANION GAP SERPL CALCULATED.3IONS-SCNC: 2.9 MMOL/L (ref 5–15)
BASOPHILS # BLD AUTO: 0.06 10*3/MM3 (ref 0–0.2)
BASOPHILS NFR BLD AUTO: 0.6 % (ref 0–1.5)
BUN SERPL-MCNC: 18 MG/DL (ref 8–23)
BUN/CREAT SERPL: 33.3 (ref 7–25)
CALCIUM SPEC-SCNC: 9 MG/DL (ref 8.2–9.6)
CHLORIDE SERPL-SCNC: 96 MMOL/L (ref 98–107)
CO2 SERPL-SCNC: 34.1 MMOL/L (ref 22–29)
CREAT SERPL-MCNC: 0.54 MG/DL (ref 0.57–1)
DEPRECATED RDW RBC AUTO: 57.1 FL (ref 37–54)
EGFRCR SERPLBLD CKD-EPI 2021: 84.4 ML/MIN/1.73
EOSINOPHIL # BLD AUTO: 0.38 10*3/MM3 (ref 0–0.4)
EOSINOPHIL NFR BLD AUTO: 4 % (ref 0.3–6.2)
ERYTHROCYTE [DISTWIDTH] IN BLOOD BY AUTOMATED COUNT: 14.7 % (ref 12.3–15.4)
GLUCOSE SERPL-MCNC: 96 MG/DL (ref 65–99)
HCT VFR BLD AUTO: 37.2 % (ref 34–46.6)
HGB BLD-MCNC: 11.9 G/DL (ref 12–15.9)
IMM GRANULOCYTES # BLD AUTO: 0.2 10*3/MM3 (ref 0–0.05)
IMM GRANULOCYTES NFR BLD AUTO: 2.1 % (ref 0–0.5)
INR PPP: 1.62 (ref 2–3)
LYMPHOCYTES # BLD AUTO: 1.74 10*3/MM3 (ref 0.7–3.1)
LYMPHOCYTES NFR BLD AUTO: 18.4 % (ref 19.6–45.3)
MCH RBC QN AUTO: 33.6 PG (ref 26.6–33)
MCHC RBC AUTO-ENTMCNC: 32 G/DL (ref 31.5–35.7)
MCV RBC AUTO: 105.1 FL (ref 79–97)
MONOCYTES # BLD AUTO: 1.51 10*3/MM3 (ref 0.1–0.9)
MONOCYTES NFR BLD AUTO: 16 % (ref 5–12)
NEUTROPHILS NFR BLD AUTO: 5.56 10*3/MM3 (ref 1.7–7)
NEUTROPHILS NFR BLD AUTO: 58.9 % (ref 42.7–76)
NRBC BLD AUTO-RTO: 0.3 /100 WBC (ref 0–0.2)
PLATELET # BLD AUTO: 177 10*3/MM3 (ref 140–450)
PMV BLD AUTO: 10.9 FL (ref 6–12)
POTASSIUM SERPL-SCNC: 4.8 MMOL/L (ref 3.5–5.2)
PROTHROMBIN TIME: 17 SECONDS (ref 19.4–28.5)
RBC # BLD AUTO: 3.54 10*6/MM3 (ref 3.77–5.28)
SODIUM SERPL-SCNC: 133 MMOL/L (ref 136–145)
WBC NRBC COR # BLD AUTO: 9.45 10*3/MM3 (ref 3.4–10.8)

## 2024-06-22 PROCEDURE — 71045 X-RAY EXAM CHEST 1 VIEW: CPT

## 2024-06-22 PROCEDURE — 80048 BASIC METABOLIC PNL TOTAL CA: CPT | Performed by: INTERNAL MEDICINE

## 2024-06-22 PROCEDURE — 85610 PROTHROMBIN TIME: CPT | Performed by: INTERNAL MEDICINE

## 2024-06-22 PROCEDURE — 85025 COMPLETE CBC W/AUTO DIFF WBC: CPT | Performed by: INTERNAL MEDICINE

## 2024-06-22 RX ADMIN — METOCLOPRAMIDE 5 MG: 5 TABLET ORAL at 08:56

## 2024-06-22 RX ADMIN — DOCUSATE SODIUM 100 MG: 100 CAPSULE, LIQUID FILLED ORAL at 20:01

## 2024-06-22 RX ADMIN — SENNOSIDES AND DOCUSATE SODIUM 2 TABLET: 50; 8.6 TABLET ORAL at 20:01

## 2024-06-22 RX ADMIN — Medication 10 ML: at 09:02

## 2024-06-22 RX ADMIN — Medication 10 ML: at 20:02

## 2024-06-22 RX ADMIN — MIRTAZAPINE 15 MG: 15 TABLET, FILM COATED ORAL at 20:01

## 2024-06-22 RX ADMIN — METOPROLOL SUCCINATE 12.5 MG: 25 TABLET, FILM COATED, EXTENDED RELEASE ORAL at 20:01

## 2024-06-22 RX ADMIN — METOCLOPRAMIDE 5 MG: 5 TABLET ORAL at 20:01

## 2024-06-22 RX ADMIN — METOCLOPRAMIDE 5 MG: 5 TABLET ORAL at 11:32

## 2024-06-22 RX ADMIN — METOCLOPRAMIDE 5 MG: 5 TABLET ORAL at 17:06

## 2024-06-22 RX ADMIN — GUAIFENESIN 1200 MG: 600 TABLET, EXTENDED RELEASE ORAL at 20:01

## 2024-06-22 RX ADMIN — WARFARIN SODIUM 2 MG: 2 TABLET ORAL at 17:06

## 2024-06-22 RX ADMIN — MAGNESIUM HYDROXIDE 5 ML: 2400 SUSPENSION ORAL at 17:14

## 2024-06-22 RX ADMIN — METOPROLOL SUCCINATE 12.5 MG: 25 TABLET, FILM COATED, EXTENDED RELEASE ORAL at 08:56

## 2024-06-22 RX ADMIN — LATANOPROST 1 DROP: 50 SOLUTION/ DROPS OPHTHALMIC at 20:01

## 2024-06-22 RX ADMIN — GUAIFENESIN 1200 MG: 600 TABLET, EXTENDED RELEASE ORAL at 08:56

## 2024-06-22 NOTE — PROGRESS NOTES
"Pharmacy dosing service  Anticoagulant  Warfarin     Subjective:    Shruthi Amin is a 89 y.o.female being continued on warfarin for Atrial Fibrillation / Flutter.    INR Goal: 2 - 3  Home medication?: warfarin 2 mg PO daily, except warfarin 4 mg PO on Tuesday.   Bridge Therapy Present?:  No  Interacting Medications Evaluation (New/Present/Discontinued):   Additional Contributing Factors:       Assessment/Plan:    INR is subtherapeutic today but trending up appropriately. Patient received one time boosted dose of warfarin on 6/20. Will continue home regimen at this time and plan to give warfarin 2 mg today.    Continue to monitor and adjust based on INR.         Date 6/20 6/21 6/22         INR 1.59 1.45 1.62         Dose 4 mg 2 mg 2 mg             Objective:  [Ht: 157.5 cm (62\"); Wt: 38.5 kg (84 lb 14 oz); BMI: Body mass index is 15.52 kg/m².]    Lab Results   Component Value Date    ALBUMIN 3.6 06/17/2024     Lab Results   Component Value Date    INR 1.62 (L) 06/22/2024    INR 1.45 (L) 06/21/2024    INR 1.59 (L) 06/20/2024    PROTIME 17.0 (L) 06/22/2024    PROTIME 15.4 (L) 06/21/2024    PROTIME 16.7 (L) 06/20/2024     Lab Results   Component Value Date    HGB 11.9 (L) 06/22/2024    HGB 12.0 06/21/2024    HGB 12.1 06/20/2024     Lab Results   Component Value Date    HCT 37.2 06/22/2024    HCT 38.0 06/21/2024    HCT 38.7 06/20/2024       Renetta Suarez [facility]  06/22/24 08:45 EDT        "

## 2024-06-22 NOTE — PROGRESS NOTES
LOS: 4 days   Patient Care Team:  Cristal Goodwin MD as PCP - General (Family Medicine)  Anai Moise APRN as Nurse Practitioner (Cardiology)  Theodore Vital MD as Consulting Physician (Cardiology)    Subjective     Patient denies any complaints      Review of Systems   Constitutional:  Positive for activity change, appetite change and fatigue.   HENT: Negative.     Respiratory:  Negative for shortness of breath.    Cardiovascular: Negative.    Gastrointestinal: Negative.    Genitourinary: Negative.    Musculoskeletal: Negative.    Neurological:  Positive for weakness.   Psychiatric/Behavioral: Negative.             Objective     Vital Signs  Temp:  [97.6 °F (36.4 °C)-97.7 °F (36.5 °C)] 97.7 °F (36.5 °C)  Heart Rate:  [82-85] 84  Resp:  [16-22] 17  BP: (102-138)/(48-89) 127/63      Physical Exam  Vitals reviewed.   Constitutional:       Appearance: She is not ill-appearing.   HENT:      Head: Normocephalic and atraumatic.      Right Ear: External ear normal.      Left Ear: External ear normal.      Nose: Nose normal.      Mouth/Throat:      Mouth: Mucous membranes are moist.   Eyes:      General:         Right eye: No discharge.         Left eye: No discharge.   Cardiovascular:      Rate and Rhythm: Normal rate and regular rhythm.      Pulses: Normal pulses.      Heart sounds: Normal heart sounds.   Pulmonary:      Effort: Pulmonary effort is normal.      Breath sounds: Normal breath sounds.   Abdominal:      General: Bowel sounds are normal.      Palpations: Abdomen is soft.   Musculoskeletal:         General: Normal range of motion.      Cervical back: Normal range of motion.   Skin:     General: Skin is warm and dry.   Neurological:      Mental Status: She is alert and oriented to person, place, and time.   Psychiatric:         Behavior: Behavior normal.              Results Review:    Lab Results (last 24 hours)       Procedure Component Value Units Date/Time    Body Fluid Culture - Body Fluid,  Pleural Cavity [[id number]] Collected: 06/19/24 1057    Specimen: Body Fluid from Pleural Cavity Updated: 06/22/24 0629     Body Fluid Culture No growth at 3 days     Gram Stain Rare (1+) WBCs per low power field      No organisms seen    Basic Metabolic Panel [[id number]]  (Abnormal) Collected: 06/22/24 0137    Specimen: Blood Updated: 06/22/24 0235     Glucose 96 mg/dL      BUN 18 mg/dL      Creatinine 0.54 mg/dL      Sodium 133 mmol/L      Potassium 4.8 mmol/L      Chloride 96 mmol/L      CO2 34.1 mmol/L      Calcium 9.0 mg/dL      BUN/Creatinine Ratio 33.3     Anion Gap 2.9 mmol/L      eGFR 84.4 mL/min/1.73     Narrative:      GFR Normal >60  Chronic Kidney Disease <60  Kidney Failure <15    The GFR formula is only valid for adults with stable renal function between ages 18 and 70.    CBC & Differential [[id number]]  (Abnormal) Collected: 06/22/24 0137    Specimen: Blood Updated: 06/22/24 0222    Narrative:      The following orders were created for panel order CBC & Differential.  Procedure                               Abnormality         Status                     ---------                               -----------         ------                     CBC Auto Differential[[id number]]        Abnormal            Final result               Scan Slide[[id number]]                                                                    Please view results for these tests on the individual orders.    CBC Auto Differential [[id number]]  (Abnormal) Collected: 06/22/24 0137    Specimen: Blood Updated: 06/22/24 0222     WBC 9.45 10*3/mm3      RBC 3.54 10*6/mm3      Hemoglobin 11.9 g/dL      Hematocrit 37.2 %      .1 fL      MCH 33.6 pg      MCHC 32.0 g/dL      RDW 14.7 %      RDW-SD 57.1 fl      MPV 10.9 fL      Platelets 177 10*3/mm3      Neutrophil % 58.9 %      Lymphocyte % 18.4 %      Monocyte % 16.0 %      Eosinophil % 4.0 %      Basophil % 0.6 %      Immature Grans % 2.1 %      Neutrophils, Absolute 5.56 10*3/mm3       Lymphocytes, Absolute 1.74 10*3/mm3      Monocytes, Absolute 1.51 10*3/mm3      Eosinophils, Absolute 0.38 10*3/mm3      Basophils, Absolute 0.06 10*3/mm3      Immature Grans, Absolute 0.20 10*3/mm3      nRBC 0.3 /100 WBC     Protime-INR [[id number]]  (Abnormal) Collected: 06/22/24 0137    Specimen: Blood Updated: 06/22/24 0222     Protime 17.0 Seconds      INR 1.62             Imaging Results (Last 24 Hours)       Procedure Component Value Units Date/Time    XR Chest 1 View [[id number]] Resulted: 06/22/24 0602     Updated: 06/22/24 0606                 I reviewed the patient's new clinical results.    Medication Review:   Scheduled Meds:docusate sodium, 100 mg, Oral, Nightly  guaiFENesin, 1,200 mg, Oral, Q12H  latanoprost, 1 drop, Right Eye, Nightly  metoclopramide, 5 mg, Oral, 4x Daily AC & at Bedtime  metoprolol succinate XL, 12.5 mg, Oral, Q12H  mirtazapine, 15 mg, Oral, Nightly  sodium chloride, 10 mL, Intravenous, Q12H  warfarin, 2 mg, Oral, Once per day on Sunday Monday Wednesday Thursday Friday Saturday   And  [START ON 6/25/2024] warfarin, 4 mg, Oral, Every Tuesday      Continuous Infusions:Pharmacy to dose warfarin,       PRN Meds:.  acetaminophen    senna-docusate sodium **AND** polyethylene glycol **AND** bisacodyl **AND** bisacodyl    magnesium hydroxide    nitroglycerin    ondansetron    Pharmacy to dose warfarin    sodium chloride    sodium chloride     Interval History:    Assessment & Plan     Recurrent pleural effusion  -status post chest tube placement  -CT to -20 with continued output  - Pulmonary following      Large hiatal hernia  -metoclopramide, reflux precautions    Constipation  -bowel regimen    Underweight  -nutritional supplements, mirtazapine    Glaucoma  Secondary hypercoagulable state related to atrial fibs    Paroxysmal atrial fib  HTN   -currently in a controlled rate;  restart low dose metoprolol that was previously held for bradycardia      Dig toxicity-resolved monitor  off  Anemia    Stage II pressure injury on sacrum  -healing, continue topical barrier creams and pressure-relief measure    Plan for disposition:Home    ALMA Heard  06/22/24  09:12 EDT

## 2024-06-22 NOTE — PROGRESS NOTES
Daily Progress Note          Assessment    Recurrent pleural effusion: Status post right thoracentesis April 2024 with drainage of 450 mL  Transudative fluid likely related to CHF/severe mitral regurgitation  Hypoxemia: ABG 7.45/43.2/92.2 while on 2 L nasal cannula  Anemia  A-fib  Bradycardia with mild digoxin toxicity  Large hiatal hernia  COPD  CHF  CAD  Osteoarthritis, kyphosis    Results for orders placed during the hospital encounter of 02/23/24    Adult Transesophageal Echo (JEFF) W/ Cont if Necessary Per Protocol    Interpretation Summary    Left ventricular ejection fraction appears to be 56 - 60%.    Severe mitral valve regurgitation is present.    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    Clinical correlation required         Recommendations:  Status post right chest tube placement 6/19/2024: The pleural effusion has slowly decreasing but there is still significant daily output, continue -20 cm H2O suction    BP and heart rate are improving, metoprolol resumed  Oxygen supplement and titration to maintain saturation 90 to 95%: Currently requiring 2 L per nasal cannula    Bronchodilators  Mucinex  Diuresis    Encourage use of incentive spirometry  Chronic anticoagulation: Warfarin     I personally reviewed the radiological studies                 LOS: 4 days     Subjective     Patient reports mild shortness of breath    Objective     Vital signs for last 24 hours:  Vitals:    06/21/24 2022 06/22/24 0012 06/22/24 0400 06/22/24 0734   BP: 111/58 136/89 109/57 127/63   BP Location: Right arm Right arm Right arm Right arm   Patient Position: Lying Lying Lying Lying   Pulse: 85 82 82 84   Resp: 16 20 16 17   Temp: 97.6 °F (36.4 °C) 97.7 °F (36.5 °C) 97.6 °F (36.4 °C) 97.7 °F (36.5 °C)   TempSrc: Oral Oral Oral Oral   SpO2: 99% 100% 98% 99%   Weight:       Height:            Intake/Output last 3 shifts:  I/O last 3 completed shifts:  In: 240 [P.O.:240]  Out: 1485 [Urine:850; Chest Tube:635]  Intake/Output this shift:  No intake/output data recorded.      Radiology  Imaging Results (Last 24 Hours)       Procedure Component Value Units Date/Time    XR Chest 1 View [[id number]] Resulted: 06/22/24 0602     Updated: 06/22/24 0606            Labs:  Results from last 7 days   Lab Units 06/22/24  0137   WBC 10*3/mm3 9.45   HEMOGLOBIN g/dL 11.9*   HEMATOCRIT % 37.2   PLATELETS 10*3/mm3 177     Results from last 7 days   Lab Units 06/22/24  0137 06/18/24  0001 06/17/24  1543   SODIUM mmol/L 133*   < > 141   POTASSIUM mmol/L 4.8   < > 4.3   CHLORIDE mmol/L 96*   < > 103   CO2 mmol/L 34.1*   < > 30.9*   BUN mg/dL 18   < > 16   CREATININE mg/dL 0.54*   < > 0.48*   CALCIUM mg/dL 9.0   < > 8.8   BILIRUBIN mg/dL  --   --  0.8   ALK PHOS U/L  --   --  79   ALT (SGPT) U/L  --   --  12   AST (SGOT) U/L  --   --  22   GLUCOSE mg/dL 96   < > 93    < > = values in this interval not displayed.     Results from last 7 days   Lab Units 06/17/24  1600   PH, ARTERIAL pH units 7.453*   PO2 ART mm Hg 96.2   PCO2, ARTERIAL mm Hg 43.2   HCO3 ART mmol/L 30.2*     Results from last 7 days   Lab Units 06/17/24  1543   ALBUMIN g/dL 3.6     Results from last 7 days   Lab Units 06/17/24  1543   HSTROP T ng/L 20*             Results from last 7 days   Lab Units 06/22/24  0137 06/21/24  0427 06/20/24  0452   INR  1.62* 1.45* 1.59*     Results from last 7 days   Lab Units 06/17/24  1543   TSH uIU/mL 2.160   FREE T4 ng/dL 1.36           Meds:   SCHEDULE  docusate sodium, 100 mg, Oral, Nightly  guaiFENesin, 1,200 mg, Oral, Q12H  latanoprost, 1 drop, Right Eye, Nightly  metoclopramide, 5 mg, Oral, 4x Daily AC & at Bedtime  metoprolol succinate XL, 12.5 mg, Oral, Q12H  mirtazapine, 15 mg, Oral, Nightly  sodium chloride, 10 mL, Intravenous, Q12H  warfarin, 2 mg, Oral, Once per day on Sunday Monday Wednesday Thursday Friday  Saturday   And  [START ON 6/25/2024] warfarin, 4 mg, Oral, Every Tuesday      Infusions  Pharmacy to dose warfarin,       PRNs    acetaminophen    senna-docusate sodium **AND** polyethylene glycol **AND** bisacodyl **AND** bisacodyl    magnesium hydroxide    nitroglycerin    ondansetron    Pharmacy to dose warfarin    sodium chloride    sodium chloride    Physical Exam:  General Appearance:  Alert   HEENT:  Normocephalic, without obvious abnormality, Conjunctiva/corneas clear,.   Nares normal, no drainage     Neck:  Supple, symmetrical, trachea midline.   Lungs /Chest wall: Mild bilateral basal rhonchi, respirations unlabored, symmetrical wall movement.     Heart: Rate controlled, S1 S2 normal  Abdomen: Soft, non-tender, no masses, no organomegaly.    Extremities: No edema, no clubbing or cyanosis     ROS  Constitutional: Negative for chills, fever and malaise/fatigue.   HENT: Negative.    Eyes: Negative.    Cardiovascular: Negative.    Respiratory: Positive for cough and shortness of breath.    Skin: Negative.    Musculoskeletal: Negative.    Gastrointestinal: Negative.    Genitourinary: Negative.    Neurological: Generalized weakness      I reviewed the recent clinical results  I personally reviewed the latest radiological studies    Part of this note may be an electronic transcription/translation of spoken language to printed text using the Dragon Dictation System.

## 2024-06-22 NOTE — PLAN OF CARE
Goal Outcome Evaluation:              Outcome Evaluation: pt pleasant today, no c/o pain or discomfort. Chest tube still in place. Able to make needs known, call [facility] in reach.

## 2024-06-22 NOTE — PROGRESS NOTES
"Enter Query Response Below      Query Response: Acute on Chronic Diastolic heart failure/HFpEF              If applicable, please update the problem list.   Patient: Shruthi Amin        : 6/10/1928  Account: [id number]           Admit Date:         How to Respond to this query:       a. Click New Note     b. Answer query within the yellow box.                c. Update the Problem List, if applicable.      If you have any questions about this query contact me at: [email or web address]  [phone]      Dr. Curtis:   Dr. Roman:     89 y F admitted () with \"recurrent pleural effusion.\" No history of CHF noted. proBNP 324.0. CXR (): Moderate right and small left pleural effusions with bibasilar airspace disease which may relate to compressive atelectasis and/or pneumonia. Cardiomegaly and central pulmonary vascular congestion. Echo (24) EF 56-60%. Right chest tube placement (). Pulmonary notes () include, \"Transudative fluid likely related to CHF/severe mitral regurgitation.\"    Please clarify the type of heart failure treated/monitored:    Acute Diastolic heart failure/HFpEF  Acute on Chronic Diastolic heart failure/HFpEF   Other- specify_________  Unable to determine    By submitting this query, we are merely seeking further clarification of documentation to accurately reflect all conditions that you are monitoring, evaluating, treating or that extend the hospitalization or utilize additional resources of care. Please utilize your independent clinical judgment when addressing the question(s) above.     This query and your response, once completed, will be entered into the legal medical record.    Sincerely,    MAURIZIO Paredes, RN, CCDS  Clinical Documentation Integrity Program     "

## 2024-06-23 ENCOUNTER — APPOINTMENT (OUTPATIENT)
Dept: GENERAL RADIOLOGY | Facility: HOSPITAL | Age: 89
End: 2024-06-23
Payer: MEDICARE

## 2024-06-23 LAB
ANION GAP SERPL CALCULATED.3IONS-SCNC: 5.6 MMOL/L (ref 5–15)
BASOPHILS # BLD AUTO: 0.08 10*3/MM3 (ref 0–0.2)
BASOPHILS NFR BLD AUTO: 0.7 % (ref 0–1.5)
BUN SERPL-MCNC: 18 MG/DL (ref 8–23)
BUN/CREAT SERPL: 40.9 (ref 7–25)
CALCIUM SPEC-SCNC: 8.2 MG/DL (ref 8.2–9.6)
CHLORIDE SERPL-SCNC: 96 MMOL/L (ref 98–107)
CO2 SERPL-SCNC: 31.4 MMOL/L (ref 22–29)
CREAT SERPL-MCNC: 0.44 MG/DL (ref 0.57–1)
DEPRECATED RDW RBC AUTO: 55 FL (ref 37–54)
EGFRCR SERPLBLD CKD-EPI 2021: 88.6 ML/MIN/1.73
EOSINOPHIL # BLD AUTO: 0.36 10*3/MM3 (ref 0–0.4)
EOSINOPHIL NFR BLD AUTO: 3.1 % (ref 0.3–6.2)
ERYTHROCYTE [DISTWIDTH] IN BLOOD BY AUTOMATED COUNT: 14.5 % (ref 12.3–15.4)
GLUCOSE SERPL-MCNC: 94 MG/DL (ref 65–99)
HCT VFR BLD AUTO: 35.2 % (ref 34–46.6)
HGB BLD-MCNC: 11.4 G/DL (ref 12–15.9)
IMM GRANULOCYTES # BLD AUTO: 0.27 10*3/MM3 (ref 0–0.05)
IMM GRANULOCYTES NFR BLD AUTO: 2.3 % (ref 0–0.5)
INR PPP: 1.8 (ref 2–3)
LYMPHOCYTES # BLD AUTO: 2.06 10*3/MM3 (ref 0.7–3.1)
LYMPHOCYTES NFR BLD AUTO: 17.9 % (ref 19.6–45.3)
MCH RBC QN AUTO: 33.6 PG (ref 26.6–33)
MCHC RBC AUTO-ENTMCNC: 32.4 G/DL (ref 31.5–35.7)
MCV RBC AUTO: 103.8 FL (ref 79–97)
MONOCYTES # BLD AUTO: 2.06 10*3/MM3 (ref 0.1–0.9)
MONOCYTES NFR BLD AUTO: 17.9 % (ref 5–12)
NEUTROPHILS NFR BLD AUTO: 58.1 % (ref 42.7–76)
NEUTROPHILS NFR BLD AUTO: 6.7 10*3/MM3 (ref 1.7–7)
NRBC BLD AUTO-RTO: 0.2 /100 WBC (ref 0–0.2)
PLATELET # BLD AUTO: 193 10*3/MM3 (ref 140–450)
PMV BLD AUTO: 11.2 FL (ref 6–12)
POTASSIUM SERPL-SCNC: 5.1 MMOL/L (ref 3.5–5.2)
PROTHROMBIN TIME: 18.8 SECONDS (ref 19.4–28.5)
RBC # BLD AUTO: 3.39 10*6/MM3 (ref 3.77–5.28)
SODIUM SERPL-SCNC: 133 MMOL/L (ref 136–145)
WBC NRBC COR # BLD AUTO: 11.53 10*3/MM3 (ref 3.4–10.8)

## 2024-06-23 PROCEDURE — 97112 NEUROMUSCULAR REEDUCATION: CPT

## 2024-06-23 PROCEDURE — 71045 X-RAY EXAM CHEST 1 VIEW: CPT

## 2024-06-23 PROCEDURE — 80048 BASIC METABOLIC PNL TOTAL CA: CPT | Performed by: INTERNAL MEDICINE

## 2024-06-23 PROCEDURE — 97116 GAIT TRAINING THERAPY: CPT

## 2024-06-23 PROCEDURE — 85025 COMPLETE CBC W/AUTO DIFF WBC: CPT | Performed by: INTERNAL MEDICINE

## 2024-06-23 PROCEDURE — 25010000002 ONDANSETRON PER 1 MG: Performed by: INTERNAL MEDICINE

## 2024-06-23 PROCEDURE — 85610 PROTHROMBIN TIME: CPT | Performed by: INTERNAL MEDICINE

## 2024-06-23 RX ORDER — SORBITOL SOLUTION 70 %
30 SOLUTION, ORAL MISCELLANEOUS ONCE
Status: COMPLETED | OUTPATIENT
Start: 2024-06-23 | End: 2024-06-23

## 2024-06-23 RX ORDER — ENEMA 19; 7 G/133ML; G/133ML
1 ENEMA RECTAL ONCE AS NEEDED
Status: DISCONTINUED | OUTPATIENT
Start: 2024-06-23 | End: 2024-06-26 | Stop reason: HOSPADM

## 2024-06-23 RX ADMIN — SORBITOL SOLUTION (BULK) 30 ML: 70 SOLUTION at 11:14

## 2024-06-23 RX ADMIN — MIRTAZAPINE 15 MG: 15 TABLET, FILM COATED ORAL at 21:10

## 2024-06-23 RX ADMIN — GUAIFENESIN 1200 MG: 600 TABLET, EXTENDED RELEASE ORAL at 21:10

## 2024-06-23 RX ADMIN — GUAIFENESIN 1200 MG: 600 TABLET, EXTENDED RELEASE ORAL at 08:38

## 2024-06-23 RX ADMIN — METOPROLOL SUCCINATE 12.5 MG: 25 TABLET, FILM COATED, EXTENDED RELEASE ORAL at 11:14

## 2024-06-23 RX ADMIN — Medication 10 ML: at 11:14

## 2024-06-23 RX ADMIN — MAGNESIUM HYDROXIDE 5 ML: 2400 SUSPENSION ORAL at 14:08

## 2024-06-23 RX ADMIN — Medication 10 ML: at 21:10

## 2024-06-23 RX ADMIN — ACETAMINOPHEN 650 MG: 325 TABLET, FILM COATED ORAL at 04:43

## 2024-06-23 RX ADMIN — METOCLOPRAMIDE 5 MG: 5 TABLET ORAL at 08:41

## 2024-06-23 RX ADMIN — METOCLOPRAMIDE 5 MG: 5 TABLET ORAL at 17:43

## 2024-06-23 RX ADMIN — METOCLOPRAMIDE 5 MG: 5 TABLET ORAL at 11:14

## 2024-06-23 RX ADMIN — METOPROLOL SUCCINATE 12.5 MG: 25 TABLET, FILM COATED, EXTENDED RELEASE ORAL at 21:10

## 2024-06-23 RX ADMIN — LATANOPROST 1 DROP: 50 SOLUTION/ DROPS OPHTHALMIC at 21:10

## 2024-06-23 RX ADMIN — DOCUSATE SODIUM 100 MG: 100 CAPSULE, LIQUID FILLED ORAL at 21:10

## 2024-06-23 RX ADMIN — ONDANSETRON 4 MG: 2 INJECTION INTRAMUSCULAR; INTRAVENOUS at 04:43

## 2024-06-23 RX ADMIN — METOCLOPRAMIDE 5 MG: 5 TABLET ORAL at 21:10

## 2024-06-23 NOTE — PLAN OF CARE
Goal Outcome Evaluation:         No new concerns overnight. No c/o discomfort. Chest tube in place and levels monitored. Turned with wedge every 2 hours as needed. Call light in reach.

## 2024-06-23 NOTE — PROGRESS NOTES
"Pharmacy dosing service  Anticoagulant  Warfarin     Subjective:    Shruthi Amin is a 89 y.o.female being continued on warfarin for Atrial Fibrillation / Flutter.    INR Goal: 2 - 3  Home medication?: warfarin 2 mg PO daily, except warfarin 4 mg PO on Tuesday.   Bridge Therapy Present?:  No  Interacting Medications Evaluation (New/Present/Discontinued):   Additional Contributing Factors:       Assessment/Plan:    INR is subtherapeutic today but trending up appropriately. Patient received one time boosted dose of warfarin on 6/20. Will continue home regimen at this time and plan to give warfarin 2 mg today.    Continue to monitor and adjust based on INR.         Date 6/20 6/21 6/22 6/23        INR 1.59 1.45 1.62 1.80        Dose 4 mg 2 mg 2 mg 2 mg            Objective:  [Ht: 157.5 cm (62\"); Wt: 38.5 kg (84 lb 14 oz); BMI: Body mass index is 15.52 kg/m².]    Lab Results   Component Value Date    ALBUMIN 3.6 06/17/2024     Lab Results   Component Value Date    INR 1.80 (L) 06/23/2024    INR 1.62 (L) 06/22/2024    INR 1.45 (L) 06/21/2024    PROTIME 18.8 (L) 06/23/2024    PROTIME 17.0 (L) 06/22/2024    PROTIME 15.4 (L) 06/21/2024     Lab Results   Component Value Date    HGB 11.4 (L) 06/23/2024    HGB 11.9 (L) 06/22/2024    HGB 12.0 06/21/2024     Lab Results   Component Value Date    HCT 35.2 06/23/2024    HCT 37.2 06/22/2024    HCT 38.0 06/21/2024       Renetta Suarez [facility]  06/23/24 10:47 EDT          "

## 2024-06-23 NOTE — THERAPY TREATMENT NOTE
"Subjective: Pt agreeable to therapeutic plan of care. Pt feeling better with CT out    Objective:     Bed mobility - Min-A  Transfers - CGA and with rolling walker  Ambulation - 30 feet CGA and with rolling walker      Vitals: WNL on 2L O2    Pain: 0 VAS       Education: Provided education on the importance of mobility in the acute care setting, Verbal/Tactile Cues, Transfer Training, and Gait Training    Assessment: Shruthi Amin presents with functional mobility impairments which indicate the need for skilled intervention. Tolerating session today without incident. Patient doing well with mobility and minimal/CGA assist. Presents with flexed posture and slowed tessy but no LOB noted. Plans on home with family and HH at [place].Will continue to follow and progress as tolerated.     Plan/Recommendations:   If medically appropriate, Low Intensity Therapy recommended post-acute care - This is recommended as therapy feels this patient would require 2-3 visits per week. OP or HH would be the best option depending on patient's home bound status. Consider, if the patient has other  \"skilled\" needs such as wounds, IV antibiotics, etc. Combined with \"low intensity\" could also equate to a SNF. If patient is medically complex, consider LTAC. Pt requires no DME at discharge.     Pt desires Home with family assist and Home Health at discharge. Pt cooperative; agreeable to therapeutic recommendations and plan of care.         Basic Mobility 6-click:  Rollin = Total, A lot = 2, A little = 3; 4 = None  Supine>Sit:   1 = Total, A lot = 2, A little = 3; 4 = None   Sit>Stand with arms:  1 = Total, A lot = 2, A little = 3; 4 = None  Bed>Chair:   1 = Total, A lot = 2, A little = 3; 4 = None  Ambulate in room:  1 = Total, A lot = 2, A little = 3; 4 = None  3-5 Steps with railin = Total, A lot = 2, A little = 3; 4 = None  Score: 16    Post-Tx Position: Up in Chair, Alarms activated, and Call light and personal items " within reach  PPE: gloves

## 2024-06-23 NOTE — PROGRESS NOTES
Daily Progress Note          Assessment    Recurrent pleural effusion: Status post right thoracentesis April 2024 with drainage of 450 mL  Transudative fluid likely related to CHF/severe mitral regurgitation  Hypoxemia: ABG 7.45/43.2/92.2 while on 2 L nasal cannula  Anemia  A-fib  Bradycardia with mild digoxin toxicity  Large hiatal hernia  COPD  CHF  CAD  Osteoarthritis, kyphosis    Results for orders placed during the hospital encounter of 02/23/24    Adult Transesophageal Echo (JEFF) W/ Cont if Necessary Per Protocol    Interpretation Summary    Left ventricular ejection fraction appears to be 56 - 60%.    Severe mitral valve regurgitation is present.    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    Clinical correlation required         Recommendations:    Hold warfarin due to bleeding from the site of chest tube insertion     status post right chest tube placement 6/19/2024: The pleural effusion has decreased, I flushed the chest tube and there was no fluid returning so I removed the chest tube.  Long-term management will probably include recurrent thoracentesis when she becomes symptomatic, I was initially considering Pleurx catheter but the need for warfarin and the bleeding from the insertion site makes it difficult to maintain a chronic indwelling catheter.          BP and heart rate are improving, metoprolol resumed  Oxygen supplement and titration to maintain saturation 90 to 95%: Currently requiring 2 L per nasal cannula    Bronchodilators  Mucinex  Diuresis    Encourage use of incentive spirometry  Chronic anticoagulation: Warfarin on hold     I personally reviewed the radiological studies                 LOS: 5 days     Subjective     Patient reports mild chronic shortness of breath    Objective     Vital signs for last 24 hours:  Vitals:    06/23/24 0029 06/23/24 0431  06/23/24 0714 06/23/24 1100   BP: 130/68 135/72 100/52 124/67   BP Location: Right arm Right arm Right arm Right arm   Patient Position: Lying Lying Lying Lying   Pulse: 89 87 65 82   Resp: 21 16 13 13   Temp: 97.6 °F (36.4 °C) 98.3 °F (36.8 °C) 98 °F (36.7 °C) 97.4 °F (36.3 °C)   TempSrc: Oral Oral Oral Oral   SpO2: 99% 97% 99%    Weight:       Height:           Intake/Output last 3 shifts:  I/O last 3 completed shifts:  In: 720 [P.O.:720]  Out: 1930 [Urine:1550; Chest Tube:380]  Intake/Output this shift:  I/O this shift:  In: 240 [P.O.:240]  Out: -       Radiology  Imaging Results (Last 24 Hours)       Procedure Component Value Units Date/Time    XR Chest 1 View [[id number]] Collected: 06/23/24 0838     Updated: 06/23/24 0842    Narrative:      XR CHEST 1 VW    Date of Exam: 6/23/2024 4:59 AM EDT    Indication: Right pleural effusion    Comparison: Chest radiograph 6/22/2024, chest CT 2/25/2024    Findings:  Cardiomegaly. Large hiatal hernia probably containing the entire stomach. Right pleural drainage catheter noted with stable appearing small bilateral pleural effusions. Underlying bibasilar airspace opacities favor atelectasis however infection may have   a similar appearance. Chronic appearing interstitial changes similar to previous comparison. Small right apical pneumothorax measuring up to 8 mm stable from prior. Aortic atherosclerotic disease. Degenerative related osseous changes with previous   thoracic augmentation.      Impression:      Impression:  Radiographically stable appearance of the chest since 6/22/2024, including the small right hydropneumothorax. Drainage catheter in stable position.      Electronically Signed: Ward Anderson MD    6/23/2024 8:40 AM EDT    Workstation ID: [id number]            Labs:  Results from last 7 days   Lab Units 06/23/24  0015   WBC 10*3/mm3 11.53*   HEMOGLOBIN g/dL 11.4*   HEMATOCRIT % 35.2   PLATELETS 10*3/mm3 193     Results from last 7 days   Lab Units  06/23/24  0015 06/18/24  0001 06/17/24  1543   SODIUM mmol/L 133*   < > 141   POTASSIUM mmol/L 5.1   < > 4.3   CHLORIDE mmol/L 96*   < > 103   CO2 mmol/L 31.4*   < > 30.9*   BUN mg/dL 18   < > 16   CREATININE mg/dL 0.44*   < > 0.48*   CALCIUM mg/dL 8.2   < > 8.8   BILIRUBIN mg/dL  --   --  0.8   ALK PHOS U/L  --   --  79   ALT (SGPT) U/L  --   --  12   AST (SGOT) U/L  --   --  22   GLUCOSE mg/dL 94   < > 93    < > = values in this interval not displayed.     Results from last 7 days   Lab Units 06/17/24  1600   PH, ARTERIAL pH units 7.453*   PO2 ART mm Hg 96.2   PCO2, ARTERIAL mm Hg 43.2   HCO3 ART mmol/L 30.2*     Results from last 7 days   Lab Units 06/17/24  1543   ALBUMIN g/dL 3.6     Results from last 7 days   Lab Units 06/17/24  1543   HSTROP T ng/L 20*             Results from last 7 days   Lab Units 06/23/24  0015 06/22/24  0137 06/21/24  0427   INR  1.80* 1.62* 1.45*     Results from last 7 days   Lab Units 06/17/24  1543   TSH uIU/mL 2.160   FREE T4 ng/dL 1.36           Meds:   SCHEDULE  docusate sodium, 100 mg, Oral, Nightly  guaiFENesin, 1,200 mg, Oral, Q12H  latanoprost, 1 drop, Right Eye, Nightly  metoclopramide, 5 mg, Oral, 4x Daily AC & at Bedtime  metoprolol succinate XL, 12.5 mg, Oral, Q12H  mirtazapine, 15 mg, Oral, Nightly  sodium chloride, 10 mL, Intravenous, Q12H  warfarin, 2 mg, Oral, Once per day on Sunday Monday Wednesday Thursday Friday Saturday   And  [START ON 6/25/2024] warfarin, 4 mg, Oral, Every Tuesday      Infusions  Pharmacy to dose warfarin,       PRNs    acetaminophen    senna-docusate sodium **AND** polyethylene glycol **AND** bisacodyl **AND** bisacodyl    fleet enema    magnesium hydroxide    nitroglycerin    ondansetron    Pharmacy to dose warfarin    sodium chloride    sodium chloride    Physical Exam:  General Appearance:  Alert, looks chronically ill but not in acute distress  HEENT:  Normocephalic, without obvious abnormality, Conjunctiva/corneas clear,.   Nares normal, no  drainage     Neck:  Supple, symmetrical, trachea midline.   Lungs /Chest wall: Mild bilateral basal rhonchi, respirations unlabored, symmetrical wall movement.     Heart: Rate controlled, S1 S2 normal  Abdomen: Soft, non-tender, no masses, no organomegaly.    Extremities: No edema, no clubbing or cyanosis     ROS  Constitutional: Negative for chills, fever and malaise/fatigue.   HENT: Negative.    Eyes: Negative.    Cardiovascular: Negative.    Respiratory: Positive for cough and shortness of breath.    Skin: Negative.    Musculoskeletal: Kyphosis  Gastrointestinal: Negative.    Genitourinary: Negative.    Neurological: Generalized weakness      I reviewed the recent clinical results  I personally reviewed the latest radiological studies    Part of this note may be an electronic transcription/translation of spoken language to printed text using the Dragon Dictation System.

## 2024-06-23 NOTE — PLAN OF CARE
Goal Outcome Evaluation:              Outcome Evaluation: Pt alert this AM, Laxatives given per APRN new orders today. Call light in reach, able to make needs known. all questions answered.

## 2024-06-23 NOTE — PROGRESS NOTES
LOS: 5 days   Patient Care Team:  Cristal Goodwin MD as PCP - General (Family Medicine)  Anai Moise APRN as Nurse Practitioner (Cardiology)  Theodore Vital MD as Consulting Physician (Cardiology)    Subjective     Patient denies any complaints      Review of Systems   Constitutional:  Positive for activity change, appetite change and fatigue.   HENT: Negative.     Respiratory:  Negative for shortness of breath.    Cardiovascular: Negative.    Gastrointestinal: Negative.    Genitourinary: Negative.    Musculoskeletal: Negative.    Neurological:  Positive for weakness.   Psychiatric/Behavioral: Negative.             Objective     Vital Signs  Temp:  [97.6 °F (36.4 °C)-98.4 °F (36.9 °C)] 98 °F (36.7 °C)  Heart Rate:  [65-95] 65  Resp:  [13-21] 13  BP: (100-135)/(52-72) 100/52      Physical Exam  Vitals reviewed.   Constitutional:       Appearance: She is not ill-appearing.   HENT:      Head: Normocephalic and atraumatic.      Right Ear: External ear normal.      Left Ear: External ear normal.      Nose: Nose normal.      Mouth/Throat:      Mouth: Mucous membranes are moist.   Eyes:      General:         Right eye: No discharge.         Left eye: No discharge.   Cardiovascular:      Rate and Rhythm: Normal rate and regular rhythm.      Pulses: Normal pulses.      Heart sounds: Normal heart sounds.   Pulmonary:      Effort: Pulmonary effort is normal.      Breath sounds: Normal breath sounds.   Abdominal:      General: Bowel sounds are normal.      Palpations: Abdomen is soft.   Musculoskeletal:         General: Normal range of motion.      Cervical back: Normal range of motion.   Skin:     General: Skin is warm and dry.   Neurological:      Mental Status: She is alert and oriented to person, place, and time.   Psychiatric:         Behavior: Behavior normal.              Results Review:    Lab Results (last 24 hours)       Procedure Component Value Units Date/Time    Body Fluid Culture - Body Fluid,  Pleural Cavity [[id number]] Collected: 06/19/24 1057    Specimen: Body Fluid from Pleural Cavity Updated: 06/23/24 0636     Body Fluid Culture No growth at 4 days     Gram Stain Rare (1+) WBCs per low power field      No organisms seen    Basic Metabolic Panel [[id number]]  (Abnormal) Collected: 06/23/24 0015    Specimen: Blood Updated: 06/23/24 0114     Glucose 94 mg/dL      BUN 18 mg/dL      Creatinine 0.44 mg/dL      Sodium 133 mmol/L      Potassium 5.1 mmol/L      Chloride 96 mmol/L      CO2 31.4 mmol/L      Calcium 8.2 mg/dL      BUN/Creatinine Ratio 40.9     Anion Gap 5.6 mmol/L      eGFR 88.6 mL/min/1.73     Narrative:      GFR Normal >60  Chronic Kidney Disease <60  Kidney Failure <15    The GFR formula is only valid for adults with stable renal function between ages 18 and 70.    Protime-INR [[id number]]  (Abnormal) Collected: 06/23/24 0015    Specimen: Blood Updated: 06/23/24 0103     Protime 18.8 Seconds      INR 1.80    CBC & Differential [[id number]]  (Abnormal) Collected: 06/23/24 0015    Specimen: Blood Updated: 06/23/24 0053    Narrative:      The following orders were created for panel order CBC & Differential.  Procedure                               Abnormality         Status                     ---------                               -----------         ------                     CBC Auto Differential[[id number]]        Abnormal            Final result                 Please view results for these tests on the individual orders.    CBC Auto Differential [[id number]]  (Abnormal) Collected: 06/23/24 0015    Specimen: Blood Updated: 06/23/24 0053     WBC 11.53 10*3/mm3      RBC 3.39 10*6/mm3      Hemoglobin 11.4 g/dL      Hematocrit 35.2 %      .8 fL      MCH 33.6 pg      MCHC 32.4 g/dL      RDW 14.5 %      RDW-SD 55.0 fl      MPV 11.2 fL      Platelets 193 10*3/mm3      Neutrophil % 58.1 %      Lymphocyte % 17.9 %      Monocyte % 17.9 %      Eosinophil % 3.1 %      Basophil % 0.7 %      Immature  Grans % 2.3 %      Neutrophils, Absolute 6.70 10*3/mm3      Lymphocytes, Absolute 2.06 10*3/mm3      Monocytes, Absolute 2.06 10*3/mm3      Eosinophils, Absolute 0.36 10*3/mm3      Basophils, Absolute 0.08 10*3/mm3      Immature Grans, Absolute 0.27 10*3/mm3      nRBC 0.2 /100 WBC              Imaging Results (Last 24 Hours)       Procedure Component Value Units Date/Time    XR Chest 1 View [[id number]] Collected: 06/23/24 0838     Updated: 06/23/24 0842    Narrative:      XR CHEST 1 VW    Date of Exam: 6/23/2024 4:59 AM EDT    Indication: Right pleural effusion    Comparison: Chest radiograph 6/22/2024, chest CT 2/25/2024    Findings:  Cardiomegaly. Large hiatal hernia probably containing the entire stomach. Right pleural drainage catheter noted with stable appearing small bilateral pleural effusions. Underlying bibasilar airspace opacities favor atelectasis however infection may have   a similar appearance. Chronic appearing interstitial changes similar to previous comparison. Small right apical pneumothorax measuring up to 8 mm stable from prior. Aortic atherosclerotic disease. Degenerative related osseous changes with previous   thoracic augmentation.      Impression:      Impression:  Radiographically stable appearance of the chest since 6/22/2024, including the small right hydropneumothorax. Drainage catheter in stable position.      Electronically Signed: Ward Anderson MD    6/23/2024 8:40 AM EDT    Workstation ID: [id number]    XR Chest 1 View [[id number]] Collected: 06/22/24 1130     Updated: 06/22/24 1134    Narrative:      XR CHEST 1 VW    Date of Exam: 6/22/2024 5:46 AM EDT    Indication: Right pleural effusion    Comparison: 6/21/2024    Findings:  There is a stable chest tube overlying the right lung base. Persistent small right apical pneumothorax measuring 13 mm. Unchanged cardiomegaly. No discrete pneumothorax on the left. There is persistent bibasilar airspace disease not significantly changed   with  small bilateral pleural effusions. Lower thoracic kyphoplasty. Areas of parenchymal scarring unchanged.      Impression:      Impression:  1. Stable small right apical pneumothorax with chest tube overlying the right lung base.  2. No change in bibasilar airspace disease and small bilateral pleural effusions.      Electronically Signed: Elver Machuca MD    6/22/2024 11:31 AM EDT    Workstation ID: [id number]                 I reviewed the patient's new clinical results.    Medication Review:   Scheduled Meds:docusate sodium, 100 mg, Oral, Nightly  guaiFENesin, 1,200 mg, Oral, Q12H  latanoprost, 1 drop, Right Eye, Nightly  metoclopramide, 5 mg, Oral, 4x Daily AC & at Bedtime  metoprolol succinate XL, 12.5 mg, Oral, Q12H  mirtazapine, 15 mg, Oral, Nightly  sodium chloride, 10 mL, Intravenous, Q12H  sorbitol, 30 mL, Oral, Once  warfarin, 2 mg, Oral, Once per day on Sunday Monday Wednesday Thursday Friday Saturday   And  [START ON 6/25/2024] warfarin, 4 mg, Oral, Every Tuesday      Continuous Infusions:Pharmacy to dose warfarin,       PRN Meds:.  acetaminophen    senna-docusate sodium **AND** polyethylene glycol **AND** bisacodyl **AND** bisacodyl    magnesium hydroxide    nitroglycerin    ondansetron    Pharmacy to dose warfarin    sodium chloride    sodium chloride     Interval History:    Assessment & Plan     Recurrent pleural effusion  -status post chest tube placement  -CT to -20 with continued output  - Pulmonary following      Large hiatal hernia  -metoclopramide, reflux precautions    Constipation  -bowel regimen    Underweight  -nutritional supplements, mirtazapine    Glaucoma  Secondary hypercoagulable state related to atrial fibs    Paroxysmal atrial fib  HTN   -currently in a controlled rate;  restart low dose metoprolol that was previously held for bradycardia      Dig toxicity-resolved monitor off  Anemia    Stage II pressure injury on sacrum  -healing, continue topical barrier creams and pressure-relief  measure    Plan for disposition:Home    Josie Solano, [place]  06/23/24  10:44 EDT

## 2024-06-23 NOTE — PLAN OF CARE
Assessment: Shruthi Amin presents with functional mobility impairments which indicate the need for skilled intervention. Tolerating session today without incident. Patient doing well with mobility and minimal/CGA assist. Presents with flexed posture and slowed tessy but no LOB noted. Plans on home with family and HH at [place].Will continue to follow and progress as tolerated.

## 2024-06-24 ENCOUNTER — APPOINTMENT (OUTPATIENT)
Dept: GENERAL RADIOLOGY | Facility: HOSPITAL | Age: 89
End: 2024-06-24
Payer: MEDICARE

## 2024-06-24 LAB
ANION GAP SERPL CALCULATED.3IONS-SCNC: 6 MMOL/L (ref 5–15)
BACTERIA FLD CULT: NORMAL
BASOPHILS # BLD AUTO: 0.08 10*3/MM3 (ref 0–0.2)
BASOPHILS NFR BLD AUTO: 0.8 % (ref 0–1.5)
BUN SERPL-MCNC: 17 MG/DL (ref 8–23)
BUN/CREAT SERPL: 34.7 (ref 7–25)
CALCIUM SPEC-SCNC: 8.8 MG/DL (ref 8.2–9.6)
CHLORIDE SERPL-SCNC: 94 MMOL/L (ref 98–107)
CO2 SERPL-SCNC: 32 MMOL/L (ref 22–29)
CREAT SERPL-MCNC: 0.49 MG/DL (ref 0.57–1)
DEPRECATED RDW RBC AUTO: 54.8 FL (ref 37–54)
EGFRCR SERPLBLD CKD-EPI 2021: 86.4 ML/MIN/1.73
EOSINOPHIL # BLD AUTO: 0.35 10*3/MM3 (ref 0–0.4)
EOSINOPHIL NFR BLD AUTO: 3.6 % (ref 0.3–6.2)
ERYTHROCYTE [DISTWIDTH] IN BLOOD BY AUTOMATED COUNT: 14.4 % (ref 12.3–15.4)
GLUCOSE SERPL-MCNC: 92 MG/DL (ref 65–99)
GRAM STN SPEC: NORMAL
GRAM STN SPEC: NORMAL
HCT VFR BLD AUTO: 34.9 % (ref 34–46.6)
HGB BLD-MCNC: 11.2 G/DL (ref 12–15.9)
IMM GRANULOCYTES # BLD AUTO: 0.2 10*3/MM3 (ref 0–0.05)
IMM GRANULOCYTES NFR BLD AUTO: 2.1 % (ref 0–0.5)
INR PPP: 1.69 (ref 2–3)
LYMPHOCYTES # BLD AUTO: 2.01 10*3/MM3 (ref 0.7–3.1)
LYMPHOCYTES NFR BLD AUTO: 20.9 % (ref 19.6–45.3)
MCH RBC QN AUTO: 33.4 PG (ref 26.6–33)
MCHC RBC AUTO-ENTMCNC: 32.1 G/DL (ref 31.5–35.7)
MCV RBC AUTO: 104.2 FL (ref 79–97)
MONOCYTES # BLD AUTO: 1.69 10*3/MM3 (ref 0.1–0.9)
MONOCYTES NFR BLD AUTO: 17.5 % (ref 5–12)
NEUTROPHILS NFR BLD AUTO: 5.31 10*3/MM3 (ref 1.7–7)
NEUTROPHILS NFR BLD AUTO: 55.1 % (ref 42.7–76)
NRBC BLD AUTO-RTO: 0 /100 WBC (ref 0–0.2)
PLATELET # BLD AUTO: 226 10*3/MM3 (ref 140–450)
PMV BLD AUTO: 11.2 FL (ref 6–12)
POTASSIUM SERPL-SCNC: 4.7 MMOL/L (ref 3.5–5.2)
PROTHROMBIN TIME: 17.7 SECONDS (ref 19.4–28.5)
RBC # BLD AUTO: 3.35 10*6/MM3 (ref 3.77–5.28)
SODIUM SERPL-SCNC: 132 MMOL/L (ref 136–145)
WBC NRBC COR # BLD AUTO: 9.64 10*3/MM3 (ref 3.4–10.8)

## 2024-06-24 PROCEDURE — 97116 GAIT TRAINING THERAPY: CPT

## 2024-06-24 PROCEDURE — 85610 PROTHROMBIN TIME: CPT | Performed by: INTERNAL MEDICINE

## 2024-06-24 PROCEDURE — 97530 THERAPEUTIC ACTIVITIES: CPT

## 2024-06-24 PROCEDURE — 85025 COMPLETE CBC W/AUTO DIFF WBC: CPT | Performed by: INTERNAL MEDICINE

## 2024-06-24 PROCEDURE — 80048 BASIC METABOLIC PNL TOTAL CA: CPT | Performed by: INTERNAL MEDICINE

## 2024-06-24 PROCEDURE — 99222 1ST HOSP IP/OBS MODERATE 55: CPT | Performed by: INTERNAL MEDICINE

## 2024-06-24 PROCEDURE — 97110 THERAPEUTIC EXERCISES: CPT

## 2024-06-24 PROCEDURE — 71045 X-RAY EXAM CHEST 1 VIEW: CPT

## 2024-06-24 RX ORDER — METOCLOPRAMIDE 5 MG/1
5 TABLET ORAL
Qty: 120 TABLET | Refills: 1 | Status: SHIPPED | OUTPATIENT
Start: 2024-06-24

## 2024-06-24 RX ORDER — METOPROLOL SUCCINATE 25 MG/1
12.5 TABLET, EXTENDED RELEASE ORAL EVERY 12 HOURS SCHEDULED
Status: DISCONTINUED | OUTPATIENT
Start: 2024-06-25 | End: 2024-06-26 | Stop reason: HOSPADM

## 2024-06-24 RX ORDER — FUROSEMIDE 20 MG/1
20 TABLET ORAL DAILY
Status: DISCONTINUED | OUTPATIENT
Start: 2024-06-24 | End: 2024-06-26 | Stop reason: HOSPADM

## 2024-06-24 RX ORDER — DOCUSATE SODIUM 100 MG/1
100 CAPSULE, LIQUID FILLED ORAL NIGHTLY
Start: 2024-06-24 | End: 2024-06-25

## 2024-06-24 RX ORDER — METOPROLOL SUCCINATE 25 MG/1
12.5 TABLET, EXTENDED RELEASE ORAL EVERY 12 HOURS SCHEDULED
Qty: 60 TABLET | Refills: 1 | Status: SHIPPED | OUTPATIENT
Start: 2024-06-24

## 2024-06-24 RX ORDER — WARFARIN SODIUM 4 MG/1
4 TABLET ORAL
Status: COMPLETED | OUTPATIENT
Start: 2024-06-24 | End: 2024-06-24

## 2024-06-24 RX ADMIN — Medication 10 ML: at 20:52

## 2024-06-24 RX ADMIN — METOCLOPRAMIDE 5 MG: 5 TABLET ORAL at 12:31

## 2024-06-24 RX ADMIN — METOCLOPRAMIDE 5 MG: 5 TABLET ORAL at 20:52

## 2024-06-24 RX ADMIN — FUROSEMIDE 20 MG: 20 TABLET ORAL at 16:30

## 2024-06-24 RX ADMIN — WARFARIN SODIUM 4 MG: 4 TABLET ORAL at 17:31

## 2024-06-24 RX ADMIN — GUAIFENESIN 1200 MG: 600 TABLET, EXTENDED RELEASE ORAL at 20:52

## 2024-06-24 RX ADMIN — MIRTAZAPINE 15 MG: 15 TABLET, FILM COATED ORAL at 20:52

## 2024-06-24 RX ADMIN — METOCLOPRAMIDE 5 MG: 5 TABLET ORAL at 15:57

## 2024-06-24 RX ADMIN — DOCUSATE SODIUM 100 MG: 100 CAPSULE, LIQUID FILLED ORAL at 20:52

## 2024-06-24 RX ADMIN — METOCLOPRAMIDE 5 MG: 5 TABLET ORAL at 16:30

## 2024-06-24 RX ADMIN — LATANOPROST 1 DROP: 50 SOLUTION/ DROPS OPHTHALMIC at 20:53

## 2024-06-24 RX ADMIN — METOPROLOL SUCCINATE 12.5 MG: 25 TABLET, FILM COATED, EXTENDED RELEASE ORAL at 08:36

## 2024-06-24 RX ADMIN — Medication 10 ML: at 10:04

## 2024-06-24 RX ADMIN — METOCLOPRAMIDE 5 MG: 5 TABLET ORAL at 08:36

## 2024-06-24 RX ADMIN — GUAIFENESIN 1200 MG: 600 TABLET, EXTENDED RELEASE ORAL at 08:36

## 2024-06-24 NOTE — PROGRESS NOTES
Daily Progress Note          Assessment    Recurrent pleural effusion: Status post right thoracentesis April 2024 with drainage of 450 mL  Transudative fluid likely related to CHF/severe mitral regurgitation  Hypoxemia: ABG 7.45/43.2/92.2 while on 2 L nasal cannula  Anemia  A-fib  Bradycardia with mild digoxin toxicity  Large hiatal hernia  COPD  CHF  CAD  Osteoarthritis, kyphosis    Results for orders placed during the hospital encounter of 02/23/24    Adult Transesophageal Echo (JEFF) W/ Cont if Necessary Per Protocol    Interpretation Summary    Left ventricular ejection fraction appears to be 56 - 60%.    Severe mitral valve regurgitation is present.    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    Clinical correlation required         Recommendations:      This patient is high risk for repeating thoracentsis, her main issue is very large hiatal hernia, occupying large intrathoracic space, causing atelectasis lower lobes bilaterally with 2nd effusions in addition to Severe mitral valve regurgitation and calcified aortic valve mass    I do NOT recommend repeating thoracentesis , however if needed consider Pleurx catheter which is chronic indwelling catheter. Still not the best option, I recommend medical management    Need cardiologyn evaluation, if not done already    Oxygen supplement and titration to maintain saturation 90 to 95%: Currently requiring 2 L per nasal cannula    Bronchodilators  Mucinex  Diuresis    Encourage use of incentive spirometry  Chronic anticoagulation: Warfarin on hold     I personally reviewed the radiological studies                 LOS: 6 days     Subjective     Patient reports mild chronic shortness of breath    Objective     Vital signs for last 24 hours:  Vitals:    06/23/24 1601 06/23/24 2015 06/24/24 0001 06/24/24 0446   BP: 109/53 112/64 121/73  120/67   BP Location: Right arm Right arm Right arm Right arm   Patient Position: Lying Lying Lying Lying   Pulse: 90 84 87 74   Resp: 17 18 18 18   Temp: 97.6 °F (36.4 °C) 97.9 °F (36.6 °C) 98.3 °F (36.8 °C) 98 °F (36.7 °C)   TempSrc: Oral Oral Axillary Axillary   SpO2: 92% 99% 100% 97%   Weight:       Height:           Intake/Output last 3 shifts:  I/O last 3 completed shifts:  In: 720 [P.O.:720]  Out: 830 [Urine:800; Chest Tube:30]  Intake/Output this shift:  No intake/output data recorded.      Radiology  Imaging Results (Last 24 Hours)       Procedure Component Value Units Date/Time    XR Chest 1 View [[id number]] Collected: 06/24/24 0737     Updated: 06/24/24 0747    Narrative:      XR CHEST 1 VW    Date of Exam: 6/24/2024 5:16 AM EDT    Indication: Right pleural effusion    Comparison: 6/23/2024 and prior    Findings:  Study limited by overlying support and monitoring apparatus. Heart size is enlarged and pulmonary vascularity is congested. Small to moderate right-sided pleural effusion is similar to slightly increased given differences in technique. Previously noted   chest tube is not identified. There appears to be a small right apical pneumothorax with a similar to slightly decreased prominence from the comparison. Associated peripheral opacity at the right apex is noted. Retrocardiac opacity at the left base   noted. Osseous structures are stable      Impression:      Impression:    1. Small to moderate pleural effusion on the right similar to slightly increased in the comparison. Small right apical pneumothorax slightly decreased in the comparison    2. Increased retrocardiac opacity compared to the prior study. Large hiatal hernia not well visualized on radiographs.      Electronically Signed: Real Mendez MD    6/24/2024 7:41 AM EDT    Workstation ID: [id number]    XR Chest 1 View [[id number]] Collected: 06/23/24 0838     Updated: 06/23/24 0842    Narrative:      XR CHEST 1 VW    Date of Exam:  6/23/2024 4:59 AM EDT    Indication: Right pleural effusion    Comparison: Chest radiograph 6/22/2024, chest CT 2/25/2024    Findings:  Cardiomegaly. Large hiatal hernia probably containing the entire stomach. Right pleural drainage catheter noted with stable appearing small bilateral pleural effusions. Underlying bibasilar airspace opacities favor atelectasis however infection may have   a similar appearance. Chronic appearing interstitial changes similar to previous comparison. Small right apical pneumothorax measuring up to 8 mm stable from prior. Aortic atherosclerotic disease. Degenerative related osseous changes with previous   thoracic augmentation.      Impression:      Impression:  Radiographically stable appearance of the chest since 6/22/2024, including the small right hydropneumothorax. Drainage catheter in stable position.      Electronically Signed: Ward Anderson MD    6/23/2024 8:40 AM EDT    Workstation ID: [id number]            Labs:  Results from last 7 days   Lab Units 06/24/24  0358   WBC 10*3/mm3 9.64   HEMOGLOBIN g/dL 11.2*   HEMATOCRIT % 34.9   PLATELETS 10*3/mm3 226     Results from last 7 days   Lab Units 06/24/24  0358 06/18/24  0001 06/17/24  1543   SODIUM mmol/L 132*   < > 141   POTASSIUM mmol/L 4.7   < > 4.3   CHLORIDE mmol/L 94*   < > 103   CO2 mmol/L 32.0*   < > 30.9*   BUN mg/dL 17   < > 16   CREATININE mg/dL 0.49*   < > 0.48*   CALCIUM mg/dL 8.8   < > 8.8   BILIRUBIN mg/dL  --   --  0.8   ALK PHOS U/L  --   --  79   ALT (SGPT) U/L  --   --  12   AST (SGOT) U/L  --   --  22   GLUCOSE mg/dL 92   < > 93    < > = values in this interval not displayed.     Results from last 7 days   Lab Units 06/17/24  1600   PH, ARTERIAL pH units 7.453*   PO2 ART mm Hg 96.2   PCO2, ARTERIAL mm Hg 43.2   HCO3 ART mmol/L 30.2*     Results from last 7 days   Lab Units 06/17/24  1543   ALBUMIN g/dL 3.6     Results from last 7 days   Lab Units 06/17/24  1543   HSTROP T ng/L 20*             Results from last 7  days   Lab Units 06/24/24  0358 06/23/24  0015 06/22/24  0137   INR  1.69* 1.80* 1.62*     Results from last 7 days   Lab Units 06/17/24  1543   TSH uIU/mL 2.160   FREE T4 ng/dL 1.36           Meds:   SCHEDULE  docusate sodium, 100 mg, Oral, Nightly  guaiFENesin, 1,200 mg, Oral, Q12H  latanoprost, 1 drop, Right Eye, Nightly  metoclopramide, 5 mg, Oral, 4x Daily AC & at Bedtime  metoprolol succinate XL, 12.5 mg, Oral, Q12H  mirtazapine, 15 mg, Oral, Nightly  sodium chloride, 10 mL, Intravenous, Q12H  [Held by provider] warfarin, 2 mg, Oral, Once per day on Sunday Monday Wednesday Thursday Friday Saturday   And  [Held by provider] warfarin, 4 mg, Oral, Every Tuesday      Infusions  Pharmacy to dose warfarin,       PRNs    acetaminophen    senna-docusate sodium **AND** polyethylene glycol **AND** bisacodyl **AND** bisacodyl    fleet enema    magnesium hydroxide    nitroglycerin    ondansetron    Pharmacy to dose warfarin    sodium chloride    sodium chloride    Physical Exam:  General Appearance:  Alert, looks chronically ill but not in acute distress  HEENT:  Normocephalic, without obvious abnormality, Conjunctiva/corneas clear,.   Nares normal, no drainage     Neck:  Supple, symmetrical, trachea midline.   Lungs /Chest wall: Mild bilateral basal rhonchi, respirations unlabored, symmetrical wall movement.     Heart: Rate controlled, S1 S2 normal  Abdomen: Soft, non-tender, no masses, no organomegaly.    Extremities: No edema, no clubbing or cyanosis     ROS  Constitutional: Negative for chills, fever and malaise/fatigue.   HENT: Negative.    Eyes: Negative.    Cardiovascular: Negative.    Respiratory: Positive for cough and shortness of breath.    Skin: Negative.    Musculoskeletal: Kyphosis  Gastrointestinal: Negative.    Genitourinary: Negative.    Neurological: Generalized weakness      I reviewed the recent clinical results  I personally reviewed the latest radiological studies    Part of this note may be an  electronic transcription/translation of spoken language to printed text using the Dragon Dictation System.

## 2024-06-24 NOTE — DISCHARGE PLACEMENT REQUEST
"Shruthi Hamilton (89 y.o. Female)       Date of Birth   06/10/1928    Social Security Number       Address   [address] [place] [address] [place] [facility]    Home Phone   [phone]    MRN   [id number]       Mandaeism   None    Marital Status                               Admission Date   6/17/24    Admission Type   Emergency    Admitting Provider   Tricia Roman MD    Attending Provider   Tricia Roman MD    Department, Room/Bed   [facility] 3C MEDICAL INPATIENT, 364/1       Discharge Date       Discharge Disposition       Discharge Destination                                 Attending Provider: Tricia Roman MD    Allergies: Penicillins, Codeine, Latex    Isolation: None   Infection: None   Code Status: No CPR    Ht: 157.5 cm (62\")   Wt: 38.5 kg (84 lb 14 oz)    Admission Cmt: None   Principal Problem: Recurrent pleural effusion [J90]                   Active Insurance as of 6/17/2024       Primary Coverage       Payor Plan Insurance Group Employer/Plan Group    MEDICARE MEDICARE A & B        Payor Plan Address Payor Plan Phone Number Payor Plan Fax Number Effective Dates    [address] [phone]  6/1/1993 - None Entered    [facility] [address]         Subscriber Name Subscriber Birth Date Member ID       SHRUTHI HAMILTON R 6/10/1928 [id number]               Secondary Coverage       Payor Plan Insurance Group Employer/Plan Group    ANTHEM BLUE CROSS ANTHEM BLUE CROSS BLUE SHIELD PPO [id number]       Payor Plan Address Payor Plan Phone Number Payor Plan Fax Number Effective Dates    [address] [phone]  1/1/2021 - None Entered    [facility] [id number]         Subscriber Name Subscriber Birth Date Member ID       SHRUTHI HAMILTON R 6/10/1928 [id number]                     Emergency Contacts        (Rel.) Home Phone Work Phone Mobile Phone    Shea Nam (Daughter) [phone] -- [phone]    Inessa Huang (Grandchild) [phone] -- [phone]    [email or web address] (Son) " [phone] -- [phone]

## 2024-06-24 NOTE — PLAN OF CARE
Goal Outcome Evaluation:              Outcome Evaluation: Pt q2 turn continues.  Pt without issues.  Will continue to monitor.

## 2024-06-24 NOTE — THERAPY TREATMENT NOTE
"Subjective: Pt agreeable to therapeutic plan of care.    Objective:     Bed mobility - Min-A  Transfers - Min-A and with rolling walker  Ambulation - 40 feet x 2CGA and with rolling walker    Therapeutic Exercise - 10 Reps Bilaterally AROM supported sitting / chair ; needed rest breaks between sets due to SOA    Vitals: WNL, sats dropped to 87% on room air after gait bout and noted with inc work of breathing.     Pain: 0 VAS   Location: na    Education: Provided education on the importance of mobility in the acute care setting, Verbal/Tactile Cues, Transfer Training, Gait Training, and Energy conservation strategies    Assessment: Shruthi Amin presents with functional mobility impairments which indicate the need for skilled intervention. Patient tolerated inc in gait distance , 40 ft x 2 with CGA. Did ambulate on room air with drop in spO2 to 87% thus supplemental O2 re applied. Patient able to perform Ue/Le exercises with emphasis on chest expansion.  Tolerating session today without incident. Will continue to follow and progress as tolerated. Short SNF will be beneficial prior to home for strength training.     Plan/Recommendations:   If medically appropriate, Moderate Intensity Therapy recommended post-acute care. This is recommended as therapy feels the patient would require 3-4 days per week and wouldn't tolerate \"3 hour daily\" rehab intensity. SNF would be the preferred choice. If the patient does not agree to SNF, arrange HH or OP depending on home bound status. If patient is medically complex, consider LTACH. Pt requires no DME at discharge.     Pt desires Skilled Rehab placement at discharge. Pt cooperative; agreeable to therapeutic recommendations and plan of care.         Basic Mobility 6-click:  Rollin = Total, A lot = 2, A little = 3; 4 = None  Supine>Sit:   1 = Total, A lot = 2, A little = 3; 4 = None   Sit>Stand with arms:  1 = Total, A lot = 2, A little = 3; 4 = None  Bed>Chair:   1 = " Total, A lot = 2, A little = 3; 4 = None  Ambulate in room:  1 = Total, A lot = 2, A little = 3; 4 = None  3-5 Steps with railin = Total, A lot = 2, A little = 3; 4 = None  Score: 18      Post-Tx Position: Up in Chair, Alarms activated, and Call light and personal items within reach  PPE: gloves and surgical mask

## 2024-06-24 NOTE — SIGNIFICANT NOTE
Will hold on discharge today. Family is now requesting inpatient and further cardiac work up. Will ask Dr. Vital to see    Josie MARQUEZ

## 2024-06-24 NOTE — PLAN OF CARE
Assessment: Shruthi Amin presents with functional mobility impairments which indicate the need for skilled intervention. Patient tolerated inc in gait distance , 40 ft x 2 with CGA. Did ambulate on room air with drop in spO2 to 87% thus supplemental O2 re applied. Patient able to perform Ue/Le exercises with emphasis on chest expansion.  Tolerating session today without incident. Will continue to follow and progress as tolerated. Short SNF will be beneficial prior to home for strength training.

## 2024-06-24 NOTE — CONSULTS
[facility] [place]        Subjective:     Encounter Date:06/17/2024      Patient ID: Shruthi Amin is a 89 y.o. female.    Chief Complaint: shortness of breath, recurrent pleural effusion    Referring Physician: Josie Solano  Seen and examined agree with narrative as discussed with nurse practitioner after face-to-face counter scruff findings below verified by me for accuracy, greater than 50% of total encounter time and medical decision making performed by me    HPI:  Shruthi Amin is a 89 y.o. female who presents with shortness of breath and hypoxia.  Ms. Amin is a patient of Dr. Vital. 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     Ms. Amin has experienced recurrent pleural effusions requiring thoracentesis.  Cardiology has been asked to evaluate medications in hopes of reduction of pleural effusion in future. She was on as needed Lasix at home. She lives with family and they convey she does not eat or drink much. She is on as needed home O2. She completes basic ADLs with walker. It sounds as if functional capacity has declined some over the last few months. She was  previously on digoxin at home but her levels were elevated on admission and she was bradycardic therefore this has been stopped appropriately, heart rates in the 50s with QT prolongation. She was also on toprol XL 25mg BID And this has been reduced to 12.5mg BID due to bradycardia but held acutely until heart rate increases and borderline blood pressures.     Review of systems otherwise negative x 14 point review of systems except  as mentioned above  Historical data copied forward from her previous encounters in EMR is unchanged    EKG atrial fibrillation slow ventricular response, QT prolongation, possible old anteroseptal infarct  Past Medical History:   Diagnosis Date    Atrial fibrillation     Coronary artery disease     Atrial fibrillation    GERD (gastroesophageal reflux disease)     Glaucoma     Hiatal hernia with gastroesophageal reflux     History of osteoporotic pathological fracture     Right intertroch (2019)    Hx of compression fracture of spine     T12 and L1(); T6 (2022)    Hyperlipidemia     Hypertension     Osteoarthritis     Osteoporosis     on prolia(3/21/22)    Stroke     off and on dizziness from the stroke.       Past Surgical History:   Procedure Laterality Date    BACK SURGERY      kyphoplasty    EYE SURGERY      cataract surgery    FIXATION KYPHOPLASTY LUMBAR SPINE      HIP TROCHANTERIC NAILING WITH INTRAMEDULLARY HIP SCREW Right 2019    Procedure: HIP TROCHANTERIC NAILING SHORT WITH INTRAMEDULLARY HIP SCREW;  Surgeon: Edward Centeno MD;  Location: [facility] MAIN OR;  Service: Orthopedics    RECTAL SURGERY      x 3       Family History   Problem Relation Age of Onset    Heart disease Mother     Hypertension Mother     Osteoporosis Mother     Cancer Father         colon cancer    Osteoporosis Father     Cancer Sister         lung    Cancer Brother         colon cancer       Social History     Socioeconomic History    Marital status:    Tobacco Use    Smoking status: Former     Current packs/day: 0.00     Types: Cigarettes     Quit date:      Years since quittin.5     Passive exposure: Past    Smokeless tobacco: Never   Vaping Use    Vaping status: Never Used   Substance and Sexual Activity    Alcohol use: No    Drug use: No    Sexual activity: Defer         Allergies   Allergen Reactions    Penicillins Hives    Codeine Nausea And Vomiting    Latex Rash       Current Medications:  "  Scheduled Meds:docusate sodium, 100 mg, Oral, Nightly  furosemide, 20 mg, Oral, Daily  guaiFENesin, 1,200 mg, Oral, Q12H  latanoprost, 1 drop, Right Eye, Nightly  metoclopramide, 5 mg, Oral, 4x Daily AC & at Bedtime  metoprolol succinate XL, 12.5 mg, Oral, Q12H  mirtazapine, 15 mg, Oral, Nightly  sodium chloride, 10 mL, Intravenous, Q12H  warfarin, 4 mg, Oral, Once      Continuous Infusions:Pharmacy to dose warfarin,         Review of Systems   Constitutional: Negative for chills, diaphoresis and malaise/fatigue.   Cardiovascular:  Positive for dyspnea on exertion. Negative for chest pain, irregular heartbeat, leg swelling, near-syncope, orthopnea, palpitations, paroxysmal nocturnal dyspnea and syncope.   Respiratory:  Negative for cough, shortness of breath, sleep disturbances due to breathing and sputum production.    Gastrointestinal:  Negative for change in bowel habit.   Genitourinary:  Negative for urgency.   Neurological:  Negative for dizziness and headaches.   Psychiatric/Behavioral:  Negative for altered mental status.             Objective:         /78 (BP Location: Right arm, Patient Position: Lying)   Pulse 82   Temp 97.7 °F (36.5 °C) (Oral)   Resp 16   Ht 157.5 cm (62\")   Wt 38.5 kg (84 lb 14 oz)   SpO2 100%   BMI 15.52 kg/m²     Physical Exam:  General Appearance:    Somewhat drowsy, frail, elderly, thin                                Head: Atraumatic, normocephalic, PERRLA               Neck:   supple, mild JVD   Lungs:     Supplemental O2, dim bilat bases    Heart:    irregular rhythm and normal rate, normal S1 and S2, murmur   Abdomen:     Normal bowel sounds, no masses, no organomegaly, soft  nontender, nondistended, no guarding, no rebound  tenderness   Extremities:   Moves all extremities well, no edema, no cyanosis, no  redness   Pulses:   Pulses palpable and equal bilaterally   Skin:   No bleeding, bruising or rash   Neurologic:   Will Awaken, alert, oriented x3         Scribed " "findings above        ASCVD Risk Score::  The ASCVD Risk score (Dania SCOTT, et al., 2019) failed to calculate.      Lab Review:     Results from last 7 days   Lab Units 06/24/24  0358 06/23/24  0015 06/18/24  0001 06/17/24  1543   SODIUM mmol/L 132* 133*   < > 141   POTASSIUM mmol/L 4.7 5.1   < > 4.3   CHLORIDE mmol/L 94* 96*   < > 103   CO2 mmol/L 32.0* 31.4*   < > 30.9*   BUN mg/dL 17 18   < > 16   CREATININE mg/dL 0.49* 0.44*   < > 0.48*   GLUCOSE mg/dL 92 94   < > 93   CALCIUM mg/dL 8.8 8.2   < > 8.8   AST (SGOT) U/L  --   --   --  22   ALT (SGPT) U/L  --   --   --  12    < > = values in this interval not displayed.     Results from last 7 days   Lab Units 06/17/24  1543   HSTROP T ng/L 20*     Results from last 7 days   Lab Units 06/24/24  0358 06/23/24  0015   WBC 10*3/mm3 9.64 11.53*   HEMOGLOBIN g/dL 11.2* 11.4*   HEMATOCRIT % 34.9 35.2   PLATELETS 10*3/mm3 226 193     Results from last 7 days   Lab Units 06/24/24  0358 06/23/24  0015   INR  1.69* 1.80*               Invalid input(s): \"LDLCALC\"  Results from last 7 days   Lab Units 06/17/24  1543   PROBNP pg/mL 3,240.0*     Results from last 7 days   Lab Units 06/17/24  1543   TSH uIU/mL 2.160       Recent Radiology:  Imaging Results (Most Recent)       Procedure Component Value Units Date/Time    XR Chest 1 View [[id number]] Collected: 06/24/24 0737     Updated: 06/24/24 0747    Narrative:      XR CHEST 1 VW    Date of Exam: 6/24/2024 5:16 AM EDT    Indication: Right pleural effusion    Comparison: 6/23/2024 and prior    Findings:  Study limited by overlying support and monitoring apparatus. Heart size is enlarged and pulmonary vascularity is congested. Small to moderate right-sided pleural effusion is similar to slightly increased given differences in technique. Previously noted   chest tube is not identified. There appears to be a small right apical pneumothorax with a similar to slightly decreased prominence from the comparison. Associated peripheral " opacity at the right apex is noted. Retrocardiac opacity at the left base   noted. Osseous structures are stable      Impression:      Impression:    1. Small to moderate pleural effusion on the right similar to slightly increased in the comparison. Small right apical pneumothorax slightly decreased in the comparison    2. Increased retrocardiac opacity compared to the prior study. Large hiatal hernia not well visualized on radiographs.      Electronically Signed: Real Mendez MD    6/24/2024 7:41 AM EDT    Workstation ID: [id number]    XR Chest 1 View [[id number]] Collected: 06/23/24 0838     Updated: 06/23/24 0842    Narrative:      XR CHEST 1 VW    Date of Exam: 6/23/2024 4:59 AM EDT    Indication: Right pleural effusion    Comparison: Chest radiograph 6/22/2024, chest CT 2/25/2024    Findings:  Cardiomegaly. Large hiatal hernia probably containing the entire stomach. Right pleural drainage catheter noted with stable appearing small bilateral pleural effusions. Underlying bibasilar airspace opacities favor atelectasis however infection may have   a similar appearance. Chronic appearing interstitial changes similar to previous comparison. Small right apical pneumothorax measuring up to 8 mm stable from prior. Aortic atherosclerotic disease. Degenerative related osseous changes with previous   thoracic augmentation.      Impression:      Impression:  Radiographically stable appearance of the chest since 6/22/2024, including the small right hydropneumothorax. Drainage catheter in stable position.      Electronically Signed: Ward Anderson MD    6/23/2024 8:40 AM EDT    Workstation ID: [id number]    XR Chest 1 View [[id number]] Collected: 06/22/24 1130     Updated: 06/22/24 1134    Narrative:      XR CHEST 1 VW    Date of Exam: 6/22/2024 5:46 AM EDT    Indication: Right pleural effusion    Comparison: 6/21/2024    Findings:  There is a stable chest tube overlying the right lung base. Persistent small right apical  pneumothorax measuring 13 mm. Unchanged cardiomegaly. No discrete pneumothorax on the left. There is persistent bibasilar airspace disease not significantly changed   with small bilateral pleural effusions. Lower thoracic kyphoplasty. Areas of parenchymal scarring unchanged.      Impression:      Impression:  1. Stable small right apical pneumothorax with chest tube overlying the right lung base.  2. No change in bibasilar airspace disease and small bilateral pleural effusions.      Electronically Signed: Elver Machuca MD    6/22/2024 11:31 AM EDT    Workstation ID: [id number]    XR Chest 1 View [[id number]] Collected: 06/21/24 0722     Updated: 06/21/24 0726    Narrative:      XR CHEST 1 VW    Date of Exam: 6/21/2024 4:20 AM EDT    Indication: Right pleural effusion    Comparison: 6/20/2024    Findings:  Right basilar pleural catheter remains in place. Right apical pneumothorax redemonstrated, appearing slightly larger with 8 mm separation of the lung from the chest wall. Stable enlargement of the cardiac silhouette. Stable small right pleural effusion   with right basilar atelectasis. Stable left basilar opacity compatible with airspace disease and probable effusion      Impression:      Impression:  There is persistent right apical pneumothorax which appears slightly larger. There is otherwise stable appearance of the chest with small right pleural effusion and basilar atelectasis, and left basilar opacity likely representing combination of airspace   disease and effusion      Electronically Signed: Alex Juarez    6/21/2024 7:24 AM EDT    Workstation ID: [id number]    XR Chest 1 View [[id number]] Collected: 06/20/24 0715     Updated: 06/20/24 0719    Narrative:      XR CHEST 1 VW    Date of Exam: 6/20/2024 3:50 AM EDT    Indication: Right pleural effusion, right chest tube    Comparison: 6/19/2024.    Findings:  The right pleural effusion has slightly reduced in size. There is a stable right basilar pigtail chest tube  in place. There is a new small right apical pneumothorax measuring 4 mm. On the left side, there is increased density in the left lower chest   which may relate to a small pleural effusion with atelectasis. The exam is somewhat difficult to interpret as the patient has a large hiatal hernia. The heart is enlarged. The pulmonary vascular markings are increased and unchanged.      Impression:      Impression:    1. Right pleural effusion has slightly reduced in size. There is a stable right basilar pigtail chest tube. There is a new small right apical pneumothorax measuring 4 mm.  2. Otherwise no interval change from yesterday's exam with abnormalities as described.      Electronically Signed: Emerson Leal MD    6/20/2024 7:17 AM EDT    Workstation ID: [id number]    XR Chest 1 View [[id number]] Collected: 06/19/24 1316     Updated: 06/19/24 1320    Narrative:      XR CHEST 1 VW    Date of Exam: 6/19/2024 12:50 PM EDT    Indication: Right pleural effusion, status post chest tube placement    Comparison: 6/17/2024.    Findings:  The patient is status post placement of a right basilar pigtail chest tube. There is a persistent moderate sized right pleural effusion. There is no pneumothorax. There is presumed compressive atelectasis on the right lung base and atelectasis in the   right perihilar region. On the left side, the patient probably has a smaller pleural effusion. There is a large hiatal hernia. The heart is enlarged. There may be underlying pulmonary vascular congestion. There are chronic age-related changes involving   the bony thorax and thoracic aorta.      Impression:      Impression:    1. Interval placement of a right-sided chest tube. There continues to be a moderate right pleural effusion. There is no pneumothorax.  2. Additional abnormalities as described above and unchanged from the previous exam.      Electronically Signed: Emerson Leal MD    6/19/2024 1:18 PM EDT    Workstation ID: [id number]    XR Chest 1  View [[id number]] Collected: 06/17/24 1507     Updated: 06/17/24 1511    Narrative:      XR CHEST 1 VW    Date of Exam: 6/17/2024 3:00 PM EDT    Indication: sob    Comparison: 4/19/2024    Findings:  Heart is enlarged. There is a large hiatal hernia with likely intrathoracic stomach. No pneumothorax. Moderate right and small left pleural effusions. Bibasilar airspace disease may relate to compressive atelectasis or pneumonia. Central pulmonary   vascular congestion. Negative for pneumothorax. Kyphoplasty noted in the lower thoracic spine near the thoracolumbar junction.      Impression:      Impression:  1. Moderate right and small left pleural effusions with bibasilar airspace disease which may relate to compressive atelectasis and/or pneumonia.  2. Cardiomegaly and central pulmonary vascular congestion.  3. Large hiatal hernia, unchanged.      Electronically Signed: Elver Machuca MD    6/17/2024 3:09 PM EDT    Workstation ID: [id number]              ECHOCARDIOGRAM:    Results for orders placed during the hospital encounter of 02/23/24    Adult Transesophageal Echo (JEFF) W/ Cont if Necessary Per Protocol    Interpretation Summary    Left ventricular ejection fraction appears to be 56 - 60%.    Severe mitral valve regurgitation is present.    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  Discussed optimizing medications, will start daily lasix 20mg-- patient does not have much intake at baseline  Labwork in 1 week after discharge  Plan for CXR in 2 weeks per pulm. Per pulm. Not thoracentesis candidate again, if recurrent effusion occurs, would need pleurx catheter  Blood pressure stable, HR afib rates controlled but bradycardic, continue beta blocker with toprol XL 12.5mg bid, hold until tomorrow  A/c with coumadin goal INR is 2-3  Will plan outpt cardiology follow up after CXR and getting labwork in 1 week  Recommend rehab as that is what physical therapy Is now recommending provided patient is agreeable  Discussed fluid restriction, she is high risk for readmission and re-accumulation of pleural effusion  Would recommend goals of care discussion in further possible palliative evaluation should readmissions/fluid re-accumulation occur     Discontinue digoxin indefinitely    Further recommendation follow findings and clinical course  Theodore Vital MD, PhD           Kiesha Oviedo, [place]  06/24/24  14:55 EDT

## 2024-06-24 NOTE — PLAN OF CARE
Goal Outcome Evaluation:      Pt alert and orient x 3. Pt able to make needs known verbally and use call light. Pt compliant with plan of care at this time.

## 2024-06-24 NOTE — DISCHARGE SUMMARY
Date of Discharge:  6/24/2024    Discharge Diagnosis:     Pleural effusion  Hypoxia  Bradycardia  Mild digoxin toxicity  Large hiatal hernia  Constipation  Underweight  Glaucoma  Proximal atrial fibrillation  Hypertension  Dig toxicity  Anemia  Stage II pressure injury on sacrum    Presenting Problem/History of Present Illness  Active Hospital Problems    Diagnosis  POA    **Recurrent pleural effusion [J90]  Yes    Bradycardia [R00.1]  Yes    Hypoxia [R09.02]  Yes    Atrial fibrillation [I48.91]  Yes      Resolved Hospital Problems   No resolved problems to display.          Hospital Course    Patient is a 89 y.o. female presented with past medical history of atrial fibrillation on warfarin, recurrent pleural effusion,  CAD, GERD, hypertension, constipation and large hiatal hernia.  Presented to the emergency department on 6/17 with complaints of shortness of breath and hypoxia; reported oxygenation in 79 the 80s on room air.  She had a moderate right pleural effusion that required chest tube.  Pulmonary follow ed for chest tube management and there further recommendation will include most likely long-term management with thoracentesis when she becomes symptomatic.  They had considered Pleurx catheter but the need for warfarin and the bleeding from the insertion site makes it difficult to maintain a chronic indwelling catheter.  On this admission she was also found to be hypotensive and bradycardic with a little dig toxicity.  Home medications for blood pressure and heart rate were held until she stabilized.  She was restarted on beta-blocker and lowered but will continue to hold dig as rate is controlled.  She will need to follow-up with pulmonology and get cxr  in 2 weeks. She will also need to follow up with PCP in 2 weeks.    Procedures Performed         Consults:   Consults       Date and Time Order Name Status Description    6/17/2024  4:52 PM Inpatient Pulmonology Consult Completed     6/17/2024  4:48 PM  Hospitalist (on-call MD unless specified)              Pertinent Test Results:    Lab Results (most recent)       Procedure Component Value Units Date/Time    Body Fluid Culture - Body Fluid, Pleural Cavity [[id number]] Collected: 06/19/24 1057    Specimen: Body Fluid from Pleural Cavity Updated: 06/24/24 0617     Body Fluid Culture No growth at 5 days     Gram Stain Rare (1+) WBCs per low power field      No organisms seen    Basic Metabolic Panel [[id number]]  (Abnormal) Collected: 06/24/24 0358    Specimen: Blood Updated: 06/24/24 0523     Glucose 92 mg/dL      BUN 17 mg/dL      Creatinine 0.49 mg/dL      Sodium 132 mmol/L      Potassium 4.7 mmol/L      Chloride 94 mmol/L      CO2 32.0 mmol/L      Calcium 8.8 mg/dL      BUN/Creatinine Ratio 34.7     Anion Gap 6.0 mmol/L      eGFR 86.4 mL/min/1.73     Narrative:      GFR Normal >60  Chronic Kidney Disease <60  Kidney Failure <15    The GFR formula is only valid for adults with stable renal function between ages 18 and 70.    Protime-INR [[id number]]  (Abnormal) Collected: 06/24/24 0358    Specimen: Blood Updated: 06/24/24 0508     Protime 17.7 Seconds      INR 1.69    CBC & Differential [[id number]]  (Abnormal) Collected: 06/24/24 0358    Specimen: Blood Updated: 06/24/24 0503    Narrative:      The following orders were created for panel order CBC & Differential.  Procedure                               Abnormality         Status                     ---------                               -----------         ------                     CBC Auto Differential[[id number]]        Abnormal            Final result                 Please view results for these tests on the individual orders.    CBC Auto Differential [[id number]]  (Abnormal) Collected: 06/24/24 0358    Specimen: Blood Updated: 06/24/24 0503     WBC 9.64 10*3/mm3      RBC 3.35 10*6/mm3      Hemoglobin 11.2 g/dL      Hematocrit 34.9 %      .2 fL      MCH 33.4 pg      MCHC 32.1 g/dL      RDW 14.4 %       RDW-SD 54.8 fl      MPV 11.2 fL      Platelets 226 10*3/mm3      Neutrophil % 55.1 %      Lymphocyte % 20.9 %      Monocyte % 17.5 %      Eosinophil % 3.6 %      Basophil % 0.8 %      Immature Grans % 2.1 %      Neutrophils, Absolute 5.31 10*3/mm3      Lymphocytes, Absolute 2.01 10*3/mm3      Monocytes, Absolute 1.69 10*3/mm3      Eosinophils, Absolute 0.35 10*3/mm3      Basophils, Absolute 0.08 10*3/mm3      Immature Grans, Absolute 0.20 10*3/mm3      nRBC 0.0 /100 WBC     Basic Metabolic Panel [[id number]]  (Abnormal) Collected: 06/23/24 0015    Specimen: Blood Updated: 06/23/24 0114     Glucose 94 mg/dL      BUN 18 mg/dL      Creatinine 0.44 mg/dL      Sodium 133 mmol/L      Potassium 5.1 mmol/L      Chloride 96 mmol/L      CO2 31.4 mmol/L      Calcium 8.2 mg/dL      BUN/Creatinine Ratio 40.9     Anion Gap 5.6 mmol/L      eGFR 88.6 mL/min/1.73     Narrative:      GFR Normal >60  Chronic Kidney Disease <60  Kidney Failure <15    The GFR formula is only valid for adults with stable renal function between ages 18 and 70.    Protime-INR [[id number]]  (Abnormal) Collected: 06/23/24 0015    Specimen: Blood Updated: 06/23/24 0103     Protime 18.8 Seconds      INR 1.80    CBC & Differential [[id number]]  (Abnormal) Collected: 06/23/24 0015    Specimen: Blood Updated: 06/23/24 0053    Narrative:      The following orders were created for panel order CBC & Differential.  Procedure                               Abnormality         Status                     ---------                               -----------         ------                     CBC Auto Differential[[id number]]        Abnormal            Final result                 Please view results for these tests on the individual orders.    CBC Auto Differential [[id number]]  (Abnormal) Collected: 06/23/24 0015    Specimen: Blood Updated: 06/23/24 0053     WBC 11.53 10*3/mm3      RBC 3.39 10*6/mm3      Hemoglobin 11.4 g/dL      Hematocrit 35.2 %      .8 fL      MCH  "33.6 pg      MCHC 32.4 g/dL      RDW 14.5 %      RDW-SD 55.0 fl      MPV 11.2 fL      Platelets 193 10*3/mm3      Neutrophil % 58.1 %      Lymphocyte % 17.9 %      Monocyte % 17.9 %      Eosinophil % 3.1 %      Basophil % 0.7 %      Immature Grans % 2.3 %      Neutrophils, Absolute 6.70 10*3/mm3      Lymphocytes, Absolute 2.06 10*3/mm3      Monocytes, Absolute 2.06 10*3/mm3      Eosinophils, Absolute 0.36 10*3/mm3      Basophils, Absolute 0.08 10*3/mm3      Immature Grans, Absolute 0.27 10*3/mm3      nRBC 0.2 /100 WBC     Digoxin Level [[id number]]  (Abnormal) Collected: 06/21/24 0515    Specimen: Blood from Arm, Right Updated: 06/21/24 0551     Digoxin 0.47 ng/mL     Non-gynecologic Cytology [[id number]] Collected: 06/19/24 1101    Specimen: Pleural Fluid from Pleural Cavity Updated: 06/20/24 1250     Case Report --     Medical Cytology Report                           Case: [id number]                                  Authorizing Provider:  Lupe Curtis MD         Collected:           06/19/2024 11:01 AM          Ordering Location:     [address]    Received:            06/19/2024 12:27 PM                                 MEDICAL INPATIENT                                                            Pathologist:           Orestes Fair MD                                                             Specimen:    Pleural Cavity                                                                              Final Diagnosis --     Pleural fluid, smears and cytospin preparation:    Abundant reactive mesothelial cells, histiocytes and scattered acute and chronic inflammatory cells    Negative for malignant cells    JPR       Gross Description --     1. Pleural Cavity.  Received in carbowax and designated \"pleural fluid\" are 20 mL of clear, green fluid. Particulate matter is not present. This specimen is processed as per protocol.          Lactate Dehydrogenase, Body Fluid - Body Fluid, Pleural Cavity [[id number]] " Collected: 06/19/24 1057    Specimen: Body Fluid from Pleural Cavity Updated: 06/19/24 1618     Lactate Dehydrogenase (LD), Fluid 74 U/L     Narrative:      No Reference Ranges Established.    Serous fluid LDH greater than 60 percent of the serum LDH or serous fluid LDH two-thirds of the upper limit of normal for serum LDH suggests the fluid is an exudate.     1. Pleural TP/Serum TP >0.5  2. Pleural LD/Serum LD >0.6  3. Pleural LD >2/3 of the upper limit of normal for serum LDH    This test was developed, it performance characteristics determined and judged suitable for clinical purposes by [facility].  It has not been cleared or approved by the FDA.  The laboratory is regulated under CLIA as qualified to perform high-complexity testing.     Glucose, Body Fluid - Pleural Fluid, Pleural Cavity [[id number]] Collected: 06/19/24 1101    Specimen: Pleural Fluid from Pleural Cavity Updated: 06/19/24 1608     Glucose, Fluid 103 mg/dL     Narrative:      No Reference Ranges Established.    Serous fluid glucose less than 60 mg/dL or less than 30 mg/dL below serum glucose suggests an infectious or malignant exudate.     This test was developed, it performance characteristics determined and judged suitable for clinical purposes by [facility].  It has not been cleared or approved by the FDA.  The laboratory is regulated under CLIA as qualified to perform high-complexity testing.     Protein, Body Fluid - Pleural Fluid, Pleural Cavity [[id number]] Collected: 06/19/24 1101    Specimen: Pleural Fluid from Pleural Cavity Updated: 06/19/24 1608     Protein, Total, Fluid 1.9 g/dL     Narrative:      No Reference Ranges Established.    A serous fluid total fluid (TP) greater than 50 percent of the serum TP suggests the fluid is an exudate.      1. Pleural TP/Serum TP >0.5  2. Pleural LD/Serum LD >0.6  3. Pleural LD >2/3 of the upper limit of normal for serum LDH    This test was  developed, it performance characteristics determined and judged suitable for clinical purposes by [facility].  It has not been cleared or approved by the FDA.  The laboratory is regulated under CLIA as qualified to perform high-complexity testing.     Body Fluid Cell Count With Differential - Body Fluid, Pleural Cavity [[id number]] Collected: 06/19/24 1057    Specimen: Body Fluid from Pleural Cavity Updated: 06/19/24 1255    Narrative:      The following orders were created for panel order Body Fluid Cell Count With Differential - Body Fluid, Pleural Cavity.  Procedure                               Abnormality         Status                     ---------                               -----------         ------                     Body fluid cell count - ...[[id number]]                      Final result               Body fluid differential ...[[id number]]                      Final result                 Please view results for these tests on the individual orders.    Body fluid differential - Body Fluid, Pleural Cavity [[id number]] Collected: 06/19/24 1057    Specimen: Body Fluid from Pleural Cavity Updated: 06/19/24 1255     Neutrophils, Fluid 2 %      Lymphocytes, Fluid 89 %      Mesothelial Cells, Fluid 9 %     Narrative:      Concentrated Smear by Cytocentrifuge Prep.    pH, Body Fluid - Body Fluid, Pleural Cavity [[id number]]  (Normal) Collected: 06/19/24 1057    Specimen: Body Fluid from Pleural Cavity Updated: 06/19/24 1210     pH, Fluid 7.50    Body fluid cell count - Body Fluid, Pleural Cavity [[id number]] Collected: 06/19/24 1057    Specimen: Body Fluid from Pleural Cavity Updated: 06/19/24 1202     Color, Fluid Yellow     Appearance, Fluid Clear     Nucleated Cells, Fluid 166 /mm3     Narrative:      No reference range established. Physician to interpret results with clinical findings.    Folate [[id number]]  (Normal) Collected: 06/19/24 0000    Specimen: Blood from Arm, Right Updated:  06/19/24 1104     Folate 10.70 ng/mL     Narrative:      Results may be falsely increased if patient taking Biotin.      Vitamin B12 [[id number]]  (Abnormal) Collected: 06/19/24 0000    Specimen: Blood from Arm, Right Updated: 06/19/24 1104     Vitamin B-12 >2,000 pg/mL     Narrative:      Results may be falsely increased if patient taking Biotin.      Reticulocytes [[id number]]  (Abnormal) Collected: 06/18/24 0001    Specimen: Blood from Arm, Left Updated: 06/18/24 1241     Reticulocyte % 3.60 %      Reticulocyte Absolute 0.1033 10*6/mm3     Iron Profile [[id number]]  (Abnormal) Collected: 06/18/24 0001    Specimen: Blood from Arm, Left Updated: 06/18/24 1226     Iron 105 mcg/dL      Iron Saturation (TSAT) 55 %      Transferrin 128 mg/dL      TIBC 191 mcg/dL     TSH [[id number]]  (Normal) Collected: 06/17/24 1543    Specimen: Blood from Arm, Left Updated: 06/17/24 1720     TSH 2.160 uIU/mL     T4, Free [[id number]]  (Normal) Collected: 06/17/24 1543    Specimen: Blood from Arm, Left Updated: 06/17/24 1720     Free T4 1.36 ng/dL     Digoxin Level [[id number]]  (Abnormal) Collected: 06/17/24 1543    Specimen: Blood from Arm, Left Updated: 06/17/24 1620     Digoxin 1.45 ng/mL     Comprehensive Metabolic Panel [[id number]]  (Abnormal) Collected: 06/17/24 1543    Specimen: Blood from Arm, Left Updated: 06/17/24 1620     Glucose 93 mg/dL      BUN 16 mg/dL      Creatinine 0.48 mg/dL      Sodium 141 mmol/L      Potassium 4.3 mmol/L      Chloride 103 mmol/L      CO2 30.9 mmol/L      Calcium 8.8 mg/dL      Total Protein 5.8 g/dL      Albumin 3.6 g/dL      ALT (SGPT) 12 U/L      AST (SGOT) 22 U/L      Alkaline Phosphatase 79 U/L      Total Bilirubin 0.8 mg/dL      Globulin 2.2 gm/dL      A/G Ratio 1.6 g/dL      BUN/Creatinine Ratio 33.3     Anion Gap 7.1 mmol/L      eGFR 86.8 mL/min/1.73     Narrative:      GFR Normal >60  Chronic Kidney Disease <60  Kidney Failure <15    The GFR formula is only valid for adults with stable renal  function between ages 18 and 70.    Single High Sensitivity Troponin T [[id number]]  (Abnormal) Collected: 06/17/24 1543    Specimen: Blood from Arm, Left Updated: 06/17/24 1620     HS Troponin T 20 ng/L     Narrative:      High Sensitive Troponin T Reference Range:  <14.0 ng/L- Negative Female for AMI  <22.0 ng/L- Negative Male for AMI  >=14 - Abnormal Female indicating possible myocardial injury.  >=22 - Abnormal Male indicating possible myocardial injury.   Clinicians would have to utilize clinical acumen, EKG, Troponin, and serial changes to determine if it is an Acute Myocardial Infarction or myocardial injury due to an underlying chronic condition.         BNP [[id number]]  (Abnormal) Collected: 06/17/24 1543    Specimen: Blood from Arm, Left Updated: 06/17/24 1620     proBNP 3,240.0 pg/mL     Narrative:      This assay is used as an aid in the diagnosis of individuals suspected of having heart failure. It can be used as an aid in the diagnosis of acute decompensated heart failure (ADHF) in patients presenting with signs and symptoms of ADHF to the emergency department (ED). In addition, NT-proBNP of <300 pg/mL indicates ADHF is not likely.    Age Range Result Interpretation  NT-proBNP Concentration (pg/mL:      <50             Positive            >450                   Gray                 300-450                    Negative             <300    50-75           Positive            >900                  Gray                300-900                  Negative            <300      >75             Positive            >1800                  Gray                300-1800                  Negative            <300    Blood Gas, Arterial - [[id number]]  (Abnormal) Collected: 06/17/24 1600    Specimen: Arterial Blood Updated: 06/17/24 1602     Site Right Radial     Gulshan's Test Positive     pH, Arterial 7.453 pH units      pCO2, Arterial 43.2 mm Hg      pO2, Arterial 96.2 mm Hg      HCO3, Arterial 30.2 mmol/L      Base  Excess, Arterial 5.7 mmol/L      Comment: Serial Number: [id number]:  [id number]        O2 Saturation, Arterial 97.8 %      CO2 Content 31.6 mmol/L      Barometric Pressure for Blood Gas --     Comment: N/A        Modality Cannula     FIO2 28 %      Hemodilution No     PO2/FIO2 344             Results for orders placed during the hospital encounter of 02/23/24    Adult Transesophageal Echo (JEFF) W/ Cont if Necessary Per Protocol    Interpretation Summary    Left ventricular ejection fraction appears to be 56 - 60%.    Severe mitral valve regurgitation is present.    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    Clinical correlation required       Condition on Discharge:  Stable    Vital Signs  Temp:  [97 °F (36.1 °C)-98.3 °F (36.8 °C)] 97 °F (36.1 °C)  Heart Rate:  [74-90] 89  Resp:  [13-18] 18  BP: (109-125)/(53-73) 125/71      Physical Exam  Vitals reviewed.   Constitutional:       Appearance: She is not ill-appearing.   HENT:      Head: Normocephalic and atraumatic.      Right Ear: External ear normal.      Left Ear: External ear normal.      Nose: Nose normal.      Mouth/Throat:      Mouth: Mucous membranes are moist.   Eyes:      General:         Right eye: No discharge.         Left eye: No discharge.   Cardiovascular:      Rate and Rhythm: Normal rate and regular rhythm.      Pulses: Normal pulses.      Heart sounds: Normal heart sounds.   Pulmonary:      Effort: Pulmonary effort is normal.      Breath sounds: Normal breath sounds.   Abdominal:      General: Bowel sounds are normal.      Palpations: Abdomen is soft.   Musculoskeletal:         General: Normal range of motion.      Cervical back: Normal range of motion.   Skin:     General: Skin is warm and dry.      Coloration: Skin is pale.   Neurological:      Mental Status: She is alert and oriented to person, place, and  time.   Psychiatric:         Behavior: Behavior normal.              Discharge Disposition  Home or Self Care    Discharge Medications     Discharge Medications        Changes to Medications        Instructions Start Date   metoprolol succinate XL 25 MG 24 hr tablet  Commonly known as: TOPROL-XL  What changed:   how much to take  when to take this   12.5 mg, Oral, Every 12 Hours Scheduled             Continue These Medications        Instructions Start Date   cholecalciferol 10 MCG (400 UNIT) tablet  Commonly known as: VITAMIN D3   400 Units, Oral, Daily      DIGESTIVE ENZYMES PO   1 capsule, Oral, 3 Times Daily      docusate sodium 100 MG capsule  Commonly known as: COLACE   100 mg, Oral, Nightly      furosemide 20 MG tablet  Commonly known as: LASIX   20 mg, Oral, As Needed      [place] 550 MG capsule   1 capsule, Oral, 3 Times Daily      latanoprost 0.005 % ophthalmic solution  Commonly known as: XALATAN   1 drop, Both Eyes, Nightly      metoclopramide 5 MG tablet  Commonly known as: REGLAN   5 mg, Oral, 4 Times Daily Before Meals & Nightly      Milk of Magnesia 400 MG/5ML suspension  Generic drug: magnesium hydroxide   30 mL, Oral, Daily PRN      mirtazapine 15 MG tablet  Commonly known as: REMERON   15 mg, Oral, Nightly      potassium chloride 10 MEQ CR tablet  Commonly known as: Klor-Con M10   10 mEq, Oral, As Needed, ONLY WHEN TAKING WATER PILL      senna 8.6 MG tablet  Commonly known as: SENOKOT   1 tablet, Oral, Daily PRN      sennosides-docusate 8.6-50 MG per tablet  Commonly known as: PERICOLACE   1 tablet, Oral, Daily PRN      VITAMIN A PO   1 tablet, Oral, Daily      warfarin 2 MG tablet  Commonly known as: COUMADIN   2 mg, Oral, 6 Times Weekly, Monday, Wednesday, Thursday, Friday, Saturday, Sunday      warfarin 2 MG tablet  Commonly known as: COUMADIN   4 mg, Oral, Weekly, On Tuesday only.             Stop These Medications      digoxin 125 MCG tablet  Commonly known as: LANOXIN               Discharge Diet: Regular    Activity at Discharge: as tolerated    Follow-up Appointments  Future Appointments   Date Time Provider Department Center   6/25/2024  1:45 PM Theodore Vital MD [facility]     Additional Instructions for the Follow-ups that You Need to Schedule       Discharge Follow-up with PCP   As directed       Currently Documented PCP:    Cristal Goodwin MD    PCP Phone Number:    [phone]     Follow Up Details: 2 weeks        Discharge Follow-up with Specified Provider: Dr Curtis in 2 weeks after cxr has been completed   As directed      To: Dr Curtis in 2 weeks after cxr has been completed        XR abdomen 2 vw w chest 1 vw   Jul 08, 2024      Exam reason: s/p pleural effusion with previous ct placement   Does this patient have a diabetic monitoring/medication delivering device on?: No   Release to patient: Routine Release                Test Results Pending at Discharge       ALMA Heard  06/24/24  10:18 EDT    Time: Discharge 25 min

## 2024-06-24 NOTE — CASE MANAGEMENT/SOCIAL WORK
Continued Stay Note   Jamey     Patient Name: Shruthi Amin  MRN: [id number]  Today's Date: 6/24/2024    Admit Date: 6/17/2024    Plan: Referral to [facility] pending. No precert required. PASRR approved. Home O2 2L w/ [employer].   Discharge Plan       Row Name 06/24/24 1522       Plan    Plan Referral to [facility] pending. No precert required. PASRR approved. Home O2 2L w/ [employer].    Patient/Family in Agreement with Plan yes    Plan Comments CM received notification from nursing that family thinks pt may need short term rehab prior to returning home. CM met with pt at bedside and left SNF list with CM contact info. Pt requested her daughter Shea be called. CM spoke to daughter Shea by phone, explained that SNF list left at bedside, and to review facility ratings by going to Medicare.gov. Daughter plans to come in this afternoon and review list. She notified CM of her top 2 choices: 1) [facility] and 2) [facility]. Referral made to [facility] and liaison Tiarra notified, pending review.             Megan Naegele, RN     Office Phone: [phone]  Office Cell: [phone]

## 2024-06-24 NOTE — PROGRESS NOTES
"Pharmacy dosing service  Anticoagulant  Warfarin     Subjective:    Shruthi Amin is a 89 y.o.female being continued on warfarin for Atrial Fibrillation / Flutter.    INR Goal: 2 - 3  Home medication?: warfarin 2 mg PO daily, except warfarin 4 mg PO on Tuesday.   Bridge Therapy Present?:  No  Interacting Medications Evaluation (New/Present/Discontinued):   Additional Contributing Factors:       Assessment/Plan:    Warfarin held yesterday by Dr Curtis for bleeding from the chest tube site. I verified with Josie Solano NP that warfarin is okay to be resumed today. I will schedule warfarin 4 mg X 1 dose today for missed dose and INR trending down.     Continue to monitor and adjust based on INR.         Date 6/20 6/21 6/22 6/23 6/24       INR 1.59 1.45 1.62 1.80 1.69       Dose 4 mg 2 mg 2 mg Hold 4 mg            Objective:  [Ht: 157.5 cm (62\"); Wt: 38.5 kg (84 lb 14 oz); BMI: Body mass index is 15.52 kg/m².]    Lab Results   Component Value Date    ALBUMIN 3.6 06/17/2024     Lab Results   Component Value Date    INR 1.69 (L) 06/24/2024    INR 1.80 (L) 06/23/2024    INR 1.62 (L) 06/22/2024    PROTIME 17.7 (L) 06/24/2024    PROTIME 18.8 (L) 06/23/2024    PROTIME 17.0 (L) 06/22/2024     Lab Results   Component Value Date    HGB 11.2 (L) 06/24/2024    HGB 11.4 (L) 06/23/2024    HGB 11.9 (L) 06/22/2024     Lab Results   Component Value Date    HCT 34.9 06/24/2024    HCT 35.2 06/23/2024    HCT 37.2 06/22/2024       Sol Strong, PharmD  06/24/24 14:30 EDT            "

## 2024-06-25 LAB
ANION GAP SERPL CALCULATED.3IONS-SCNC: 6.5 MMOL/L (ref 5–15)
BASOPHILS # BLD AUTO: 0.08 10*3/MM3 (ref 0–0.2)
BASOPHILS NFR BLD AUTO: 0.9 % (ref 0–1.5)
BUN SERPL-MCNC: 19 MG/DL (ref 8–23)
BUN/CREAT SERPL: 38 (ref 7–25)
CALCIUM SPEC-SCNC: 8.4 MG/DL (ref 8.2–9.6)
CHLORIDE SERPL-SCNC: 95 MMOL/L (ref 98–107)
CO2 SERPL-SCNC: 30.5 MMOL/L (ref 22–29)
CREAT SERPL-MCNC: 0.5 MG/DL (ref 0.57–1)
DEPRECATED RDW RBC AUTO: 53.7 FL (ref 37–54)
EGFRCR SERPLBLD CKD-EPI 2021: 86 ML/MIN/1.73
EOSINOPHIL # BLD AUTO: 0.42 10*3/MM3 (ref 0–0.4)
EOSINOPHIL NFR BLD AUTO: 4.7 % (ref 0.3–6.2)
ERYTHROCYTE [DISTWIDTH] IN BLOOD BY AUTOMATED COUNT: 14.2 % (ref 12.3–15.4)
GLUCOSE SERPL-MCNC: 84 MG/DL (ref 65–99)
HCT VFR BLD AUTO: 33.2 % (ref 34–46.6)
HGB BLD-MCNC: 10.7 G/DL (ref 12–15.9)
IMM GRANULOCYTES # BLD AUTO: 0.19 10*3/MM3 (ref 0–0.05)
IMM GRANULOCYTES NFR BLD AUTO: 2.1 % (ref 0–0.5)
INR PPP: 1.56 (ref 2–3)
LYMPHOCYTES # BLD AUTO: 1.98 10*3/MM3 (ref 0.7–3.1)
LYMPHOCYTES NFR BLD AUTO: 22.1 % (ref 19.6–45.3)
MCH RBC QN AUTO: 33.4 PG (ref 26.6–33)
MCHC RBC AUTO-ENTMCNC: 32.2 G/DL (ref 31.5–35.7)
MCV RBC AUTO: 103.8 FL (ref 79–97)
MONOCYTES # BLD AUTO: 1.76 10*3/MM3 (ref 0.1–0.9)
MONOCYTES NFR BLD AUTO: 19.7 % (ref 5–12)
NEUTROPHILS NFR BLD AUTO: 4.51 10*3/MM3 (ref 1.7–7)
NEUTROPHILS NFR BLD AUTO: 50.5 % (ref 42.7–76)
NRBC BLD AUTO-RTO: 0 /100 WBC (ref 0–0.2)
PLATELET # BLD AUTO: 214 10*3/MM3 (ref 140–450)
PMV BLD AUTO: 11 FL (ref 6–12)
POTASSIUM SERPL-SCNC: 4.4 MMOL/L (ref 3.5–5.2)
PROTHROMBIN TIME: 16.5 SECONDS (ref 19.4–28.5)
RBC # BLD AUTO: 3.2 10*6/MM3 (ref 3.77–5.28)
SODIUM SERPL-SCNC: 132 MMOL/L (ref 136–145)
WBC NRBC COR # BLD AUTO: 8.94 10*3/MM3 (ref 3.4–10.8)

## 2024-06-25 PROCEDURE — 85610 PROTHROMBIN TIME: CPT | Performed by: INTERNAL MEDICINE

## 2024-06-25 PROCEDURE — 85025 COMPLETE CBC W/AUTO DIFF WBC: CPT | Performed by: INTERNAL MEDICINE

## 2024-06-25 PROCEDURE — 80048 BASIC METABOLIC PNL TOTAL CA: CPT | Performed by: INTERNAL MEDICINE

## 2024-06-25 PROCEDURE — 99232 SBSQ HOSP IP/OBS MODERATE 35: CPT | Performed by: INTERNAL MEDICINE

## 2024-06-25 RX ORDER — MAG HYDROX/ALUMINUM HYD/SIMETH 400-400-40
1 SUSPENSION, ORAL (FINAL DOSE FORM) ORAL ONCE
Status: COMPLETED | OUTPATIENT
Start: 2024-06-25 | End: 2024-06-25

## 2024-06-25 RX ORDER — DOCUSATE SODIUM 250 MG
250 CAPSULE ORAL 2 TIMES DAILY
Start: 2024-06-25

## 2024-06-25 RX ORDER — SORBITOL SOLUTION 70 %
50 SOLUTION, ORAL MISCELLANEOUS DAILY
Status: COMPLETED | OUTPATIENT
Start: 2024-06-25 | End: 2024-06-25

## 2024-06-25 RX ORDER — SODIUM, POTASSIUM,MAG SULFATES 17.5-3.13G
1 SOLUTION, RECONSTITUTED, ORAL ORAL ONCE
Status: COMPLETED | OUTPATIENT
Start: 2024-06-25 | End: 2024-06-25

## 2024-06-25 RX ORDER — FAMOTIDINE 20 MG/1
20 TABLET, FILM COATED ORAL DAILY
Start: 2024-06-25

## 2024-06-25 RX ORDER — WARFARIN SODIUM 3 MG/1
3 TABLET ORAL
Status: COMPLETED | OUTPATIENT
Start: 2024-06-25 | End: 2024-06-25

## 2024-06-25 RX ORDER — FUROSEMIDE 20 MG/1
20 TABLET ORAL DAILY
Start: 2024-06-26

## 2024-06-25 RX ORDER — SODIUM, POTASSIUM,MAG SULFATES 17.5-3.13G
1 SOLUTION, RECONSTITUTED, ORAL ORAL EVERY 12 HOURS
Status: DISCONTINUED | OUTPATIENT
Start: 2024-06-25 | End: 2024-06-25

## 2024-06-25 RX ORDER — ACETAMINOPHEN 325 MG/1
650 TABLET ORAL EVERY 4 HOURS PRN
Start: 2024-06-25

## 2024-06-25 RX ADMIN — Medication 10 ML: at 08:05

## 2024-06-25 RX ADMIN — GLYCERIN 1 SUPPOSITORY: 2 SUPPOSITORY RECTAL at 13:49

## 2024-06-25 RX ADMIN — SORBITOL SOLUTION (BULK) 50 ML: 70 SOLUTION at 16:24

## 2024-06-25 RX ADMIN — Medication 10 ML: at 21:04

## 2024-06-25 RX ADMIN — SODIUM SULFATE, POTASSIUM SULFATE, MAGNESIUM SULFATE 1 BOTTLE: 17.5; 3.13; 1.6 SOLUTION, CONCENTRATE ORAL at 21:04

## 2024-06-25 RX ADMIN — DOCUSATE SODIUM 100 MG: 100 CAPSULE, LIQUID FILLED ORAL at 21:04

## 2024-06-25 RX ADMIN — METOCLOPRAMIDE 5 MG: 5 TABLET ORAL at 16:30

## 2024-06-25 RX ADMIN — BISACODYL 5 MG: 5 TABLET, COATED ORAL at 13:49

## 2024-06-25 RX ADMIN — METOCLOPRAMIDE 5 MG: 5 TABLET ORAL at 12:34

## 2024-06-25 RX ADMIN — GUAIFENESIN 1200 MG: 600 TABLET, EXTENDED RELEASE ORAL at 21:03

## 2024-06-25 RX ADMIN — SENNOSIDES AND DOCUSATE SODIUM 2 TABLET: 50; 8.6 TABLET ORAL at 12:34

## 2024-06-25 RX ADMIN — METOCLOPRAMIDE 5 MG: 5 TABLET ORAL at 08:05

## 2024-06-25 RX ADMIN — METOCLOPRAMIDE 5 MG: 5 TABLET ORAL at 21:03

## 2024-06-25 RX ADMIN — GUAIFENESIN 1200 MG: 600 TABLET, EXTENDED RELEASE ORAL at 08:05

## 2024-06-25 RX ADMIN — WARFARIN SODIUM 3 MG: 3 TABLET ORAL at 17:37

## 2024-06-25 RX ADMIN — MAGNESIUM HYDROXIDE 5 ML: 2400 SUSPENSION ORAL at 15:41

## 2024-06-25 RX ADMIN — LATANOPROST 1 DROP: 50 SOLUTION/ DROPS OPHTHALMIC at 21:17

## 2024-06-25 RX ADMIN — METOPROLOL SUCCINATE 12.5 MG: 25 TABLET, EXTENDED RELEASE ORAL at 08:05

## 2024-06-25 RX ADMIN — METOPROLOL SUCCINATE 12.5 MG: 25 TABLET, EXTENDED RELEASE ORAL at 21:04

## 2024-06-25 RX ADMIN — MIRTAZAPINE 15 MG: 15 TABLET, FILM COATED ORAL at 21:03

## 2024-06-25 RX ADMIN — FUROSEMIDE 20 MG: 20 TABLET ORAL at 08:05

## 2024-06-25 NOTE — SIGNIFICANT NOTE
06/25/24 1647   OTHER   Discipline physical therapist   Rehab Time/Intention   Session Not Performed patient/family declined treatment  (refused PT treatment due to several meds given to promote BM)   Recommendation   PT - Next Appointment 06/26/24

## 2024-06-25 NOTE — PROGRESS NOTES
Daily Progress Note          Assessment    Recurrent pleural effusion: Status post right thoracentesis April 2024 with drainage of 450 mL  Transudative fluid likely related to CHF/severe mitral regurgitation, mild compressive atelectasis due to very large hiatal hernia    Hypoxemia: ABG 7.45/43.2/92.2 while on 2 L nasal cannula  Anemia  A-fib  Bradycardia with mild digoxin toxicity  Large hiatal hernia  COPD  CHF  CAD  Osteoarthritis, kyphosis    Results for orders placed during the hospital encounter of 02/23/24    Adult Transesophageal Echo (JEFF) W/ Cont if Necessary Per Protocol    Interpretation Summary    Left ventricular ejection fraction appears to be 56 - 60%.    Severe mitral valve regurgitation is present.    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    Clinical correlation required         Recommendations:      I do NOT recommend repeating thoracentesis , even Pleurx catheter which is chronic indwelling catheter still not the best option, I recommend medical management for severe mitral regurg    Unfortunately at her age there is no good treatment for this massive hiatal hernia    Oxygen supplement and titration to maintain saturation 90 to 95%: Currently requiring 2 L per nasal cannula    Bronchodilators  Mucinex  Diuresis    Encourage use of incentive spirometry  Chronic anticoagulation: Warfarin on hold     I personally reviewed the radiological studies                     LOS: 7 days     Subjective     Patient reports mild chronic shortness of breath    Objective     Vital signs for last 24 hours:  Vitals:    06/24/24 1957 06/25/24 0011 06/25/24 0455 06/25/24 0805   BP: 98/56 110/64 128/71 130/76   BP Location: Right arm Right arm Right arm    Patient Position: Lying Lying Lying    Pulse: 92 84 91 88   Resp: 18 16 16    Temp:  97.8 °F (36.6 °C) 97.5 °F (36.4 °C)     TempSrc:  Oral Oral    SpO2: 98%  92%    Weight:       Height:           Intake/Output last 3 shifts:  I/O last 3 completed shifts:  In: 240 [P.O.:240]  Out: 500 [Urine:500]  Intake/Output this shift:  No intake/output data recorded.      Radiology  Imaging Results (Last 24 Hours)       ** No results found for the last 24 hours. **            Labs:  Results from last 7 days   Lab Units 06/25/24  0238   WBC 10*3/mm3 8.94   HEMOGLOBIN g/dL 10.7*   HEMATOCRIT % 33.2*   PLATELETS 10*3/mm3 214     Results from last 7 days   Lab Units 06/25/24  0238   SODIUM mmol/L 132*   POTASSIUM mmol/L 4.4   CHLORIDE mmol/L 95*   CO2 mmol/L 30.5*   BUN mg/dL 19   CREATININE mg/dL 0.50*   CALCIUM mg/dL 8.4   GLUCOSE mg/dL 84                               Results from last 7 days   Lab Units 06/25/24  0238 06/24/24  0358 06/23/24  0015   INR  1.56* 1.69* 1.80*                 Meds:   SCHEDULE  docusate sodium, 100 mg, Oral, Nightly  furosemide, 20 mg, Oral, Daily  guaiFENesin, 1,200 mg, Oral, Q12H  latanoprost, 1 drop, Right Eye, Nightly  metoclopramide, 5 mg, Oral, 4x Daily AC & at Bedtime  metoprolol succinate XL, 12.5 mg, Oral, Q12H  mirtazapine, 15 mg, Oral, Nightly  sodium chloride, 10 mL, Intravenous, Q12H      Infusions  Pharmacy to dose warfarin,       PRNs    acetaminophen    senna-docusate sodium **AND** polyethylene glycol **AND** bisacodyl **AND** bisacodyl    fleet enema    magnesium hydroxide    nitroglycerin    ondansetron    Pharmacy to dose warfarin    sodium chloride    sodium chloride    Physical Exam:  General Appearance:  Alert, looks chronically ill but not in acute distress  HEENT:  Normocephalic, without obvious abnormality, Conjunctiva/corneas clear,.   Nares normal, no drainage     Neck:  Supple, symmetrical, trachea midline.   Lungs /Chest wall: Mild bilateral basal rhonchi, respirations unlabored, symmetrical wall movement.     Heart: Rate controlled, S1 S2 normal  Abdomen: Soft, non-tender, no masses, no  organomegaly.    Extremities: No edema, no clubbing or cyanosis     ROS  Constitutional: Negative for chills, fever and malaise/fatigue.   HENT: Negative.    Eyes: Negative.    Cardiovascular: Negative.    Respiratory: Positive for cough and shortness of breath.    Skin: Negative.    Musculoskeletal: Kyphosis  Gastrointestinal: Negative.    Genitourinary: Negative.    Neurological: Generalized weakness      I reviewed the recent clinical results  I personally reviewed the latest radiological studies    Part of this note may be an electronic transcription/translation of spoken language to printed text using the Dragon Dictation System.

## 2024-06-25 NOTE — PROGRESS NOTES
"Pharmacy dosing service  Anticoagulant  Warfarin     Subjective:    Shruthi Amin is a 89 y.o.female being continued on warfarin for Atrial Fibrillation / Flutter.    INR Goal: 2 - 3  Home medication?: warfarin 2 mg PO daily, except warfarin 4 mg PO on Tuesday.   Bridge Therapy Present?:  No  Interacting Medications Evaluation (New/Present/Discontinued):   Additional Contributing Factors:       Assessment/Plan:    Warfarin held 6/23 by Dr Curtis for bleeding from the chest tube site. Warfarin resumed 6/24 per Josie Solano NP. Patient received warfarin 4 mg yesterday. Will schedule warfarin 3 mg today for subtherapeutic INR.    Continue to monitor and adjust based on INR.         Date 6/20 6/21 6/22 6/23 6/24 6/25      INR 1.59 1.45 1.62 1.80 1.69 1.56      Dose 4 mg 2 mg 2 mg Hold 4 mg  3 mg          Objective:  [Ht: 157.5 cm (62\"); Wt: 38.5 kg (84 lb 14 oz); BMI: Body mass index is 15.52 kg/m².]    Lab Results   Component Value Date    ALBUMIN 3.6 06/17/2024     Lab Results   Component Value Date    INR 1.56 (L) 06/25/2024    INR 1.69 (L) 06/24/2024    INR 1.80 (L) 06/23/2024    PROTIME 16.5 (L) 06/25/2024    PROTIME 17.7 (L) 06/24/2024    PROTIME 18.8 (L) 06/23/2024     Lab Results   Component Value Date    HGB 10.7 (L) 06/25/2024    HGB 11.2 (L) 06/24/2024    HGB 11.4 (L) 06/23/2024     Lab Results   Component Value Date    HCT 33.2 (L) 06/25/2024    HCT 34.9 06/24/2024    HCT 35.2 06/23/2024       Sol Strong, PharmD  06/25/24 10:34 EDT              "

## 2024-06-25 NOTE — DISCHARGE SUMMARY
Date of Discharge:  6/25/2024    Discharge Diagnosis:   **Recurrent pleural effusion [J90]   Bradycardia [R00.1]   Severe malnutrition [E43]   Hypoxia [R09.02]   Hyperlipidemia [E78.5]   Atrial fibrillation [I48.91]   Hiatal hernia [K44.9]   Osteoarthritis [M19.90]   Hypertension [I10]       Presenting Problem/History of Present Illness  Active Hospital Problems    Diagnosis  POA    **Recurrent pleural effusion [J90]  Yes    Bradycardia [R00.1]  Yes    Severe malnutrition [E43]  Yes    Hypoxia [R09.02]  Yes    Hyperlipidemia [E78.5]  Yes    Atrial fibrillation [I48.91]  Yes    Hiatal hernia [K44.9]  Yes    Osteoarthritis [M19.90]  Yes    Hypertension [I10]  Yes      Resolved Hospital Problems   No resolved problems to display.          Hospital Course  Patient is a 89 y.o. female with history of large hiatal hernia, chronic atrial fibrillation on warfarin and recurrent pleural effusion who presented on 6/17/2024 with complaints of shortness of breath and hypoxia.  She had a moderate-sized right pleural effusion that required thoracentesis and chest tube placement.  Her shortness of breath is likely multifactorial including the very large hiatal hernia and pleural effusion.  Consideration was given to Pleurx catheter placement but given that she is chronically anticoagulated for atrial fibrillation decision was made to not pursue permanent catheter placement.  She was restarted on daily furosemide to help manage her recurrent pleural effusion.  She was bradycardic and so digoxin was stopped.  Dose of beta-blocker was lowered.  At the time of discharge she is hemodynamically stable.  She does have a new oxygen requirement of 2 L/min.  She is deconditioned and will be transferred to [facility] for skilled rehab.    She will need a repeat chest x-ray in 2 weeks with outpatient follow-up with pulmonologist.    She will need routine evaluation of renal function and electrolytes now that she is on daily furosemide.   She will also need monitoring of INR.  Procedures Performed         Consults:   Consults       Date and Time Order Name Status Description    6/24/2024 10:42 AM Inpatient Cardiology Consult Completed     6/17/2024  4:52 PM Inpatient Pulmonology Consult Completed     6/17/2024  4:48 PM Hospitalist (on-call MD unless specified)              Pertinent Test Results:    Lab Results (most recent)       Procedure Component Value Units Date/Time    Basic Metabolic Panel [[id number]]  (Abnormal) Collected: 06/25/24 0238    Specimen: Blood from Arm, Right Updated: 06/25/24 0327     Glucose 84 mg/dL      BUN 19 mg/dL      Creatinine 0.50 mg/dL      Sodium 132 mmol/L      Potassium 4.4 mmol/L      Comment: Specimen hemolyzed.  Result may be falsely elevated.        Chloride 95 mmol/L      CO2 30.5 mmol/L      Calcium 8.4 mg/dL      BUN/Creatinine Ratio 38.0     Anion Gap 6.5 mmol/L      eGFR 86.0 mL/min/1.73     Narrative:      GFR Normal >60  Chronic Kidney Disease <60  Kidney Failure <15    The GFR formula is only valid for adults with stable renal function between ages 18 and 70.    Protime-INR [[id number]]  (Abnormal) Collected: 06/25/24 0238    Specimen: Blood from Arm, Right Updated: 06/25/24 0319     Protime 16.5 Seconds      INR 1.56    CBC & Differential [[id number]]  (Abnormal) Collected: 06/25/24 0238    Specimen: Blood from Arm, Right Updated: 06/25/24 0306    Narrative:      The following orders were created for panel order CBC & Differential.  Procedure                               Abnormality         Status                     ---------                               -----------         ------                     CBC Auto Differential[[id number]]        Abnormal            Final result                 Please view results for these tests on the individual orders.    CBC Auto Differential [[id number]]  (Abnormal) Collected: 06/25/24 0238    Specimen: Blood from Arm, Right Updated: 06/25/24 0306     WBC 8.94 10*3/mm3       RBC 3.20 10*6/mm3      Hemoglobin 10.7 g/dL      Hematocrit 33.2 %      .8 fL      MCH 33.4 pg      MCHC 32.2 g/dL      RDW 14.2 %      RDW-SD 53.7 fl      MPV 11.0 fL      Platelets 214 10*3/mm3      Neutrophil % 50.5 %      Lymphocyte % 22.1 %      Monocyte % 19.7 %      Eosinophil % 4.7 %      Basophil % 0.9 %      Immature Grans % 2.1 %      Neutrophils, Absolute 4.51 10*3/mm3      Lymphocytes, Absolute 1.98 10*3/mm3      Monocytes, Absolute 1.76 10*3/mm3      Eosinophils, Absolute 0.42 10*3/mm3      Basophils, Absolute 0.08 10*3/mm3      Immature Grans, Absolute 0.19 10*3/mm3      nRBC 0.0 /100 WBC     Body Fluid Culture - Body Fluid, Pleural Cavity [[id number]] Collected: 06/19/24 1057    Specimen: Body Fluid from Pleural Cavity Updated: 06/24/24 0617     Body Fluid Culture No growth at 5 days     Gram Stain Rare (1+) WBCs per low power field      No organisms seen    Basic Metabolic Panel [[id number]]  (Abnormal) Collected: 06/24/24 0358    Specimen: Blood Updated: 06/24/24 0523     Glucose 92 mg/dL      BUN 17 mg/dL      Creatinine 0.49 mg/dL      Sodium 132 mmol/L      Potassium 4.7 mmol/L      Chloride 94 mmol/L      CO2 32.0 mmol/L      Calcium 8.8 mg/dL      BUN/Creatinine Ratio 34.7     Anion Gap 6.0 mmol/L      eGFR 86.4 mL/min/1.73     Narrative:      GFR Normal >60  Chronic Kidney Disease <60  Kidney Failure <15    The GFR formula is only valid for adults with stable renal function between ages 18 and 70.    Protime-INR [[id number]]  (Abnormal) Collected: 06/24/24 0358    Specimen: Blood Updated: 06/24/24 0508     Protime 17.7 Seconds      INR 1.69    CBC & Differential [[id number]]  (Abnormal) Collected: 06/24/24 0358    Specimen: Blood Updated: 06/24/24 0503    Narrative:      The following orders were created for panel order CBC & Differential.  Procedure                               Abnormality         Status                     ---------                               -----------          ------                     CBC Auto Differential[[id number]]        Abnormal            Final result                 Please view results for these tests on the individual orders.    CBC Auto Differential [[id number]]  (Abnormal) Collected: 06/24/24 0358    Specimen: Blood Updated: 06/24/24 0503     WBC 9.64 10*3/mm3      RBC 3.35 10*6/mm3      Hemoglobin 11.2 g/dL      Hematocrit 34.9 %      .2 fL      MCH 33.4 pg      MCHC 32.1 g/dL      RDW 14.4 %      RDW-SD 54.8 fl      MPV 11.2 fL      Platelets 226 10*3/mm3      Neutrophil % 55.1 %      Lymphocyte % 20.9 %      Monocyte % 17.5 %      Eosinophil % 3.6 %      Basophil % 0.8 %      Immature Grans % 2.1 %      Neutrophils, Absolute 5.31 10*3/mm3      Lymphocytes, Absolute 2.01 10*3/mm3      Monocytes, Absolute 1.69 10*3/mm3      Eosinophils, Absolute 0.35 10*3/mm3      Basophils, Absolute 0.08 10*3/mm3      Immature Grans, Absolute 0.20 10*3/mm3      nRBC 0.0 /100 WBC     Digoxin Level [[id number]]  (Abnormal) Collected: 06/21/24 0515    Specimen: Blood from Arm, Right Updated: 06/21/24 0551     Digoxin 0.47 ng/mL     Non-gynecologic Cytology [[id number]] Collected: 06/19/24 1101    Specimen: Pleural Fluid from Pleural Cavity Updated: 06/20/24 1250     Case Report --     Medical Cytology Report                           Case: [id number]                                  Authorizing Provider:  Lupe Curtis MD         Collected:           06/19/2024 11:01 AM          Ordering Location:     [address]    Received:            06/19/2024 12:27 PM                                 MEDICAL INPATIENT                                                            Pathologist:           Orestes Fair MD                                                             Specimen:    Pleural Cavity                                                                              Final Diagnosis --     Pleural fluid, smears and cytospin preparation:    Abundant reactive  "mesothelial cells, histiocytes and scattered acute and chronic inflammatory cells    Negative for malignant cells    JPR       Gross Description --     1. Pleural Cavity.  Received in carbowax and designated \"pleural fluid\" are 20 mL of clear, green fluid. Particulate matter is not present. This specimen is processed as per protocol.          Lactate Dehydrogenase, Body Fluid - Body Fluid, Pleural Cavity [[id number]] Collected: 06/19/24 1057    Specimen: Body Fluid from Pleural Cavity Updated: 06/19/24 1618     Lactate Dehydrogenase (LD), Fluid 74 U/L     Narrative:      No Reference Ranges Established.    Serous fluid LDH greater than 60 percent of the serum LDH or serous fluid LDH two-thirds of the upper limit of normal for serum LDH suggests the fluid is an exudate.     1. Pleural TP/Serum TP >0.5  2. Pleural LD/Serum LD >0.6  3. Pleural LD >2/3 of the upper limit of normal for serum LDH    This test was developed, it performance characteristics determined and judged suitable for clinical purposes by [facility].  It has not been cleared or approved by the FDA.  The laboratory is regulated under CLIA as qualified to perform high-complexity testing.     Glucose, Body Fluid - Pleural Fluid, Pleural Cavity [[id number]] Collected: 06/19/24 1101    Specimen: Pleural Fluid from Pleural Cavity Updated: 06/19/24 1608     Glucose, Fluid 103 mg/dL     Narrative:      No Reference Ranges Established.    Serous fluid glucose less than 60 mg/dL or less than 30 mg/dL below serum glucose suggests an infectious or malignant exudate.     This test was developed, it performance characteristics determined and judged suitable for clinical purposes by [facility].  It has not been cleared or approved by the FDA.  The laboratory is regulated under CLIA as qualified to perform high-complexity testing.     Protein, Body Fluid - Pleural Fluid, Pleural Cavity [[id number]] Collected: 06/19/24 " 1101    Specimen: Pleural Fluid from Pleural Cavity Updated: 06/19/24 1608     Protein, Total, Fluid 1.9 g/dL     Narrative:      No Reference Ranges Established.    A serous fluid total fluid (TP) greater than 50 percent of the serum TP suggests the fluid is an exudate.      1. Pleural TP/Serum TP >0.5  2. Pleural LD/Serum LD >0.6  3. Pleural LD >2/3 of the upper limit of normal for serum LDH    This test was developed, it performance characteristics determined and judged suitable for clinical purposes by [facility].  It has not been cleared or approved by the FDA.  The laboratory is regulated under CLIA as qualified to perform high-complexity testing.     Body Fluid Cell Count With Differential - Body Fluid, Pleural Cavity [[id number]] Collected: 06/19/24 1057    Specimen: Body Fluid from Pleural Cavity Updated: 06/19/24 1255    Narrative:      The following orders were created for panel order Body Fluid Cell Count With Differential - Body Fluid, Pleural Cavity.  Procedure                               Abnormality         Status                     ---------                               -----------         ------                     Body fluid cell count - ...[[id number]]                      Final result               Body fluid differential ...[[id number]]                      Final result                 Please view results for these tests on the individual orders.    Body fluid differential - Body Fluid, Pleural Cavity [[id number]] Collected: 06/19/24 1057    Specimen: Body Fluid from Pleural Cavity Updated: 06/19/24 1255     Neutrophils, Fluid 2 %      Lymphocytes, Fluid 89 %      Mesothelial Cells, Fluid 9 %     Narrative:      Concentrated Smear by Cytocentrifuge Prep.    pH, Body Fluid - Body Fluid, Pleural Cavity [[id number]]  (Normal) Collected: 06/19/24 1057    Specimen: Body Fluid from Pleural Cavity Updated: 06/19/24 1210     pH, Fluid 7.50    Body fluid cell count - Body Fluid,  Pleural Cavity [[id number]] Collected: 06/19/24 1057    Specimen: Body Fluid from Pleural Cavity Updated: 06/19/24 1202     Color, Fluid Yellow     Appearance, Fluid Clear     Nucleated Cells, Fluid 166 /mm3     Narrative:      No reference range established. Physician to interpret results with clinical findings.    Folate [[id number]]  (Normal) Collected: 06/19/24 0000    Specimen: Blood from Arm, Right Updated: 06/19/24 1104     Folate 10.70 ng/mL     Narrative:      Results may be falsely increased if patient taking Biotin.      Vitamin B12 [[id number]]  (Abnormal) Collected: 06/19/24 0000    Specimen: Blood from Arm, Right Updated: 06/19/24 1104     Vitamin B-12 >2,000 pg/mL     Narrative:      Results may be falsely increased if patient taking Biotin.      Reticulocytes [[id number]]  (Abnormal) Collected: 06/18/24 0001    Specimen: Blood from Arm, Left Updated: 06/18/24 1241     Reticulocyte % 3.60 %      Reticulocyte Absolute 0.1033 10*6/mm3     Iron Profile [[id number]]  (Abnormal) Collected: 06/18/24 0001    Specimen: Blood from Arm, Left Updated: 06/18/24 1226     Iron 105 mcg/dL      Iron Saturation (TSAT) 55 %      Transferrin 128 mg/dL      TIBC 191 mcg/dL     TSH [[id number]]  (Normal) Collected: 06/17/24 1543    Specimen: Blood from Arm, Left Updated: 06/17/24 1720     TSH 2.160 uIU/mL     T4, Free [[id number]]  (Normal) Collected: 06/17/24 1543    Specimen: Blood from Arm, Left Updated: 06/17/24 1720     Free T4 1.36 ng/dL     Digoxin Level [[id number]]  (Abnormal) Collected: 06/17/24 1543    Specimen: Blood from Arm, Left Updated: 06/17/24 1620     Digoxin 1.45 ng/mL     Comprehensive Metabolic Panel [[id number]]  (Abnormal) Collected: 06/17/24 1543    Specimen: Blood from Arm, Left Updated: 06/17/24 1620     Glucose 93 mg/dL      BUN 16 mg/dL      Creatinine 0.48 mg/dL      Sodium 141 mmol/L      Potassium 4.3 mmol/L      Chloride 103 mmol/L      CO2 30.9 mmol/L      Calcium 8.8 mg/dL      Total Protein 5.8  g/dL      Albumin 3.6 g/dL      ALT (SGPT) 12 U/L      AST (SGOT) 22 U/L      Alkaline Phosphatase 79 U/L      Total Bilirubin 0.8 mg/dL      Globulin 2.2 gm/dL      A/G Ratio 1.6 g/dL      BUN/Creatinine Ratio 33.3     Anion Gap 7.1 mmol/L      eGFR 86.8 mL/min/1.73     Narrative:      GFR Normal >60  Chronic Kidney Disease <60  Kidney Failure <15    The GFR formula is only valid for adults with stable renal function between ages 18 and 70.    Single High Sensitivity Troponin T [[id number]]  (Abnormal) Collected: 06/17/24 1543    Specimen: Blood from Arm, Left Updated: 06/17/24 1620     HS Troponin T 20 ng/L     Narrative:      High Sensitive Troponin T Reference Range:  <14.0 ng/L- Negative Female for AMI  <22.0 ng/L- Negative Male for AMI  >=14 - Abnormal Female indicating possible myocardial injury.  >=22 - Abnormal Male indicating possible myocardial injury.   Clinicians would have to utilize clinical acumen, EKG, Troponin, and serial changes to determine if it is an Acute Myocardial Infarction or myocardial injury due to an underlying chronic condition.         BNP [[id number]]  (Abnormal) Collected: 06/17/24 1543    Specimen: Blood from Arm, Left Updated: 06/17/24 1620     proBNP 3,240.0 pg/mL     Narrative:      This assay is used as an aid in the diagnosis of individuals suspected of having heart failure. It can be used as an aid in the diagnosis of acute decompensated heart failure (ADHF) in patients presenting with signs and symptoms of ADHF to the emergency department (ED). In addition, NT-proBNP of <300 pg/mL indicates ADHF is not likely.    Age Range Result Interpretation  NT-proBNP Concentration (pg/mL:      <50             Positive            >450                   Gray                 300-450                    Negative             <300    50-75           Positive            >900                  Gray                300-900                  Negative            <300      >75             Positive             >1800                  Gray                300-1800                  Negative            <300    Blood Gas, Arterial - [[id number]]  (Abnormal) Collected: 06/17/24 1600    Specimen: Arterial Blood Updated: 06/17/24 1602     Site Right Radial     Gulshan's Test Positive     pH, Arterial 7.453 pH units      pCO2, Arterial 43.2 mm Hg      pO2, Arterial 96.2 mm Hg      HCO3, Arterial 30.2 mmol/L      Base Excess, Arterial 5.7 mmol/L      Comment: Serial Number: [id number]:  [id number]        O2 Saturation, Arterial 97.8 %      CO2 Content 31.6 mmol/L      Barometric Pressure for Blood Gas --     Comment: N/A        Modality Cannula     FIO2 28 %      Hemodilution No     PO2/FIO2 344             Results for orders placed during the hospital encounter of 02/23/24    Adult Transesophageal Echo (JEFF) W/ Cont if Necessary Per Protocol    Interpretation Summary    Left ventricular ejection fraction appears to be 56 - 60%.    Severe mitral valve regurgitation is present.    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    Clinical correlation required              Condition on Discharge: Improved, stable    Vital Signs  Temp:  [97.1 °F (36.2 °C)-97.8 °F (36.6 °C)] 97.5 °F (36.4 °C)  Heart Rate:  [82-93] 88  Resp:  [16-20] 16  BP: ()/(56-78) 130/76    Physical Exam:     General Appearance:    Alert, cooperative, in no acute distress, thin, chronically ill-appearing   Head:    Normocephalic, without obvious abnormality, atraumatic   Eyes:            Lids and lashes normal, conjunctivae and sclerae normal, no   icterus, no pallor, corneas clear, PERRLA   Ears:    Ears appear intact with no abnormalities noted   Throat:   No oral lesions, no thrush, oral mucosa moist   Neck:   No adenopathy, supple, trachea midline, no thyromegaly, no   carotid bruit, no JVD   Lungs:     Clear to  auscultation,respirations regular, even and                  unlabored    Heart:  Irregularly irregular   Chest Wall:    No abnormalities observed   Abdomen:     Normal bowel sounds, no masses, no organomegaly, soft        non-tender, non-distended, no guarding, no rebound                tenderness   Extremities:   Moves all extremities well, no edema, no cyanosis, no             redness   Pulses:   Pulses palpable and equal bilaterally   Skin:   No bleeding, bruising or rash   Lymph nodes:   No palpable adenopathy   Neurologic:   Cranial nerves 2 - 12 grossly intact, sensation intact, DTR       present and equal bilaterally       Discharge Disposition  Skilled Nursing Facility (DC - External)    Discharge Medications     Discharge Medications        New Medications        Instructions Start Date   acetaminophen 325 MG tablet  Commonly known as: TYLENOL   650 mg, Oral, Every 4 Hours PRN      famotidine 20 MG tablet  Commonly known as: Pepcid   20 mg, Oral, Daily             Changes to Medications        Instructions Start Date   docusate sodium 250 MG capsule  Commonly known as: COLACE  What changed:   medication strength  how much to take  when to take this   250 mg, Oral, 2 Times Daily      furosemide 20 MG tablet  Commonly known as: LASIX  What changed:   when to take this  reasons to take this   20 mg, Oral, Daily   Start Date: June 26, 2024     metoprolol succinate XL 25 MG 24 hr tablet  Commonly known as: TOPROL-XL  What changed:   how much to take  when to take this   12.5 mg, Oral, Every 12 Hours Scheduled             Continue These Medications        Instructions Start Date   cholecalciferol 10 MCG (400 UNIT) tablet  Commonly known as: VITAMIN D3   400 Units, Oral, Daily      DIGESTIVE ENZYMES PO   1 capsule, Oral, 3 Times Daily      Zeinab Dailey 550 MG capsule   1 capsule, Oral, 3 Times Daily      latanoprost 0.005 % ophthalmic solution  Commonly known as: XALATAN   1 drop, Both Eyes, Nightly       metoclopramide 5 MG tablet  Commonly known as: REGLAN   5 mg, Oral, 4 Times Daily Before Meals & Nightly      Milk of Magnesia 400 MG/5ML suspension  Generic drug: magnesium hydroxide   30 mL, Oral, Daily PRN      mirtazapine 15 MG tablet  Commonly known as: REMERON   15 mg, Oral, Nightly      sennosides-docusate 8.6-50 MG per tablet  Commonly known as: PERICOLACE   1 tablet, Oral, Daily PRN      VITAMIN A PO   1 tablet, Oral, Daily      warfarin 2 MG tablet  Commonly known as: COUMADIN   2 mg, Oral, 6 Times Weekly, Monday, Wednesday, Thursday, Friday, Saturday, Sunday      warfarin 2 MG tablet  Commonly known as: COUMADIN   4 mg, Oral, Weekly, On Tuesday only.             Stop These Medications      digoxin 125 MCG tablet  Commonly known as: LANOXIN     potassium chloride 10 MEQ CR tablet  Commonly known as: Klor-Con M10     senna 8.6 MG tablet  Commonly known as: SENOKOT              Discharge Diet:   Diet Instructions       Diet: Regular/House Diet; Regular (IDDSI 7); Thin (IDDSI 0)      Discharge Diet: Regular/House Diet    Texture: Regular (IDDSI 7)    Fluid Consistency: Thin (IDDSI 0)            Activity at Discharge:   Activity Instructions       Activity as Tolerated              Follow-up Appointments  Future Appointments   Date Time Provider Department Center   6/25/2024  1:45 PM Theodore Vital MD [facility]     Additional Instructions for the Follow-ups that You Need to Schedule       Discharge Follow-up with PCP   As directed       Currently Documented PCP:    Cristal Goodwin MD    PCP Phone Number:    [phone]     Follow Up Details: After release from rehab        Discharge Follow-up with Specified Provider: Dr Curtis in 2 weeks after cxr has been completed   As directed      To: Dr Curtis in 2 weeks after cxr has been completed        Basic Metabolic Panel    Jun 29, 2024 (Approximate)      Release to patient: Routine Release        Magnesium    Jun 29, 2024 (Approximate)       Release to patient: Routine Release        XR abdomen 2 vw w chest 1 vw   Jul 08, 2024      Exam reason: s/p pleural effusion with previous ct placement   Does this patient have a diabetic monitoring/medication delivering device on?: No   Release to patient: Routine Release                Test Results Pending at Discharge       Tricia Roman MD  06/25/24  11:55 EDT    Time: Discharge 35 min

## 2024-06-25 NOTE — CONSULTS
[facility] [place]        Subjective:     Encounter Date:06/17/2024      Patient ID: Shruthi Amin is a 89 y.o. female.    Chief Complaint: shortness of breath, recurrent pleural effusion    Referring Physician: Josie Solano  Seen and examined agree with narrative as discussed with nurse practitioner after face-to-face counter scruff findings below verified by me for accuracy, greater than 50% of total encounter time and medical decision making performed by me    HPI:  Shruthi Amin is a 89 y.o. female who presents with shortness of breath and hypoxia.  Ms. Amin is a patient of Dr. Vital. 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     Ms. Amin has experienced recurrent pleural effusions requiring thoracentesis.  Cardiology has been asked to evaluate medications in hopes of reduction of pleural effusion in future. She was on as needed Lasix at home. She lives with family and they convey she does not eat or drink much. She is on as needed home O2. She completes basic ADLs with walker. It sounds as if functional capacity has declined some over the last few months. She was  previously on digoxin at home but her levels were elevated on admission and she was bradycardic therefore this has been stopped appropriately, heart rates in the 50s with QT prolongation. She was also on toprol XL 25mg BID And this has been reduced to 12.5mg BID due to bradycardia but held acutely until heart rate increases and borderline blood pressures.   ===============================================================  Blood  pressures improved  Heart rates improved  Creatinine stable today  No distress on encounter no acute events overnight    Review of systems otherwise negative x 14 point review of systems except as mentioned above  Historical data copied forward from her previous encounters in EMR is unchanged    EKG atrial fibrillation slow ventricular response, QT prolongation, possible old anteroseptal infarct  Past Medical History:   Diagnosis Date    Atrial fibrillation     Coronary artery disease     Atrial fibrillation    GERD (gastroesophageal reflux disease)     Glaucoma     Hiatal hernia with gastroesophageal reflux     History of osteoporotic pathological fracture     Right intertroch (2019)    Hx of compression fracture of spine     T12 and L1(); T6 (2022)    Hyperlipidemia     Hypertension     Osteoarthritis     Osteoporosis     on prolia(3/21/22)    Stroke     off and on dizziness from the stroke.       Past Surgical History:   Procedure Laterality Date    BACK SURGERY      kyphoplasty    EYE SURGERY      cataract surgery    FIXATION KYPHOPLASTY LUMBAR SPINE      HIP TROCHANTERIC NAILING WITH INTRAMEDULLARY HIP SCREW Right 2019    Procedure: HIP TROCHANTERIC NAILING SHORT WITH INTRAMEDULLARY HIP SCREW;  Surgeon: Edward Centeno MD;  Location: [facility] MAIN OR;  Service: Orthopedics    RECTAL SURGERY      x 3       Family History   Problem Relation Age of Onset    Heart disease Mother     Hypertension Mother     Osteoporosis Mother     Cancer Father         colon cancer    Osteoporosis Father     Cancer Sister         lung    Cancer Brother         colon cancer       Social History     Socioeconomic History    Marital status:    Tobacco Use    Smoking status: Former     Current packs/day: 0.00     Types: Cigarettes     Quit date:      Years since quittin.5     Passive exposure: Past    Smokeless tobacco: Never   Vaping Use    Vaping status: Never Used   Substance and Sexual Activity  "   Alcohol use: No    Drug use: No    Sexual activity: Defer         Allergies   Allergen Reactions    Penicillins Hives    Codeine Nausea And Vomiting    Latex Rash       Current Medications:   Scheduled Meds:docusate sodium, 100 mg, Oral, Nightly  furosemide, 20 mg, Oral, Daily  guaiFENesin, 1,200 mg, Oral, Q12H  latanoprost, 1 drop, Right Eye, Nightly  metoclopramide, 5 mg, Oral, 4x Daily AC & at Bedtime  metoprolol succinate XL, 12.5 mg, Oral, Q12H  mirtazapine, 15 mg, Oral, Nightly  sodium chloride, 10 mL, Intravenous, Q12H      Continuous Infusions:Pharmacy to dose warfarin,         Review of Systems   Constitutional: Negative for chills, diaphoresis and malaise/fatigue.   Cardiovascular:  Positive for dyspnea on exertion. Negative for chest pain, irregular heartbeat, leg swelling, near-syncope, orthopnea, palpitations, paroxysmal nocturnal dyspnea and syncope.   Respiratory:  Negative for cough, shortness of breath, sleep disturbances due to breathing and sputum production.    Gastrointestinal:  Negative for change in bowel habit.   Genitourinary:  Negative for urgency.   Neurological:  Negative for dizziness and headaches.   Psychiatric/Behavioral:  Negative for altered mental status.             Objective:         /76   Pulse 88   Temp 97.5 °F (36.4 °C) (Oral)   Resp 16   Ht 157.5 cm (62\")   Wt 38.5 kg (84 lb 14 oz)   SpO2 92%   BMI 15.52 kg/m²     Physical Exam:  General Appearance:    Somewhat drowsy, frail, elderly, thin                                Head: Atraumatic, normocephalic, PERRLA               Neck:   supple, mild JVD   Lungs:     Supplemental O2, dim bilat bases    Heart:    irregular rhythm and normal rate, normal S1 and S2, murmur   Abdomen:     Normal bowel sounds, no masses, no organomegaly, soft  nontender, nondistended, no guarding, no rebound  tenderness   Extremities:   Moves all extremities well, no edema, no cyanosis, no  redness   Pulses:   Pulses palpable and equal " "bilaterally   Skin:   No bleeding, bruising or rash   Neurologic:   Will Awaken, alert, oriented x3         Unchanged from prior encounter        ASCVD Risk Score::  The ASCVD Risk score (Dania DK, et al., 2019) failed to calculate.      Lab Review:     Results from last 7 days   Lab Units 06/25/24  0238 06/24/24  0358   SODIUM mmol/L 132* 132*   POTASSIUM mmol/L 4.4 4.7   CHLORIDE mmol/L 95* 94*   CO2 mmol/L 30.5* 32.0*   BUN mg/dL 19 17   CREATININE mg/dL 0.50* 0.49*   GLUCOSE mg/dL 84 92   CALCIUM mg/dL 8.4 8.8           Results from last 7 days   Lab Units 06/25/24  0238 06/24/24  0358   WBC 10*3/mm3 8.94 9.64   HEMOGLOBIN g/dL 10.7* 11.2*   HEMATOCRIT % 33.2* 34.9   PLATELETS 10*3/mm3 214 226     Results from last 7 days   Lab Units 06/25/24  0238 06/24/24  0358   INR  1.56* 1.69*               Invalid input(s): \"LDLCALC\"                Recent Radiology:  Imaging Results (Most Recent)       Procedure Component Value Units Date/Time    XR Chest 1 View [[id number]] Collected: 06/24/24 0737     Updated: 06/24/24 0747    Narrative:      XR CHEST 1 VW    Date of Exam: 6/24/2024 5:16 AM EDT    Indication: Right pleural effusion    Comparison: 6/23/2024 and prior    Findings:  Study limited by overlying support and monitoring apparatus. Heart size is enlarged and pulmonary vascularity is congested. Small to moderate right-sided pleural effusion is similar to slightly increased given differences in technique. Previously noted   chest tube is not identified. There appears to be a small right apical pneumothorax with a similar to slightly decreased prominence from the comparison. Associated peripheral opacity at the right apex is noted. Retrocardiac opacity at the left base   noted. Osseous structures are stable      Impression:      Impression:    1. Small to moderate pleural effusion on the right similar to slightly increased in the comparison. Small right apical pneumothorax slightly decreased in the comparison    2. " Increased retrocardiac opacity compared to the prior study. Large hiatal hernia not well visualized on radiographs.      Electronically Signed: Real Mendez MD    6/24/2024 7:41 AM EDT    Workstation ID: [id number]    XR Chest 1 View [[id number]] Collected: 06/23/24 0838     Updated: 06/23/24 0842    Narrative:      XR CHEST 1 VW    Date of Exam: 6/23/2024 4:59 AM EDT    Indication: Right pleural effusion    Comparison: Chest radiograph 6/22/2024, chest CT 2/25/2024    Findings:  Cardiomegaly. Large hiatal hernia probably containing the entire stomach. Right pleural drainage catheter noted with stable appearing small bilateral pleural effusions. Underlying bibasilar airspace opacities favor atelectasis however infection may have   a similar appearance. Chronic appearing interstitial changes similar to previous comparison. Small right apical pneumothorax measuring up to 8 mm stable from prior. Aortic atherosclerotic disease. Degenerative related osseous changes with previous   thoracic augmentation.      Impression:      Impression:  Radiographically stable appearance of the chest since 6/22/2024, including the small right hydropneumothorax. Drainage catheter in stable position.      Electronically Signed: Ward Anderson MD    6/23/2024 8:40 AM EDT    Workstation ID: [id number]    XR Chest 1 View [[id number]] Collected: 06/22/24 1130     Updated: 06/22/24 1134    Narrative:      XR CHEST 1 VW    Date of Exam: 6/22/2024 5:46 AM EDT    Indication: Right pleural effusion    Comparison: 6/21/2024    Findings:  There is a stable chest tube overlying the right lung base. Persistent small right apical pneumothorax measuring 13 mm. Unchanged cardiomegaly. No discrete pneumothorax on the left. There is persistent bibasilar airspace disease not significantly changed   with small bilateral pleural effusions. Lower thoracic kyphoplasty. Areas of parenchymal scarring unchanged.      Impression:      Impression:  1. Stable small  right apical pneumothorax with chest tube overlying the right lung base.  2. No change in bibasilar airspace disease and small bilateral pleural effusions.      Electronically Signed: Elver Machuca MD    6/22/2024 11:31 AM EDT    Workstation ID: [id number]    XR Chest 1 View [[id number]] Collected: 06/21/24 0722     Updated: 06/21/24 0726    Narrative:      XR CHEST 1 VW    Date of Exam: 6/21/2024 4:20 AM EDT    Indication: Right pleural effusion    Comparison: 6/20/2024    Findings:  Right basilar pleural catheter remains in place. Right apical pneumothorax redemonstrated, appearing slightly larger with 8 mm separation of the lung from the chest wall. Stable enlargement of the cardiac silhouette. Stable small right pleural effusion   with right basilar atelectasis. Stable left basilar opacity compatible with airspace disease and probable effusion      Impression:      Impression:  There is persistent right apical pneumothorax which appears slightly larger. There is otherwise stable appearance of the chest with small right pleural effusion and basilar atelectasis, and left basilar opacity likely representing combination of airspace   disease and effusion      Electronically Signed: Alex Juarez    6/21/2024 7:24 AM EDT    Workstation ID: [id number]    XR Chest 1 View [[id number]] Collected: 06/20/24 0715     Updated: 06/20/24 0719    Narrative:      XR CHEST 1 VW    Date of Exam: 6/20/2024 3:50 AM EDT    Indication: Right pleural effusion, right chest tube    Comparison: 6/19/2024.    Findings:  The right pleural effusion has slightly reduced in size. There is a stable right basilar pigtail chest tube in place. There is a new small right apical pneumothorax measuring 4 mm. On the left side, there is increased density in the left lower chest   which may relate to a small pleural effusion with atelectasis. The exam is somewhat difficult to interpret as the patient has a large hiatal hernia. The heart is enlarged. The  pulmonary vascular markings are increased and unchanged.      Impression:      Impression:    1. Right pleural effusion has slightly reduced in size. There is a stable right basilar pigtail chest tube. There is a new small right apical pneumothorax measuring 4 mm.  2. Otherwise no interval change from yesterday's exam with abnormalities as described.      Electronically Signed: Emerson Leal MD    6/20/2024 7:17 AM EDT    Workstation ID: [id number]    XR Chest 1 View [[id number]] Collected: 06/19/24 1316     Updated: 06/19/24 1320    Narrative:      XR CHEST 1 VW    Date of Exam: 6/19/2024 12:50 PM EDT    Indication: Right pleural effusion, status post chest tube placement    Comparison: 6/17/2024.    Findings:  The patient is status post placement of a right basilar pigtail chest tube. There is a persistent moderate sized right pleural effusion. There is no pneumothorax. There is presumed compressive atelectasis on the right lung base and atelectasis in the   right perihilar region. On the left side, the patient probably has a smaller pleural effusion. There is a large hiatal hernia. The heart is enlarged. There may be underlying pulmonary vascular congestion. There are chronic age-related changes involving   the bony thorax and thoracic aorta.      Impression:      Impression:    1. Interval placement of a right-sided chest tube. There continues to be a moderate right pleural effusion. There is no pneumothorax.  2. Additional abnormalities as described above and unchanged from the previous exam.      Electronically Signed: Emerson Leal MD    6/19/2024 1:18 PM EDT    Workstation ID: [id number]    XR Chest 1 View [[id number]] Collected: 06/17/24 1507     Updated: 06/17/24 1511    Narrative:      XR CHEST 1 VW    Date of Exam: 6/17/2024 3:00 PM EDT    Indication: sob    Comparison: 4/19/2024    Findings:  Heart is enlarged. There is a large hiatal hernia with likely intrathoracic stomach. No pneumothorax. Moderate right and  small left pleural effusions. Bibasilar airspace disease may relate to compressive atelectasis or pneumonia. Central pulmonary   vascular congestion. Negative for pneumothorax. Kyphoplasty noted in the lower thoracic spine near the thoracolumbar junction.      Impression:      Impression:  1. Moderate right and small left pleural effusions with bibasilar airspace disease which may relate to compressive atelectasis and/or pneumonia.  2. Cardiomegaly and central pulmonary vascular congestion.  3. Large hiatal hernia, unchanged.      Electronically Signed: Elver Machuca MD    6/17/2024 3:09 PM EDT    Workstation ID: [id number]              ECHOCARDIOGRAM:    Results for orders placed during the hospital encounter of 02/23/24    Adult Transesophageal Echo (JEFF) W/ Cont if Necessary Per Protocol    Interpretation Summary    Left ventricular ejection fraction appears to be 56 - 60%.    Severe mitral valve regurgitation is present.    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 continue medical management volume reduction and heart rate control    Discussed  optimizing medications,  lasix 20mg-- patient does not have much intake at baseline    Third spacing for pleural effusions is multifactorial not simply related to high filling pressures with high likelihood of recurrence of effusions as discussed    Plan for CXR in 2 weeks per pulm. Per pulm. Not thoracentesis candidate again, if recurrent effusion occurs, would need pleurx catheter, agree    Restarting low-dose Toprol-XL today, bradycardia resolved with holding digoxin and beta-blockers yesterday    A/c with coumadin goal INR is 2-3  Will plan outpt cardiology follow up after CXR and getting labwork in 1 week    Recommend rehab as that is what physical therapy Is now recommending provided patient is agreeable    Fluid restriction salt restriction    Would recommend goals of care discussion in further possible palliative evaluation should readmissions/fluid re-accumulation occur versus Pleurx catheter    Today given  Gentle Lasix oral daily, creatinine stable  Metoprolol restart today at low-dose, follow telemetry  Rate controlled atrial fibrillation today in the 80s  Off digoxin, would not restart at this time  Ultimately would increase metoprolol for additional rate control if needed and use midodrine for any hypotension gently to avoid high risk medicines with high likelihood of toxicity    Theodore Vital MD, PhD        Further recommendation follow findings and clinical course  Theodore Vital MD, PhD           Theodore Vital MD  06/24/24  09:09 EDT

## 2024-06-25 NOTE — PLAN OF CARE
Goal Outcome Evaluation:              Outcome Evaluation: Pt continues with specialty bed and q2 turn.  Will continue to monitor.

## 2024-06-25 NOTE — CASE MANAGEMENT/SOCIAL WORK
Continued Stay Note   [facility]     Patient Name: Shruthi Amin  MRN: [id number]  Today's Date: 6/25/2024    Admit Date: 6/17/2024    Plan: [facility] accepted, bed ready 6/25. No precert required. PASRR approved. Home O2 2L [facility].   Discharge Plan       Row Name 06/25/24 1012       Plan    Plan [facility] accepted, bed ready 6/25. No precert required. PASRR approved. Home O2 2L [facility].    Patient/Family in Agreement with Plan yes    Plan Comments CM received update from liaison that [facility] is able to accept and will have bed ready today for pt. Pharmacy updated to [facility]. Secure chat sent to Dr Roman, ALMA Galindo, and nursing. CM met with pt at bedside and updated her on acceptance.             Megan Naegele, RN     Office Phone: [phone]  Office Cell: [phone]

## 2024-06-26 ENCOUNTER — TELEPHONE (OUTPATIENT)
Dept: PHARMACY | Facility: HOSPITAL | Age: 89
End: 2024-06-26
Payer: MEDICARE

## 2024-06-26 VITALS
SYSTOLIC BLOOD PRESSURE: 115 MMHG | HEART RATE: 91 BPM | WEIGHT: 84.88 LBS | RESPIRATION RATE: 19 BRPM | HEIGHT: 62 IN | DIASTOLIC BLOOD PRESSURE: 67 MMHG | BODY MASS INDEX: 15.62 KG/M2 | OXYGEN SATURATION: 99 % | TEMPERATURE: 97.9 F

## 2024-06-26 LAB
ANION GAP SERPL CALCULATED.3IONS-SCNC: 5.2 MMOL/L (ref 5–15)
BASOPHILS # BLD AUTO: 0.08 10*3/MM3 (ref 0–0.2)
BASOPHILS NFR BLD AUTO: 0.9 % (ref 0–1.5)
BUN SERPL-MCNC: 15 MG/DL (ref 8–23)
BUN/CREAT SERPL: 36.6 (ref 7–25)
CALCIUM SPEC-SCNC: 8.5 MG/DL (ref 8.2–9.6)
CHLORIDE SERPL-SCNC: 97 MMOL/L (ref 98–107)
CO2 SERPL-SCNC: 36.8 MMOL/L (ref 22–29)
CREAT SERPL-MCNC: 0.41 MG/DL (ref 0.57–1)
DEPRECATED RDW RBC AUTO: 52.4 FL (ref 37–54)
EGFRCR SERPLBLD CKD-EPI 2021: 90.2 ML/MIN/1.73
EOSINOPHIL # BLD AUTO: 0.21 10*3/MM3 (ref 0–0.4)
EOSINOPHIL NFR BLD AUTO: 2.5 % (ref 0.3–6.2)
ERYTHROCYTE [DISTWIDTH] IN BLOOD BY AUTOMATED COUNT: 14.2 % (ref 12.3–15.4)
GLUCOSE SERPL-MCNC: 92 MG/DL (ref 65–99)
HCT VFR BLD AUTO: 30.8 % (ref 34–46.6)
HGB BLD-MCNC: 9.9 G/DL (ref 12–15.9)
IMM GRANULOCYTES # BLD AUTO: 0.18 10*3/MM3 (ref 0–0.05)
IMM GRANULOCYTES NFR BLD AUTO: 2.1 % (ref 0–0.5)
INR PPP: 1.66 (ref 2–3)
LYMPHOCYTES # BLD AUTO: 1.72 10*3/MM3 (ref 0.7–3.1)
LYMPHOCYTES NFR BLD AUTO: 20.2 % (ref 19.6–45.3)
MCH RBC QN AUTO: 32.7 PG (ref 26.6–33)
MCHC RBC AUTO-ENTMCNC: 32.1 G/DL (ref 31.5–35.7)
MCV RBC AUTO: 101.7 FL (ref 79–97)
MONOCYTES # BLD AUTO: 1.9 10*3/MM3 (ref 0.1–0.9)
MONOCYTES NFR BLD AUTO: 22.3 % (ref 5–12)
NEUTROPHILS NFR BLD AUTO: 4.42 10*3/MM3 (ref 1.7–7)
NEUTROPHILS NFR BLD AUTO: 52 % (ref 42.7–76)
NRBC BLD AUTO-RTO: 0 /100 WBC (ref 0–0.2)
PLATELET # BLD AUTO: 227 10*3/MM3 (ref 140–450)
PMV BLD AUTO: 11 FL (ref 6–12)
POTASSIUM SERPL-SCNC: 3.5 MMOL/L (ref 3.5–5.2)
PROTHROMBIN TIME: 17.4 SECONDS (ref 19.4–28.5)
RBC # BLD AUTO: 3.03 10*6/MM3 (ref 3.77–5.28)
SODIUM SERPL-SCNC: 139 MMOL/L (ref 136–145)
WBC NRBC COR # BLD AUTO: 8.51 10*3/MM3 (ref 3.4–10.8)

## 2024-06-26 PROCEDURE — 80048 BASIC METABOLIC PNL TOTAL CA: CPT | Performed by: INTERNAL MEDICINE

## 2024-06-26 PROCEDURE — 85025 COMPLETE CBC W/AUTO DIFF WBC: CPT | Performed by: INTERNAL MEDICINE

## 2024-06-26 PROCEDURE — 85610 PROTHROMBIN TIME: CPT | Performed by: INTERNAL MEDICINE

## 2024-06-26 PROCEDURE — 99232 SBSQ HOSP IP/OBS MODERATE 35: CPT | Performed by: INTERNAL MEDICINE

## 2024-06-26 RX ORDER — WARFARIN SODIUM 2 MG/1
2 TABLET ORAL
Status: DISCONTINUED | OUTPATIENT
Start: 2024-06-26 | End: 2024-06-26 | Stop reason: HOSPADM

## 2024-06-26 RX ADMIN — METOPROLOL SUCCINATE 12.5 MG: 25 TABLET, EXTENDED RELEASE ORAL at 10:01

## 2024-06-26 RX ADMIN — GUAIFENESIN 1200 MG: 600 TABLET, EXTENDED RELEASE ORAL at 10:01

## 2024-06-26 RX ADMIN — Medication 10 ML: at 10:02

## 2024-06-26 RX ADMIN — METOCLOPRAMIDE 5 MG: 5 TABLET ORAL at 10:01

## 2024-06-26 RX ADMIN — FUROSEMIDE 20 MG: 20 TABLET ORAL at 10:01

## 2024-06-26 NOTE — CASE MANAGEMENT/SOCIAL WORK
Case Management Discharge Note      Final Note: rae Ziegler.    Selected Continued Care - Discharged on 6/26/2024 Admission date: 6/17/2024 - Discharge disposition: Skilled Nursing Facility (DC - External)      Destination Coordination complete.      Service Provider Selected Services Address Phone Fax Patient Preferred    [facility]  [address] [place] [address] [phone]              Transportation Services  Private: Car    Final Discharge Disposition Code: 03 - skilled nursing facility (SNF)

## 2024-06-26 NOTE — PROGRESS NOTES
Daily Progress Note          Assessment    Recurrent pleural effusion: Status post right thoracentesis April 2024 with drainage of 450 mL  Transudative fluid likely related to CHF/severe mitral regurgitation, mild compressive atelectasis due to very large hiatal hernia    I do NOT recommend repeating thoracentesis , even Pleurx catheter which is chronic indwelling catheter still not the best option, I recommend medical management for severe mitral regurg    Unfortunately at her age there is no good treatment for this massive hiatal hernia    Hypoxemia: ABG 7.45/43.2/92.2 while on 2 L nasal cannula  Anemia  A-fib  Bradycardia with mild digoxin toxicity  Large hiatal hernia  COPD  CHF  CAD  Osteoarthritis, kyphosis    Results for orders placed during the hospital encounter of 02/23/24    Adult Transesophageal Echo (JEFF) W/ Cont if Necessary Per Protocol    Interpretation Summary    Left ventricular ejection fraction appears to be 56 - 60%.    Severe mitral valve regurgitation is present.    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    Clinical correlation required         Recommendations:        Oxygen supplement and titration to maintain saturation 90 to 95%: Currently requiring 2 L per nasal cannula    Bronchodilators  Mucinex  Diuresis    Encourage use of incentive spirometry  Chronic anticoagulation: Warfarin on hold     I personally reviewed the radiological studies                     LOS: 8 days     Subjective     Patient reports mild chronic shortness of breath    Objective     Vital signs for last 24 hours:  Vitals:    06/25/24 1939 06/26/24 0134 06/26/24 0512 06/26/24 0741   BP: 123/70 112/74 119/63 115/67   BP Location: Right arm Right arm Right arm Right arm   Patient Position: Lying Lying Lying Lying   Pulse: 105 99 91    Resp: 21 20 15 19   Temp: 98.3 °F (36.8 °C) 97.7  °F (36.5 °C) 97.8 °F (36.6 °C) 97.9 °F (36.6 °C)   TempSrc: Oral Oral Oral Oral   SpO2: 97% 99% 97% 99%   Weight:       Height:           Intake/Output last 3 shifts:  I/O last 3 completed shifts:  In: 480 [P.O.:480]  Out: 1500 [Urine:1500]  Intake/Output this shift:  No intake/output data recorded.      Radiology  Imaging Results (Last 24 Hours)       ** No results found for the last 24 hours. **            Labs:  Results from last 7 days   Lab Units 06/26/24  0447   WBC 10*3/mm3 8.51   HEMOGLOBIN g/dL 9.9*   HEMATOCRIT % 30.8*   PLATELETS 10*3/mm3 227     Results from last 7 days   Lab Units 06/26/24  0447   SODIUM mmol/L 139   POTASSIUM mmol/L 3.5   CHLORIDE mmol/L 97*   CO2 mmol/L 36.8*   BUN mg/dL 15   CREATININE mg/dL 0.41*   CALCIUM mg/dL 8.5   GLUCOSE mg/dL 92                               Results from last 7 days   Lab Units 06/26/24  0447 06/25/24  0238 06/24/24  0358   INR  1.66* 1.56* 1.69*                 Meds:   SCHEDULE  docusate sodium, 100 mg, Oral, Nightly  furosemide, 20 mg, Oral, Daily  guaiFENesin, 1,200 mg, Oral, Q12H  latanoprost, 1 drop, Right Eye, Nightly  metoclopramide, 5 mg, Oral, 4x Daily AC & at Bedtime  metoprolol succinate XL, 12.5 mg, Oral, Q12H  mirtazapine, 15 mg, Oral, Nightly  sodium chloride, 10 mL, Intravenous, Q12H      Infusions  Pharmacy to dose warfarin,       PRNs    acetaminophen    senna-docusate sodium **AND** polyethylene glycol **AND** bisacodyl **AND** bisacodyl    fleet enema    magnesium hydroxide    nitroglycerin    ondansetron    Pharmacy to dose warfarin    sodium chloride    sodium chloride    Physical Exam:  General Appearance:  Alert, looks chronically ill but not in acute distress  HEENT:  Normocephalic, without obvious abnormality, Conjunctiva/corneas clear,.   Nares normal, no drainage     Neck:  Supple, symmetrical, trachea midline.   Lungs /Chest wall: Mild bilateral basal rhonchi, respirations unlabored, symmetrical wall movement.     Heart: Rate  controlled, S1 S2 normal  Abdomen: Soft, non-tender, no masses, no organomegaly.    Extremities: No edema, no clubbing or cyanosis     ROS  Constitutional: Negative for chills, fever and malaise/fatigue.   HENT: Negative.    Eyes: Negative.    Cardiovascular: Negative.    Respiratory: Positive for cough and shortness of breath.    Skin: Negative.    Musculoskeletal: Kyphosis  Gastrointestinal: Negative.    Genitourinary: Negative.    Neurological: Generalized weakness      I reviewed the recent clinical results  I personally reviewed the latest radiological studies    Part of this note may be an electronic transcription/translation of spoken language to printed text using the Dragon Dictation System.

## 2024-06-26 NOTE — TELEPHONE ENCOUNTER
Patient discharged from [facility] to [facility] today, 6/26. Confirmed with patient's nurse, Dawna, that facility does check INRs and there is a provider there who adjusts warfarin doses accordingly. Requested that [facility] be informed when patient is discharged from facility.

## 2024-06-26 NOTE — PROGRESS NOTES
"Pharmacy dosing service  Anticoagulant  Warfarin     Subjective:    Shruthi Amin is a 89 y.o.female being continued on warfarin for Atrial Fibrillation / Flutter.    INR Goal: 2 - 3  Home medication?: warfarin 2 mg PO daily, except warfarin 4 mg PO on Tuesday.   Bridge Therapy Present?:  No  Interacting Medications Evaluation (New/Present/Discontinued):   Additional Contributing Factors:       Assessment/Plan:    Warfarin held 6/23 by Dr Curtis for bleeding from the chest tube site. Warfarin resumed 6/24 per Josie Solano NP. INR trending up. Will schedule warfarin 2 mg today.     Continue to monitor and adjust based on INR.         Date 6/20 6/21 6/22 6/23 6/24 6/25 6/26     INR 1.59 1.45 1.62 1.80 1.69 1.56 1.66     Dose 4 mg 2 mg 2 mg Hold 4 mg  3 mg 2 mg         Objective:  [Ht: 157.5 cm (62\"); Wt: 38.5 kg (84 lb 14 oz); BMI: Body mass index is 15.52 kg/m².]    Lab Results   Component Value Date    ALBUMIN 3.6 06/17/2024     Lab Results   Component Value Date    INR 1.66 (L) 06/26/2024    INR 1.56 (L) 06/25/2024    INR 1.69 (L) 06/24/2024    PROTIME 17.4 (L) 06/26/2024    PROTIME 16.5 (L) 06/25/2024    PROTIME 17.7 (L) 06/24/2024     Lab Results   Component Value Date    HGB 9.9 (L) 06/26/2024    HGB 10.7 (L) 06/25/2024    HGB 11.2 (L) 06/24/2024     Lab Results   Component Value Date    HCT 30.8 (L) 06/26/2024    HCT 33.2 (L) 06/25/2024    HCT 34.9 06/24/2024       Sol Strong, PharmD  06/26/24 10:19 EDT                "

## 2024-06-26 NOTE — PLAN OF CARE
Goal Outcome Evaluation:              Outcome Evaluation: Pt had bm this shift.  Pt scheduled to go to rehab Will continue to monitor.

## 2024-06-26 NOTE — PROGRESS NOTES
"CARDIOLOGY FOLLOW-UP PROGRESS NOTE      Reason for follow-up:    SOA  Pleural Effusion  CAD  HTN  AFib     Attending: Tricia Roman MD      Subjective .     Drowsy, slow to respond  Arousable however with stimulation  No acute events overnight  Chest pain-free    Heart rates in the 90s blood pressure 1 10-1 20 systolic     ROS  Pertinent items are noted in HPI, all other systems reviewed and negative  Allergies: Penicillins, Codeine, and Latex    Scheduled Meds:docusate sodium, 100 mg, Oral, Nightly  furosemide, 20 mg, Oral, Daily  guaiFENesin, 1,200 mg, Oral, Q12H  latanoprost, 1 drop, Right Eye, Nightly  metoclopramide, 5 mg, Oral, 4x Daily AC & at Bedtime  metoprolol succinate XL, 12.5 mg, Oral, Q12H  mirtazapine, 15 mg, Oral, Nightly  sodium chloride, 10 mL, Intravenous, Q12H        Continuous Infusions:Pharmacy to dose warfarin,         PRN Meds:.  acetaminophen    senna-docusate sodium **AND** polyethylene glycol **AND** bisacodyl **AND** bisacodyl    fleet enema    magnesium hydroxide    nitroglycerin    ondansetron    Pharmacy to dose warfarin    sodium chloride    sodium chloride    Objective     VITAL SIGNS  Patient Vitals for the past 24 hrs:   BP Temp Temp src Pulse Resp SpO2   06/26/24 0741 115/67 97.9 °F (36.6 °C) Oral -- 19 99 %   06/26/24 0512 119/63 97.8 °F (36.6 °C) Oral 91 15 97 %   06/26/24 0134 112/74 97.7 °F (36.5 °C) Oral 99 20 99 %   06/25/24 1939 123/70 98.3 °F (36.8 °C) Oral 105 21 97 %   06/25/24 1207 101/58 -- -- 76 -- 99 %   06/25/24 1204 101/58 97.4 °F (36.3 °C) -- 77 17 99 %        Flowsheet Rows      Flowsheet Row First Filed Value   Admission Height 157.5 cm (62\") Documented at 06/17/2024 1422   Admission Weight 45.4 kg (100 lb 1.4 oz) Documented at 06/17/2024 1422            Body mass index is 15.52 kg/m².      Intake/Output Summary (Last 24 hours) at 6/26/2024 0901  Last data filed at 6/26/2024 0512  Gross per 24 hour   Intake 480 ml   Output 1500 ml   Net -1020 ml        TELEMETRY: "     Afib, controlled rates 84 isolated PVCs    Physical Exam:  Vitals reviewed.   Constitutional:       General: Not in acute distress.     Appearance: Normal appearance. Well-developed. Frail. Chronically ill-appearing.   Eyes:      Pupils: Pupils are equal, round, and reactive to light.   HENT:      Head: Normocephalic and atraumatic.   Neck:      Vascular: No JVD.   Pulmonary:      Effort: Pulmonary effort is normal.      Breath sounds: Normal breath sounds.   Cardiovascular:      Normal rate. Irregularly irregular rhythm.   Pulses:     Intact distal pulses.   Edema:     Peripheral edema absent.   Abdominal:      General: There is no distension.      Palpations: Abdomen is soft.      Tenderness: There is no abdominal tenderness.   Musculoskeletal: Normal range of motion.      Cervical back: Normal range of motion and neck supple. Skin:     General: Skin is warm and dry.   Neurological:      Mental Status: Lethargic.            Results Review:   I reviewed the patient's new clinical results.    CBC    Results from last 7 days   Lab Units 06/26/24  0447 06/25/24  0238 06/24/24  0358 06/23/24  0015 06/22/24  0137 06/21/24  0427 06/20/24  0452   WBC 10*3/mm3 8.51 8.94 9.64 11.53* 9.45 12.39* 9.96   HEMOGLOBIN g/dL 9.9* 10.7* 11.2* 11.4* 11.9* 12.0 12.1   PLATELETS 10*3/mm3 227 214 226 193 177 184 185     BMP   Results from last 7 days   Lab Units 06/26/24  0447 06/25/24  0238 06/24/24  0358 06/23/24  0015 06/22/24  0137 06/21/24  0515 06/20/24  0452   SODIUM mmol/L 139 132* 132* 133* 133* 137 140   POTASSIUM mmol/L 3.5 4.4 4.7 5.1 4.8 4.7 4.2   CHLORIDE mmol/L 97* 95* 94* 96* 96* 100 102   CO2 mmol/L 36.8* 30.5* 32.0* 31.4* 34.1* 33.2* 31.0*   BUN mg/dL 15 19 17 18 18 14 13   CREATININE mg/dL 0.41* 0.50* 0.49* 0.44* 0.54* 0.45* 0.44*   GLUCOSE mg/dL 92 84 92 94 96 100* 85     Cr Clearance Estimated Creatinine Clearance: 48.8 mL/min (A) (by C-G formula based on SCr of 0.41 mg/dL (L)).  Coag   Results from last 7 days  "  Lab Units 06/26/24  0447 06/25/24  0238 06/24/24  0358 06/23/24  0015 06/22/24  0137 06/21/24  0427 06/20/24  0452   INR  1.66* 1.56* 1.69* 1.80* 1.62* 1.45* 1.59*     HbA1C No results found for: \"HGBA1C\"  Blood Glucose No results found for: \"POCGLU\"  Infection   Results from last 7 days   Lab Units 06/19/24  1057   BODYFLDCX  No growth at 5 days     CMP   Results from last 7 days   Lab Units 06/26/24  0447 06/25/24  0238 06/24/24  0358 06/23/24  0015 06/22/24  0137 06/21/24  0515 06/20/24  0452   SODIUM mmol/L 139 132* 132* 133* 133* 137 140   POTASSIUM mmol/L 3.5 4.4 4.7 5.1 4.8 4.7 4.2   CHLORIDE mmol/L 97* 95* 94* 96* 96* 100 102   CO2 mmol/L 36.8* 30.5* 32.0* 31.4* 34.1* 33.2* 31.0*   BUN mg/dL 15 19 17 18 18 14 13   CREATININE mg/dL 0.41* 0.50* 0.49* 0.44* 0.54* 0.45* 0.44*   GLUCOSE mg/dL 92 84 92 94 96 100* 85     ABG      UA      SAHARA  No results found for: \"POCMETH\", \"POCAMPHET\", \"POCBARBITUR\", \"POCBENZO\", \"POCCOCAINE\", \"POCOPIATES\", \"POCOXYCODO\", \"POCPHENCYC\", \"POCPROPOXY\", \"POCTHC\", \"POCTRICYC\"  Lysis Labs   Results from last 7 days   Lab Units 06/26/24  0447 06/25/24  0238 06/24/24  0358 06/23/24  0015 06/22/24  0137 06/21/24  0515 06/21/24  0427 06/20/24  0452   INR  1.66* 1.56* 1.69* 1.80* 1.62*  --  1.45* 1.59*   HEMOGLOBIN g/dL 9.9* 10.7* 11.2* 11.4* 11.9*  --  12.0 12.1   PLATELETS 10*3/mm3 227 214 226 193 177  --  184 185   CREATININE mg/dL 0.41* 0.50* 0.49* 0.44* 0.54* 0.45*  --  0.44*     Radiology(recent) No radiology results for the last day    Imaging Results (Last 24 Hours)       ** No results found for the last 24 hours. **                  EKG                  I personally viewed and interpreted the patient's EKG/Telemetry data:        ECHOCARDIOGRAM:     Results for orders placed during the hospital encounter of 02/23/24    Adult Transesophageal Echo (JEFF) W/ Cont if Necessary Per Protocol    Interpretation Summary    Left ventricular ejection fraction appears to be 56 - 60%.    Severe " mitral valve regurgitation is present.    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    Clinical correlation required        STRESS MYOVIEW:      CARDIAC CATHETERIZATION:      OTHER:         Assessment & Plan            Recurrent pleural effusion    Atrial fibrillation    Hiatal hernia    Hyperlipidemia    Hypertension    Osteoarthritis    Hypoxia    Severe malnutrition    Bradycardia        ASSESSMENT:    Recurrent Pleural Effusion  Plan to repeat CXR in 2 weeks  If recurrent effusion plan for pleurx catheter    Acute on Chronic HFpEF  Continue fluid/Na restriction  Gentle diuretics    Valvular heart disease  Severe MR  Not a surgical candidate and patient declines additional intervention  Continue supportive care    Permanent atrial fibrillation  Low dose BB resumed  On a/c with warfarin    Bradycardia  Improved after stopping digoxin and reducing toprol    CAD  No chest pain    CVA      PLAN:    Anticipating d/c to rehab today  Lethargy noted  Supportive care really from a cardiovascular standpoint at advanced age, limited functionality,  Will see her back as outpatient  Minimal CV medicines other than continuation of anticoagulation and beta-blocker, nothing invasive planned  If recurrent effusion would recommend Pleurx catheter for comfort and routine drainage  Continue off digoxin high risk medicine high risk for toxicity    Theodore Vital MD, PhD      Juju Castellon, APRN  06/26/24  09:01 EDT

## 2024-07-02 ENCOUNTER — TELEPHONE (OUTPATIENT)
Dept: CARDIOLOGY | Facility: CLINIC | Age: 89
End: 2024-07-02
Payer: MEDICARE

## 2024-07-02 NOTE — TELEPHONE ENCOUNTER
Caller: Shea Nam    Relationship to patient: Emergency Contact    Best call back number: [phone]     Chief complaint: NA    Type of visit: HFU    Requested date: 07.17.24     If rescheduling, when is the original appointment: NA     Additional notes: PT MISSED LAST APPT DUE TO BEING IN HOSPITAL - PT WAS TOLD TO FU IN 3 WEEKS [facility] AND NO AVAILABILITY SHOWING UNTIL OCTOBER - PLEASE CONTACT PTS DAUGHTER TO SCHEDULE AS PT IS IN NURSING FACILITY FOR REHAB AND NOT ALWAYS ABLE TO TALK

## 2024-07-03 ENCOUNTER — PATIENT OUTREACH (OUTPATIENT)
Dept: CASE MANAGEMENT | Facility: OTHER | Age: 89
End: 2024-07-03
Payer: MEDICARE

## 2024-07-03 NOTE — OUTREACH NOTE
AMBULATORY CASE MANAGEMENT NOTE    Care Coordination    SNF Follow-up    SNF Karlie. Discharged from [facility] 6/26 follow up     Jennifer RITCHIE  Ambulatory Case Management    7/3/2024, 13:05 EDT

## 2024-07-10 ENCOUNTER — TELEPHONE (OUTPATIENT)
Dept: PHARMACY | Facility: HOSPITAL | Age: 89
End: 2024-07-10
Payer: MEDICARE

## 2024-07-16 ENCOUNTER — OFFICE VISIT (OUTPATIENT)
Dept: CARDIOLOGY | Facility: CLINIC | Age: 89
End: 2024-07-16
Payer: MEDICARE

## 2024-07-16 VITALS
SYSTOLIC BLOOD PRESSURE: 104 MMHG | HEART RATE: 99 BPM | HEIGHT: 62 IN | WEIGHT: 111 LBS | DIASTOLIC BLOOD PRESSURE: 65 MMHG | BODY MASS INDEX: 20.43 KG/M2 | RESPIRATION RATE: 18 BRPM

## 2024-07-16 DIAGNOSIS — I34.0 NONRHEUMATIC MITRAL VALVE REGURGITATION: ICD-10-CM

## 2024-07-16 DIAGNOSIS — I10 PRIMARY HYPERTENSION: ICD-10-CM

## 2024-07-16 DIAGNOSIS — I48.11 LONGSTANDING PERSISTENT ATRIAL FIBRILLATION: Primary | ICD-10-CM

## 2024-07-16 DIAGNOSIS — E78.00 PURE HYPERCHOLESTEROLEMIA: ICD-10-CM

## 2024-07-16 DIAGNOSIS — Z79.01 CHRONIC ANTICOAGULATION: ICD-10-CM

## 2024-07-16 PROCEDURE — 99214 OFFICE O/P EST MOD 30 MIN: CPT | Performed by: INTERNAL MEDICINE

## 2024-07-16 PROCEDURE — G2211 COMPLEX E/M VISIT ADD ON: HCPCS | Performed by: INTERNAL MEDICINE

## 2024-07-16 RX ORDER — MIDODRINE HYDROCHLORIDE 2.5 MG/1
2.5 TABLET ORAL 2 TIMES DAILY
Qty: 60 TABLET | Refills: 3 | Status: SHIPPED | OUTPATIENT
Start: 2024-07-16

## 2024-07-18 ENCOUNTER — TELEPHONE (OUTPATIENT)
Dept: CARDIOLOGY | Facility: CLINIC | Age: 89
End: 2024-07-18

## 2024-07-18 NOTE — TELEPHONE ENCOUNTER
Caller: SANJUANA    Relationship: Provider    Best call back number: [phone]    What is the best time to reach you: ANY    Who are you requesting to speak with (clinical staff, provider,  specific staff member): CLINICAL      What was the call regarding: TAMRA EISENBERG WANTS THE PROGRESS NOTES FROM 07.16.24 OFFICE VISIT. [facility].

## 2024-07-22 ENCOUNTER — TELEPHONE (OUTPATIENT)
Dept: CARDIOLOGY | Facility: CLINIC | Age: 89
End: 2024-07-22
Payer: MEDICARE

## 2024-07-22 NOTE — TELEPHONE ENCOUNTER
Caller: [facility]    Relationship to patient: CARETAKER     Best call back number: [phone]    Patient is needing: FACILITY IS NEEDING VISIT NOTES FROM PATIENT'S VISIT ON 7/16/2024. PLEASE FAX [phone]

## 2024-07-29 NOTE — PROGRESS NOTES
Cardiology Clinic Note  Theodore Vital MD, PhD    Subjective:     Encounter Date:07/16/2024      Patient ID: Shruthi Amin is a 89 y.o. female.    Chief Complaint:  Chief Complaint   Patient presents with    Hospital Follow Up Visit       HPI:      I had the pleasure to see this 89-year-old female in follow-up today.  Recent hospitalization with lethargy and confusion.  She has history of stroke 2010, hypertension hyperlipidemia, underlying atrial fibrillation, history of coronary artery disease remotely.  Recently with transesophageal echo demonstrating EF of 55 to 60% with severe mitral valve regurgitation Limited view secondary to body habitus, calcified density on the aortic valve which could be fibroblastoma or healed vegetation.  Previously we did discuss that with history of stroke and usually fibroblastoma would be removed however at 89 years old advancing age would recommend more likely anticoagulation rather than open heart surgery for this which is likely been there over a long period of time.  There was also severe tricuspid valve regurgitation with severe biatrial enlargement secondary to longstanding atrial fibrillation.  Blood pressures are okay at 1 10-1 20 systolic.  She has at least moderate frailty with weight of 100 pounds and advanced age.  She is recent hospitalized with lethargy and confusion as well as recurrent pleural effusions which are multifactorial consider for Pleurx catheter..  Weight is stable today we discussed increasing p.o. intake, treatment for orthostasis with midodrine, she is scheduled for follow-up chest x-ray and follow-up with pulmonary as well with recurrent effusions.  She is tolerating anticoagulation with Coumadin goal INR is 2-2.5    Review of systems otherwise negative x 14 point review of systems except as mentioned above    Historical data copied forward from previous encounters in EMR including the history, exam, and assessment/plan has been reviewed and is  "unchanged unless noted otherwise.    Cardiac medicines reviewed with risk, benefits, and necessity of each discussed.    Risk and benefit of cardiac testing reviewed including death heart attack stroke pain bleeding infection need for vascular /cardiovascular surgery were discussed and the patient     Objective:         /65 (BP Location: Right arm, Patient Position: Sitting)   Pulse 99   Resp 18   Ht 157.5 cm (62\")   Wt 50.3 kg (111 lb)   BMI 20.30 kg/m²     Physical Exam  Irregularly irregular, no rubs gallops heave or lift  Frailty noted  Soft and tender distended  Clear to auscultation other than diminished bilateral bases left greater than right  Radial pulses 1-2+ equal bilaterally  No clubbing cyanosis  Normocephalic/atraumatic  Appears elderly stated age, intact grossly  Assessment:         Recurrent Pleural Effusion  Plan to repeat CXR in 2 weeks  If recurrent effusion plan for pleurx catheter     Acute on Chronic HFpEF  Continue fluid/Na restriction  Gentle diuretics     Valvular heart disease  Severe MR  Not a surgical candidate and patient declines additional intervention  Continue supportive care     Permanent atrial fibrillation  Low dose BB resumed  On a/c with warfarin     Bradycardia  Improved after stopping digoxin and reducing toprol     CAD  No chest pain     CVA remotely 2010    Fibroelastoma    Continue anticoagulation  Follow pulmonary recurrent thoracentesis if needed versus Pleurx catheter optimization  Continue beta-blocker  Start midodrine for orthostatic hypotension  Is also on scheduled Lasix for volume reduction with recurrent pleural effusion which can be avoided  Encourage nutrition good protein intake for oncotic pressure  Frailty and fall risk discussed, fall prevention    6-month follow-up  Theodore Vital MD, PhD      The pleasure to be involved in this patient's cardiovascular care.  Please call with any questions or concerns  Theodore Vital MD, PhD    Most recent EKG as " reviewed and interpreted by me:  Procedures     Most recent echo as reviewed and interpreted by me:  Results for orders placed during the hospital encounter of 02/23/24    Adult Transesophageal Echo (JEFF) W/ Cont if Necessary Per Protocol    Interpretation Summary    Left ventricular ejection fraction appears to be 56 - 60%.    Severe mitral valve regurgitation is present.    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    Clinical correlation required      Most recent stress test as reviewed and interpreted by me:      Most recent cardiac catheterization as reviewed interpreted by me:  No results found for this or any previous visit.    The following portions of the patient's history were reviewed and updated as appropriate: allergies, current medications, past family history, past medical history, past social history, past surgical history, and problem list.      ROS:  14 point review of systems negative except as mentioned above    Current Outpatient Medications:     acetaminophen (TYLENOL) 325 MG tablet, Take 2 tablets by mouth Every 4 (Four) Hours As Needed for Mild Pain., Disp: , Rfl:     Cholecalciferol 10 MCG (400 UNIT) tablet, Take 1 tablet by mouth Daily., Disp: , Rfl:     DIGESTIVE ENZYMES PO, Take 1 capsule by mouth 3 (Three) Times a Day., Disp: , Rfl:     docusate sodium (COLACE) 250 MG capsule, Take 1 capsule by mouth 2 (Two) Times a Day. Indications: Constipation, Disp: , Rfl:     famotidine (Pepcid) 20 MG tablet, Take 1 tablet by mouth Daily., Disp: , Rfl:     furosemide (LASIX) 20 MG tablet, Take 1 tablet by mouth Daily., Disp: , Rfl:     latanoprost (XALATAN) 0.005 % ophthalmic solution, Administer 1 drop to both eyes Every Night. Indications: Wide-Angle Glaucoma, Disp: , Rfl:     magnesium hydroxide (Milk of Magnesia) 400 MG/5ML suspension, Take 30 mL by mouth Daily  As Needed for Constipation. Indications: Constipation, Disp: , Rfl:     metoclopramide (REGLAN) 5 MG tablet, Take 1 tablet by mouth 4 (Four) Times a Day Before Meals & at Bedtime. Indications: Gastroesophageal Reflux Disease, Disp: 120 tablet, Rfl: 1    metoprolol succinate XL (TOPROL-XL) 25 MG 24 hr tablet, Take 0.5 tablets by mouth Every 12 (Twelve) Hours., Disp: 60 tablet, Rfl: 1    mirtazapine (REMERON) 15 MG tablet, Take 1 tablet by mouth Every Night. Indications: Appetite Stimulant, Disp: , Rfl:     sennosides-docusate (senna-docusate sodium) 8.6-50 MG per tablet, Take 1 tablet by mouth Daily As Needed for Constipation., Disp: , Rfl:     warfarin (COUMADIN) 2 MG tablet, Take 1 tablet by mouth 6 (Six) Times a Week. Monday, Wednesday, Thursday, Friday, Saturday, Sunday, Disp: , Rfl:     warfarin (COUMADIN) 2 MG tablet, Take 2 tablets by mouth 1 (One) Time Per Week. On Tuesday only., Disp: , Rfl:     midodrine (PROAMATINE) 2.5 MG tablet, Take 1 tablet by mouth 2 (Two) Times a Day., Disp: 60 tablet, Rfl: 3    Problem List:  Patient Active Problem List   Diagnosis    Atrial fibrillation    Chronic anticoagulation    Cerebrovascular accident (CVA)    Hiatal hernia    Hyperlipidemia    Hypertension    Osteoarthritis    Hypoxia    Compression fracture of thoracic vertebra with delayed healing    History of osteoporotic pathological fracture    Acute on chronic congestive heart failure, unspecified heart failure type    Severe malnutrition    Nonrheumatic mitral valve regurgitation    Back pain    Recurrent pleural effusion    Bradycardia     Past Medical History:  Past Medical History:   Diagnosis Date    Atrial fibrillation     Coronary artery disease     Atrial fibrillation    GERD (gastroesophageal reflux disease)     Glaucoma     Hiatal hernia with gastroesophageal reflux     History of osteoporotic pathological fracture     Right intertroch (7/2019)    Hx of compression fracture of spine     T12 and L1(2013); T6  (2022)    Hyperlipidemia     Hypertension     Osteoarthritis     Osteoporosis     on prolia(3/21/22)    Stroke     off and on dizziness from the stroke.     Past Surgical History:  Past Surgical History:   Procedure Laterality Date    BACK SURGERY      kyphoplasty    EYE SURGERY      cataract surgery    FIXATION KYPHOPLASTY LUMBAR SPINE  2013    HIP TROCHANTERIC NAILING WITH INTRAMEDULLARY HIP SCREW Right 2019    Procedure: HIP TROCHANTERIC NAILING SHORT WITH INTRAMEDULLARY HIP SCREW;  Surgeon: Edward Centeno MD;  Location: [facility] MAIN OR;  Service: Orthopedics    RECTAL SURGERY      x 3     Social History:  Social History     Socioeconomic History    Marital status:    Tobacco Use    Smoking status: Former     Current packs/day: 0.00     Types: Cigarettes     Quit date:      Years since quittin.6     Passive exposure: Past    Smokeless tobacco: Never   Vaping Use    Vaping status: Never Used   Substance and Sexual Activity    Alcohol use: No    Drug use: No    Sexual activity: Defer     Allergies:  Allergies   Allergen Reactions    Penicillins Hives    Codeine Nausea And Vomiting    Latex Rash     Immunizations:  Immunization History   Administered Date(s) Administered    Shingrix 2020, 2020    Tdap 2022            In-Office Procedure(s):  No orders to display        ASCVD RIsk Score::  The ASCVD Risk score (Dania SCOTT, et al., 2019) failed to calculate for the following reasons:    The 2019 ASCVD risk score is only valid for ages 40 to 79    The patient has a prior MI or stroke diagnosis    Imaging:    Results for orders placed in visit on 24    IMAGING SCANNED       Results for orders placed during the hospital encounter of 24    US Thoracentesis    Narrative  DATE OF EXAM:  2024 2:09 PM EDT    PROCEDURE:  US THORACENTESIS    INDICATIONS:  chronic R pleural effusion    COMPARISON:  No Comparisons Available    FLUOROSCOPIC  TIME:  None    TECHNIQUE:  A detailed explanation of the procedure, including the risks, benefits, and alternatives was provided. Informed consent was obtained from the patient. A preprocedure timeout was performed. The interventional radiology nurse monitored the patient at all  times. The patient was placed upright in the bed and the right back was ultrasounded, demonstrating a moderate right pleural effusion. The right back was then marked and prepped and draped in the usual sterile fashion. The skin and subcutaneous tissues  were anesthetized with 1% lidocaine and a small skin incision was made. Next, a 4 Malagasy centesis needle was advanced into the collection. A total of 450 mL of clear yellow fluid was aspirated and the needle was removed. The patient tolerated the  procedure well, without immediate complication. Post procedure chest x-ray completed demonstrating small right basilar ex vacuo pneumothorax. The patient is asymptomatic and vital signs are stable.    FINDINGS:  See above    Impression  Technically successful right thoracentesis yielding 450 mL of clear yellow fluid utilizing ultrasound guidance.      Report dictated by: Dakota Alfaro DNP    I have personally reviewed this case and agree with the findings above:    Electronically Signed: Eddie Guillen MD  4/19/2024 3:18 PM EDT  Workstation ID: [id number]      Results for orders placed during the hospital encounter of 02/23/24    CT Abdomen Pelvis Without Contrast    Narrative  CT ABDOMEN PELVIS WO CONTRAST    Date of Exam: 3/1/2024 11:19 AM EST    Indication: Nausea/vomiting.    Comparison: CT abdomen and pelvis 3/9/2017    Technique: Axial CT images were obtained of the abdomen and pelvis without the administration of contrast. Sagittal and coronal reconstructions were performed.  Automated exposure control and iterative reconstruction methods were used.      Findings:  LUNG BASES: There is bibasilar airspace disease and pleural effusions right greater  than left. There is a large hiatal hernia with the majority of the gastric body in the thoracic cavity. The heart is enlarged. There are    LIVER:  Unremarkable parenchyma without focal lesion.  BILIARY/GALLBLADDER:  Unremarkable  SPLEEN:  Unremarkable  PANCREAS:  Unremarkable  ADRENAL:  Unremarkable  KIDNEYS:  Unremarkable parenchyma with no solid mass identified. No obstruction.  No calculus identified.  GASTROINTESTINAL/MESENTERY: Evaluation of the gastrointestinal track demonstrates fecal stasis throughout compatible with constipation. There are few scattered diverticula. No evidence for acute inflammatory changes. Free fluid is present in the pelvic  cul-de-sac.  AORTA/IVC: There is calcified atherosclerotic disease.    RETROPERITONEUM/LYMPH NODES: No pathologic lymphadenopathy noted.    REPRODUCTIVE: The uterus is atrophied.  BLADDER:  Unremarkable    OSSEUS STRUCTURES: There is multilevel degenerative disc and facet disease accentuated by lumbar scoliosis. Kyphoplasty is noted at T12. Right hip surgical hardware is noted.    Impression  Impression:    1. Large hiatal hernia with the gastric body in the thoracic cavity.  2. Bibasilar airspace disease and pleural effusions.  3. Constipation.            Electronically Signed: Michelle Fowler MD  3/1/2024 11:44 AM EST  Workstation ID: [id number]      Lab Review:   No results displayed because visit has over 200 results.      Anticoagulation Visit on 06/12/2024   Component Date Value    INR 06/11/2024 2.20    Anticoagulation Visit on 06/04/2024   Component Date Value    INR 06/04/2024 2.30    Anticoagulation Visit on 05/29/2024   Component Date Value    INR 05/29/2024 2.50    Anticoagulation Visit on 05/21/2024   Component Date Value    INR 05/21/2024 2.20    Anticoagulation Visit on 05/14/2024   Component Date Value    INR 05/14/2024 2.80    Anticoagulation Visit on 05/07/2024   Component Date Value    INR 05/07/2024 2.70    Anticoagulation Visit on 04/30/2024    Component Date Value    INR 04/30/2024 2.60    Anticoagulation Visit on 04/25/2024   Component Date Value    INR 04/23/2024 2.70    Hospital Outpatient Visit on 04/19/2024   Component Date Value    Case Report 04/19/2024                      Value:Medical Cytology Report                           Case: [id number]                                  Authorizing Provider:  Lupe Curtis MD         Collected:           04/19/2024 02:25 PM          Ordering Location:     [facility]       Received:            04/22/2024 10:56 AM                                 INTERVENTIONAL RADIOLOGY                                                     Pathologist:           Orestes Fair MD                                                             Specimen:    Pleural Cavity, right                                                                      Final Diagnosis 04/19/2024                      Value:This result contains rich text formatting which cannot be displayed here.    Gross Description 04/19/2024                      Value:This result contains rich text formatting which cannot be displayed here.    Body Fluid Culture 04/19/2024 No growth at 5 days     Gram Stain 04/19/2024 Rare (1+) WBCs per low power field     Gram Stain 04/19/2024 No organisms seen     Lactate Dehydrogenase (L* 04/19/2024 69     Protein, Total, Fluid 04/19/2024 2.4     Color, Fluid 04/19/2024 Yellow     Appearance, Fluid 04/19/2024 Clear     RBC, Fluid 04/19/2024 1,000     Nucleated Cells, Fluid 04/19/2024 378     Extra Tube 04/19/2024 Hold for add-ons.     Neutrophils, Fluid 04/19/2024 7     Lymphocytes, Fluid 04/19/2024 83     Monocytes, Fluid 04/19/2024 10    There may be more visits with results that are not included.     Recent labs reviewed and interpreted for clinical significance and application            Level of Care:           Theodore Vital MD  07/29/24  .

## 2024-08-01 ENCOUNTER — TELEPHONE (OUTPATIENT)
Dept: CARDIOLOGY | Facility: CLINIC | Age: 89
End: 2024-08-01

## 2024-08-01 NOTE — TELEPHONE ENCOUNTER
Caller: Shea Nam    Relationship to patient: Emergency Contact    Best call back number: [phone]    Chief complaint: BMP ELEVATED, SWELLING OF FEET AND LEGS, BP 88-51, REHAB PUT HER ON HIGH DOSE OF LASIX AGAINST DR WHITE'S ADVISEMENT.    Type of visit: FU    Requested date: ASAP    If rescheduling, when is the original appointment:     Additional notes:HUB UNABLE TO SCHEDULE SOONEST AVAILABLE WAS SEP 23, PATIENT'S DAUGHTER STATED SHE IN NEED OF APPOINTMENT ASAP. URGENT CONDITION.  PATIENT'S DAUGHTER STATED THAT DR WHITE ADVISED THAT A HIGHER DOSE OF LASIX WOULD PUT TOO MUCH STRESS ON HER HEART AND THAT HER HEART COULD HANDLE THE DOSAGE. PLEASE ADVSIE

## 2024-08-01 NOTE — TELEPHONE ENCOUNTER
Caller: CHELSEA    RelationshiP SANJUANA        What form or medical record are you requesting: OFFICE NOTE 07/16/2024        How would you like to receive the form or medical records (pick-up, mail, fax): [phone]      Timeframe paperwork needed: ASAP    Additional notes: PLEASE FAX TO CHELSEA

## 2024-08-08 ENCOUNTER — TRANSCRIBE ORDERS (OUTPATIENT)
Dept: HOME HEALTH SERVICES | Facility: HOME HEALTHCARE | Age: 89
End: 2024-08-08
Payer: MEDICARE

## 2024-08-08 ENCOUNTER — DOCUMENTATION (OUTPATIENT)
Dept: HOME HEALTH SERVICES | Facility: HOME HEALTHCARE | Age: 89
End: 2024-08-08
Payer: MEDICARE

## 2024-08-08 ENCOUNTER — ANTICOAGULATION VISIT (OUTPATIENT)
Dept: PHARMACY | Facility: HOSPITAL | Age: 89
End: 2024-08-08
Payer: MEDICARE

## 2024-08-08 ENCOUNTER — HOME HEALTH ADMISSION (OUTPATIENT)
Dept: HOME HEALTH SERVICES | Facility: HOME HEALTHCARE | Age: 89
End: 2024-08-08
Payer: MEDICARE

## 2024-08-08 DIAGNOSIS — J90 PLEURAL EFFUSION: Primary | ICD-10-CM

## 2024-08-08 DIAGNOSIS — Z79.01 CHRONIC ANTICOAGULATION: ICD-10-CM

## 2024-08-08 DIAGNOSIS — I48.0 PAROXYSMAL ATRIAL FIBRILLATION: Primary | Chronic | ICD-10-CM

## 2024-08-08 LAB — INR PPP: 2.1 (ref 2–3)

## 2024-08-08 RX ORDER — WARFARIN SODIUM 3 MG/1
1 TABLET ORAL
COMMUNITY
Start: 2022-01-01

## 2024-08-08 RX ORDER — FUROSEMIDE 40 MG/1
1 TABLET ORAL DAILY
COMMUNITY
Start: 2022-01-01

## 2024-08-08 NOTE — PROGRESS NOTES
Anticoagulation Clinic Progress Note - Home INR Testing     INR Goal: 2 - 3  Today's INR: 2.1 (therapeutic). INR result was called in today by Shea, patient's daughter.   Current warfarin dose: warfarin 3 mg PO daily Current total weekly dose = 21 mg per week.     -Of note: patient was discharged from [facility] and will be testing on tuesdays    INR History:      Latest Ref Rng & Units 6/21/2024     4:27 AM 6/22/2024     1:37 AM 6/23/2024    12:15 AM 6/24/2024     3:58 AM 6/25/2024     2:38 AM 6/26/2024     4:47 AM 8/8/2024     4:03 PM   Anticoagulation Monitoring   INR Goal        2.0-3.0   Trend        Same   Last Week Total        16 mg   Next Week Total        16 mg   Sun        2 mg   Mon        2 mg   Tue        4 mg   Wed        2 mg   Thu        2 mg   Fri        2 mg   Sat        2 mg   Historical INR 2.00 - 3.00 1.45  1.62  1.80  1.69  1.56  1.66         Anticoagulation Summary  As of 8/8/2024      INR goal:  2.0-3.0   TTR:  71.6% (4.7 y)   INR used for dosing:     Warfarin maintenance plan:  4 mg every Tue; 2 mg all other days   Weekly warfarin total:  16 mg   Plan last modified:  Chel Muñoz, PharmD (4/25/2024)   Next INR check:     Priority:  High   Target end date:  Indefinite    Indications    Atrial fibrillation [I48.91]  Chronic anticoagulation [Z79.01]                 Anticoagulation Episode Summary       INR check location:  Anticoagulation Clinic    Preferred lab:      Send INR reminders to:  [facility] CLINICAL POOL    Comments:  Patient Contract: **Still need**    Last  = 5/21; next due 6/18    Supplies mailed 6/12/24              Anticoagulation Care Providers       Provider Role Specialty Phone number    Theodroe Vital MD Referring Cardiology [phone]            Clinic Interview:   Patient Findings     Negatives:  Signs/symptoms of thrombosis, Signs/symptoms of bleeding,   Laboratory test error suspected, Change in health, Change in alcohol use,   Change in  activity, Upcoming invasive procedure, Emergency department   visit, Upcoming dental procedure, Missed doses, Extra doses, Change in   medications, Change in diet/appetite, Hospital admission, Bruising, Other   complaints      Clinical Outcomes     Negatives:  Major bleeding event, Thromboembolic event,   Anticoagulation-related hospital admission, Anticoagulation-related ED   visit, Anticoagulation-related fatality        Current Medications:   Prior to Admission medications    Medication Sig Start Date End Date Taking? Authorizing Provider   acetaminophen (TYLENOL) 325 MG tablet Take 2 tablets by mouth Every 4 (Four) Hours As Needed for Mild Pain. 6/25/24   Tricia Roman MD   Cholecalciferol 10 MCG (400 UNIT) tablet Take 1 tablet by mouth Daily.    Minh Bass MD   DIGESTIVE ENZYMES PO Take 1 capsule by mouth 3 (Three) Times a Day.    Minh Bass MD   docusate sodium (COLACE) 250 MG capsule Take 1 capsule by mouth 2 (Two) Times a Day. Indications: Constipation 6/25/24   Tricia Roman MD   famotidine (Pepcid) 20 MG tablet Take 1 tablet by mouth Daily. 6/25/24   Tricia Roman MD   furosemide (LASIX) 20 MG tablet Take 1 tablet by mouth Daily. 6/26/24   Tricia Roman MD   latanoprost (XALATAN) 0.005 % ophthalmic solution Administer 1 drop to both eyes Every Night. Indications: Wide-Angle Glaucoma    Minh Bass MD   magnesium hydroxide (Milk of Magnesia) 400 MG/5ML suspension Take 30 mL by mouth Daily As Needed for Constipation. Indications: Constipation 3/15/24   Minh Bass MD   metoclopramide (REGLAN) 5 MG tablet Take 1 tablet by mouth 4 (Four) Times a Day Before Meals & at Bedtime. Indications: Gastroesophageal Reflux Disease 6/24/24   Josie Solano APRN   metoprolol succinate XL (TOPROL-XL) 25 MG 24 hr tablet Take 0.5 tablets by mouth Every 12 (Twelve) Hours. 6/24/24   Josie Solano APRN   midodrine (PROAMATINE) 2.5 MG tablet Take 1 tablet by mouth 2 (Two) Times a Day.  24   Theodore Vital MD   mirtazapine (REMERON) 15 MG tablet Take 1 tablet by mouth Every Night. Indications: Appetite Stimulant 3/13/24   Mihn Bass MD   sennosides-docusate (senna-docusate sodium) 8.6-50 MG per tablet Take 1 tablet by mouth Daily As Needed for Constipation.    Minh Bass MD   warfarin (COUMADIN) 2 MG tablet Take 1 tablet by mouth 6 (Six) Times a Week. Monday, Wednesday, Thursday, Friday, Saturday,     Minh Bass MD   warfarin (COUMADIN) 2 MG tablet Take 2 tablets by mouth 1 (One) Time Per Week. On Tuesday only.    Minh Bass MD       Medical history:   Past Medical History:   Diagnosis Date    Atrial fibrillation     Coronary artery disease     Atrial fibrillation    GERD (gastroesophageal reflux disease)     Glaucoma     Hiatal hernia with gastroesophageal reflux     History of osteoporotic pathological fracture     Right intertroch (2019)    Hx of compression fracture of spine     T12 and L1(); T6 (2022)    Hyperlipidemia     Hypertension     Osteoarthritis     Osteoporosis     on prolia(3/21/22)    Stroke     off and on dizziness from the stroke.       Social history:   Social History     Tobacco Use    Smoking status: Former     Current packs/day: 0.00     Types: Cigarettes     Quit date:      Years since quittin.6     Passive exposure: Past    Smokeless tobacco: Never   Substance Use Topics    Alcohol use: No       Allergies:    Penicillins, Codeine, and Latex    [id number] SCORE   [id number] Score for Atrial Fibrillation Stroke Risk  Age in Years: 75+  Sex: Female  CHF History: Yes  Hypertension History: Yes  Stroke/TIA/Thromboembolism History: Yes  Vascular Disease History: Yes  Diabetes History: No  [id number] Score: 8  Stroke risks -: 8 Points. Risk of ischemic stroke: 10.8%. Risk of stroke/TIA/embolism: 15.2%        This patient is managed via a collaborative agreement, pursuant to IC [id number]. The  signed protocol is kept in the [facility] at [address] [place] [address].    Education: Shruthi Amin has been instructed to call if any changes in medications, doses, concerns, etc. Patient was provided dosing instructions and expressed verbal understanding and has no further questions at this time.    Plan:  1. no change; warfarin 3 mg PO daily  Total weekly dose = 21 mg.   2. INR should be tested weekly and called into clinic.       Micha Ferrari, PharmD  8/8/2024  16:04 EDT

## 2024-08-08 NOTE — PROGRESS NOTES
Facility Discharge  Shruthi Amin - 6/10/1928  Medicare  DC 8/8 from [facility]  RN/PT/OT     Dr. Xin Goodwin is the PCP/POC/Attending provider and agrees to follow the HH/POC per [place] on 8/7/24 at 13:58     Madi Anderson NP is the ordering provider: RN/TP/OT    Madi Anderson NP performed the F2F on 7/27/24    DX: pleural effusion, acute on chronic diastolic CHF, atrial fibrillation, severe protein calorie malnutrition, hyperlipidemia, endocarditis, stage 2 pressure ulcer.     Address confirmed:  [address] [place] [address] [place] [address]    Contact:  #1 home [phone]   #2 daughter cell - [phone] (Shea)    I met with the patient at the bedside and spoke with the daughter and they are agreeable to home health and do not have any other skilled services in the home at this time.

## 2024-08-10 ENCOUNTER — HOME CARE VISIT (OUTPATIENT)
Dept: HOME HEALTH SERVICES | Facility: HOME HEALTHCARE | Age: 89
End: 2024-08-10
Payer: MEDICARE

## 2024-08-10 PROCEDURE — G0299 HHS/HOSPICE OF RN EA 15 MIN: HCPCS

## 2024-08-10 NOTE — Clinical Note
"[facility] SOC    SOC Note: Patient is a pleasant 89 year old female who was discharged home from rehab 2 days ago. About 7 weeks ago, patient was admitted to [facility] from Dr. Curtis's office due to pleural effusions and increased respiratory sx. Patient was admitted and then discharged to [facility] rehab. Patient states that she progressed well in rehab and was discharged home after about 6 weeks. Patient states that she does have episodes of incontinence. Patient's daughter is a nurse and able to meet patient's needs. Patient is on continuous home oxygen and states that she has been for a few years. Patient has all f/u appointments made. Patient is currently on a mechanical soft diet and thickened liquids due to failing a swallow study in rehab. SN will see when SLP's next opening is and add eval for speech therapy. Patient has rash on R foot. States that it does itch at times. Daugher is putting a natual cream on area. States that she will show MD at next appointment. Unable to get reading on pulse ox. All other vitals WNL. Patient's daughter checks INR with home machine and manages with MD office.    Home Health ordered for: SN, PT, OT    Reason for Hosp/Primary Dx/Co-morbidities: CHF, SOB, pleural effusion    Focus of Care: CHF management    Patient's goal(s): \"to be able to do more around the house\"    Current Functional status/mobility/DME: uses rollator in her home, needs minimal assistance    HB status/Living Arrangements: lives with daughter who is primary caregiver    Skin Integrity/wound status: no open wounds    Code Status: DNR    Fall Risk/Safety concerns: high fall risk    Educated on Emergency Plan, steps to take prior to going to the ER and when to Call Home Health First:  yes    Medication issues/Concerns: none    Additional Problems/Concerns: none    SDOH Barriers (i.e. caregiver concerns, social isolation, transportation, food insecurity, environment, income etc.)/Need for MSW: denies"

## 2024-08-11 VITALS — DIASTOLIC BLOOD PRESSURE: 68 MMHG | TEMPERATURE: 98 F | SYSTOLIC BLOOD PRESSURE: 110 MMHG | RESPIRATION RATE: 16 BRPM

## 2024-08-11 NOTE — HOME HEALTH
"SOC Note: Patient is a pleasant 89 year old female who was discharged home from rehab 2 days ago. About 7 weeks ago, patient was admitted to [facility] from Dr. Curtis's office due to pleural effusions and increased respiratory sx. Patient was admitted and then discharged to [facility] rehab. Patient states that she progressed well in rehab and was discharged home after about 6 weeks. Patient states that she does have episodes of incontinence. Patient's daughter is a nurse and able to meet patient's needs. Patient is on continuous home oxygen and states that she has been for a few years. Patient has all f/u appointments made. Patient is currently on a mechanical soft diet and thickened liquids due to failing a swallow study in rehab. SN will see when SLP's next opening is and add eval for speech therapy. Patient has rash on R foot. States that it does itch at times. Daugher is putting a natual cream on area. States that she will show MD at next appointment. Unable to get reading on pulse ox. All other vitals WNL. Patient's daughter checks INR with home machine and manages with MD office.    Home Health ordered for: SN, PT, OT    Reason for Hosp/Primary Dx/Co-morbidities: CHF, SOB, pleural effusion    Focus of Care: CHF management    Patient's goal(s): \"to be able to do more around the house\"    Current Functional status/mobility/DME: uses rollator in her home, needs minimal assistance    HB status/Living Arrangements: lives with daughter who is primary caregiver    Skin Integrity/wound status: no open wounds    Code Status: DNR    Fall Risk/Safety concerns: high fall risk    Educated on Emergency Plan, steps to take prior to going to the ER and when to Call Home Health First:  yes    Medication issues/Concerns: none    Additional Problems/Concerns: none    SDOH Barriers (i.e. caregiver concerns, social isolation, transportation, food insecurity, environment, income etc.)/Need for MSW: denies"

## 2024-08-12 ENCOUNTER — HOME CARE VISIT (OUTPATIENT)
Dept: HOME HEALTH SERVICES | Facility: HOME HEALTHCARE | Age: 89
End: 2024-08-12
Payer: MEDICARE

## 2024-08-12 VITALS
HEART RATE: 78 BPM | RESPIRATION RATE: 18 BRPM | OXYGEN SATURATION: 92 % | TEMPERATURE: 97.5 F | DIASTOLIC BLOOD PRESSURE: 68 MMHG | SYSTOLIC BLOOD PRESSURE: 114 MMHG

## 2024-08-12 PROCEDURE — G0151 HHCP-SERV OF PT,EA 15 MIN: HCPCS

## 2024-08-13 ENCOUNTER — ANTICOAGULATION VISIT (OUTPATIENT)
Dept: PHARMACY | Facility: HOSPITAL | Age: 89
End: 2024-08-13
Payer: MEDICARE

## 2024-08-13 ENCOUNTER — OFFICE VISIT (OUTPATIENT)
Dept: CARDIOLOGY | Facility: CLINIC | Age: 89
End: 2024-08-13
Payer: MEDICARE

## 2024-08-13 ENCOUNTER — HOME CARE VISIT (OUTPATIENT)
Dept: HOME HEALTH SERVICES | Facility: HOME HEALTHCARE | Age: 89
End: 2024-08-13
Payer: MEDICARE

## 2024-08-13 ENCOUNTER — TELEPHONE (OUTPATIENT)
Dept: CARDIOLOGY | Facility: CLINIC | Age: 89
End: 2024-08-13

## 2024-08-13 VITALS
DIASTOLIC BLOOD PRESSURE: 68 MMHG | SYSTOLIC BLOOD PRESSURE: 99 MMHG | WEIGHT: 101 LBS | RESPIRATION RATE: 18 BRPM | HEIGHT: 62 IN | HEART RATE: 101 BPM | BODY MASS INDEX: 18.58 KG/M2

## 2024-08-13 DIAGNOSIS — I34.0 NONRHEUMATIC MITRAL VALVE REGURGITATION: ICD-10-CM

## 2024-08-13 DIAGNOSIS — J90 RECURRENT PLEURAL EFFUSION: ICD-10-CM

## 2024-08-13 DIAGNOSIS — Z79.01 CHRONIC ANTICOAGULATION: Primary | ICD-10-CM

## 2024-08-13 DIAGNOSIS — I48.11 LONGSTANDING PERSISTENT ATRIAL FIBRILLATION: Primary | ICD-10-CM

## 2024-08-13 DIAGNOSIS — Z79.01 CHRONIC ANTICOAGULATION: ICD-10-CM

## 2024-08-13 DIAGNOSIS — I10 PRIMARY HYPERTENSION: ICD-10-CM

## 2024-08-13 LAB
INR PPP: 3.9 (ref 0.9–1.1)
INR PPP: 4.2 (ref 0.9–1.1)
PROTHROMBIN TIME: 38.9 SECONDS
PROTHROMBIN TIME: 41.4 SECONDS

## 2024-08-13 PROCEDURE — 85610 PROTHROMBIN TIME: CPT

## 2024-08-13 PROCEDURE — G0463 HOSPITAL OUTPT CLINIC VISIT: HCPCS

## 2024-08-13 PROCEDURE — 36416 COLLJ CAPILLARY BLOOD SPEC: CPT

## 2024-08-13 PROCEDURE — G0299 HHS/HOSPICE OF RN EA 15 MIN: HCPCS

## 2024-08-13 NOTE — PROGRESS NOTES
Anticoagulation Clinic Progress Note    INR Goal: 2 - 3  Today's INR: 3.9, 4.2 (two separate finger sticks in office). 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 Decided to stop into clinic today when coming for Dr. Vital's appointment to confirm INR).   Current warfarin dose: warfarin 3 mg PO daily. Current total weekly dose = 21 mg per week.     INR History:      Latest Ref Rng & Units 6/23/2024    12:15 AM 6/24/2024     3:58 AM 6/25/2024     2:38 AM 6/26/2024     4:47 AM 8/8/2024    12:00 AM 8/8/2024     4:03 PM 8/13/2024     3:45 PM   Anticoagulation Monitoring   INR       2.10 3.90   INR Date       8/8/2024 8/13/2024   INR Goal       2.0-3.0 2.0-3.0   Trend       Same Up   Last Week Total       16 mg 21 mg   Next Week Total       16 mg 18 mg   Sun       2 mg 3 mg   Mon       2 mg 3 mg   Tue       - 1.5 mg   Wed       - 3 mg   Thu       2 mg 3 mg   Fri       2 mg 3 mg   Sat       2 mg 1.5 mg   Historical INR 2.00 - 3.00 1.80  1.69  1.56  1.66  2.10         Visit Report        Report       This result is from an external source.       Anticoagulation Summary  As of 8/13/2024      INR goal:  2.0-3.0   TTR:  71.5% (4.7 y)   INR used for dosing:  3.90 (8/13/2024)   Warfarin maintenance plan:  1.5 mg every Tue, Sat; 3 mg all other days   Weekly warfarin total:  18 mg   Plan last modified:  Tasia Wen, PharmD (8/13/2024)   Next INR check:  8/20/2024   Priority:  High   Target end date:  Indefinite    Indications    Atrial fibrillation [I48.91]  Chronic anticoagulation [Z79.01]                 Anticoagulation Episode Summary       INR check location:  Anticoagulation Clinic    Preferred lab:      Send INR reminders to:   [facility] CLINIC CLINICAL POOL    Comments:  Patient Contract: **Still need**    Last  = 5/21; next due  6/18    Supplies mailed 6/12/24              Anticoagulation Care Providers       Provider Role Specialty Phone number    Theodore Vital Len Mckay MD Referring Cardiology [phone]            Clinic Interview:   Patient Findings     Negatives:  Signs/symptoms of thrombosis, Signs/symptoms of bleeding,   Laboratory test error suspected, Change in health, Change in alcohol use,   Change in activity, Upcoming invasive procedure, Emergency department   visit, Upcoming dental procedure, Missed doses, Extra doses, Change in   medications, Change in diet/appetite, Hospital admission, Bruising, Other   complaints      Clinical Outcomes     Negatives:  Major bleeding event, Thromboembolic event,   Anticoagulation-related hospital admission, Anticoagulation-related ED   visit, Anticoagulation-related fatality        Current Medications:   Prior to Admission medications    Medication Sig Start Date End Date Taking? Authorizing Provider   acetaminophen (TYLENOL) 325 MG tablet Take 2 tablets by mouth Every 4 (Four) Hours As Needed for Mild Pain. 6/25/24   Tricia Roman MD   Cholecalciferol 10 MCG (400 UNIT) tablet Take 1 tablet by mouth Daily. Indications: Vitamin D Deficiency 1/1/22   Minh Bass MD   DIGESTIVE ENZYMES PO Take 1 capsule by mouth 3 (Three) Times a Day. Indications: supplement 1/1/22   Minh Bass MD   docusate sodium (COLACE) 250 MG capsule Take 1 capsule by mouth 2 (Two) Times a Day. Indications: Constipation 6/25/24   Tricia Roman MD   famotidine (Pepcid) 20 MG tablet Take 1 tablet by mouth Daily. 6/25/24   Tricia Roman MD   furosemide (LASIX) 40 MG tablet Take 1 tablet by mouth Daily. Indications: Cardiac Failure 1/1/22   Minh Bass MD   latanoprost (XALATAN) 0.005 % ophthalmic solution Administer 1 drop to both eyes Every Night. Indications: Wide-Angle Glaucoma 1/1/22   Minh Bass MD   magnesium hydroxide (Milk of Magnesia) 400 MG/5ML suspension Take 30 mL by mouth  Daily As Needed for Constipation. Indications: Constipation 3/15/24   Minh Bass MD   metoclopramide (REGLAN) 5 MG tablet Take 1 tablet by mouth 4 (Four) Times a Day Before Meals & at Bedtime. Indications: Gastroesophageal Reflux Disease 6/24/24   Josie Solano APRN   metoprolol succinate XL (TOPROL-XL) 25 MG 24 hr tablet Take 0.5 tablets by mouth Every 12 (Twelve) Hours. 6/24/24   Josie Solano APRN   midodrine (PROAMATINE) 2.5 MG tablet Take 1 tablet by mouth 2 (Two) Times a Day. 7/16/24   Theodore Vital MD   mirtazapine (REMERON) 15 MG tablet Take 1 tablet by mouth Every Night. Indications: Appetite Stimulant 3/13/24   Minh Bass MD   O2 (OXYGEN) Inhale 3 L/min As Needed. Indications: SOB 1/1/22   Minh Bass MD   potassium chloride (KLOR-CON M20) 20 MEQ CR tablet Take 1 tablet by mouth Daily. Indications: Low Amount of Potassium in the Blood 8/4/24   Minh Bass MD   sennosides-docusate (senna-docusate sodium) 8.6-50 MG per tablet Take 1 tablet by mouth 2 (Two) Times a Day. Indications: Constipation 1/1/22   Minh Bass MD   warfarin (COUMADIN) 3 MG tablet Take 1 tablet by mouth Daily. Indications: Atrial Fibrillation 1/1/22   Minh Bass MD   warfarin (Coumadin) 3 MG tablet Take 1 tablet by mouth Every Night. Indications: Atrial Fibrillation 1/1/22   Minh Bass MD   furosemide (LASIX) 20 MG tablet Take 1 tablet by mouth Daily.  Patient taking differently: Take 2 tablets by mouth Daily. 6/26/24 8/13/24  Tricia Roman MD       Medical history:   Past Medical History:   Diagnosis Date    Atrial fibrillation     Coronary artery disease     Atrial fibrillation    GERD (gastroesophageal reflux disease)     Glaucoma     Hiatal hernia with gastroesophageal reflux     History of osteoporotic pathological fracture     Right intertroch (7/2019)    Hx of compression fracture of spine     T12 and L1(2013); T6 (1/2022)    Hyperlipidemia      Hypertension     Osteoarthritis     Osteoporosis     on prolia(3/21/22)    Stroke     off and on dizziness from the stroke.       Social history:   Social History     Tobacco Use    Smoking status: Former     Current packs/day: 0.00     Types: Cigarettes     Quit date:      Years since quittin.6     Passive exposure: Past    Smokeless tobacco: Never   Substance Use Topics    Alcohol use: No       Allergies:    Penicillins, Codeine, and Latex    Vaccine History:   Immunization History   Administered Date(s) Administered    Shingrix 2020, 2020    Tdap 2022       [id number] SCORE   [id number] Score for Atrial Fibrillation Stroke Risk  Age in Years: 75+  Sex: Female  CHF History: Yes  Hypertension History: Yes  Stroke/TIA/Thromboembolism History: Yes  Vascular Disease History: Yes  Diabetes History: No  [id number] Score: 8  Stroke risks -: 8 Points. Risk of ischemic stroke: 10.8%. Risk of stroke/TIA/embolism: 15.2%      This patient is managed via a collaborative agreement, pursuant to IC [id number]. The signed protocol is kept in the [facility] at [address] [place] [address].    Education: Shruthi Amin has been instructed to call if any changes in medications, doses, concerns, etc. Patient was provided dosing instructions and expressed verbal understanding and has no further questions at this time.    Plan:  1. Anticoagulation   - decrease by 14 %; warfarin 3 mg PO daily, except warfarin 1.5 mg PO on Tuesday and Saturday.  New total weekly dose = 18 mg.   - Patient to check INR weekly and call into clinic  - Encouraged patient to have extra serving of greens tonight.    Counseled patient on increased bleeding risk associated with elevated INR, signs/symptoms to monitor for, and when to seek medical attention      Tasia Wen, Yue  2024  17:10 EDT

## 2024-08-13 NOTE — TELEPHONE ENCOUNTER
ESTEPHANIA IS WITH THE PATIENT NOW AND SHE WOULD LIKE TO KNOW IF DR WHITE WOULD ORDER A CHEST XRAY TO GET DONE TODAY. PATIENT IS COUGHING UP A LOT OF PHLEGM THAT STARTED THIS MORNING.

## 2024-08-14 ENCOUNTER — HOME CARE VISIT (OUTPATIENT)
Dept: HOME HEALTH SERVICES | Facility: HOME HEALTHCARE | Age: 89
End: 2024-08-14
Payer: MEDICARE

## 2024-08-14 VITALS
OXYGEN SATURATION: 99 % | TEMPERATURE: 98 F | RESPIRATION RATE: 17 BRPM | DIASTOLIC BLOOD PRESSURE: 68 MMHG | SYSTOLIC BLOOD PRESSURE: 108 MMHG | HEART RATE: 97 BPM

## 2024-08-14 VITALS — DIASTOLIC BLOOD PRESSURE: 68 MMHG | TEMPERATURE: 97.4 F | HEART RATE: 103 BPM | SYSTOLIC BLOOD PRESSURE: 97 MMHG

## 2024-08-14 PROCEDURE — G0153 HHCP-SVS OF S/L PATH,EA 15MN: HCPCS

## 2024-08-15 NOTE — HOME HEALTH
"Eval Note    Patient's goal(s): \"I want to be able to eat without stuff going down the wrong way.\"    Services required to achieve goals: ST    Potential Issues for goal attainment: None    Problems identified: oropharyngeal dysphagia    Describe the Functional status and safety: Good. Pt always utilizes her rollator when ambulating. Due to severe weakness today and lethargy this date, pt required increased assistance when ambulating. Pt's daughter, Shea Nam, was present during assessment. Shea has been educated on what consistencies are safe for patient to eat and how to accurately thicken liquids.    Describe any environmental issues: Pets reside in home. O2 tubing.    Any equipment needs: None    POC confirmed with Shruthi Amin on 8-14-24."

## 2024-08-16 ENCOUNTER — HOME CARE VISIT (OUTPATIENT)
Dept: HOME HEALTH SERVICES | Facility: HOME HEALTHCARE | Age: 89
End: 2024-08-16
Payer: MEDICARE

## 2024-08-16 ENCOUNTER — APPOINTMENT (OUTPATIENT)
Dept: GENERAL RADIOLOGY | Facility: HOSPITAL | Age: 89
DRG: 308 | End: 2024-08-16
Payer: MEDICARE

## 2024-08-16 ENCOUNTER — HOSPITAL ENCOUNTER (INPATIENT)
Facility: HOSPITAL | Age: 89
LOS: 7 days | Discharge: HOME | DRG: 308 | End: 2024-08-23
Attending: EMERGENCY MEDICINE | Admitting: FAMILY MEDICINE
Payer: MEDICARE

## 2024-08-16 VITALS
OXYGEN SATURATION: 100 % | RESPIRATION RATE: 18 BRPM | SYSTOLIC BLOOD PRESSURE: 122 MMHG | DIASTOLIC BLOOD PRESSURE: 66 MMHG | TEMPERATURE: 97.8 F | HEART RATE: 91 BPM

## 2024-08-16 DIAGNOSIS — J96.01 ACUTE RESPIRATORY FAILURE WITH HYPOXIA AND HYPERCAPNIA: Primary | ICD-10-CM

## 2024-08-16 DIAGNOSIS — J18.9 PNEUMONIA DUE TO INFECTIOUS ORGANISM, UNSPECIFIED LATERALITY, UNSPECIFIED PART OF LUNG: ICD-10-CM

## 2024-08-16 DIAGNOSIS — I50.9 ACUTE ON CHRONIC CONGESTIVE HEART FAILURE, UNSPECIFIED HEART FAILURE TYPE: ICD-10-CM

## 2024-08-16 DIAGNOSIS — I48.91 ATRIAL FIBRILLATION WITH RVR: ICD-10-CM

## 2024-08-16 DIAGNOSIS — J96.02 ACUTE RESPIRATORY FAILURE WITH HYPOXIA AND HYPERCAPNIA: Primary | ICD-10-CM

## 2024-08-16 LAB
ALBUMIN SERPL-MCNC: 4 G/DL (ref 3.5–5.2)
ALBUMIN/GLOB SERPL: 1.4 G/DL
ALP SERPL-CCNC: 139 U/L (ref 39–117)
ALT SERPL W P-5'-P-CCNC: 20 U/L (ref 1–33)
ANION GAP SERPL CALCULATED.3IONS-SCNC: 10.7 MMOL/L (ref 5–15)
APTT PPP: 41.8 SECONDS (ref 61–76.5)
ARTERIAL PATENCY WRIST A: POSITIVE
ARTERIAL PATENCY WRIST A: POSITIVE
AST SERPL-CCNC: 38 U/L (ref 1–32)
ATMOSPHERIC PRESS: ABNORMAL MM[HG]
ATMOSPHERIC PRESS: ABNORMAL MM[HG]
B PARAPERT DNA SPEC QL NAA+PROBE: NOT DETECTED
B PERT DNA SPEC QL NAA+PROBE: NOT DETECTED
BASE EXCESS BLDA CALC-SCNC: 10.3 MMOL/L (ref 0–3)
BASE EXCESS BLDA CALC-SCNC: 7.9 MMOL/L (ref 0–3)
BASOPHILS # BLD AUTO: 0.2 10*3/MM3 (ref 0–0.2)
BASOPHILS NFR BLD AUTO: 1.2 % (ref 0–1.5)
BDY SITE: ABNORMAL
BDY SITE: ABNORMAL
BILIRUB SERPL-MCNC: 0.8 MG/DL (ref 0–1.2)
BUN SERPL-MCNC: 26 MG/DL (ref 8–23)
BUN/CREAT SERPL: 47.3 (ref 7–25)
C PNEUM DNA NPH QL NAA+NON-PROBE: NOT DETECTED
CA-I BLDA-SCNC: 1.29 MMOL/L (ref 1.15–1.33)
CALCIUM SPEC-SCNC: 9.3 MG/DL (ref 8.2–9.6)
CHLORIDE SERPL-SCNC: 100 MMOL/L (ref 98–107)
CO2 BLDA-SCNC: 38.3 MMOL/L (ref 22–29)
CO2 SERPL-SCNC: 30.3 MMOL/L (ref 22–29)
CREAT BLDA-MCNC: 0.52 MG/DL (ref 0.6–1.3)
CREAT SERPL-MCNC: 0.55 MG/DL (ref 0.57–1)
D-LACTATE SERPL-SCNC: 2.1 MMOL/L (ref 0.5–2)
D-LACTATE SERPL-SCNC: 2.3 MMOL/L (ref 0.2–2)
D-LACTATE SERPL-SCNC: 2.3 MMOL/L (ref 0.3–2)
D-LACTATE SERPL-SCNC: 2.9 MMOL/L (ref 0.5–2)
DEPRECATED RDW RBC AUTO: 59.2 FL (ref 37–54)
EGFRCR SERPLBLD CKD-EPI 2021: 84 ML/MIN/1.73
EGFRCR SERPLBLD CKD-EPI 2021: 85.1 ML/MIN/1.73
EOSINOPHIL # BLD AUTO: 0.04 10*3/MM3 (ref 0–0.4)
EOSINOPHIL NFR BLD AUTO: 0.2 % (ref 0.3–6.2)
ERYTHROCYTE [DISTWIDTH] IN BLOOD BY AUTOMATED COUNT: 15.1 % (ref 12.3–15.4)
FLUAV SUBTYP SPEC NAA+PROBE: NOT DETECTED
FLUBV RNA ISLT QL NAA+PROBE: NOT DETECTED
GEN 5 2HR TROPONIN T REFLEX: 31 NG/L
GLOBULIN UR ELPH-MCNC: 2.9 GM/DL
GLUCOSE BLDC GLUCOMTR-MCNC: 233 MG/DL (ref 74–100)
GLUCOSE BLDC GLUCOMTR-MCNC: 233 MG/DL (ref 74–100)
GLUCOSE SERPL-MCNC: 206 MG/DL (ref 65–99)
HADV DNA SPEC NAA+PROBE: NOT DETECTED
HCO3 BLDA-SCNC: 36.5 MMOL/L (ref 21–28)
HCO3 BLDA-SCNC: 37 MMOL/L (ref 21–28)
HCOV 229E RNA SPEC QL NAA+PROBE: NOT DETECTED
HCOV HKU1 RNA SPEC QL NAA+PROBE: NOT DETECTED
HCOV NL63 RNA SPEC QL NAA+PROBE: NOT DETECTED
HCOV OC43 RNA SPEC QL NAA+PROBE: NOT DETECTED
HCT VFR BLD AUTO: 35.8 % (ref 34–46.6)
HCT VFR BLDA CALC: 37 % (ref 38–51)
HEMODILUTION: NO
HEMODILUTION: NO
HGB BLD-MCNC: 10.8 G/DL (ref 12–15.9)
HGB BLDA-MCNC: 12.5 G/DL (ref 12–17)
HMPV RNA NPH QL NAA+NON-PROBE: NOT DETECTED
HPIV1 RNA ISLT QL NAA+PROBE: NOT DETECTED
HPIV2 RNA SPEC QL NAA+PROBE: NOT DETECTED
HPIV3 RNA NPH QL NAA+PROBE: NOT DETECTED
HPIV4 P GENE NPH QL NAA+PROBE: NOT DETECTED
IMM GRANULOCYTES # BLD AUTO: 1.13 10*3/MM3 (ref 0–0.05)
IMM GRANULOCYTES NFR BLD AUTO: 6.8 % (ref 0–0.5)
INHALED O2 CONCENTRATION: 100 %
INHALED O2 CONCENTRATION: 60 %
INR PPP: 2.57 (ref 2–3)
LYMPHOCYTES # BLD AUTO: 2.51 10*3/MM3 (ref 0.7–3.1)
LYMPHOCYTES NFR BLD AUTO: 15.1 % (ref 19.6–45.3)
Lab: ABNORMAL
Lab: ABNORMAL
M PNEUMO IGG SER IA-ACNC: NOT DETECTED
MAGNESIUM SERPL-MCNC: 2 MG/DL (ref 1.7–2.3)
MCH RBC QN AUTO: 32.5 PG (ref 26.6–33)
MCHC RBC AUTO-ENTMCNC: 30.2 G/DL (ref 31.5–35.7)
MCV RBC AUTO: 107.8 FL (ref 79–97)
MODALITY: ABNORMAL
MODALITY: ABNORMAL
MONOCYTES # BLD AUTO: 2.04 10*3/MM3 (ref 0.1–0.9)
MONOCYTES NFR BLD AUTO: 12.3 % (ref 5–12)
MRSA DNA SPEC QL NAA+PROBE: NORMAL
NEUTROPHILS NFR BLD AUTO: 10.68 10*3/MM3 (ref 1.7–7)
NEUTROPHILS NFR BLD AUTO: 64.4 % (ref 42.7–76)
NOTIFIED WHO: ABNORMAL
NOTIFIED WHO: ABNORMAL
NRBC BLD AUTO-RTO: 0.3 /100 WBC (ref 0–0.2)
NT-PROBNP SERPL-MCNC: 7361 PG/ML (ref 0–1800)
PCO2 BLDA: 57.5 MM HG (ref 35–48)
PCO2 BLDA: 76.3 MM HG (ref 35–48)
PEEP RESPIRATORY: 5 CM[H2O]
PH BLDA: 7.29 PH UNITS (ref 7.35–7.45)
PH BLDA: 7.41 PH UNITS (ref 7.35–7.45)
PLATELET # BLD AUTO: 254 10*3/MM3 (ref 140–450)
PMV BLD AUTO: 11.6 FL (ref 6–12)
PO2 BLD: 154 MM[HG] (ref 0–500)
PO2 BLD: 66 MM[HG] (ref 0–500)
PO2 BLDA: 66.4 MM HG (ref 83–108)
PO2 BLDA: 92.5 MM HG (ref 83–108)
POTASSIUM BLDA-SCNC: 4.9 MMOL/L (ref 3.5–4.5)
POTASSIUM SERPL-SCNC: 4.8 MMOL/L (ref 3.5–5.2)
PROT SERPL-MCNC: 6.9 G/DL (ref 6–8.5)
PROTHROMBIN TIME: 26.1 SECONDS (ref 19.4–28.5)
RBC # BLD AUTO: 3.32 10*6/MM3 (ref 3.77–5.28)
READ BACK: YES
READ BACK: YES
RESPIRATORY RATE: 26
RHINOVIRUS RNA SPEC NAA+PROBE: DETECTED
RSV RNA NPH QL NAA+NON-PROBE: NOT DETECTED
SAO2 % BLDCOA: 89.2 % (ref 94–98)
SAO2 % BLDCOA: 97 % (ref 94–98)
SARS-COV-2 RNA NPH QL NAA+NON-PROBE: NOT DETECTED
SODIUM BLD-SCNC: 141 MMOL/L (ref 138–146)
SODIUM SERPL-SCNC: 141 MMOL/L (ref 136–145)
TROPONIN T DELTA: 4 NG/L
TROPONIN T SERPL HS-MCNC: 27 NG/L
WBC NRBC COR # BLD AUTO: 16.6 10*3/MM3 (ref 3.4–10.8)

## 2024-08-16 PROCEDURE — 94799 UNLISTED PULMONARY SVC/PX: CPT

## 2024-08-16 PROCEDURE — 93005 ELECTROCARDIOGRAM TRACING: CPT | Performed by: EMERGENCY MEDICINE

## 2024-08-16 PROCEDURE — 87040 BLOOD CULTURE FOR BACTERIA: CPT | Performed by: EMERGENCY MEDICINE

## 2024-08-16 PROCEDURE — 82948 REAGENT STRIP/BLOOD GLUCOSE: CPT

## 2024-08-16 PROCEDURE — 0202U NFCT DS 22 TRGT SARS-COV-2: CPT | Performed by: EMERGENCY MEDICINE

## 2024-08-16 PROCEDURE — 82565 ASSAY OF CREATININE: CPT

## 2024-08-16 PROCEDURE — 25810000003 SODIUM CHLORIDE 0.9 % SOLUTION 250 ML FLEX CONT: Performed by: EMERGENCY MEDICINE

## 2024-08-16 PROCEDURE — 25010000002 VANCOMYCIN 1 G RECONSTITUTED SOLUTION 1 EACH VIAL: Performed by: EMERGENCY MEDICINE

## 2024-08-16 PROCEDURE — 82330 ASSAY OF CALCIUM: CPT

## 2024-08-16 PROCEDURE — G0299 HHS/HOSPICE OF RN EA 15 MIN: HCPCS

## 2024-08-16 PROCEDURE — 85025 COMPLETE CBC W/AUTO DIFF WBC: CPT | Performed by: EMERGENCY MEDICINE

## 2024-08-16 PROCEDURE — 83605 ASSAY OF LACTIC ACID: CPT

## 2024-08-16 PROCEDURE — 25010000002 CEFEPIME PER 500 MG: Performed by: EMERGENCY MEDICINE

## 2024-08-16 PROCEDURE — 36415 COLL VENOUS BLD VENIPUNCTURE: CPT

## 2024-08-16 PROCEDURE — 80053 COMPREHEN METABOLIC PANEL: CPT | Performed by: EMERGENCY MEDICINE

## 2024-08-16 PROCEDURE — 94761 N-INVAS EAR/PLS OXIMETRY MLT: CPT

## 2024-08-16 PROCEDURE — 82803 BLOOD GASES ANY COMBINATION: CPT | Performed by: EMERGENCY MEDICINE

## 2024-08-16 PROCEDURE — 85610 PROTHROMBIN TIME: CPT | Performed by: EMERGENCY MEDICINE

## 2024-08-16 PROCEDURE — 83735 ASSAY OF MAGNESIUM: CPT | Performed by: EMERGENCY MEDICINE

## 2024-08-16 PROCEDURE — 94664 DEMO&/EVAL PT USE INHALER: CPT

## 2024-08-16 PROCEDURE — 93005 ELECTROCARDIOGRAM TRACING: CPT

## 2024-08-16 PROCEDURE — 85018 HEMOGLOBIN: CPT

## 2024-08-16 PROCEDURE — 80051 ELECTROLYTE PANEL: CPT

## 2024-08-16 PROCEDURE — 85730 THROMBOPLASTIN TIME PARTIAL: CPT | Performed by: EMERGENCY MEDICINE

## 2024-08-16 PROCEDURE — 25010000002 FUROSEMIDE PER 20 MG: Performed by: EMERGENCY MEDICINE

## 2024-08-16 PROCEDURE — 99291 CRITICAL CARE FIRST HOUR: CPT

## 2024-08-16 PROCEDURE — 83880 ASSAY OF NATRIURETIC PEPTIDE: CPT | Performed by: EMERGENCY MEDICINE

## 2024-08-16 PROCEDURE — 84484 ASSAY OF TROPONIN QUANT: CPT | Performed by: EMERGENCY MEDICINE

## 2024-08-16 PROCEDURE — 87641 MR-STAPH DNA AMP PROBE: CPT | Performed by: EMERGENCY MEDICINE

## 2024-08-16 PROCEDURE — 36600 WITHDRAWAL OF ARTERIAL BLOOD: CPT | Performed by: EMERGENCY MEDICINE

## 2024-08-16 PROCEDURE — 94660 CPAP INITIATION&MGMT: CPT

## 2024-08-16 PROCEDURE — 71045 X-RAY EXAM CHEST 1 VIEW: CPT

## 2024-08-16 RX ORDER — WARFARIN SODIUM 3 MG/1
3 TABLET ORAL
Status: COMPLETED | OUTPATIENT
Start: 2024-08-17 | End: 2024-08-17

## 2024-08-16 RX ORDER — MIDODRINE HYDROCHLORIDE 2.5 MG/1
2.5 TABLET ORAL ONCE
Status: COMPLETED | OUTPATIENT
Start: 2024-08-17 | End: 2024-08-17

## 2024-08-16 RX ORDER — MIRTAZAPINE 15 MG/1
15 TABLET, FILM COATED ORAL NIGHTLY
Status: DISCONTINUED | OUTPATIENT
Start: 2024-08-17 | End: 2024-08-23 | Stop reason: HOSPADM

## 2024-08-16 RX ORDER — ACETAMINOPHEN 325 MG/1
650 TABLET ORAL EVERY 4 HOURS PRN
Status: DISCONTINUED | OUTPATIENT
Start: 2024-08-16 | End: 2024-08-16 | Stop reason: SDUPTHER

## 2024-08-16 RX ORDER — ONDANSETRON 2 MG/ML
4 INJECTION INTRAMUSCULAR; INTRAVENOUS EVERY 6 HOURS PRN
Status: DISCONTINUED | OUTPATIENT
Start: 2024-08-16 | End: 2024-08-23 | Stop reason: HOSPADM

## 2024-08-16 RX ORDER — FAMOTIDINE 20 MG/1
20 TABLET, FILM COATED ORAL DAILY
Status: DISCONTINUED | OUTPATIENT
Start: 2024-08-17 | End: 2024-08-23 | Stop reason: HOSPADM

## 2024-08-16 RX ORDER — POTASSIUM CHLORIDE 1500 MG/1
20 TABLET, EXTENDED RELEASE ORAL DAILY
Status: DISCONTINUED | OUTPATIENT
Start: 2024-08-17 | End: 2024-08-23 | Stop reason: HOSPADM

## 2024-08-16 RX ORDER — AMOXICILLIN 250 MG
1 CAPSULE ORAL 2 TIMES DAILY
Status: DISCONTINUED | OUTPATIENT
Start: 2024-08-17 | End: 2024-08-23 | Stop reason: HOSPADM

## 2024-08-16 RX ORDER — METOCLOPRAMIDE 5 MG/1
5 TABLET ORAL
Status: DISCONTINUED | OUTPATIENT
Start: 2024-08-17 | End: 2024-08-23 | Stop reason: HOSPADM

## 2024-08-16 RX ORDER — IPRATROPIUM BROMIDE AND ALBUTEROL SULFATE 2.5; .5 MG/3ML; MG/3ML
3 SOLUTION RESPIRATORY (INHALATION)
Status: DISCONTINUED | OUTPATIENT
Start: 2024-08-16 | End: 2024-08-23

## 2024-08-16 RX ORDER — SODIUM CHLORIDE 9 MG/ML
40 INJECTION, SOLUTION INTRAVENOUS AS NEEDED
Status: DISCONTINUED | OUTPATIENT
Start: 2024-08-16 | End: 2024-08-23 | Stop reason: HOSPADM

## 2024-08-16 RX ORDER — ACETAMINOPHEN 325 MG/1
650 TABLET ORAL EVERY 4 HOURS PRN
Status: DISCONTINUED | OUTPATIENT
Start: 2024-08-16 | End: 2024-08-23 | Stop reason: HOSPADM

## 2024-08-16 RX ORDER — LATANOPROST 50 UG/ML
1 SOLUTION/ DROPS OPHTHALMIC NIGHTLY
Status: DISCONTINUED | OUTPATIENT
Start: 2024-08-17 | End: 2024-08-23 | Stop reason: HOSPADM

## 2024-08-16 RX ORDER — METOPROLOL SUCCINATE 25 MG/1
12.5 TABLET, EXTENDED RELEASE ORAL EVERY 12 HOURS SCHEDULED
Status: DISCONTINUED | OUTPATIENT
Start: 2024-08-17 | End: 2024-08-23 | Stop reason: HOSPADM

## 2024-08-16 RX ORDER — MIDODRINE HYDROCHLORIDE 2.5 MG/1
2.5 TABLET ORAL
Status: DISCONTINUED | OUTPATIENT
Start: 2024-08-17 | End: 2024-08-23 | Stop reason: HOSPADM

## 2024-08-16 RX ORDER — POLYETHYLENE GLYCOL 3350 17 G/17G
17 POWDER, FOR SOLUTION ORAL DAILY PRN
Status: DISCONTINUED | OUTPATIENT
Start: 2024-08-16 | End: 2024-08-23 | Stop reason: HOSPADM

## 2024-08-16 RX ORDER — SODIUM CHLORIDE 0.9 % (FLUSH) 0.9 %
10 SYRINGE (ML) INJECTION AS NEEDED
Status: DISCONTINUED | OUTPATIENT
Start: 2024-08-16 | End: 2024-08-23 | Stop reason: HOSPADM

## 2024-08-16 RX ORDER — AMOXICILLIN 250 MG
2 CAPSULE ORAL 2 TIMES DAILY PRN
Status: DISCONTINUED | OUTPATIENT
Start: 2024-08-16 | End: 2024-08-23 | Stop reason: HOSPADM

## 2024-08-16 RX ORDER — DILTIAZEM HCL/D5W 125 MG/125
5-15 PLASTIC BAG, INJECTION (ML) INTRAVENOUS CONTINUOUS
Status: DISCONTINUED | OUTPATIENT
Start: 2024-08-16 | End: 2024-08-17

## 2024-08-16 RX ORDER — DOCUSATE SODIUM 100 MG/1
100 CAPSULE, LIQUID FILLED ORAL 2 TIMES DAILY
Status: DISCONTINUED | OUTPATIENT
Start: 2024-08-17 | End: 2024-08-23 | Stop reason: HOSPADM

## 2024-08-16 RX ORDER — FUROSEMIDE 40 MG
40 TABLET ORAL DAILY
Status: DISCONTINUED | OUTPATIENT
Start: 2024-08-17 | End: 2024-08-23 | Stop reason: HOSPADM

## 2024-08-16 RX ORDER — BISACODYL 10 MG
10 SUPPOSITORY, RECTAL RECTAL DAILY PRN
Status: DISCONTINUED | OUTPATIENT
Start: 2024-08-16 | End: 2024-08-23 | Stop reason: HOSPADM

## 2024-08-16 RX ORDER — SODIUM CHLORIDE 0.9 % (FLUSH) 0.9 %
10 SYRINGE (ML) INJECTION EVERY 12 HOURS SCHEDULED
Status: DISCONTINUED | OUTPATIENT
Start: 2024-08-16 | End: 2024-08-23 | Stop reason: HOSPADM

## 2024-08-16 RX ORDER — BISACODYL 5 MG/1
5 TABLET, DELAYED RELEASE ORAL DAILY PRN
Status: DISCONTINUED | OUTPATIENT
Start: 2024-08-16 | End: 2024-08-23 | Stop reason: HOSPADM

## 2024-08-16 RX ORDER — ACETAMINOPHEN 650 MG/1
650 SUPPOSITORY RECTAL EVERY 4 HOURS PRN
Status: DISCONTINUED | OUTPATIENT
Start: 2024-08-16 | End: 2024-08-23 | Stop reason: HOSPADM

## 2024-08-16 RX ORDER — ACETAMINOPHEN 160 MG/5ML
650 SOLUTION ORAL EVERY 4 HOURS PRN
Status: DISCONTINUED | OUTPATIENT
Start: 2024-08-16 | End: 2024-08-23 | Stop reason: HOSPADM

## 2024-08-16 RX ORDER — ONDANSETRON 4 MG/1
4 TABLET, ORALLY DISINTEGRATING ORAL EVERY 6 HOURS PRN
Status: DISCONTINUED | OUTPATIENT
Start: 2024-08-16 | End: 2024-08-23 | Stop reason: HOSPADM

## 2024-08-16 RX ORDER — FUROSEMIDE 10 MG/ML
20 INJECTION INTRAMUSCULAR; INTRAVENOUS ONCE
Status: COMPLETED | OUTPATIENT
Start: 2024-08-16 | End: 2024-08-16

## 2024-08-16 RX ADMIN — CEFEPIME 2000 MG: 2 INJECTION, POWDER, FOR SOLUTION INTRAVENOUS at 17:21

## 2024-08-16 RX ADMIN — Medication 5 MG/HR: at 18:26

## 2024-08-16 RX ADMIN — SODIUM CHLORIDE 1000 MG: 9 INJECTION, SOLUTION INTRAVENOUS at 18:18

## 2024-08-16 RX ADMIN — FUROSEMIDE 20 MG: 10 INJECTION, SOLUTION INTRAMUSCULAR; INTRAVENOUS at 18:17

## 2024-08-16 NOTE — ED PROVIDER NOTES
Subjective   History of Present Illness  Chief complaint: Patient is a 89-year-old.  She has history of A-fib and is on warfarin.  She has a history of a persisting right pleural effusion and CHF.  She has been admitted to the hospital recently and had thoracentesis.  They are trying not to do 1 again as this has been a stable CHF related thoracentesis in the past.  She went to rehab and left rehab a week ago.  She since Monday has been having difficulty with cough congestion and her breathing is steadily become worse.  Her primary physician started her on cefdinir on Tuesday.  She did not have any testing done then.  She tried some cough syrup today and things became progressively worse through the day today to the point where her oxygen saturation dropped into the 40s and her heart rate into the 130s.  She does not provide much history.  She is in distress.  Daughter provides most history for the patient.  She did have some dysphagia and was placed on mechanical soft diet and thickened liquids at the facility she was at for rehab.    Context:    Duration:    Timing:    Severity:    Associated Symptoms:        PCP:  LMP:      Review of Systems   Unable to perform ROS: Acuity of condition   Constitutional:  Positive for fatigue.   HENT:  Positive for congestion.    Respiratory:  Positive for cough and shortness of breath.        Past Medical History:   Diagnosis Date    Atrial fibrillation     Coronary artery disease     Atrial fibrillation    GERD (gastroesophageal reflux disease)     Glaucoma     Hiatal hernia with gastroesophageal reflux     History of osteoporotic pathological fracture     Right intertroch (7/2019)    Hx of compression fracture of spine     T12 and L1(2013); T6 (1/2022)    Hyperlipidemia     Hypertension     Osteoarthritis     Osteoporosis     on prolia(3/21/22)    Stroke     off and on dizziness from the stroke.       Allergies   Allergen Reactions    Penicillins Hives    Codeine Nausea And  Vomiting    Latex Rash       Past Surgical History:   Procedure Laterality Date    BACK SURGERY      kyphoplasty    EYE SURGERY      cataract surgery    FIXATION KYPHOPLASTY LUMBAR SPINE  2013    HIP TROCHANTERIC NAILING WITH INTRAMEDULLARY HIP SCREW Right 2019    Procedure: HIP TROCHANTERIC NAILING SHORT WITH INTRAMEDULLARY HIP SCREW;  Surgeon: Edward Centeno MD;  Location: [facility] MAIN OR;  Service: Orthopedics    RECTAL SURGERY      x 3       Family History   Problem Relation Age of Onset    Heart disease Mother     Hypertension Mother     Osteoporosis Mother     Cancer Father         colon cancer    Osteoporosis Father     Cancer Sister         lung    Cancer Brother         colon cancer       Social History     Socioeconomic History    Marital status:    Tobacco Use    Smoking status: Former     Current packs/day: 0.00     Types: Cigarettes     Quit date:      Years since quittin.6     Passive exposure: Past    Smokeless tobacco: Never   Vaping Use    Vaping status: Never Used   Substance and Sexual Activity    Alcohol use: No    Drug use: No    Sexual activity: Defer           Objective   Physical Exam  Vitals and nursing note reviewed.   Constitutional:       General: She is in acute distress.   HENT:      Head: Normocephalic and atraumatic.   Cardiovascular:      Rate and Rhythm: Tachycardia present. Rhythm irregular.   Pulmonary:      Effort: Tachypnea present.      Breath sounds: Examination of the right-middle field reveals decreased breath sounds. Examination of the left-middle field reveals rales. Examination of the right-lower field reveals decreased breath sounds. Examination of the left-lower field reveals rales. Decreased breath sounds and rales present.   Chest:      Chest wall: No tenderness.   Abdominal:      Palpations: Abdomen is soft.      Tenderness: There is no abdominal tenderness.   Musculoskeletal:      Cervical back: Normal range of motion and neck supple.       Right lower leg: No tenderness.      Left lower leg: No tenderness.   Skin:     General: Skin is warm.   Neurological:      Mental Status: She is alert.   Psychiatric:         Mood and Affect: Mood normal.         Procedures           ED Course      Results for orders placed or performed during the hospital encounter of 08/16/24   Blood Gas, Arterial -    Specimen: Arterial Blood   Result Value Ref Range    Site Right Radial     Gulshan's Test Positive     pH, Arterial 7.294 (L) 7.350 - 7.450 pH units    pCO2, Arterial 76.3 (C) 35.0 - 48.0 mm Hg    pO2, Arterial 66.4 (L) 83.0 - 108.0 mm Hg    HCO3, Arterial 37.0 (H) 21.0 - 28.0 mmol/L    Base Excess, Arterial 7.9 (H) 0.0 - 3.0 mmol/L    O2 Saturation, Arterial 89.2 (L) 94.0 - 98.0 %    Barometric Pressure for Blood Gas      Modality NRB     FIO2 100 %    Notified Who Dr Capone     Read Back Yes     Notified Time      Hemodilution No     PO2/FIO2 66 0 - 500   Protime-INR    Specimen: Blood   Result Value Ref Range    Protime 26.1 19.4 - 28.5 Seconds    INR 2.57 2.00 - 3.00   Comprehensive Metabolic Panel    Specimen: Blood   Result Value Ref Range    Glucose 206 (H) 65 - 99 mg/dL    BUN 26 (H) 8 - 23 mg/dL    Creatinine 0.55 (L) 0.57 - 1.00 mg/dL    Sodium 141 136 - 145 mmol/L    Potassium 4.8 3.5 - 5.2 mmol/L    Chloride 100 98 - 107 mmol/L    CO2 30.3 (H) 22.0 - 29.0 mmol/L    Calcium 9.3 8.2 - 9.6 mg/dL    Total Protein 6.9 6.0 - 8.5 g/dL    Albumin 4.0 3.5 - 5.2 g/dL    ALT (SGPT) 20 1 - 33 U/L    AST (SGOT) 38 (H) 1 - 32 U/L    Alkaline Phosphatase 139 (H) 39 - 117 U/L    Total Bilirubin 0.8 0.0 - 1.2 mg/dL    Globulin 2.9 gm/dL    A/G Ratio 1.4 g/dL    BUN/Creatinine Ratio 47.3 (H) 7.0 - 25.0    Anion Gap 10.7 5.0 - 15.0 mmol/L    eGFR 84.0 >60.0 mL/min/1.73   aPTT    Specimen: Blood   Result Value Ref Range    PTT 41.8 (L) 61.0 - 76.5 seconds   High Sensitivity Troponin T    Specimen: Blood   Result Value Ref Range    HS Troponin T 27 (H) <14 ng/L   BNP     Specimen: Blood   Result Value Ref Range    proBNP 7,361.0 (H) 0.0 - 1,800.0 pg/mL   Magnesium    Specimen: Blood   Result Value Ref Range    Magnesium 2.0 1.7 - 2.3 mg/dL   CBC Auto Differential    Specimen: Blood   Result Value Ref Range    WBC 16.60 (H) 3.40 - 10.80 10*3/mm3    RBC 3.32 (L) 3.77 - 5.28 10*6/mm3    Hemoglobin 10.8 (L) 12.0 - 15.9 g/dL    Hematocrit 35.8 34.0 - 46.6 %    .8 (H) 79.0 - 97.0 fL    MCH 32.5 26.6 - 33.0 pg    MCHC 30.2 (L) 31.5 - 35.7 g/dL    RDW 15.1 12.3 - 15.4 %    RDW-SD 59.2 (H) 37.0 - 54.0 fl    MPV 11.6 6.0 - 12.0 fL    Platelets 254 140 - 450 10*3/mm3    Neutrophil % 64.4 42.7 - 76.0 %    Lymphocyte % 15.1 (L) 19.6 - 45.3 %    Monocyte % 12.3 (H) 5.0 - 12.0 %    Eosinophil % 0.2 (L) 0.3 - 6.2 %    Basophil % 1.2 0.0 - 1.5 %    Immature Grans % 6.8 (H) 0.0 - 0.5 %    Neutrophils, Absolute 10.68 (H) 1.70 - 7.00 10*3/mm3    Lymphocytes, Absolute 2.51 0.70 - 3.10 10*3/mm3    Monocytes, Absolute 2.04 (H) 0.10 - 0.90 10*3/mm3    Eosinophils, Absolute 0.04 0.00 - 0.40 10*3/mm3    Basophils, Absolute 0.20 0.00 - 0.20 10*3/mm3    Immature Grans, Absolute 1.13 (H) 0.00 - 0.05 10*3/mm3    nRBC 0.3 (H) 0.0 - 0.2 /100 WBC   POC Creatinine    Specimen: Arterial Blood   Result Value Ref Range    Creatinine 0.52 (L) 0.60 - 1.30 mg/dL    eGFR 85.1 >60.0 mL/min/1.73   POCT Electrolytes +HGB +HCT    Specimen: Arterial Blood   Result Value Ref Range    Sodium 141 138 - 146 mmol/L    POC Potassium 4.9 (H) 3.5 - 4.5 mmol/L    Ionized Calcium 1.29 1.15 - 1.33 mmol/L    Glucose 233 (H) 74 - 100 mg/dL    Hematocrit 37 (L) 38 - 51 %    Hemoglobin 12.5 12.0 - 17.0 g/dL   POC Lactate    Specimen: Arterial Blood   Result Value Ref Range    Lactate 2.3 (C) 0.2 - 2.0 mmol/L    Notified Time      Notified Who Dr Capone     Read Back Yes    POC Glucose Once    Specimen: Arterial Blood   Result Value Ref Range    Glucose 233 (H) 74 - 100 mg/dL   POC Lactate    Specimen: Blood   Result Value Ref Range     Lactate 2.3 (C) 0.3 - 2.0 mmol/L   ECG 12 Lead Dyspnea   Result Value Ref Range    QT Interval 287 ms    QTC Interval 437 ms                                    XR Chest 1 View    Result Date: 8/16/2024  Impression: Limited evaluation due to patient's head and neck obscuring the majority of the left lung. Severely elevated diaphragm with large hiatal hernia suspected. Cardiomegaly. Moderate pleural effusions. Bibasilar opacity atelectasis versus pneumonia. Electronically Signed: Julia Torrez MD  8/16/2024 5:04 PM EDT  Workstation ID: [id number]             Medical Decision Making  Patient was seen evaluated for the above problem    [place] diagnosis includes was not limited to pneumonia, PE, pneumothorax, CHF    Patient presented in A-fib with RVR.  She was in respiratory distress however.  I thought potentially the tachycardia may just be secondary to her dyspnea.  She was placed on BiPAP and she was acidotic with hypercarbia and hypoxemia.  She is improving on BiPAP.  Her heart rate remained in A-fib with RVR.  Cardizem drip was initiated.  She is and CHF.  Her INR is therapeutic.  I doubt she has PE.  She also has elevated white count and congestive symptoms.  She was placed on vancomycin and cefepime as she has recently been in skilled nursing facility and hospital admission.  She was discharged last week.  I discussed with Dr. Goodwin who agrees to admit the patient.  Discussed with patient and family admission.  They verbalized understanding.  Critical care time is 54 minutes    Amount and/or Complexity of Data Reviewed  Labs: ordered. Decision-making details documented in ED Course.     Details: Labs reviewed by myself  Radiology: ordered and independent interpretation performed.     Details: X-ray interpreted myself as above  ECG/medicine tests: ordered and independent interpretation performed.     Details: EKG interpreted by myself A-fib rate of 139    Risk  Prescription drug management.  Drug therapy  requiring intensive monitoring for toxicity.  Decision regarding hospitalization.    Critical Care  Total time providing critical care: 54 minutes        Final diagnoses:   None   Acute hypoxemic/hypercarbic respiratory failure  Pneumonia  CHF  A-fib with RVR    ED Disposition  ED Disposition       None            No follow-up provider specified.       Medication List      No changes were made to your prescriptions during this visit.            Alexei Capone,   08/16/24 [id number]

## 2024-08-16 NOTE — HOME HEALTH
Routine Visit Note: Medications reviewed. Appetite well. No issues with elimination. CP assessed. Patient has complaints of nightmares since starting the antibiotic for bronchitis; SN called MD Ever about the night chand. Family and patient notified of call back.     Skill/education provided: Vital signs stable. Wound care completed; patient tolerated well. Cardiopulmonary assessment completed. Patient denies falls and pain. Education provided about wound healing and high risk medications. Edema present on right and left foot. Education provided about elevation.      Patient/caregiver response: Patient verbilized understanding.     Plan for next visit: Vital signs, review medications, cardiopulmonary assessment, assess falls and pain, cp assess, wound care, assess oxygen use, assess edema, assess nightmares    Other pertinent info: na

## 2024-08-17 ENCOUNTER — APPOINTMENT (OUTPATIENT)
Dept: CT IMAGING | Facility: HOSPITAL | Age: 89
DRG: 308 | End: 2024-08-17
Payer: MEDICARE

## 2024-08-17 LAB
ANION GAP SERPL CALCULATED.3IONS-SCNC: 9.9 MMOL/L (ref 5–15)
BASOPHILS # BLD AUTO: 0.13 10*3/MM3 (ref 0–0.2)
BASOPHILS NFR BLD AUTO: 1.2 % (ref 0–1.5)
BUN SERPL-MCNC: 22 MG/DL (ref 8–23)
BUN/CREAT SERPL: 48.9 (ref 7–25)
CALCIUM SPEC-SCNC: 8.8 MG/DL (ref 8.2–9.6)
CHLORIDE SERPL-SCNC: 101 MMOL/L (ref 98–107)
CO2 SERPL-SCNC: 32.1 MMOL/L (ref 22–29)
CREAT SERPL-MCNC: 0.45 MG/DL (ref 0.57–1)
DEPRECATED RDW RBC AUTO: 59.1 FL (ref 37–54)
EGFRCR SERPLBLD CKD-EPI 2021: 88.2 ML/MIN/1.73
EOSINOPHIL # BLD AUTO: 0.04 10*3/MM3 (ref 0–0.4)
EOSINOPHIL NFR BLD AUTO: 0.4 % (ref 0.3–6.2)
ERYTHROCYTE [DISTWIDTH] IN BLOOD BY AUTOMATED COUNT: 15 % (ref 12.3–15.4)
GLUCOSE SERPL-MCNC: 85 MG/DL (ref 65–99)
HCT VFR BLD AUTO: 29.3 % (ref 34–46.6)
HGB BLD-MCNC: 9.2 G/DL (ref 12–15.9)
IMM GRANULOCYTES # BLD AUTO: 0.73 10*3/MM3 (ref 0–0.05)
IMM GRANULOCYTES NFR BLD AUTO: 6.6 % (ref 0–0.5)
INR PPP: 2.56 (ref 2–3)
LYMPHOCYTES # BLD AUTO: 1.18 10*3/MM3 (ref 0.7–3.1)
LYMPHOCYTES NFR BLD AUTO: 10.7 % (ref 19.6–45.3)
MAGNESIUM SERPL-MCNC: 1.9 MG/DL (ref 1.7–2.3)
MCH RBC QN AUTO: 33.7 PG (ref 26.6–33)
MCHC RBC AUTO-ENTMCNC: 31.4 G/DL (ref 31.5–35.7)
MCV RBC AUTO: 107.3 FL (ref 79–97)
MONOCYTES # BLD AUTO: 1.74 10*3/MM3 (ref 0.1–0.9)
MONOCYTES NFR BLD AUTO: 15.8 % (ref 5–12)
NEUTROPHILS NFR BLD AUTO: 65.3 % (ref 42.7–76)
NEUTROPHILS NFR BLD AUTO: 7.22 10*3/MM3 (ref 1.7–7)
NRBC BLD AUTO-RTO: 0.4 /100 WBC (ref 0–0.2)
PLATELET # BLD AUTO: 186 10*3/MM3 (ref 140–450)
PMV BLD AUTO: 11.6 FL (ref 6–12)
POTASSIUM SERPL-SCNC: 4 MMOL/L (ref 3.5–5.2)
PROTHROMBIN TIME: 26 SECONDS (ref 19.4–28.5)
QT INTERVAL: 287 MS
QTC INTERVAL: 437 MS
RBC # BLD AUTO: 2.73 10*6/MM3 (ref 3.77–5.28)
SODIUM SERPL-SCNC: 143 MMOL/L (ref 136–145)
WBC NRBC COR # BLD AUTO: 11.04 10*3/MM3 (ref 3.4–10.8)

## 2024-08-17 PROCEDURE — 71250 CT THORAX DX C-: CPT

## 2024-08-17 PROCEDURE — 25010000002 CEFTRIAXONE PER 250 MG

## 2024-08-17 PROCEDURE — 80048 BASIC METABOLIC PNL TOTAL CA: CPT | Performed by: FAMILY MEDICINE

## 2024-08-17 PROCEDURE — 94799 UNLISTED PULMONARY SVC/PX: CPT

## 2024-08-17 PROCEDURE — 25010000002 CEFEPIME PER 500 MG: Performed by: FAMILY MEDICINE

## 2024-08-17 PROCEDURE — 99222 1ST HOSP IP/OBS MODERATE 55: CPT | Performed by: INTERNAL MEDICINE

## 2024-08-17 PROCEDURE — 94761 N-INVAS EAR/PLS OXIMETRY MLT: CPT

## 2024-08-17 PROCEDURE — 85025 COMPLETE CBC W/AUTO DIFF WBC: CPT | Performed by: FAMILY MEDICINE

## 2024-08-17 PROCEDURE — 97162 PT EVAL MOD COMPLEX 30 MIN: CPT

## 2024-08-17 PROCEDURE — 83735 ASSAY OF MAGNESIUM: CPT | Performed by: FAMILY MEDICINE

## 2024-08-17 PROCEDURE — 94664 DEMO&/EVAL PT USE INHALER: CPT

## 2024-08-17 PROCEDURE — 94660 CPAP INITIATION&MGMT: CPT

## 2024-08-17 PROCEDURE — 85610 PROTHROMBIN TIME: CPT | Performed by: FAMILY MEDICINE

## 2024-08-17 RX ORDER — WARFARIN SODIUM 3 MG/1
1.5 TABLET ORAL
Status: COMPLETED | OUTPATIENT
Start: 2024-08-17 | End: 2024-08-17

## 2024-08-17 RX ORDER — GUAIFENESIN 600 MG/1
600 TABLET, EXTENDED RELEASE ORAL EVERY 12 HOURS SCHEDULED
Status: DISCONTINUED | OUTPATIENT
Start: 2024-08-17 | End: 2024-08-23 | Stop reason: HOSPADM

## 2024-08-17 RX ORDER — METRONIDAZOLE 500 MG/1
500 TABLET ORAL EVERY 8 HOURS SCHEDULED
Status: COMPLETED | OUTPATIENT
Start: 2024-08-17 | End: 2024-08-22

## 2024-08-17 RX ORDER — ACETYLCYSTEINE 200 MG/ML
2 SOLUTION ORAL; RESPIRATORY (INHALATION)
Status: DISCONTINUED | OUTPATIENT
Start: 2024-08-17 | End: 2024-08-23

## 2024-08-17 RX ADMIN — CEFTRIAXONE 1000 MG: 1 INJECTION, POWDER, FOR SOLUTION INTRAMUSCULAR; INTRAVENOUS at 15:44

## 2024-08-17 RX ADMIN — WARFARIN SODIUM 1.5 MG: 3 TABLET ORAL at 17:50

## 2024-08-17 RX ADMIN — METOCLOPRAMIDE 5 MG: 5 TABLET ORAL at 17:50

## 2024-08-17 RX ADMIN — IPRATROPIUM BROMIDE AND ALBUTEROL SULFATE 3 ML: .5; 3 SOLUTION RESPIRATORY (INHALATION) at 15:04

## 2024-08-17 RX ADMIN — LATANOPROST 1 DROP: 50 SOLUTION/ DROPS OPHTHALMIC at 21:32

## 2024-08-17 RX ADMIN — IPRATROPIUM BROMIDE AND ALBUTEROL SULFATE 3 ML: .5; 3 SOLUTION RESPIRATORY (INHALATION) at 08:14

## 2024-08-17 RX ADMIN — METRONIDAZOLE 500 MG: 500 TABLET ORAL at 22:17

## 2024-08-17 RX ADMIN — SENNOSIDES AND DOCUSATE SODIUM 1 TABLET: 8.6; 5 TABLET ORAL at 09:55

## 2024-08-17 RX ADMIN — MIDODRINE HYDROCHLORIDE 2.5 MG: 2.5 TABLET ORAL at 09:54

## 2024-08-17 RX ADMIN — METRONIDAZOLE 500 MG: 500 TABLET ORAL at 15:44

## 2024-08-17 RX ADMIN — LATANOPROST 1 DROP: 50 SOLUTION/ DROPS OPHTHALMIC at 02:09

## 2024-08-17 RX ADMIN — MIRTAZAPINE 15 MG: 15 TABLET, FILM COATED ORAL at 02:08

## 2024-08-17 RX ADMIN — ACETYLCYSTEINE 2 ML: 200 SOLUTION ORAL; RESPIRATORY (INHALATION) at 18:53

## 2024-08-17 RX ADMIN — DOCUSATE SODIUM 100 MG: 100 CAPSULE, LIQUID FILLED ORAL at 21:32

## 2024-08-17 RX ADMIN — IPRATROPIUM BROMIDE AND ALBUTEROL SULFATE 3 ML: .5; 3 SOLUTION RESPIRATORY (INHALATION) at 18:52

## 2024-08-17 RX ADMIN — METOCLOPRAMIDE 5 MG: 5 TABLET ORAL at 09:53

## 2024-08-17 RX ADMIN — POTASSIUM CHLORIDE 20 MEQ: 1500 TABLET, EXTENDED RELEASE ORAL at 09:54

## 2024-08-17 RX ADMIN — Medication 10 ML: at 09:00

## 2024-08-17 RX ADMIN — GUAIFENESIN 600 MG: 600 TABLET, EXTENDED RELEASE ORAL at 21:33

## 2024-08-17 RX ADMIN — METOCLOPRAMIDE 5 MG: 5 TABLET ORAL at 21:42

## 2024-08-17 RX ADMIN — Medication 10 ML: at 21:34

## 2024-08-17 RX ADMIN — METOPROLOL SUCCINATE 12.5 MG: 25 TABLET, FILM COATED, EXTENDED RELEASE ORAL at 02:08

## 2024-08-17 RX ADMIN — DOCUSATE SODIUM 100 MG: 100 CAPSULE, LIQUID FILLED ORAL at 09:52

## 2024-08-17 RX ADMIN — MIDODRINE HYDROCHLORIDE 2.5 MG: 2.5 TABLET ORAL at 02:09

## 2024-08-17 RX ADMIN — METOCLOPRAMIDE 5 MG: 5 TABLET ORAL at 02:09

## 2024-08-17 RX ADMIN — MIRTAZAPINE 15 MG: 15 TABLET, FILM COATED ORAL at 21:41

## 2024-08-17 RX ADMIN — METOCLOPRAMIDE 5 MG: 5 TABLET ORAL at 11:53

## 2024-08-17 RX ADMIN — METOPROLOL SUCCINATE 12.5 MG: 25 TABLET, FILM COATED, EXTENDED RELEASE ORAL at 12:05

## 2024-08-17 RX ADMIN — CEFEPIME 2000 MG: 2 INJECTION, POWDER, FOR SOLUTION INTRAVENOUS at 07:48

## 2024-08-17 RX ADMIN — FUROSEMIDE 40 MG: 40 TABLET ORAL at 09:53

## 2024-08-17 RX ADMIN — SENNOSIDES AND DOCUSATE SODIUM 1 TABLET: 8.6; 5 TABLET ORAL at 21:32

## 2024-08-17 RX ADMIN — FAMOTIDINE 20 MG: 20 TABLET, FILM COATED ORAL at 09:52

## 2024-08-17 RX ADMIN — WARFARIN SODIUM 3 MG: 3 TABLET ORAL at 02:09

## 2024-08-17 RX ADMIN — MIDODRINE HYDROCHLORIDE 2.5 MG: 2.5 TABLET ORAL at 17:50

## 2024-08-17 NOTE — THERAPY EVALUATION
Patient Name: Shruthi Amin  : 6/10/1928    MRN: [id number]                              Today's Date: 2024       Admit Date: 2024    Visit Dx:     ICD-10-CM ICD-9-CM   1. Acute respiratory failure with hypoxia and hypercapnia  J96.01 518.81    J96.02    2. Acute on chronic congestive heart failure, unspecified heart failure type  I50.9 428.0   3. Pneumonia due to infectious organism, unspecified laterality, unspecified part of lung  J18.9 486   4. Atrial fibrillation with RVR  I48.91 427.31     Patient Active Problem List   Diagnosis    Atrial fibrillation    Chronic anticoagulation    Cerebrovascular accident (CVA)    Hiatal hernia    Hyperlipidemia    Hypertension    Osteoarthritis    Hypoxia    Compression fracture of thoracic vertebra with delayed healing    History of osteoporotic pathological fracture    Acute on chronic congestive heart failure, unspecified heart failure type    Severe malnutrition    Nonrheumatic mitral valve regurgitation    Back pain    Recurrent pleural effusion    Bradycardia    Atrial fibrillation with rapid ventricular response     Past Medical History:   Diagnosis Date    Atrial fibrillation     Coronary artery disease     Atrial fibrillation    GERD (gastroesophageal reflux disease)     Glaucoma     Hiatal hernia with gastroesophageal reflux     History of osteoporotic pathological fracture     Right intertroch (2019)    Hx of compression fracture of spine     T12 and L1(); T6 (2022)    Hyperlipidemia     Hypertension     Osteoarthritis     Osteoporosis     on prolia(3/21/22)    Stroke     off and on dizziness from the stroke.     Past Surgical History:   Procedure Laterality Date    BACK SURGERY      kyphoplasty    EYE SURGERY      cataract surgery    FIXATION KYPHOPLASTY LUMBAR SPINE      HIP TROCHANTERIC NAILING WITH INTRAMEDULLARY HIP SCREW Right 2019    Procedure: HIP TROCHANTERIC NAILING SHORT WITH INTRAMEDULLARY HIP SCREW;  Surgeon: Jacobo  Edward Finnegan MD;  Location: [facility] MAIN OR;  Service: Orthopedics    RECTAL SURGERY      x 3      General Information       Row Name 08/17/24 1510          Physical Therapy Time and Intention    Document Type evaluation  -EL     Mode of Treatment individual therapy;physical therapy  -EL       Row Name 08/17/24 1510          General Information    Prior Level of Function independent:;all household mobility;min assist:;ADL's  home with daughter, daughter assists with ADLS  -EL       Row Name 08/17/24 1510          Living Environment    People in Home child(juan francisco), adult  -EL       Row Name 08/17/24 1510          Home Main Entrance    Number of Stairs, Main Entrance one  -EL       Row Name 08/17/24 1510          Stairs Within Home, Primary    Number of Stairs, Within Home, Primary none  -EL       Row Name 08/17/24 1510          Cognition    Orientation Status (Cognition) oriented x 4  -EL       Row Name 08/17/24 1510          Safety Issues, Functional Mobility    Impairments Affecting Function (Mobility) endurance/activity tolerance;shortness of breath  -EL               User Key  (r) = Recorded By, (t) = Taken By, (c) = Cosigned By      Initials Name Provider Type    Harsh Vega, PT Physical Therapist                   Mobility       Row Name 08/17/24 1513          Bed Mobility    Bed Mobility supine-sit  -EL     Supine-Sit [place] (Bed Mobility) standby assist  -EL     Assistive Device (Bed Mobility) bed rails  -EL     Comment, (Bed Mobility) Reports feeling shaky in sitting. RR 40, cued for slowing breathing, pt reports shaky feeling improved after sitting for approx 1 min  -EL       Row Name 08/17/24 1513          Bed-Chair Transfer    Bed-Chair [place] (Transfers) contact guard  -EL     Assistive Device (Bed-Chair Transfers) --  HHA  -EL       Row Name 08/17/24 1513          Sit-Stand Transfer    Sit-Stand [place] (Transfers) contact guard  -EL     Assistive Device (Sit-Stand Transfers) --  HHA   -EL               User Key  (r) = Recorded By, (t) = Taken By, (c) = Cosigned By      Initials Name Provider Type    Harsh Vega PT Physical Therapist                   Obj/Interventions       Row Name 08/17/24 1518          Range of Motion Comprehensive    General Range of Motion bilateral lower extremity ROM WFL  -EL       Row Name 08/17/24 1518          Strength Comprehensive (MMT)    General Manual Muscle Testing (MMT) Assessment lower extremity strength deficits identified  -EL     Comment, General Manual Muscle Testing (MMT) Assessment BLE 4-/5 gross  -EL       Row Name 08/17/24 1518          Balance    Balance Assessment sitting static balance;standing static balance;standing dynamic balance  -EL     Static Sitting Balance independent  -EL     Static Standing Balance contact guard  -EL     Dynamic Standing Balance contact guard  -EL     Comment, Balance provided HHA for short ambulatory transfer to bedside chair  -EL       Row Name 08/17/24 1518          Sensory Assessment (Somatosensory)    Sensory Assessment (Somatosensory) sensation intact  -               User Key  (r) = Recorded By, (t) = Taken By, (c) = Cosigned By      Initials Name Provider Type    Harsh Vega PT Physical Therapist                   Goals/Plan       Row Name 08/17/24 1525          Bed Mobility Goal 1 (PT)    Activity/Assistive Device (Bed Mobility Goal 1, PT) bed mobility activities, all  -EL     [place] Level/Cues Needed (Bed Mobility Goal 1, PT) modified independence  -EL     Time Frame (Bed Mobility Goal 1, PT) long term goal (LTG);2 weeks  -EL       Row Name 08/17/24 1525          Transfer Goal 1 (PT)    Activity/Assistive Device (Transfer Goal 1, PT) transfers, all;walker, rolling  -EL     [place] Level/Cues Needed (Transfer Goal 1, PT) modified independence  -EL     Time Frame (Transfer Goal 1, PT) long term goal (LTG);2 weeks  -EL       Row Name 08/17/24 1525          Gait Training Goal 1 (PT)     Activity/Assistive Device (Gait Training Goal 1, PT) gait (walking locomotion);walker, rolling  -EL     [place] Level (Gait Training Goal 1, PT) modified independence  -EL     Distance (Gait Training Goal 1, PT) 120  -EL     Time Frame (Gait Training Goal 1, PT) long term goal (LTG);2 weeks  -EL       Row Name 08/17/24 1525          Therapy Assessment/Plan (PT)    Planned Therapy Interventions (PT) balance training;bed mobility training;gait training;neuromuscular re-education;transfer training;stair training;patient/family education  -EL               User Key  (r) = Recorded By, (t) = Taken By, (c) = Cosigned By      Initials Name Provider Type    EL Harsh Mckay, PT Physical Therapist                   Clinical Impression       Row Name 08/17/24 1520          Pain    Pretreatment Pain Rating 0/10 - no pain  -EL     Posttreatment Pain Rating 0/10 - no pain  -EL       Row Name 08/17/24 1520          Plan of Care Review    Plan of Care Reviewed With patient;family  -EL     Outcome Evaluation Pt is a 89 YO F admitted with persistent R plueral effusion, PNA, currently on 4L O2 uses 2L at baseline. Pt reports living at home wiht daughter, typically is independent with ambulation using RWx, and gets assistance from daughter for ADLs. Pt has 1 step to enter home and has had no recent falls. Pt this date requiring no physical assistance to come to sitting. In sitting, pt c/o shaky feeling and RR was >40, pt cued for slowing RR and shaky feeling improved. Pt requiring just CGA for stand and transfer to bedside chair and O2 maintained above 90% throughout. Pt physically appears safe to reutrn home when medically appropriate. PT to follow while admitted.  -EL       Row Name 08/17/24 1522          Therapy Assessment/Plan (PT)    Rehab Potential (PT) good, to achieve stated therapy goals  -EL     Criteria for Skilled Interventions Met (PT) yes  -EL     Therapy Frequency (PT) 3 times/wk  -EL     Predicted Duration of Therapy  Intervention (PT) until d/c  -EL       Row Name 08/17/24 1520          Vital Signs    O2 Delivery Pre Treatment supplemental O2  -EL     O2 Delivery Intra Treatment supplemental O2  -EL     O2 Delivery Post Treatment supplemental O2  -EL     Pre Patient Position Supine  -EL     Intra Patient Position Standing  -EL     Post Patient Position Sitting  -EL       Row Name 08/17/24 1520          Positioning and Restraints    Pre-Treatment Position in bed  -EL     Post Treatment Position chair  -EL     In Chair notified nsg;reclined;call light within reach;encouraged to call for assist;exit alarm on  -EL               User Key  (r) = Recorded By, (t) = Taken By, (c) = Cosigned By      Initials Name Provider Type    Harsh Vega, PT Physical Therapist                   Outcome Measures       Row Name 08/17/24 1525 08/17/24 0806       How much help from another person do you currently need...    Turning from your back to your side while in flat bed without using bedrails? 3  -EL 3  -KH    Moving from lying on back to sitting on the side of a flat bed without bedrails? 3  -EL 3  -KH    Moving to and from a bed to a chair (including a wheelchair)? 3  -EL 2  -KH    Standing up from a chair using your arms (e.g., wheelchair, bedside chair)? 3  -EL 2  -KH    Climbing 3-5 steps with a railing? 2  -EL 2  -KH    To walk in hospital room? 3  -EL 2  -KH    AM-PAC 6 Clicks Score (PT) 17  -EL 14  -KH    Highest Level of Mobility Goal 5 --> Static standing  -EL 4 --> Transfer to chair/commode  -KH      Row Name 08/17/24 0800          How much help from another person do you currently need...    Turning from your back to your side while in flat bed without using bedrails? 3  -MS (r) VW (t) MS (c)     Moving from lying on back to sitting on the side of a flat bed without bedrails? 3  -MS (r) VW (t) MS (c)     Moving to and from a bed to a chair (including a wheelchair)? 3  -MS (r) VW (t) MS (c)     Standing up from a chair using your arms  (e.g., wheelchair, bedside chair)? 3  -MS (r) VW (t) MS (c)     Climbing 3-5 steps with a railing? 2  -MS (r) VW (t) MS (c)     To walk in hospital room? 2  -MS (r) VW (t) MS (c)     AM-PAC 6 Clicks Score (PT) 16  -VW     Highest Level of Mobility Goal 5 --> Static standing  -       Row Name 08/17/24 1525          Functional Assessment    Outcome Measure Options AM-PAC 6 Clicks Basic Mobility (PT)  -               User Key  (r) = Recorded By, (t) = Taken By, (c) = Cosigned By      Initials Name Provider Type    EL Harsh Mckay, PT Physical Therapist     Trista Ac, RN Registered Nurse    Leona Hernandez RN Registered Nurse    Frederick Argueta RN Registered Nurse                                 Physical Therapy Education       Title: PT OT SLP Therapies (Done)       Topic: Physical Therapy (Done)       Point: Mobility training (Done)       Learning Progress Summary             Patient Acceptance, E,TB, VU by  at 8/17/2024 1526                         Point: Precautions (Done)       Learning Progress Summary             Patient Acceptance, E,TB, VU by  at 8/17/2024 1526                                         User Key       Initials Effective Dates Name Provider Type Discipline     06/23/20 -  Harsh Mckay, PT Physical Therapist PT                  PT Recommendation and Plan  Planned Therapy Interventions (PT): balance training, bed mobility training, gait training, neuromuscular re-education, transfer training, stair training, patient/family education  Plan of Care Reviewed With: patient, family  Outcome Evaluation: Pt is a 89 YO F admitted with persistent R plueral effusion, PNA, currently on 4L O2 uses 2L at baseline. Pt reports living at home wiht daughter, typically is independent with ambulation using RWx, and gets assistance from daughter for ADLs. Pt has 1 step to enter home and has had no recent falls. Pt this date requiring no physical assistance to come to sitting. In sitting, pt c/o  shaky feeling and RR was >40, pt cued for slowing RR and shaky feeling improved. Pt requiring just CGA for stand and transfer to bedside chair and O2 maintained above 90% throughout. Pt physically appears safe to reutrn home when medically appropriate. PT to follow while admitted.     Time Calculation:   PT Evaluation Complexity  History, PT Evaluation Complexity: 1-2 personal factors and/or comorbidities  Examination of Body Systems (PT Eval Complexity): total of 3 or more elements  Clinical Presentation (PT Evaluation Complexity): evolving  Clinical Decision Making (PT Evaluation Complexity): moderate complexity  Overall Complexity (PT Evaluation Complexity): moderate complexity     PT Charges       Row Name 08/17/24 1526             Time Calculation    Start Time 1112  -EL      Stop Time 1132  -EL      Time Calculation (min) 20 min  -EL      PT Received On 08/17/24  -EL      PT - Next Appointment 08/19/24  -EL      PT Goal Re-Cert Due Date 08/31/24  -EL                User Key  (r) = Recorded By, (t) = Taken By, (c) = Cosigned By      Initials Name Provider Type    Hasrh Vega, PT Physical Therapist                  Therapy Charges for Today       Code Description Service Date Service Provider Modifiers Qty    [id number]  PT EVAL MOD COMPLEXITY 4 8/17/2024 Harsh Mckay, PT GP 1            PT G-Codes  Outcome Measure Options: AM-PAC 6 Clicks Basic Mobility (PT)  AM-PAC 6 Clicks Score (PT): 17  PT Discharge Summary  Anticipated Discharge Disposition (PT): skilled nursing facility    Harsh Mckay PT  8/17/2024

## 2024-08-17 NOTE — PROGRESS NOTES
"Pharmacy Antimicrobial Dosing Service    Subjective:  Shruthi Amin is a 89 y.o.female admitted with afib. Pharmacy has been consulted to dose Vancomycin and Cefepime for possible PNA.        Assessment/Plan    1. Day #1 Vancomycin: Goal -600 mcg*h/mL.   Vanc 1 g q24h  Pk: 8/18/24 @ 2100  Tr: 8/19/24 @ 1700    2. Day #1 Cefepime: 2 g IV q12h for estCrCl 30-59 mL/min.    Will continue to monitor drug levels, renal function, culture and sensitivities, and patient clinical status.       Objective:  Relevant clinical data and objective history reviewed:  157.5 cm (62\")   45 kg (99 lb 3.3 oz)   Ideal body weight: 53.9 kg (118 lb 11.8 oz)  Body mass index is 18.15 kg/m².        Results from last 7 days   Lab Units 08/16/24  1650 08/16/24  1649   CREATININE mg/dL 0.55* 0.52*     Estimated Creatinine Clearance: 42.5 mL/min (A) (by C-G formula based on SCr of 0.55 mg/dL (L)).  I/O last 3 completed shifts:  In: 100 [IV Piggyback:100]  Out: -     Results from last 7 days   Lab Units 08/16/24  1650   WBC 10*3/mm3 16.60*     Temperature    08/16/24 1723 08/16/24 2100   Temp: 97.4 °F (36.3 °C) 97.8 °F (36.6 °C)     Baseline culture/source/susceptibility:  Microbiology Results (last 10 days)       Procedure Component Value - Date/Time    Respiratory Panel PCR w/COVID-19(SARS-CoV-2) [facility]/FABI/MISTY/PAD/[facility] In-House, NP Swab in UTM/VTM, 2 HR TAT - Swab, Nasopharynx [[id number]]  (Abnormal) Collected: 08/16/24 1727    Lab Status: Final result Specimen: Swab from Nasopharynx Updated: 08/16/24 1841     ADENOVIRUS, PCR Not Detected     Coronavirus 229E Not Detected     Coronavirus HKU1 Not Detected     Coronavirus NL63 Not Detected     Coronavirus OC43 Not Detected     COVID19 Not Detected     Human Metapneumovirus Not Detected     Human Rhinovirus/Enterovirus Detected     Influenza A PCR Not Detected     Influenza B PCR Not Detected     Parainfluenza Virus 1 Not Detected     Parainfluenza Virus 2 Not Detected     Parainfluenza " Virus 3 Not Detected     Parainfluenza Virus 4 Not Detected     RSV, PCR Not Detected     Bordetella pertussis pcr Not Detected     Bordetella parapertussis PCR Not Detected     Chlamydophila pneumoniae PCR Not Detected     Mycoplasma pneumo by PCR Not Detected    Narrative:      In the setting of a positive respiratory panel with a viral infection PLUS a negative procalcitonin without other underlying concern for bacterial infection, consider observing off antibiotics or discontinuation of antibiotics and continue supportive care. If the respiratory panel is positive for atypical bacterial infection (Bordetella pertussis, Chlamydophila pneumoniae, or Mycoplasma pneumoniae), consider antibiotic de-escalation to target atypical bacterial infection.    MRSA Screen, PCR (Inpatient) - Swab, Nares [[id number]]  (Normal) Collected: 08/16/24 1727    Lab Status: Final result Specimen: Swab from Nares Updated: 08/16/24 1858     MRSA PCR No MRSA Detected    Narrative:      The negative predictive value of this diagnostic test is high and should only be used to consider de-escalating anti-MRSA therapy. A positive result may indicate colonization with MRSA and must be correlated clinically.            Joel Choi RPH  08/16/24 22:43 EDT

## 2024-08-17 NOTE — PLAN OF CARE
Goal Outcome Evaluation:         Pt seen by Cardiology this morning and Cardizem was discontinued. Pt seen by therapy and up to chair at bedside. Pt has had family in room this shift. Pt able to take medications crushed in applesauce w/ HTL. No discussion about Pt discharging today.

## 2024-08-17 NOTE — PLAN OF CARE
Goal Outcome Evaluation:  Plan of Care Reviewed With: patient, family           Outcome Evaluation: Pt is a 89 YO F admitted with persistent R plueral effusion, PNA, currently on 4L O2 uses 2L at baseline. Pt reports living at home wiht daughter, typically is independent with ambulation using RWx, and gets assistance from daughter for ADLs. Pt has 1 step to enter home and has had no recent falls. Pt this date requiring no physical assistance to come to sitting. In sitting, pt c/o shaky feeling and RR was >40, pt cued for slowing RR and shaky feeling improved. Pt requiring just CGA for stand and transfer to bedside chair and O2 maintained above 90% throughout. Pt physically appears safe to reutrn home when medically appropriate. PT to follow while admitted.      Anticipated Discharge Disposition (PT): skilled nursing facility

## 2024-08-17 NOTE — PROGRESS NOTES
"Pharmacy dosing service  Anticoagulant  Warfarin     Subjective:    Shruthi Amin is a 89 y.o.female being continued on warfarin for Atrial Fibrillation / Flutter.    INR Goal: 2 - 3  Home medication?: warfarin 3 mg PO daily, except warfarin 1.5 mg PO on Tuesday, Saturday.   Bridge Therapy Present?:  No      Assessment/Plan:    Today's INR is therapeutic (2.57) on home warfarin 3mg daily except 1.5mg on Tuesdays and Saturdays. Will give home dose of warfarin 3mg tonight and re-evaluate tomorrow.      Continue to monitor and adjust based on INR.         Date 8/16           INR 2.57           Dose 3mg               Objective:  [Ht: 157.5 cm (62\"); Wt: 45 kg (99 lb 3.3 oz); BMI: Body mass index is 18.15 kg/m².]    Lab Results   Component Value Date    ALBUMIN 4.0 08/16/2024     Lab Results   Component Value Date    INR 2.57 08/16/2024    INR 3.90 (H) 08/13/2024    INR 4.20 (H) 08/13/2024    PROTIME 26.1 08/16/2024    PROTIME 38.9 08/13/2024    PROTIME 41.4 08/13/2024     Lab Results   Component Value Date    HGB 10.8 (L) 08/16/2024    HGB 12.5 08/16/2024    HGB 9.9 (L) 06/26/2024     Lab Results   Component Value Date    HCT 35.8 08/16/2024    HCT 37 (L) 08/16/2024    HCT 30.8 (L) 06/26/2024       Holly Hubbard, PharmD  08/16/24 23:59 EDT     "

## 2024-08-17 NOTE — H&P
Patient Care Team:  Cristal Goodwin MD as PCP - General (Family Medicine)  Anai Moise APRN as Nurse Practitioner (Cardiology)  Theodore Vital MD as Consulting Physician (Cardiology)    Chief complaint shortness of breath    Subjective     Shruthi Amin is a 89-year-old female who presents to [facility], [place], operated by [facility] ED on 8/16/2024 with progressive cough and shortness of breath.  Patient does have a past medical history of COPD and follows outpatient with Dr. Marshall.  She also has a history of a large hiatal hernia with recurrent aspiration.  Patient recently was discharged from hospital and went to rehab.  She has been home for 1 week.  Family reports Monday she started feeling bad.  She was having worsening shortness of breath.  She did see her pulmonologist who started her on cefdinir.  Her symptoms progressed so family brought her to ER to further evaluation.  She was found to be in atrial fibrillation with RVR.  While in ED she was found to be positive for rhinovirus.  She was started on Cardizem drip and cardiology was consulted.    Review of Systems   Constitutional:  Positive for activity change, appetite change and fatigue.   HENT: Negative.     Respiratory:  Positive for choking and shortness of breath.    Cardiovascular:  Positive for palpitations. Negative for chest pain and leg swelling.   Gastrointestinal: Negative.    Genitourinary: Negative.    Musculoskeletal:  Positive for arthralgias and back pain.   Neurological:  Positive for weakness.   Psychiatric/Behavioral: Negative.            History  Past Medical History:   Diagnosis Date    Atrial fibrillation     Coronary artery disease     Atrial fibrillation    GERD (gastroesophageal reflux disease)     Glaucoma     Hiatal hernia with gastroesophageal reflux     History of osteoporotic pathological fracture     Right intertroch (7/2019)    Hx of compression fracture of spine     T12 and L1(2013); T6 (1/2022)    Hyperlipidemia      Hypertension     Osteoarthritis     Osteoporosis     on prolia(3/21/22)    Stroke     off and on dizziness from the stroke.     Past Surgical History:   Procedure Laterality Date    BACK SURGERY      kyphoplasty    EYE SURGERY      cataract surgery    FIXATION KYPHOPLASTY LUMBAR SPINE  2013    HIP TROCHANTERIC NAILING WITH INTRAMEDULLARY HIP SCREW Right 2019    Procedure: HIP TROCHANTERIC NAILING SHORT WITH INTRAMEDULLARY HIP SCREW;  Surgeon: Edward Centeno MD;  Location: [facility] MAIN OR;  Service: Orthopedics    RECTAL SURGERY      x 3     Family History   Problem Relation Age of Onset    Heart disease Mother     Hypertension Mother     Osteoporosis Mother     Cancer Father         colon cancer    Osteoporosis Father     Cancer Sister         lung    Cancer Brother         colon cancer     Social History     Tobacco Use    Smoking status: Former     Current packs/day: 0.00     Types: Cigarettes     Quit date:      Years since quittin.6     Passive exposure: Past    Smokeless tobacco: Never   Vaping Use    Vaping status: Never Used   Substance Use Topics    Alcohol use: No    Drug use: No     Medications Prior to Admission   Medication Sig Dispense Refill Last Dose    acetaminophen (TYLENOL) 325 MG tablet Take 2 tablets by mouth Every 4 (Four) Hours As Needed for Mild Pain.   8/15/2024    cefuroxime (CEFTIN) 250 MG tablet Take 1 tablet by mouth Daily. Indications: Bronchitis   2024    Cholecalciferol 10 MCG (400 UNIT) tablet Take 1 tablet by mouth Daily. Indications: Vitamin D Deficiency   2024    DIGESTIVE ENZYMES PO Take 1 capsule by mouth 3 (Three) Times a Day. Indications: supplement   2024    docusate sodium (COLACE) 250 MG capsule Take 1 capsule by mouth 2 (Two) Times a Day. Indications: Constipation   2024    famotidine (Pepcid) 20 MG tablet Take 1 tablet by mouth Daily.   2024    furosemide (LASIX) 40 MG tablet Take 1 tablet by mouth Daily. Indications: Cardiac  Failure   8/16/2024    latanoprost (XALATAN) 0.005 % ophthalmic solution Administer 1 drop to both eyes Every Night. Indications: Wide-Angle Glaucoma   8/15/2024    metoclopramide (REGLAN) 5 MG tablet Take 1 tablet by mouth 4 (Four) Times a Day Before Meals & at Bedtime. Indications: Gastroesophageal Reflux Disease 120 tablet 1 8/16/2024    metoprolol succinate XL (TOPROL-XL) 25 MG 24 hr tablet Take 0.5 tablets by mouth Every 12 (Twelve) Hours. 60 tablet 1 8/16/2024    midodrine (PROAMATINE) 2.5 MG tablet Take 1 tablet by mouth 2 (Two) Times a Day. 60 tablet 3 8/16/2024    mirtazapine (REMERON) 15 MG tablet Take 1 tablet by mouth Every Night. Indications: Appetite Stimulant   8/15/2024    O2 (OXYGEN) Inhale 3 L/min As Needed. Indications: SOB   8/16/2024    sennosides-docusate (senna-docusate sodium) 8.6-50 MG per tablet Take 1 tablet by mouth 2 (Two) Times a Day. Indications: Constipation   8/16/2024    warfarin (COUMADIN) 3 MG tablet Take 1 tablet by mouth Daily. Indications: Atrial Fibrillation   8/15/2024    warfarin (Coumadin) 3 MG tablet Take 1 tablet by mouth Every Night. Indications: Atrial Fibrillation   8/15/2024    magnesium hydroxide (Milk of Magnesia) 400 MG/5ML suspension Take 30 mL by mouth Daily As Needed for Constipation. Indications: Constipation   Unknown    potassium chloride (KLOR-CON M20) 20 MEQ CR tablet Take 1 tablet by mouth Daily. Indications: Low Amount of Potassium in the Blood   Unknown     Allergies:  Penicillins, Codeine, and Latex    Objective     Vital Signs  Temp:  [97.4 °F (36.3 °C)-98.2 °F (36.8 °C)] 98.2 °F (36.8 °C)  Heart Rate:  [] 115  Resp:  [16-30] 18  BP: (105-163)/(46-98) 114/62       Physical Exam  Vitals reviewed.   Constitutional:       Appearance: She is ill-appearing.   HENT:      Head: Normocephalic and atraumatic.      Right Ear: External ear normal.      Left Ear: External ear normal.      Nose: Nose normal.      Mouth/Throat:      Mouth: Mucous membranes are  moist.   Eyes:      General:         Right eye: No discharge.         Left eye: No discharge.   Cardiovascular:      Pulses: Normal pulses.      Heart sounds: Murmur heard.   Pulmonary:      Effort: Pulmonary effort is normal.      Breath sounds: Rhonchi and rales present.   Abdominal:      General: Bowel sounds are normal.      Palpations: Abdomen is soft.   Musculoskeletal:         General: Normal range of motion.   Skin:     General: Skin is warm and dry.   Neurological:      Mental Status: She is alert and oriented to person, place, and time.   Psychiatric:         Mood and Affect: Mood normal.         Behavior: Behavior normal.          Results Review:     Imaging Results (Last 24 Hours)       Procedure Component Value Units Date/Time    CT Chest Without Contrast Diagnostic [[id number]] Collected: 08/17/24 1113     Updated: 08/17/24 1135    Narrative:      CT CHEST WO CONTRAST DIAGNOSTIC    Date of Exam: 8/17/2024 8:52 AM EDT    Indication: pneumonia.    Comparison: Chest radiograph 8/16/2024. Chest CT  2/25/2024    Technique: Axial CT images were obtained of the chest without contrast administration.  Sagittal and coronal reconstructions were performed.  Automated exposure control and iterative reconstruction methods were used.      Findings:  Thyroid and thoracic inlet: Normal.    Lymph nodes: No pathologic appearing lymph nodes by imaging criteria.    Cardiovascular: Similar cardiomegaly. No pericardial effusion. Similar mildly enlarged main pulm artery measuring 37 mm, which can be seen with pulmonary hypertension. No evidence of aortic aneurysm. . There are coronary artery calcifications. Aortic   atherosclerotic calcifications.    Esophagus: Large hiatal hernia with mostly intrathoracic stomach    Lung parenchyma: Biapical pleural-parenchymal scarring. Consolidative opacities in both lower lobes and in the right middle lobe. Increased consolidative opacity in the left lower lobe. Mild interlobular septal  thickening.    Airways: Patent trachea and mainstem bronchi.    Pleura: Similar moderate right and increased small left pleural effusions. No pneumothorax.    Chest wall and osseous structures: Interval increased, severe height loss of a chronic appearing compression fracture at T7. No appreciable osseous retropulsion. Additional multilevel chronic appearing vertebral body compression fractures are similar to   prior. Vertebral body augmentation at T12. Degenerative changes of the imaged spine. The bones are demineralized.    Included upper abdomen: Otherwise, no significant abnormality.      Impression:      Impression:  Similar moderate right and increased small left pleural effusions. Adjacent consolidative opacities, which have increased in the left lower lobe, may represent atelectasis, with superimposed pneumonia to be excluded clinically.    Cardiomegaly with mild pulmonary edema.    Interval increased, severe height loss of a chronic appearing compression fracture at T7 vertebral body. Recommend correlation with point tenderness to assess for an occult acute component. No significant osseous retropulsion.          Electronically Signed: Tray De Paz    8/17/2024 11:33 AM EDT    Workstation ID: [id number]    XR Chest 1 View [[id number]] Collected: 08/16/24 1703     Updated: 08/16/24 1706    Narrative:      XR CHEST 1 VW    Date of Exam: 8/16/2024 5:01 PM EDT    Indication: soa    Comparison: Chest x-ray 6/24/2024    Findings:  Limited evaluation due to patient's kyphosis and head and neck obscuring the majority of the left lung. Large hiatal hernia. Cardiomegaly. Moderate pleural effusions. Groundglass opacity and septal thickening. Bibasilar airspace opacities.      Impression:      Impression:  Limited evaluation due to patient's head and neck obscuring the majority of the left lung. Severely elevated diaphragm with large hiatal hernia suspected. Cardiomegaly. Moderate pleural effusions. Bibasilar opacity  atelectasis versus pneumonia.      Electronically Signed: Julia Torrez MD    8/16/2024 5:04 PM EDT    Workstation ID: [id number]             Lab Results (last 24 hours)       Procedure Component Value Units Date/Time    Basic Metabolic Panel [[id number]]  (Abnormal) Collected: 08/17/24 0034    Specimen: Blood Updated: 08/17/24 0234     Glucose 85 mg/dL      BUN 22 mg/dL      Creatinine 0.45 mg/dL      Sodium 143 mmol/L      Potassium 4.0 mmol/L      Comment: Specimen hemolyzed.  Result may be falsely elevated.        Chloride 101 mmol/L      CO2 32.1 mmol/L      Calcium 8.8 mg/dL      BUN/Creatinine Ratio 48.9     Anion Gap 9.9 mmol/L      eGFR 88.2 mL/min/1.73     Narrative:      GFR Normal >60  Chronic Kidney Disease <60  Kidney Failure <15    The GFR formula is only valid for adults with stable renal function between ages 18 and 70.    Magnesium [[id number]]  (Normal) Collected: 08/17/24 0034    Specimen: Blood Updated: 08/17/24 0234     Magnesium 1.9 mg/dL     CBC & Differential [[id number]]  (Abnormal) Collected: 08/17/24 0034    Specimen: Blood Updated: 08/17/24 0232    Narrative:      The following orders were created for panel order CBC & Differential.  Procedure                               Abnormality         Status                     ---------                               -----------         ------                     CBC Auto Differential[[id number]]        Abnormal            Final result               Scan Slide[[id number]]                                                                    Please view results for these tests on the individual orders.    CBC Auto Differential [[id number]]  (Abnormal) Collected: 08/17/24 0034    Specimen: Blood Updated: 08/17/24 0232     WBC 11.04 10*3/mm3      RBC 2.73 10*6/mm3      Hemoglobin 9.2 g/dL      Hematocrit 29.3 %      .3 fL      MCH 33.7 pg      MCHC 31.4 g/dL      RDW 15.0 %      RDW-SD 59.1 fl      MPV 11.6 fL      Platelets 186 10*3/mm3      Neutrophil %  65.3 %      Lymphocyte % 10.7 %      Monocyte % 15.8 %      Eosinophil % 0.4 %      Basophil % 1.2 %      Immature Grans % 6.6 %      Neutrophils, Absolute 7.22 10*3/mm3      Lymphocytes, Absolute 1.18 10*3/mm3      Monocytes, Absolute 1.74 10*3/mm3      Eosinophils, Absolute 0.04 10*3/mm3      Basophils, Absolute 0.13 10*3/mm3      Immature Grans, Absolute 0.73 10*3/mm3      nRBC 0.4 /100 WBC     Protime-INR [[id number]]  (Normal) Collected: 08/17/24 0034    Specimen: Blood Updated: 08/17/24 0220     Protime 26.0 Seconds      INR 2.56    Basic Metabolic Panel [[id number]] Updated: 08/16/24 2354    Specimen: Blood     STAT Lactic Acid, Reflex [[id number]]  (Abnormal) Collected: 08/16/24 2313    Specimen: Blood Updated: 08/16/24 2353     Lactate 2.1 mmol/L     STAT Lactic Acid, Reflex [[id number]]  (Abnormal) Collected: 08/16/24 1957    Specimen: Blood Updated: 08/16/24 2044     Lactate 2.9 mmol/L     High Sensitivity Troponin T 2Hr [[id number]]  (Abnormal) Collected: 08/16/24 1915    Specimen: Blood Updated: 08/16/24 1941     HS Troponin T 31 ng/L      Troponin T Delta 4 ng/L     Narrative:      High Sensitive Troponin T Reference Range:  <14.0 ng/L- Negative Female for AMI  <22.0 ng/L- Negative Male for AMI  >=14 - Abnormal Female indicating possible myocardial injury.  >=22 - Abnormal Male indicating possible myocardial injury.   Clinicians would have to utilize clinical acumen, EKG, Troponin, and serial changes to determine if it is an Acute Myocardial Infarction or myocardial injury due to an underlying chronic condition.         Blood Gas, Arterial - [[id number]]  (Abnormal) Collected: 08/16/24 1926    Specimen: Arterial Blood Updated: 08/1934     Site Right Radial     Gulshan's Test Positive     pH, Arterial 7.411 pH units      pCO2, Arterial 57.5 mm Hg      pO2, Arterial 92.5 mm Hg      HCO3, Arterial 36.5 mmol/L      Base Excess, Arterial 10.3 mmol/L      Comment: Serial Number: [id number]:  [id number]         O2 Saturation, Arterial 97.0 %      CO2 Content 38.3 mmol/L      Barometric Pressure for Blood Gas --     Comment: N/A        Modality BiPap     FIO2 60 %      PEEP 5     Hemodilution No     Respiratory Rate 26     PO2/FIO2 154    MRSA Screen, PCR (Inpatient) - Swab, Nares [[id number]]  (Normal) Collected: 08/16/24 1727    Specimen: Swab from Nares Updated: 08/16/24 1858     MRSA PCR No MRSA Detected    Narrative:      The negative predictive value of this diagnostic test is high and should only be used to consider de-escalating anti-MRSA therapy. A positive result may indicate colonization with MRSA and must be correlated clinically.    Respiratory Panel PCR w/COVID-19(SARS-CoV-2) KEYONA/FABI/MISTY/PAD/COR/ALISSON In-House, NP Swab in UTM/VTM, 2 HR TAT - Swab, Nasopharynx [[id number]]  (Abnormal) Collected: 08/16/24 1727    Specimen: Swab from Nasopharynx Updated: 08/16/24 1841     ADENOVIRUS, PCR Not Detected     Coronavirus 229E Not Detected     Coronavirus HKU1 Not Detected     Coronavirus NL63 Not Detected     Coronavirus OC43 Not Detected     COVID19 Not Detected     Human Metapneumovirus Not Detected     Human Rhinovirus/Enterovirus Detected     Influenza A PCR Not Detected     Influenza B PCR Not Detected     Parainfluenza Virus 1 Not Detected     Parainfluenza Virus 2 Not Detected     Parainfluenza Virus 3 Not Detected     Parainfluenza Virus 4 Not Detected     RSV, PCR Not Detected     Bordetella pertussis pcr Not Detected     Bordetella parapertussis PCR Not Detected     Chlamydophila pneumoniae PCR Not Detected     Mycoplasma pneumo by PCR Not Detected    Narrative:      In the setting of a positive respiratory panel with a viral infection PLUS a negative procalcitonin without other underlying concern for bacterial infection, consider observing off antibiotics or discontinuation of antibiotics and continue supportive care. If the respiratory panel is positive for atypical bacterial infection (Bordetella pertussis,  Chlamydophila pneumoniae, or Mycoplasma pneumoniae), consider antibiotic de-escalation to target atypical bacterial infection.    Protime-INR [[id number]]  (Normal) Collected: 08/16/24 1650    Specimen: Blood Updated: 08/16/24 1734     Protime 26.1 Seconds      INR 2.57    aPTT [[id number]]  (Abnormal) Collected: 08/16/24 1650    Specimen: Blood Updated: 08/16/24 1734     PTT 41.8 seconds     High Sensitivity Troponin T [[id number]]  (Abnormal) Collected: 08/16/24 1650    Specimen: Blood Updated: 08/16/24 1729     HS Troponin T 27 ng/L     Narrative:      High Sensitive Troponin T Reference Range:  <14.0 ng/L- Negative Female for AMI  <22.0 ng/L- Negative Male for AMI  >=14 - Abnormal Female indicating possible myocardial injury.  >=22 - Abnormal Male indicating possible myocardial injury.   Clinicians would have to utilize clinical acumen, EKG, Troponin, and serial changes to determine if it is an Acute Myocardial Infarction or myocardial injury due to an underlying chronic condition.         BNP [[id number]]  (Abnormal) Collected: 08/16/24 1650    Specimen: Blood Updated: 08/16/24 1729     proBNP 7,361.0 pg/mL     Narrative:      This assay is used as an aid in the diagnosis of individuals suspected of having heart failure. It can be used as an aid in the diagnosis of acute decompensated heart failure (ADHF) in patients presenting with signs and symptoms of ADHF to the emergency department (ED). In addition, NT-proBNP of <300 pg/mL indicates ADHF is not likely.    Age Range Result Interpretation  NT-proBNP Concentration (pg/mL:      <50             Positive            >450                   Gray                 300-450                    Negative             <300    50-75           Positive            >900                  Gray                300-900                  Negative            <300      >75             Positive            >1800                  Gray                300-1800                  Negative             <300    Magnesium [[id number]]  (Normal) Collected: 08/16/24 1650    Specimen: Blood Updated: 08/16/24 1729     Magnesium 2.0 mg/dL     Comprehensive Metabolic Panel [[id number]]  (Abnormal) Collected: 08/16/24 1650    Specimen: Blood Updated: 08/16/24 1729     Glucose 206 mg/dL      BUN 26 mg/dL      Creatinine 0.55 mg/dL      Sodium 141 mmol/L      Potassium 4.8 mmol/L      Chloride 100 mmol/L      CO2 30.3 mmol/L      Calcium 9.3 mg/dL      Total Protein 6.9 g/dL      Albumin 4.0 g/dL      ALT (SGPT) 20 U/L      AST (SGOT) 38 U/L      Alkaline Phosphatase 139 U/L      Total Bilirubin 0.8 mg/dL      Globulin 2.9 gm/dL      A/G Ratio 1.4 g/dL      BUN/Creatinine Ratio 47.3     Anion Gap 10.7 mmol/L      eGFR 84.0 mL/min/1.73     Narrative:      GFR Normal >60  Chronic Kidney Disease <60  Kidney Failure <15    The GFR formula is only valid for adults with stable renal function between ages 18 and 70.    CBC & Differential [[id number]]  (Abnormal) Collected: 08/16/24 1650    Specimen: Blood Updated: 08/16/24 1715    Narrative:      The following orders were created for panel order CBC & Differential.  Procedure                               Abnormality         Status                     ---------                               -----------         ------                     CBC Auto Differential[[id number]]        Abnormal            Final result               Scan Slide[[id number]]                                                                    Please view results for these tests on the individual orders.    CBC Auto Differential [[id number]]  (Abnormal) Collected: 08/16/24 1650    Specimen: Blood Updated: 08/16/24 1715     WBC 16.60 10*3/mm3      RBC 3.32 10*6/mm3      Hemoglobin 10.8 g/dL      Hematocrit 35.8 %      .8 fL      MCH 32.5 pg      MCHC 30.2 g/dL      RDW 15.1 %      RDW-SD 59.2 fl      MPV 11.6 fL      Platelets 254 10*3/mm3      Neutrophil % 64.4 %      Lymphocyte % 15.1 %      Monocyte % 12.3 %       Eosinophil % 0.2 %      Basophil % 1.2 %      Immature Grans % 6.8 %      Neutrophils, Absolute 10.68 10*3/mm3      Lymphocytes, Absolute 2.51 10*3/mm3      Monocytes, Absolute 2.04 10*3/mm3      Eosinophils, Absolute 0.04 10*3/mm3      Basophils, Absolute 0.20 10*3/mm3      Immature Grans, Absolute 1.13 10*3/mm3      nRBC 0.3 /100 WBC     Blood Culture - Blood, Arm, Left [[id number]] Collected: 08/16/24 1704    Specimen: Blood from Arm, Left Updated: 08/16/24 1715    POC Lactate [[id number]]  (Abnormal) Collected: 08/16/24 1655    Specimen: Blood Updated: 08/16/24 1703     Lactate 2.3 mmol/L      Comment: Serial Number: [id number]:  [id number]       Blood Culture - Blood, Arm, Left [[id number]] Collected: 08/16/24 1650    Specimen: Blood from Arm, Left Updated: 08/16/24 1701    POC Lactate [[id number]]  (Abnormal) Collected: 08/16/24 1649    Specimen: Arterial Blood Updated: 08/16/24 1658     Lactate 2.3 mmol/L      Comment: Serial Number: [id number]:  [id number]        Notified Time --     Notified Who Dr Capone     Read Back Yes    Blood Gas, Arterial - [[id number]]  (Abnormal) Collected: 08/16/24 1649    Specimen: Arterial Blood Updated: 08/16/24 1657     Site Right Radial     Gulshan's Test Positive     pH, Arterial 7.294 pH units      pCO2, Arterial 76.3 mm Hg      pO2, Arterial 66.4 mm Hg      HCO3, Arterial 37.0 mmol/L      Base Excess, Arterial 7.9 mmol/L      Comment: Serial Number: [id number]:  [id number]        O2 Saturation, Arterial 89.2 %      Barometric Pressure for Blood Gas --     Comment: N/A        Modality NRB     FIO2 100 %      Notified Who Dr Capone     Read Back Yes     Notified Time --     Hemodilution No     PO2/FIO2 66    POC Creatinine [[id number]]  (Abnormal) Collected: 08/16/24 1649    Specimen: Arterial Blood Updated: 08/16/24 1657     Creatinine 0.52 mg/dL      Comment: Serial Number: [id number]:  [id number]        eGFR 85.1 mL/min/1.73     POCT Electrolytes +HGB +HCT [[id number]]   (Abnormal) Collected: 08/16/24 1649    Specimen: Arterial Blood Updated: 08/16/24 1657     Sodium 141 mmol/L      POC Potassium 4.9 mmol/L      Ionized Calcium 1.29 mmol/L      Comment: Serial Number: [id number]:  [id number]        Glucose 233 mg/dL      Hematocrit 37 %      Hemoglobin 12.5 g/dL     POC Glucose Once [[id number]]  (Abnormal) Collected: 08/16/24 1649    Specimen: Arterial Blood Updated: 08/16/24 1657     Glucose 233 mg/dL      Comment: Serial Number: [id number]:  [id number]                I reviewed the patient's new clinical results.    Assessment & Plan       Atrial fibrillation with rapid ventricular response  -Patient is off Cardizem drip, continue beta-blocker  - Cardiology following  - Anticoagulated on warfarin    Acute on chronic respiratory failure with hypoxia and hypercapnia  Questionable aspiration pneumonia  Rhinovirus  - Will discontinue Vanco\cefepime and start Unasyn  - Add Mucinex and Mucomyst nebulized with albuterol  - Appreciate pulmonology input  -BiPAP as needed    Hiatal hernia  Dysphagia  Malnutrition  - Will consult speech therapy    Osteoarthritis  Hyperlipidemia  Essential hypertension    Diet: Mechanical soft with honey thick liquid  DVT prophylaxis: Anticoagulated on warfarin  GI prophylaxis: Pepcid  Code status: Full code      I discussed the patient's findings and my recommendations with patient.     ALMA Horan  08/17/24  14:21 EDT

## 2024-08-17 NOTE — PROGRESS NOTES
"Pharmacy dosing service  Anticoagulant  Warfarin     Subjective:    Shruthi Amin is a 89 y.o.female being continued on warfarin for Atrial Fibrillation / Flutter.    INR Goal: 2 - 3  Home medication?: warfarin 3 mg PO daily, except warfarin 1.5 mg PO on Tuesday, Saturday.   Bridge Therapy Present?:  No      Assessment/Plan:    Today's INR is therapeutic (2.56) on home warfarin 3mg daily except 1.5mg on Tuesdays and Saturdays. Will schedule warfarin 1.5 mg for today as this is the normal home dose for patient on Saturdays.     Continue to monitor and adjust based on INR.         Date 8/16 8/17          INR 2.57 2.56          Dose 3mg 1.5 mg               Objective:  [Ht: 157.5 cm (62\"); Wt: 47 kg (103 lb 9.9 oz); BMI: Body mass index is 18.95 kg/m².]    Lab Results   Component Value Date    ALBUMIN 4.0 08/16/2024     Lab Results   Component Value Date    INR 2.56 08/17/2024    INR 2.57 08/16/2024    INR 3.90 (H) 08/13/2024    PROTIME 26.0 08/17/2024    PROTIME 26.1 08/16/2024    PROTIME 38.9 08/13/2024     Lab Results   Component Value Date    HGB 9.2 (L) 08/17/2024    HGB 10.8 (L) 08/16/2024    HGB 12.5 08/16/2024     Lab Results   Component Value Date    HCT 29.3 (L) 08/17/2024    HCT 35.8 08/16/2024    HCT 37 (L) 08/16/2024       Sol Strong, PharmD  08/17/24 11:59 EDT       "

## 2024-08-17 NOTE — CONSULTS
HP      Name: Shruthi Amin ADMIT: 2024   : 6/10/1928  PCP: Cristal Goodwin MD    MRN: [id number] LOS: 1 days   AGE/SEX: 89 y.o. female  ROOM:      Chief Complaint   Patient presents with    Shortness of Breath       Subjective        History of present illness  Shruthi Amin is a 89-year-old female patient who has chronic atrial fibrillation, she was in the hospital couple weeks back with CHF and pleural effusions and had thoracentesis.  She is admitted to the hospital from rehab due to worsening shortness of breath and cough.  In the hospital, she had an episode of rapid heart rate.  Now her heart rate is in the 80s and her blood pressure is stable.  At home she is on Toprol and warfarin.    Past Medical History:   Diagnosis Date    Atrial fibrillation     Coronary artery disease     Atrial fibrillation    GERD (gastroesophageal reflux disease)     Glaucoma     Hiatal hernia with gastroesophageal reflux     History of osteoporotic pathological fracture     Right intertroch (2019)    Hx of compression fracture of spine     T12 and L1(); T6 (2022)    Hyperlipidemia     Hypertension     Osteoarthritis     Osteoporosis     on prolia(3/21/22)    Stroke     off and on dizziness from the stroke.     Past Surgical History:   Procedure Laterality Date    BACK SURGERY      kyphoplasty    EYE SURGERY      cataract surgery    FIXATION KYPHOPLASTY LUMBAR SPINE      HIP TROCHANTERIC NAILING WITH INTRAMEDULLARY HIP SCREW Right 2019    Procedure: HIP TROCHANTERIC NAILING SHORT WITH INTRAMEDULLARY HIP SCREW;  Surgeon: Edward Centeno MD;  Location: [facility] MAIN OR;  Service: Orthopedics    RECTAL SURGERY      x 3     Family History   Problem Relation Age of Onset    Heart disease Mother     Hypertension Mother     Osteoporosis Mother     Cancer Father         colon cancer    Osteoporosis Father     Cancer Sister         lung    Cancer Brother         colon cancer     Social History      Tobacco Use    Smoking status: Former     Current packs/day: 0.00     Types: Cigarettes     Quit date:      Years since quittin.6     Passive exposure: Past    Smokeless tobacco: Never   Vaping Use    Vaping status: Never Used   Substance Use Topics    Alcohol use: No    Drug use: No     Medications Prior to Admission   Medication Sig Dispense Refill Last Dose    acetaminophen (TYLENOL) 325 MG tablet Take 2 tablets by mouth Every 4 (Four) Hours As Needed for Mild Pain.   8/15/2024    cefuroxime (CEFTIN) 250 MG tablet Take 1 tablet by mouth Daily. Indications: Bronchitis   2024    Cholecalciferol 10 MCG (400 UNIT) tablet Take 1 tablet by mouth Daily. Indications: Vitamin D Deficiency   2024    DIGESTIVE ENZYMES PO Take 1 capsule by mouth 3 (Three) Times a Day. Indications: supplement   2024    docusate sodium (COLACE) 250 MG capsule Take 1 capsule by mouth 2 (Two) Times a Day. Indications: Constipation   2024    famotidine (Pepcid) 20 MG tablet Take 1 tablet by mouth Daily.   2024    furosemide (LASIX) 40 MG tablet Take 1 tablet by mouth Daily. Indications: Cardiac Failure   2024    latanoprost (XALATAN) 0.005 % ophthalmic solution Administer 1 drop to both eyes Every Night. Indications: Wide-Angle Glaucoma   8/15/2024    metoclopramide (REGLAN) 5 MG tablet Take 1 tablet by mouth 4 (Four) Times a Day Before Meals & at Bedtime. Indications: Gastroesophageal Reflux Disease 120 tablet 1 2024    metoprolol succinate XL (TOPROL-XL) 25 MG 24 hr tablet Take 0.5 tablets by mouth Every 12 (Twelve) Hours. 60 tablet 1 2024    midodrine (PROAMATINE) 2.5 MG tablet Take 1 tablet by mouth 2 (Two) Times a Day. 60 tablet 3 2024    mirtazapine (REMERON) 15 MG tablet Take 1 tablet by mouth Every Night. Indications: Appetite Stimulant   8/15/2024    O2 (OXYGEN) Inhale 3 L/min As Needed. Indications: SOB   2024    sennosides-docusate (senna-docusate sodium) 8.6-50 MG per  tablet Take 1 tablet by mouth 2 (Two) Times a Day. Indications: Constipation   8/16/2024    warfarin (COUMADIN) 3 MG tablet Take 1 tablet by mouth Daily. Indications: Atrial Fibrillation   8/15/2024    warfarin (Coumadin) 3 MG tablet Take 1 tablet by mouth Every Night. Indications: Atrial Fibrillation   8/15/2024    magnesium hydroxide (Milk of Magnesia) 400 MG/5ML suspension Take 30 mL by mouth Daily As Needed for Constipation. Indications: Constipation   Unknown    potassium chloride (KLOR-CON M20) 20 MEQ CR tablet Take 1 tablet by mouth Daily. Indications: Low Amount of Potassium in the Blood   Unknown     Allergies:  Penicillins, Codeine, and Latex    Review of systems    Constitutional: Negative.    Respiratory and cardiovascular: As detailed in HPI section.  Gastrointestinal: Negative for constipation, nausea and vomiting negative for abdominal distention, abdominal pain and diarrhea.   Genitourinary: Negative for difficulty urinating and flank pain.   Musculoskeletal: Negative for arthralgias, joint swelling and myalgias.   Skin: Negative for color change, rash and wound.   Neurological: Negative for dizziness, syncope, weakness and headaches.   Hematological: Negative for adenopathy.   Psychiatric/Behavioral: Negative for confusion.   All other systems reviewed and are negative.       Physical Exam  VITALS REVIEWED    General:      well developed, in no acute distress.    Head:      normocephalic and atraumatic.    Eyes:      PERRL/EOM intact, conjunctiva and sclera clear with out nystagmus.    Neck:      no masses, thyromegaly,  trachea central with normal respiratory effort and PMI displaced laterally  Lungs:      Clear to auscultation bilaterally  Heart:       Irregular rhythm  Msk:      no deformity or scoliosis noted of thoracic or lumbar spine.    Pulses:      pulses normal in all 4 extremities.    Extremities:       No lower extremity edema  Neurologic:      no focal deficits.   alert oriented  x3  Skin:      intact without lesions or rashes.    Psych:      alert and cooperative; normal mood and affect; normal attention span and concentration.      Result Review :               Pertinent cardiac workup    EKG 8/16/2024 atrial fibrillation ventricular rate of 139 bpm.  Echo 2/24/2024 ejection fraction 50 to 55%, severe MR and severe TR, dilated atria.      Assessment and Plan         Atrial fibrillation with rapid ventricular response      Shruthi Amin is a 89-year-old female patient who has chronic atrial fibrillation, severe MR and TR, is admitted to the hospital due to shortness of breath and cough.  She did have an episode of rapid rate but her heart rate is now back to normal in the 80s in A-fib.  I recommend continuing her home dose of metoprolol, add Cardizem if needed.        No follow-ups on file.  Patient was given instructions and counseling regarding her condition or for health maintenance advice. Please see specific information pulled into the AVS if appropriate.        Electronically signed by Margaret Sparks MD, 08/17/24, 10:39 AM EDT.

## 2024-08-18 ENCOUNTER — HOME CARE VISIT (OUTPATIENT)
Dept: HOME HEALTH SERVICES | Facility: HOME HEALTHCARE | Age: 89
End: 2024-08-18
Payer: MEDICARE

## 2024-08-18 LAB
ANION GAP SERPL CALCULATED.3IONS-SCNC: 6.1 MMOL/L (ref 5–15)
BACTERIA UR QL AUTO: ABNORMAL /HPF
BASOPHILS # BLD AUTO: 0.15 10*3/MM3 (ref 0–0.2)
BASOPHILS NFR BLD AUTO: 1.1 % (ref 0–1.5)
BILIRUB UR QL STRIP: NEGATIVE
BUN SERPL-MCNC: 20 MG/DL (ref 8–23)
BUN/CREAT SERPL: 44.4 (ref 7–25)
CALCIUM SPEC-SCNC: 8.9 MG/DL (ref 8.2–9.6)
CHLORIDE SERPL-SCNC: 102 MMOL/L (ref 98–107)
CLARITY UR: CLEAR
CO2 SERPL-SCNC: 34.9 MMOL/L (ref 22–29)
COLOR UR: YELLOW
CREAT SERPL-MCNC: 0.45 MG/DL (ref 0.57–1)
D-LACTATE SERPL-SCNC: 2 MMOL/L (ref 0.5–2)
DEPRECATED RDW RBC AUTO: 57.9 FL (ref 37–54)
EGFRCR SERPLBLD CKD-EPI 2021: 88.2 ML/MIN/1.73
EOSINOPHIL # BLD AUTO: 0.13 10*3/MM3 (ref 0–0.4)
EOSINOPHIL NFR BLD AUTO: 0.9 % (ref 0.3–6.2)
ERYTHROCYTE [DISTWIDTH] IN BLOOD BY AUTOMATED COUNT: 15 % (ref 12.3–15.4)
GLUCOSE SERPL-MCNC: 92 MG/DL (ref 65–99)
GLUCOSE UR STRIP-MCNC: NEGATIVE MG/DL
HCT VFR BLD AUTO: 30.1 % (ref 34–46.6)
HGB BLD-MCNC: 9.4 G/DL (ref 12–15.9)
HGB UR QL STRIP.AUTO: NEGATIVE
HYALINE CASTS UR QL AUTO: ABNORMAL /LPF
IMM GRANULOCYTES # BLD AUTO: 0.95 10*3/MM3 (ref 0–0.05)
IMM GRANULOCYTES NFR BLD AUTO: 6.9 % (ref 0–0.5)
INR PPP: 3.56 (ref 2–3)
KETONES UR QL STRIP: NEGATIVE
LEUKOCYTE ESTERASE UR QL STRIP.AUTO: ABNORMAL
LYMPHOCYTES # BLD AUTO: 1.22 10*3/MM3 (ref 0.7–3.1)
LYMPHOCYTES NFR BLD AUTO: 8.9 % (ref 19.6–45.3)
MAGNESIUM SERPL-MCNC: 1.8 MG/DL (ref 1.7–2.3)
MCH RBC QN AUTO: 33.1 PG (ref 26.6–33)
MCHC RBC AUTO-ENTMCNC: 31.2 G/DL (ref 31.5–35.7)
MCV RBC AUTO: 106 FL (ref 79–97)
MONOCYTES # BLD AUTO: 2.12 10*3/MM3 (ref 0.1–0.9)
MONOCYTES NFR BLD AUTO: 15.4 % (ref 5–12)
NEUTROPHILS NFR BLD AUTO: 66.8 % (ref 42.7–76)
NEUTROPHILS NFR BLD AUTO: 9.19 10*3/MM3 (ref 1.7–7)
NITRITE UR QL STRIP: NEGATIVE
NRBC BLD AUTO-RTO: 0.4 /100 WBC (ref 0–0.2)
PH UR STRIP.AUTO: <=5 [PH] (ref 5–8)
PLATELET # BLD AUTO: 226 10*3/MM3 (ref 140–450)
PMV BLD AUTO: 11.5 FL (ref 6–12)
POTASSIUM SERPL-SCNC: 4 MMOL/L (ref 3.5–5.2)
PROT UR QL STRIP: NEGATIVE
PROTHROMBIN TIME: 35.3 SECONDS (ref 19.4–28.5)
RBC # BLD AUTO: 2.84 10*6/MM3 (ref 3.77–5.28)
RBC # UR STRIP: ABNORMAL /HPF
REF LAB TEST METHOD: ABNORMAL
SODIUM SERPL-SCNC: 143 MMOL/L (ref 136–145)
SP GR UR STRIP: 1.01 (ref 1–1.03)
SQUAMOUS #/AREA URNS HPF: ABNORMAL /HPF
UROBILINOGEN UR QL STRIP: ABNORMAL
WBC # UR STRIP: ABNORMAL /HPF
WBC NRBC COR # BLD AUTO: 13.76 10*3/MM3 (ref 3.4–10.8)

## 2024-08-18 PROCEDURE — 85610 PROTHROMBIN TIME: CPT | Performed by: FAMILY MEDICINE

## 2024-08-18 PROCEDURE — 83605 ASSAY OF LACTIC ACID: CPT

## 2024-08-18 PROCEDURE — 97166 OT EVAL MOD COMPLEX 45 MIN: CPT

## 2024-08-18 PROCEDURE — 85025 COMPLETE CBC W/AUTO DIFF WBC: CPT | Performed by: FAMILY MEDICINE

## 2024-08-18 PROCEDURE — 94664 DEMO&/EVAL PT USE INHALER: CPT

## 2024-08-18 PROCEDURE — 92610 EVALUATE SWALLOWING FUNCTION: CPT

## 2024-08-18 PROCEDURE — 94799 UNLISTED PULMONARY SVC/PX: CPT

## 2024-08-18 PROCEDURE — 83735 ASSAY OF MAGNESIUM: CPT | Performed by: FAMILY MEDICINE

## 2024-08-18 PROCEDURE — 94660 CPAP INITIATION&MGMT: CPT

## 2024-08-18 PROCEDURE — 25010000002 CEFTRIAXONE PER 250 MG

## 2024-08-18 PROCEDURE — 97535 SELF CARE MNGMENT TRAINING: CPT

## 2024-08-18 PROCEDURE — 80048 BASIC METABOLIC PNL TOTAL CA: CPT | Performed by: FAMILY MEDICINE

## 2024-08-18 PROCEDURE — 94761 N-INVAS EAR/PLS OXIMETRY MLT: CPT

## 2024-08-18 PROCEDURE — 81001 URINALYSIS AUTO W/SCOPE: CPT

## 2024-08-18 RX ADMIN — GUAIFENESIN 600 MG: 600 TABLET, EXTENDED RELEASE ORAL at 08:53

## 2024-08-18 RX ADMIN — FAMOTIDINE 20 MG: 20 TABLET, FILM COATED ORAL at 08:54

## 2024-08-18 RX ADMIN — METRONIDAZOLE 500 MG: 500 TABLET ORAL at 06:05

## 2024-08-18 RX ADMIN — IPRATROPIUM BROMIDE AND ALBUTEROL SULFATE 3 ML: .5; 3 SOLUTION RESPIRATORY (INHALATION) at 18:46

## 2024-08-18 RX ADMIN — MIDODRINE HYDROCHLORIDE 2.5 MG: 2.5 TABLET ORAL at 17:10

## 2024-08-18 RX ADMIN — Medication 10 MG: at 21:01

## 2024-08-18 RX ADMIN — Medication 10 ML: at 08:57

## 2024-08-18 RX ADMIN — METOCLOPRAMIDE 5 MG: 5 TABLET ORAL at 08:54

## 2024-08-18 RX ADMIN — MIDODRINE HYDROCHLORIDE 2.5 MG: 2.5 TABLET ORAL at 08:54

## 2024-08-18 RX ADMIN — METRONIDAZOLE 500 MG: 500 TABLET ORAL at 21:01

## 2024-08-18 RX ADMIN — DOCUSATE SODIUM 100 MG: 100 CAPSULE, LIQUID FILLED ORAL at 21:00

## 2024-08-18 RX ADMIN — LATANOPROST 1 DROP: 50 SOLUTION/ DROPS OPHTHALMIC at 21:01

## 2024-08-18 RX ADMIN — ACETYLCYSTEINE 2 ML: 200 SOLUTION ORAL; RESPIRATORY (INHALATION) at 18:46

## 2024-08-18 RX ADMIN — Medication 10 ML: at 21:01

## 2024-08-18 RX ADMIN — METOCLOPRAMIDE 5 MG: 5 TABLET ORAL at 17:10

## 2024-08-18 RX ADMIN — METRONIDAZOLE 500 MG: 500 TABLET ORAL at 14:08

## 2024-08-18 RX ADMIN — CEFTRIAXONE 1000 MG: 1 INJECTION, POWDER, FOR SOLUTION INTRAMUSCULAR; INTRAVENOUS at 17:10

## 2024-08-18 RX ADMIN — POTASSIUM CHLORIDE 20 MEQ: 1500 TABLET, EXTENDED RELEASE ORAL at 08:54

## 2024-08-18 RX ADMIN — IPRATROPIUM BROMIDE AND ALBUTEROL SULFATE 3 ML: .5; 3 SOLUTION RESPIRATORY (INHALATION) at 11:16

## 2024-08-18 RX ADMIN — IPRATROPIUM BROMIDE AND ALBUTEROL SULFATE 3 ML: .5; 3 SOLUTION RESPIRATORY (INHALATION) at 07:19

## 2024-08-18 RX ADMIN — METOCLOPRAMIDE 5 MG: 5 TABLET ORAL at 12:20

## 2024-08-18 RX ADMIN — MIRTAZAPINE 15 MG: 15 TABLET, FILM COATED ORAL at 21:00

## 2024-08-18 RX ADMIN — DOCUSATE SODIUM 100 MG: 100 CAPSULE, LIQUID FILLED ORAL at 08:53

## 2024-08-18 RX ADMIN — METOPROLOL SUCCINATE 12.5 MG: 25 TABLET, FILM COATED, EXTENDED RELEASE ORAL at 08:54

## 2024-08-18 RX ADMIN — ACETAMINOPHEN 650 MG: 325 TABLET, FILM COATED ORAL at 01:14

## 2024-08-18 RX ADMIN — SENNOSIDES AND DOCUSATE SODIUM 1 TABLET: 8.6; 5 TABLET ORAL at 21:01

## 2024-08-18 RX ADMIN — METOCLOPRAMIDE 5 MG: 5 TABLET ORAL at 21:00

## 2024-08-18 RX ADMIN — SENNOSIDES AND DOCUSATE SODIUM 1 TABLET: 8.6; 5 TABLET ORAL at 08:53

## 2024-08-18 RX ADMIN — FUROSEMIDE 40 MG: 40 TABLET ORAL at 08:54

## 2024-08-18 RX ADMIN — ACETYLCYSTEINE 2 ML: 200 SOLUTION ORAL; RESPIRATORY (INHALATION) at 07:19

## 2024-08-18 RX ADMIN — GUAIFENESIN 600 MG: 600 TABLET, EXTENDED RELEASE ORAL at 21:00

## 2024-08-18 NOTE — THERAPY EVALUATION
Patient Name: Shruthi Amin  : 6/10/1928    MRN: [id number]                              Today's Date: 2024       Admit Date: 2024    Visit Dx:     ICD-10-CM ICD-9-CM   1. Acute respiratory failure with hypoxia and hypercapnia  J96.01 518.81    J96.02    2. Acute on chronic congestive heart failure, unspecified heart failure type  I50.9 428.0   3. Pneumonia due to infectious organism, unspecified laterality, unspecified part of lung  J18.9 486   4. Atrial fibrillation with RVR  I48.91 427.31     Patient Active Problem List   Diagnosis    Atrial fibrillation    Chronic anticoagulation    Cerebrovascular accident (CVA)    Hiatal hernia    Hyperlipidemia    Hypertension    Osteoarthritis    Hypoxia    Compression fracture of thoracic vertebra with delayed healing    History of osteoporotic pathological fracture    Acute on chronic congestive heart failure, unspecified heart failure type    Severe malnutrition    Nonrheumatic mitral valve regurgitation    Back pain    Recurrent pleural effusion    Bradycardia    Atrial fibrillation with rapid ventricular response     Past Medical History:   Diagnosis Date    Atrial fibrillation     Coronary artery disease     Atrial fibrillation    GERD (gastroesophageal reflux disease)     Glaucoma     Hiatal hernia with gastroesophageal reflux     History of osteoporotic pathological fracture     Right intertroch (2019)    Hx of compression fracture of spine     T12 and L1(); T6 (2022)    Hyperlipidemia     Hypertension     Osteoarthritis     Osteoporosis     on prolia(3/21/22)    Stroke     off and on dizziness from the stroke.     Past Surgical History:   Procedure Laterality Date    BACK SURGERY      kyphoplasty    EYE SURGERY      cataract surgery    FIXATION KYPHOPLASTY LUMBAR SPINE      HIP TROCHANTERIC NAILING WITH INTRAMEDULLARY HIP SCREW Right 2019    Procedure: HIP TROCHANTERIC NAILING SHORT WITH INTRAMEDULLARY HIP SCREW;  Surgeon: Jacobo  Edward Finnegan MD;  Location: [facility] MAIN OR;  Service: Orthopedics    RECTAL SURGERY      x 3      General Information       Row Name 08/18/24 1537          General Information    Prior Level of Function independent:;all household mobility;mod assist:;ADL's  pt has assist for bathing & dressing & for all IADL like cooking & cleaning. Pt walks in the home withough asstist & uses her RW.  -     Existing Precautions/Restrictions fall;oxygen therapy device and L/min  uses O2 at home, takes honey thick liquids for the past several weeks once her dysphagia was Dx at SNF where she had been for rehab.  -     Barriers to Rehab medically complex;previous functional deficit;cognitive status  -       Row Name 08/18/24 1537          Living Environment    People in Home child(juan francisco), adult  -       Row Name 08/18/24 1537          Home Main Entrance    Number of Stairs, Main Entrance one  -       Row Name 08/18/24 1537          Stairs Within Home, Primary    Number of Stairs, Within Home, Primary none  -       Row Name 08/18/24 1537          Cognition    Orientation Status (Cognition) oriented to;person  -       Row Name 08/18/24 1537          Safety Issues, Functional Mobility    Impairments Affecting Function (Mobility) endurance/activity tolerance  -               User Key  (r) = Recorded By, (t) = Taken By, (c) = Cosigned By      Initials Name Provider Type     Dorene Conner OT Occupational Therapist                     Mobility/ADL's       Row Name 08/18/24 1540          Transfers    Transfers sit-stand transfer;stand-sit transfer  -       Row Name 08/18/24 1540          Sit-Stand Transfer    Sit-Stand [place] (Transfers) contact guard  -       Row Name 08/18/24 1540          Stand-Sit Transfer    Stand-Sit [place] (Transfers) contact guard  -       Row Name 08/18/24 1540          Functional Mobility    Functional Mobility- Ind. Level contact guard assist  -     Functional  Mobility-Distance (Feet) 3  forward & back from the chair w/ RW  -     Patient was able to Ambulate yes  -[facility] Name 08/18/24 1540          Activities of Daily Living    BADL Assessment/Intervention bathing;upper body dressing;lower body dressing;feeding  -[facility] Name 08/18/24 1540          Bathing Assessment/Intervention    [place] Level (Bathing) bathing skills;maximum assist (25% patient effort)  -     Position (Bathing) supported sitting;supported standing  -[facility] Name 08/18/24 1540          Upper Body Dressing Assessment/Training    [place] Level (Upper Body Dressing) doff;don;front opening garment;maximum assist (25% patient effort)  -[facility] Name 08/18/24 1540          Lower Body Dressing Assessment/Training    [place] Level (Lower Body Dressing) doff;don;socks;dependent (less than 25% patient effort)  -[facility] Name 08/18/24 1540          Self-Feeding Assessment/Training    [place] Level (Feeding) liquids to mouth;set up  -               User Key  (r) = Recorded By, (t) = Taken By, (c) = Cosigned By      Initials Name Provider Type     Dorene Conner, OT Occupational Therapist                   Obj/Interventions       [facility] Name 08/18/24 1543          Vision Assessment/Intervention    Visual Impairment/Limitations corrective lenses full-time  -[facility] Name 08/18/24 1543          Range of Motion Comprehensive    Comment, General Range of Motion kyphotic  -MH       Row Name 08/18/24 1543          Strength Comprehensive (MMT)    Comment, General Manual Muscle Testing (MMT) Assessment BLE 4-/5, BUE 3+/5  -               User Key  (r) = Recorded By, (t) = Taken By, (c) = Cosigned By      Initials Name Provider Type     Dorene Conner, OT Occupational Therapist                   Goals/Plan       [facility] Name 08/18/24 1552          Transfer Goal 1 (OT)    Activity/Assistive Device (Transfer Goal 1, OT) transfers, all  -     [place] Level/Cues Needed  (Transfer Goal 1, OT) supervision required  -     Time Frame (Transfer Goal 1, OT) 10 days  -       Row Name 08/18/24 4271          Toileting Goal 1 (OT)    Activity/Device (Toileting Goal 1, OT) toileting skills, all  -     [place] Level/Cues Needed (Toileting Goal 1, OT) minimum assist (75% or more patient effort)  -     Time Frame (Toileting Goal 1, OT) 10 days  -       Row Name 08/18/24 4117          Grooming Goal 1 (OT)    Activity/Device (Grooming Goal 1, OT) grooming skills, all  -MH     [place] (Grooming Goal 1, OT) minimum assist (75% or more patient effort)  -     Time Frame (Grooming Goal 1, OT) 2 weeks  -       Row Name 08/18/24 3720          Therapy Assessment/Plan (OT)    Planned Therapy Interventions (OT) activity tolerance training;adaptive equipment training;BADL retraining;cognitive/visual perception retraining;functional balance retraining;transfer/mobility retraining;ROM/therapeutic exercise;patient/caregiver education/training;occupation/activity based interventions  -               User Key  (r) = Recorded By, (t) = Taken By, (c) = Cosigned By      Initials Name Provider Type     Dorene Conner, OT Occupational Therapist                   Clinical Impression       Row Name 08/18/24 6655          Pain Assessment    Pretreatment Pain Rating 0/10 - no pain  -     Posttreatment Pain Rating 0/10 - no pain  -       Row Name 08/18/24 3111          Plan of Care Review    Plan of Care Reviewed With patient;grandchild(juan francisco)  -     Progress improving  -     Outcome Evaluation Pt is 89 y/o F admitted with AMS & rhinovirus shortly after coming home from a rehab stay. Pt may have UTI and has been suffering from aspiration as wel and recently started to take honey thick liquids. PMH of A-fib, CAD, GERD, osteoporosis, kypohsis, HTN, and CHF. Pt resides with adult daughter in [facility] with x1 LINO. Dtr provides bathing, dressing assist & pt walks in the home with RW and no assist  but family are willing and able to assist more if needed. Pt completes assisted sponge bath this date with OT and demonstrates functioning at what seems to be her baseline except that she is drowsy due to a sleepless, confused night. She stands at her RW and can take steps with O2, CGA. Ot recommends home with 24 hour supervision & HHC if family desire.  -       Row Name 08/18/24 1544          Therapy Assessment/Plan (OT)    Rehab Potential (OT) good, to achieve stated therapy goals  -     Criteria for Skilled Therapeutic Interventions Met (OT) skilled treatment is necessary  -     Therapy Frequency (OT) 3 times/wk  -     Predicted Duration of Therapy Intervention (OT) until d/c  -       Row Name 08/18/24 1544          Therapy Plan Review/Discharge Plan (OT)    Anticipated Discharge Disposition (OT) home with 24/7 care;home with home health  -       Row Name 08/18/24 154          Vital Signs    O2 Delivery Pre Treatment supplemental O2  -     O2 Delivery Intra Treatment supplemental O2  -     O2 Delivery Post Treatment supplemental O2  -     Pre Patient Position Sitting  -     Intra Patient Position Standing  -     Post Patient Position Sitting  -       Row Name 08/18/24 1542          Positioning and Restraints    Pre-Treatment Position sitting in chair/recliner  -     Post Treatment Position chair  -     In Chair call light within reach;encouraged to call for assist;exit alarm on;with family/caregiver;legs elevated;with other staff;with SLP  -               User Key  (r) = Recorded By, (t) = Taken By, (c) = Cosigned By      Initials Name Provider Type     Dorene Conner, OT Occupational Therapist                   Outcome Measures       Row Name 08/18/24 0800          How much help from another person do you currently need...    Turning from your back to your side while in flat bed without using bedrails? 3  -MS     Moving from lying on back to sitting on the side of a flat bed  without bedrails? 3  -MS     Moving to and from a bed to a chair (including a wheelchair)? 3  -MS     Standing up from a chair using your arms (e.g., wheelchair, bedside chair)? 3  -MS     Climbing 3-5 steps with a railing? 2  -MS     To walk in hospital room? 3  -MS     AM-PAC 6 Clicks Score (PT) 17  -MS     Highest Level of Mobility Goal 5 --> Static standing  -MS               User Key  (r) = Recorded By, (t) = Taken By, (c) = Cosigned By      Initials Name Provider Type    Leona Hernandez, RN Registered Nurse                    Occupational Therapy Education       Title: PT OT SLP Therapies (In Progress)       Topic: Occupational Therapy (In Progress)       Point: ADL training (Done)       Description:   Instruct learner(s) on proper safety adaptation and remediation techniques during self care or transfers.   Instruct in proper use of assistive devices.                  Learning Progress Summary             Patient Acceptance, E,TB,D, VU,DU by  at 8/18/2024 1553   Family Acceptance, E,TB,D, VU,DU by  at 8/18/2024 1553                         Point: Home exercise program (Not Started)       Description:   Instruct learner(s) on appropriate technique for monitoring, assisting and/or progressing therapeutic exercises/activities.                  Learner Progress:  Not documented in this visit.              Point: Precautions (Done)       Description:   Instruct learner(s) on prescribed precautions during self-care and functional transfers.                  Learning Progress Summary             Patient Acceptance, E,TB,D, VU,DU by  at 8/18/2024 1553   Family Acceptance, E,TB,D, VU,DU by  at 8/18/2024 1553                         Point: Body mechanics (Done)       Description:   Instruct learner(s) on proper positioning and spine alignment during self-care, functional mobility activities and/or exercises.                  Learning Progress Summary             Patient Acceptance, E,TB,D, VU,DU by  at  8/18/2024 1553   Family Acceptance, E,TB,D, VU,DU by  at 8/18/2024 1553                                         User Key       Initials Effective Dates Name Provider Type Discipline     06/16/21 -  Dorene Conner OT Occupational Therapist OT                  OT Recommendation and Plan  Planned Therapy Interventions (OT): activity tolerance training, adaptive equipment training, BADL retraining, cognitive/visual perception retraining, functional balance retraining, transfer/mobility retraining, ROM/therapeutic exercise, patient/caregiver education/training, occupation/activity based interventions  Therapy Frequency (OT): 3 times/wk  Plan of Care Review  Plan of Care Reviewed With: patient, grandchild(juan francisco)  Progress: improving  Outcome Evaluation: Pt is 89 y/o F admitted with AMS & rhinovirus shortly after coming home from a rehab stay. Pt may have UTI and has been suffering from aspiration as wel and recently started to take honey thick liquids. PMH of A-fib, CAD, GERD, osteoporosis, kypohsis, HTN, and CHF. Pt resides with adult daughter in [facility] with x1 LINO. Dtr provides bathing, dressing assist & pt walks in the home with RW and no assist but family are willing and able to assist more if needed. Pt completes assisted sponge bath this date with OT and demonstrates functioning at what seems to be her baseline except that she is drowsy due to a sleepless, confused night. She stands at her RW and can take steps with O2, CGA. Ot recommends home with 24 hour supervision & HHC if family desire.     Time Calculation:         Time Calculation- OT       Row Name 08/18/24 1553             Time Calculation-     OT Start Time 1441  -      OT Stop Time 1502  -      OT Time Calculation (min) 21 min  -      Total Timed Code Minutes- OT 10 minute(s)  -      OT Received On 08/18/24  -      OT - Next Appointment 08/20/24  -      OT Goal Re-Cert Due Date 09/01/24  -                User Key  (r) = Recorded By, (t) =  Taken By, (c) = Cosigned By      Initials Name Provider Type     Dorene Conner OT Occupational Therapist                  Therapy Charges for Today       Code Description Service Date Service Provider Modifiers Qty    [id number] HC OT SELF CARE/MGMT/TRAIN EA 15 MIN 8/18/2024 Dorene Conner OT GO 1    [id number]  OT EVAL MOD COMPLEXITY 2 8/18/2024 Dorene Conner OT GO 1                 Dorene Conner OT  8/18/2024

## 2024-08-18 NOTE — PLAN OF CARE
"Goal Outcome Evaluation:   Pt seen for clinical swallow eval. Pt and her family report that pt has been on a [facility] soft diet with HTL since being in a rehab facility. Pt had VFSS that rec this diet. Pt also found to have hiatal hernia and esoph dysmotility with a \"pocket\" in her esophagus. Pt has been home for one week from rehab and is working with a  SLP on swallowing. Pt now has pneumonia and there is a concern for aspiration.          Pt given trials of HT juice by cup and straw,  applesauce and a Fig everett.  Pt had good labial closure over cup and straw. She had timely oral transit on liquids and puree. Mastication was slow but functional. She had no pocketing or oral residual. Visualization of swallow suggests timely initiation.  After the swallow, pt had clear vocal quality and no overt s/s of aspiration following all trials.  It is rec that pt continue a [facility] diet with HTL for now. Pt should up at a 90 degree angle for all PO, eat at a slow rate and take small bites and sips. Clinician had discussion with pt's family that further eval of swallow with VFSS while hospitalized could rec NPO, if further aspiration was seen, and pt is at increased risk for aspiration due to her esophageal issues.      Pt and her family opted to remain on current diet and continue HTL, opting out of VFSS for now. Discussed that pt could get 3-4 more weeks of swallow therapy and then pursue VFSS for possible upgrade of diet. Pt may have small sips of water between meals, after oral care, via the Mitch water Protocol. See guidelines below. ST will follow to assure tolerance of diet and for pt/family education.       The rationale to recommend water when a PO diet cannot appropriately/functionally sustain nutrition (or if thickened liquids are prescribed due to aspiration of thin liquids) is because water is low risk for aspiration pna when compared to aspiration of food or other liquids.    Benefits of a water protocol include " but are not limited to:      Oral gratification   Engagement of oropharyngeal swallow musculature   Decrease likelihood of dehydration       Guidelines for proper implementation include:  Waiting a minimum of 30 minutes after consuming any other PO   Thorough oral care prior to consuming water  Upright at 90 degree hip flexion  Small sips at slow rate  Monitor for any changes in respiratory status and discontinue if distress noted     The [facility] Free Water Protocol is a research based protocol established in 1984.                              SLP Swallowing Diagnosis: mild, oral dysphagia, suspect acute-on-chronic, pharyngeal dysphagia (08/18/24 1800)

## 2024-08-18 NOTE — PLAN OF CARE
Goal Outcome Evaluation:              Outcome Evaluation: Pt has family at beside and becomes more confused, labs pending multiple sticks but no success.care ongoing

## 2024-08-18 NOTE — Clinical Note
Transfer Summary:     Patient transferred to [facility]  Reason for Transfer: Inpatient hospital admission for AFib with rapid ventricular response.  Report called to: Hospital   Summary of care, treatments, or services provided to the patient including disciplines seeing the patient: Skilled nursing for wound care and CHF, SLP for swallowing deficit.  Patients progress toward goals: Ongoing, recent hospitalization  Communicable Disease: Rhinovirus

## 2024-08-18 NOTE — CONSULTS
Group: [facility]         CONSULT NOTE    Patient Identification:  Shruthi Amin  89 y.o.  female  6/10/1928  [id number]            Requesting physician: Attending physician    Reason for Consultation: Aspiration pneumonia, respiratory failure      History of Present Illness:  89-year-old female with history of valvular heart disease/severe mitral regurgitation and recurrent pleural effusion, A-fib, large hiatal hernia, COPD, CAD, severe kyphosis who presented 8/16/2024 with complaint of progressive shortness of breath and cough.  I saw her last week in the office with adductive cough and yellowish sputum and I started her on oral antibiotic Ceftin for acute bronchitis with instructions to come to ER if symptoms worsened.  Upon presentation she was found to have A-fib with RVR for which she was started initially on Cardizem drip and switch to beta-blocker.    Today she is afebrile and hemodynamically stable: Oxygen saturation 94% 4 L per nasal cannula.  INR 3.5, WBCs 13.7, hemoglobin 9.4.  Respiratory panel is positive for rhinovirus.  CT scan of the chest showed persistent moderate right and small left pleural effusion with left lower lobe opacity and compression T7 vertebral body fracture        Assessment:  Atrial fibrillation with rapid ventricular response  Aspiration pneumonia?  Rhinovirus infection    Recurrent pleural effusion: Status post right thoracentesis April 2024 with drainage of 450 mL  Transudative fluid likely related to CHF/severe mitral regurgitation, mild compressive atelectasis due to very large hiatal hernia     I do NOT recommend repeating thoracentesis , even Pleurx catheter which is chronic indwelling catheter still not the best option, I recommend medical management for severe mitral regurg     Unfortunately at her age there is no good treatment for this massive hiatal hernia     Acute on chronic hypoxemia: ABG 7.41/57.5/92.5 on BiPAP  Anemia  A-fib  Bradycardia with mild  digoxin toxicity  Large hiatal hernia  COPD  CHF  CAD  Osteoarthritis, kyphosis      Recommendations:  Antibiotic: Rocephin  Nebulized Mucomyst  Encourage use of incentive spirometry  Oxygen supplement and titration to maintain saturation 90 to 95%  Bronchodilators  Mucinex    Diuresis  HR control  Anticoagulation: Warfarin, monitor INR and dose as per pharmacy    I personally reviewed the radiological studies      Review of Sytems:  Constitutional: Negative for chills, and fever and positive for malaise/fatigue.   HENT: Negative.    Eyes: Negative.    Cardiovascular: Shortness of breath and palpitation  Respiratory: Positive for cough and shortness of breath.    Skin: Negative.    Musculoskeletal: Kyphosis and chronic back pain  Gastrointestinal: Negative.    Genitourinary: Negative.    Neurological: Generalized weakness    Past Medical History:  Past Medical History:   Diagnosis Date    Atrial fibrillation     Coronary artery disease     Atrial fibrillation    GERD (gastroesophageal reflux disease)     Glaucoma     Hiatal hernia with gastroesophageal reflux     History of osteoporotic pathological fracture     Right intertroch (7/2019)    Hx of compression fracture of spine     T12 and L1(2013); T6 (1/2022)    Hyperlipidemia     Hypertension     Osteoarthritis     Osteoporosis     on prolia(3/21/22)    Stroke     off and on dizziness from the stroke.       Past Surgical History:  Past Surgical History:   Procedure Laterality Date    BACK SURGERY      kyphoplasty    EYE SURGERY      cataract surgery    FIXATION KYPHOPLASTY LUMBAR SPINE  2013    HIP TROCHANTERIC NAILING WITH INTRAMEDULLARY HIP SCREW Right 7/20/2019    Procedure: HIP TROCHANTERIC NAILING SHORT WITH INTRAMEDULLARY HIP SCREW;  Surgeon: Edward Centeno MD;  Location: [facility] MAIN OR;  Service: Orthopedics    RECTAL SURGERY      x 3        Home Meds:  Medications Prior to Admission   Medication Sig Dispense Refill Last Dose    acetaminophen  (TYLENOL) 325 MG tablet Take 2 tablets by mouth Every 4 (Four) Hours As Needed for Mild Pain.   8/15/2024    cefuroxime (CEFTIN) 250 MG tablet Take 1 tablet by mouth Daily. Indications: Bronchitis   8/16/2024    Cholecalciferol 10 MCG (400 UNIT) tablet Take 1 tablet by mouth Daily. Indications: Vitamin D Deficiency   8/16/2024    DIGESTIVE ENZYMES PO Take 1 capsule by mouth 3 (Three) Times a Day. Indications: supplement   8/16/2024    docusate sodium (COLACE) 250 MG capsule Take 1 capsule by mouth 2 (Two) Times a Day. Indications: Constipation   8/16/2024    famotidine (Pepcid) 20 MG tablet Take 1 tablet by mouth Daily.   8/16/2024    furosemide (LASIX) 40 MG tablet Take 1 tablet by mouth Daily. Indications: Cardiac Failure   8/16/2024    latanoprost (XALATAN) 0.005 % ophthalmic solution Administer 1 drop to both eyes Every Night. Indications: Wide-Angle Glaucoma   8/15/2024    metoclopramide (REGLAN) 5 MG tablet Take 1 tablet by mouth 4 (Four) Times a Day Before Meals & at Bedtime. Indications: Gastroesophageal Reflux Disease 120 tablet 1 8/16/2024    metoprolol succinate XL (TOPROL-XL) 25 MG 24 hr tablet Take 0.5 tablets by mouth Every 12 (Twelve) Hours. 60 tablet 1 8/16/2024    midodrine (PROAMATINE) 2.5 MG tablet Take 1 tablet by mouth 2 (Two) Times a Day. 60 tablet 3 8/16/2024    mirtazapine (REMERON) 15 MG tablet Take 1 tablet by mouth Every Night. Indications: Appetite Stimulant   8/15/2024    O2 (OXYGEN) Inhale 3 L/min As Needed. Indications: SOB   8/16/2024    sennosides-docusate (senna-docusate sodium) 8.6-50 MG per tablet Take 1 tablet by mouth 2 (Two) Times a Day. Indications: Constipation   8/16/2024    warfarin (COUMADIN) 3 MG tablet Take 1 tablet by mouth Daily. Indications: Atrial Fibrillation   8/15/2024    warfarin (Coumadin) 3 MG tablet Take 1 tablet by mouth Every Night. Indications: Atrial Fibrillation   8/15/2024    magnesium hydroxide (Milk of Magnesia) 400 MG/5ML suspension Take 30 mL by  "mouth Daily As Needed for Constipation. Indications: Constipation   Unknown    potassium chloride (KLOR-CON M20) 20 MEQ CR tablet Take 1 tablet by mouth Daily. Indications: Low Amount of Potassium in the Blood   Unknown       Allergies:  Allergies   Allergen Reactions    Penicillins Hives    Codeine Nausea And Vomiting    Latex Rash       Social History:   Social History     Socioeconomic History    Marital status:    Tobacco Use    Smoking status: Former     Current packs/day: 0.00     Types: Cigarettes     Quit date:      Years since quittin.6     Passive exposure: Past    Smokeless tobacco: Never   Vaping Use    Vaping status: Never Used   Substance and Sexual Activity    Alcohol use: No    Drug use: No    Sexual activity: Defer       Family History:  Family History   Problem Relation Age of Onset    Heart disease Mother     Hypertension Mother     Osteoporosis Mother     Cancer Father         colon cancer    Osteoporosis Father     Cancer Sister         lung    Cancer Brother         colon cancer       Physical Exam:  /63 (BP Location: Left arm, Patient Position: Lying)   Pulse 85   Temp 97 °F (36.1 °C) (Axillary)   Resp 18   Ht 157.5 cm (62\")   Wt 48.2 kg (106 lb 4.2 oz)   SpO2 94%   BMI 19.44 kg/m²  Body mass index is 19.44 kg/m². 94% 48.2 kg (106 lb 4.2 oz)  General Appearance:  Alert   HEENT:  Normocephalic, without obvious abnormality, Conjunctiva/corneas clear,.   Nares normal, no drainage     Neck:  Supple, symmetrical, trachea midline. No JVD.  Lungs /Chest wall:   Bilateral basal rhonchi, respirations unlabored, symmetrical wall movement.     Heart: Rate controlled, systolic murmur, S1 S2 normal  Abdomen: Soft, non-tender, no masses, no organomegaly.    Extremities: Trace edema, no clubbing or cyanosis    LABS:  Lab Results   Component Value Date    CALCIUM 8.9 2024    PHOS 2.5 10/04/2022     Results from last 7 days   Lab Units 24  0557 24  0034 " 08/16/24  1650   MAGNESIUM mg/dL 1.8 1.9 2.0   SODIUM mmol/L 143 143 141   POTASSIUM mmol/L 4.0 4.0 4.8   CHLORIDE mmol/L 102 101 100   CO2 mmol/L 34.9* 32.1* 30.3*   BUN mg/dL 20 22 26*   CREATININE mg/dL 0.45* 0.45* 0.55*   GLUCOSE mg/dL 92 85 206*   CALCIUM mg/dL 8.9 8.8 9.3   WBC 10*3/mm3 13.76* 11.04* 16.60*   HEMOGLOBIN g/dL 9.4* 9.2* 10.8*   PLATELETS 10*3/mm3 226 186 254   ALT (SGPT) U/L  --   --  20   AST (SGOT) U/L  --   --  38*   PROBNP pg/mL  --   --  7,361.0*     Lab Results   Component Value Date    TROPONINT 31 (H) 08/16/2024     Results from last 7 days   Lab Units 08/16/24  1915 08/16/24  1650   HSTROP T ng/L 31* 27*     Results from last 7 days   Lab Units 08/16/24  1704 08/16/24  1650   BLOODCX  No growth at 24 hours No growth at 24 hours     Results from last 7 days   Lab Units 08/18/24  0557 08/16/24  2313 08/16/24  1957 08/16/24  1655 08/16/24  1649   LACTATE mmol/L 2.0 2.1* 2.9* 2.3* 2.3*     Results from last 7 days   Lab Units 08/16/24  1926 08/16/24  1649   PH, ARTERIAL pH units 7.411 7.294*   PCO2, ARTERIAL mm Hg 57.5* 76.3*   PO2 ART mm Hg 92.5 66.4*   O2 SATURATION ART % 97.0 89.2*   MODALITY  BiPap NRB     Results from last 7 days   Lab Units 08/16/24  1727   ADENOVIRUS DETECTION BY PCR  Not Detected   CORONAVIRUS 229E  Not Detected   CORONAVIRUS HKU1  Not Detected   CORONAVIRUS NL63  Not Detected   CORONAVIRUS OC43  Not Detected   HUMAN METAPNEUMOVIRUS  Not Detected   HUMAN RHINOVIRUS/ENTEROVIRUS  Detected*   INFLUENZA B PCR  Not Detected   PARAINFLUENZA 1  Not Detected   PARAINFLUENZA VIRUS 2  Not Detected   PARAINFLUENZA VIRUS 3  Not Detected   PARAINFLUENZA VIRUS 4  Not Detected   BORDETELLA PERTUSSIS PCR  Not Detected   CHLAMYDOPHILA PNEUMONIAE PCR  Not Detected   MYCOPLAMA PNEUMO PCR  Not Detected   INFLUENZA A PCR  Not Detected   RSV, PCR  Not Detected     Results from last 7 days   Lab Units 08/18/24  0142 08/17/24  0034 08/16/24  1650 08/13/24  1553 08/13/24  1551   INR  3.56*  2.56 2.57 3.90* 4.20*     Results from last 7 days   Lab Units 08/16/24  1704 08/16/24  1650   BLOODCX  No growth at 24 hours No growth at 24 hours     Lab Results   Component Value Date    TSH 2.160 06/17/2024     Estimated Creatinine Clearance: 55.6 mL/min (A) (by C-G formula based on SCr of 0.45 mg/dL (L)).         Imaging:  Imaging Results (Last 24 Hours)       ** No results found for the last 24 hours. **              Current Meds:   SCHEDULE  acetylcysteine, 2 mL, Nebulization, BID - RT  cefTRIAXone, 1,000 mg, Intravenous, Q24H  docusate sodium, 100 mg, Oral, BID  famotidine, 20 mg, Oral, Daily  furosemide, 40 mg, Oral, Daily  guaiFENesin, 600 mg, Oral, Q12H  ipratropium-albuterol, 3 mL, Nebulization, 4x Daily - RT  latanoprost, 1 drop, Both Eyes, Nightly  metoclopramide, 5 mg, Oral, 4x Daily AC & at Bedtime  metoprolol succinate XL, 12.5 mg, Oral, Q12H  metroNIDAZOLE, 500 mg, Oral, Q8H  midodrine, 2.5 mg, Oral, BID AC  mirtazapine, 15 mg, Oral, Nightly  potassium chloride, 20 mEq, Oral, Daily  sennosides-docusate, 1 tablet, Oral, BID  sodium chloride, 10 mL, Intravenous, Q12H      Infusions  Pharmacy to dose warfarin,       PRNs    acetaminophen **OR** acetaminophen **OR** acetaminophen    senna-docusate sodium **AND** polyethylene glycol **AND** bisacodyl **AND** bisacodyl    Calcium Replacement - Follow Nurse / BPA Driven Protocol    magnesium hydroxide    Magnesium Standard Dose Replacement - Follow Nurse / BPA Driven Protocol    melatonin    ondansetron ODT **OR** ondansetron    Pharmacy to dose warfarin    Phosphorus Replacement - Follow Nurse / BPA Driven Protocol    Potassium Replacement - Follow Nurse / BPA Driven Protocol    [COMPLETED] Insert Peripheral IV **AND** sodium chloride    sodium chloride    sodium chloride        Lupe Curtis MD  8/18/2024  12:03 EDT      Much of this encounter note is an electronic transcription/translation of spoken language to printed text using Dragon Software.

## 2024-08-18 NOTE — PLAN OF CARE
Goal Outcome Evaluation:  Plan of Care Reviewed With: patient, grandchild(juan francisco)        Progress: improving  Outcome Evaluation: Pt is 89 y/o F admitted with AMS & rhinovirus shortly after coming home from a rehab stay. Pt may have UTI and has been suffering from aspiration as wel and recently started to take honey thick liquids. PMH of A-fib, CAD, GERD, osteoporosis, kypohsis, HTN, and CHF. Pt resides with adult daughter in [facility] with x1 LINO. Dtr provides bathing, dressing assist & pt walks in the home with RW and no assist but family are willing and able to assist more if needed. Pt completes assisted sponge bath this date with OT and demonstrates functioning at what seems to be her baseline except that she is drowsy due to a sleepless, confused night. She stands at her RW and can take steps with O2, CGA. Ot recommends home with 24 hour supervision & HHC if family desire.      Anticipated Discharge Disposition (OT): home with 24/7 care, home with home health

## 2024-08-18 NOTE — CONSULTS
"Nutrition Services    Patient Name: Shruthi Amin  YOB: 1928  MRN: [id number]  Admission date: 8/16/2024    Comment:  -- Diet per SLP    -- Add Magic Cups at lunch/dinner (Provides 580 kcals, 18 g protein if consumed)       CLINICAL NUTRITION ASSESSMENT      Reason for Assessment 8/18: MST of 2, wound     H&P      Past Medical History:   Diagnosis Date    Atrial fibrillation     Coronary artery disease     Atrial fibrillation    GERD (gastroesophageal reflux disease)     Glaucoma     Hiatal hernia with gastroesophageal reflux     History of osteoporotic pathological fracture     Right intertroch (7/2019)    Hx of compression fracture of spine     T12 and L1(2013); T6 (1/2022)    Hyperlipidemia     Hypertension     Osteoarthritis     Osteoporosis     on prolia(3/21/22)    Stroke     off and on dizziness from the stroke.       Past Surgical History:   Procedure Laterality Date    BACK SURGERY      kyphoplasty    EYE SURGERY      cataract surgery    FIXATION KYPHOPLASTY LUMBAR SPINE  2013    HIP TROCHANTERIC NAILING WITH INTRAMEDULLARY HIP SCREW Right 7/20/2019    Procedure: HIP TROCHANTERIC NAILING SHORT WITH INTRAMEDULLARY HIP SCREW;  Surgeon: Edward Centeno MD;  Location: [facility];  Service: Orthopedics    RECTAL SURGERY      x 3        Current Problems   Atrial fibrillation with rapid ventricular response  - Cardiology following  -- PT/OT following    Acute on chronic respiratory failure with hypoxia and hypercapnia  Questionable aspiration pneumonia  Rhinovirus  -- Pulmonology following    Hiatal hernia  Dysphagia  Malnutrition  - Speech therapy consulted      Osteoarthritis  Hyperlipidemia  Essential hypertension       Encounter Information        Trending Narrative     8/18: Admitted for SOB.  RD unable to see patient in person on this date.         Anthropometrics        Current Height, Weight Height: 157.5 cm (62\")  Weight: 48.2 kg (106 lb 4.2 oz) (08/18/24 0513)       Usual " Body Weight (UBW) Unable to obtain from patient        Trending Weight Hx     This admission: 8/18: 106#             PTA: No recent weight loss to note     Wt Readings from Last 30 Encounters:   08/18/24 0513 48.2 kg (106 lb 4.2 oz)   08/17/24 0551 47 kg (103 lb 9.9 oz)   08/16/24 1612 45 kg (99 lb 3.3 oz)   08/13/24 1617 45.8 kg (101 lb)   07/16/24 1423 50.3 kg (111 lb)   06/17/24 1940 38.5 kg (84 lb 14 oz)   06/17/24 1422 45.4 kg (100 lb 1.4 oz)   04/19/24 1415 45.4 kg (100 lb)   04/10/24 1211 45.4 kg (100 lb)   04/04/24 1324 44.5 kg (98 lb)   03/28/24 1219 47.2 kg (104 lb)   03/25/24 1435 46.3 kg (102 lb)   03/04/24 1229 50.6 kg (111 lb 9.6 oz)   02/27/24 1201 49.9 kg (110 lb)   02/27/24 1106 50.3 kg (110 lb 14.3 oz)   02/24/24 1029 50.3 kg (111 lb)   02/24/24 0204 50.8 kg (111 lb 15.9 oz)   02/23/24 1252 49.4 kg (109 lb)   11/29/23 1157 50.8 kg (112 lb)   08/03/23 1340 52.2 kg (115 lb)   05/09/23 1602 52.6 kg (116 lb)   01/31/23 1134 52.6 kg (116 lb)   12/02/22 1131 49.4 kg (109 lb)   11/21/22 1103 49 kg (108 lb)   11/14/22 1143 50.3 kg (111 lb)   11/11/22 0950 49.9 kg (110 lb)   11/21/22 1024 49.2 kg (108 lb 7.8 oz)   11/20/22 1203 49.7 kg (109 lb 9.8 oz)   11/19/22 1043 50 kg (110 lb 2.3 oz)   11/18/22 1516 60.8 kg (134 lb 0.3 oz)   11/18/22 1515 61 kg (134 lb 5.9 oz)   11/18/22 1052 49.6 kg (109 lb 5.6 oz)   11/18/22 1051 4.65 kg (10 lb 4 oz)   11/17/22 1010 50.4 kg (111 lb 1.1 oz)   11/16/22 1011 49.8 kg (109 lb 13 oz)   11/15/22 0959 49.5 kg (109 lb 0.6 oz)   11/14/22 1113 49.2 kg (108 lb 9.2 oz)   11/14/22 1105 49.6 kg (109 lb 5.9 oz)   11/13/22 0954 48.9 kg (107 lb 11.5 oz)   11/12/22 2300 61.3 kg (135 lb 0.9 oz)   11/12/22 1019 49.2 kg (108 lb 7.8 oz)   11/11/22 1017 49.6 kg (109 lb 6.6 oz)   11/11/22 0744 55.6 kg (122 lb 8.9 oz)   11/10/22 1008 49.9 kg (109 lb 15.1 oz)   11/10/22 1008 49.9 kg (110 lb 1.6 oz)   11/09/22 1840 56.4 kg (124 lb 5.4 oz)   11/09/22 0932 49.7 kg (109 lb 10.2 oz)   11/09/22 0932  "50 kg (110 lb 2.6 oz)   11/08/22 2255 60.8 kg (134 lb 2.1 oz)   11/08/22 2255 60.5 kg (133 lb 4.3 oz)   11/08/22 2254 60.3 kg (133 lb 0.1 oz)   11/08/22 2253 60.4 kg (133 lb 3.9 oz)   11/08/22 1015 51.5 kg (113 lb 8.9 oz)   11/08/22 1014 51.5 kg (113 lb 9.3 oz)   11/07/22 1428 50.1 kg (110 lb 8.3 oz)   11/07/22 1427 50.7 kg (111 lb 11.7 oz)   11/04/22 1143 49.9 kg (110 lb)   11/03/22 1416 48.1 kg (106 lb)   10/29/22 1239 50.3 kg (111 lb)   10/27/22 1447 51.7 kg (114 lb)   10/06/22 0402 51.3 kg (113 lb 1.5 oz)   10/05/22 0355 48.2 kg (106 lb 4.2 oz)   10/04/22 0346 51.8 kg (114 lb 3.2 oz)   10/03/22 0335 50 kg (110 lb 3.7 oz)   10/03/22 0112 50 kg (110 lb 3.7 oz)   10/02/22 1103 49.9 kg (110 lb)   10/01/22 1940 49.9 kg (110 lb 0.2 oz)   09/22/22 1354 51.2 kg (112 lb 12.8 oz)   07/18/22 1252 49.4 kg (109 lb)   05/03/22 1422 50.8 kg (112 lb)   04/19/22 1420 50.3 kg (111 lb)   03/30/22 1130 50.3 kg (111 lb)      BMI kg/m2 Body mass index is 19.44 kg/m².       Labs        Pertinent Labs    Results from last 7 days   Lab Units 08/18/24  0557 08/17/24  0034 08/16/24  1650   SODIUM mmol/L 143 143 141   POTASSIUM mmol/L 4.0 4.0 4.8   CHLORIDE mmol/L 102 101 100   CO2 mmol/L 34.9* 32.1* 30.3*   BUN mg/dL 20 22 26*   CREATININE mg/dL 0.45* 0.45* 0.55*   CALCIUM mg/dL 8.9 8.8 9.3   BILIRUBIN mg/dL  --   --  0.8   ALK PHOS U/L  --   --  139*   ALT (SGPT) U/L  --   --  20   AST (SGOT) U/L  --   --  38*   GLUCOSE mg/dL 92 85 206*     Results from last 7 days   Lab Units 08/18/24  0557 08/17/24  0034 08/16/24  1650   MAGNESIUM mg/dL 1.8 1.9 2.0   HEMOGLOBIN g/dL 9.4* 9.2* 10.8*   HEMATOCRIT % 30.1* 29.3* 35.8     No results found for: \"HGBA1C\"     Medications    Scheduled Medications acetylcysteine, 2 mL, Nebulization, BID - RT  cefTRIAXone, 1,000 mg, Intravenous, Q24H  docusate sodium, 100 mg, Oral, BID  famotidine, 20 mg, Oral, Daily  furosemide, 40 mg, Oral, Daily  guaiFENesin, 600 mg, Oral, Q12H  ipratropium-albuterol, 3 mL, " Nebulization, 4x Daily - RT  latanoprost, 1 drop, Both Eyes, Nightly  metoclopramide, 5 mg, Oral, 4x Daily AC & at Bedtime  metoprolol succinate XL, 12.5 mg, Oral, Q12H  metroNIDAZOLE, 500 mg, Oral, Q8H  midodrine, 2.5 mg, Oral, BID AC  mirtazapine, 15 mg, Oral, Nightly  potassium chloride, 20 mEq, Oral, Daily  sennosides-docusate, 1 tablet, Oral, BID  sodium chloride, 10 mL, Intravenous, Q12H        Infusions Pharmacy to dose warfarin,         PRN Medications   acetaminophen **OR** acetaminophen **OR** acetaminophen    senna-docusate sodium **AND** polyethylene glycol **AND** bisacodyl **AND** bisacodyl    Calcium Replacement - Follow Nurse / BPA Driven Protocol    magnesium hydroxide    Magnesium Standard Dose Replacement - Follow Nurse / BPA Driven Protocol    melatonin    ondansetron ODT **OR** ondansetron    Pharmacy to dose warfarin    Phosphorus Replacement - Follow Nurse / BPA Driven Protocol    Potassium Replacement - Follow Nurse / BPA Driven Protocol    [COMPLETED] Insert Peripheral IV **AND** sodium chloride    sodium chloride    sodium chloride     Physical Findings        Trending Physical   Appearance, NFPE 8/18: DAVDI   --  Edema  No edema documented      Bowel Function No documented BM since admission (x 2 days ago)     Tubes No feeding tube      Chewing/Swallowing On altered diet, SLP consulted      Skin No WOCN note at this time    DTI to coccyx   Right lower leg wound  Rash     --  Current Nutrition Orders & Evaluation of Intake       Oral Nutrition     Food Allergies NKFA   Current PO Diet Diet: Regular/House; Texture: Soft to Chew (NDD 3); Soft to Chew: Ground Meat; Fluid Consistency: Honey Thick   Supplement None ordered    PO Evaluation     Trending % PO Intake 8/18: 58% average PO intakes x 3 documented meals since admission    --  Nutritional Risk Screening        NRS-2002 Score          Nutrition Diagnosis         Nutrition Dx Problem 1 Swallowing difficulty related to past medical history  including dysphagia as evidenced by mechanically altered diet.        Nutrition Dx Problem 2        Intervention Goal         Intervention Goal(s) Accept ONS  No weight loss  PO intakes at least 75%     Nutrition Intervention        RD Action Add ONS     Nutrition Prescription          Diet Prescription Soft to Chew, Ground Meat, HTL   Supplement Prescription Magic Cup BID    --  Monitor/Evaluation        Monitor Per protocol, I&O, PO intake, Supplement intake, Pertinent labs, Weight, Skin status, GI status, Symptoms, POC/GOC       Electronically signed by:  Kristen Almonte RD  08/18/24 14:12 EDT

## 2024-08-18 NOTE — PROGRESS NOTES
"Pharmacy dosing service  Anticoagulant  Warfarin     Subjective:    Shruthi Amin is a 89 y.o.female being continued on warfarin for Atrial Fibrillation / Flutter.    INR Goal: 2 - 3  Home medication?: warfarin 3 mg PO daily, except warfarin 1.5 mg PO on Tuesday, Saturday.   Bridge Therapy Present?:  No      Assessment/Plan:    Will hold warfarin today for supratherapeutic INR.     Continue to monitor and adjust based on INR.         Date 8/16 8/17 8/18         INR 2.57 2.56 3.56         Dose 3mg 1.5 mg  Hold             Objective:  [Ht: 157.5 cm (62\"); Wt: 48.2 kg (106 lb 4.2 oz); BMI: Body mass index is 19.44 kg/m².]    Lab Results   Component Value Date    ALBUMIN 4.0 08/16/2024     Lab Results   Component Value Date    INR 3.56 (H) 08/18/2024    INR 2.56 08/17/2024    INR 2.57 08/16/2024    PROTIME 35.3 (H) 08/18/2024    PROTIME 26.0 08/17/2024    PROTIME 26.1 08/16/2024     Lab Results   Component Value Date    HGB 9.4 (L) 08/18/2024    HGB 9.2 (L) 08/17/2024    HGB 10.8 (L) 08/16/2024     Lab Results   Component Value Date    HCT 30.1 (L) 08/18/2024    HCT 29.3 (L) 08/17/2024    HCT 35.8 08/16/2024       Sol Strong, PharmD  08/18/24 10:03 EDT         "

## 2024-08-18 NOTE — PROGRESS NOTES
LOS: 2 days   Patient Care Team:  Cristal Goodwin MD as PCP - General (Family Medicine)  Anai Moise APRN as Nurse Practitioner (Cardiology)  Theodore Vital MD as Consulting Physician (Cardiology)    Subjective     Continues with shortness of breath and cough.  Did not sleep well last night    Review of Systems   Constitutional:  Positive for activity change and fatigue.   HENT: Negative.     Respiratory:  Positive for cough and shortness of breath.    Cardiovascular:  Negative for chest pain and leg swelling.   Gastrointestinal: Negative.    Genitourinary: Negative.    Musculoskeletal:  Positive for arthralgias.   Skin: Negative.    Psychiatric/Behavioral: Negative.             Objective     Vital Signs  Temp:  [97.8 °F (36.6 °C)-99.1 °F (37.3 °C)] 97.8 °F (36.6 °C)  Heart Rate:  [] 107  Resp:  [15-28] 20  BP: ()/(46-71) 113/71      Physical Exam  Vitals reviewed.   HENT:      Head: Normocephalic and atraumatic.      Right Ear: External ear normal.      Left Ear: External ear normal.      Nose: Nose normal.   Eyes:      General:         Right eye: No discharge.         Left eye: No discharge.   Cardiovascular:      Rate and Rhythm: Tachycardia present. Rhythm irregular.      Pulses: Normal pulses.      Heart sounds: Murmur heard.   Pulmonary:      Effort: Pulmonary effort is normal.      Breath sounds: Rhonchi and rales present.   Abdominal:      General: Bowel sounds are normal.      Palpations: Abdomen is soft.   Skin:     General: Skin is warm and dry.   Neurological:      Mental Status: She is alert and oriented to person, place, and time.   Psychiatric:         Mood and Affect: Mood normal.         Behavior: Behavior normal.              Results Review:    Lab Results (last 24 hours)       Procedure Component Value Units Date/Time    Magnesium [[id number]]  (Normal) Collected: 08/18/24 0557    Specimen: Blood from Arm, Right Updated: 08/18/24 0653     Magnesium 1.8 mg/dL     Basic  Metabolic Panel [[id number]]  (Abnormal) Collected: 08/18/24 0557    Specimen: Blood from Arm, Right Updated: 08/18/24 0653     Glucose 92 mg/dL      BUN 20 mg/dL      Creatinine 0.45 mg/dL      Sodium 143 mmol/L      Potassium 4.0 mmol/L      Chloride 102 mmol/L      CO2 34.9 mmol/L      Calcium 8.9 mg/dL      BUN/Creatinine Ratio 44.4     Anion Gap 6.1 mmol/L      eGFR 88.2 mL/min/1.73     Narrative:      GFR Normal >60  Chronic Kidney Disease <60  Kidney Failure <15    The GFR formula is only valid for adults with stable renal function between ages 18 and 70.    STAT Lactic Acid, Reflex [[id number]]  (Normal) Collected: 08/18/24 0557    Specimen: Blood from Arm, Right Updated: 08/18/24 0642     Lactate 2.0 mmol/L     CBC & Differential [[id number]]  (Abnormal) Collected: 08/18/24 0557    Specimen: Blood from Arm, Right Updated: 08/18/24 0612    Narrative:      The following orders were created for panel order CBC & Differential.  Procedure                               Abnormality         Status                     ---------                               -----------         ------                     CBC Auto Differential[[id number]]        Abnormal            Final result               Scan Slide[[id number]]                                                                    Please view results for these tests on the individual orders.    CBC Auto Differential [[id number]]  (Abnormal) Collected: 08/18/24 0557    Specimen: Blood from Arm, Right Updated: 08/18/24 0612     WBC 13.76 10*3/mm3      RBC 2.84 10*6/mm3      Hemoglobin 9.4 g/dL      Hematocrit 30.1 %      .0 fL      MCH 33.1 pg      MCHC 31.2 g/dL      RDW 15.0 %      RDW-SD 57.9 fl      MPV 11.5 fL      Platelets 226 10*3/mm3      Neutrophil % 66.8 %      Lymphocyte % 8.9 %      Monocyte % 15.4 %      Eosinophil % 0.9 %      Basophil % 1.1 %      Immature Grans % 6.9 %      Neutrophils, Absolute 9.19 10*3/mm3      Lymphocytes, Absolute 1.22 10*3/mm3       Monocytes, Absolute 2.12 10*3/mm3      Eosinophils, Absolute 0.13 10*3/mm3      Basophils, Absolute 0.15 10*3/mm3      Immature Grans, Absolute 0.95 10*3/mm3      nRBC 0.4 /100 WBC     Protime-INR [[id number]]  (Abnormal) Collected: 08/18/24 0142    Specimen: Blood Updated: 08/18/24 0208     Protime 35.3 Seconds      INR 3.56    Blood Culture - Blood, Arm, Left [[id number]]  (Normal) Collected: 08/16/24 1704    Specimen: Blood from Arm, Left Updated: 08/17/24 1716     Blood Culture No growth at 24 hours    Narrative:      Less than seven (7) mL's of blood was collected.  Insufficient quantity may yield false negative results.    Blood Culture - Blood, Arm, Left [[id number]]  (Normal) Collected: 08/16/24 1650    Specimen: Blood from Arm, Left Updated: 08/17/24 1716     Blood Culture No growth at 24 hours    Narrative:      Less than seven (7) mL's of blood was collected.  Insufficient quantity may yield false negative results.             Imaging Results (Last 24 Hours)       Procedure Component Value Units Date/Time    CT Chest Without Contrast Diagnostic [[id number]] Collected: 08/17/24 1113     Updated: 08/17/24 1135    Narrative:      CT CHEST WO CONTRAST DIAGNOSTIC    Date of Exam: 8/17/2024 8:52 AM EDT    Indication: pneumonia.    Comparison: Chest radiograph 8/16/2024. Chest CT  2/25/2024    Technique: Axial CT images were obtained of the chest without contrast administration.  Sagittal and coronal reconstructions were performed.  Automated exposure control and iterative reconstruction methods were used.      Findings:  Thyroid and thoracic inlet: Normal.    Lymph nodes: No pathologic appearing lymph nodes by imaging criteria.    Cardiovascular: Similar cardiomegaly. No pericardial effusion. Similar mildly enlarged main pulm artery measuring 37 mm, which can be seen with pulmonary hypertension. No evidence of aortic aneurysm. . There are coronary artery calcifications. Aortic   atherosclerotic  calcifications.    Esophagus: Large hiatal hernia with mostly intrathoracic stomach    Lung parenchyma: Biapical pleural-parenchymal scarring. Consolidative opacities in both lower lobes and in the right middle lobe. Increased consolidative opacity in the left lower lobe. Mild interlobular septal thickening.    Airways: Patent trachea and mainstem bronchi.    Pleura: Similar moderate right and increased small left pleural effusions. No pneumothorax.    Chest wall and osseous structures: Interval increased, severe height loss of a chronic appearing compression fracture at T7. No appreciable osseous retropulsion. Additional multilevel chronic appearing vertebral body compression fractures are similar to   prior. Vertebral body augmentation at T12. Degenerative changes of the imaged spine. The bones are demineralized.    Included upper abdomen: Otherwise, no significant abnormality.      Impression:      Impression:  Similar moderate right and increased small left pleural effusions. Adjacent consolidative opacities, which have increased in the left lower lobe, may represent atelectasis, with superimposed pneumonia to be excluded clinically.    Cardiomegaly with mild pulmonary edema.    Interval increased, severe height loss of a chronic appearing compression fracture at T7 vertebral body. Recommend correlation with point tenderness to assess for an occult acute component. No significant osseous retropulsion.          Electronically Signed: Tray De Paz    8/17/2024 11:33 AM EDT    Workstation ID: [id number]                 I reviewed the patient's new clinical results.    Medication Review:   Scheduled Meds:acetylcysteine, 2 mL, Nebulization, BID - RT  cefTRIAXone, 1,000 mg, Intravenous, Q24H  docusate sodium, 100 mg, Oral, BID  famotidine, 20 mg, Oral, Daily  furosemide, 40 mg, Oral, Daily  guaiFENesin, 600 mg, Oral, Q12H  ipratropium-albuterol, 3 mL, Nebulization, 4x Daily - RT  latanoprost, 1 drop, Both Eyes,  Nightly  metoclopramide, 5 mg, Oral, 4x Daily AC & at Bedtime  metoprolol succinate XL, 12.5 mg, Oral, Q12H  metroNIDAZOLE, 500 mg, Oral, Q8H  midodrine, 2.5 mg, Oral, BID AC  mirtazapine, 15 mg, Oral, Nightly  potassium chloride, 20 mEq, Oral, Daily  sennosides-docusate, 1 tablet, Oral, BID  sodium chloride, 10 mL, Intravenous, Q12H      Continuous Infusions:Pharmacy to dose warfarin,       PRN Meds:.  acetaminophen **OR** acetaminophen **OR** acetaminophen    senna-docusate sodium **AND** polyethylene glycol **AND** bisacodyl **AND** bisacodyl    Calcium Replacement - Follow Nurse / BPA Driven Protocol    magnesium hydroxide    Magnesium Standard Dose Replacement - Follow Nurse / BPA Driven Protocol    ondansetron ODT **OR** ondansetron    Pharmacy to dose warfarin    Phosphorus Replacement - Follow Nurse / BPA Driven Protocol    Potassium Replacement - Follow Nurse / BPA Driven Protocol    [COMPLETED] Insert Peripheral IV **AND** sodium chloride    sodium chloride    sodium chloride     Interval History:    Assessment & Plan     Atrial fibrillation with rapid ventricular response  -Patient is off Cardizem drip, continue beta-blocker  - Cardiology following  - Anticoagulated on warfarin     Acute on chronic respiratory failure with hypoxia and hypercapnia  Questionable aspiration pneumonia  Rhinovirus  - Continue Rocephin and Flagyl  - Mucinex and Mucomyst nebulized with albuterol  - Appreciate pulmonology input  - BiPAP as needed     Large hiatal hernia  Dysphagia  Malnutrition  - Will consult speech therapy     Osteoarthritis  Hyperlipidemia  Essential hypertension     Diet: Mechanical soft with honey thick liquid  DVT prophylaxis: Anticoagulated on warfarin  GI prophylaxis: Pepcid  Code status: Full code    Plan for disposition:ALMA Arana  08/18/24  08:41 EDT

## 2024-08-18 NOTE — PLAN OF CARE
Goal Outcome Evaluation:      Pt has been up to chair today with family at bedside. Pt is on 4 liters nasal canula. Medication ordered to help pt sleep better tonight if needed. Continue to monitor

## 2024-08-18 NOTE — THERAPY EVALUATION
Acute Care - Speech Language Pathology   Swallow Initial Evaluation  [place]     Patient Name: Shruthi Amin  : 6/10/1928  MRN: [id number]  Today's Date: 2024               Admit Date: 2024    Visit Dx:     ICD-10-CM ICD-9-CM   1. Acute respiratory failure with hypoxia and hypercapnia  J96.01 518.81    J96.02    2. Acute on chronic congestive heart failure, unspecified heart failure type  I50.9 428.0   3. Pneumonia due to infectious organism, unspecified laterality, unspecified part of lung  J18.9 486   4. Atrial fibrillation with RVR  I48.91 427.31     Patient Active Problem List   Diagnosis    Atrial fibrillation    Chronic anticoagulation    Cerebrovascular accident (CVA)    Hiatal hernia    Hyperlipidemia    Hypertension    Osteoarthritis    Hypoxia    Compression fracture of thoracic vertebra with delayed healing    History of osteoporotic pathological fracture    Acute on chronic congestive heart failure, unspecified heart failure type    Severe malnutrition    Nonrheumatic mitral valve regurgitation    Back pain    Recurrent pleural effusion    Bradycardia    Atrial fibrillation with rapid ventricular response     Past Medical History:   Diagnosis Date    Atrial fibrillation     Coronary artery disease     Atrial fibrillation    GERD (gastroesophageal reflux disease)     Glaucoma     Hiatal hernia with gastroesophageal reflux     History of osteoporotic pathological fracture     Right intertroch (2019)    Hx of compression fracture of spine     T12 and L1(); T6 (2022)    Hyperlipidemia     Hypertension     Osteoarthritis     Osteoporosis     on prolia(3/21/22)    Stroke     off and on dizziness from the stroke.     Past Surgical History:   Procedure Laterality Date    BACK SURGERY      kyphoplasty    EYE SURGERY      cataract surgery    FIXATION KYPHOPLASTY LUMBAR SPINE      HIP TROCHANTERIC NAILING WITH INTRAMEDULLARY HIP SCREW Right 2019    Procedure: HIP TROCHANTERIC  NAILING SHORT WITH INTRAMEDULLARY HIP SCREW;  Surgeon: Edward Centeno MD;  Location: [facility] MAIN OR;  Service: Orthopedics    RECTAL SURGERY      x 3       SLP Recommendation and Plan  SLP Swallowing Diagnosis: mild, oral dysphagia, suspect acute-on-chronic, pharyngeal dysphagia (08/18/24 1800)  SLP Diet Recommendation: mechanical ground textures, honey thick liquids (08/18/24 1800)  Recommended Precautions and Strategies: upright posture during/after eating, small bites of food and sips of liquid, alternate between small bites of food and sips of liquid, general aspiration precautions, reflux precautions, assist with feeding (08/18/24 1800)  SLP Rec. for Method of Medication Administration: meds whole, meds crushed, as tolerated (08/18/24 1800)     Monitor for Signs of Aspiration: yes, notify SLP if any concerns, cough, gurgly voice, throat clearing (08/18/24 1800)     Swallow Criteria for Skilled Therapeutic Interventions Met: demonstrates skilled criteria (08/18/24 1800)     Rehab Potential/Prognosis, Swallowing: good, to achieve stated therapy goals (08/18/24 1800)  Therapy Frequency (Swallow): PRN (08/18/24 1800)  Predicted Duration Therapy Intervention (Days): until discharge (08/18/24 1800)  Oral Care Recommendations: Oral Care BID/PRN, Before ice/water (08/18/24 1800)                  Patient/Family Concerns, Anticipated Discharge Disposition (SLP): See above note (08/18/24 1800)                            SWALLOW EVALUATION (Last 72 Hours)       SLP Adult Swallow Evaluation       Row Name 08/18/24 1800       Rehab Evaluation    Document Type evaluation  -CP    Subjective Information no complaints  -CP    Patient Observations alert;cooperative  -CP    Patient/Family/Caregiver Comments/Observations Many family members in the room  -CP    Patient Effort good  -CP       General Information    Patient Profile Reviewed yes  -CP    Pertinent History Of Current Problem Shruthi Amin is a 89-year-old  female who presents to [facility] ED on 8/16/2024 with progressive cough and shortness of breath.  Patient does have a past medical history of COPD and follows outpatient with Dr. Marshall.  She also has a history of a large hiatal hernia with recurrent aspiration.  Patient recently was discharged from hospital and went to rehab.  She has been home for 1 week.  Family reports Monday she started feeling bad.  She was having worsening shortness of breath.  She did see her pulmonologist who started her on cefdinir.  Her symptoms progressed so family brought her to ER to further evaluation.  She was found to be in atrial fibrillation with RVR.  While in ED she was found to be positive for rhinovirus.  She was started on Cardizem drip and cardiology was consulted. Pt has a recent history of dysphagia and was placed on amech soft diet with HTL 1-2 weeks ago while at a rehab facility. Pt is now home and getting home health ST for swallowing.  -CP    Current Method of Nutrition mechanical ground textures;honey-thick liquids  -CP    Prior Level of Function-Swallowing mechanical ground textures;honey thick liquids  -CP    Plans/Goals Discussed with patient;family  -CP    Barriers to Rehab none identified  -CP       Pain    Additional Documentation Pain Scale: FACES Pre/Post-Treatment (Group)  -CP       Pain Scale: FACES Pre/Post-Treatment    Pain: FACES Scale, Pretreatment 0-->no hurt  -CP    Posttreatment Pain Rating 0-->no hurt  -CP       Oral Motor Structure and Function    Dentition Assessment upper dentures/partial in place;lower dentures/partial in place  -CP    Secretion Management WNL/WFL  -CP    Mucosal Quality moist, healthy  -CP       Oral Musculature and Cranial Nerve Assessment    Oral Motor General Assessment WFL  -CP       General Eating/Swallowing Observations    Respiratory Support Currently in Use nasal cannula  -CP    O2 Liters 4L  -CP    Eating/Swallowing Skills fed by SLP  -CP    Positioning  "During Eating upright in chair  -CP    Utensils Used spoon;straw  -CP    Consistencies Trialed honey-thick liquids;pureed;soft to chew textures  -CP       Clinical Swallow Eval    Clinical Swallow Evaluation Summary Pt seen for clinical swallow eval. Pt and her family report that pt has been on a [facility] soft diet with HTL since being in a rehab facility. Pt had VFSS that rec this diet. Pt also found to have hiatal hernia and esoph dysmotility with a \"pocket\" in her esophagus. Pt has been home for one week from rehab and is working with a  SLP on swallowing. Pt now has pneumonia and there is a concern for aspiration.         Pt given trials of HT juice by cup and straw,  applesauce and a Fig everett.  Pt had good labial closure over cup and straw. She had timely oral transit on liquids and puree. Mastication was slow but functional. She had no pocketing or oral residual. Visualization of swallow suggests timely initiation.  After the swallow, pt had clear vocal quality and no overt s/s of aspiration following all trials.  It is rec that pt continue a [facility] diet with HTL for now. Pt should up at a 90 degree angle for all PO, eat at a slow rate and take small bites and sips. Clinician had discussion with pt's family that further eval of swallow with VFSS while hospitalized could rec NPO, if further aspiration was seen, and pt is at increased risk for aspiration due to her esophageal issues.     Pt and her family opted to remain on current diet and continue HTL, opting out of VFSS for now. Discussed that pt could get 3-4 more weeks of swallow therapy and then pursue VFSS for possible upgrade of diet. Pt may have small sips of water between meals, after oral care, via the Meyer water Protocol. See guidelines below. ST will follow to assure tolerance of diet and for pt/family education.      The rationale to recommend water when a PO diet cannot appropriately/functionally sustain nutrition (or if thickened liquids are " prescribed due to aspiration of thin liquids) is because water is low risk for aspiration pna when compared to aspiration of food or other liquids.    Benefits of a water protocol include but are not limited to:     Oral gratification   Engagement of oropharyngeal swallow musculature   Decrease likelihood of dehydration      Guidelines for proper implementation include:  Waiting a minimum of 30 minutes after consuming any other PO   Thorough oral care prior to consuming water  Upright at 90 degree hip flexion  Small sips at slow rate  Monitor for any changes in respiratory status and discontinue if distress noted    The Meyer Free Water Protocol is a research based protocol established in 1984.  -CP       SLP Evaluation Clinical Impression    SLP Swallowing Diagnosis mild;oral dysphagia;suspect acute-on-chronic;pharyngeal dysphagia  -CP    Functional Impact risk of aspiration/pneumonia;risk of malnutrition  -CP    Rehab Potential/Prognosis, Swallowing good, to achieve stated therapy goals  -CP    Swallow Criteria for Skilled Therapeutic Interventions Met demonstrates skilled criteria  -CP       SLP Treatment Clinical Impressions    Care Plan Review evaluation/treatment results reviewed;care plan/treatment goals reviewed;risks/benefits reviewed;current/potential barriers reviewed;patient/other agree to care plan  -CP    Care Plan Review, Other Participant(s) son;family  -CP       Recommendations    Therapy Frequency (Swallow) PRN  -CP    Predicted Duration Therapy Intervention (Days) until discharge  -CP    SLP Diet Recommendation mechanical ground textures;honey thick liquids  -CP    Recommended Precautions and Strategies upright posture during/after eating;small bites of food and sips of liquid;alternate between small bites of food and sips of liquid;general aspiration precautions;reflux precautions;assist with feeding  -CP    Oral Care Recommendations Oral Care BID/PRN;Before ice/water  -CP    SLP Rec. for  Method of Medication Administration meds whole;meds crushed;as tolerated  -CP    Monitor for Signs of Aspiration yes;notify SLP if any concerns;cough;gurgly voice;throat clearing  -CP    Patient/Family Concerns, Anticipated Discharge Disposition (SLP) See above note  -CP       Swallow Goals (SLP)    Swallow LTGs Swallow Long Term Goal (free text)  -CP    Swallow STGs diet tolerance goal selection (SLP);pharyngeal strengthening exercise goal selection (SLP);swallow management recall goal selection (SLP)  -CP    Diet Tolerance Goal Selection (SLP) Swallow Short Term Goal 1  -CP    Pharyngeal Strengthening Exercise Goal Selection (SLP) pharyngeal strengthening exercise, SLP goal 1  -CP    Swallow Management Recall Goal Selection (SLP) swallow management recall, SLP goal 1  -CP       (LTG) Swallow    (LTG) Swallow Pt will maximize swallow function for least restrictive PO diet, exhibiting no complication associated with dysphagia, adequate PO intake, and demonstrating independent use of swallow compensations  -CP    Time Frame (Swallow Long Term Goal) by discharge  -CP    Progress/Outcomes (Swallow Long Term Goal) new goal  -CP       (STG) Swallow 1    (STG) Swallow 1 The patient will participate in a meal/follow-up assessment to determine safety and adequacy of recommended diet, independent use of safe swallow compensations, pt/family education and additional goals/recommendations to follow.  -CP    Time Frame (Swallow Short Term Goal 1) 1 week  -CP    Progress/Outcomes (Swallow Short Term Goal 1) new goal  -CP       (STG) Pharyngeal Strengthening Exercise Goal 1 (SLP)    Activity (Pharyngeal Strengthening Goal 1, SLP) increase pharyngeal sensation  -CP    Increase Pharyngeal Sensation shaker;chin tuck against resistance (CTAR);EMST;hard effortful swallow;nj  -CP    [place]/Accuracy (Pharyngeal Strengthening Goal 1, SLP) with minimal cues (75-90% accuracy)  -CP    Time Frame (Pharyngeal Strengthening Goal 1,  SLP) 1 week  -CP       (STG) Swallow Management Recall Goal 1 (SLP)    Activity (Swallow Management Recall Goal 1, SLP) recall of;aspiration precautions;reflux precautions;oral care recommendations;safe diet/liquid level;safe diet level/texture;safe liquid viscosity;rationale for use of strategies/techniques  -CP    [place]/Accuracy (Swallow Management Recall Goal 1, SLP) with moderate cues (50-74% accuracy)  -CP    Time Frame (Swallow Management Recall Goal 1, SLP) 1 week  -CP              User Key  (r) = Recorded By, (t) = Taken By, (c) = Cosigned By      Initials Name Effective Dates    CP Yi Bill, VELMA 06/16/21 -                     EDUCATION  The patient has been educated in the following areas:   Dysphagia (Swallowing Impairment) Oral Care/Hydration Modified Diet Instruction.        SLP GOALS       Row Name 08/18/24 1800             (LTG) Swallow    (LTG) Swallow Pt will maximize swallow function for least restrictive PO diet, exhibiting no complication associated with dysphagia, adequate PO intake, and demonstrating independent use of swallow compensations  -CP      Time Frame (Swallow Long Term Goal) by discharge  -CP      Progress/Outcomes (Swallow Long Term Goal) new goal  -CP         (STG) Swallow 1    (STG) Swallow 1 The patient will participate in a meal/follow-up assessment to determine safety and adequacy of recommended diet, independent use of safe swallow compensations, pt/family education and additional goals/recommendations to follow.  -CP      Time Frame (Swallow Short Term Goal 1) 1 week  -CP      Progress/Outcomes (Swallow Short Term Goal 1) new goal  -CP         (STG) Pharyngeal Strengthening Exercise Goal 1 (SLP)    Activity (Pharyngeal Strengthening Goal 1, SLP) increase pharyngeal sensation  -CP      Increase Pharyngeal Sensation shaker;chin tuck against resistance (CTAR);EMST;hard effortful swallow;nj  -CP      [place]/Accuracy (Pharyngeal Strengthening Goal 1, SLP)  with minimal cues (75-90% accuracy)  -CP      Time Frame (Pharyngeal Strengthening Goal 1, SLP) 1 week  -CP         (STG) Swallow Management Recall Goal 1 (SLP)    Activity (Swallow Management Recall Goal 1, SLP) recall of;aspiration precautions;reflux precautions;oral care recommendations;safe diet/liquid level;safe diet level/texture;safe liquid viscosity;rationale for use of strategies/techniques  -CP      [place]/Accuracy (Swallow Management Recall Goal 1, SLP) with moderate cues (50-74% accuracy)  -CP      Time Frame (Swallow Management Recall Goal 1, SLP) 1 week  -CP                User Key  (r) = Recorded By, (t) = Taken By, (c) = Cosigned By      Initials Name Provider Type    CP Yi Bill, SLP Speech and Language Pathologist                         Time Calculation:                VELMA Parks  8/18/2024

## 2024-08-18 NOTE — HOME HEALTH
Patient is current with [facility]. Patient was admitted to [facility] on 08/16/2024. We will continue to follow and resume care once discharged home. Thank you.

## 2024-08-19 ENCOUNTER — DOCUMENTATION (OUTPATIENT)
Dept: HOME HEALTH SERVICES | Facility: HOME HEALTHCARE | Age: 89
End: 2024-08-19
Payer: MEDICARE

## 2024-08-19 LAB
ANION GAP SERPL CALCULATED.3IONS-SCNC: 5 MMOL/L (ref 5–15)
BACTERIA SPEC RESP CULT: NORMAL
BUN SERPL-MCNC: 18 MG/DL (ref 8–23)
BUN/CREAT SERPL: 40 (ref 7–25)
CALCIUM SPEC-SCNC: 8.6 MG/DL (ref 8.2–9.6)
CHLORIDE SERPL-SCNC: 101 MMOL/L (ref 98–107)
CO2 SERPL-SCNC: 37 MMOL/L (ref 22–29)
CREAT SERPL-MCNC: 0.45 MG/DL (ref 0.57–1)
EGFRCR SERPLBLD CKD-EPI 2021: 88.2 ML/MIN/1.73
GLUCOSE SERPL-MCNC: 92 MG/DL (ref 65–99)
GRAM STN SPEC: NORMAL
INR PPP: 3.3 (ref 2–3)
MAGNESIUM SERPL-MCNC: 1.9 MG/DL (ref 1.7–2.3)
POTASSIUM SERPL-SCNC: 4.1 MMOL/L (ref 3.5–5.2)
PROTHROMBIN TIME: 32.9 SECONDS (ref 19.4–28.5)
SODIUM SERPL-SCNC: 143 MMOL/L (ref 136–145)

## 2024-08-19 PROCEDURE — 97530 THERAPEUTIC ACTIVITIES: CPT

## 2024-08-19 PROCEDURE — 80048 BASIC METABOLIC PNL TOTAL CA: CPT | Performed by: FAMILY MEDICINE

## 2024-08-19 PROCEDURE — 83735 ASSAY OF MAGNESIUM: CPT | Performed by: FAMILY MEDICINE

## 2024-08-19 PROCEDURE — 99232 SBSQ HOSP IP/OBS MODERATE 35: CPT | Performed by: INTERNAL MEDICINE

## 2024-08-19 PROCEDURE — 87205 SMEAR GRAM STAIN: CPT

## 2024-08-19 PROCEDURE — 97110 THERAPEUTIC EXERCISES: CPT

## 2024-08-19 PROCEDURE — 92526 ORAL FUNCTION THERAPY: CPT

## 2024-08-19 PROCEDURE — 85610 PROTHROMBIN TIME: CPT | Performed by: FAMILY MEDICINE

## 2024-08-19 PROCEDURE — 25010000002 CEFTRIAXONE PER 250 MG

## 2024-08-19 PROCEDURE — 94799 UNLISTED PULMONARY SVC/PX: CPT

## 2024-08-19 PROCEDURE — 94761 N-INVAS EAR/PLS OXIMETRY MLT: CPT

## 2024-08-19 PROCEDURE — 94664 DEMO&/EVAL PT USE INHALER: CPT

## 2024-08-19 RX ADMIN — METRONIDAZOLE 500 MG: 500 TABLET ORAL at 21:16

## 2024-08-19 RX ADMIN — METRONIDAZOLE 500 MG: 500 TABLET ORAL at 05:22

## 2024-08-19 RX ADMIN — POTASSIUM CHLORIDE 20 MEQ: 1500 TABLET, EXTENDED RELEASE ORAL at 08:01

## 2024-08-19 RX ADMIN — SENNOSIDES AND DOCUSATE SODIUM 1 TABLET: 8.6; 5 TABLET ORAL at 21:16

## 2024-08-19 RX ADMIN — GUAIFENESIN 600 MG: 600 TABLET, EXTENDED RELEASE ORAL at 08:01

## 2024-08-19 RX ADMIN — IPRATROPIUM BROMIDE AND ALBUTEROL SULFATE 3 ML: .5; 3 SOLUTION RESPIRATORY (INHALATION) at 19:25

## 2024-08-19 RX ADMIN — ACETYLCYSTEINE 2 ML: 200 SOLUTION ORAL; RESPIRATORY (INHALATION) at 19:25

## 2024-08-19 RX ADMIN — MIDODRINE HYDROCHLORIDE 2.5 MG: 2.5 TABLET ORAL at 08:01

## 2024-08-19 RX ADMIN — DOCUSATE SODIUM 100 MG: 100 CAPSULE, LIQUID FILLED ORAL at 08:01

## 2024-08-19 RX ADMIN — CEFTRIAXONE 1000 MG: 1 INJECTION, POWDER, FOR SOLUTION INTRAMUSCULAR; INTRAVENOUS at 17:09

## 2024-08-19 RX ADMIN — METOPROLOL SUCCINATE 12.5 MG: 25 TABLET, FILM COATED, EXTENDED RELEASE ORAL at 08:01

## 2024-08-19 RX ADMIN — MIRTAZAPINE 15 MG: 15 TABLET, FILM COATED ORAL at 21:16

## 2024-08-19 RX ADMIN — METOPROLOL SUCCINATE 12.5 MG: 25 TABLET, FILM COATED, EXTENDED RELEASE ORAL at 21:16

## 2024-08-19 RX ADMIN — METOCLOPRAMIDE 5 MG: 5 TABLET ORAL at 10:37

## 2024-08-19 RX ADMIN — IPRATROPIUM BROMIDE AND ALBUTEROL SULFATE 3 ML: .5; 3 SOLUTION RESPIRATORY (INHALATION) at 14:58

## 2024-08-19 RX ADMIN — DOCUSATE SODIUM 100 MG: 100 CAPSULE, LIQUID FILLED ORAL at 21:16

## 2024-08-19 RX ADMIN — IPRATROPIUM BROMIDE AND ALBUTEROL SULFATE 3 ML: .5; 3 SOLUTION RESPIRATORY (INHALATION) at 12:24

## 2024-08-19 RX ADMIN — METOCLOPRAMIDE 5 MG: 5 TABLET ORAL at 21:16

## 2024-08-19 RX ADMIN — SENNOSIDES AND DOCUSATE SODIUM 1 TABLET: 8.6; 5 TABLET ORAL at 08:01

## 2024-08-19 RX ADMIN — Medication 10 ML: at 08:02

## 2024-08-19 RX ADMIN — METOCLOPRAMIDE 5 MG: 5 TABLET ORAL at 08:01

## 2024-08-19 RX ADMIN — ACETYLCYSTEINE 2 ML: 200 SOLUTION ORAL; RESPIRATORY (INHALATION) at 08:20

## 2024-08-19 RX ADMIN — FUROSEMIDE 40 MG: 40 TABLET ORAL at 08:02

## 2024-08-19 RX ADMIN — Medication 10 ML: at 21:17

## 2024-08-19 RX ADMIN — METRONIDAZOLE 500 MG: 500 TABLET ORAL at 14:11

## 2024-08-19 RX ADMIN — LATANOPROST 1 DROP: 50 SOLUTION/ DROPS OPHTHALMIC at 21:17

## 2024-08-19 RX ADMIN — FAMOTIDINE 20 MG: 20 TABLET, FILM COATED ORAL at 08:02

## 2024-08-19 RX ADMIN — MIDODRINE HYDROCHLORIDE 2.5 MG: 2.5 TABLET ORAL at 17:09

## 2024-08-19 RX ADMIN — IPRATROPIUM BROMIDE AND ALBUTEROL SULFATE 3 ML: .5; 3 SOLUTION RESPIRATORY (INHALATION) at 08:15

## 2024-08-19 RX ADMIN — METOCLOPRAMIDE 5 MG: 5 TABLET ORAL at 17:09

## 2024-08-19 RX ADMIN — GUAIFENESIN 600 MG: 600 TABLET, EXTENDED RELEASE ORAL at 21:16

## 2024-08-19 NOTE — PLAN OF CARE
Goal Outcome Evaluation:              Outcome Evaluation: Pt A&Ox3 throughout shift. Sat up in chair for several hours. Remains on 4 liters NC. No complaints of pain.

## 2024-08-19 NOTE — PROGRESS NOTES
"  Pharmacy dosing service  Anticoagulant  Warfarin     Subjective:    Shruthi Amin is a 89 y.o.female being continued on warfarin for Atrial Fibrillation / Flutter.    INR Goal: 2 - 3  Home medication?: warfarin 3 mg PO daily, except warfarin 1.5 mg PO on Tuesday, Saturday.   Bridge Therapy Present?:  No  Interacting Medications Evaluation (New/Present/Discontinued): Metronidazole (8/17-8/22)  Additional Contributing Factors: None      Assessment/Plan:    Will hold warfarin today for supratherapeutic INR.     Continue to monitor and adjust based on INR.         Date 8/16 8/17 8/18 8/19        INR 2.57 2.56 3.56 3.30        Dose 3mg 1.5 mg  Hold Hold            Objective:  [Ht: 157.5 cm (62\"); Wt: 48.5 kg (106 lb 14.8 oz); BMI: Body mass index is 19.56 kg/m².]    Lab Results   Component Value Date    ALBUMIN 4.0 08/16/2024     Lab Results   Component Value Date    INR 3.30 (H) 08/19/2024    INR 3.56 (H) 08/18/2024    INR 2.56 08/17/2024    PROTIME 32.9 (H) 08/19/2024    PROTIME 35.3 (H) 08/18/2024    PROTIME 26.0 08/17/2024     Lab Results   Component Value Date    HGB 9.4 (L) 08/18/2024    HGB 9.2 (L) 08/17/2024    HGB 10.8 (L) 08/16/2024     Lab Results   Component Value Date    HCT 30.1 (L) 08/18/2024    HCT 29.3 (L) 08/17/2024    HCT 35.8 08/16/2024       Rika Brian, [facility]  08/19/24 13:38 EDT           "

## 2024-08-19 NOTE — THERAPY TREATMENT NOTE
"Subjective: Pt agreeable to therapeutic plan of care.    Objective:     Precautions - desaturates with activity Rhinovirus    Bed mobility - N/A or Not attempted.  Pt already up in the chair  Transfers - SBA, CGA, and with rolling walker  Ambulation - march in place x's 3    Therapeutic Exercise - 15 Reps B LE AROM supported sitting / chair and standing    Vitals: Desaturates  sats drop to the low 80's or high 70's during activity.  Sats quickly recover back to 90's after rest.  Pt demonstrated good pursed lip breathing technique.     Pain: 4 VAS   Location: [facility]  Intervention for pain: Repositioned, Increased Activity, and Therapeutic Presence    Education: Provided education on the importance of mobility in the acute care setting, Verbal/Tactile Cues, Transfer Training, Gait Training, and Energy conservation strategies    Assessment: Shruthi Amin presents with functional mobility impairments which indicate the need for skilled intervention. Tolerating session today without incident. Pt did well with PT today.  Pt is most likely close to her baseline mobility.  Pt has 24/7 assist at home. Will continue to follow and progress as tolerated.     Plan/Recommendations:   If medically appropriate, Low Intensity Therapy recommended post-acute care - This is recommended as therapy feels this patient would require 2-3 visits per week. OP or HH would be the best option depending on patient's home bound status. Consider, if the patient has other  \"skilled\" needs such as wounds, IV antibiotics, etc. Combined with \"low intensity\" could also equate to a SNF. If patient is medically complex, consider LTAC. Pt requires no DME at discharge.     Pt desires Home with family assist and Home Health at discharge. Pt cooperative; agreeable to therapeutic recommendations and plan of care.     Basic Mobility 6-click:  Rollin = Total, A lot = 2, A little = 3; 4 = None  Supine>Sit:   1 = Total, A lot = 2, A little = 3; 4 = " None   Sit>Stand with arms:  1 = Total, A lot = 2, A little = 3; 4 = None  Bed>Chair:   1 = Total, A lot = 2, A little = 3; 4 = None  Ambulate in room:  1 = Total, A lot = 2, A little = 3; 4 = None  3-5 Steps with railin = Total, A lot = 2, A little = 3; 4 = None  Score: 18    Modified [place]: 4 = Moderately severe disability (Unable to attend to own bodily needs without assistance, and unable to walk unassisted)     Post-Tx Position: Up in Chair, Alarms activated, and Call light and personal items within reach  PPE: gloves and surgical mask

## 2024-08-19 NOTE — DISCHARGE PLACEMENT REQUEST
"Shruthi Hamilton (89 y.o. Female)       Date of Birth   06/10/1928    Social Security Number       Address   [address] [place] [address]    Home Phone   [phone]    MRN   [id number]       Jewish   None    Marital Status                               Admission Date   8/16/24    Admission Type   Emergency    Admitting Provider   Cristal Goodwin MD    Attending Provider   Cristal Goodwin MD    Department, Room/Bed   [facility], 2114/1       Discharge Date       Discharge Disposition       Discharge Destination                                 Attending Provider: Cristal Goodwin MD    Allergies: Penicillins, Codeine, Latex    Isolation: Droplet   Infection: Rhinovirus  (08/16/24)   Code Status: No CPR    Ht: 157.5 cm (62\")   Wt: 48.5 kg (106 lb 14.8 oz)    Admission Cmt: None   Principal Problem: Atrial fibrillation with rapid ventricular response [I48.91]                   Active Insurance as of 8/16/2024       Primary Coverage       Payor Plan Insurance Group Employer/Plan Group    MEDICARE MEDICARE A & B        Payor Plan Address Payor Plan Phone Number Payor Plan Fax Number Effective Dates    [address] [phone]  6/1/1993 - None Entered    [facility] [id number]         Subscriber Name Subscriber Birth Date Member ID       SHRUTHI HAMILTON 6/10/1928 [id number]               Secondary Coverage       Payor Plan Insurance Group Employer/Plan Group    ANTHEM BLUE CROSS ANTHEM BLUE CROSS BLUE SHIELD PPO [id number]       Payor Plan Address Payor Plan Phone Number Payor Plan Fax Number Effective Dates    [address] [phone]  1/1/2021 - None Entered    [facility] [id number]         Subscriber Name Subscriber Birth Date Member ID       SHRUTHI HAMILTON 6/10/1928 [id number]                     Emergency Contacts        (Rel.) Home Phone Work Phone Mobile Phone    Shea Nam (Daughter) [phone] -- [phone]    Inessa Huang (Grandchild) " [phone] -- [phone]    sonya cartwright (Son) [phone] -- [phone]                 History & Physical        Gilda, ALMA Farley at 08/17/24 1420       Attestation signed by Cristal Goodwin MD at 08/17/24 1431    I have reviewed this documentation and agree.                      Patient Care Team:  Cristal Goodwin MD as PCP - General (Family Medicine)  Anai Moise APRN as Nurse Practitioner (Cardiology)  Theodore Vital MD as Consulting Physician (Cardiology)    Chief complaint shortness of breath    Subjective    Shruthi Cartwright is a 89-year-old female who presents to [facility] ED on 8/16/2024 with progressive cough and shortness of breath.  Patient does have a past medical history of COPD and follows outpatient with Dr. Marshall.  She also has a history of a large hiatal hernia with recurrent aspiration.  Patient recently was discharged from hospital and went to rehab.  She has been home for 1 week.  Family reports Monday she started feeling bad.  She was having worsening shortness of breath.  She did see her pulmonologist who started her on cefdinir.  Her symptoms progressed so family brought her to ER to further evaluation.  She was found to be in atrial fibrillation with RVR.  While in ED she was found to be positive for rhinovirus.  She was started on Cardizem drip and cardiology was consulted.    Review of Systems   Constitutional:  Positive for activity change, appetite change and fatigue.   HENT: Negative.     Respiratory:  Positive for choking and shortness of breath.    Cardiovascular:  Positive for palpitations. Negative for chest pain and leg swelling.   Gastrointestinal: Negative.    Genitourinary: Negative.    Musculoskeletal:  Positive for arthralgias and back pain.   Neurological:  Positive for weakness.   Psychiatric/Behavioral: Negative.            History  Past Medical History:   Diagnosis Date    Atrial fibrillation     Coronary artery disease      Atrial fibrillation    GERD (gastroesophageal reflux disease)     Glaucoma     Hiatal hernia with gastroesophageal reflux     History of osteoporotic pathological fracture     Right intertroch (2019)    Hx of compression fracture of spine     T12 and L1(); T6 (2022)    Hyperlipidemia     Hypertension     Osteoarthritis     Osteoporosis     on prolia(3/21/22)    Stroke     off and on dizziness from the stroke.     Past Surgical History:   Procedure Laterality Date    BACK SURGERY      kyphoplasty    EYE SURGERY      cataract surgery    FIXATION KYPHOPLASTY LUMBAR SPINE      HIP TROCHANTERIC NAILING WITH INTRAMEDULLARY HIP SCREW Right 2019    Procedure: HIP TROCHANTERIC NAILING SHORT WITH INTRAMEDULLARY HIP SCREW;  Surgeon: Edward Centeno MD;  Location: [facility] MAIN OR;  Service: Orthopedics    RECTAL SURGERY      x 3     Family History   Problem Relation Age of Onset    Heart disease Mother     Hypertension Mother     Osteoporosis Mother     Cancer Father         colon cancer    Osteoporosis Father     Cancer Sister         lung    Cancer Brother         colon cancer     Social History     Tobacco Use    Smoking status: Former     Current packs/day: 0.00     Types: Cigarettes     Quit date:      Years since quittin.6     Passive exposure: Past    Smokeless tobacco: Never   Vaping Use    Vaping status: Never Used   Substance Use Topics    Alcohol use: No    Drug use: No     Medications Prior to Admission   Medication Sig Dispense Refill Last Dose    acetaminophen (TYLENOL) 325 MG tablet Take 2 tablets by mouth Every 4 (Four) Hours As Needed for Mild Pain.   8/15/2024    cefuroxime (CEFTIN) 250 MG tablet Take 1 tablet by mouth Daily. Indications: Bronchitis   2024    Cholecalciferol 10 MCG (400 UNIT) tablet Take 1 tablet by mouth Daily. Indications: Vitamin D Deficiency   2024    DIGESTIVE ENZYMES PO Take 1 capsule by mouth 3 (Three) Times a Day. Indications: supplement    8/16/2024    docusate sodium (COLACE) 250 MG capsule Take 1 capsule by mouth 2 (Two) Times a Day. Indications: Constipation   8/16/2024    famotidine (Pepcid) 20 MG tablet Take 1 tablet by mouth Daily.   8/16/2024    furosemide (LASIX) 40 MG tablet Take 1 tablet by mouth Daily. Indications: Cardiac Failure   8/16/2024    latanoprost (XALATAN) 0.005 % ophthalmic solution Administer 1 drop to both eyes Every Night. Indications: Wide-Angle Glaucoma   8/15/2024    metoclopramide (REGLAN) 5 MG tablet Take 1 tablet by mouth 4 (Four) Times a Day Before Meals & at Bedtime. Indications: Gastroesophageal Reflux Disease 120 tablet 1 8/16/2024    metoprolol succinate XL (TOPROL-XL) 25 MG 24 hr tablet Take 0.5 tablets by mouth Every 12 (Twelve) Hours. 60 tablet 1 8/16/2024    midodrine (PROAMATINE) 2.5 MG tablet Take 1 tablet by mouth 2 (Two) Times a Day. 60 tablet 3 8/16/2024    mirtazapine (REMERON) 15 MG tablet Take 1 tablet by mouth Every Night. Indications: Appetite Stimulant   8/15/2024    O2 (OXYGEN) Inhale 3 L/min As Needed. Indications: SOB   8/16/2024    sennosides-docusate (senna-docusate sodium) 8.6-50 MG per tablet Take 1 tablet by mouth 2 (Two) Times a Day. Indications: Constipation   8/16/2024    warfarin (COUMADIN) 3 MG tablet Take 1 tablet by mouth Daily. Indications: Atrial Fibrillation   8/15/2024    warfarin (Coumadin) 3 MG tablet Take 1 tablet by mouth Every Night. Indications: Atrial Fibrillation   8/15/2024    magnesium hydroxide (Milk of Magnesia) 400 MG/5ML suspension Take 30 mL by mouth Daily As Needed for Constipation. Indications: Constipation   Unknown    potassium chloride (KLOR-CON M20) 20 MEQ CR tablet Take 1 tablet by mouth Daily. Indications: Low Amount of Potassium in the Blood   Unknown     Allergies:  Penicillins, Codeine, and Latex    Objective    Vital Signs  Temp:  [97.4 °F (36.3 °C)-98.2 °F (36.8 °C)] 98.2 °F (36.8 °C)  Heart Rate:  [] 115  Resp:  [16-30] 18  BP: (105-163)/(46-98)  114/62       Physical Exam  Vitals reviewed.   Constitutional:       Appearance: She is ill-appearing.   HENT:      Head: Normocephalic and atraumatic.      Right Ear: External ear normal.      Left Ear: External ear normal.      Nose: Nose normal.      Mouth/Throat:      Mouth: Mucous membranes are moist.   Eyes:      General:         Right eye: No discharge.         Left eye: No discharge.   Cardiovascular:      Pulses: Normal pulses.      Heart sounds: Murmur heard.   Pulmonary:      Effort: Pulmonary effort is normal.      Breath sounds: Rhonchi and rales present.   Abdominal:      General: Bowel sounds are normal.      Palpations: Abdomen is soft.   Musculoskeletal:         General: Normal range of motion.   Skin:     General: Skin is warm and dry.   Neurological:      Mental Status: She is alert and oriented to person, place, and time.   Psychiatric:         Mood and Affect: Mood normal.         Behavior: Behavior normal.          Results Review:     Imaging Results (Last 24 Hours)       Procedure Component Value Units Date/Time    CT Chest Without Contrast Diagnostic [[id number]] Collected: 08/17/24 1113     Updated: 08/17/24 1135    Narrative:      CT CHEST WO CONTRAST DIAGNOSTIC    Date of Exam: 8/17/2024 8:52 AM EDT    Indication: pneumonia.    Comparison: Chest radiograph 8/16/2024. Chest CT  2/25/2024    Technique: Axial CT images were obtained of the chest without contrast administration.  Sagittal and coronal reconstructions were performed.  Automated exposure control and iterative reconstruction methods were used.      Findings:  Thyroid and thoracic inlet: Normal.    Lymph nodes: No pathologic appearing lymph nodes by imaging criteria.    Cardiovascular: Similar cardiomegaly. No pericardial effusion. Similar mildly enlarged main pulm artery measuring 37 mm, which can be seen with pulmonary hypertension. No evidence of aortic aneurysm. . There are coronary artery calcifications. Aortic    atherosclerotic calcifications.    Esophagus: Large hiatal hernia with mostly intrathoracic stomach    Lung parenchyma: Biapical pleural-parenchymal scarring. Consolidative opacities in both lower lobes and in the right middle lobe. Increased consolidative opacity in the left lower lobe. Mild interlobular septal thickening.    Airways: Patent trachea and mainstem bronchi.    Pleura: Similar moderate right and increased small left pleural effusions. No pneumothorax.    Chest wall and osseous structures: Interval increased, severe height loss of a chronic appearing compression fracture at T7. No appreciable osseous retropulsion. Additional multilevel chronic appearing vertebral body compression fractures are similar to   prior. Vertebral body augmentation at T12. Degenerative changes of the imaged spine. The bones are demineralized.    Included upper abdomen: Otherwise, no significant abnormality.      Impression:      Impression:  Similar moderate right and increased small left pleural effusions. Adjacent consolidative opacities, which have increased in the left lower lobe, may represent atelectasis, with superimposed pneumonia to be excluded clinically.    Cardiomegaly with mild pulmonary edema.    Interval increased, severe height loss of a chronic appearing compression fracture at T7 vertebral body. Recommend correlation with point tenderness to assess for an occult acute component. No significant osseous retropulsion.          Electronically Signed: Tray De Paz    8/17/2024 11:33 AM EDT    Workstation ID: [id number]    XR Chest 1 View [[id number]] Collected: 08/16/24 1703     Updated: 08/16/24 1706    Narrative:      XR CHEST 1 VW    Date of Exam: 8/16/2024 5:01 PM EDT    Indication: soa    Comparison: Chest x-ray 6/24/2024    Findings:  Limited evaluation due to patient's kyphosis and head and neck obscuring the majority of the left lung. Large hiatal hernia. Cardiomegaly. Moderate pleural effusions.  Groundglass opacity and septal thickening. Bibasilar airspace opacities.      Impression:      Impression:  Limited evaluation due to patient's head and neck obscuring the majority of the left lung. Severely elevated diaphragm with large hiatal hernia suspected. Cardiomegaly. Moderate pleural effusions. Bibasilar opacity atelectasis versus pneumonia.      Electronically Signed: Julia Torrez MD    8/16/2024 5:04 PM EDT    Workstation ID: [id number]             Lab Results (last 24 hours)       Procedure Component Value Units Date/Time    Basic Metabolic Panel [[id number]]  (Abnormal) Collected: 08/17/24 0034    Specimen: Blood Updated: 08/17/24 0234     Glucose 85 mg/dL      BUN 22 mg/dL      Creatinine 0.45 mg/dL      Sodium 143 mmol/L      Potassium 4.0 mmol/L      Comment: Specimen hemolyzed.  Result may be falsely elevated.        Chloride 101 mmol/L      CO2 32.1 mmol/L      Calcium 8.8 mg/dL      BUN/Creatinine Ratio 48.9     Anion Gap 9.9 mmol/L      eGFR 88.2 mL/min/1.73     Narrative:      GFR Normal >60  Chronic Kidney Disease <60  Kidney Failure <15    The GFR formula is only valid for adults with stable renal function between ages 18 and 70.    Magnesium [[id number]]  (Normal) Collected: 08/17/24 0034    Specimen: Blood Updated: 08/17/24 0234     Magnesium 1.9 mg/dL     CBC & Differential [[id number]]  (Abnormal) Collected: 08/17/24 0034    Specimen: Blood Updated: 08/17/24 0232    Narrative:      The following orders were created for panel order CBC & Differential.  Procedure                               Abnormality         Status                     ---------                               -----------         ------                     CBC Auto Differential[[id number]]        Abnormal            Final result               Scan Slide[[id number]]                                                                    Please view results for these tests on the individual orders.    CBC Auto Differential [[id number]]   (Abnormal) Collected: 08/17/24 0034    Specimen: Blood Updated: 08/17/24 0232     WBC 11.04 10*3/mm3      RBC 2.73 10*6/mm3      Hemoglobin 9.2 g/dL      Hematocrit 29.3 %      .3 fL      MCH 33.7 pg      MCHC 31.4 g/dL      RDW 15.0 %      RDW-SD 59.1 fl      MPV 11.6 fL      Platelets 186 10*3/mm3      Neutrophil % 65.3 %      Lymphocyte % 10.7 %      Monocyte % 15.8 %      Eosinophil % 0.4 %      Basophil % 1.2 %      Immature Grans % 6.6 %      Neutrophils, Absolute 7.22 10*3/mm3      Lymphocytes, Absolute 1.18 10*3/mm3      Monocytes, Absolute 1.74 10*3/mm3      Eosinophils, Absolute 0.04 10*3/mm3      Basophils, Absolute 0.13 10*3/mm3      Immature Grans, Absolute 0.73 10*3/mm3      nRBC 0.4 /100 WBC     Protime-INR [[id number]]  (Normal) Collected: 08/17/24 0034    Specimen: Blood Updated: 08/17/24 0220     Protime 26.0 Seconds      INR 2.56    Basic Metabolic Panel [[id number]] Updated: 08/16/24 2354    Specimen: Blood     STAT Lactic Acid, Reflex [[id number]]  (Abnormal) Collected: 08/16/24 2313    Specimen: Blood Updated: 08/16/24 2353     Lactate 2.1 mmol/L     STAT Lactic Acid, Reflex [[id number]]  (Abnormal) Collected: 08/16/24 1957    Specimen: Blood Updated: 08/16/24 2044     Lactate 2.9 mmol/L     High Sensitivity Troponin T 2Hr [[id number]]  (Abnormal) Collected: 08/16/24 1915    Specimen: Blood Updated: 08/16/24 1941     HS Troponin T 31 ng/L      Troponin T Delta 4 ng/L     Narrative:      High Sensitive Troponin T Reference Range:  <14.0 ng/L- Negative Female for AMI  <22.0 ng/L- Negative Male for AMI  >=14 - Abnormal Female indicating possible myocardial injury.  >=22 - Abnormal Male indicating possible myocardial injury.   Clinicians would have to utilize clinical acumen, EKG, Troponin, and serial changes to determine if it is an Acute Myocardial Infarction or myocardial injury due to an underlying chronic condition.         Blood Gas, Arterial - [[id number]]  (Abnormal) Collected: 08/16/24  1926    Specimen: Arterial Blood Updated: 08/1934     Site Right Radial     Gulshan's Test Positive     pH, Arterial 7.411 pH units      pCO2, Arterial 57.5 mm Hg      pO2, Arterial 92.5 mm Hg      HCO3, Arterial 36.5 mmol/L      Base Excess, Arterial 10.3 mmol/L      Comment: Serial Number: [id number]:  [id number]        O2 Saturation, Arterial 97.0 %      CO2 Content 38.3 mmol/L      Barometric Pressure for Blood Gas --     Comment: N/A        Modality BiPap     FIO2 60 %      PEEP 5     Hemodilution No     Respiratory Rate 26     PO2/FIO2 154    MRSA Screen, PCR (Inpatient) - Swab, Nares [[id number]]  (Normal) Collected: 08/16/24 1727    Specimen: Swab from Nares Updated: 08/16/24 1858     MRSA PCR No MRSA Detected    Narrative:      The negative predictive value of this diagnostic test is high and should only be used to consider de-escalating anti-MRSA therapy. A positive result may indicate colonization with MRSA and must be correlated clinically.    Respiratory Panel PCR w/COVID-19(SARS-CoV-2) KEYONA/FABI/MISTY/PAD/COR/ALISSON In-House, NP Swab in UTM/VTM, 2 HR TAT - Swab, Nasopharynx [[id number]]  (Abnormal) Collected: 08/16/24 1727    Specimen: Swab from Nasopharynx Updated: 08/16/24 1841     ADENOVIRUS, PCR Not Detected     Coronavirus 229E Not Detected     Coronavirus HKU1 Not Detected     Coronavirus NL63 Not Detected     Coronavirus OC43 Not Detected     COVID19 Not Detected     Human Metapneumovirus Not Detected     Human Rhinovirus/Enterovirus Detected     Influenza A PCR Not Detected     Influenza B PCR Not Detected     Parainfluenza Virus 1 Not Detected     Parainfluenza Virus 2 Not Detected     Parainfluenza Virus 3 Not Detected     Parainfluenza Virus 4 Not Detected     RSV, PCR Not Detected     Bordetella pertussis pcr Not Detected     Bordetella parapertussis PCR Not Detected     Chlamydophila pneumoniae PCR Not Detected     Mycoplasma pneumo by PCR Not Detected    Narrative:      In the setting of a  positive respiratory panel with a viral infection PLUS a negative procalcitonin without other underlying concern for bacterial infection, consider observing off antibiotics or discontinuation of antibiotics and continue supportive care. If the respiratory panel is positive for atypical bacterial infection (Bordetella pertussis, Chlamydophila pneumoniae, or Mycoplasma pneumoniae), consider antibiotic de-escalation to target atypical bacterial infection.    Protime-INR [[id number]]  (Normal) Collected: 08/16/24 1650    Specimen: Blood Updated: 08/16/24 1734     Protime 26.1 Seconds      INR 2.57    aPTT [[id number]]  (Abnormal) Collected: 08/16/24 1650    Specimen: Blood Updated: 08/16/24 1734     PTT 41.8 seconds     High Sensitivity Troponin T [[id number]]  (Abnormal) Collected: 08/16/24 1650    Specimen: Blood Updated: 08/16/24 1729     HS Troponin T 27 ng/L     Narrative:      High Sensitive Troponin T Reference Range:  <14.0 ng/L- Negative Female for AMI  <22.0 ng/L- Negative Male for AMI  >=14 - Abnormal Female indicating possible myocardial injury.  >=22 - Abnormal Male indicating possible myocardial injury.   Clinicians would have to utilize clinical acumen, EKG, Troponin, and serial changes to determine if it is an Acute Myocardial Infarction or myocardial injury due to an underlying chronic condition.         BNP [[id number]]  (Abnormal) Collected: 08/16/24 1650    Specimen: Blood Updated: 08/16/24 1729     proBNP 7,361.0 pg/mL     Narrative:      This assay is used as an aid in the diagnosis of individuals suspected of having heart failure. It can be used as an aid in the diagnosis of acute decompensated heart failure (ADHF) in patients presenting with signs and symptoms of ADHF to the emergency department (ED). In addition, NT-proBNP of <300 pg/mL indicates ADHF is not likely.    Age Range Result Interpretation  NT-proBNP Concentration (pg/mL:      <50             Positive            >450                    Gray                 300-450                    Negative             <300    50-75           Positive            >900                  Gray                300-900                  Negative            <300      >75             Positive            >1800                  Gray                300-1800                  Negative            <300    Magnesium [[id number]]  (Normal) Collected: 08/16/24 1650    Specimen: Blood Updated: 08/16/24 1729     Magnesium 2.0 mg/dL     Comprehensive Metabolic Panel [[id number]]  (Abnormal) Collected: 08/16/24 1650    Specimen: Blood Updated: 08/16/24 1729     Glucose 206 mg/dL      BUN 26 mg/dL      Creatinine 0.55 mg/dL      Sodium 141 mmol/L      Potassium 4.8 mmol/L      Chloride 100 mmol/L      CO2 30.3 mmol/L      Calcium 9.3 mg/dL      Total Protein 6.9 g/dL      Albumin 4.0 g/dL      ALT (SGPT) 20 U/L      AST (SGOT) 38 U/L      Alkaline Phosphatase 139 U/L      Total Bilirubin 0.8 mg/dL      Globulin 2.9 gm/dL      A/G Ratio 1.4 g/dL      BUN/Creatinine Ratio 47.3     Anion Gap 10.7 mmol/L      eGFR 84.0 mL/min/1.73     Narrative:      GFR Normal >60  Chronic Kidney Disease <60  Kidney Failure <15    The GFR formula is only valid for adults with stable renal function between ages 18 and 70.    CBC & Differential [[id number]]  (Abnormal) Collected: 08/16/24 1650    Specimen: Blood Updated: 08/16/24 1715    Narrative:      The following orders were created for panel order CBC & Differential.  Procedure                               Abnormality         Status                     ---------                               -----------         ------                     CBC Auto Differential[[id number]]        Abnormal            Final result               Scan Slide[[id number]]                                                                    Please view results for these tests on the individual orders.    CBC Auto Differential [[id number]]  (Abnormal) Collected: 08/16/24 1650    Specimen:  Blood Updated: 08/16/24 1715     WBC 16.60 10*3/mm3      RBC 3.32 10*6/mm3      Hemoglobin 10.8 g/dL      Hematocrit 35.8 %      .8 fL      MCH 32.5 pg      MCHC 30.2 g/dL      RDW 15.1 %      RDW-SD 59.2 fl      MPV 11.6 fL      Platelets 254 10*3/mm3      Neutrophil % 64.4 %      Lymphocyte % 15.1 %      Monocyte % 12.3 %      Eosinophil % 0.2 %      Basophil % 1.2 %      Immature Grans % 6.8 %      Neutrophils, Absolute 10.68 10*3/mm3      Lymphocytes, Absolute 2.51 10*3/mm3      Monocytes, Absolute 2.04 10*3/mm3      Eosinophils, Absolute 0.04 10*3/mm3      Basophils, Absolute 0.20 10*3/mm3      Immature Grans, Absolute 1.13 10*3/mm3      nRBC 0.3 /100 WBC     Blood Culture - Blood, Arm, Left [[id number]] Collected: 08/16/24 1704    Specimen: Blood from Arm, Left Updated: 08/16/24 1715    POC Lactate [[id number]]  (Abnormal) Collected: 08/16/24 1655    Specimen: Blood Updated: 08/16/24 1703     Lactate 2.3 mmol/L      Comment: Serial Number: [id number]:  [id number]       Blood Culture - Blood, Arm, Left [[id number]] Collected: 08/16/24 1650    Specimen: Blood from Arm, Left Updated: 08/16/24 1701    POC Lactate [[id number]]  (Abnormal) Collected: 08/16/24 1649    Specimen: Arterial Blood Updated: 08/16/24 1658     Lactate 2.3 mmol/L      Comment: Serial Number: [id number]:  [id number]        Notified Time --     Notified Who Dr Capone     Read Back Yes    Blood Gas, Arterial - [[id number]]  (Abnormal) Collected: 08/16/24 1649    Specimen: Arterial Blood Updated: 08/16/24 1657     Site Right Radial     Gulshan's Test Positive     pH, Arterial 7.294 pH units      pCO2, Arterial 76.3 mm Hg      pO2, Arterial 66.4 mm Hg      HCO3, Arterial 37.0 mmol/L      Base Excess, Arterial 7.9 mmol/L      Comment: Serial Number: [id number]:  [id number]        O2 Saturation, Arterial 89.2 %      Barometric Pressure for Blood Gas --     Comment: N/A        Modality NRB     FIO2 100 %      Notified Who Dr Capone     Read  Back Yes     Notified Time --     Hemodilution No     PO2/FIO2 66    POC Creatinine [[id number]]  (Abnormal) Collected: 08/16/24 1649    Specimen: Arterial Blood Updated: 08/16/24 1657     Creatinine 0.52 mg/dL      Comment: Serial Number: [id number]:  [id number]        eGFR 85.1 mL/min/1.73     POCT Electrolytes +HGB +HCT [[id number]]  (Abnormal) Collected: 08/16/24 1649    Specimen: Arterial Blood Updated: 08/16/24 1657     Sodium 141 mmol/L      POC Potassium 4.9 mmol/L      Ionized Calcium 1.29 mmol/L      Comment: Serial Number: [id number]:  [id number]        Glucose 233 mg/dL      Hematocrit 37 %      Hemoglobin 12.5 g/dL     POC Glucose Once [[id number]]  (Abnormal) Collected: 08/16/24 1649    Specimen: Arterial Blood Updated: 08/16/24 1657     Glucose 233 mg/dL      Comment: Serial Number: [id number]:  [id number]                I reviewed the patient's new clinical results.    Assessment & Plan      Atrial fibrillation with rapid ventricular response  -Patient is off Cardizem drip, continue beta-blocker  - Cardiology following  - Anticoagulated on warfarin    Acute on chronic respiratory failure with hypoxia and hypercapnia  Questionable aspiration pneumonia  Rhinovirus  - Will discontinue Vanco\cefepime and start Unasyn  - Add Mucinex and Mucomyst nebulized with albuterol  - Appreciate pulmonology input  -BiPAP as needed    Hiatal hernia  Dysphagia  Malnutrition  - Will consult speech therapy    Osteoarthritis  Hyperlipidemia  Essential hypertension    Diet: Mechanical soft with honey thick liquid  DVT prophylaxis: Anticoagulated on warfarin  GI prophylaxis: Pepcid  Code status: Full code      I discussed the patient's findings and my recommendations with patient.     ALMA Horan  08/17/24  14:21 EDT        Electronically signed by Cristal Goodwin MD at 08/17/24 6635

## 2024-08-19 NOTE — CASE MANAGEMENT/SOCIAL WORK
Discharge Planning Assessment   [place]     Patient Name: Shruthi Amin  MRN: [id number]  Today's Date: 8/19/2024    Admit Date: 8/16/2024    Plan: Anticipate routine home with [facility] (current, FATOUMATA order per MD). Current home O2 through [facility].   Discharge Needs Assessment       Row Name 08/19/24 1158       Living Environment    People in Home child(juan francisco), adult    Name(s) of People in Home Rosalina Valdivia    Current Living Arrangements home    Potentially Unsafe Housing Conditions none    In the past 12 months has the electric, gas, oil, or water company threatened to shut off services in your home? No    Primary Care Provided by self    Provides Primary Care For no one    Family Caregiver if Needed child(juan francisco), adult;grandchild(juan francisco), adult    Quality of Family Relationships helpful;involved;supportive    Able to Return to Prior Arrangements yes       Resource/Environmental Concerns    Resource/Environmental Concerns none    Transportation Concerns none       Transportation Needs    In the past 12 months, has lack of transportation kept you from medical appointments or from getting medications? no    In the past 12 months, has lack of transportation kept you from meetings, work, or from getting things needed for daily living? No       Food Insecurity    Within the past 12 months, you worried that your food would run out before you got the money to buy more. Never true    Within the past 12 months, the food you bought just didn't last and you didn't have money to get more. Never true       Transition Planning    Patient/Family Anticipates Transition to home with family;home with help/services    Patient/Family Anticipated Services at Transition home health care    Transportation Anticipated family or friend will provide       Discharge Needs Assessment    Readmission Within the Last 30 Days no previous admission in last 30 days    Equipment Currently Used at Home walker, rolling;wheelchair;rollator;oxygen;commode     Concerns to be Addressed denies needs/concerns at this time    Anticipated Changes Related to Illness none    Equipment Needed After Discharge none    Discharge Facility/Level of Care Needs home with home health    Provided Post Acute Provider List? Yes    Post Acute Provider List Hospice    Provided Post Acute Provider Quality & Resource List? Yes    Post Acute Provider Quality and Resource List --  Hospice/Palliative Care List    Delivered To Support Person    Support Person DaughterJaime Valdivia    Method of Delivery In person                   Discharge Plan       Row Name 08/19/24 1200       Plan    Plan Anticipate routine home with [facility] (current, FATOUMATA order per MD). Current home O2 through [facility].    Patient/Family in Agreement with Plan yes    Plan Comments CM met with patient, daughter (Shea), and granddaughter (Inessa) at bedside to discuss dc planning and IMM letter. PCP and pharmacy confirmed, reported no trouble affording medications, and declined needs at this time for any DME/HH/PT services. PT/OT recommending home health. CM received update from Arlin CONLEY with [facility] that patient is current with their HH services. CM added in basket. CM confirmed with patient’s daughterShea that patient lives at home with her, has HH through [facility], and Shea provides patient’s transportation. Shea inquired about hospice/palliative care services at home, if needed. List provided to review. Shea confirmed that patient has a bedside commode, 2 walkers, rollator, wheelchair, and O2 at home through [facility]. Confirmed concentrator as well as portable tanks.                  Continued Care and Services - Admitted Since 8/16/2024       Home Medical Care Coordination complete.      Service Provider Request Status Selected Services Address Phone Fax Patient Preferred    [facility]  Selected Home Health Services [address] [facility] [place] [address] [phone]                    Demographic Summary        Row Name 08/19/24 1158       General Information    Admission Type inpatient    Arrived From emergency department    Required Notices Provided Important Message from Medicare    Referral Source admission list    Reason for Consult discharge planning    Preferred Language English       Contact Information    Permission Granted to Share Info With                    Functional Status       Row Name 08/19/24 1158       Functional Status    Usual Activity Tolerance moderate    Current Activity Tolerance fair       Functional Status, IADL    Medications assistive person    Meal Preparation completely dependent    Housekeeping completely dependent    Laundry completely dependent    Shopping completely dependent                Mary Avila RN     Office Phone: [phone]  Office Cell: [phone]

## 2024-08-19 NOTE — PLAN OF CARE
Goal Outcome Evaluation:  Pt seen on this date to ensure tolerance of current diet recommendation os STC/HTL. Upon entry, pt in recliner with tray and family at bedside. Pt reports no pain, choking, coughing, or globus sensation. Pt observed eating cheesecake x2 and drinking HT applesauce x2. Pt with adequate rotary chew, timely oral transit, and good oral clearance. Pt had slightly prolonged mastication. No s/s of aspiration across PO intake. Pt independently recalled safe swallow strategies of sitting up and small sips/bites. D/t observations, pt appears to be tolerating current diet and it recommended pt remain on STC/HTL with safe swallow strategies and dysphagia therapy. ST will continue to follow and make recommendations as indicated.

## 2024-08-19 NOTE — PLAN OF CARE
"Goal Outcome Evaluation:      Bed mobility - N/A or Not attempted.  Pt already up in the chair  Transfers - SBA, CGA, and with rolling walker  Ambulation - march in place x's 3    Therapeutic Exercise - 15 Reps B LE AROM supported sitting / chair and standing      If medically appropriate, Low Intensity Therapy recommended post-acute care - This is recommended as therapy feels this patient would require 2-3 visits per week. OP or HH would be the best option depending on patient's home bound status. Consider, if the patient has other  \"skilled\" needs such as wounds, IV antibiotics, etc. Combined with \"low intensity\" could also equate to a SNF. If patient is medically complex, consider LTAC. Pt requires no DME at discharge.     Pt desires Home with family assist and Home Health at discharge. Pt cooperative; agreeable to therapeutic recommendations and plan of care.                                 "

## 2024-08-19 NOTE — PROGRESS NOTES
Cardiology [place]        LOS:  LOS: 3 days   Patient Name: Shruthi Amin  Age/Sex: 89 y.o. female  : 6/10/1928  MRN: [id number]    Day of Service: 24   Length of Stay: 3  Encounter Provider: ALMA Segovia  Place of Service: [facility] CARDIOLOGY  Patient Care Team:  Cristal Goodwin MD as PCP - General (Family Medicine)  Anai Moise APRN as Nurse Practitioner (Cardiology)  Theodore Vital MD as Consulting Physician (Cardiology)    Subjective:     Chief Complaint: f/u Afib, VHD, CHF    Subjective: patient frail, weak, deconditioned, Afib rates controlled. Tired today.   No CV events  Patient does not want further procedures    Current Medications:   Scheduled Meds:acetylcysteine, 2 mL, Nebulization, BID - RT  cefTRIAXone, 1,000 mg, Intravenous, Q24H  docusate sodium, 100 mg, Oral, BID  famotidine, 20 mg, Oral, Daily  furosemide, 40 mg, Oral, Daily  guaiFENesin, 600 mg, Oral, Q12H  ipratropium-albuterol, 3 mL, Nebulization, 4x Daily - RT  latanoprost, 1 drop, Both Eyes, Nightly  metoclopramide, 5 mg, Oral, 4x Daily AC & at Bedtime  metoprolol succinate XL, 12.5 mg, Oral, Q12H  metroNIDAZOLE, 500 mg, Oral, Q8H  midodrine, 2.5 mg, Oral, BID AC  mirtazapine, 15 mg, Oral, Nightly  potassium chloride, 20 mEq, Oral, Daily  sennosides-docusate, 1 tablet, Oral, BID  sodium chloride, 10 mL, Intravenous, Q12H      Continuous Infusions:Pharmacy to dose warfarin,         Allergies:  Allergies   Allergen Reactions    Penicillins Hives    Codeine Nausea And Vomiting    Latex Rash       Review of Systems   Unable to perform ROS: Acuity of condition         Objective:     Temp:  [97.7 °F (36.5 °C)-99.1 °F (37.3 °C)] 97.7 °F (36.5 °C)  Heart Rate:  [] 94  Resp:  [14-23] 20  BP: ()/(52-70) 103/58     Intake/Output Summary (Last 24 hours) at 2024 1222  Last data filed at 2024 0983  Gross per 24 hour   Intake 120 ml   Output 500 ml   Net -380 ml     Body  mass index is 19.56 kg/m².      08/17/24  0551 08/18/24  0513 08/19/24  0517   Weight: 47 kg (103 lb 9.9 oz) 48.2 kg (106 lb 4.2 oz) 48.5 kg (106 lb 14.8 oz)         General Appearance:    Will arouse, drowsy                                Head: Atraumatic, normocephalic, PERRLA               Neck:   supple,  no JVD   Lungs:     Supplemental O2, coughing, dim bilat bases    Heart:    irregular rhythm and normal rate, normal S1 and S2, murmur   Abdomen:     Normal bowel sounds, no masses, no organomegaly, soft  nontender, nondistended, no guarding, no rebound  tenderness   Extremities:   Moves all extremities well, no edema, no cyanosis, no  redness   Pulses:   Pulses palpable and equal bilaterally   Skin:   No bleeding, bruising or rash   Neurologic:   Will arouse, drowsy     Scribe findings above    Lab Review:   Results from last 7 days   Lab Units 08/19/24  0426 08/18/24  0557 08/17/24  0034 08/16/24  1650   SODIUM mmol/L 143 143   < > 141   POTASSIUM mmol/L 4.1 4.0   < > 4.8   CHLORIDE mmol/L 101 102   < > 100   CO2 mmol/L 37.0* 34.9*   < > 30.3*   BUN mg/dL 18 20   < > 26*   CREATININE mg/dL 0.45* 0.45*   < > 0.55*   GLUCOSE mg/dL 92 92   < > 206*   CALCIUM mg/dL 8.6 8.9   < > 9.3   AST (SGOT) U/L  --   --   --  38*   ALT (SGPT) U/L  --   --   --  20    < > = values in this interval not displayed.     Results from last 7 days   Lab Units 08/16/24  1915 08/16/24  1650   HSTROP T ng/L 31* 27*     Results from last 7 days   Lab Units 08/18/24  0557 08/17/24  0034   WBC 10*3/mm3 13.76* 11.04*   HEMOGLOBIN g/dL 9.4* 9.2*   HEMATOCRIT % 30.1* 29.3*   PLATELETS 10*3/mm3 226 186     Results from last 7 days   Lab Units 08/19/24  0426 08/18/24  0142 08/17/24  0034 08/16/24  1650   INR  3.30* 3.56*   < > 2.57   APTT seconds  --   --   --  41.8*    < > = values in this interval not displayed.     Results from last 7 days   Lab Units 08/19/24  0426 08/18/24  0557   MAGNESIUM mg/dL 1.9 1.8           Invalid input(s):  "\"LDLCALC\"  Results from last 7 days   Lab Units 08/16/24  1650   PROBNP pg/mL 7,361.0*           Recent Radiology:  Imaging Results (Most Recent)       Procedure Component Value Units Date/Time    CT Chest Without Contrast Diagnostic [[id number]] Collected: 08/17/24 1113     Updated: 08/17/24 1135    Narrative:      CT CHEST WO CONTRAST DIAGNOSTIC    Date of Exam: 8/17/2024 8:52 AM EDT    Indication: pneumonia.    Comparison: Chest radiograph 8/16/2024. Chest CT  2/25/2024    Technique: Axial CT images were obtained of the chest without contrast administration.  Sagittal and coronal reconstructions were performed.  Automated exposure control and iterative reconstruction methods were used.      Findings:  Thyroid and thoracic inlet: Normal.    Lymph nodes: No pathologic appearing lymph nodes by imaging criteria.    Cardiovascular: Similar cardiomegaly. No pericardial effusion. Similar mildly enlarged main pulm artery measuring 37 mm, which can be seen with pulmonary hypertension. No evidence of aortic aneurysm. . There are coronary artery calcifications. Aortic   atherosclerotic calcifications.    Esophagus: Large hiatal hernia with mostly intrathoracic stomach    Lung parenchyma: Biapical pleural-parenchymal scarring. Consolidative opacities in both lower lobes and in the right middle lobe. Increased consolidative opacity in the left lower lobe. Mild interlobular septal thickening.    Airways: Patent trachea and mainstem bronchi.    Pleura: Similar moderate right and increased small left pleural effusions. No pneumothorax.    Chest wall and osseous structures: Interval increased, severe height loss of a chronic appearing compression fracture at T7. No appreciable osseous retropulsion. Additional multilevel chronic appearing vertebral body compression fractures are similar to   prior. Vertebral body augmentation at T12. Degenerative changes of the imaged spine. The bones are demineralized.    Included upper abdomen: " Otherwise, no significant abnormality.      Impression:      Impression:  Similar moderate right and increased small left pleural effusions. Adjacent consolidative opacities, which have increased in the left lower lobe, may represent atelectasis, with superimposed pneumonia to be excluded clinically.    Cardiomegaly with mild pulmonary edema.    Interval increased, severe height loss of a chronic appearing compression fracture at T7 vertebral body. Recommend correlation with point tenderness to assess for an occult acute component. No significant osseous retropulsion.          Electronically Signed: Tray De Paz    8/17/2024 11:33 AM EDT    Workstation ID: [id number]    XR Chest 1 View [[id number]] Collected: 08/16/24 1703     Updated: 08/16/24 1706    Narrative:      XR CHEST 1 VW    Date of Exam: 8/16/2024 5:01 PM EDT    Indication: soa    Comparison: Chest x-ray 6/24/2024    Findings:  Limited evaluation due to patient's kyphosis and head and neck obscuring the majority of the left lung. Large hiatal hernia. Cardiomegaly. Moderate pleural effusions. Groundglass opacity and septal thickening. Bibasilar airspace opacities.      Impression:      Impression:  Limited evaluation due to patient's head and neck obscuring the majority of the left lung. Severely elevated diaphragm with large hiatal hernia suspected. Cardiomegaly. Moderate pleural effusions. Bibasilar opacity atelectasis versus pneumonia.      Electronically Signed: Julia Torrez MD    8/16/2024 5:04 PM EDT    Workstation ID: [id number]            ECHOCARDIOGRAM:    Results for orders placed during the hospital encounter of 02/23/24    Adult Transesophageal Echo (JEFF) W/ Cont if Necessary Per Protocol    Interpretation Summary    Left ventricular ejection fraction appears to be 56 - 60%.    Severe mitral valve regurgitation is present.    Technically very difficult study with limited views because of body habitus    A calcified mass present on the aortic valve  which could be a fibroblastoma or a healed vegetation.    Clinical correlation required        I reviewed the patient's new clinical results.    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     Continue ongoing supportive care  Optimization of goal-directed medical therapy  Volume reduction as tolerated  Encouraged nutrition    Further recommendations to follow findings and clinical course    Theodore Vital MD, PhD        Kiesha Oviedo, APRN  08/19/24  12:22 EDT            Seen and examined on date of service

## 2024-08-19 NOTE — THERAPY TREATMENT NOTE
Acute Care - Speech Language Pathology   Swallow Treatment Note GISELA Concepcion     Patient Name: Shruthi Amin  : 6/10/1928  MRN: [id number]  Today's Date: 2024               Admit Date: 2024    Visit Dx:     ICD-10-CM ICD-9-CM   1. Acute respiratory failure with hypoxia and hypercapnia  J96.01 518.81    J96.02    2. Acute on chronic congestive heart failure, unspecified heart failure type  I50.9 428.0   3. Pneumonia due to infectious organism, unspecified laterality, unspecified part of lung  J18.9 486   4. Atrial fibrillation with RVR  I48.91 427.31     Patient Active Problem List   Diagnosis    Atrial fibrillation    Chronic anticoagulation    Cerebrovascular accident (CVA)    Hiatal hernia    Hyperlipidemia    Hypertension    Osteoarthritis    Hypoxia    Compression fracture of thoracic vertebra with delayed healing    History of osteoporotic pathological fracture    Acute on chronic congestive heart failure, unspecified heart failure type    Severe malnutrition    Nonrheumatic mitral valve regurgitation    Back pain    Recurrent pleural effusion    Bradycardia    Atrial fibrillation with rapid ventricular response     Past Medical History:   Diagnosis Date    Atrial fibrillation     Coronary artery disease     Atrial fibrillation    GERD (gastroesophageal reflux disease)     Glaucoma     Hiatal hernia with gastroesophageal reflux     History of osteoporotic pathological fracture     Right intertroch (2019)    Hx of compression fracture of spine     T12 and L1(); T6 (2022)    Hyperlipidemia     Hypertension     Osteoarthritis     Osteoporosis     on prolia(3/21/22)    Stroke     off and on dizziness from the stroke.     Past Surgical History:   Procedure Laterality Date    BACK SURGERY      kyphoplasty    EYE SURGERY      cataract surgery    FIXATION KYPHOPLASTY LUMBAR SPINE      HIP TROCHANTERIC NAILING WITH INTRAMEDULLARY HIP SCREW Right 2019    Procedure: HIP TROCHANTERIC NAILING  SHORT WITH INTRAMEDULLARY HIP SCREW;  Surgeon: Edward Centeno MD;  Location: [facility] MAIN OR;  Service: Orthopedics    RECTAL SURGERY      x 3       SLP Recommendation and Plan  Pt seen on this date to ensure tolerance of current diet recommendation os STC/HTL. Upon entry, pt in recliner with tray and family at bedside. Pt reports no pain, choking, coughing, or globus sensation. Pt observed eating cheesecake x2 and drinking HT applesauce x2. Pt with adequate rotary chew, timely oral transit, and good oral clearance. Pt had slightly prolonged mastication. No s/s of aspiration across PO intake. Pt independently recalled safe swallow strategies of sitting up and small sips/bites. D/t observations, pt appears to be tolerating current diet and it recommended pt remain on STC/HTL with safe swallow strategies and dysphagia therapy. ST will continue to follow and make recommendations as indicated.       EDUCATION  The patient has been educated in the following areas:   Dysphagia (Swallowing Impairment).        SLP GOALS       Row Name 08/19/24 1400       (LTG) Swallow    (LTG) Swallow Pt will maximize swallow function for least restrictive PO diet, exhibiting no complication associated with dysphagia, adequate PO intake, and demonstrating independent use of swallow compensations  -AA    Time Frame (Swallow Long Term Goal) by discharge  -AA    Progress/Outcomes (Swallow Long Term Goal) goal ongoing  -AA       (STG) Swallow 1    (STG) Swallow 1 The patient will participate in a meal/follow-up assessment to determine safety and adequacy of recommended diet, independent use of safe swallow compensations, pt/family education and additional goals/recommendations to follow.  -AA    Time Frame (Swallow Short Term Goal 1) 1 week  -AA    Progress/Outcomes (Swallow Short Term Goal 1) goal ongoing  -AA       (STG) Pharyngeal Strengthening Exercise Goal 1 (SLP)    Activity (Pharyngeal Strengthening Goal 1, SLP) increase  pharyngeal sensation  -AA    Increase Pharyngeal Sensation shaker;chin tuck against resistance (CTAR);EMST;hard effortful swallow;nj  -AA    [place]/Accuracy (Pharyngeal Strengthening Goal 1, SLP) with minimal cues (75-90% accuracy)  -AA    Time Frame (Pharyngeal Strengthening Goal 1, SLP) 1 week  -AA       (STG) Swallow Management Recall Goal 1 (SLP)    Activity (Swallow Management Recall Goal 1, SLP) recall of;aspiration precautions;reflux precautions;oral care recommendations;safe diet/liquid level;safe diet level/texture;safe liquid viscosity;rationale for use of strategies/techniques  -AA    [place]/Accuracy (Swallow Management Recall Goal 1, SLP) with moderate cues (50-74% accuracy)  -AA    Time Frame (Swallow Management Recall Goal 1, SLP) 1 week  -AA              User Key  (r) = Recorded By, (t) = Taken By, (c) = Cosigned By      Initials Name Provider Type    Mimi Arnold SLP Speech and Language Pathologist               VELMA Rivero  8/19/2024

## 2024-08-19 NOTE — PROGRESS NOTES
LOS: 3 days   Patient Care Team:  Cristal Goodwin MD as PCP - General (Family Medicine)  Anai Moise APRN as Nurse Practitioner (Cardiology)  Theodore Vital MD as Consulting Physician (Cardiology)    Subjective     Interval History: Improving    Patient Complaints: Cough and wheezing are improving; no new complaints    History taken from: patient    Review of Systems   Constitutional:  Positive for activity change, appetite change and fatigue. Negative for fever.   HENT:  Negative for facial swelling.    Eyes:  Negative for visual disturbance.   Respiratory:  Positive for cough. Negative for shortness of breath, wheezing and stridor.    Cardiovascular:  Negative for chest pain, palpitations and leg swelling.   Gastrointestinal:  Negative for abdominal pain, constipation, diarrhea, nausea and vomiting.   Endocrine: Negative for polyuria.   Genitourinary:  Negative for dysuria.   Musculoskeletal:  Positive for gait problem. Negative for arthralgias and back pain.   Neurological:  Negative for dizziness and headaches.   Psychiatric/Behavioral:  Negative for confusion.            Objective     Vital Signs  Temp:  [97.7 °F (36.5 °C)-99.1 °F (37.3 °C)] 97.7 °F (36.5 °C)  Heart Rate:  [] 94  Resp:  [14-23] 20  BP: ()/(52-70) 103/58    Physical Exam:     General Appearance:    Alert, cooperative, in no acute distress, younger than stated age, underweight, frail   Head:    Normocephalic, without obvious abnormality, atraumatic   Eyes:            Lids and lashes normal, conjunctivae and sclerae normal, no   icterus, no pallor, corneas clear, PERRLA   Ears:    Ears appear intact with no abnormalities noted   Throat:   No oral lesions, no thrush, oral mucosa moist   Neck:   No adenopathy, supple, trachea midline, no thyromegaly, no   carotid bruit, no JVD   Lungs:   Mild diffuse wheezing    Heart:    Regular rhythm and normal rate, normal S1 and S2, no            murmur, no gallop, no rub, no  click   Chest Wall:    No abnormalities observed   Abdomen:     Normal bowel sounds, no masses, no organomegaly, soft        Non-tender non-distended, no guarding,   Extremities:   Moves all extremities well, no edema, no cyanosis, no             Redness   Pulses:   Pulses palpable and equal bilaterally   Skin: Very thin skin, scattered superficial ecchymoses on all 4 extremities   Lymph nodes:   No palpable adenopathy   Neurologic:   Cranial nerves 2 - 12 grossly intact, sensation intact, DTR       present and equal bilaterally        Results Review:    Lab Results (last 24 hours)       Procedure Component Value Units Date/Time    Basic Metabolic Panel [[id number]]  (Abnormal) Collected: 08/19/24 0426    Specimen: Blood Updated: 08/19/24 0501     Glucose 92 mg/dL      BUN 18 mg/dL      Creatinine 0.45 mg/dL      Sodium 143 mmol/L      Potassium 4.1 mmol/L      Chloride 101 mmol/L      CO2 37.0 mmol/L      Calcium 8.6 mg/dL      BUN/Creatinine Ratio 40.0     Anion Gap 5.0 mmol/L      eGFR 88.2 mL/min/1.73     Narrative:      GFR Normal >60  Chronic Kidney Disease <60  Kidney Failure <15    The GFR formula is only valid for adults with stable renal function between ages 18 and 70.    Magnesium [[id number]]  (Normal) Collected: 08/19/24 0426    Specimen: Blood Updated: 08/19/24 0501     Magnesium 1.9 mg/dL     Protime-INR [[id number]]  (Abnormal) Collected: 08/19/24 0426    Specimen: Blood Updated: 08/19/24 0455     Protime 32.9 Seconds      INR 3.30    Blood Culture - Blood, Arm, Left [[id number]]  (Normal) Collected: 08/16/24 1704    Specimen: Blood from Arm, Left Updated: 08/18/24 1730     Blood Culture No growth at 2 days    Narrative:      Less than seven (7) mL's of blood was collected.  Insufficient quantity may yield false negative results.    Blood Culture - Blood, Arm, Left [[id number]]  (Normal) Collected: 08/16/24 1650    Specimen: Blood from Arm, Left Updated: 08/18/24 1715     Blood Culture No growth at 2  days    Narrative:      Less than seven (7) mL's of blood was collected.  Insufficient quantity may yield false negative results.    Urinalysis, Microscopic Only - Urine, Clean Catch [[id number]]  (Abnormal) Collected: 08/18/24 1527    Specimen: Urine, Clean Catch Updated: 08/18/24 1539     RBC, UA 0-2 /HPF      WBC, UA 0-2 /HPF      Comment: Urine culture not indicated.        Bacteria, UA None Seen /HPF      Squamous Epithelial Cells, UA 3-6 /HPF      Hyaline Casts, UA 3-6 /LPF      Methodology Automated Microscopy    Urinalysis With Culture If Indicated - Urine, Clean Catch [[id number]]  (Abnormal) Collected: 08/18/24 1527    Specimen: Urine, Clean Catch Updated: 08/18/24 1537     Color, UA Yellow     Appearance, UA Clear     pH, UA <=5.0     Specific Gravity, UA 1.011     Glucose, UA Negative     Ketones, UA Negative     Bilirubin, UA Negative     Blood, UA Negative     Protein, UA Negative     Leuk Esterase, UA Trace     Nitrite, UA Negative     Urobilinogen, UA 0.2 E.U./dL    Narrative:      In absence of clinical symptoms, the presence of pyuria, bacteria, and/or nitrites on the urinalysis result does not correlate with infection.             Imaging Results (Last 24 Hours)       ** No results found for the last 24 hours. **                 I reviewed the patient's new clinical results.    Medication Review:   Scheduled Meds:acetylcysteine, 2 mL, Nebulization, BID - RT  cefTRIAXone, 1,000 mg, Intravenous, Q24H  docusate sodium, 100 mg, Oral, BID  famotidine, 20 mg, Oral, Daily  furosemide, 40 mg, Oral, Daily  guaiFENesin, 600 mg, Oral, Q12H  ipratropium-albuterol, 3 mL, Nebulization, 4x Daily - RT  latanoprost, 1 drop, Both Eyes, Nightly  metoclopramide, 5 mg, Oral, 4x Daily AC & at Bedtime  metoprolol succinate XL, 12.5 mg, Oral, Q12H  metroNIDAZOLE, 500 mg, Oral, Q8H  midodrine, 2.5 mg, Oral, BID AC  mirtazapine, 15 mg, Oral, Nightly  potassium chloride, 20 mEq, Oral, Daily  sennosides-docusate, 1 tablet,  Oral, BID  sodium chloride, 10 mL, Intravenous, Q12H      Continuous Infusions:Pharmacy to dose warfarin,       PRN Meds:.  acetaminophen **OR** acetaminophen **OR** acetaminophen    senna-docusate sodium **AND** polyethylene glycol **AND** bisacodyl **AND** bisacodyl    Calcium Replacement - Follow Nurse / BPA Driven Protocol    magnesium hydroxide    Magnesium Standard Dose Replacement - Follow Nurse / BPA Driven Protocol    melatonin    ondansetron ODT **OR** ondansetron    Pharmacy to dose warfarin    Phosphorus Replacement - Follow Nurse / BPA Driven Protocol    Potassium Replacement - Follow Nurse / BPA Driven Protocol    [COMPLETED] Insert Peripheral IV **AND** sodium chloride    sodium chloride    sodium chloride     Assessment & Plan       Atrial fibrillation with rapid ventricular response  -Pharmacy is managing warfarin; rate is controlled    Rhinovirus infection-supportive care  Pneumonia-Rocephin and metronidazole to cover aspiration pneumonia  Hypotension-midodrine  Dysphagia-modified diet  Large hiatal hernia-famotidine, upright for all meals  COPD-stable  Chronic coronary artery disease  Thoracic kyphosis  Recurrent pleural effusion-no intervention needed; continue furosemide  Severe mitral regurgitation-medical management  Hypoxemia-wean oxygen as tolerated  Anemia-hemoglobin is stable    Plan for disposition: To be determined    Tricia Roman MD  08/19/24  13:22 EDT

## 2024-08-19 NOTE — PLAN OF CARE
Problem: Noninvasive Ventilation Acute  Goal: Effective Unassisted Ventilation and Oxygenation  Outcome: Ongoing, Progressing     Problem: Adjustment to Illness (Sepsis/Septic Shock)  Goal: Optimal Coping  Outcome: Ongoing, Progressing  Intervention: Optimize Psychosocial Adjustment to Illness  Recent Flowsheet Documentation  Taken 8/19/2024 0400 by Pallavi Melo RN  Supportive Measures: active listening utilized  Family/Support System Care: self-care encouraged  Taken 8/19/2024 0000 by Pallavi Melo RN  Supportive Measures: active listening utilized  Family/Support System Care: presence promoted  Taken 8/18/2024 2000 by Pallavi Melo RN  Supportive Measures: active listening utilized  Family/Support System Care: presence promoted     Problem: Bleeding (Sepsis/Septic Shock)  Goal: Absence of Bleeding  Outcome: Ongoing, Progressing     Problem: Glycemic Control Impaired (Sepsis/Septic Shock)  Goal: Blood Glucose Level Within Desired Range  Outcome: Ongoing, Progressing     Problem: Infection Progression (Sepsis/Septic Shock)  Goal: Absence of Infection Signs and Symptoms  Outcome: Ongoing, Progressing  Intervention: Initiate Sepsis Management  Recent Flowsheet Documentation  Taken 8/19/2024 0400 by Pallavi Melo RN  Infection Prevention:   environmental surveillance performed   hand hygiene promoted   rest/sleep promoted   single patient room provided  Taken 8/19/2024 0200 by Pallavi Melo RN  Infection Prevention:   environmental surveillance performed   hand hygiene promoted   rest/sleep promoted   single patient room provided  Isolation Precautions:   droplet   precautions maintained  Taken 8/19/2024 0000 by Pallavi Melo RN  Infection Prevention:   environmental surveillance performed   hand hygiene promoted   rest/sleep promoted   single patient room provided  Taken 8/18/2024 2200 by Pallavi Melo RN  Infection Prevention:   environmental surveillance performed   hand hygiene promoted   rest/sleep promoted    single patient room provided  Isolation Precautions:   precautions maintained   droplet  Taken 8/18/2024 2000 by Pallavi Melo RN  Infection Prevention:   environmental surveillance performed   hand hygiene promoted   rest/sleep promoted   single patient room provided  Isolation Precautions:   droplet   precautions maintained  Intervention: Promote Recovery  Recent Flowsheet Documentation  Taken 8/18/2024 2000 by Pallavi Melo RN  Sleep/Rest Enhancement: consistent schedule promoted     Problem: Nutrition Impaired (Sepsis/Septic Shock)  Goal: Optimal Nutrition Intake  Outcome: Ongoing, Progressing     Problem: Adult Inpatient Plan of Care  Goal: Plan of Care Review  Outcome: Ongoing, Progressing  Flowsheets (Taken 8/19/2024 0545)  Progress: no change  Plan of Care Reviewed With: patient  Outcome Evaluation: Pt alert and oriented with VSS. Remains on 4LNC. No c/o pain this shift. Multiple family members at bedside overnight.  Goal: Patient-Specific Goal (Individualized)  Outcome: Ongoing, Progressing  Goal: Absence of Hospital-Acquired Illness or Injury  Outcome: Ongoing, Progressing  Intervention: Identify and Manage Fall Risk  Recent Flowsheet Documentation  Taken 8/19/2024 0400 by Pallavi Melo, RN  Safety Promotion/Fall Prevention:   safety round/check completed   room organization consistent   nonskid shoes/slippers when out of bed   lighting adjusted   clutter free environment maintained   assistive device/personal items within reach  Taken 8/19/2024 0200 by Pallavi Melo, RN  Safety Promotion/Fall Prevention:   safety round/check completed   room organization consistent   nonskid shoes/slippers when out of bed   lighting adjusted   clutter free environment maintained   assistive device/personal items within reach  Taken 8/19/2024 0000 by Pallavi Melo, RN  Safety Promotion/Fall Prevention:   safety round/check completed   room organization consistent   nonskid shoes/slippers when out of bed   lighting adjusted    clutter free environment maintained   assistive device/personal items within reach  Taken 8/18/2024 2200 by Pallavi Melo RN  Safety Promotion/Fall Prevention:   safety round/check completed   room organization consistent   nonskid shoes/slippers when out of bed   lighting adjusted   clutter free environment maintained   assistive device/personal items within reach  Taken 8/18/2024 2000 by Pallavi Melo RN  Safety Promotion/Fall Prevention:   safety round/check completed   room organization consistent   nonskid shoes/slippers when out of bed   lighting adjusted   clutter free environment maintained   assistive device/personal items within reach  Intervention: Prevent Skin Injury  Recent Flowsheet Documentation  Taken 8/19/2024 0400 by Pallavi Melo RN  Skin Protection: adhesive use limited  Taken 8/18/2024 2000 by Pallavi Melo RN  Skin Protection: adhesive use limited  Intervention: Prevent and Manage VTE (Venous Thromboembolism) Risk  Recent Flowsheet Documentation  Taken 8/19/2024 0400 by Pallavi Melo RN  VTE Prevention/Management: (warfarin) sequential compression devices off  Taken 8/18/2024 2000 by Pallavi Melo RN  VTE Prevention/Management: (warfarin) sequential compression devices off  Range of Motion: active ROM (range of motion) encouraged  Intervention: Prevent Infection  Recent Flowsheet Documentation  Taken 8/19/2024 0400 by Pallavi Melo RN  Infection Prevention:   environmental surveillance performed   hand hygiene promoted   rest/sleep promoted   single patient room provided  Taken 8/19/2024 0200 by Pallavi Melo RN  Infection Prevention:   environmental surveillance performed   hand hygiene promoted   rest/sleep promoted   single patient room provided  Taken 8/19/2024 0000 by Pallavi Melo RN  Infection Prevention:   environmental surveillance performed   hand hygiene promoted   rest/sleep promoted   single patient room provided  Taken 8/18/2024 2200 by Pallavi Melo RN  Infection Prevention:    environmental surveillance performed   hand hygiene promoted   rest/sleep promoted   single patient room provided  Taken 8/18/2024 2000 by Pallavi Melo RN  Infection Prevention:   environmental surveillance performed   hand hygiene promoted   rest/sleep promoted   single patient room provided  Goal: Optimal Comfort and Wellbeing  Outcome: Ongoing, Progressing  Intervention: Provide Person-Centered Care  Recent Flowsheet Documentation  Taken 8/19/2024 0400 by Pallavi Melo RN  Trust Relationship/Rapport:   care explained   choices provided  Taken 8/19/2024 0000 by Pallavi Melo RN  Trust Relationship/Rapport:   care explained   choices provided  Taken 8/18/2024 2000 by Pallavi Melo RN  Trust Relationship/Rapport:   care explained   choices provided   questions encouraged  Goal: Readiness for Transition of Care  Outcome: Ongoing, Progressing     Problem: Skin Injury Risk Increased  Goal: Skin Health and Integrity  Outcome: Ongoing, Progressing  Intervention: Optimize Skin Protection  Recent Flowsheet Documentation  Taken 8/19/2024 0400 by Pallavi Melo RN  Pressure Reduction Techniques: weight shift assistance provided  Pressure Reduction Devices:   pressure-redistributing mattress utilized   positioning supports utilized   heel offloading device utilized  Skin Protection: adhesive use limited  Taken 8/18/2024 2000 by Pallavi Melo RN  Pressure Reduction Techniques:   frequent weight shift encouraged   weight shift assistance provided  Pressure Reduction Devices:   heel offloading device utilized   positioning supports utilized   pressure-redistributing mattress utilized  Skin Protection: adhesive use limited     Problem: Asthma Comorbidity  Goal: Maintenance of Asthma Control  Outcome: Ongoing, Progressing  Intervention: Maintain Asthma Symptom Control  Recent Flowsheet Documentation  Taken 8/19/2024 0400 by Pallavi Melo RN  Medication Review/Management: medications reviewed  Taken 8/19/2024 0200 by Pallavi Melo  RN  Medication Review/Management: medications reviewed  Taken 8/19/2024 0000 by Pallavi Melo RN  Medication Review/Management: medications reviewed  Taken 8/18/2024 2200 by Pallavi Melo RN  Medication Review/Management: medications reviewed  Taken 8/18/2024 2000 by Pallavi Melo RN  Medication Review/Management: medications reviewed     Problem: Behavioral Health Comorbidity  Goal: Maintenance of Behavioral Health Symptom Control  Outcome: Ongoing, Progressing  Intervention: Maintain Behavioral Health Symptom Control  Recent Flowsheet Documentation  Taken 8/19/2024 0400 by Pallavi Melo RN  Medication Review/Management: medications reviewed  Taken 8/19/2024 0200 by Pallavi Melo RN  Medication Review/Management: medications reviewed  Taken 8/19/2024 0000 by Pallavi Melo RN  Medication Review/Management: medications reviewed  Taken 8/18/2024 2200 by Pallavi Melo RN  Medication Review/Management: medications reviewed  Taken 8/18/2024 2000 by Pallavi Melo RN  Medication Review/Management: medications reviewed     Problem: COPD (Chronic Obstructive Pulmonary Disease) Comorbidity  Goal: Maintenance of COPD Symptom Control  Outcome: Ongoing, Progressing  Intervention: Maintain COPD-Symptom Control  Recent Flowsheet Documentation  Taken 8/19/2024 0400 by Pallavi Melo RN  Supportive Measures: active listening utilized  Medication Review/Management: medications reviewed  Taken 8/19/2024 0200 by Pallavi Melo RN  Medication Review/Management: medications reviewed  Taken 8/19/2024 0000 by Pallavi Melo RN  Supportive Measures: active listening utilized  Medication Review/Management: medications reviewed  Taken 8/18/2024 2200 by Pallavi Melo RN  Medication Review/Management: medications reviewed  Taken 8/18/2024 2000 by Pallavi Melo RN  Supportive Measures: active listening utilized  Medication Review/Management: medications reviewed     Problem: Diabetes Comorbidity  Goal: Blood Glucose Level Within Targeted  Range  Outcome: Ongoing, Progressing     Problem: Heart Failure Comorbidity  Goal: Maintenance of Heart Failure Symptom Control  Outcome: Ongoing, Progressing  Intervention: Maintain Heart Failure-Management  Recent Flowsheet Documentation  Taken 8/19/2024 0400 by Pallavi Melo RN  Medication Review/Management: medications reviewed  Taken 8/19/2024 0200 by Pallavi Melo RN  Medication Review/Management: medications reviewed  Taken 8/19/2024 0000 by Pallavi Melo RN  Medication Review/Management: medications reviewed  Taken 8/18/2024 2200 by Pallavi Melo RN  Medication Review/Management: medications reviewed  Taken 8/18/2024 2000 by Pallavi Melo RN  Medication Review/Management: medications reviewed     Problem: Hypertension Comorbidity  Goal: Blood Pressure in Desired Range  Outcome: Ongoing, Progressing  Intervention: Maintain Blood Pressure Management  Recent Flowsheet Documentation  Taken 8/19/2024 0400 by Pallavi Melo RN  Medication Review/Management: medications reviewed  Taken 8/19/2024 0200 by Pallavi Melo RN  Medication Review/Management: medications reviewed  Taken 8/19/2024 0000 by Pallavi Melo RN  Medication Review/Management: medications reviewed  Taken 8/18/2024 2200 by Pallavi Melo RN  Medication Review/Management: medications reviewed  Taken 8/18/2024 2000 by Pallavi Melo RN  Medication Review/Management: medications reviewed     Problem: Obstructive Sleep Apnea Risk or Actual Comorbidity Management  Goal: Unobstructed Breathing During Sleep  Outcome: Ongoing, Progressing     Problem: Osteoarthritis Comorbidity  Goal: Maintenance of Osteoarthritis Symptom Control  Outcome: Ongoing, Progressing  Intervention: Maintain Osteoarthritis Symptom Control  Recent Flowsheet Documentation  Taken 8/19/2024 0400 by Pallavi Melo RN  Medication Review/Management: medications reviewed  Taken 8/19/2024 0200 by Pallavi Melo RN  Medication Review/Management: medications reviewed  Taken 8/19/2024 0000 by  Pallavi Melo RN  Medication Review/Management: medications reviewed  Taken 8/18/2024 2200 by Pallavi Melo RN  Medication Review/Management: medications reviewed  Taken 8/18/2024 2000 by Pallavi Melo RN  Medication Review/Management: medications reviewed     Problem: Pain Chronic (Persistent) (Comorbidity Management)  Goal: Acceptable Pain Control and Functional Ability  Outcome: Ongoing, Progressing  Intervention: Manage Persistent Pain  Recent Flowsheet Documentation  Taken 8/19/2024 0400 by Pallavi Melo RN  Medication Review/Management: medications reviewed  Taken 8/19/2024 0200 by Pallavi Melo RN  Medication Review/Management: medications reviewed  Taken 8/19/2024 0000 by Pallavi Melo RN  Medication Review/Management: medications reviewed  Taken 8/18/2024 2200 by Pallavi Melo RN  Medication Review/Management: medications reviewed  Taken 8/18/2024 2000 by Pallavi Melo RN  Sleep/Rest Enhancement: consistent schedule promoted  Medication Review/Management: medications reviewed  Intervention: Optimize Psychosocial Wellbeing  Recent Flowsheet Documentation  Taken 8/19/2024 0400 by Pallavi Melo RN  Supportive Measures: active listening utilized  Diversional Activities: television  Family/Support System Care: self-care encouraged  Taken 8/19/2024 0000 by Pallavi Melo RN  Supportive Measures: active listening utilized  Diversional Activities: television  Family/Support System Care: presence promoted  Taken 8/18/2024 2000 by Pallavi Melo RN  Supportive Measures: active listening utilized  Diversional Activities: television  Family/Support System Care: presence promoted     Problem: Seizure Disorder Comorbidity  Goal: Maintenance of Seizure Control  Outcome: Ongoing, Progressing     Problem: Fall Injury Risk  Goal: Absence of Fall and Fall-Related Injury  Outcome: Ongoing, Progressing  Intervention: Identify and Manage Contributors  Recent Flowsheet Documentation  Taken 8/19/2024 0400 by Pallavi Melo  RN  Medication Review/Management: medications reviewed  Taken 8/19/2024 0200 by Pallavi Melo RN  Medication Review/Management: medications reviewed  Taken 8/19/2024 0000 by Pallavi Melo RN  Medication Review/Management: medications reviewed  Taken 8/18/2024 2200 by Pallavi Melo RN  Medication Review/Management: medications reviewed  Taken 8/18/2024 2000 by Pallavi Melo RN  Medication Review/Management: medications reviewed  Intervention: Promote Injury-Free Environment  Recent Flowsheet Documentation  Taken 8/19/2024 0400 by Pallavi Melo RN  Safety Promotion/Fall Prevention:   safety round/check completed   room organization consistent   nonskid shoes/slippers when out of bed   lighting adjusted   clutter free environment maintained   assistive device/personal items within reach  Taken 8/19/2024 0200 by Pallavi Melo RN  Safety Promotion/Fall Prevention:   safety round/check completed   room organization consistent   nonskid shoes/slippers when out of bed   lighting adjusted   clutter free environment maintained   assistive device/personal items within reach  Taken 8/19/2024 0000 by Pallavi Melo RN  Safety Promotion/Fall Prevention:   safety round/check completed   room organization consistent   nonskid shoes/slippers when out of bed   lighting adjusted   clutter free environment maintained   assistive device/personal items within reach  Taken 8/18/2024 2200 by Pallaiv Melo RN  Safety Promotion/Fall Prevention:   safety round/check completed   room organization consistent   nonskid shoes/slippers when out of bed   lighting adjusted   clutter free environment maintained   assistive device/personal items within reach  Taken 8/18/2024 2000 by Pallavi Melo RN  Safety Promotion/Fall Prevention:   safety round/check completed   room organization consistent   nonskid shoes/slippers when out of bed   lighting adjusted   clutter free environment maintained   assistive device/personal items within reach  Goal: Absence  of Fall and Fall-Related Injury  Outcome: Ongoing, Progressing  Intervention: Identify and Manage Contributors  Recent Flowsheet Documentation  Taken 8/19/2024 0400 by Pallavi Melo RN  Medication Review/Management: medications reviewed  Taken 8/19/2024 0200 by Pallavi Melo RN  Medication Review/Management: medications reviewed  Taken 8/19/2024 0000 by Pallavi Melo RN  Medication Review/Management: medications reviewed  Taken 8/18/2024 2200 by Pallavi Melo RN  Medication Review/Management: medications reviewed  Taken 8/18/2024 2000 by Pallavi Melo RN  Medication Review/Management: medications reviewed  Intervention: Promote Injury-Free Environment  Recent Flowsheet Documentation  Taken 8/19/2024 0400 by Pallavi Melo RN  Safety Promotion/Fall Prevention:   safety round/check completed   room organization consistent   nonskid shoes/slippers when out of bed   lighting adjusted   clutter free environment maintained   assistive device/personal items within reach  Taken 8/19/2024 0200 by Pallavi Melo RN  Safety Promotion/Fall Prevention:   safety round/check completed   room organization consistent   nonskid shoes/slippers when out of bed   lighting adjusted   clutter free environment maintained   assistive device/personal items within reach  Taken 8/19/2024 0000 by Pallavi Melo RN  Safety Promotion/Fall Prevention:   safety round/check completed   room organization consistent   nonskid shoes/slippers when out of bed   lighting adjusted   clutter free environment maintained   assistive device/personal items within reach  Taken 8/18/2024 2200 by Pallavi Melo RN  Safety Promotion/Fall Prevention:   safety round/check completed   room organization consistent   nonskid shoes/slippers when out of bed   lighting adjusted   clutter free environment maintained   assistive device/personal items within reach  Taken 8/18/2024 2000 by Pallavi Melo RN  Safety Promotion/Fall Prevention:   safety round/check completed   room  organization consistent   nonskid shoes/slippers when out of bed   lighting adjusted   clutter free environment maintained   assistive device/personal items within reach   Goal Outcome Evaluation:  Plan of Care Reviewed With: patient        Progress: no change  Outcome Evaluation: Pt alert and oriented with VSS. Remains on 4LNC. No c/o pain this shift. Multiple family members at bedside overnight.

## 2024-08-19 NOTE — PROGRESS NOTES
Daily Progress Note        Atrial fibrillation with rapid ventricular response    Assessment:    Atrial fibrillation with rapid ventricular response  Recurrent aspiration pneumonia, due to large hiatal hernia  Rhinovirus infection     Recurrent pleural effusion: Status post right thoracentesis April 2024 with drainage of 450 mL  Transudative fluid likely related to CHF/severe mitral regurgitation, mild compressive atelectasis due to very large hiatal hernia     I do NOT recommend repeating thoracentesis , even Pleurx catheter which is chronic indwelling catheter still not the best option, I recommend medical management for severe mitral regurg     Unfortunately at her age there is no good treatment for this massive hiatal hernia     Acute on chronic hypoxemia: ABG 7.41/57.5/92.5 on BiPAP  Anemia  A-fib  Bradycardia with mild digoxin toxicity  Large hiatal hernia  COPD  CHF  CAD  Osteoarthritis, kyphosis        Recommendations:    Oxygen supplement and titration to maintain saturation 90 to 95%  Antibiotic: Rocephin  Nebulized Mucomyst  Encourage use of incentive spirometry    Bronchodilators  Mucinex     Diuresis  HR control  Anticoagulation: Warfarin, monitor INR and dose as per pharmacy     I personally reviewed the radiological studies          LOS: 3 days     Subjective         Objective     Vital signs for last 24 hours:  Vitals:    08/18/24 2133 08/19/24 0110 08/19/24 0517 08/19/24 0801   BP: 108/64 107/70 99/60 103/58   BP Location: Left arm Left arm Left arm    Patient Position: Lying Lying Lying    Pulse: 117 88 101 97   Resp: 18 21 20    Temp: 99.1 °F (37.3 °C) 98.2 °F (36.8 °C) 97.8 °F (36.6 °C)    TempSrc: Oral Axillary Axillary    SpO2: 94% 96% 94%    Weight:   48.5 kg (106 lb 14.8 oz)    Height:           Intake/Output last 3 shifts:  I/O last 3 completed shifts:  In: 120 [P.O.:120]  Out: 500 [Urine:500]  Intake/Output this shift:  No intake/output data recorded.      Radiology  Imaging Results (Last  24 Hours)       ** No results found for the last 24 hours. **            Labs:  Results from last 7 days   Lab Units 08/18/24  0557   WBC 10*3/mm3 13.76*   HEMOGLOBIN g/dL 9.4*   HEMATOCRIT % 30.1*   PLATELETS 10*3/mm3 226     Results from last 7 days   Lab Units 08/19/24  0426 08/17/24  0034 08/16/24  1650   SODIUM mmol/L 143   < > 141   POTASSIUM mmol/L 4.1   < > 4.8   CHLORIDE mmol/L 101   < > 100   CO2 mmol/L 37.0*   < > 30.3*   BUN mg/dL 18   < > 26*   CREATININE mg/dL 0.45*   < > 0.55*   CALCIUM mg/dL 8.6   < > 9.3   BILIRUBIN mg/dL  --   --  0.8   ALK PHOS U/L  --   --  139*   ALT (SGPT) U/L  --   --  20   AST (SGOT) U/L  --   --  38*   GLUCOSE mg/dL 92   < > 206*    < > = values in this interval not displayed.     Results from last 7 days   Lab Units 08/16/24  1926   PH, ARTERIAL pH units 7.411   PO2 ART mm Hg 92.5   PCO2, ARTERIAL mm Hg 57.5*   HCO3 ART mmol/L 36.5*     Results from last 7 days   Lab Units 08/16/24  1650   ALBUMIN g/dL 4.0     Results from last 7 days   Lab Units 08/16/24  1915 08/16/24  1650   HSTROP T ng/L 31* 27*         Results from last 7 days   Lab Units 08/19/24  0426   MAGNESIUM mg/dL 1.9     Results from last 7 days   Lab Units 08/19/24  0426 08/18/24  0142 08/17/24  0034 08/16/24  1650   INR  3.30* 3.56* 2.56 2.57   APTT seconds  --   --   --  41.8*               Meds:   SCHEDULE  acetylcysteine, 2 mL, Nebulization, BID - RT  cefTRIAXone, 1,000 mg, Intravenous, Q24H  docusate sodium, 100 mg, Oral, BID  famotidine, 20 mg, Oral, Daily  furosemide, 40 mg, Oral, Daily  guaiFENesin, 600 mg, Oral, Q12H  ipratropium-albuterol, 3 mL, Nebulization, 4x Daily - RT  latanoprost, 1 drop, Both Eyes, Nightly  metoclopramide, 5 mg, Oral, 4x Daily AC & at Bedtime  metoprolol succinate XL, 12.5 mg, Oral, Q12H  metroNIDAZOLE, 500 mg, Oral, Q8H  midodrine, 2.5 mg, Oral, BID AC  mirtazapine, 15 mg, Oral, Nightly  potassium chloride, 20 mEq, Oral, Daily  sennosides-docusate, 1 tablet, Oral, BID  sodium  chloride, 10 mL, Intravenous, Q12H      Infusions  Pharmacy to dose warfarin,       PRNs    acetaminophen **OR** acetaminophen **OR** acetaminophen    senna-docusate sodium **AND** polyethylene glycol **AND** bisacodyl **AND** bisacodyl    Calcium Replacement - Follow Nurse / BPA Driven Protocol    magnesium hydroxide    Magnesium Standard Dose Replacement - Follow Nurse / BPA Driven Protocol    melatonin    ondansetron ODT **OR** ondansetron    Pharmacy to dose warfarin    Phosphorus Replacement - Follow Nurse / BPA Driven Protocol    Potassium Replacement - Follow Nurse / BPA Driven Protocol    [COMPLETED] Insert Peripheral IV **AND** sodium chloride    sodium chloride    sodium chloride    Physical Exam:  Physical Exam  Cardiovascular:      Heart sounds: Murmur heard.      No gallop.   Pulmonary:      Effort: No respiratory distress.      Breath sounds: No stridor. Rhonchi and rales present. No wheezing.   Chest:      Chest wall: No tenderness.         ROS  Review of Systems   Respiratory:  Positive for cough and shortness of breath. Negative for wheezing and stridor.    Cardiovascular:  Positive for palpitations. Negative for chest pain and leg swelling.             Total time spent with patient greater than: 45 Minutes

## 2024-08-19 NOTE — NURSING NOTE
WOCN note:    89 yr old female admitted 8/16/24 with afib with RVR. WOCN consult received for a pressure injury to her coccyx that was noted upon admission.   Patient is alert and oriented with complaints of pain to her left great toe. No injury noted but the toe tip has blanchable erythema. There are silicone foam dressings to both lower legs. There is a blood filled blister noted to the right leg and a skin tear to the left leg.     Patient also presents with non-blanchable erythema to her sacrum. She is incontinent of urine. Granddaughter at the bedside assisted with repositioning. Recommend to initiate pressure injury prevention measures with pillows or foam wedge for sacral off-loading and floating heels off the bed surface. Also recommend to place a silicone foam dressing to the sacrum as a preventative/protective measure. We will continue to follow as needed.

## 2024-08-19 NOTE — PROGRESS NOTES
Nutrition Services  Patient Name: Shruthi Amin  YOB: 1928  MRN: [id number]  Admission date: 8/16/2024    PROGRESS NOTE      Encounter Information: Progress note to check on NFPE. Pt with long-standing Hx pulmonary Dz, as well as chewing/swallowing difficulty and very advanced age. Overall, pt does have muscle/fat loss consistent with chronic disease related malnutrition -- most consistent with hypermetabolism due to respiratory Dz state (COPD), as well as heart failure. ST has also been following and has recommended modified texture diet and thickened liquids, which are in place. Intake only about 50% at recent meals - has diminished overall appetite. Pt has access to food and supportive family who help with meals/food preparation. Malnutrition is not related to lack of access or starvation -- rather, it is C/W high calorie expenditure and diminished intakes with chronic Dz states.       PO Diet: Diet: Regular/House; Texture: Soft to Chew (NDD 3); Soft to Chew: Ground Meat; Fluid Consistency: Honey Thick   PO Supplements: Magic Cups at lunch/dinner (Provides 580 kcals, 18 g protein if consumed)    PO Intake:  50% at meals       Current nutrition support:    Nutrition support review:        Labs (reviewed below): Reviewed and C/W clinical course         GI Function:  Stool Output  Stool Unmeasured Occurrence: 1 (08/19/24 1709)  Bowel Incontinence: No (08/19/24 1709)  Stool Amount: small (08/19/24 1709)          Nutrition Intervention Updates: Continue liberalized diet as much as possible, with texture and viscosity modifications per ST recommendations.     -- Mechanical ground foods   -- HTL    Will continue ONS: Magic Cups at lunch/dinner (Provides 580 kcals, 18 g protein if consumed)        Results from last 7 days   Lab Units 08/19/24  0426 08/18/24  0557 08/17/24  0034 08/16/24  1650   SODIUM mmol/L 143 143 143 141   POTASSIUM mmol/L 4.1 4.0 4.0 4.8   CHLORIDE mmol/L 101 102 101 100   CO2  "mmol/L 37.0* 34.9* 32.1* 30.3*   BUN mg/dL 18 20 22 26*   CREATININE mg/dL 0.45* 0.45* 0.45* 0.55*   CALCIUM mg/dL 8.6 8.9 8.8 9.3   BILIRUBIN mg/dL  --   --   --  0.8   ALK PHOS U/L  --   --   --  139*   ALT (SGPT) U/L  --   --   --  20   AST (SGOT) U/L  --   --   --  38*   GLUCOSE mg/dL 92 92 85 206*     Results from last 7 days   Lab Units 08/19/24  0426 08/18/24  0557 08/17/24  0034   MAGNESIUM mg/dL 1.9 1.8 1.9   HEMOGLOBIN g/dL  --  9.4* 9.2*   HEMATOCRIT %  --  30.1* 29.3*     COVID19   Date Value Ref Range Status   08/16/2024 Not Detected Not Detected - Ref. Range Final     No results found for: \"HGBA1C\"    Malnutrition Severity Assessment      Patient meets criteria for : Severe Malnutrition  Malnutrition Type (Last 8 Hours)       Malnutrition Severity Assessment       Row Name 08/19/24 1854       Malnutrition Severity Assessment    Malnutrition Type Chronic Disease - Related Malnutrition      Row Name 08/19/24 1854       Muscle Loss    Loss of Muscle Mass Findings Severe    [place] Region Severe - deep hollowing/scooping, lack of muscle to touch, facial bones well defined    Clavicle Bone Region Severe - protruding prominent bone    Acromion Bone Region Severe - squared shoulders, bones, and acromion process protrusion prominent    Dorsal Hand Region Severe - prominent depression    Patellar Region Severe - prominent bone, square looking, very little muscle definition    Anterior Thigh Region Severe - line/depression along thigh, obviously thin    Posterior Calf Region Severe - thin with very little definition/firmness      Row Name 08/19/24 1854       Fat Loss    Subcutaneous Fat Loss Findings Severe    Orbital Region  Severe - pronounced hollowness/depression, dark circles, loose saggy skin    Upper Arm Region Severe - mostly skin, very little space between folds, fingers touch    Thoracic & Lumbar Region Severe - ribs visible with prominent depressions, iliac crest very prominent      Row Name 08/19/24 " 1854       Criteria Met (Must meet criteria for severity in at least 2 of these categories: M Wasting, Fat Loss, Fluid, Secondary Signs, Wt. Status, Intake)    Patient meets criteria for  Severe Malnutrition                       RD to follow up per protocol.    Electronically signed by:  Neelam Ricketts RD  08/19/24 18:44 EDT

## 2024-08-20 LAB
ANION GAP SERPL CALCULATED.3IONS-SCNC: 5.5 MMOL/L (ref 5–15)
ANISOCYTOSIS BLD QL: ABNORMAL
BUN SERPL-MCNC: 19 MG/DL (ref 8–23)
BUN/CREAT SERPL: 44.2 (ref 7–25)
CALCIUM SPEC-SCNC: 8.9 MG/DL (ref 8.2–9.6)
CHLORIDE SERPL-SCNC: 102 MMOL/L (ref 98–107)
CO2 SERPL-SCNC: 36.5 MMOL/L (ref 22–29)
CREAT SERPL-MCNC: 0.43 MG/DL (ref 0.57–1)
DEPRECATED RDW RBC AUTO: 59.2 FL (ref 37–54)
EGFRCR SERPLBLD CKD-EPI 2021: 89.1 ML/MIN/1.73
ELLIPTOCYTES BLD QL SMEAR: ABNORMAL
EOSINOPHIL # BLD MANUAL: 0.47 10*3/MM3 (ref 0–0.4)
EOSINOPHIL NFR BLD MANUAL: 3 % (ref 0.3–6.2)
ERYTHROCYTE [DISTWIDTH] IN BLOOD BY AUTOMATED COUNT: 15.4 % (ref 12.3–15.4)
GLUCOSE SERPL-MCNC: 93 MG/DL (ref 65–99)
HCT VFR BLD AUTO: 31.6 % (ref 34–46.6)
HGB BLD-MCNC: 10 G/DL (ref 12–15.9)
INR PPP: 3.11 (ref 2–3)
LARGE PLATELETS: ABNORMAL
LYMPHOCYTES # BLD MANUAL: 2.18 10*3/MM3 (ref 0.7–3.1)
LYMPHOCYTES NFR BLD MANUAL: 6 % (ref 5–12)
MACROCYTES BLD QL SMEAR: ABNORMAL
MCH RBC QN AUTO: 33.3 PG (ref 26.6–33)
MCHC RBC AUTO-ENTMCNC: 31.6 G/DL (ref 31.5–35.7)
MCV RBC AUTO: 105.3 FL (ref 79–97)
METAMYELOCYTES NFR BLD MANUAL: 2 % (ref 0–0)
MONOCYTES # BLD: 0.93 10*3/MM3 (ref 0.1–0.9)
MYELOCYTES NFR BLD MANUAL: 5 % (ref 0–0)
NEUTROPHILS # BLD AUTO: 10.89 10*3/MM3 (ref 1.7–7)
NEUTROPHILS NFR BLD MANUAL: 69 % (ref 42.7–76)
NEUTS BAND NFR BLD MANUAL: 1 % (ref 0–5)
PLATELET # BLD AUTO: 279 10*3/MM3 (ref 140–450)
PMV BLD AUTO: 11 FL (ref 6–12)
POIKILOCYTOSIS BLD QL SMEAR: ABNORMAL
POTASSIUM SERPL-SCNC: 4.4 MMOL/L (ref 3.5–5.2)
PROTHROMBIN TIME: 31.2 SECONDS (ref 19.4–28.5)
RBC # BLD AUTO: 3 10*6/MM3 (ref 3.77–5.28)
SCAN SLIDE: NORMAL
SODIUM SERPL-SCNC: 144 MMOL/L (ref 136–145)
VARIANT LYMPHS NFR BLD MANUAL: 1 % (ref 0–5)
VARIANT LYMPHS NFR BLD MANUAL: 13 % (ref 19.6–45.3)
WBC MORPH BLD: NORMAL
WBC NRBC COR # BLD AUTO: 15.55 10*3/MM3 (ref 3.4–10.8)

## 2024-08-20 PROCEDURE — 92526 ORAL FUNCTION THERAPY: CPT

## 2024-08-20 PROCEDURE — 94799 UNLISTED PULMONARY SVC/PX: CPT

## 2024-08-20 PROCEDURE — 94660 CPAP INITIATION&MGMT: CPT

## 2024-08-20 PROCEDURE — 85610 PROTHROMBIN TIME: CPT | Performed by: FAMILY MEDICINE

## 2024-08-20 PROCEDURE — 80048 BASIC METABOLIC PNL TOTAL CA: CPT | Performed by: INTERNAL MEDICINE

## 2024-08-20 PROCEDURE — 94761 N-INVAS EAR/PLS OXIMETRY MLT: CPT

## 2024-08-20 PROCEDURE — 25010000002 CEFTRIAXONE PER 250 MG

## 2024-08-20 PROCEDURE — 99232 SBSQ HOSP IP/OBS MODERATE 35: CPT | Performed by: INTERNAL MEDICINE

## 2024-08-20 PROCEDURE — 85007 BL SMEAR W/DIFF WBC COUNT: CPT | Performed by: INTERNAL MEDICINE

## 2024-08-20 PROCEDURE — 94664 DEMO&/EVAL PT USE INHALER: CPT

## 2024-08-20 PROCEDURE — 85025 COMPLETE CBC W/AUTO DIFF WBC: CPT | Performed by: INTERNAL MEDICINE

## 2024-08-20 RX ORDER — WARFARIN SODIUM 3 MG/1
1.5 TABLET ORAL
Status: COMPLETED | OUTPATIENT
Start: 2024-08-20 | End: 2024-08-20

## 2024-08-20 RX ADMIN — MIRTAZAPINE 15 MG: 15 TABLET, FILM COATED ORAL at 20:16

## 2024-08-20 RX ADMIN — DOCUSATE SODIUM 100 MG: 100 CAPSULE, LIQUID FILLED ORAL at 20:16

## 2024-08-20 RX ADMIN — SENNOSIDES AND DOCUSATE SODIUM 1 TABLET: 8.6; 5 TABLET ORAL at 08:26

## 2024-08-20 RX ADMIN — Medication 10 ML: at 08:27

## 2024-08-20 RX ADMIN — IPRATROPIUM BROMIDE AND ALBUTEROL SULFATE 3 ML: .5; 3 SOLUTION RESPIRATORY (INHALATION) at 10:58

## 2024-08-20 RX ADMIN — DOCUSATE SODIUM 100 MG: 100 CAPSULE, LIQUID FILLED ORAL at 08:27

## 2024-08-20 RX ADMIN — METOPROLOL SUCCINATE 12.5 MG: 25 TABLET, FILM COATED, EXTENDED RELEASE ORAL at 08:26

## 2024-08-20 RX ADMIN — METOCLOPRAMIDE 5 MG: 5 TABLET ORAL at 11:31

## 2024-08-20 RX ADMIN — IPRATROPIUM BROMIDE AND ALBUTEROL SULFATE 3 ML: .5; 3 SOLUTION RESPIRATORY (INHALATION) at 06:55

## 2024-08-20 RX ADMIN — LATANOPROST 1 DROP: 50 SOLUTION/ DROPS OPHTHALMIC at 20:17

## 2024-08-20 RX ADMIN — POTASSIUM CHLORIDE 20 MEQ: 1500 TABLET, EXTENDED RELEASE ORAL at 08:26

## 2024-08-20 RX ADMIN — METRONIDAZOLE 500 MG: 500 TABLET ORAL at 13:48

## 2024-08-20 RX ADMIN — FAMOTIDINE 20 MG: 20 TABLET, FILM COATED ORAL at 08:26

## 2024-08-20 RX ADMIN — ACETYLCYSTEINE 2 ML: 200 SOLUTION ORAL; RESPIRATORY (INHALATION) at 06:55

## 2024-08-20 RX ADMIN — METRONIDAZOLE 500 MG: 500 TABLET ORAL at 22:26

## 2024-08-20 RX ADMIN — MIDODRINE HYDROCHLORIDE 2.5 MG: 2.5 TABLET ORAL at 08:26

## 2024-08-20 RX ADMIN — MIDODRINE HYDROCHLORIDE 2.5 MG: 2.5 TABLET ORAL at 17:00

## 2024-08-20 RX ADMIN — METOCLOPRAMIDE 5 MG: 5 TABLET ORAL at 08:27

## 2024-08-20 RX ADMIN — METOCLOPRAMIDE 5 MG: 5 TABLET ORAL at 16:59

## 2024-08-20 RX ADMIN — METRONIDAZOLE 500 MG: 500 TABLET ORAL at 05:32

## 2024-08-20 RX ADMIN — Medication 10 ML: at 20:17

## 2024-08-20 RX ADMIN — CEFTRIAXONE 1000 MG: 1 INJECTION, POWDER, FOR SOLUTION INTRAMUSCULAR; INTRAVENOUS at 17:00

## 2024-08-20 RX ADMIN — WARFARIN SODIUM 1.5 MG: 3 TABLET ORAL at 17:00

## 2024-08-20 RX ADMIN — GUAIFENESIN 600 MG: 600 TABLET, EXTENDED RELEASE ORAL at 08:27

## 2024-08-20 RX ADMIN — IPRATROPIUM BROMIDE AND ALBUTEROL SULFATE 3 ML: .5; 3 SOLUTION RESPIRATORY (INHALATION) at 14:57

## 2024-08-20 RX ADMIN — METOCLOPRAMIDE 5 MG: 5 TABLET ORAL at 20:16

## 2024-08-20 RX ADMIN — GUAIFENESIN 600 MG: 600 TABLET, EXTENDED RELEASE ORAL at 20:15

## 2024-08-20 RX ADMIN — IPRATROPIUM BROMIDE AND ALBUTEROL SULFATE 3 ML: .5; 3 SOLUTION RESPIRATORY (INHALATION) at 18:44

## 2024-08-20 RX ADMIN — FUROSEMIDE 40 MG: 40 TABLET ORAL at 08:26

## 2024-08-20 RX ADMIN — METOPROLOL SUCCINATE 12.5 MG: 25 TABLET, FILM COATED, EXTENDED RELEASE ORAL at 20:15

## 2024-08-20 RX ADMIN — SENNOSIDES AND DOCUSATE SODIUM 1 TABLET: 8.6; 5 TABLET ORAL at 20:16

## 2024-08-20 RX ADMIN — ACETYLCYSTEINE 2 ML: 200 SOLUTION ORAL; RESPIRATORY (INHALATION) at 18:44

## 2024-08-20 NOTE — SIGNIFICANT NOTE
08/20/24 1210   OTHER   Discipline occupational therapist   Rehab Time/Intention   Session Not Performed patient/family declined treatment  (Pt eating at this time. OT will follow up as schedule allows)   Recommendation   OT - Next Appointment 08/21/24

## 2024-08-20 NOTE — PLAN OF CARE
Goal Outcome Evaluation:              Outcome Evaluation: Pt A&Ox4. Currnetly on 3 liters NC. No complaints of pain. Call light within reach, family at bedside.

## 2024-08-20 NOTE — CASE MANAGEMENT/SOCIAL WORK
Continued Stay Note   Jamey     Patient Name: Shruthi Amin  MRN: [id number]  Today's Date: 8/20/2024    Admit Date: 8/16/2024    Plan: Anticipate routine home with [facility] (current, [facility] order per MD). Current home O2 through [facility]. [facility] referral sent.   Discharge Plan       Row Name 08/20/24 1318       Plan    Plan Anticipate routine home with [facility] (current, [facility] order per MD). Current home O2 through [facility]. [facility] referral sent.    Patient/Family in Agreement with Plan yes    Plan Comments Received call from patient’s daughter, Shea who reported that they were interested in explanation of services for palliative care from [facility] and inquired if they could meet in hospital tomorrow, 8/21 after 2:30pm. CM added new referral in [facility] basket and sent to liaison Kitty BELLE who reported she would reach out.                  Mary Avila RN     Office Phone: [phone]  Office Cell: [phone]

## 2024-08-20 NOTE — PROGRESS NOTES
LOS: 4 days   Patient Care Team:  Cristal Goodwin MD as PCP - General (Family Medicine)  Anai Moise APRN as Nurse Practitioner (Cardiology)  Theodore Vital MD as Consulting Physician (Cardiology)    Subjective     Patient denies any new complaints    Review of Systems   Constitutional:  Positive for activity change and fatigue.   HENT: Negative.     Respiratory:  Positive for cough and shortness of breath.    Cardiovascular: Negative.    Gastrointestinal: Negative.    Genitourinary: Negative.    Musculoskeletal:  Positive for gait problem.   Neurological:  Positive for weakness.   Psychiatric/Behavioral: Negative.             Objective     Vital Signs  Temp:  [96.8 °F (36 °C)-98.6 °F (37 °C)] 98.4 °F (36.9 °C)  Heart Rate:  [] 109  Resp:  [16-28] 23  BP: ()/(57-68) 111/63      Physical Exam  Vitals reviewed.   Constitutional:       Appearance: She is ill-appearing.   HENT:      Head: Normocephalic and atraumatic.      Right Ear: External ear normal.      Left Ear: External ear normal.      Nose: Nose normal.      Mouth/Throat:      Mouth: Mucous membranes are moist.   Eyes:      General:         Right eye: No discharge.         Left eye: No discharge.   Cardiovascular:      Rate and Rhythm: Normal rate and regular rhythm.      Pulses: Normal pulses.      Heart sounds: Normal heart sounds.   Pulmonary:      Breath sounds: Rhonchi present.   Abdominal:      General: Bowel sounds are normal.      Palpations: Abdomen is soft.   Musculoskeletal:         General: Normal range of motion.      Cervical back: Normal range of motion.   Skin:     General: Skin is warm.   Neurological:      Mental Status: She is alert and oriented to person, place, and time.   Psychiatric:         Behavior: Behavior normal.              Results Review:    Lab Results (last 24 hours)       Procedure Component Value Units Date/Time    CBC & Differential [[id number]]  (Abnormal) Collected: 08/20/24 5715    Specimen:  Blood from Arm, Right Updated: 08/20/24 0419    Narrative:      The following orders were created for panel order CBC & Differential.  Procedure                               Abnormality         Status                     ---------                               -----------         ------                     CBC Auto Differential[[id number]]        Abnormal            Final result               Scan Slide[[id number]]                                       Final result                 Please view results for these tests on the individual orders.    Scan Slide [[id number]] Collected: 08/20/24 0333    Specimen: Blood from Arm, Right Updated: 08/20/24 0419     Scan Slide --     Comment: See Manual Differential Results       Manual Differential [[id number]]  (Abnormal) Collected: 08/20/24 0333    Specimen: Blood from Arm, Right Updated: 08/20/24 0419     Neutrophil % 69.0 %      Lymphocyte % 13.0 %      Monocyte % 6.0 %      Eosinophil % 3.0 %      Bands %  1.0 %      Metamyelocyte % 2.0 %      Myelocyte % 5.0 %      Atypical Lymphocyte % 1.0 %      Neutrophils Absolute 10.89 10*3/mm3      Lymphocytes Absolute 2.18 10*3/mm3      Monocytes Absolute 0.93 10*3/mm3      Eosinophils Absolute 0.47 10*3/mm3      Anisocytosis Slight/1+     Elliptocytes Slight/1+     Macrocytes Slight/1+     Poikilocytes Slight/1+     WBC Morphology Normal     Large Platelets Slight/1+    CBC Auto Differential [[id number]]  (Abnormal) Collected: 08/20/24 0333    Specimen: Blood from Arm, Right Updated: 08/20/24 0419     WBC 15.55 10*3/mm3      RBC 3.00 10*6/mm3      Hemoglobin 10.0 g/dL      Hematocrit 31.6 %      .3 fL      MCH 33.3 pg      MCHC 31.6 g/dL      RDW 15.4 %      RDW-SD 59.2 fl      MPV 11.0 fL      Platelets 279 10*3/mm3     Narrative:      The previously reported component NRBC is no longer being reported. Previous result was 0.3 /100 WBC (Reference Range: 0.0-0.2 /100 WBC) on 8/20/2024 at 0344 EDT.    Basic Metabolic Panel  [[id number]]  (Abnormal) Collected: 08/20/24 0333    Specimen: Blood from Arm, Right Updated: 08/20/24 0409     Glucose 93 mg/dL      BUN 19 mg/dL      Creatinine 0.43 mg/dL      Sodium 144 mmol/L      Potassium 4.4 mmol/L      Chloride 102 mmol/L      CO2 36.5 mmol/L      Calcium 8.9 mg/dL      BUN/Creatinine Ratio 44.2     Anion Gap 5.5 mmol/L      eGFR 89.1 mL/min/1.73     Narrative:      GFR Normal >60  Chronic Kidney Disease <60  Kidney Failure <15    The GFR formula is only valid for adults with stable renal function between ages 18 and 70.    Protime-INR [[id number]]  (Abnormal) Collected: 08/20/24 0333    Specimen: Blood from Arm, Right Updated: 08/20/24 0354     Protime 31.2 Seconds      INR 3.11    Respiratory Culture - Sputum, Cough [[id number]] Collected: 08/19/24 1823    Specimen: Sputum from Cough Updated: 08/19/24 2026     Respiratory Culture Rejected     Gram Stain Few (2+) WBCs seen      Rare (1+) Epithelial cells seen      Yeast    Narrative:      Specimen rejected due to oropharyngeal contamination. Please reorder and recollect specimen if clinically necessary.    Blood Culture - Blood, Arm, Left [[id number]]  (Normal) Collected: 08/16/24 1704    Specimen: Blood from Arm, Left Updated: 08/19/24 1716     Blood Culture No growth at 3 days    Narrative:      Less than seven (7) mL's of blood was collected.  Insufficient quantity may yield false negative results.    Blood Culture - Blood, Arm, Left [[id number]]  (Normal) Collected: 08/16/24 1650    Specimen: Blood from Arm, Left Updated: 08/19/24 1716     Blood Culture No growth at 3 days    Narrative:      Less than seven (7) mL's of blood was collected.  Insufficient quantity may yield false negative results.             Imaging Results (Last 24 Hours)       ** No results found for the last 24 hours. **                 I reviewed the patient's new clinical results.    Medication Review:   Scheduled Meds:acetylcysteine, 2 mL, Nebulization, BID -  RT  cefTRIAXone, 1,000 mg, Intravenous, Q24H  docusate sodium, 100 mg, Oral, BID  famotidine, 20 mg, Oral, Daily  furosemide, 40 mg, Oral, Daily  guaiFENesin, 600 mg, Oral, Q12H  ipratropium-albuterol, 3 mL, Nebulization, 4x Daily - RT  latanoprost, 1 drop, Both Eyes, Nightly  metoclopramide, 5 mg, Oral, 4x Daily AC & at Bedtime  metoprolol succinate XL, 12.5 mg, Oral, Q12H  metroNIDAZOLE, 500 mg, Oral, Q8H  midodrine, 2.5 mg, Oral, BID AC  mirtazapine, 15 mg, Oral, Nightly  potassium chloride, 20 mEq, Oral, Daily  sennosides-docusate, 1 tablet, Oral, BID  sodium chloride, 10 mL, Intravenous, Q12H      Continuous Infusions:Pharmacy to dose warfarin,       PRN Meds:.  acetaminophen **OR** acetaminophen **OR** acetaminophen    senna-docusate sodium **AND** polyethylene glycol **AND** bisacodyl **AND** bisacodyl    Calcium Replacement - Follow Nurse / BPA Driven Protocol    magnesium hydroxide    Magnesium Standard Dose Replacement - Follow Nurse / BPA Driven Protocol    melatonin    ondansetron ODT **OR** ondansetron    Pharmacy to dose warfarin    Phosphorus Replacement - Follow Nurse / BPA Driven Protocol    Potassium Replacement - Follow Nurse / BPA Driven Protocol    [COMPLETED] Insert Peripheral IV **AND** sodium chloride    sodium chloride    sodium chloride     Interval History:    Assessment & Plan     Atrial fibrillation with rapid ventricular response  -Pharmacy is managing warfarin; rate is controlled     Rhinovirus infection  Pneumonia  Hypoxia  -Rocephin and metronidazole to cover aspiration pneumonia    Recurrent pleural effusion  Severe mitral regurgitation  -no intervention needed; continue gentle diuresis  -not candidate for any further invasive procedures    Hypotension  -midodrine    Dysphagia  Large hiatal hernia  -modified diet  -speech following  -famotidine, upright for all meals      COPD-stable  Chronic coronary artery disease  Thoracic kyphosis  Anemia-hemoglobin is stable    Plan for  disposition:Home    Josie Solano, [place]  08/20/24  09:45 EDT

## 2024-08-20 NOTE — PROGRESS NOTES
Daily Progress Note        Atrial fibrillation with rapid ventricular response    Assessment:    Atrial fibrillation with rapid ventricular response  Recurrent aspiration pneumonia, due to large hiatal hernia  Rhinovirus infection     Recurrent pleural effusion: Status post right thoracentesis April 2024 with drainage of 450 mL  Transudative fluid likely related to CHF/severe mitral regurgitation, mild compressive atelectasis due to very large hiatal hernia     I do NOT recommend repeating thoracentesis , even Pleurx catheter which is chronic indwelling catheter still not the best option, I recommend medical management for severe mitral regurg     Unfortunately at her age there is no good treatment for this massive hiatal hernia     Acute on chronic hypoxemia: ABG 7.41/57.5/92.5 on BiPAP  Anemia  A-fib  Bradycardia with mild digoxin toxicity  Large hiatal hernia  COPD  CHF  CAD  Osteoarthritis, kyphosis        Recommendations:    Oxygen supplement and titration to maintain saturation 90 to 95%  Antibiotic: Rocephin  Nebulized Mucomyst  Encourage use of incentive spirometry    Bronchodilators  Mucinex     Diuresis  HR control  Anticoagulation: Warfarin, monitor INR and dose as per pharmacy     I personally reviewed the radiological studies          LOS: 4 days     Subjective         Objective     Vital signs for last 24 hours:  Vitals:    08/20/24 0659 08/20/24 0700 08/20/24 0704 08/20/24 0826   BP:    111/68   BP Location:       Patient Position:       Pulse: 85 87 90 96   Resp: 17 22 20    Temp:       TempSrc:       SpO2: 95% 96% 96%    Weight:       Height:           Intake/Output last 3 shifts:  No intake/output data recorded.  Intake/Output this shift:  No intake/output data recorded.      Radiology  Imaging Results (Last 24 Hours)       ** No results found for the last 24 hours. **            Labs:  Results from last 7 days   Lab Units 08/20/24  0333   WBC 10*3/mm3 15.55*   HEMOGLOBIN g/dL 10.0*   HEMATOCRIT %  31.6*   PLATELETS 10*3/mm3 279     Results from last 7 days   Lab Units 08/20/24  0333 08/17/24  0034 08/16/24  1650   SODIUM mmol/L 144   < > 141   POTASSIUM mmol/L 4.4   < > 4.8   CHLORIDE mmol/L 102   < > 100   CO2 mmol/L 36.5*   < > 30.3*   BUN mg/dL 19   < > 26*   CREATININE mg/dL 0.43*   < > 0.55*   CALCIUM mg/dL 8.9   < > 9.3   BILIRUBIN mg/dL  --   --  0.8   ALK PHOS U/L  --   --  139*   ALT (SGPT) U/L  --   --  20   AST (SGOT) U/L  --   --  38*   GLUCOSE mg/dL 93   < > 206*    < > = values in this interval not displayed.     Results from last 7 days   Lab Units 08/16/24  1926   PH, ARTERIAL pH units 7.411   PO2 ART mm Hg 92.5   PCO2, ARTERIAL mm Hg 57.5*   HCO3 ART mmol/L 36.5*     Results from last 7 days   Lab Units 08/16/24  1650   ALBUMIN g/dL 4.0     Results from last 7 days   Lab Units 08/16/24  1915 08/16/24  1650   HSTROP T ng/L 31* 27*         Results from last 7 days   Lab Units 08/19/24  0426   MAGNESIUM mg/dL 1.9     Results from last 7 days   Lab Units 08/20/24  0333 08/19/24  0426 08/18/24  0142 08/17/24  0034 08/16/24  1650   INR  3.11* 3.30* 3.56*   < > 2.57   APTT seconds  --   --   --   --  41.8*    < > = values in this interval not displayed.               Meds:   SCHEDULE  acetylcysteine, 2 mL, Nebulization, BID - RT  cefTRIAXone, 1,000 mg, Intravenous, Q24H  docusate sodium, 100 mg, Oral, BID  famotidine, 20 mg, Oral, Daily  furosemide, 40 mg, Oral, Daily  guaiFENesin, 600 mg, Oral, Q12H  ipratropium-albuterol, 3 mL, Nebulization, 4x Daily - RT  latanoprost, 1 drop, Both Eyes, Nightly  metoclopramide, 5 mg, Oral, 4x Daily AC & at Bedtime  metoprolol succinate XL, 12.5 mg, Oral, Q12H  metroNIDAZOLE, 500 mg, Oral, Q8H  midodrine, 2.5 mg, Oral, BID AC  mirtazapine, 15 mg, Oral, Nightly  potassium chloride, 20 mEq, Oral, Daily  sennosides-docusate, 1 tablet, Oral, BID  sodium chloride, 10 mL, Intravenous, Q12H      Infusions  Pharmacy to dose warfarin,       PRNs    acetaminophen **OR**  acetaminophen **OR** acetaminophen    senna-docusate sodium **AND** polyethylene glycol **AND** bisacodyl **AND** bisacodyl    Calcium Replacement - Follow Nurse / BPA Driven Protocol    magnesium hydroxide    Magnesium Standard Dose Replacement - Follow Nurse / BPA Driven Protocol    melatonin    ondansetron ODT **OR** ondansetron    Pharmacy to dose warfarin    Phosphorus Replacement - Follow Nurse / BPA Driven Protocol    Potassium Replacement - Follow Nurse / BPA Driven Protocol    [COMPLETED] Insert Peripheral IV **AND** sodium chloride    sodium chloride    sodium chloride    Physical Exam:  Physical Exam  Cardiovascular:      Heart sounds: Murmur heard.      No gallop.   Pulmonary:      Effort: No respiratory distress.      Breath sounds: No stridor. Rhonchi and rales present. No wheezing.   Chest:      Chest wall: No tenderness.         ROS  Review of Systems   Respiratory:  Positive for cough and shortness of breath. Negative for wheezing and stridor.    Cardiovascular:  Positive for palpitations. Negative for chest pain and leg swelling.             Total time spent with patient greater than: 45 Minutes

## 2024-08-20 NOTE — PLAN OF CARE
Goal Outcome Evaluation:         Patient calm and cooperative, can make needs known. Patient had antibiotics given during shift. Patient voiced no complaints of pain. Family was at bedside throughout shift. VSS, call light placed within reach. Plan of care ongoing

## 2024-08-20 NOTE — THERAPY TREATMENT NOTE
Acute Care - Speech Language Pathology   Swallow Treatment Note GISELA Concepcion     Patient Name: Shruthi Amin  : 6/10/1928  MRN: [id number]  Today's Date: 2024               Admit Date: 2024    Visit Dx:     ICD-10-CM ICD-9-CM   1. Acute respiratory failure with hypoxia and hypercapnia  J96.01 518.81    J96.02    2. Acute on chronic congestive heart failure, unspecified heart failure type  I50.9 428.0   3. Pneumonia due to infectious organism, unspecified laterality, unspecified part of lung  J18.9 486   4. Atrial fibrillation with RVR  I48.91 427.31     Patient Active Problem List   Diagnosis    Atrial fibrillation    Chronic anticoagulation    Cerebrovascular accident (CVA)    Hiatal hernia    Hyperlipidemia    Hypertension    Osteoarthritis    Hypoxia    Compression fracture of thoracic vertebra with delayed healing    History of osteoporotic pathological fracture    Acute on chronic congestive heart failure, unspecified heart failure type    Severe malnutrition    Nonrheumatic mitral valve regurgitation    Back pain    Recurrent pleural effusion    Bradycardia    Atrial fibrillation with rapid ventricular response     Past Medical History:   Diagnosis Date    Atrial fibrillation     Coronary artery disease     Atrial fibrillation    GERD (gastroesophageal reflux disease)     Glaucoma     Hiatal hernia with gastroesophageal reflux     History of osteoporotic pathological fracture     Right intertroch (2019)    Hx of compression fracture of spine     T12 and L1(); T6 (2022)    Hyperlipidemia     Hypertension     Osteoarthritis     Osteoporosis     on prolia(3/21/22)    Stroke     off and on dizziness from the stroke.     Past Surgical History:   Procedure Laterality Date    BACK SURGERY      kyphoplasty    EYE SURGERY      cataract surgery    FIXATION KYPHOPLASTY LUMBAR SPINE      HIP TROCHANTERIC NAILING WITH INTRAMEDULLARY HIP SCREW Right 2019    Procedure: HIP TROCHANTERIC NAILING  SHORT WITH INTRAMEDULLARY HIP SCREW;  Surgeon: Edward Centeno MD;  Location: [facility] MAIN OR;  Service: Orthopedics    RECTAL SURGERY      x 3       SLP Recommendation and Plan     EDUCATION  The patient has been educated in the following areas:   Dysphagia (Swallowing Impairment) Oral Care/Hydration Modified Diet Instruction.      SLP GOALS       Row Name 08/20/24 0800       (LTG) Swallow    (LTG) Swallow Pt will maximize swallow function for least restrictive PO diet, exhibiting no complication associated with dysphagia, adequate PO intake, and demonstrating independent use of swallow compensations  -PF    Time Frame (Swallow Long Term Goal) by discharge  -PF    Progress/Outcomes (Swallow Long Term Goal) goal ongoing  -PF       (STG) Swallow 1    (STG) Swallow 1 The patient will participate in a meal/follow-up assessment to determine safety and adequacy of recommended diet, independent use of safe swallow compensations, pt/family education and additional goals/recommendations to follow.  -PF    Time Frame (Swallow Short Term Goal 1) 1 week  -PF    Progress/Outcomes (Swallow Short Term Goal 1) goal ongoing  -PF    Comment (Swallow Short Term Goal 1) Pt was seen for skilled DT/meal assessment to ensure tolerance and safety of current diet of soft to chew (ground) and HTL. Pt self-fed all trials but required minimal tray set up and food to be cut into smaller pieces. Daughter and niece at bedside. Pt consumed soft to chew solids and HTL. Oral phase was marked by complete labial seal resulting in no anterior loss, slow but functional mastication on solids, min residual. Pharyngeal phase w/ no overt s/s of aspiration on solids nor HTL, and w/ clear vocal quality. Pt endorses diet tolerance and it is rec'd pt remain on soft to chew (ground) w/ HTL at this time. D/w pt and family r/t SLP plan for continued DT/meal assessment and education of current recs. Additionally, family is agreeable to repeating VFSS in  approximately 3-4 weeks of HH ST once when pt return home. ST will continue to follow.  -PF       (STG) Pharyngeal Strengthening Exercise Goal 1 (SLP)    Activity (Pharyngeal Strengthening Goal 1, SLP) increase pharyngeal sensation  -PF    Increase Pharyngeal Sensation shaker;chin tuck against resistance (CTAR);EMST;hard effortful swallow;nj  -PF    [place]/Accuracy (Pharyngeal Strengthening Goal 1, SLP) with minimal cues (75-90% accuracy)  -PF    Time Frame (Pharyngeal Strengthening Goal 1, SLP) 1 week  -PF       (STG) Swallow Management Recall Goal 1 (SLP)    Activity (Swallow Management Recall Goal 1, SLP) recall of;aspiration precautions;reflux precautions;oral care recommendations;safe diet/liquid level;safe diet level/texture;safe liquid viscosity;rationale for use of strategies/techniques  -PF    [place]/Accuracy (Swallow Management Recall Goal 1, SLP) with moderate cues (50-74% accuracy)  -PF    Time Frame (Swallow Management Recall Goal 1, SLP) 1 week  -PF              User Key  (r) = Recorded By, (t) = Taken By, (c) = Cosigned By      Initials Name Provider Type    PF Fakto, [place], SLP Speech and Language Pathologist             VELMA Corona  8/20/2024

## 2024-08-20 NOTE — PROGRESS NOTES
Cardiology [place]        LOS:  LOS: 4 days   Patient Name: Shruthi Amin  Age/Sex: 89 y.o. female  : 6/10/1928  MRN: [id number]    Day of Service: 24   Length of Stay: 4  Encounter Provider: ALMA Segovia  Place of Service: [facility] CARDIOLOGY  Patient Care Team:  Cristal Goodwin MD as PCP - General (Family Medicine)  Anai Moise APRN as Nurse Practitioner (Cardiology)  Theodore Vital MD as Consulting Physician (Cardiology)    Subjective:     Chief Complaint: f/u Afib, VHD, CHF    Subjective: patient frail, weak, deconditioned, Afib rates controlled.   Lethargy present, family at bedside  Considering [facility] vs palliative care  No CV events  Patient does not want further procedures    Current Medications:   Scheduled Meds:acetylcysteine, 2 mL, Nebulization, BID - RT  cefTRIAXone, 1,000 mg, Intravenous, Q24H  docusate sodium, 100 mg, Oral, BID  famotidine, 20 mg, Oral, Daily  furosemide, 40 mg, Oral, Daily  guaiFENesin, 600 mg, Oral, Q12H  ipratropium-albuterol, 3 mL, Nebulization, 4x Daily - RT  latanoprost, 1 drop, Both Eyes, Nightly  metoclopramide, 5 mg, Oral, 4x Daily AC & at Bedtime  metoprolol succinate XL, 12.5 mg, Oral, Q12H  metroNIDAZOLE, 500 mg, Oral, Q8H  midodrine, 2.5 mg, Oral, BID AC  mirtazapine, 15 mg, Oral, Nightly  potassium chloride, 20 mEq, Oral, Daily  sennosides-docusate, 1 tablet, Oral, BID  sodium chloride, 10 mL, Intravenous, Q12H      Continuous Infusions:Pharmacy to dose warfarin,         Allergies:  Allergies   Allergen Reactions    Penicillins Hives    Codeine Nausea And Vomiting    Latex Rash       Review of Systems   Unable to perform ROS: Acuity of condition         Objective:     Temp:  [96.8 °F (36 °C)-98.6 °F (37 °C)] 98.4 °F (36.9 °C)  Heart Rate:  [] 92  Resp:  [16-28] 23  BP: ()/(57-68) 111/63     Intake/Output Summary (Last 24 hours) at 2024 1202  Last data filed at 2024 0920  Gross per 24  hour   Intake 120 ml   Output --   Net 120 ml     Body mass index is 19.44 kg/m².      08/18/24  0513 08/19/24  0517 08/20/24  0521   Weight: 48.2 kg (106 lb 4.2 oz) 48.5 kg (106 lb 14.8 oz) 48.2 kg (106 lb 4.2 oz)         General Appearance:    Will arouse, drowsy                                Head: Atraumatic, normocephalic, PERRLA               Neck:   supple,  no JVD   Lungs:     Supplemental O2, coughing, dim bilat bases    Heart:    irregular rhythm and normal rate, normal S1 and S2, murmur   Abdomen:     Normal bowel sounds, no masses, no organomegaly, soft  nontender, nondistended, no guarding, no rebound  tenderness   Extremities:   Moves all extremities well, no edema, no cyanosis, no  redness   Pulses:   Pulses palpable and equal bilaterally   Skin:   No bleeding, bruising or rash   Neurologic:   Will arouse, drowsy     unchanged    Lab Review:   Results from last 7 days   Lab Units 08/20/24  0333 08/19/24  0426 08/17/24  0034 08/16/24  1650   SODIUM mmol/L 144 143   < > 141   POTASSIUM mmol/L 4.4 4.1   < > 4.8   CHLORIDE mmol/L 102 101   < > 100   CO2 mmol/L 36.5* 37.0*   < > 30.3*   BUN mg/dL 19 18   < > 26*   CREATININE mg/dL 0.43* 0.45*   < > 0.55*   GLUCOSE mg/dL 93 92   < > 206*   CALCIUM mg/dL 8.9 8.6   < > 9.3   AST (SGOT) U/L  --   --   --  38*   ALT (SGPT) U/L  --   --   --  20    < > = values in this interval not displayed.     Results from last 7 days   Lab Units 08/16/24  1915 08/16/24  1650   HSTROP T ng/L 31* 27*     Results from last 7 days   Lab Units 08/20/24  0333 08/18/24  0557   WBC 10*3/mm3 15.55* 13.76*   HEMOGLOBIN g/dL 10.0* 9.4*   HEMATOCRIT % 31.6* 30.1*   PLATELETS 10*3/mm3 279 226     Results from last 7 days   Lab Units 08/20/24  0333 08/19/24  0426 08/17/24  0034 08/16/24  1650   INR  3.11* 3.30*   < > 2.57   APTT seconds  --   --   --  41.8*    < > = values in this interval not displayed.     Results from last 7 days   Lab Units 08/19/24  0426 08/18/24  0557   MAGNESIUM  "mg/dL 1.9 1.8           Invalid input(s): \"LDLCALC\"  Results from last 7 days   Lab Units 08/16/24  1650   PROBNP pg/mL 7,361.0*           Recent Radiology:  Imaging Results (Most Recent)       Procedure Component Value Units Date/Time    CT Chest Without Contrast Diagnostic [[id number]] Collected: 08/17/24 1113     Updated: 08/17/24 1135    Narrative:      CT CHEST WO CONTRAST DIAGNOSTIC    Date of Exam: 8/17/2024 8:52 AM EDT    Indication: pneumonia.    Comparison: Chest radiograph 8/16/2024. Chest CT  2/25/2024    Technique: Axial CT images were obtained of the chest without contrast administration.  Sagittal and coronal reconstructions were performed.  Automated exposure control and iterative reconstruction methods were used.      Findings:  Thyroid and thoracic inlet: Normal.    Lymph nodes: No pathologic appearing lymph nodes by imaging criteria.    Cardiovascular: Similar cardiomegaly. No pericardial effusion. Similar mildly enlarged main pulm artery measuring 37 mm, which can be seen with pulmonary hypertension. No evidence of aortic aneurysm. . There are coronary artery calcifications. Aortic   atherosclerotic calcifications.    Esophagus: Large hiatal hernia with mostly intrathoracic stomach    Lung parenchyma: Biapical pleural-parenchymal scarring. Consolidative opacities in both lower lobes and in the right middle lobe. Increased consolidative opacity in the left lower lobe. Mild interlobular septal thickening.    Airways: Patent trachea and mainstem bronchi.    Pleura: Similar moderate right and increased small left pleural effusions. No pneumothorax.    Chest wall and osseous structures: Interval increased, severe height loss of a chronic appearing compression fracture at T7. No appreciable osseous retropulsion. Additional multilevel chronic appearing vertebral body compression fractures are similar to   prior. Vertebral body augmentation at T12. Degenerative changes of the imaged spine. The bones are " demineralized.    Included upper abdomen: Otherwise, no significant abnormality.      Impression:      Impression:  Similar moderate right and increased small left pleural effusions. Adjacent consolidative opacities, which have increased in the left lower lobe, may represent atelectasis, with superimposed pneumonia to be excluded clinically.    Cardiomegaly with mild pulmonary edema.    Interval increased, severe height loss of a chronic appearing compression fracture at T7 vertebral body. Recommend correlation with point tenderness to assess for an occult acute component. No significant osseous retropulsion.          Electronically Signed: Tray De Paz    8/17/2024 11:33 AM EDT    Workstation ID: [id number]    XR Chest 1 View [[id number]] Collected: 08/16/24 1703     Updated: 08/16/24 1706    Narrative:      XR CHEST 1 VW    Date of Exam: 8/16/2024 5:01 PM EDT    Indication: soa    Comparison: Chest x-ray 6/24/2024    Findings:  Limited evaluation due to patient's kyphosis and head and neck obscuring the majority of the left lung. Large hiatal hernia. Cardiomegaly. Moderate pleural effusions. Groundglass opacity and septal thickening. Bibasilar airspace opacities.      Impression:      Impression:  Limited evaluation due to patient's head and neck obscuring the majority of the left lung. Severely elevated diaphragm with large hiatal hernia suspected. Cardiomegaly. Moderate pleural effusions. Bibasilar opacity atelectasis versus pneumonia.      Electronically Signed: Julia Torrez MD    8/16/2024 5:04 PM EDT    Workstation ID: [id number]            ECHOCARDIOGRAM:    Results for orders placed during the hospital encounter of 02/23/24    Adult Transesophageal Echo (JEFF) W/ Cont if Necessary Per Protocol    Interpretation Summary    Left ventricular ejection fraction appears to be 56 - 60%.    Severe mitral valve regurgitation is present.    Technically very difficult study with limited views because of body habitus    A  calcified mass present on the aortic valve which could be a fibroblastoma or a healed vegetation.    Clinical correlation required        I reviewed the patient's new clinical results.    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     Continue ongoing supportive care  Optimization of goal-directed medical therapy  Volume reduction as tolerated  Encouraged nutrition    Continue supportive care      > 50% time in direct care and coordination of care my me    Kiesha Oviedo, ALMA  08/20/24  12:03 EDT

## 2024-08-20 NOTE — SIGNIFICANT NOTE
08/20/24 1600   OTHER   Discipline physical therapist   Rehab Time/Intention   Session Not Performed patient/family declined treatment  (Pt reporting she just got back to bed with RN staff prior to PT session, refusing to work with PT staff. Will follow-up tomorrow.)   Therapy Assessment/Plan (PT)   Criteria for Skilled Interventions Met (PT) yes;meets criteria   Recommendation   PT - Next Appointment 08/21/24

## 2024-08-21 LAB
BACTERIA SPEC AEROBE CULT: NORMAL
BACTERIA SPEC AEROBE CULT: NORMAL
INR PPP: 2.62 (ref 2–3)
PROTHROMBIN TIME: 26.6 SECONDS (ref 19.4–28.5)

## 2024-08-21 PROCEDURE — 94664 DEMO&/EVAL PT USE INHALER: CPT

## 2024-08-21 PROCEDURE — 94761 N-INVAS EAR/PLS OXIMETRY MLT: CPT

## 2024-08-21 PROCEDURE — 94799 UNLISTED PULMONARY SVC/PX: CPT

## 2024-08-21 PROCEDURE — 25010000002 CEFTRIAXONE PER 250 MG

## 2024-08-21 PROCEDURE — 85610 PROTHROMBIN TIME: CPT | Performed by: FAMILY MEDICINE

## 2024-08-21 PROCEDURE — 99232 SBSQ HOSP IP/OBS MODERATE 35: CPT | Performed by: INTERNAL MEDICINE

## 2024-08-21 PROCEDURE — 97530 THERAPEUTIC ACTIVITIES: CPT

## 2024-08-21 PROCEDURE — 97116 GAIT TRAINING THERAPY: CPT

## 2024-08-21 PROCEDURE — 94660 CPAP INITIATION&MGMT: CPT

## 2024-08-21 RX ORDER — WARFARIN SODIUM 3 MG/1
1.5 TABLET ORAL
Status: COMPLETED | OUTPATIENT
Start: 2024-08-21 | End: 2024-08-21

## 2024-08-21 RX ADMIN — SENNOSIDES AND DOCUSATE SODIUM 1 TABLET: 8.6; 5 TABLET ORAL at 08:47

## 2024-08-21 RX ADMIN — METOCLOPRAMIDE 5 MG: 5 TABLET ORAL at 20:59

## 2024-08-21 RX ADMIN — METRONIDAZOLE 500 MG: 500 TABLET ORAL at 21:00

## 2024-08-21 RX ADMIN — DOCUSATE SODIUM 100 MG: 100 CAPSULE, LIQUID FILLED ORAL at 20:58

## 2024-08-21 RX ADMIN — METOPROLOL SUCCINATE 12.5 MG: 25 TABLET, FILM COATED, EXTENDED RELEASE ORAL at 20:59

## 2024-08-21 RX ADMIN — Medication 10 ML: at 21:08

## 2024-08-21 RX ADMIN — Medication 10 ML: at 08:41

## 2024-08-21 RX ADMIN — FUROSEMIDE 40 MG: 40 TABLET ORAL at 08:47

## 2024-08-21 RX ADMIN — LATANOPROST 1 DROP: 50 SOLUTION/ DROPS OPHTHALMIC at 21:08

## 2024-08-21 RX ADMIN — WARFARIN SODIUM 1.5 MG: 3 TABLET ORAL at 17:26

## 2024-08-21 RX ADMIN — METRONIDAZOLE 500 MG: 500 TABLET ORAL at 14:32

## 2024-08-21 RX ADMIN — METOPROLOL SUCCINATE 12.5 MG: 25 TABLET, FILM COATED, EXTENDED RELEASE ORAL at 08:46

## 2024-08-21 RX ADMIN — IPRATROPIUM BROMIDE AND ALBUTEROL SULFATE 3 ML: .5; 3 SOLUTION RESPIRATORY (INHALATION) at 06:53

## 2024-08-21 RX ADMIN — IPRATROPIUM BROMIDE AND ALBUTEROL SULFATE 3 ML: .5; 3 SOLUTION RESPIRATORY (INHALATION) at 19:04

## 2024-08-21 RX ADMIN — MIDODRINE HYDROCHLORIDE 2.5 MG: 2.5 TABLET ORAL at 17:26

## 2024-08-21 RX ADMIN — IPRATROPIUM BROMIDE AND ALBUTEROL SULFATE 3 ML: .5; 3 SOLUTION RESPIRATORY (INHALATION) at 11:16

## 2024-08-21 RX ADMIN — SENNOSIDES AND DOCUSATE SODIUM 1 TABLET: 8.6; 5 TABLET ORAL at 20:58

## 2024-08-21 RX ADMIN — DOCUSATE SODIUM 100 MG: 100 CAPSULE, LIQUID FILLED ORAL at 08:47

## 2024-08-21 RX ADMIN — METOCLOPRAMIDE 5 MG: 5 TABLET ORAL at 08:47

## 2024-08-21 RX ADMIN — ACETYLCYSTEINE 2 ML: 200 SOLUTION ORAL; RESPIRATORY (INHALATION) at 06:53

## 2024-08-21 RX ADMIN — METOCLOPRAMIDE 5 MG: 5 TABLET ORAL at 12:24

## 2024-08-21 RX ADMIN — POTASSIUM CHLORIDE 20 MEQ: 1500 TABLET, EXTENDED RELEASE ORAL at 08:47

## 2024-08-21 RX ADMIN — IPRATROPIUM BROMIDE AND ALBUTEROL SULFATE 3 ML: .5; 3 SOLUTION RESPIRATORY (INHALATION) at 15:19

## 2024-08-21 RX ADMIN — GUAIFENESIN 600 MG: 600 TABLET, EXTENDED RELEASE ORAL at 08:47

## 2024-08-21 RX ADMIN — METRONIDAZOLE 500 MG: 500 TABLET ORAL at 05:21

## 2024-08-21 RX ADMIN — ACETYLCYSTEINE 2 ML: 200 SOLUTION ORAL; RESPIRATORY (INHALATION) at 19:04

## 2024-08-21 RX ADMIN — FAMOTIDINE 20 MG: 20 TABLET, FILM COATED ORAL at 08:47

## 2024-08-21 RX ADMIN — CEFTRIAXONE 1000 MG: 1 INJECTION, POWDER, FOR SOLUTION INTRAMUSCULAR; INTRAVENOUS at 15:53

## 2024-08-21 RX ADMIN — MIDODRINE HYDROCHLORIDE 2.5 MG: 2.5 TABLET ORAL at 08:47

## 2024-08-21 RX ADMIN — METOCLOPRAMIDE 5 MG: 5 TABLET ORAL at 17:26

## 2024-08-21 RX ADMIN — GUAIFENESIN 600 MG: 600 TABLET, EXTENDED RELEASE ORAL at 20:59

## 2024-08-21 RX ADMIN — MIRTAZAPINE 15 MG: 15 TABLET, FILM COATED ORAL at 20:59

## 2024-08-21 NOTE — PLAN OF CARE
Goal Outcome Evaluation:  Plan of Care Reviewed With: patient        Progress: no change  Outcome Evaluation: Patiet received medications. Patient is currently up in the chair. IV abx given. Warfarin tonight. Patient is on 3L nc. Palliative consulted and visited today.

## 2024-08-21 NOTE — THERAPY TREATMENT NOTE
"Subjective: Pt agreeable to therapeutic plan of care. Pt reports feeling very weak this PM due to note getting up out of bed yet for the day.    Objective:     Precautions - desaturates with activity    Bed mobility - Min-A to complete supine>sit EOB, min A for scooting to EOB, but able to maintain static sitting balance CGA>SBA prior to mobilizing  Transfers - CGA and with rolling walker  Ambulation - 10 feet CGA, Min-A, and with rolling walker    Vitals: WNL on 3L O2    Pain: 0 VAS   Location: N/A  Intervention for pain: N/A    Education: Provided education on the importance of mobility in the acute care setting, Verbal/Tactile Cues, Transfer Training, Gait Training, and Energy conservation strategies    Assessment: Shruthi Amin presents with functional mobility impairments which indicate the need for skilled intervention. Tolerating session today without incident. Pt seems to be improving with PT staff this date as she is now able to ambulate 10' min A with RW this afternoon. Initially unsteady on her feet, req min A for first 5', but then able to transition into CGA for final couple feet before ending session in the recliner. Overall, mobilizes well with PT staff and should make great progress with us during her stay. Family present in the room mentions they are having family discussion on the progression of her care to be home with hospice or aggressive care. Will continue to follow and progress as tolerated.     Plan/Recommendations:   If medically appropriate, Low Intensity Therapy recommended post-acute care - This is recommended as therapy feels this patient would require 2-3 visits per week. OP or HH would be the best option depending on patient's home bound status. Consider, if the patient has other  \"skilled\" needs such as wounds, IV antibiotics, etc. Combined with \"low intensity\" could also equate to a SNF. If patient is medically complex, consider LTAC. Pt requires no DME at discharge.     Pt " desires Home with Home Health and Home with family assist at discharge. Pt cooperative; agreeable to therapeutic recommendations and plan of care.     Basic Mobility 6-click:  Rollin = Total, A lot = 2, A little = 3; 4 = None  Supine>Sit:   1 = Total, A lot = 2, A little = 3; 4 = None   Sit>Stand with arms:  1 = Total, A lot = 2, A little = 3; 4 = None  Bed>Chair:   1 = Total, A lot = 2, A little = 3; 4 = None  Ambulate in room:  1 = Total, A lot = 2, A little = 3; 4 = None  3-5 Steps with railin = Total, A lot = 2, A little = 3; 4 = None  Score: 17    Modified [place]: N/A = No pre-op stroke/TIA    Post-Tx Position: Up in Chair, Alarms activated, and Call light and personal items within reach, family present  PPE: gloves, surgical mask, and gown

## 2024-08-21 NOTE — PLAN OF CARE
Goal Outcome Evaluation:     Patient is calm and cooperative,had her bony prominences covered with silicon foam dressing during the shift. Family at bedside. VSS  Plan of care ongoing

## 2024-08-21 NOTE — PROGRESS NOTES
LOS: 5 days   Patient Care Team:  Cristal Goodwin MD as PCP - General (Family Medicine)  Anai Moise APRN as Nurse Practitioner (Cardiology)  Theodore Vital MD as Consulting Physician (Cardiology)    Subjective     Complains of constipation    Review of Systems   Constitutional:  Positive for activity change and fatigue.   HENT: Negative.     Respiratory:  Positive for cough and shortness of breath.    Cardiovascular: Negative.    Gastrointestinal: Negative.  Positive for constipation.   Genitourinary: Negative.    Musculoskeletal:  Positive for gait problem.   Neurological:  Positive for weakness.   Psychiatric/Behavioral: Negative.             Objective     Vital Signs  Temp:  [97.8 °F (36.6 °C)-98.7 °F (37.1 °C)] 97.8 °F (36.6 °C)  Heart Rate:  [] 84  Resp:  [15-34] 15  BP: (103-117)/(53-68) 103/53      Physical Exam  Vitals reviewed.   Constitutional:       Appearance: She is ill-appearing.   HENT:      Head: Normocephalic and atraumatic.      Right Ear: External ear normal.      Left Ear: External ear normal.      Nose: Nose normal.      Mouth/Throat:      Mouth: Mucous membranes are moist.   Eyes:      General:         Right eye: No discharge.         Left eye: No discharge.   Cardiovascular:      Rate and Rhythm: Normal rate and regular rhythm.      Pulses: Normal pulses.      Heart sounds: Normal heart sounds.   Pulmonary:      Breath sounds: Rhonchi present.   Abdominal:      General: Bowel sounds are normal. There is distension.      Palpations: Abdomen is soft.   Musculoskeletal:         General: Normal range of motion.      Cervical back: Normal range of motion.   Skin:     General: Skin is warm.   Neurological:      Mental Status: She is alert and oriented to person, place, and time.   Psychiatric:         Behavior: Behavior normal.              Results Review:    Lab Results (last 24 hours)       Procedure Component Value Units Date/Time    Protime-INR [[id number]]  (Normal)  Collected: 08/21/24 0408    Specimen: Blood Updated: 08/21/24 0448     Protime 26.6 Seconds      INR 2.62    Blood Culture - Blood, Arm, Left [[id number]]  (Normal) Collected: 08/16/24 1704    Specimen: Blood from Arm, Left Updated: 08/20/24 1715     Blood Culture No growth at 4 days    Narrative:      Less than seven (7) mL's of blood was collected.  Insufficient quantity may yield false negative results.    Blood Culture - Blood, Arm, Left [[id number]]  (Normal) Collected: 08/16/24 1650    Specimen: Blood from Arm, Left Updated: 08/20/24 1715     Blood Culture No growth at 4 days    Narrative:      Less than seven (7) mL's of blood was collected.  Insufficient quantity may yield false negative results.             Imaging Results (Last 24 Hours)       ** No results found for the last 24 hours. **                 I reviewed the patient's new clinical results.    Medication Review:   Scheduled Meds:acetylcysteine, 2 mL, Nebulization, BID - RT  cefTRIAXone, 1,000 mg, Intravenous, Q24H  docusate sodium, 100 mg, Oral, BID  famotidine, 20 mg, Oral, Daily  furosemide, 40 mg, Oral, Daily  guaiFENesin, 600 mg, Oral, Q12H  ipratropium-albuterol, 3 mL, Nebulization, 4x Daily - RT  latanoprost, 1 drop, Both Eyes, Nightly  metoclopramide, 5 mg, Oral, 4x Daily AC & at Bedtime  metoprolol succinate XL, 12.5 mg, Oral, Q12H  metroNIDAZOLE, 500 mg, Oral, Q8H  midodrine, 2.5 mg, Oral, BID AC  mirtazapine, 15 mg, Oral, Nightly  potassium chloride, 20 mEq, Oral, Daily  sennosides-docusate, 1 tablet, Oral, BID  sodium chloride, 10 mL, Intravenous, Q12H      Continuous Infusions:Pharmacy to dose warfarin,       PRN Meds:.  acetaminophen **OR** acetaminophen **OR** acetaminophen    senna-docusate sodium **AND** polyethylene glycol **AND** bisacodyl **AND** bisacodyl    Calcium Replacement - Follow Nurse / BPA Driven Protocol    magnesium hydroxide    Magnesium Standard Dose Replacement - Follow Nurse / BPA Driven Protocol    melatonin     ondansetron ODT **OR** ondansetron    Pharmacy to dose warfarin    Phosphorus Replacement - Follow Nurse / BPA Driven Protocol    Potassium Replacement - Follow Nurse / BPA Driven Protocol    [COMPLETED] Insert Peripheral IV **AND** sodium chloride    sodium chloride    sodium chloride     Interval History:    Assessment & Plan     Atrial fibrillation with rapid ventricular response  -Pharmacy is managing warfarin; rate is controlled     Rhinovirus infection  Pneumonia  Hypoxia  -Rocephin and metronidazole to cover aspiration pneumonia    Recurrent pleural effusion  Severe mitral regurgitation  -no intervention needed; continue gentle diuresis  -not candidate for any further invasive procedures    Hypotension  -midodrine    Dysphagia  Large hiatal hernia  -modified diet  -speech following  -famotidine, upright for all meals      COPD-stable  Chronic coronary artery disease  Thoracic kyphosis  Anemia-hemoglobin is stable    Patient and family meeting with palliative care today at 2:30pm determine palliative versus hospice.    Plan for disposition:Home    ALMA Horan  08/21/24  09:08 EDT

## 2024-08-21 NOTE — PROGRESS NOTES
Daily Progress Note        Atrial fibrillation with rapid ventricular response    Assessment:    Atrial fibrillation with rapid ventricular response  Recurrent aspiration pneumonia, due to large hiatal hernia  Rhinovirus infection     Recurrent pleural effusion: Status post right thoracentesis April 2024 with drainage of 450 mL  Transudative fluid likely related to CHF/severe mitral regurgitation, mild compressive atelectasis due to very large hiatal hernia     I do NOT recommend repeating thoracentesis , even Pleurx catheter which is chronic indwelling catheter still not the best option, I recommend medical management for severe mitral regurg     Unfortunately at her age there is no good treatment for this massive hiatal hernia     Acute on chronic hypoxemia: ABG 7.41/57.5/92.5 on BiPAP  Anemia  A-fib  Bradycardia with mild digoxin toxicity  Large hiatal hernia  COPD  CHF  CAD  Osteoarthritis, kyphosis        Recommendations:    Oxygen supplement and titration to maintain saturation 90 to 95%  Antibiotic: Completed Rocephin  Nebulized Mucomyst  Encourage use of incentive spirometry    Bronchodilators  Mucinex     Diuresis  HR control  Anticoagulation: Warfarin, monitor INR and dose as per pharmacy     I personally reviewed the radiological studies          LOS: 5 days     Subjective         Objective     Vital signs for last 24 hours:  Vitals:    08/21/24 0653 08/21/24 0657 08/21/24 0658 08/21/24 0702   BP:       BP Location:       Patient Position:       Pulse: 88 89 94 93   Resp: 18 21 20 15   Temp:       TempSrc:       SpO2: 100% 100% 100% 97%   Weight:       Height:           Intake/Output last 3 shifts:  I/O last 3 completed shifts:  In: 630 [P.O.:630]  Out: 600 [Urine:600]  Intake/Output this shift:  No intake/output data recorded.      Radiology  Imaging Results (Last 24 Hours)       ** No results found for the last 24 hours. **            Labs:  Results from last 7 days   Lab Units 08/20/24  9894   WBC  10*3/mm3 15.55*   HEMOGLOBIN g/dL 10.0*   HEMATOCRIT % 31.6*   PLATELETS 10*3/mm3 279     Results from last 7 days   Lab Units 08/20/24  0333 08/17/24  0034 08/16/24  1650   SODIUM mmol/L 144   < > 141   POTASSIUM mmol/L 4.4   < > 4.8   CHLORIDE mmol/L 102   < > 100   CO2 mmol/L 36.5*   < > 30.3*   BUN mg/dL 19   < > 26*   CREATININE mg/dL 0.43*   < > 0.55*   CALCIUM mg/dL 8.9   < > 9.3   BILIRUBIN mg/dL  --   --  0.8   ALK PHOS U/L  --   --  139*   ALT (SGPT) U/L  --   --  20   AST (SGOT) U/L  --   --  38*   GLUCOSE mg/dL 93   < > 206*    < > = values in this interval not displayed.     Results from last 7 days   Lab Units 08/16/24  1926   PH, ARTERIAL pH units 7.411   PO2 ART mm Hg 92.5   PCO2, ARTERIAL mm Hg 57.5*   HCO3 ART mmol/L 36.5*     Results from last 7 days   Lab Units 08/16/24  1650   ALBUMIN g/dL 4.0     Results from last 7 days   Lab Units 08/16/24  1915 08/16/24  1650   HSTROP T ng/L 31* 27*         Results from last 7 days   Lab Units 08/19/24  0426   MAGNESIUM mg/dL 1.9     Results from last 7 days   Lab Units 08/21/24  0408 08/20/24  0333 08/19/24  0426 08/17/24  0034 08/16/24  1650   INR  2.62 3.11* 3.30*   < > 2.57   APTT seconds  --   --   --   --  41.8*    < > = values in this interval not displayed.               Meds:   SCHEDULE  acetylcysteine, 2 mL, Nebulization, BID - RT  cefTRIAXone, 1,000 mg, Intravenous, Q24H  docusate sodium, 100 mg, Oral, BID  famotidine, 20 mg, Oral, Daily  furosemide, 40 mg, Oral, Daily  guaiFENesin, 600 mg, Oral, Q12H  ipratropium-albuterol, 3 mL, Nebulization, 4x Daily - RT  latanoprost, 1 drop, Both Eyes, Nightly  metoclopramide, 5 mg, Oral, 4x Daily AC & at Bedtime  metoprolol succinate XL, 12.5 mg, Oral, Q12H  metroNIDAZOLE, 500 mg, Oral, Q8H  midodrine, 2.5 mg, Oral, BID AC  mirtazapine, 15 mg, Oral, Nightly  potassium chloride, 20 mEq, Oral, Daily  sennosides-docusate, 1 tablet, Oral, BID  sodium chloride, 10 mL, Intravenous, Q12H      Infusions  Pharmacy to  dose warfarin,       PRNs    acetaminophen **OR** acetaminophen **OR** acetaminophen    senna-docusate sodium **AND** polyethylene glycol **AND** bisacodyl **AND** bisacodyl    Calcium Replacement - Follow Nurse / BPA Driven Protocol    magnesium hydroxide    Magnesium Standard Dose Replacement - Follow Nurse / BPA Driven Protocol    melatonin    ondansetron ODT **OR** ondansetron    Pharmacy to dose warfarin    Phosphorus Replacement - Follow Nurse / BPA Driven Protocol    Potassium Replacement - Follow Nurse / BPA Driven Protocol    [COMPLETED] Insert Peripheral IV **AND** sodium chloride    sodium chloride    sodium chloride    Physical Exam:  Physical Exam  Cardiovascular:      Heart sounds: Murmur heard.      No gallop.   Pulmonary:      Effort: No respiratory distress.      Breath sounds: No stridor. Rhonchi and rales present. No wheezing.   Chest:      Chest wall: No tenderness.         ROS  Review of Systems   Respiratory:  Positive for cough and shortness of breath. Negative for wheezing and stridor.    Cardiovascular:  Positive for palpitations. Negative for chest pain and leg swelling.             Total time spent with patient greater than: 45 Minutes

## 2024-08-21 NOTE — PROGRESS NOTES
"  Pharmacy dosing service  Anticoagulant  Warfarin     Subjective:    Shruthi Amin is a 89 y.o.female being continued on warfarin for Atrial Fibrillation / Flutter.    INR Goal: 2 - 3  Home medication?: warfarin 3 mg PO daily, except warfarin 1.5 mg PO on Tuesday, Saturday.   Bridge Therapy Present?:  No  Interacting Medications Evaluation (New/Present/Discontinued): Metronidazole (8/17-8/22) and Ceftriaxone (8/17-8/21): both may alter GI ashley and increase warfarin sensitivity   Additional Contributing Factors: None      Assessment/Plan:    INR within goal range today. Will schedule warfarin 1.5 mg today.    Continue to monitor and adjust based on INR.         Date 8/16 8/17 8/18 8/19 8/20 8/21      INR 2.57 2.56 3.56 3.30 3.11 2.62      Dose 3mg 1.5 mg  Hold Hold 1.5 mg 1.5 mg          Objective:  [Ht: 157.5 cm (62\"); Wt: 44.3 kg (97 lb 10.6 oz); BMI: Body mass index is 17.86 kg/m².]    Lab Results   Component Value Date    ALBUMIN 4.0 08/16/2024     Lab Results   Component Value Date    INR 2.62 08/21/2024    INR 3.11 (H) 08/20/2024    INR 3.30 (H) 08/19/2024    PROTIME 26.6 08/21/2024    PROTIME 31.2 (H) 08/20/2024    PROTIME 32.9 (H) 08/19/2024     Lab Results   Component Value Date    HGB 10.0 (L) 08/20/2024    HGB 9.4 (L) 08/18/2024    HGB 9.2 (L) 08/17/2024     Lab Results   Component Value Date    HCT 31.6 (L) 08/20/2024    HCT 30.1 (L) 08/18/2024    HCT 29.3 (L) 08/17/2024       Sol Strong, PharmD  08/21/24 11:37 EDT               "

## 2024-08-21 NOTE — PROGRESS NOTES
Cardiology [place]        LOS:  LOS: 5 days   Patient Name: Shruthi Amin  Age/Sex: 89 y.o. female  : 6/10/1928  MRN: [id number]    Day of Service: 24   Length of Stay: 5  Encounter Provider: Theodore Vital MD  Place of Service: [facility] CARDIOLOGY  Patient Care Team:  Cristal Goodwin MD as PCP - General (Family Medicine)  Anai Moise APRN as Nurse Practitioner (Cardiology)  Theodore Vital MD as Consulting Physician (Cardiology)    Subjective:     Chief Complaint: f/u Afib, VHD, CHF    Subjective:   In bed, no distress weak deconditioned  Arousable denies any chest pain  Family bedside  Ongoing hospice versus pal care discussions      Patient does not want further procedures    Current Medications:   Scheduled Meds:acetylcysteine, 2 mL, Nebulization, BID - RT  cefTRIAXone, 1,000 mg, Intravenous, Q24H  docusate sodium, 100 mg, Oral, BID  famotidine, 20 mg, Oral, Daily  furosemide, 40 mg, Oral, Daily  guaiFENesin, 600 mg, Oral, Q12H  ipratropium-albuterol, 3 mL, Nebulization, 4x Daily - RT  latanoprost, 1 drop, Both Eyes, Nightly  metoclopramide, 5 mg, Oral, 4x Daily AC & at Bedtime  metoprolol succinate XL, 12.5 mg, Oral, Q12H  metroNIDAZOLE, 500 mg, Oral, Q8H  midodrine, 2.5 mg, Oral, BID AC  mirtazapine, 15 mg, Oral, Nightly  potassium chloride, 20 mEq, Oral, Daily  sennosides-docusate, 1 tablet, Oral, BID  sodium chloride, 10 mL, Intravenous, Q12H  warfarin, 1.5 mg, Oral, Once      Continuous Infusions:Pharmacy to dose warfarin,         Allergies:  Allergies   Allergen Reactions    Penicillins Hives    Codeine Nausea And Vomiting    Latex Rash       Review of systems unable to be performed in their entirety secondary to acuity of condition and mental status lethargy    Objective:     Temp:  [97.8 °F (36.6 °C)-98.7 °F (37.1 °C)] 98.5 °F (36.9 °C)  Heart Rate:  [] 92  Resp:  [15-34] 21  BP: (103-117)/(53-68) 105/56     Intake/Output Summary  (Last 24 hours) at 8/21/2024 1144  Last data filed at 8/21/2024 1047  Gross per 24 hour   Intake 560 ml   Output 600 ml   Net -40 ml     Body mass index is 17.86 kg/m².      08/19/24  0517 08/20/24  0521 08/21/24  0508   Weight: 48.5 kg (106 lb 14.8 oz) 48.2 kg (106 lb 4.2 oz) 44.3 kg (97 lb 10.6 oz) (extra blankets and pillows removed)         General Appearance:    Will arouse, lethargy noted                                Head: Atraumatic, normocephalic, PERRLA               Neck:   supple,  no JVD   Lungs:     Supplemental O2, coughing, dim bilat bases    Heart:    irregular rhythm and normal rate, normal S1 and S2, murmur   Abdomen:     Normal bowel sounds, no masses, no organomegaly, soft  nontender, nondistended, no guarding, no rebound  tenderness   Extremities:   Moves all extremities well, no edema, no cyanosis, no  redness   Pulses:   Pulses palpable and equal bilaterally   Skin:   No bleeding, bruising or rash   Neurologic:   Will arouse, drowsy, moves extremities     unchanged    Lab Review:   Results from last 7 days   Lab Units 08/20/24  0333 08/19/24  0426 08/17/24  0034 08/16/24  1650   SODIUM mmol/L 144 143   < > 141   POTASSIUM mmol/L 4.4 4.1   < > 4.8   CHLORIDE mmol/L 102 101   < > 100   CO2 mmol/L 36.5* 37.0*   < > 30.3*   BUN mg/dL 19 18   < > 26*   CREATININE mg/dL 0.43* 0.45*   < > 0.55*   GLUCOSE mg/dL 93 92   < > 206*   CALCIUM mg/dL 8.9 8.6   < > 9.3   AST (SGOT) U/L  --   --   --  38*   ALT (SGPT) U/L  --   --   --  20    < > = values in this interval not displayed.     Results from last 7 days   Lab Units 08/16/24  1915 08/16/24  1650   HSTROP T ng/L 31* 27*     Results from last 7 days   Lab Units 08/20/24  0333 08/18/24  0557   WBC 10*3/mm3 15.55* 13.76*   HEMOGLOBIN g/dL 10.0* 9.4*   HEMATOCRIT % 31.6* 30.1*   PLATELETS 10*3/mm3 279 226     Results from last 7 days   Lab Units 08/21/24  0408 08/20/24  0333 08/17/24  0034 08/16/24  1650   INR  2.62 3.11*   < > 2.57   APTT seconds  --   " --   --  41.8*    < > = values in this interval not displayed.     Results from last 7 days   Lab Units 08/19/24  0426 08/18/24  0557   MAGNESIUM mg/dL 1.9 1.8           Invalid input(s): \"LDLCALC\"  Results from last 7 days   Lab Units 08/16/24  1650   PROBNP pg/mL 7,361.0*           Recent Radiology:  Imaging Results (Most Recent)       Procedure Component Value Units Date/Time    CT Chest Without Contrast Diagnostic [[id number]] Collected: 08/17/24 1113     Updated: 08/17/24 1135    Narrative:      CT CHEST WO CONTRAST DIAGNOSTIC    Date of Exam: 8/17/2024 8:52 AM EDT    Indication: pneumonia.    Comparison: Chest radiograph 8/16/2024. Chest CT  2/25/2024    Technique: Axial CT images were obtained of the chest without contrast administration.  Sagittal and coronal reconstructions were performed.  Automated exposure control and iterative reconstruction methods were used.      Findings:  Thyroid and thoracic inlet: Normal.    Lymph nodes: No pathologic appearing lymph nodes by imaging criteria.    Cardiovascular: Similar cardiomegaly. No pericardial effusion. Similar mildly enlarged main pulm artery measuring 37 mm, which can be seen with pulmonary hypertension. No evidence of aortic aneurysm. . There are coronary artery calcifications. Aortic   atherosclerotic calcifications.    Esophagus: Large hiatal hernia with mostly intrathoracic stomach    Lung parenchyma: Biapical pleural-parenchymal scarring. Consolidative opacities in both lower lobes and in the right middle lobe. Increased consolidative opacity in the left lower lobe. Mild interlobular septal thickening.    Airways: Patent trachea and mainstem bronchi.    Pleura: Similar moderate right and increased small left pleural effusions. No pneumothorax.    Chest wall and osseous structures: Interval increased, severe height loss of a chronic appearing compression fracture at T7. No appreciable osseous retropulsion. Additional multilevel chronic appearing " vertebral body compression fractures are similar to   prior. Vertebral body augmentation at T12. Degenerative changes of the imaged spine. The bones are demineralized.    Included upper abdomen: Otherwise, no significant abnormality.      Impression:      Impression:  Similar moderate right and increased small left pleural effusions. Adjacent consolidative opacities, which have increased in the left lower lobe, may represent atelectasis, with superimposed pneumonia to be excluded clinically.    Cardiomegaly with mild pulmonary edema.    Interval increased, severe height loss of a chronic appearing compression fracture at T7 vertebral body. Recommend correlation with point tenderness to assess for an occult acute component. No significant osseous retropulsion.          Electronically Signed: Tray De Paz    8/17/2024 11:33 AM EDT    Workstation ID: [id number]    XR Chest 1 View [[id number]] Collected: 08/16/24 1703     Updated: 08/16/24 1706    Narrative:      XR CHEST 1 VW    Date of Exam: 8/16/2024 5:01 PM EDT    Indication: soa    Comparison: Chest x-ray 6/24/2024    Findings:  Limited evaluation due to patient's kyphosis and head and neck obscuring the majority of the left lung. Large hiatal hernia. Cardiomegaly. Moderate pleural effusions. Groundglass opacity and septal thickening. Bibasilar airspace opacities.      Impression:      Impression:  Limited evaluation due to patient's head and neck obscuring the majority of the left lung. Severely elevated diaphragm with large hiatal hernia suspected. Cardiomegaly. Moderate pleural effusions. Bibasilar opacity atelectasis versus pneumonia.      Electronically Signed: Julia Torrez MD    8/16/2024 5:04 PM EDT    Workstation ID: [id number]            ECHOCARDIOGRAM:    Results for orders placed during the hospital encounter of 02/23/24    Adult Transesophageal Echo (JFEF) W/ Cont if Necessary Per Protocol    Interpretation Summary    Left ventricular ejection fraction  appears to be 56 - 60%.    Severe mitral valve regurgitation is present.    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    Clinical correlation required        I reviewed the patient's new clinical results.    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     Continue ongoing supportive care  Optimization of goal-directed medical therapy  Volume reduction as tolerated  Encouraged nutrition    Goals of care discussions      > 50% time in direct care and coordination of care my me    Theodore Vital MD  08/21/24  11:44 EDT

## 2024-08-21 NOTE — PLAN OF CARE
"Assessment: Shruthi Amin presents with functional mobility impairments which indicate the need for skilled intervention. Tolerating session today without incident. Pt seems to be improving with PT staff this date as she is now able to ambulate 10' min A with RW this afternoon. Initially unsteady on her feet, req min A for first 5', but then able to transition into CGA for final couple feet before ending session in the recliner. Overall, mobilizes well with PT staff and should make great progress with us during her stay. Family present in the room mentions they are having family discussion on the progression of her care to be home with hospice or aggressive care. Will continue to follow and progress as tolerated.     Plan/Recommendations:   If medically appropriate, Low Intensity Therapy recommended post-acute care - This is recommended as therapy feels this patient would require 2-3 visits per week. OP or HH would be the best option depending on patient's home bound status. Consider, if the patient has other  \"skilled\" needs such as wounds, IV antibiotics, etc. Combined with \"low intensity\" could also equate to a SNF. If patient is medically complex, consider LTAC. Pt requires no DME at discharge.     Pt desires Home with Home Health and Home with family assist at discharge. Pt cooperative; agreeable to therapeutic recommendations and plan of care.   "

## 2024-08-21 NOTE — CASE MANAGEMENT/SOCIAL WORK
Continued Stay Note   [place]     Patient Name: Shruthi Amin  MRN: [id number]  Today's Date: 8/21/2024    Admit Date: 8/16/2024    Plan: Anticipate routine home with [facility] (current, [facility]OUMATA order per MD). Current home O2 through [facility]. [facility] referral sent.   Discharge Plan       Row Name 08/21/24 6986       Plan    Plan Comments Discussed with [facility] liaison Kitty MCINTYRE that explanation of services was provided at bedside today, 8/21 and she plans to follow-up again tomorrow, 8/22 to further discuss with family.                  Mary Avila RN     Office Phone: [phone]  Office Cell: [phone]      family

## 2024-08-22 LAB
INR PPP: 2.18 (ref 2–3)
PROTHROMBIN TIME: 22.4 SECONDS (ref 19.4–28.5)

## 2024-08-22 PROCEDURE — 97535 SELF CARE MNGMENT TRAINING: CPT

## 2024-08-22 PROCEDURE — 94799 UNLISTED PULMONARY SVC/PX: CPT

## 2024-08-22 PROCEDURE — 94640 AIRWAY INHALATION TREATMENT: CPT

## 2024-08-22 PROCEDURE — 97530 THERAPEUTIC ACTIVITIES: CPT

## 2024-08-22 PROCEDURE — 94664 DEMO&/EVAL PT USE INHALER: CPT

## 2024-08-22 PROCEDURE — 85610 PROTHROMBIN TIME: CPT | Performed by: FAMILY MEDICINE

## 2024-08-22 PROCEDURE — 97112 NEUROMUSCULAR REEDUCATION: CPT

## 2024-08-22 PROCEDURE — 94660 CPAP INITIATION&MGMT: CPT

## 2024-08-22 PROCEDURE — 92526 ORAL FUNCTION THERAPY: CPT

## 2024-08-22 PROCEDURE — 94761 N-INVAS EAR/PLS OXIMETRY MLT: CPT

## 2024-08-22 RX ORDER — WARFARIN SODIUM 5 MG/1
2.5 TABLET ORAL
Status: COMPLETED | OUTPATIENT
Start: 2024-08-22 | End: 2024-08-22

## 2024-08-22 RX ADMIN — WARFARIN SODIUM 2.5 MG: 5 TABLET ORAL at 17:26

## 2024-08-22 RX ADMIN — SENNOSIDES AND DOCUSATE SODIUM 1 TABLET: 8.6; 5 TABLET ORAL at 21:43

## 2024-08-22 RX ADMIN — DOCUSATE SODIUM 100 MG: 100 CAPSULE, LIQUID FILLED ORAL at 08:28

## 2024-08-22 RX ADMIN — MIRTAZAPINE 15 MG: 15 TABLET, FILM COATED ORAL at 21:44

## 2024-08-22 RX ADMIN — METOCLOPRAMIDE 5 MG: 5 TABLET ORAL at 17:26

## 2024-08-22 RX ADMIN — GUAIFENESIN 600 MG: 600 TABLET, EXTENDED RELEASE ORAL at 21:43

## 2024-08-22 RX ADMIN — FAMOTIDINE 20 MG: 20 TABLET, FILM COATED ORAL at 08:28

## 2024-08-22 RX ADMIN — POTASSIUM CHLORIDE 20 MEQ: 1500 TABLET, EXTENDED RELEASE ORAL at 08:28

## 2024-08-22 RX ADMIN — Medication 10 MG: at 21:44

## 2024-08-22 RX ADMIN — MIDODRINE HYDROCHLORIDE 2.5 MG: 2.5 TABLET ORAL at 08:29

## 2024-08-22 RX ADMIN — MIDODRINE HYDROCHLORIDE 2.5 MG: 2.5 TABLET ORAL at 17:26

## 2024-08-22 RX ADMIN — IPRATROPIUM BROMIDE AND ALBUTEROL SULFATE 3 ML: .5; 3 SOLUTION RESPIRATORY (INHALATION) at 19:41

## 2024-08-22 RX ADMIN — DOCUSATE SODIUM 100 MG: 100 CAPSULE, LIQUID FILLED ORAL at 21:43

## 2024-08-22 RX ADMIN — SENNOSIDES AND DOCUSATE SODIUM 1 TABLET: 8.6; 5 TABLET ORAL at 08:28

## 2024-08-22 RX ADMIN — IPRATROPIUM BROMIDE AND ALBUTEROL SULFATE 3 ML: .5; 3 SOLUTION RESPIRATORY (INHALATION) at 10:56

## 2024-08-22 RX ADMIN — FUROSEMIDE 40 MG: 40 TABLET ORAL at 09:59

## 2024-08-22 RX ADMIN — ACETYLCYSTEINE 2 ML: 200 SOLUTION ORAL; RESPIRATORY (INHALATION) at 07:37

## 2024-08-22 RX ADMIN — METRONIDAZOLE 500 MG: 500 TABLET ORAL at 06:13

## 2024-08-22 RX ADMIN — METOPROLOL SUCCINATE 12.5 MG: 25 TABLET, FILM COATED, EXTENDED RELEASE ORAL at 09:59

## 2024-08-22 RX ADMIN — ACETYLCYSTEINE 2 ML: 200 SOLUTION ORAL; RESPIRATORY (INHALATION) at 19:40

## 2024-08-22 RX ADMIN — GUAIFENESIN 600 MG: 600 TABLET, EXTENDED RELEASE ORAL at 08:29

## 2024-08-22 RX ADMIN — LATANOPROST 1 DROP: 50 SOLUTION/ DROPS OPHTHALMIC at 21:47

## 2024-08-22 RX ADMIN — IPRATROPIUM BROMIDE AND ALBUTEROL SULFATE 3 ML: .5; 3 SOLUTION RESPIRATORY (INHALATION) at 07:37

## 2024-08-22 RX ADMIN — METOCLOPRAMIDE 5 MG: 5 TABLET ORAL at 12:08

## 2024-08-22 RX ADMIN — IPRATROPIUM BROMIDE AND ALBUTEROL SULFATE 3 ML: .5; 3 SOLUTION RESPIRATORY (INHALATION) at 15:58

## 2024-08-22 RX ADMIN — POLYETHYLENE GLYCOL 3350 17 G: 17 POWDER, FOR SOLUTION ORAL at 21:45

## 2024-08-22 RX ADMIN — METOCLOPRAMIDE 5 MG: 5 TABLET ORAL at 21:44

## 2024-08-22 RX ADMIN — Medication 10 ML: at 21:47

## 2024-08-22 RX ADMIN — Medication 10 ML: at 08:31

## 2024-08-22 RX ADMIN — METOCLOPRAMIDE 5 MG: 5 TABLET ORAL at 08:28

## 2024-08-22 NOTE — PROGRESS NOTES
LOS: 6 days   Patient Care Team:  Cristal Goodwin MD as PCP - General (Family Medicine)  Anai Moise APRN as Nurse Practitioner (Cardiology)  Theodore Vital MD as Consulting Physician (Cardiology)    Subjective     Patient denies any new complaints    Review of Systems   Constitutional:  Positive for activity change and fatigue.   HENT: Negative.     Respiratory:  Positive for cough and shortness of breath.    Cardiovascular: Negative.    Gastrointestinal: Negative.    Genitourinary: Negative.    Musculoskeletal:  Positive for gait problem.   Neurological:  Positive for weakness.   Psychiatric/Behavioral: Negative.             Objective     Vital Signs  Temp:  [98 °F (36.7 °C)-98.8 °F (37.1 °C)] 98.5 °F (36.9 °C)  Heart Rate:  [] 95  Resp:  [19-26] 20  BP: ()/(48-76) 116/68      Physical Exam  Vitals reviewed.   Constitutional:       Appearance: She is ill-appearing.   HENT:      Head: Normocephalic and atraumatic.      Right Ear: External ear normal.      Left Ear: External ear normal.      Nose: Nose normal.      Mouth/Throat:      Mouth: Mucous membranes are moist.   Eyes:      General:         Right eye: No discharge.         Left eye: No discharge.   Cardiovascular:      Rate and Rhythm: Normal rate and regular rhythm.      Pulses: Normal pulses.      Heart sounds: Normal heart sounds.   Pulmonary:      Breath sounds: Rhonchi present.   Abdominal:      General: Bowel sounds are normal.      Palpations: Abdomen is soft.   Musculoskeletal:         General: Normal range of motion.      Cervical back: Normal range of motion.   Skin:     General: Skin is warm.   Neurological:      Mental Status: She is alert and oriented to person, place, and time.   Psychiatric:         Behavior: Behavior normal.              Results Review:    Lab Results (last 24 hours)       Procedure Component Value Units Date/Time    Protime-INR [[id number]]  (Normal) Collected: 08/22/24 0303    Specimen: Blood  Updated: 08/22/24 0323     Protime 22.4 Seconds      INR 2.18    Blood Culture - Blood, Arm, Left [[id number]]  (Normal) Collected: 08/16/24 1704    Specimen: Blood from Arm, Left Updated: 08/21/24 1730     Blood Culture No growth at 5 days    Narrative:      Less than seven (7) mL's of blood was collected.  Insufficient quantity may yield false negative results.    Blood Culture - Blood, Arm, Left [[id number]]  (Normal) Collected: 08/16/24 1650    Specimen: Blood from Arm, Left Updated: 08/21/24 1715     Blood Culture No growth at 5 days    Narrative:      Less than seven (7) mL's of blood was collected.  Insufficient quantity may yield false negative results.             Imaging Results (Last 24 Hours)       ** No results found for the last 24 hours. **                 I reviewed the patient's new clinical results.    Medication Review:   Scheduled Meds:acetylcysteine, 2 mL, Nebulization, BID - RT  docusate sodium, 100 mg, Oral, BID  famotidine, 20 mg, Oral, Daily  furosemide, 40 mg, Oral, Daily  guaiFENesin, 600 mg, Oral, Q12H  ipratropium-albuterol, 3 mL, Nebulization, 4x Daily - RT  latanoprost, 1 drop, Both Eyes, Nightly  metoclopramide, 5 mg, Oral, 4x Daily AC & at Bedtime  metoprolol succinate XL, 12.5 mg, Oral, Q12H  midodrine, 2.5 mg, Oral, BID AC  mirtazapine, 15 mg, Oral, Nightly  potassium chloride, 20 mEq, Oral, Daily  sennosides-docusate, 1 tablet, Oral, BID  sodium chloride, 10 mL, Intravenous, Q12H  warfarin, 2.5 mg, Oral, Once      Continuous Infusions:Pharmacy to dose warfarin,       PRN Meds:.  acetaminophen **OR** acetaminophen **OR** acetaminophen    senna-docusate sodium **AND** polyethylene glycol **AND** bisacodyl **AND** bisacodyl    Calcium Replacement - Follow Nurse / BPA Driven Protocol    magnesium hydroxide    Magnesium Standard Dose Replacement - Follow Nurse / BPA Driven Protocol    melatonin    ondansetron ODT **OR** ondansetron    Pharmacy to dose warfarin    Phosphorus Replacement -  Follow Nurse / BPA Driven Protocol    Potassium Replacement - Follow Nurse / BPA Driven Protocol    [COMPLETED] Insert Peripheral IV **AND** sodium chloride    sodium chloride    sodium chloride     Interval History:    Assessment & Plan     Atrial fibrillation with rapid ventricular response  -Pharmacy is managing warfarin; rate is controlled     Rhinovirus infection  Pneumonia  Hypoxia  -Rocephin and metronidazole to cover aspiration pneumonia    Recurrent pleural effusion  Severe mitral regurgitation  -no intervention needed; continue gentle diuresis  -not candidate for any further invasive procedures    Hypotension  -midodrine    Dysphagia  Large hiatal hernia  -modified diet  -speech following  -famotidine, upright for all meals      COPD-stable  Chronic coronary artery disease  Thoracic kyphosis  Anemia-hemoglobin is stable    Plan for disposition:Home with HH tomorrow family will follow up with hospice at home    ALMA Heard  08/22/24  13:36 EDT

## 2024-08-22 NOTE — THERAPY TREATMENT NOTE
"Subjective: Pt agreeable to therapeutic plan of care.  Cognition: oriented to Person, Place, Time, and Situation and safety/judgement: fair    Objective:     Precautions -falls and Oxygen needs     Bed Mobility: CGA with increased time to come supine to sitting EOB  Functional Transfers: CGA and with rolling walker cues for hand placement      Balance: standing CGA, Min-A, and with rolling walker  Functional Ambulation: CGA and with rolling walker    Lower Body Dressing: Mod-A  ADL Position: edge of bed sitting  ADL Comments: mod A to thread BLEs through brief    Vitals: WNL on oxygen     Pain: 3 VAS  Location: generalized  Interventions for pain: Repositioned  Education: Provided education on the importance of mobility in the acute care setting, Verbal/Tactile Cues, ADL training, and Transfer Training      Assessment: Shruthi Amin presents with ADL impairments affecting function including balance, endurance / activity tolerance, motor control, and strength. Pt required CGA-min A for activities with increased time secondary to weakness. Pt and family educated on d/c plan and falls prevention, they were receptive. Demonstrated functioning below baseline abilities indicate the need for continued skilled intervention while inpatient. Tolerating session today without incident. Will continue to follow and progress as tolerated.     Plan/Recommendations:   Low Intensity Therapy recommended post-acute care - This is recommended as therapy feels this patient would require 2-3 visits per week. OP or HH would be the best option depending on patient's home bound status. Consider, if the patient has other  \"skilled\" needs such as wounds, IV antibiotics, etc. Combined with \"low intensity\" could also equate to a SNF. If patient is medically complex, consider LTAC.. Pt requires no DME at discharge.     Pt desires Home with Home Health and Home with family assist at discharge. Pt cooperative; agreeable to therapeutic " recommendations and plan of care.     Modified Geovany: N/A = No pre-op stroke/TIA    Post-Tx Position: Up in Chair, Alarms activated, and Call light and personal items within reach  PPE: gloves and surgical mask

## 2024-08-22 NOTE — PLAN OF CARE
Goal Outcome Evaluation:  Plan of Care Reviewed With: patient        Progress: no change     Patient has no voiced complaint of my shift-  VS has been stable.  Plan of care is ongoing.

## 2024-08-22 NOTE — CASE MANAGEMENT/SOCIAL WORK
Continued Stay Note  [facility]     Patient Name: Shruthi Amin  MRN: [id number]  Today's Date: 8/22/2024    Admit Date: 8/16/2024    Plan: Anticipate routine home with [facility] (current, FATOUMATA order per MD). Current home O2 through [facility] [place]. [facility] referral sent.   Discharge Plan       Row Name 08/22/24 1340       Plan    Plan Comments Per [facility] liaison, patient and family have declined hospice services at this time. CM provided update to [facility] liaisonMarva. Barrier to D/C: 4L O2, anticipate d/c tomorrow per NP, warfain dosing.                      Expected Discharge Date and Time       Expected Discharge Date Expected Discharge Time    Aug 23, 2024           Phone communication or documentation only - no physical contact with patient or family.     JANNETTE CurtisN, RN    [address] [address]    Office: [phone]  Fax: [phone]

## 2024-08-22 NOTE — PLAN OF CARE
Goal Outcome Evaluation:  Plan of Care Reviewed With: patient        Progress: no change  Outcome Evaluation: Patient received medications. Patient sat up in the chair today. Patient is on 3L nc. Patient had told staff at one point during the day that she would like to continue aggressive treatment and follow with hospice care in the future. Nurse practioner is aware and had discussions with family regarding statement. Continue gentle diuresis.

## 2024-08-22 NOTE — PROGRESS NOTES
"  Pharmacy dosing service  Anticoagulant  Warfarin     Subjective:    Shruthi Amin is a 89 y.o.female being continued on warfarin for Atrial Fibrillation / Flutter.    INR Goal: 2 - 3  Home medication?: warfarin 3 mg PO daily, except warfarin 1.5 mg PO on Tuesday, Saturday.   Bridge Therapy Present?:  No  Interacting Medications Evaluation (New/Present/Discontinued): Metronidazole (8/17-8/22) and Ceftriaxone (8/17-8/21): both may alter GI ashley and increase warfarin sensitivity   Additional Contributing Factors: None      Assessment/Plan:    INR within goal range today. Last dose of metronidazole was given this morning and ceftriaxone ended yesterday. Will give warfarin 2.5 mg today.     Continue to monitor and adjust based on INR.         Date 8/16 8/17 8/18 8/19 8/20 8/21 8/22     INR 2.57 2.56 3.56 3.30 3.11 2.62 2.18     Dose 3mg 1.5 mg  Hold Hold 1.5 mg 1.5 mg 2.5 mg         Objective:  [Ht: 157.5 cm (62\"); Wt: 45.2 kg (99 lb 10.4 oz); BMI: Body mass index is 18.23 kg/m².]    Lab Results   Component Value Date    ALBUMIN 4.0 08/16/2024     Lab Results   Component Value Date    INR 2.18 08/22/2024    INR 2.62 08/21/2024    INR 3.11 (H) 08/20/2024    PROTIME 22.4 08/22/2024    PROTIME 26.6 08/21/2024    PROTIME 31.2 (H) 08/20/2024     Lab Results   Component Value Date    HGB 10.0 (L) 08/20/2024    HGB 9.4 (L) 08/18/2024    HGB 9.2 (L) 08/17/2024     Lab Results   Component Value Date    HCT 31.6 (L) 08/20/2024    HCT 30.1 (L) 08/18/2024    HCT 29.3 (L) 08/17/2024       Rika Brian [facility]  08/22/24 11:31 EDT                 "

## 2024-08-22 NOTE — THERAPY TREATMENT NOTE
Acute Care - Speech Language Pathology   Swallow Treatment Note GISELA Concepcion     Patient Name: Shruthi Amin  : 6/10/1928  MRN: [id number]  Today's Date: 2024               Admit Date: 2024    Visit Dx:     ICD-10-CM ICD-9-CM   1. Acute respiratory failure with hypoxia and hypercapnia  J96.01 518.81    J96.02    2. Acute on chronic congestive heart failure, unspecified heart failure type  I50.9 428.0   3. Pneumonia due to infectious organism, unspecified laterality, unspecified part of lung  J18.9 486   4. Atrial fibrillation with RVR  I48.91 427.31     Patient Active Problem List   Diagnosis    Atrial fibrillation    Chronic anticoagulation    Cerebrovascular accident (CVA)    Hiatal hernia    Hyperlipidemia    Hypertension    Osteoarthritis    Hypoxia    Compression fracture of thoracic vertebra with delayed healing    History of osteoporotic pathological fracture    Acute on chronic congestive heart failure, unspecified heart failure type    Severe malnutrition    Nonrheumatic mitral valve regurgitation    Back pain    Recurrent pleural effusion    Bradycardia    Atrial fibrillation with rapid ventricular response     Past Medical History:   Diagnosis Date    Atrial fibrillation     Coronary artery disease     Atrial fibrillation    GERD (gastroesophageal reflux disease)     Glaucoma     Hiatal hernia with gastroesophageal reflux     History of osteoporotic pathological fracture     Right intertroch (2019)    Hx of compression fracture of spine     T12 and L1(); T6 (2022)    Hyperlipidemia     Hypertension     Osteoarthritis     Osteoporosis     on prolia(3/21/22)    Stroke     off and on dizziness from the stroke.     Past Surgical History:   Procedure Laterality Date    BACK SURGERY      kyphoplasty    EYE SURGERY      cataract surgery    FIXATION KYPHOPLASTY LUMBAR SPINE      HIP TROCHANTERIC NAILING WITH INTRAMEDULLARY HIP SCREW Right 2019    Procedure: HIP TROCHANTERIC NAILING  SHORT WITH INTRAMEDULLARY HIP SCREW;  Surgeon: Edward Centeno MD;  Location: [facility] MAIN OR;  Service: Orthopedics    RECTAL SURGERY      x 3       SLP Recommendation and Plan                                                                                          EDUCATION  The patient has been educated in the following areas:   Dysphagia (Swallowing Impairment) Oral Care/Hydration Modified Diet Instruction.        SLP GOALS       Row Name 08/22/24 1300       (LTG) Swallow    (LTG) Swallow Pt will maximize swallow function for least restrictive PO diet, exhibiting no complication associated with dysphagia, adequate PO intake, and demonstrating independent use of swallow compensations  -MB    Time Frame (Swallow Long Term Goal) by discharge  -MB    Progress/Outcomes (Swallow Long Term Goal) goal ongoing  -MB       (STG) Swallow 1    (STG) Swallow 1 The patient will participate in a meal/follow-up assessment to determine safety and adequacy of recommended diet, independent use of safe swallow compensations, pt/family education and additional goals/recommendations to follow.  -MB    Time Frame (Swallow Short Term Goal 1) 1 week  -MB    Progress/Outcomes (Swallow Short Term Goal 1) goal ongoing  -MB    Comment (Swallow Short Term Goal 1) Pt was seen for skilled ST targeting DT/meal assessment. Pt alert and eating upright in chair upon entry with family present in room for duration of session. Pt demonstrates and endorses baseline cough with hx of COPD. Nasal cannula in place delivering 3L O2. Meal tray consisted of ground pot roast, carrot coins, mashed potatoes and gravy, frozen pureed dessert, and HTL. Pt exhibited WFL rotary chew/mastication and good labial seal with no anterior spillage. Reviewed rationale for current recommendations with pt and encouraged/faciliated advocating oral care by placing supplies on tray table; verbalized understanding. Guided pt in effortful swallow exercise x5 and encouraged  5-10 reps 3x per day.     Recommend: Continue Soft to chew solids;ground meats/ honey thick liquids. ST will continue to follow to ensure safety and tolerance to diet, make additional recs as warranted, and provide exercises and compensations to assist in mediation and remediation of swallow ability.  -MB       (STG) Pharyngeal Strengthening Exercise Goal 1 (SLP)    Activity (Pharyngeal Strengthening Goal 1, SLP) increase pharyngeal sensation  -MB    Increase Pharyngeal Sensation shaker;chin tuck against resistance (CTAR);EMST;hard effortful swallow;nj  -MB    Cantril/Accuracy (Pharyngeal Strengthening Goal 1, SLP) with minimal cues (75-90% accuracy)  -MB    Time Frame (Pharyngeal Strengthening Goal 1, SLP) 1 week  -MB    Progress/Outcomes (Pharyngeal Strengthening Goal 1, SLP) goal ongoing  -MB       (STG) Swallow Management Recall Goal 1 (SLP)    Activity (Swallow Management Recall Goal 1, SLP) recall of;aspiration precautions;reflux precautions;oral care recommendations;safe diet/liquid level;safe diet level/texture;safe liquid viscosity;rationale for use of strategies/techniques  -MB    Cantril/Accuracy (Swallow Management Recall Goal 1, SLP) with moderate cues (50-74% accuracy)  -MB    Time Frame (Swallow Management Recall Goal 1, SLP) 1 week  -MB    Progress/Outcomes (Swallow Management Recall Goal 1, SLP) goal ongoing  -MB              User Key  (r) = Recorded By, (t) = Taken By, (c) = Cosigned By      Initials Name Provider Type    PF Fakto, [place], SLP Speech and Language Pathologist    Kyle Morley, SLP Speech and Language Pathologist                         Time Calculation:                VELMA Hameed  8/22/2024

## 2024-08-22 NOTE — PLAN OF CARE
"Assessment: Shruthi Amin presents with ADL impairments affecting function including balance, endurance / activity tolerance, motor control, and strength. Pt required CGA-min A for activities with increased time secondary to weakness. Pt and family educated on d/c plan and falls prevention, they were receptive. Demonstrated functioning below baseline abilities indicate the need for continued skilled intervention while inpatient. Tolerating session today without incident. Will continue to follow and progress as tolerated.     Plan/Recommendations:   Low Intensity Therapy recommended post-acute care - This is recommended as therapy feels this patient would require 2-3 visits per week. OP or HH would be the best option depending on patient's home bound status. Consider, if the patient has other  \"skilled\" needs such as wounds, IV antibiotics, etc. Combined with \"low intensity\" could also equate to a SNF. If patient is medically complex, consider LTAC.. Pt requires no DME at discharge.   "

## 2024-08-23 ENCOUNTER — TRANSCRIBE ORDERS (OUTPATIENT)
Dept: HOME HEALTH SERVICES | Facility: HOME HEALTHCARE | Age: 89
End: 2024-08-23
Payer: MEDICARE

## 2024-08-23 ENCOUNTER — READMISSION MANAGEMENT (OUTPATIENT)
Dept: CALL CENTER | Facility: HOSPITAL | Age: 89
End: 2024-08-23
Payer: MEDICARE

## 2024-08-23 VITALS
SYSTOLIC BLOOD PRESSURE: 135 MMHG | DIASTOLIC BLOOD PRESSURE: 55 MMHG | RESPIRATION RATE: 22 BRPM | WEIGHT: 102.95 LBS | HEART RATE: 87 BPM | TEMPERATURE: 97.8 F | HEIGHT: 62 IN | OXYGEN SATURATION: 95 % | BODY MASS INDEX: 18.95 KG/M2

## 2024-08-23 DIAGNOSIS — I48.19 PERSISTENT ATRIAL FIBRILLATION: Primary | ICD-10-CM

## 2024-08-23 PROBLEM — J96.02 ACUTE RESPIRATORY FAILURE WITH HYPOXIA AND HYPERCAPNIA: Status: ACTIVE | Noted: 2024-08-23

## 2024-08-23 PROBLEM — J96.01 ACUTE RESPIRATORY FAILURE WITH HYPOXIA AND HYPERCAPNIA: Status: ACTIVE | Noted: 2024-08-23

## 2024-08-23 LAB
INR PPP: 2.05 (ref 2–3)
PROTHROMBIN TIME: 21.2 SECONDS (ref 19.4–28.5)

## 2024-08-23 PROCEDURE — 85610 PROTHROMBIN TIME: CPT | Performed by: FAMILY MEDICINE

## 2024-08-23 PROCEDURE — 94799 UNLISTED PULMONARY SVC/PX: CPT

## 2024-08-23 PROCEDURE — 94664 DEMO&/EVAL PT USE INHALER: CPT

## 2024-08-23 PROCEDURE — 92526 ORAL FUNCTION THERAPY: CPT

## 2024-08-23 PROCEDURE — 94660 CPAP INITIATION&MGMT: CPT

## 2024-08-23 PROCEDURE — 94761 N-INVAS EAR/PLS OXIMETRY MLT: CPT

## 2024-08-23 RX ORDER — WARFARIN SODIUM 5 MG/1
2.5 TABLET ORAL
Status: DISCONTINUED | OUTPATIENT
Start: 2024-08-23 | End: 2024-08-23 | Stop reason: HOSPADM

## 2024-08-23 RX ORDER — LEVOFLOXACIN 500 MG/1
500 TABLET, FILM COATED ORAL EVERY 24 HOURS
Status: DISCONTINUED | OUTPATIENT
Start: 2024-08-23 | End: 2024-08-23 | Stop reason: HOSPADM

## 2024-08-23 RX ORDER — GUAIFENESIN 600 MG/1
600 TABLET, EXTENDED RELEASE ORAL EVERY 12 HOURS SCHEDULED
Start: 2024-08-23

## 2024-08-23 RX ORDER — LEVOFLOXACIN 500 MG/1
500 TABLET, FILM COATED ORAL EVERY 24 HOURS
Qty: 5 TABLET | Refills: 0 | Status: SHIPPED | OUTPATIENT
Start: 2024-08-23 | End: 2024-08-23 | Stop reason: HOSPADM

## 2024-08-23 RX ADMIN — GUAIFENESIN 600 MG: 600 TABLET, EXTENDED RELEASE ORAL at 08:47

## 2024-08-23 RX ADMIN — METOPROLOL SUCCINATE 12.5 MG: 25 TABLET, FILM COATED, EXTENDED RELEASE ORAL at 10:11

## 2024-08-23 RX ADMIN — MIDODRINE HYDROCHLORIDE 2.5 MG: 2.5 TABLET ORAL at 08:47

## 2024-08-23 RX ADMIN — POTASSIUM CHLORIDE 20 MEQ: 1500 TABLET, EXTENDED RELEASE ORAL at 08:47

## 2024-08-23 RX ADMIN — MIDODRINE HYDROCHLORIDE 2.5 MG: 2.5 TABLET ORAL at 16:45

## 2024-08-23 RX ADMIN — Medication 10 ML: at 08:47

## 2024-08-23 RX ADMIN — IPRATROPIUM BROMIDE AND ALBUTEROL SULFATE 3 ML: .5; 3 SOLUTION RESPIRATORY (INHALATION) at 07:02

## 2024-08-23 RX ADMIN — METOCLOPRAMIDE 5 MG: 5 TABLET ORAL at 16:45

## 2024-08-23 RX ADMIN — METOCLOPRAMIDE 5 MG: 5 TABLET ORAL at 12:21

## 2024-08-23 RX ADMIN — ACETYLCYSTEINE 2 ML: 200 SOLUTION ORAL; RESPIRATORY (INHALATION) at 07:03

## 2024-08-23 RX ADMIN — DOCUSATE SODIUM 100 MG: 100 CAPSULE, LIQUID FILLED ORAL at 08:47

## 2024-08-23 RX ADMIN — FUROSEMIDE 40 MG: 40 TABLET ORAL at 10:11

## 2024-08-23 RX ADMIN — SENNOSIDES AND DOCUSATE SODIUM 1 TABLET: 8.6; 5 TABLET ORAL at 08:47

## 2024-08-23 RX ADMIN — METOCLOPRAMIDE 5 MG: 5 TABLET ORAL at 08:47

## 2024-08-23 RX ADMIN — LEVOFLOXACIN 500 MG: 500 TABLET, FILM COATED ORAL at 12:21

## 2024-08-23 RX ADMIN — FAMOTIDINE 20 MG: 20 TABLET, FILM COATED ORAL at 08:47

## 2024-08-23 NOTE — PLAN OF CARE
Problem: Adjustment to Illness (Sepsis/Septic Shock)  Goal: Optimal Coping  Intervention: Optimize Psychosocial Adjustment to Illness  Recent Flowsheet Documentation  Taken 8/22/2024 2001 by Annika Garcia RN  Supportive Measures:   active listening utilized   decision-making supported   verbalization of feelings encouraged     Problem: Infection Progression (Sepsis/Septic Shock)  Goal: Absence of Infection Signs and Symptoms  Intervention: Initiate Sepsis Management  Recent Flowsheet Documentation  Taken 8/23/2024 0001 by Annika Garcia RN  Infection Prevention:   environmental surveillance performed   hand hygiene promoted   personal protective equipment utilized   single patient room provided  Isolation Precautions:   droplet   precautions maintained  Taken 8/22/2024 2001 by Annika Garcia RN  Infection Prevention:   environmental surveillance performed   hand hygiene promoted   personal protective equipment utilized   single patient room provided  Isolation Precautions:   droplet   precautions maintained  Intervention: Promote Recovery  Recent Flowsheet Documentation  Taken 8/23/2024 0001 by Annika Garcia RN  Sleep/Rest Enhancement:   awakenings minimized   consistent schedule promoted  Taken 8/22/2024 2001 by Annika Garcia RN  Activity Management: activity encouraged  Sleep/Rest Enhancement:   awakenings minimized   consistent schedule promoted     Problem: Adult Inpatient Plan of Care  Goal: Absence of Hospital-Acquired Illness or Injury  Intervention: Identify and Manage Fall Risk  Recent Flowsheet Documentation  Taken 8/23/2024 0401 by Annika Garcia RN  Safety Promotion/Fall Prevention: safety round/check completed  Taken 8/23/2024 0200 by Annika Garcia RN  Safety Promotion/Fall Prevention: safety round/check completed  Taken 8/23/2024 0001 by Annika Garcia RN  Safety Promotion/Fall Prevention:   assistive device/personal items within reach   clutter free environment  maintained   fall prevention program maintained   nonskid shoes/slippers when out of bed   room organization consistent   safety round/check completed  Taken 8/22/2024 2200 by Annika Garcia RN  Safety Promotion/Fall Prevention: safety round/check completed  Taken 8/22/2024 2001 by Annika Garcia RN  Safety Promotion/Fall Prevention:   assistive device/personal items within reach   clutter free environment maintained   fall prevention program maintained   nonskid shoes/slippers when out of bed   room organization consistent   safety round/check completed  Intervention: Prevent and Manage VTE (Venous Thromboembolism) Risk  Recent Flowsheet Documentation  Taken 8/23/2024 0401 by Annika Garcia RN  VTE Prevention/Management:   bilateral   sequential compression devices off  Taken 8/23/2024 0001 by Annika Garcia RN  VTE Prevention/Management: (warfarin)   bilateral   sequential compression devices off  Taken 8/22/2024 2001 by Annika Garcia RN  Activity Management: activity encouraged  VTE Prevention/Management:   bilateral   sequential compression devices off  Intervention: Prevent Infection  Recent Flowsheet Documentation  Taken 8/23/2024 0001 by Annika Garcia RN  Infection Prevention:   environmental surveillance performed   hand hygiene promoted   personal protective equipment utilized   single patient room provided  Taken 8/22/2024 2001 by Annika Garcia RN  Infection Prevention:   environmental surveillance performed   hand hygiene promoted   personal protective equipment utilized   single patient room provided  Goal: Optimal Comfort and Wellbeing  Intervention: Provide Person-Centered Care  Recent Flowsheet Documentation  Taken 8/22/2024 2001 by Annika Garcia RN  Trust Relationship/Rapport:   care explained   choices provided   questions encouraged   reassurance provided     Problem: Skin Injury Risk Increased  Goal: Skin Health and Integrity  Intervention: Optimize Skin  Protection  Recent Flowsheet Documentation  Taken 8/22/2024 2001 by Annika Garcia RN  Pressure Reduction Techniques:   frequent weight shift encouraged   weight shift assistance provided  Head of Bed (HOB) Positioning: HOB at 30-45 degrees  Pressure Reduction Devices:   pressure-redistributing mattress utilized   positioning supports utilized     Problem: Asthma Comorbidity  Goal: Maintenance of Asthma Control  Intervention: Maintain Asthma Symptom Control  Recent Flowsheet Documentation  Taken 8/22/2024 2001 by Annika Garcia RN  Medication Review/Management: medications reviewed     Problem: Behavioral Health Comorbidity  Goal: Maintenance of Behavioral Health Symptom Control  Intervention: Maintain Behavioral Health Symptom Control  Recent Flowsheet Documentation  Taken 8/22/2024 2001 by Annika Garcia RN  Medication Review/Management: medications reviewed     Problem: COPD (Chronic Obstructive Pulmonary Disease) Comorbidity  Goal: Maintenance of COPD Symptom Control  Intervention: Maintain COPD-Symptom Control  Recent Flowsheet Documentation  Taken 8/22/2024 2001 by Annika Garcia RN  Supportive Measures:   active listening utilized   decision-making supported   verbalization of feelings encouraged  Medication Review/Management: medications reviewed     Problem: Heart Failure Comorbidity  Goal: Maintenance of Heart Failure Symptom Control  Intervention: Maintain Heart Failure-Management  Recent Flowsheet Documentation  Taken 8/22/2024 2001 by Annika Garcia RN  Medication Review/Management: medications reviewed     Problem: Hypertension Comorbidity  Goal: Blood Pressure in Desired Range  Intervention: Maintain Blood Pressure Management  Recent Flowsheet Documentation  Taken 8/22/2024 2001 by Annika Garcia RN  Medication Review/Management: medications reviewed     Problem: Osteoarthritis Comorbidity  Goal: Maintenance of Osteoarthritis Symptom Control  Intervention: Maintain Osteoarthritis  Symptom Control  Recent Flowsheet Documentation  Taken 8/22/2024 2001 by Annika Garcia RN  Activity Management: activity encouraged  Medication Review/Management: medications reviewed     Problem: Pain Chronic (Persistent) (Comorbidity Management)  Goal: Acceptable Pain Control and Functional Ability  Intervention: Manage Persistent Pain  Recent Flowsheet Documentation  Taken 8/23/2024 0001 by Annika Garcia RN  Sleep/Rest Enhancement:   awakenings minimized   consistent schedule promoted  Taken 8/22/2024 2001 by Annika Garcia RN  Bowel Elimination Promotion: adequate fluid intake promoted  Sleep/Rest Enhancement:   awakenings minimized   consistent schedule promoted  Medication Review/Management: medications reviewed  Intervention: Optimize Psychosocial Wellbeing  Recent Flowsheet Documentation  Taken 8/22/2024 2001 by Annika Garcia RN  Supportive Measures:   active listening utilized   decision-making supported   verbalization of feelings encouraged  Diversional Activities: television     Problem: Fall Injury Risk  Goal: Absence of Fall and Fall-Related Injury  Intervention: Identify and Manage Contributors  Recent Flowsheet Documentation  Taken 8/22/2024 2001 by Annika Garcia RN  Medication Review/Management: medications reviewed  Intervention: Promote Injury-Free Environment  Recent Flowsheet Documentation  Taken 8/23/2024 0401 by Annika Garcia RN  Safety Promotion/Fall Prevention: safety round/check completed  Taken 8/23/2024 0200 by Annika Garcia RN  Safety Promotion/Fall Prevention: safety round/check completed  Taken 8/23/2024 0001 by Annika Garcia RN  Safety Promotion/Fall Prevention:   assistive device/personal items within reach   clutter free environment maintained   fall prevention program maintained   nonskid shoes/slippers when out of bed   room organization consistent   safety round/check completed  Taken 8/22/2024 2200 by Annika Garcia RN  Safety Promotion/Fall  Prevention: safety round/check completed  Taken 8/22/2024 2001 by Annika Garcia RN  Safety Promotion/Fall Prevention:   assistive device/personal items within reach   clutter free environment maintained   fall prevention program maintained   nonskid shoes/slippers when out of bed   room organization consistent   safety round/check completed  Goal: Absence of Fall and Fall-Related Injury  Intervention: Identify and Manage Contributors  Recent Flowsheet Documentation  Taken 8/22/2024 2001 by Annika Garcia RN  Medication Review/Management: medications reviewed  Intervention: Promote Injury-Free Environment  Recent Flowsheet Documentation  Taken 8/23/2024 0401 by Annika Garcia, RN  Safety Promotion/Fall Prevention: safety round/check completed  Taken 8/23/2024 0200 by Annika Garcia RN  Safety Promotion/Fall Prevention: safety round/check completed  Taken 8/23/2024 0001 by Annika Garcia RN  Safety Promotion/Fall Prevention:   assistive device/personal items within reach   clutter free environment maintained   fall prevention program maintained   nonskid shoes/slippers when out of bed   room organization consistent   safety round/check completed  Taken 8/22/2024 2200 by Annika Garcia, RN  Safety Promotion/Fall Prevention: safety round/check completed  Taken 8/22/2024 2001 by Annika Garcia RN  Safety Promotion/Fall Prevention:   assistive device/personal items within reach   clutter free environment maintained   fall prevention program maintained   nonskid shoes/slippers when out of bed   room organization consistent   safety round/check completed   Goal Outcome Evaluation:

## 2024-08-23 NOTE — PROGRESS NOTES
Daily Progress Note        Atrial fibrillation with rapid ventricular response    Assessment:    Atrial fibrillation with rapid ventricular response  Recurrent aspiration pneumonia, due to large hiatal hernia  Rhinovirus infection     Recurrent pleural effusion: Status post right thoracentesis April 2024 with drainage of 450 mL  Transudative fluid likely related to CHF/severe mitral regurgitation, mild compressive atelectasis due to very large hiatal hernia     I do NOT recommend repeating thoracentesis , even Pleurx catheter which is chronic indwelling catheter still not the best option, I recommend medical management for severe mitral regurg     Unfortunately at her age there is no good treatment for this massive hiatal hernia     Acute on chronic hypoxemia: ABG 7.41/57.5/92.5 on BiPAP  Anemia  A-fib  Bradycardia with mild digoxin toxicity  Large hiatal hernia  COPD  CHF  CAD  Osteoarthritis, kyphosis        Recommendations:    Oxygen supplement and titration to maintain saturation 90 to 95%, currently on 2 L  Antibiotic: Completed Rocephin  Nebulized Mucomyst  Encourage use of incentive spirometry    Bronchodilators  Mucinex     Diuresis  HR control  Anticoagulation: Warfarin, monitor INR and dose as per pharmacy     I personally reviewed the radiological studies          LOS: 7 days     Subjective         Objective     Vital signs for last 24 hours:  Vitals:    08/23/24 0703 08/23/24 0706 08/23/24 0847 08/23/24 1011   BP:   94/52 108/68   BP Location:       Patient Position:       Pulse: 91 82 96 95   Resp: 18 16 23    Temp:   98.4 °F (36.9 °C)    TempSrc:   Axillary    SpO2: 92% 95% 90%    Weight:       Height:           Intake/Output last 3 shifts:  I/O last 3 completed shifts:  In: 415 [P.O.:415]  Out: 300 [Urine:300]  Intake/Output this shift:  No intake/output data recorded.      Radiology  Imaging Results (Last 24 Hours)       ** No results found for the last 24 hours. **            Labs:  Results from  last 7 days   Lab Units 08/20/24  0333   WBC 10*3/mm3 15.55*   HEMOGLOBIN g/dL 10.0*   HEMATOCRIT % 31.6*   PLATELETS 10*3/mm3 279     Results from last 7 days   Lab Units 08/20/24  0333 08/17/24  0034 08/16/24  1650   SODIUM mmol/L 144   < > 141   POTASSIUM mmol/L 4.4   < > 4.8   CHLORIDE mmol/L 102   < > 100   CO2 mmol/L 36.5*   < > 30.3*   BUN mg/dL 19   < > 26*   CREATININE mg/dL 0.43*   < > 0.55*   CALCIUM mg/dL 8.9   < > 9.3   BILIRUBIN mg/dL  --   --  0.8   ALK PHOS U/L  --   --  139*   ALT (SGPT) U/L  --   --  20   AST (SGOT) U/L  --   --  38*   GLUCOSE mg/dL 93   < > 206*    < > = values in this interval not displayed.     Results from last 7 days   Lab Units 08/16/24  1926   PH, ARTERIAL pH units 7.411   PO2 ART mm Hg 92.5   PCO2, ARTERIAL mm Hg 57.5*   HCO3 ART mmol/L 36.5*     Results from last 7 days   Lab Units 08/16/24  1650   ALBUMIN g/dL 4.0     Results from last 7 days   Lab Units 08/16/24  1915 08/16/24  1650   HSTROP T ng/L 31* 27*         Results from last 7 days   Lab Units 08/19/24  0426   MAGNESIUM mg/dL 1.9     Results from last 7 days   Lab Units 08/23/24  0109 08/22/24  0303 08/21/24  0408 08/17/24  0034 08/16/24  1650   INR  2.05 2.18 2.62   < > 2.57   APTT seconds  --   --   --   --  41.8*    < > = values in this interval not displayed.               Meds:   SCHEDULE  docusate sodium, 100 mg, Oral, BID  famotidine, 20 mg, Oral, Daily  furosemide, 40 mg, Oral, Daily  guaiFENesin, 600 mg, Oral, Q12H  latanoprost, 1 drop, Both Eyes, Nightly  levoFLOXacin, 500 mg, Oral, Q24H  metoclopramide, 5 mg, Oral, 4x Daily AC & at Bedtime  metoprolol succinate XL, 12.5 mg, Oral, Q12H  midodrine, 2.5 mg, Oral, BID AC  mirtazapine, 15 mg, Oral, Nightly  potassium chloride, 20 mEq, Oral, Daily  sennosides-docusate, 1 tablet, Oral, BID  sodium chloride, 10 mL, Intravenous, Q12H      Infusions  Pharmacy to dose warfarin,       PRNs    acetaminophen **OR** acetaminophen **OR** acetaminophen    senna-docusate  sodium **AND** polyethylene glycol **AND** bisacodyl **AND** bisacodyl    Calcium Replacement - Follow Nurse / BPA Driven Protocol    magnesium hydroxide    Magnesium Standard Dose Replacement - Follow Nurse / BPA Driven Protocol    melatonin    ondansetron ODT **OR** ondansetron    Pharmacy to dose warfarin    Phosphorus Replacement - Follow Nurse / BPA Driven Protocol    Potassium Replacement - Follow Nurse / BPA Driven Protocol    [COMPLETED] Insert Peripheral IV **AND** sodium chloride    sodium chloride    sodium chloride    Physical Exam:  Physical Exam  Cardiovascular:      Heart sounds: Murmur heard.      No gallop.   Pulmonary:      Effort: No respiratory distress.      Breath sounds: No stridor. Rhonchi and rales present. No wheezing.   Chest:      Chest wall: No tenderness.         ROS  Review of Systems   Respiratory:  Positive for cough and shortness of breath. Negative for wheezing and stridor.    Cardiovascular:  Positive for palpitations. Negative for chest pain and leg swelling.             Total time spent with patient greater than: 45 Minutes

## 2024-08-23 NOTE — PROGRESS NOTES
"  Pharmacy dosing service  Anticoagulant  Warfarin     Subjective:    Shruthi Amin is a 89 y.o.female being continued on warfarin for Atrial Fibrillation / Flutter.    INR Goal: 2 - 3  Home medication?: warfarin 3 mg PO daily, except warfarin 1.5 mg PO on Tuesday, Saturday.   Bridge Therapy Present?:  No  Interacting Medications Evaluation (New/Present/Discontinued): Metronidazole (8/17-8/22), Ceftriaxone (8/17-8/21), levofloxacin (8/23-8/28): all may alter GI ashley and increase warfarin sensitivity   Additional Contributing Factors: None      Assessment/Plan:    INR within goal range today. Last dose of metronidazole was given yesterday. Levofloxacin was started today, which may increase the patient's sensitivity to warfarin. Will give warfarin 2.5 mg today.    Continue to monitor and adjust based on INR.         Date 8/16 8/17 8/18 8/19 8/20 8/21 8/22 8/23    INR 2.57 2.56 3.56 3.30 3.11 2.62 2.18 2.05    Dose 3mg 1.5 mg  Hold Hold 1.5 mg 1.5 mg 2.5 mg 2.5 mg        Objective:  [Ht: 157.5 cm (62\"); Wt: 46.7 kg (102 lb 15.3 oz); BMI: Body mass index is 18.83 kg/m².]    Lab Results   Component Value Date    ALBUMIN 4.0 08/16/2024     Lab Results   Component Value Date    INR 2.05 08/23/2024    INR 2.18 08/22/2024    INR 2.62 08/21/2024    PROTIME 21.2 08/23/2024    PROTIME 22.4 08/22/2024    PROTIME 26.6 08/21/2024     Lab Results   Component Value Date    HGB 10.0 (L) 08/20/2024    HGB 9.4 (L) 08/18/2024    HGB 9.2 (L) 08/17/2024     Lab Results   Component Value Date    HCT 31.6 (L) 08/20/2024    HCT 30.1 (L) 08/18/2024    HCT 29.3 (L) 08/17/2024       Rika [facility]  08/23/24 11:33 EDT                   "

## 2024-08-23 NOTE — CASE MANAGEMENT/SOCIAL WORK
Discharge Planning Assessment  [facility]     Patient Name: Shruthi Amin  MRN: [id number]  Today's Date: 8/23/2024    Admit Date: 8/16/2024    Plan: Anticipate routine home with [facility] (current, [facility] order placed). Current home O2 through [facility]. [facility] referral.       Discharge Plan       Row Name 08/23/24 1200       Plan    Plan Anticipate routine home with [facility] (current, FATOUM[facility] order placed). Current home O2 through [facility]. [facility] referral.    Patient/Family in Agreement with Plan yes    Plan Comments CM met with patient and familymat bedside. Patient plans to d/c home with home health, denies any d/c needs at this time. IMM letter reviewed with patient and family, consent obtained and copy left at bedside. CM updated [facility] liaison of d/c.    Final Discharge Disposition Code 06 - home with home health care    Final Note Home with [facility]                      Expected Discharge Date and Time       Expected Discharge Date Expected Discharge Time    Aug 23, 2024                Patient Forms       Row Name 08/23/24 1202       Patient Forms    Important Message from Medicare (IMM) Delivered  8/23/24    Delivered to Support person  discusssed with patient and family, consent obtained and copy left at bedside    Method of delivery In person                  Case Management Discharge Note      Final Note: Home with [facility]    Provided Post Acute Provider List?: Yes  Post Acute Provider List: Hospice  Provided Post Acute Provider Quality & Resource List?: Yes  Post Acute Provider Quality and Resource List:  (Hospice/Palliative Care List)  Delivered To: Support Person  Support Person: Rosalina Valdivia  Method of Delivery: In person    Selected Continued Care - Admitted Since 8/16/2024           Home Medical Care Coordination complete.      Service Provider Selected Services Address Phone Fax Patient Preferred    [facility] [address], [place]  [place] [address] [phone]                  Transportation Services  Private: Car    Final Discharge Disposition Code: 06 - home with home health care    Met with patient in room wearing mask.  Maintained distance greater than six feet and spent less than 15 minutes in the room.   JANNETTE CurtisN, RN    [address] [address]    Office: [phone]  Fax: [phone]

## 2024-08-23 NOTE — SIGNIFICANT NOTE
Pt with hospital d/c summary and d/c orders. Will follow-up next treatment date if pt remains at [facility].

## 2024-08-23 NOTE — THERAPY TREATMENT NOTE
Acute Care - Speech Language Pathology   Swallow Treatment Note GISELA Concepcion     Patient Name: Shruthi Amin  : 6/10/1928  MRN: [id number]  Today's Date: 2024               Admit Date: 2024    Visit Dx:     ICD-10-CM ICD-9-CM   1. Acute respiratory failure with hypoxia and hypercapnia  J96.01 518.81    J96.02    2. Acute on chronic congestive heart failure, unspecified heart failure type  I50.9 428.0   3. Pneumonia due to infectious organism, unspecified laterality, unspecified part of lung  J18.9 486   4. Atrial fibrillation with RVR  I48.91 427.31     Patient Active Problem List   Diagnosis    Atrial fibrillation    Chronic anticoagulation    Cerebrovascular accident (CVA)    Hiatal hernia    Hyperlipidemia    Hypertension    Osteoarthritis    Hypoxia    Compression fracture of thoracic vertebra with delayed healing    History of osteoporotic pathological fracture    Acute on chronic congestive heart failure, unspecified heart failure type    Severe malnutrition    Nonrheumatic mitral valve regurgitation    Back pain    Recurrent pleural effusion    Bradycardia    Atrial fibrillation with rapid ventricular response     Past Medical History:   Diagnosis Date    Atrial fibrillation     Coronary artery disease     Atrial fibrillation    GERD (gastroesophageal reflux disease)     Glaucoma     Hiatal hernia with gastroesophageal reflux     History of osteoporotic pathological fracture     Right intertroch (2019)    Hx of compression fracture of spine     T12 and L1(); T6 (2022)    Hyperlipidemia     Hypertension     Osteoarthritis     Osteoporosis     on prolia(3/21/22)    Stroke     off and on dizziness from the stroke.     Past Surgical History:   Procedure Laterality Date    BACK SURGERY      kyphoplasty    EYE SURGERY      cataract surgery    FIXATION KYPHOPLASTY LUMBAR SPINE      HIP TROCHANTERIC NAILING WITH INTRAMEDULLARY HIP SCREW Right 2019    Procedure: HIP TROCHANTERIC NAILING  SHORT WITH INTRAMEDULLARY HIP SCREW;  Surgeon: Edward Centeno MD;  Location: [facility] MAIN OR;  Service: Orthopedics    RECTAL SURGERY      x 3       SLP Recommendation and Plan  SLP Swallowing Diagnosis: mild, oral dysphagia, suspect acute-on-chronic, pharyngeal dysphagia (08/23/24 1000)  SLP Diet Recommendation: honey thick liquids, ground (08/23/24 1000)  Recommended Precautions and Strategies: upright posture during/after eating, small bites of food and sips of liquid, alternate between small bites of food and sips of liquid, general aspiration precautions, reflux precautions, assist with feeding (08/23/24 1000)  SLP Rec. for Method of Medication Administration: meds whole, meds crushed, as tolerated (08/23/24 1000)     Monitor for Signs of Aspiration: yes, notify SLP if any concerns, cough, gurgly voice, throat clearing (08/23/24 1000)     Swallow Criteria for Skilled Therapeutic Interventions Met: demonstrates skilled criteria (08/23/24 1000)  Anticipated Discharge Disposition (SLP): home with home health (08/23/24 1000)  Rehab Potential/Prognosis, Swallowing: good, to achieve stated therapy goals (08/23/24 1000)  Therapy Frequency (Swallow): PRN (08/23/24 1000)  Predicted Duration Therapy Intervention (Days): until discharge (08/23/24 1000)  Oral Care Recommendations: Oral Care BID/PRN, Before ice/water (08/23/24 1000)        Plan for Continued Treatment (SLP): continue treatment per plan of care (08/23/24 1000)       SWALLOW EVALUATION (Last 72 Hours)       SLP Adult Swallow Evaluation       Row Name 08/23/24 1000       Rehab Evaluation    Document Type therapy note (daily note)  -LF    Subjective Information no complaints  -LF    Patient Observations alert;cooperative;agree to therapy  -LF    Patient Effort good  -LF    Comment Pt seen for skilled ST targeting dysphagia this date. Upon entry, pt awake, alert, and sitting upright in bed w/ meal tray. Pt's family endorse diet tolerance. Pt assessed w/  HTL by straw. Pt self fed and took appropriate sized sips. Adequate labial seal w/ no anterior loss. Oral transit WFL. No clinical s/s of aspiration observed at any time. Pt's family reports possible d/c this date. Discussed ST recommendations of contiuing stc (ground) and htl diet w/ safe swallow/aspiration precautions and continued dysphagia therapy. She expressed understanding. ST will continue to follow.  -    Symptoms Noted During/After Treatment none  -LF       General Information    Patient Profile Reviewed yes  -LF       SLP Evaluation Clinical Impression    SLP Swallowing Diagnosis mild;oral dysphagia;suspect acute-on-chronic;pharyngeal dysphagia  -    Functional Impact risk of aspiration/pneumonia;risk of malnutrition  -    Rehab Potential/Prognosis, Swallowing good, to achieve stated therapy goals  -    Swallow Criteria for Skilled Therapeutic Interventions Met demonstrates skilled criteria  -       SLP Treatment Clinical Impressions    Plan for Continued Treatment (SLP) continue treatment per plan of care  -       Recommendations    Therapy Frequency (Swallow) PRN  -    Predicted Duration Therapy Intervention (Days) until discharge  -    SLP Diet Recommendation honey thick liquids;ground  -    Recommended Precautions and Strategies upright posture during/after eating;small bites of food and sips of liquid;alternate between small bites of food and sips of liquid;general aspiration precautions;reflux precautions;assist with feeding  -    Oral Care Recommendations Oral Care BID/PRN;Before ice/water  -    SLP Rec. for Method of Medication Administration meds whole;meds crushed;as tolerated  -    Monitor for Signs of Aspiration yes;notify SLP if any concerns;cough;gurgly voice;throat clearing  -    Anticipated Discharge Disposition (SLP) home with home health  -       Swallow Goals (SLP)    Swallow LTGs Swallow Long Term Goal (free text)  -    Swallow STGs diet tolerance goal  selection (SLP);pharyngeal strengthening exercise goal selection (SLP);swallow management recall goal selection (SLP)  -LF    Diet Tolerance Goal Selection (SLP) Swallow Short Term Goal 1  -LF    Pharyngeal Strengthening Exercise Goal Selection (SLP) pharyngeal strengthening exercise, SLP goal 1  -LF    Swallow Management Recall Goal Selection (SLP) swallow management recall, SLP goal 1  -LF       (LTG) Swallow    (LTG) Swallow Pt will maximize swallow function for least restrictive PO diet, exhibiting no complication associated with dysphagia, adequate PO intake, and demonstrating independent use of swallow compensations  -LF    Time Frame (Swallow Long Term Goal) by discharge  -LF    Progress/Outcomes (Swallow Long Term Goal) goal ongoing  -LF       (STG) Swallow 1    (STG) Swallow 1 The patient will participate in a meal/follow-up assessment to determine safety and adequacy of recommended diet, independent use of safe swallow compensations, pt/family education and additional goals/recommendations to follow.  -LF    Time Frame (Swallow Short Term Goal 1) 1 week  -LF    Progress/Outcomes (Swallow Short Term Goal 1) goal ongoing  -LF       (STG) Pharyngeal Strengthening Exercise Goal 1 (SLP)    Activity (Pharyngeal Strengthening Goal 1, SLP) increase pharyngeal sensation  -LF    Increase Pharyngeal Sensation shaker;chin tuck against resistance (CTAR);EMST;hard effortful swallow;nj  -LF    [place]/Accuracy (Pharyngeal Strengthening Goal 1, SLP) with minimal cues (75-90% accuracy)  -LF    Time Frame (Pharyngeal Strengthening Goal 1, SLP) 1 week  -LF    Progress/Outcomes (Pharyngeal Strengthening Goal 1, SLP) goal ongoing  -LF       (STG) Swallow Management Recall Goal 1 (SLP)    Activity (Swallow Management Recall Goal 1, SLP) recall of;aspiration precautions;reflux precautions;oral care recommendations;safe diet/liquid level;safe diet level/texture;safe liquid viscosity;rationale for use of  strategies/techniques  -LF    [place]/Accuracy (Swallow Management Recall Goal 1, SLP) with moderate cues (50-74% accuracy)  -LF    Time Frame (Swallow Management Recall Goal 1, SLP) 1 week  -LF    Progress/Outcomes (Swallow Management Recall Goal 1, SLP) goal ongoing  -LF              User Key  (r) = Recorded By, (t) = Taken By, (c) = Cosigned By      Initials Name Effective Dates    LF Anai Gomes SLP 06/16/21 -                     EDUCATION  The patient has been educated in the following areas:   Dysphagia (Swallowing Impairment) Oral Care/Hydration Modified Diet Instruction.                  Time Calculation:                VELMA Duran  8/23/2024

## 2024-08-23 NOTE — PLAN OF CARE
Goal Outcome Evaluation:  Plan of Care Reviewed With: patient        Progress: no change  Outcome Evaluation: patient will be discharging home with family. Patient is on 2L nc.

## 2024-08-23 NOTE — DISCHARGE SUMMARY
Date of Discharge:  8/23/2024    Discharge Diagnosis:   Atrial fibrillation with RVR  Acute on chronic respiratory failure with hypoxia and hypercapnia  Rhinovirus  Chronic pleural effusion  Severe mitral regurgitation  Hiatal hernia  Dysphagia  Malnutrition  Osteoarthritis  Hyperlipidemia  Essential hypertension    Presenting Problem/History of Present Illness  Active Hospital Problems    Diagnosis  POA    **Atrial fibrillation with rapid ventricular response [I48.91]  Yes    Acute respiratory failure with hypoxia and hypercapnia [J96.01, J96.02]  Unknown    Severe malnutrition [E43]  Yes    Pneumonia due to infectious organism [J18.9]  Yes    Acute on chronic congestive heart failure [I50.9]  Unknown    History of osteoporotic pathological fracture [Z87.310]  Not Applicable    Hypoxia [R09.02]  Yes    Hyperlipidemia [E78.5]  Yes    Chronic anticoagulation [Z79.01]  Not Applicable    Hiatal hernia [K44.9]  Yes    Hypertension [I10]  Yes      Resolved Hospital Problems   No resolved problems to display.          Hospital Course    Patient is a 89 y.o. female presented with past medical history of atrial fibrillation on warfarin, recurrent pleural effusion,  CAD, GERD, hypertension, constipation, oxygen dependent, and large hiatal hernia with recurrent aspiration pneumonia. She presented to ED on 8/16/2024 with progressive cough and shortness of breath. Patient recently was discharged from hospital and went to rehab.  She has been home for 1 week.  Family reports Monday she started feeling bad.  She was having worsening shortness of breath.  She did see her pulmonologist who started her on cefdinir.  Her symptoms progressed so family brought her to ER to further evaluation.  She was found to be in atrial fibrillation with RVR.  While in ED she was found to be positive for rhinovirus.  She was started on Cardizem drip and cardiology was consulted.  Patient has had recurrent pleural effusions with previous  thoracentesis however she is no longer a candidate for intervention for thoracentesis and/or a candidate for any further invasive procedures for severe mitral regurgitation.  Patient was gently diuresed treated with supportive care. Rate has been controlled and INR therapeutic. She will discharge home with family and HH. She will discharge on antibiotic for recurrent aspiration pneumonia. There was discussion of hospice, and when I spoke to medical POA she stated they would go home with HH and have further family discussions in regard to hospice care.    Procedures Performed         Consults:   Consults       Date and Time Order Name Status Description    8/17/2024  2:28 PM Inpatient Pulmonology Consult Completed     8/16/2024  9:54 PM Inpatient Cardiology Consult      8/16/2024  6:02 PM Family Medicine Consult              Pertinent Test Results:    Lab Results (most recent)       Procedure Component Value Units Date/Time    Protime-INR [[id number]]  (Normal) Collected: 08/23/24 0109    Specimen: Blood from Arm, Left Updated: 08/23/24 0124     Protime 21.2 Seconds      INR 2.05    Protime-INR [[id number]]  (Normal) Collected: 08/22/24 0303    Specimen: Blood Updated: 08/22/24 0323     Protime 22.4 Seconds      INR 2.18    Blood Culture - Blood, Arm, Left [[id number]]  (Normal) Collected: 08/16/24 1704    Specimen: Blood from Arm, Left Updated: 08/21/24 1730     Blood Culture No growth at 5 days    Narrative:      Less than seven (7) mL's of blood was collected.  Insufficient quantity may yield false negative results.    Blood Culture - Blood, Arm, Left [[id number]]  (Normal) Collected: 08/16/24 1650    Specimen: Blood from Arm, Left Updated: 08/21/24 1715     Blood Culture No growth at 5 days    Narrative:      Less than seven (7) mL's of blood was collected.  Insufficient quantity may yield false negative results.    CBC & Differential [[id number]]  (Abnormal) Collected: 08/20/24 0333    Specimen: Blood from Arm, Right  Updated: 08/20/24 0419    Narrative:      The following orders were created for panel order CBC & Differential.  Procedure                               Abnormality         Status                     ---------                               -----------         ------                     CBC Auto Differential[[id number]]        Abnormal            Final result               Scan Slide[[id number]]                                       Final result                 Please view results for these tests on the individual orders.    Scan Slide [[id number]] Collected: 08/20/24 0333    Specimen: Blood from Arm, Right Updated: 08/20/24 0419     Scan Slide --     Comment: See Manual Differential Results       Manual Differential [[id number]]  (Abnormal) Collected: 08/20/24 0333    Specimen: Blood from Arm, Right Updated: 08/20/24 0419     Neutrophil % 69.0 %      Lymphocyte % 13.0 %      Monocyte % 6.0 %      Eosinophil % 3.0 %      Bands %  1.0 %      Metamyelocyte % 2.0 %      Myelocyte % 5.0 %      Atypical Lymphocyte % 1.0 %      Neutrophils Absolute 10.89 10*3/mm3      Lymphocytes Absolute 2.18 10*3/mm3      Monocytes Absolute 0.93 10*3/mm3      Eosinophils Absolute 0.47 10*3/mm3      Anisocytosis Slight/1+     Elliptocytes Slight/1+     Macrocytes Slight/1+     Poikilocytes Slight/1+     WBC Morphology Normal     Large Platelets Slight/1+    CBC Auto Differential [[id number]]  (Abnormal) Collected: 08/20/24 0333    Specimen: Blood from Arm, Right Updated: 08/20/24 0419     WBC 15.55 10*3/mm3      RBC 3.00 10*6/mm3      Hemoglobin 10.0 g/dL      Hematocrit 31.6 %      .3 fL      MCH 33.3 pg      MCHC 31.6 g/dL      RDW 15.4 %      RDW-SD 59.2 fl      MPV 11.0 fL      Platelets 279 10*3/mm3     Narrative:      The previously reported component NRBC is no longer being reported. Previous result was 0.3 /100 WBC (Reference Range: 0.0-0.2 /100 WBC) on 8/20/2024 at 0344 EDT.    Basic Metabolic Panel [[id number]]  (Abnormal)  Collected: 08/20/24 0333    Specimen: Blood from Arm, Right Updated: 08/20/24 0409     Glucose 93 mg/dL      BUN 19 mg/dL      Creatinine 0.43 mg/dL      Sodium 144 mmol/L      Potassium 4.4 mmol/L      Chloride 102 mmol/L      CO2 36.5 mmol/L      Calcium 8.9 mg/dL      BUN/Creatinine Ratio 44.2     Anion Gap 5.5 mmol/L      eGFR 89.1 mL/min/1.73     Narrative:      GFR Normal >60  Chronic Kidney Disease <60  Kidney Failure <15    The GFR formula is only valid for adults with stable renal function between ages 18 and 70.    Respiratory Culture - Sputum, Cough [[id number]] Collected: 08/19/24 1823    Specimen: Sputum from Cough Updated: 08/19/24 2026     Respiratory Culture Rejected     Gram Stain Few (2+) WBCs seen      Rare (1+) Epithelial cells seen      Yeast    Narrative:      Specimen rejected due to oropharyngeal contamination. Please reorder and recollect specimen if clinically necessary.    Basic Metabolic Panel [[id number]]  (Abnormal) Collected: 08/19/24 0426    Specimen: Blood Updated: 08/19/24 0501     Glucose 92 mg/dL      BUN 18 mg/dL      Creatinine 0.45 mg/dL      Sodium 143 mmol/L      Potassium 4.1 mmol/L      Chloride 101 mmol/L      CO2 37.0 mmol/L      Calcium 8.6 mg/dL      BUN/Creatinine Ratio 40.0     Anion Gap 5.0 mmol/L      eGFR 88.2 mL/min/1.73     Narrative:      GFR Normal >60  Chronic Kidney Disease <60  Kidney Failure <15    The GFR formula is only valid for adults with stable renal function between ages 18 and 70.    Magnesium [[id number]]  (Normal) Collected: 08/19/24 0426    Specimen: Blood Updated: 08/19/24 0501     Magnesium 1.9 mg/dL     Urinalysis, Microscopic Only - Urine, Clean Catch [[id number]]  (Abnormal) Collected: 08/18/24 1527    Specimen: Urine, Clean Catch Updated: 08/18/24 1539     RBC, UA 0-2 /HPF      WBC, UA 0-2 /HPF      Comment: Urine culture not indicated.        Bacteria, UA None Seen /HPF      Squamous Epithelial Cells, UA 3-6 /HPF      Hyaline Casts, UA 3-6  /LPF      Methodology Automated Microscopy    Urinalysis With Culture If Indicated - Urine, Clean Catch [[id number]]  (Abnormal) Collected: 08/18/24 1527    Specimen: Urine, Clean Catch Updated: 08/18/24 1537     Color, UA Yellow     Appearance, UA Clear     pH, UA <=5.0     Specific Gravity, UA 1.011     Glucose, UA Negative     Ketones, UA Negative     Bilirubin, UA Negative     Blood, UA Negative     Protein, UA Negative     Leuk Esterase, UA Trace     Nitrite, UA Negative     Urobilinogen, UA 0.2 E.U./dL    Narrative:      In absence of clinical symptoms, the presence of pyuria, bacteria, and/or nitrites on the urinalysis result does not correlate with infection.    Magnesium [[id number]]  (Normal) Collected: 08/18/24 0557    Specimen: Blood from Arm, Right Updated: 08/18/24 0653     Magnesium 1.8 mg/dL     STAT Lactic Acid, Reflex [[id number]]  (Normal) Collected: 08/18/24 0557    Specimen: Blood from Arm, Right Updated: 08/18/24 0642     Lactate 2.0 mmol/L     CBC & Differential [[id number]]  (Abnormal) Collected: 08/18/24 0557    Specimen: Blood from Arm, Right Updated: 08/18/24 0612    Narrative:      The following orders were created for panel order CBC & Differential.  Procedure                               Abnormality         Status                     ---------                               -----------         ------                     CBC Auto Differential[[id number]]        Abnormal            Final result               Scan Slide[[id number]]                                                                    Please view results for these tests on the individual orders.    CBC Auto Differential [[id number]]  (Abnormal) Collected: 08/18/24 0557    Specimen: Blood from Arm, Right Updated: 08/18/24 0612     WBC 13.76 10*3/mm3      RBC 2.84 10*6/mm3      Hemoglobin 9.4 g/dL      Hematocrit 30.1 %      .0 fL      MCH 33.1 pg      MCHC 31.2 g/dL      RDW 15.0 %      RDW-SD 57.9 fl      MPV 11.5 fL       Platelets 226 10*3/mm3      Neutrophil % 66.8 %      Lymphocyte % 8.9 %      Monocyte % 15.4 %      Eosinophil % 0.9 %      Basophil % 1.1 %      Immature Grans % 6.9 %      Neutrophils, Absolute 9.19 10*3/mm3      Lymphocytes, Absolute 1.22 10*3/mm3      Monocytes, Absolute 2.12 10*3/mm3      Eosinophils, Absolute 0.13 10*3/mm3      Basophils, Absolute 0.15 10*3/mm3      Immature Grans, Absolute 0.95 10*3/mm3      nRBC 0.4 /100 WBC     STAT Lactic Acid, Reflex [[id number]]  (Abnormal) Collected: 08/16/24 2313    Specimen: Blood Updated: 08/16/24 2353     Lactate 2.1 mmol/L     High Sensitivity Troponin T 2Hr [[id number]]  (Abnormal) Collected: 08/16/24 1915    Specimen: Blood Updated: 08/16/24 1941     HS Troponin T 31 ng/L      Troponin T Delta 4 ng/L     Narrative:      High Sensitive Troponin T Reference Range:  <14.0 ng/L- Negative Female for AMI  <22.0 ng/L- Negative Male for AMI  >=14 - Abnormal Female indicating possible myocardial injury.  >=22 - Abnormal Male indicating possible myocardial injury.   Clinicians would have to utilize clinical acumen, EKG, Troponin, and serial changes to determine if it is an Acute Myocardial Infarction or myocardial injury due to an underlying chronic condition.         Blood Gas, Arterial - [[id number]]  (Abnormal) Collected: 08/16/24 1926    Specimen: Arterial Blood Updated: 08/1934     Site Right Radial     Gulshan's Test Positive     pH, Arterial 7.411 pH units      pCO2, Arterial 57.5 mm Hg      pO2, Arterial 92.5 mm Hg      HCO3, Arterial 36.5 mmol/L      Base Excess, Arterial 10.3 mmol/L      Comment: Serial Number: [id number]:  [id number]        O2 Saturation, Arterial 97.0 %      CO2 Content 38.3 mmol/L      Barometric Pressure for Blood Gas --     Comment: N/A        Modality BiPap     FIO2 60 %      PEEP 5     Hemodilution No     Respiratory Rate 26     PO2/FIO2 154    MRSA Screen, PCR (Inpatient) - Swab, Nares [[id number]]  (Normal) Collected: 08/16/24 6176     Specimen: Swab from Nares Updated: 08/16/24 1858     MRSA PCR No MRSA Detected    Narrative:      The negative predictive value of this diagnostic test is high and should only be used to consider de-escalating anti-MRSA therapy. A positive result may indicate colonization with MRSA and must be correlated clinically.    Respiratory Panel PCR w/COVID-19(SARS-CoV-2) KEYONA/FABI/MISTY/PAD/COR/ALISSON In-House, NP Swab in UTM/VTM, 2 HR TAT - Swab, Nasopharynx [[id number]]  (Abnormal) Collected: 08/16/24 1727    Specimen: Swab from Nasopharynx Updated: 08/16/24 1841     ADENOVIRUS, PCR Not Detected     Coronavirus 229E Not Detected     Coronavirus HKU1 Not Detected     Coronavirus NL63 Not Detected     Coronavirus OC43 Not Detected     COVID19 Not Detected     Human Metapneumovirus Not Detected     Human Rhinovirus/Enterovirus Detected     Influenza A PCR Not Detected     Influenza B PCR Not Detected     Parainfluenza Virus 1 Not Detected     Parainfluenza Virus 2 Not Detected     Parainfluenza Virus 3 Not Detected     Parainfluenza Virus 4 Not Detected     RSV, PCR Not Detected     Bordetella pertussis pcr Not Detected     Bordetella parapertussis PCR Not Detected     Chlamydophila pneumoniae PCR Not Detected     Mycoplasma pneumo by PCR Not Detected    Narrative:      In the setting of a positive respiratory panel with a viral infection PLUS a negative procalcitonin without other underlying concern for bacterial infection, consider observing off antibiotics or discontinuation of antibiotics and continue supportive care. If the respiratory panel is positive for atypical bacterial infection (Bordetella pertussis, Chlamydophila pneumoniae, or Mycoplasma pneumoniae), consider antibiotic de-escalation to target atypical bacterial infection.    aPTT [[id number]]  (Abnormal) Collected: 08/16/24 1650    Specimen: Blood Updated: 08/16/24 1734     PTT 41.8 seconds     High Sensitivity Troponin T [[id number]]  (Abnormal) Collected: 08/16/24  1650    Specimen: Blood Updated: 08/16/24 1729     HS Troponin T 27 ng/L     Narrative:      High Sensitive Troponin T Reference Range:  <14.0 ng/L- Negative Female for AMI  <22.0 ng/L- Negative Male for AMI  >=14 - Abnormal Female indicating possible myocardial injury.  >=22 - Abnormal Male indicating possible myocardial injury.   Clinicians would have to utilize clinical acumen, EKG, Troponin, and serial changes to determine if it is an Acute Myocardial Infarction or myocardial injury due to an underlying chronic condition.         BNP [[id number]]  (Abnormal) Collected: 08/16/24 1650    Specimen: Blood Updated: 08/16/24 1729     proBNP 7,361.0 pg/mL     Narrative:      This assay is used as an aid in the diagnosis of individuals suspected of having heart failure. It can be used as an aid in the diagnosis of acute decompensated heart failure (ADHF) in patients presenting with signs and symptoms of ADHF to the emergency department (ED). In addition, NT-proBNP of <300 pg/mL indicates ADHF is not likely.    Age Range Result Interpretation  NT-proBNP Concentration (pg/mL:      <50             Positive            >450                   Gray                 300-450                    Negative             <300    50-75           Positive            >900                  Gray                300-900                  Negative            <300      >75             Positive            >1800                  Gray                300-1800                  Negative            <300    Comprehensive Metabolic Panel [[id number]]  (Abnormal) Collected: 08/16/24 1650    Specimen: Blood Updated: 08/16/24 1729     Glucose 206 mg/dL      BUN 26 mg/dL      Creatinine 0.55 mg/dL      Sodium 141 mmol/L      Potassium 4.8 mmol/L      Chloride 100 mmol/L      CO2 30.3 mmol/L      Calcium 9.3 mg/dL      Total Protein 6.9 g/dL      Albumin 4.0 g/dL      ALT (SGPT) 20 U/L      AST (SGOT) 38 U/L      Alkaline Phosphatase 139 U/L      Total Bilirubin  0.8 mg/dL      Globulin 2.9 gm/dL      A/G Ratio 1.4 g/dL      BUN/Creatinine Ratio 47.3     Anion Gap 10.7 mmol/L      eGFR 84.0 mL/min/1.73     Narrative:      GFR Normal >60  Chronic Kidney Disease <60  Kidney Failure <15    The GFR formula is only valid for adults with stable renal function between ages 18 and 70.    POC Lactate [[id number]]  (Abnormal) Collected: 08/16/24 1655    Specimen: Blood Updated: 08/16/24 1703     Lactate 2.3 mmol/L      Comment: Serial Number: [id number]:  [id number]       POC Lactate [[id number]]  (Abnormal) Collected: 08/16/24 1649    Specimen: Arterial Blood Updated: 08/16/24 1658     Lactate 2.3 mmol/L      Comment: Serial Number: [id number]:  [id number]        Notified Time --     Notified Who Dr Capone     Read Back Yes    Blood Gas, Arterial - [[id number]]  (Abnormal) Collected: 08/16/24 1649    Specimen: Arterial Blood Updated: 08/16/24 1657     Site Right Radial     Gulshan's Test Positive     pH, Arterial 7.294 pH units      pCO2, Arterial 76.3 mm Hg      pO2, Arterial 66.4 mm Hg      HCO3, Arterial 37.0 mmol/L      Base Excess, Arterial 7.9 mmol/L      Comment: Serial Number: [id number]:  [id number]        O2 Saturation, Arterial 89.2 %      Barometric Pressure for Blood Gas --     Comment: N/A        Modality NRB     FIO2 100 %      Notified Who Dr Capone     Read Back Yes     Notified Time --     Hemodilution No     PO2/FIO2 66    POC Creatinine [[id number]]  (Abnormal) Collected: 08/16/24 1649    Specimen: Arterial Blood Updated: 08/16/24 1657     Creatinine 0.52 mg/dL      Comment: Serial Number: [id number]:  [id number]        eGFR 85.1 mL/min/1.73     POCT Electrolytes +HGB +HCT [[id number]]  (Abnormal) Collected: 08/16/24 1649    Specimen: Arterial Blood Updated: 08/16/24 1657     Sodium 141 mmol/L      POC Potassium 4.9 mmol/L      Ionized Calcium 1.29 mmol/L      Comment: Serial Number: [id number]:  [id number]        Glucose 233 mg/dL      Hematocrit 37 %       Hemoglobin 12.5 g/dL     POC Glucose Once [[id number]]  (Abnormal) Collected: 08/16/24 1649    Specimen: Arterial Blood Updated: 08/16/24 1657     Glucose 233 mg/dL      Comment: Serial Number: [id number]:  [id number]                Results for orders placed during the hospital encounter of 02/23/24    Adult Transesophageal Echo (JEFF) W/ Cont if Necessary Per Protocol    Interpretation Summary    Left ventricular ejection fraction appears to be 56 - 60%.    Severe mitral valve regurgitation is present.    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    Clinical correlation required         Condition on Discharge:  Stable    Vital Signs  Temp:  [97.4 °F (36.3 °C)-98.7 °F (37.1 °C)] 97.8 °F (36.6 °C)  Heart Rate:  [] 90  Resp:  [16-27] 22  BP: ()/(52-68) 100/58      Physical Exam  Vitals reviewed.   Constitutional:       Appearance: She is not ill-appearing.   HENT:      Head: Normocephalic and atraumatic.      Right Ear: External ear normal.      Left Ear: External ear normal.      Nose: Nose normal.      Mouth/Throat:      Mouth: Mucous membranes are moist.   Eyes:      General:         Right eye: No discharge.         Left eye: No discharge.   Cardiovascular:      Rate and Rhythm: Normal rate and regular rhythm.      Pulses: Normal pulses.      Heart sounds: Normal heart sounds.   Pulmonary:      Breath sounds: Normal breath sounds.   Abdominal:      General: Bowel sounds are normal.      Palpations: Abdomen is soft.   Musculoskeletal:         General: Normal range of motion.      Cervical back: Normal range of motion.   Skin:     General: Skin is warm and dry.   Neurological:      Mental Status: She is alert and oriented to person, place, and time.   Psychiatric:         Behavior: Behavior normal.              Discharge Disposition  Home or Self Care    Discharge  Medications     Discharge Medications        New Medications        Instructions Start Date   guaiFENesin 600 MG 12 hr tablet  Commonly known as: MUCINEX   600 mg, Oral, Every 12 Hours Scheduled             Continue These Medications        Instructions Start Date   acetaminophen 325 MG tablet  Commonly known as: TYLENOL   650 mg, Oral, Every 4 Hours PRN      cefuroxime 250 MG tablet  Commonly known as: CEFTIN   250 mg, Oral, Daily      cholecalciferol 10 MCG (400 UNIT) tablet  Commonly known as: VITAMIN D3   1 tablet, Oral, Daily      DIGESTIVE ENZYMES PO   1 capsule, Oral, 3 Times Daily      docusate sodium 250 MG capsule  Commonly known as: COLACE   250 mg, Oral, 2 Times Daily      famotidine 20 MG tablet  Commonly known as: Pepcid   20 mg, Oral, Daily      furosemide 40 MG tablet  Commonly known as: LASIX   1 tablet, Oral, Daily      latanoprost 0.005 % ophthalmic solution  Commonly known as: XALATAN   1 drop, Both Eyes, Nightly      metoclopramide 5 MG tablet  Commonly known as: REGLAN   5 mg, Oral, 4 Times Daily Before Meals & Nightly      metoprolol succinate XL 25 MG 24 hr tablet  Commonly known as: TOPROL-XL   12.5 mg, Oral, Every 12 Hours Scheduled      midodrine 2.5 MG tablet  Commonly known as: PROAMATINE   2.5 mg, Oral, 2 Times Daily      Milk of Magnesia 400 MG/5ML suspension  Generic drug: magnesium hydroxide   30 mL, Oral, Daily PRN      mirtazapine 15 MG tablet  Commonly known as: REMERON   15 mg, Oral, Nightly      O2  Commonly known as: OXYGEN   3 L/min, Inhalation, Continuous      potassium chloride 20 MEQ CR tablet  Commonly known as: KLOR-CON M20   20 mEq, Oral, Daily      sennosides-docusate 8.6-50 MG per tablet  Commonly known as: PERICOLACE   1 tablet, Oral, 2 Times Daily      warfarin 3 MG tablet  Commonly known as: COUMADIN   1 tablet, Oral, Daily Warfarin      Coumadin 3 MG tablet  Generic drug: warfarin   3 mg, Oral, Nightly               Discharge Diet:   Diet Instructions       Diet:  Regular/House Diet; Soft to Chew (NDD 3); Chopped Meat; Thin (IDDSI 0)      Discharge Diet: Regular/House Diet    Texture: Soft to Chew (NDD 3)    Soft to Chew: Chopped Meat    Fluid Consistency: Thin (IDDSI 0)            Activity at Discharge:   Activity Instructions       Activity as Tolerated              Follow-up Appointments  Future Appointments   Date Time Provider Department Center   10/29/2024 12:45 PM Theodore Vital MD [facility]     Additional Instructions for the Follow-ups that You Need to Schedule       Discharge Follow-up with PCP   As directed       Currently Documented PCP:    Cristal Goodwin MD    PCP Phone Number:    [phone]     Follow Up Details: 2 weeks                Test Results Pending at Discharge       ALMA Heard  08/23/24  16:12 EDT    Time: Discharge 35 minutes spent in face to face, coordination of care, and chart review

## 2024-08-24 ENCOUNTER — HOME CARE VISIT (OUTPATIENT)
Dept: HOME HEALTH SERVICES | Facility: HOME HEALTHCARE | Age: 89
End: 2024-08-24
Payer: MEDICARE

## 2024-08-24 VITALS
BODY MASS INDEX: 18.04 KG/M2 | DIASTOLIC BLOOD PRESSURE: 65 MMHG | HEART RATE: 89 BPM | WEIGHT: 101.8 LBS | SYSTOLIC BLOOD PRESSURE: 110 MMHG | TEMPERATURE: 98.3 F | HEIGHT: 63 IN | RESPIRATION RATE: 17 BRPM | OXYGEN SATURATION: 93 %

## 2024-08-24 PROCEDURE — G0299 HHS/HOSPICE OF RN EA 15 MIN: HCPCS

## 2024-08-24 NOTE — HOME HEALTH
Resumption of Care Note: Pts daughter reports pt was getting more and more SOA and her oxygen levels were running very low so she took her back to [facility] ER. Pt was found to be in afib with RVR. Pt was also being treated for her ongoing PNA. Pt was stabalized and DC back home with [facility]. Pt still has a moist cough but unable to cough anything up at this time. Pt encouraged to get up and move around as much as possible, to work on her incentive spirometer, and drink plenty of fluids as well as take her meds as prescribed. Pt is agreeable with PT and SLP abdoulaye at this time.     Reason for hospitalization/new problems: Afib RVR, increase SOA, PNA    FATOUMATA [place] Findings:    New/changed medications: antibiotics continued, no new changes    New/changed orders: wound care to stage 2 coccyx    Educated on Emergency Plan, steps to take prior to going to the ER and when to Call [facility]:  reviewed    Plan/Focus of Care and Skilled need: CHF

## 2024-08-24 NOTE — OUTREACH NOTE
Prep Survey      Flowsheet Row Responses   [facility] facility patient discharged from? [facility]   Is LACE score < 7 ? No   Eligibility Readm Mgmt   Discharge diagnosis Atrial fibrillation with RVR   Does the patient have one of the following disease processes/diagnoses(primary or secondary)? Other   Does the patient have Home health ordered? Yes   What is the Home health agency?  [facility]   Is there a DME ordered? No   Prep survey completed? Yes            KATHY MARTÍNEZ - Registered Nurse

## 2024-08-24 NOTE — Clinical Note
Resumption of Care Note: Pts daughter reports pt was getting more and more SOA and her oxygen levels were running very low so she took her back to [facility] ER. Pt was found to be in afib with RVR. Pt was also being treated for her ongoing PNA. Pt was stabalized and DC back home with [facility]. Pt still has a moist cough but unable to cough anything up at this time. Pt encouraged to get up and move around as much as possible, to work on her incentive spirometer, and drink plenty of fluids as well as take her meds as prescribed. Pt is agreeable with PT and SLP abdoulaye at this time.     Reason for hospitalization/new problems: Afib RVR, increase SOA, PNA    FATOUM[place] Findings:    New/changed medications: antibiotics continued, no new changes    New/changed orders: wound care to stage 2 coccyx    Educated on Emergency Plan, steps to take prior to going to the ER and when to Call [facility]:  reviewed    Plan/Focus of Care and Skilled need: CHF

## 2024-08-26 ENCOUNTER — TELEPHONE (OUTPATIENT)
Dept: PHARMACY | Facility: HOSPITAL | Age: 89
End: 2024-08-26
Payer: MEDICARE

## 2024-08-26 ENCOUNTER — HOME CARE VISIT (OUTPATIENT)
Dept: HOME HEALTH SERVICES | Facility: HOME HEALTHCARE | Age: 89
End: 2024-08-26
Payer: MEDICARE

## 2024-08-26 ENCOUNTER — READMISSION MANAGEMENT (OUTPATIENT)
Dept: CALL CENTER | Facility: HOSPITAL | Age: 89
End: 2024-08-26
Payer: MEDICARE

## 2024-08-26 VITALS
SYSTOLIC BLOOD PRESSURE: 138 MMHG | HEART RATE: 82 BPM | OXYGEN SATURATION: 96 % | DIASTOLIC BLOOD PRESSURE: 72 MMHG | RESPIRATION RATE: 18 BRPM | TEMPERATURE: 98 F

## 2024-08-26 PROCEDURE — G0299 HHS/HOSPICE OF RN EA 15 MIN: HCPCS

## 2024-08-26 PROCEDURE — G0151 HHCP-SERV OF PT,EA 15 MIN: HCPCS

## 2024-08-26 NOTE — OUTREACH NOTE
Medical Week 1 Survey      Flowsheet Row Responses   [facility] patient discharged from? [facility]   Does the patient have one of the following disease processes/diagnoses(primary or secondary)? Other   Week 1 attempt successful? No   Unsuccessful attempts Attempt 1  [Reached Grandchild Inessa, but she asked that I call the patients home #. No answer at that # or Patients daughter Shea. Unable to reach today]            LLOYD MARIE - Registered Nurse

## 2024-08-26 NOTE — HOME HEALTH
Routine Visit Note: Medications reviewed. Appetite fair. No issues with elimination. CP assessed. Patient is on 2 L NC and tolerating well.    Skill/education provided: Vital signs stable. Dressings in place; CDI. Next visit will be changed. Cardiopulmonary assessment completed. Patient denies falls and pain. Education provided about deep breathing and high risk medications. No edema present.     Patient/caregiver response: Patient verbilized understanding.     Plan for next visit: Vital signs, review medications, cardiopulmonary assessment, assess falls and pain, cp assess, wound care    Other pertinent info: na

## 2024-08-26 NOTE — TELEPHONE ENCOUNTER
Medication Management Clinic: [facility]  Clinical Outreach     Shruthi Amin is a 89 y.o. female and is a patient of the Medication Management Clinic at [facility] for anticoagulation monitoring.     Sent a secure chat to [facility] nurse, and she is scheduled to draw patient's INR this Thursday 8/29 and call to clinic.        Micha Ferrari, PharmD  Ambulatory Care Clinical Pharmacist  8/26/2024  08:23 EDT

## 2024-08-27 VITALS
OXYGEN SATURATION: 93 % | RESPIRATION RATE: 18 BRPM | DIASTOLIC BLOOD PRESSURE: 58 MMHG | HEART RATE: 67 BPM | SYSTOLIC BLOOD PRESSURE: 110 MMHG | TEMPERATURE: 97.5 F

## 2024-08-27 NOTE — PROGRESS NOTES
Cardiology Clinic Note  Theodore Vital MD, PhD    Subjective:     Encounter Date:08/13/2024      Patient ID: Shruthi Amin is a 89 y.o. female.    Chief Complaint:  Chief Complaint   Patient presents with    Edema       HPI:        I had the pleasure to see this 89-year-old female in follow-up today.  Recent hospitalization with lethargy and confusion recurrent pleural effusion, acute on chronic heart failure exacerbation with thoracentesis.  She has history of stroke 2010, hypertension hyperlipidemia, underlying permanent atrial fibrillation, history of coronary artery disease remotely, severe MR with not being an interventional candidate for open repair or MitraClip and patient does not want further procedures.  Recently with transesophageal echo demonstrating EF of 55 to 60% with severe mitral valve regurgitation ,, calcified density on the aortic valve which could be fibroblastoma or healed vegetation.  Previously we did discuss that with history of stroke and usually fibroblastoma would be removed however at 89 years old advancing age she continues with anticoagulation rather than open heart surgery.  There was also severe tricuspid valve regurgitation with severe biatrial enlargement secondary to longstanding atrial fibrillation.  She had been recently discharged from the hospital here for follow-up.  Volume appears grossly stable, A-fib controlled less than 100, blood pressure is 99/68, she is down 10 pounds from prior encounter.  In the hospital we discussed palliative care versus hospice therapy advancing age, not a candidate for intervention, we our goals will keep her out of the hospital and try to keep volume off is much as possible and to allow further family goals of care decision making.     Review of systems otherwise negative x 14 point review of systems except as mentioned above     Historical data copied forward from previous encounters in EMR including the history, exam, and assessment/plan  "has been reviewed and is unchanged unless noted otherwise.     Cardiac medicines reviewed with risk, benefits, and necessity of each discussed.     Risk and benefit of cardiac testing reviewed including death heart attack stroke pain bleeding infection need for vascular /cardiovascular surgery were discussed and the patient      Objective:         Objective  Vitals reviewed below     Physical Exam  Irregularly irregular, no rubs gallops heave or lift  Frailty noted  Soft and tender distended  Clear to auscultation other than diminished bilateral bases left greater than right , chronic  Radial pulses 1-2+ equal bilaterally  No clubbing cyanosis  Normocephalic/atraumatic  Appears elderly stated age, intact grossly  Assessment:         Assessment  Independently manage medical conditions    Recurrent Pleural Effusion  Management per pulmonary, not a candidate for recurrent drainage thoracentesis, recent hospitalization     Acute on Chronic HFpEF  Gentle diuretics ongoing with fluid restriction  Family evaluating for palliative care versus hospice  Continue Lasix daily  Free water and salt restriction     Valvular heart disease  Severe MR, not a candidate for any intervention, dental declines additional intervention supportive care     Permanent atrial fibrillation  Low dose BB resumed  On a/c with warfarin, goal INR 2-3     Bradycardia  Improved after stopping digoxin and reducing toprol     CAD  No chest pain     CVA remotely 2010     Fibroelastoma stable, 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  Please call with any questions or concerns  Theodore Vital MD, PhD    Most recent EKG as reviewed and interpreted by me:  Procedures     Most recent echo as reviewed and interpreted by me:  Results for orders placed during the hospital encounter of 02/23/24    Adult Transesophageal Echo (JEFF) W/ Cont if Necessary Per Protocol    Interpretation Summary    Left ventricular ejection fraction appears to be 56 - 60%.    Severe mitral valve regurgitation is present.    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    Clinical correlation required      Most recent stress test as reviewed and interpreted by me:      Most recent cardiac catheterization as reviewed interpreted by me:  No results found for this or any previous visit.    The following portions of the patient's history were reviewed and updated as appropriate: allergies, current medications, past family history, past medical history, past social history, past surgical history, and problem list.      ROS:  14 point review of systems negative except as mentioned above    Current Outpatient Medications:     acetaminophen (TYLENOL) 325 MG tablet, Take 2 tablets by mouth Every 4 (Four) Hours As Needed for Mild Pain., Disp: , Rfl:     Cholecalciferol 10 MCG (400 UNIT) tablet, Take 1 tablet by mouth Daily. Indications: Vitamin D Deficiency, Disp: , Rfl:     DIGESTIVE ENZYMES PO, Take 1 capsule by mouth 3 (Three) Times a Day. Indications: supplement, Disp: , Rfl:     docusate sodium (COLACE) 250 MG capsule, Take 1 capsule by mouth 2 (Two) Times a Day. Indications: Constipation, Disp: , Rfl:     famotidine (Pepcid) 20 MG tablet, Take 1 tablet by mouth Daily., Disp: , Rfl:     furosemide (LASIX) 40 MG tablet, Take 1 tablet by mouth Daily. Indications: Cardiac Failure, Disp: , Rfl:      latanoprost (XALATAN) 0.005 % ophthalmic solution, Administer 1 drop to both eyes Every Night. Indications: Wide-Angle Glaucoma, Disp: , Rfl:     magnesium hydroxide (Milk of Magnesia) 400 MG/5ML suspension, Take 30 mL by mouth Daily As Needed for Constipation. Indications: Constipation, Disp: , Rfl:     metoclopramide (REGLAN) 5 MG tablet, Take 1 tablet by mouth 4 (Four) Times a Day Before Meals & at Bedtime. Indications: Gastroesophageal Reflux Disease, Disp: 120 tablet, Rfl: 1    metoprolol succinate XL (TOPROL-XL) 25 MG 24 hr tablet, Take 0.5 tablets by mouth Every 12 (Twelve) Hours., Disp: 60 tablet, Rfl: 1    midodrine (PROAMATINE) 2.5 MG tablet, Take 1 tablet by mouth 2 (Two) Times a Day., Disp: 60 tablet, Rfl: 3    mirtazapine (REMERON) 15 MG tablet, Take 1 tablet by mouth Every Night. Indications: Appetite Stimulant, Disp: , Rfl:     O2 (OXYGEN), Inhale 3 L/min Continuous. Indications: SOB, Disp: , Rfl:     potassium chloride (KLOR-CON M20) 20 MEQ CR tablet, Take 1 tablet by mouth Daily. Indications: Low Amount of Potassium in the Blood, Disp: , Rfl:     sennosides-docusate (senna-docusate sodium) 8.6-50 MG per tablet, Take 1 tablet by mouth 2 (Two) Times a Day. Indications: Constipation, Disp: , Rfl:     warfarin (COUMADIN) 3 MG tablet, Take 1 tablet by mouth Daily. Indications: Atrial Fibrillation, Disp: , Rfl:     warfarin (Coumadin) 3 MG tablet, Take 1 tablet by mouth Every Night. Indications: Atrial Fibrillation, Disp: , Rfl:     cefuroxime (CEFTIN) 250 MG tablet, Take 1 tablet by mouth Daily. last dose 8/27/24  Indications: Bronchitis, Disp: , Rfl:     guaiFENesin (MUCINEX) 600 MG 12 hr tablet, Take 1 tablet by mouth Every 12 (Twelve) Hours., Disp: , Rfl:     Misc Natural Products (ELDERBERRY IMMUNE COMPLEX PO), Take 1 tablet by mouth Daily. Indications: vitamin, Disp: , Rfl:     Probiotic Product (PROBIOTIC BLEND PO), Take 1 capsule by mouth Daily. Indications: probiotic, Disp: , Rfl:     Vitamins A  & D (Vitamin A & D) 5000-400 units capsule, Take 1 tablet by mouth Daily. Indications: vitamins, Disp: , Rfl:     Problem List:  Patient Active Problem List   Diagnosis    Atrial fibrillation    Chronic anticoagulation    Cerebrovascular accident (CVA)    Hiatal hernia    Hyperlipidemia    Hypertension    Osteoarthritis    Hypoxia    Compression fracture of thoracic vertebra with delayed healing    History of osteoporotic pathological fracture    Acute on chronic congestive heart failure    Pneumonia due to infectious organism    Severe malnutrition    Nonrheumatic mitral valve regurgitation    Back pain    Recurrent pleural effusion    Bradycardia    Atrial fibrillation with rapid ventricular response    Acute respiratory failure with hypoxia and hypercapnia     Past Medical History:  Past Medical History:   Diagnosis Date    Atrial fibrillation     Coronary artery disease     Atrial fibrillation    GERD (gastroesophageal reflux disease)     Glaucoma     Hiatal hernia with gastroesophageal reflux     History of osteoporotic pathological fracture     Right intertroch (7/2019)    Hx of compression fracture of spine     T12 and L1(2013); T6 (1/2022)    Hyperlipidemia     Hypertension     Osteoarthritis     Osteoporosis     on prolia(3/21/22)    Stroke     off and on dizziness from the stroke.     Past Surgical History:  Past Surgical History:   Procedure Laterality Date    BACK SURGERY      kyphoplasty    EYE SURGERY      cataract surgery    FIXATION KYPHOPLASTY LUMBAR SPINE  2013    HIP TROCHANTERIC NAILING WITH INTRAMEDULLARY HIP SCREW Right 7/20/2019    Procedure: HIP TROCHANTERIC NAILING SHORT WITH INTRAMEDULLARY HIP SCREW;  Surgeon: Edward Centeno MD;  Location: [facility] MAIN OR;  Service: Orthopedics    RECTAL SURGERY      x 3     Social History:  Social History     Socioeconomic History    Marital status:    Tobacco Use    Smoking status: Former     Current packs/day: 0.00     Types: Cigarettes      Quit date:      Years since quittin.7     Passive exposure: Past    Smokeless tobacco: Never   Vaping Use    Vaping status: Never Used   Substance and Sexual Activity    Alcohol use: No    Drug use: No    Sexual activity: Defer     Allergies:  Allergies   Allergen Reactions    Penicillins Hives    Codeine Nausea And Vomiting    Latex Rash     Immunizations:  Immunization History   Administered Date(s) Administered    Shingrix 2020, 2020    Tdap 2022            In-Office Procedure(s):  No orders to display        ASCVD RIsk Score::  The ASCVD Risk score (Dania SCOTT, et al., 2019) failed to calculate for the following reasons:    The 2019 ASCVD risk score is only valid for ages 40 to 79    The patient has a prior MI or stroke diagnosis    Imaging:    Results for orders placed in visit on 24    IMAGING SCANNED       Results for orders placed during the hospital encounter of 24    US Thoracentesis    Narrative  DATE OF EXAM:  2024 2:09 PM EDT    PROCEDURE:  US THORACENTESIS    INDICATIONS:  chronic R pleural effusion    COMPARISON:  No Comparisons Available    FLUOROSCOPIC TIME:  None    TECHNIQUE:  A detailed explanation of the procedure, including the risks, benefits, and alternatives was provided. Informed consent was obtained from the patient. A preprocedure timeout was performed. The interventional radiology nurse monitored the patient at all  times. The patient was placed upright in the bed and the right back was ultrasounded, demonstrating a moderate right pleural effusion. The right back was then marked and prepped and draped in the usual sterile fashion. The skin and subcutaneous tissues  were anesthetized with 1% lidocaine and a small skin incision was made. Next, a 4 Samoan centesis needle was advanced into the collection. A total of 450 mL of clear yellow fluid was aspirated and the needle was removed. The patient tolerated the  procedure well, without immediate  complication. Post procedure chest x-ray completed demonstrating small right basilar ex vacuo pneumothorax. The patient is asymptomatic and vital signs are stable.    FINDINGS:  See above    Impression  Technically successful right thoracentesis yielding 450 mL of clear yellow fluid utilizing ultrasound guidance.      Report dictated by: Dakota Alfaro DNP    I have personally reviewed this case and agree with the findings above:    Electronically Signed: Eddie Guillen MD  4/19/2024 3:18 PM EDT  Workstation ID: [id number]      Results for orders placed during the hospital encounter of 02/23/24    CT Abdomen Pelvis Without Contrast    Narrative  CT ABDOMEN PELVIS WO CONTRAST    Date of Exam: 3/1/2024 11:19 AM EST    Indication: Nausea/vomiting.    Comparison: CT abdomen and pelvis 3/9/2017    Technique: Axial CT images were obtained of the abdomen and pelvis without the administration of contrast. Sagittal and coronal reconstructions were performed.  Automated exposure control and iterative reconstruction methods were used.      Findings:  LUNG BASES: There is bibasilar airspace disease and pleural effusions right greater than left. There is a large hiatal hernia with the majority of the gastric body in the thoracic cavity. The heart is enlarged. There are    LIVER:  Unremarkable parenchyma without focal lesion.  BILIARY/GALLBLADDER:  Unremarkable  SPLEEN:  Unremarkable  PANCREAS:  Unremarkable  ADRENAL:  Unremarkable  KIDNEYS:  Unremarkable parenchyma with no solid mass identified. No obstruction.  No calculus identified.  GASTROINTESTINAL/MESENTERY: Evaluation of the gastrointestinal track demonstrates fecal stasis throughout compatible with constipation. There are few scattered diverticula. No evidence for acute inflammatory changes. Free fluid is present in the pelvic  cul-de-sac.  AORTA/IVC: There is calcified atherosclerotic disease.    RETROPERITONEUM/LYMPH NODES: No pathologic lymphadenopathy  noted.    REPRODUCTIVE: The uterus is atrophied.  BLADDER:  Unremarkable    OSSEUS STRUCTURES: There is multilevel degenerative disc and facet disease accentuated by lumbar scoliosis. Kyphoplasty is noted at T12. Right hip surgical hardware is noted.    Impression  Impression:    1. Large hiatal hernia with the gastric body in the thoracic cavity.  2. Bibasilar airspace disease and pleural effusions.  3. Constipation.            Electronically Signed: Michelle Fowler MD  3/1/2024 11:44 AM EST  Workstation ID: [id number]      Lab Review:   Anticoagulation Visit on 08/13/2024   Component Date Value    Protime 08/13/2024 41.4     INR 08/13/2024 4.20 (H)     Protime 08/13/2024 38.9     INR 08/13/2024 3.90 (H)    Anticoagulation Visit on 08/08/2024   Component Date Value    INR 08/08/2024 2.10    No results displayed because visit has over 200 results.      Anticoagulation Visit on 06/12/2024   Component Date Value    INR 06/11/2024 2.20    Anticoagulation Visit on 06/04/2024   Component Date Value    INR 06/04/2024 2.30    Anticoagulation Visit on 05/29/2024   Component Date Value    INR 05/29/2024 2.50    Anticoagulation Visit on 05/21/2024   Component Date Value    INR 05/21/2024 2.20    Anticoagulation Visit on 05/14/2024   Component Date Value    INR 05/14/2024 2.80    Anticoagulation Visit on 05/07/2024   Component Date Value    INR 05/07/2024 2.70    Anticoagulation Visit on 04/30/2024   Component Date Value    INR 04/30/2024 2.60    There may be more visits with results that are not included.     Recent labs reviewed and interpreted for clinical significance and application            Level of Care:           Theodore Vital MD  08/27/24  .

## 2024-08-28 ENCOUNTER — ANTICOAGULATION VISIT (OUTPATIENT)
Dept: PHARMACY | Facility: HOSPITAL | Age: 89
End: 2024-08-28
Payer: MEDICARE

## 2024-08-28 ENCOUNTER — READMISSION MANAGEMENT (OUTPATIENT)
Dept: CALL CENTER | Facility: HOSPITAL | Age: 89
End: 2024-08-28
Payer: MEDICARE

## 2024-08-28 DIAGNOSIS — Z79.01 CHRONIC ANTICOAGULATION: ICD-10-CM

## 2024-08-28 DIAGNOSIS — I48.0 PAROXYSMAL ATRIAL FIBRILLATION: Primary | Chronic | ICD-10-CM

## 2024-08-28 LAB — INR PPP: 2.8 (ref 2–3)

## 2024-08-28 NOTE — PROGRESS NOTES
Anticoagulation Clinic Progress Note - Home INR Testing     INR Goal: 2 - 3  Today's INR: 2.8 (therapeutic). INR result was called in today by Shea, patient's daughter.   Current warfarin dose:   2.5 mg upon discharge, then 3 mg daily  Last 7 days = 20.5 mg    -Of note: Patient hospitalized from -.      INR History:      Latest Ref Rng & Units 2024     4:26 AM 2024     3:33 AM 2024     4:08 AM 2024     3:03 AM 2024     1:09 AM 2024    12:00 AM 2024     9:16 AM   Anticoagulation Monitoring   INR        2.80   INR Date        2024   INR Goal        2.0-3.0   Trend        Up   Last Week Total        20.5 mg   Next Week Total        21 mg   Sun        3 mg   Mon        3 mg   Tue        -   Wed        3 mg   Thu        3 mg   Fri        3 mg   Sat        3 mg   Historical INR 2.00 - 3.00 3.30  3.11  2.62  2.18  2.05  2.80            This result is from an external source.       Anticoagulation Summary  As of 2024      INR goal:  2.0-3.0   TTR:  71.4% (4.7 y)   INR used for dosin.80 (2024)   Warfarin maintenance plan:  3 mg every day   Weekly warfarin total:  21 mg   Plan last modified:  Ruby Ferrari, PharmD (2024)   Next INR check:  9/3/2024   Priority:  High   Target end date:  Indefinite    Indications    Atrial fibrillation [I48.91]  Chronic anticoagulation [Z79.01]                 Anticoagulation Episode Summary       INR check location:  Anticoagulation Clinic    Preferred lab:      Send INR reminders to:   [facility] CLINICAL POOL    Comments:  Patient Contract: **Still need**    Last  = ; next due     Supplies mailed 24              Anticoagulation Care Providers       Provider Role Specialty Phone number    Theodore Vital MD Referring Cardiology [phone]            Clinic Interview:   Patient Findings     Negatives:  Signs/symptoms of thrombosis, Signs/symptoms of bleeding,   Laboratory test  error suspected, Change in health, Change in alcohol use,   Change in activity, Upcoming invasive procedure, Emergency department   visit, Upcoming dental procedure, Missed doses, Extra doses, Change in   medications, Change in diet/appetite, Hospital admission, Bruising, Other   complaints      Clinical Outcomes     Negatives:  Major bleeding event, Thromboembolic event,   Anticoagulation-related hospital admission, Anticoagulation-related ED   visit, Anticoagulation-related fatality        Current Medications:   Prior to Admission medications    Medication Sig Start Date End Date Taking? Authorizing Provider   acetaminophen (TYLENOL) 325 MG tablet Take 2 tablets by mouth Every 4 (Four) Hours As Needed for Mild Pain. 6/25/24   Tricia Roman MD   cefuroxime (CEFTIN) 250 MG tablet Take 1 tablet by mouth Daily. last dose 8/27/24  Indications: Bronchitis 8/13/24   Minh Bass MD   Cholecalciferol 10 MCG (400 UNIT) tablet Take 1 tablet by mouth Daily. Indications: Vitamin D Deficiency 1/1/22   Minh Bass MD   DIGESTIVE ENZYMES PO Take 1 capsule by mouth 3 (Three) Times a Day. Indications: supplement 1/1/22   Minh Bass MD   docusate sodium (COLACE) 250 MG capsule Take 1 capsule by mouth 2 (Two) Times a Day. Indications: Constipation 6/25/24   Tricia Roman MD   famotidine (Pepcid) 20 MG tablet Take 1 tablet by mouth Daily. 6/25/24   Tricia Roman MD   furosemide (LASIX) 40 MG tablet Take 1 tablet by mouth Daily. Indications: Cardiac Failure 1/1/22   Minh Bass MD   guaiFENesin (MUCINEX) 600 MG 12 hr tablet Take 1 tablet by mouth Every 12 (Twelve) Hours. 8/23/24   Josie Solano APRN   latanoprost (XALATAN) 0.005 % ophthalmic solution Administer 1 drop to both eyes Every Night. Indications: Wide-Angle Glaucoma 1/1/22   Minh Bass MD   magnesium hydroxide (Milk of Magnesia) 400 MG/5ML suspension Take 30 mL by mouth Daily As Needed for Constipation. Indications:  Constipation 3/15/24   Minh Bass MD   metoclopramide (REGLAN) 5 MG tablet Take 1 tablet by mouth 4 (Four) Times a Day Before Meals & at Bedtime. Indications: Gastroesophageal Reflux Disease 6/24/24   Josie Solano APRN   metoprolol succinate XL (TOPROL-XL) 25 MG 24 hr tablet Take 0.5 tablets by mouth Every 12 (Twelve) Hours. 6/24/24   Josie Solano APRN   midodrine (PROAMATINE) 2.5 MG tablet Take 1 tablet by mouth 2 (Two) Times a Day. 7/16/24   Theodore Vital MD   mirtazapine (REMERON) 15 MG tablet Take 1 tablet by mouth Every Night. Indications: Appetite Stimulant 3/13/24   Minh Bass MD   Misc Natural Products (ELDERBERRY IMMUNE COMPLEX PO) Take 1 tablet by mouth Daily. Indications: vitamin 7/9/22   Minh Bass MD   O2 (OXYGEN) Inhale 3 L/min Continuous. Indications: SOB 1/1/22   Minh Bass MD   potassium chloride (KLOR-CON M20) 20 MEQ CR tablet Take 1 tablet by mouth Daily. Indications: Low Amount of Potassium in the Blood 8/4/24   Minh Bass MD   Probiotic Product (PROBIOTIC BLEND PO) Take 1 capsule by mouth Daily. Indications: probiotic 8/26/24   Minh Bass MD   sennosides-docusate (senna-docusate sodium) 8.6-50 MG per tablet Take 1 tablet by mouth 2 (Two) Times a Day. Indications: Constipation 1/1/22   Minh Bass MD   Vitamins A & D (Vitamin A & D) 5000-400 units capsule Take 1 tablet by mouth Daily. Indications: vitamins 7/22/24   Minh Bass MD   warfarin (COUMADIN) 3 MG tablet Take 1 tablet by mouth Daily. Indications: Atrial Fibrillation 1/1/22   Minh Bass MD   warfarin (Coumadin) 3 MG tablet Take 1 tablet by mouth Every Night. Indications: Atrial Fibrillation 1/1/22   Minh Bass MD       Medical history:   Past Medical History:   Diagnosis Date    Atrial fibrillation     Coronary artery disease     Atrial fibrillation    GERD (gastroesophageal reflux disease)     Glaucoma      Hiatal hernia with gastroesophageal reflux     History of osteoporotic pathological fracture     Right intertroch (2019)    Hx of compression fracture of spine     T12 and L1(); T6 (2022)    Hyperlipidemia     Hypertension     Osteoarthritis     Osteoporosis     on prolia(3/21/22)    Stroke     off and on dizziness from the stroke.       Social history:   Social History     Tobacco Use    Smoking status: Former     Current packs/day: 0.00     Types: Cigarettes     Quit date:      Years since quittin.7     Passive exposure: Past    Smokeless tobacco: Never   Substance Use Topics    Alcohol use: No       Allergies:    Penicillins, Codeine, and Latex    [id number] SCORE   [id number] Score for Atrial Fibrillation Stroke Risk  Age in Years: 75+  Sex: Female  CHF History: Yes  Hypertension History: Yes  Stroke/TIA/Thromboembolism History: Yes  Vascular Disease History: Yes  Diabetes History: No  [id number] Score: 8  Stroke risks -: 8 Points. Risk of ischemic stroke: 10.8%. Risk of stroke/TIA/embolism: 15.2%      This patient is managed via a collaborative agreement, pursuant to IC 25-26-16. The signed protocol is kept in the [facility] at [address] [place] [address].    Education: Shruthi Amin has been instructed to call if any changes in medications, doses, concerns, etc. Patient was provided dosing instructions and expressed verbal understanding and has no further questions at this time.    Plan:  1. Increase 2%; warfarin 3 mg PO daily  Total weekly dose = 21 mg.   2. INR should be tested weekly and called into clinic.       Micha Ferrari, PharmD  2024  09:20 EDT

## 2024-08-28 NOTE — OUTREACH NOTE
Medical Week 1 Survey      Flowsheet Row Responses   [facility] patient discharged from? [facility]   Does the patient have one of the following disease processes/diagnoses(primary or secondary)? Other   Week 1 attempt successful? Yes   Call start time 1738   Call end time 1740   Discharge diagnosis Atrial fibrillation with RVR   Person spoke with today (if not patient) and relationship daughter Shea Cancino reviewed with patient/caregiver? Yes   Is the patient having any side effects they believe may be caused by any medication additions or changes? No   Does the patient have all medications ordered at discharge? Yes   Is the patient taking all medications as directed (includes completed medication regime)? Yes   Medication comments Completed antibiotics   Does the patient have a primary care provider?  Yes   Has the patient kept scheduled appointments due by today? Yes   What is the Home health agency?  [facility]   Has home health visited the patient within 72 hours of discharge? Yes   Did the patient receive a copy of their discharge instructions? Yes   Nursing interventions Reviewed instructions with patient   What is the patient's perception of their health status since discharge? Same   Is the patient/caregiver able to teach back signs and symptoms related to disease process for when to call PCP? Yes   Is the patient/caregiver able to teach back signs and symptoms related to disease process for when to call 911? Yes   Is the patient/caregiver able to teach back the hierarchy of who to call/visit for symptoms/problems? PCP, Specialist, Home health nurse, Urgent Care, ED, 911 Yes   If the patient is a current smoker, are they able to teach back resources for cessation? Not a smoker   Additional teach back comments She is still sleeping a lot and doing fair.   Week 1 call completed? Yes   Wrap up additional comments Denies questions or needs at this time.   Call end time 1740            Ernestina Hannon  Nurse

## 2024-08-29 ENCOUNTER — HOME CARE VISIT (OUTPATIENT)
Dept: HOME HEALTH SERVICES | Facility: HOME HEALTHCARE | Age: 89
End: 2024-08-29
Payer: MEDICARE

## 2024-08-29 VITALS
DIASTOLIC BLOOD PRESSURE: 64 MMHG | SYSTOLIC BLOOD PRESSURE: 94 MMHG | HEART RATE: 69 BPM | OXYGEN SATURATION: 97 % | TEMPERATURE: 97.7 F | RESPIRATION RATE: 18 BRPM

## 2024-08-29 PROCEDURE — G0299 HHS/HOSPICE OF RN EA 15 MIN: HCPCS

## 2024-08-30 ENCOUNTER — HOME CARE VISIT (OUTPATIENT)
Dept: HOME HEALTH SERVICES | Facility: HOME HEALTHCARE | Age: 89
End: 2024-08-30
Payer: MEDICARE

## 2024-08-30 VITALS
OXYGEN SATURATION: 96 % | HEART RATE: 74 BPM | DIASTOLIC BLOOD PRESSURE: 60 MMHG | SYSTOLIC BLOOD PRESSURE: 110 MMHG | RESPIRATION RATE: 15 BRPM

## 2024-08-30 PROCEDURE — G0152 HHCP-SERV OF OT,EA 15 MIN: HCPCS

## 2024-08-30 PROCEDURE — G0157 HHC PT ASSISTANT EA 15: HCPCS

## 2024-08-30 NOTE — HOME HEALTH
pt sitting up and is prepared for PT session.   o2 donned and pt is using at all times.   pt has been attempting hep review but daughter reports this has been very minimal.    pt is motivated to improve and return to plf      pt was limited today with standing/gait activities.  pt ambulated household distance and fatigued with request to sit.  pt was minimally unsteady upon standing and needed cues to properly position the LEs prior to standing.  pt was educated on correct form with hep review, not yet ind with cues needed to contract/hold and to improve rom.   daughter was present and educated on the activiities performed.       plan for next session- cont PT with gait trg, hep review,  balance,

## 2024-09-02 VITALS
SYSTOLIC BLOOD PRESSURE: 110 MMHG | RESPIRATION RATE: 18 BRPM | TEMPERATURE: 97.3 F | DIASTOLIC BLOOD PRESSURE: 60 MMHG | HEART RATE: 74 BPM | OXYGEN SATURATION: 98 %

## 2024-09-03 ENCOUNTER — HOME CARE VISIT (OUTPATIENT)
Dept: HOME HEALTH SERVICES | Facility: HOME HEALTHCARE | Age: 89
End: 2024-09-03
Payer: MEDICARE

## 2024-09-03 VITALS
RESPIRATION RATE: 18 BRPM | OXYGEN SATURATION: 98 % | HEART RATE: 121 BPM | TEMPERATURE: 98.2 F | DIASTOLIC BLOOD PRESSURE: 58 MMHG | SYSTOLIC BLOOD PRESSURE: 108 MMHG

## 2024-09-03 LAB — INR PPP: 3.1 (ref 2–3)

## 2024-09-03 PROCEDURE — G0151 HHCP-SERV OF PT,EA 15 MIN: HCPCS

## 2024-09-03 PROCEDURE — G0299 HHS/HOSPICE OF RN EA 15 MIN: HCPCS

## 2024-09-03 NOTE — HOME HEALTH
Routine Visit Note: Medications reviewed. Appetite well. No issues with elimination. CP assessed.     Skill/education provided: Vital signs assessed. Wound care completed; patient tolerated well. Cardiopulmonary assessment completed. Patient denies falls and pain. Education provided about wound healing, medication and high risk medications. No edema present.     Patient/caregiver response: Patient verbilized understanding.     Plan for next visit: Vital signs, review medications, cardiopulmonary assessment, assess falls and pain, cp assess, wound care    Other pertinent info: emily KEATING placed a call to MD about high heart rate and low BP; awaiting call back.

## 2024-09-04 ENCOUNTER — HOME CARE VISIT (OUTPATIENT)
Dept: HOME HEALTH SERVICES | Facility: HOME HEALTHCARE | Age: 89
End: 2024-09-04
Payer: MEDICARE

## 2024-09-04 ENCOUNTER — ANTICOAGULATION VISIT (OUTPATIENT)
Dept: PHARMACY | Facility: HOSPITAL | Age: 89
End: 2024-09-04
Payer: MEDICARE

## 2024-09-04 VITALS
DIASTOLIC BLOOD PRESSURE: 70 MMHG | TEMPERATURE: 97.4 F | BODY MASS INDEX: 16.58 KG/M2 | OXYGEN SATURATION: 98 % | WEIGHT: 93.6 LBS | SYSTOLIC BLOOD PRESSURE: 93 MMHG | HEART RATE: 93 BPM

## 2024-09-04 VITALS
SYSTOLIC BLOOD PRESSURE: 102 MMHG | TEMPERATURE: 97.8 F | RESPIRATION RATE: 18 BRPM | OXYGEN SATURATION: 93 % | HEART RATE: 88 BPM | DIASTOLIC BLOOD PRESSURE: 62 MMHG

## 2024-09-04 DIAGNOSIS — Z79.01 CHRONIC ANTICOAGULATION: Primary | ICD-10-CM

## 2024-09-04 PROCEDURE — G0153 HHCP-SVS OF S/L PATH,EA 15MN: HCPCS

## 2024-09-04 NOTE — PROGRESS NOTES
Anticoagulation Clinic Progress Note - Home INR Testing     INR Goal: 2 - 3  Today's INR: 3.1 (supratherapeutic). INR result was called in today by Shea (patient's daughter).   Current warfarin dose: warfarin 3 mg PO daily. Current total weekly dose = 21 mg per week.     *Patient's daughter reports patient had more dark, leafy greens this week than usual for her (likely to decrease INR).*    INR History:      Latest Ref Rng & Units 8/21/2024     4:08 AM 8/22/2024     3:03 AM 8/23/2024     1:09 AM 8/28/2024    12:00 AM 8/28/2024     9:16 AM 9/3/2024    12:00 AM 9/4/2024     9:22 AM   Anticoagulation Monitoring   INR      2.80  3.10   INR Date      8/28/2024  9/3/2024   INR Goal      2.0-3.0  2.0-3.0   Trend      Up  Down   Last Week Total      20.5 mg  21 mg   Next Week Total      21 mg  19.5 mg   Sun      3 mg  3 mg   Mon      3 mg  3 mg   Tue      -  3 mg   Wed      3 mg  1.5 mg   Thu      3 mg  3 mg   Fri      3 mg  3 mg   Sat      3 mg  3 mg   Historical INR 2.00 - 3.00 2.62  2.18  2.05  2.80      3.10            This result is from an external source.       Anticoagulation Summary  As of 9/4/2024      INR goal:  2.0-3.0   TTR:  71.4% (4.7 y)   INR used for dosing:  3.10 (9/3/2024)   Warfarin maintenance plan:  1.5 mg every Wed; 3 mg all other days   Weekly warfarin total:  19.5 mg   Plan last modified:  Tasia Wen, PharmD (9/4/2024)   Next INR check:  9/11/2024   Priority:  High   Target end date:  Indefinite    Indications    Atrial fibrillation [I48.91]  Chronic anticoagulation [Z79.01]                 Anticoagulation Episode Summary       INR check location:  Anticoagulation Clinic    Preferred lab:      Send INR reminders to:  [facility] CLINICAL POOL    Comments:  Patient Contract: **Still need**    Last  = 5/21; next due 6/18    Supplies mailed 8/28/24              Anticoagulation Care Providers       Provider Role Specialty Phone number    Theodore Vital MD Referring  Cardiology [phone]            Clinic Interview:   Patient Findings     Positives:  Change in diet/appetite    Negatives:  Signs/symptoms of thrombosis, Signs/symptoms of bleeding,   Laboratory test error suspected, Change in health, Change in alcohol use,   Change in activity, Upcoming invasive procedure, Emergency department   visit, Upcoming dental procedure, Missed doses, Extra doses, Change in   medications, Hospital admission, Bruising, Other complaints    Comments:  Patient's daughter reports patient had more dark, leafy greens   this week than usual for her (likely to decrease INR).       Clinical Outcomes     Negatives:  Major bleeding event, Thromboembolic event,   Anticoagulation-related hospital admission, Anticoagulation-related ED   visit, Anticoagulation-related fatality    Comments:  Patient's daughter reports patient had more dark, leafy greens   this week than usual for her (likely to decrease INR).         Current Medications:   Prior to Admission medications    Medication Sig Start Date End Date Taking? Authorizing Provider   acetaminophen (TYLENOL) 325 MG tablet Take 2 tablets by mouth Every 4 (Four) Hours As Needed for Mild Pain. 6/25/24   Tricia Roman MD   Cholecalciferol 10 MCG (400 UNIT) tablet Take 1 tablet by mouth Daily. Indications: Vitamin D Deficiency 1/1/22   Minh Bass MD   DIGESTIVE ENZYMES PO Take 1 capsule by mouth 3 (Three) Times a Day. Indications: supplement 1/1/22   Minh Bass MD   docusate sodium (COLACE) 250 MG capsule Take 1 capsule by mouth 2 (Two) Times a Day. Indications: Constipation 6/25/24   Tricia Roman MD   famotidine (Pepcid) 20 MG tablet Take 1 tablet by mouth Daily. 6/25/24   Tricia Roman MD   furosemide (LASIX) 40 MG tablet Take 1 tablet by mouth Daily. Indications: Cardiac Failure 1/1/22   Minh Bass MD   guaiFENesin (MUCINEX) 600 MG 12 hr tablet Take 1 tablet by mouth Every 12 (Twelve) Hours. 8/23/24   Josie Solano APRN    latanoprost (XALATAN) 0.005 % ophthalmic solution Administer 1 drop to both eyes Every Night. Indications: Wide-Angle Glaucoma 1/1/22   Minh Bass MD   magnesium hydroxide (Milk of Magnesia) 400 MG/5ML suspension Take 30 mL by mouth Daily As Needed for Constipation. Indications: Constipation 3/15/24   Minh Bass MD   metoclopramide (REGLAN) 5 MG tablet Take 1 tablet by mouth 4 (Four) Times a Day Before Meals & at Bedtime. Indications: Gastroesophageal Reflux Disease 6/24/24   Josie Solano APRN   metoprolol succinate XL (TOPROL-XL) 25 MG 24 hr tablet Take 0.5 tablets by mouth Every 12 (Twelve) Hours.  Patient taking differently: Take 0.5 tablets by mouth Every 12 (Twelve) Hours. Hold SBP <100  Indications: Atrial Fibrillation 6/24/24   Josie Solano APRN   midodrine (PROAMATINE) 2.5 MG tablet Take 1 tablet by mouth 2 (Two) Times a Day.  Patient taking differently: Take 1 tablet by mouth 2 (Two) Times a Day. Hold if SBP is >135  Indications: Disorder of Low Blood Pressure 7/16/24   Theodore Vital MD   mirtazapine (REMERON) 15 MG tablet Take 1 tablet by mouth Every Night. Indications: Appetite Stimulant 3/13/24   Minh Bass MD   Misc Natural Products (ELDERBERRY IMMUNE COMPLEX PO) Take 1 tablet by mouth Daily. Indications: vitamin 7/9/22   Minh Bass MD   O2 (OXYGEN) Inhale 3 L/min Continuous. Indications: SOB 1/1/22   Minh Bass MD   potassium chloride (KLOR-CON M20) 20 MEQ CR tablet Take 1 tablet by mouth Daily. Indications: Low Amount of Potassium in the Blood 8/4/24   Minh Bass MD   Probiotic Product (PROBIOTIC BLEND PO) Take 1 capsule by mouth Daily. Indications: probiotic 8/26/24   Minh Bass MD   sennosides-docusate (senna-docusate sodium) 8.6-50 MG per tablet Take 1 tablet by mouth 2 (Two) Times a Day. Indications: Constipation 1/1/22   Minh Bass MD   Vitamins A & D (Vitamin A & D) 5000-400 units capsule  Take 1 tablet by mouth Daily. Indications: vitamins 24   Provider, MD Minh   warfarin (COUMADIN) 3 MG tablet Take 1 tablet by mouth Daily. Indications: Atrial Fibrillation 22   Provider, MD Minh       Medical history:   Past Medical History:   Diagnosis Date    Atrial fibrillation     Coronary artery disease     Atrial fibrillation    GERD (gastroesophageal reflux disease)     Glaucoma     Hiatal hernia with gastroesophageal reflux     History of osteoporotic pathological fracture     Right intertroch (2019)    Hx of compression fracture of spine     T12 and L1(); T6 (2022)    Hyperlipidemia     Hypertension     Osteoarthritis     Osteoporosis     on prolia(3/21/22)    Stroke     off and on dizziness from the stroke.       Social history:   Social History     Tobacco Use    Smoking status: Former     Current packs/day: 0.00     Types: Cigarettes     Quit date:      Years since quittin.7     Passive exposure: Past    Smokeless tobacco: Never   Substance Use Topics    Alcohol use: No       Allergies:    Penicillins, Codeine, and Latex    [id number] SCORE   [id number] Score for Atrial Fibrillation Stroke Risk  Age in Years: 75+  Sex: Female  CHF History: Yes  Hypertension History: Yes  Stroke/TIA/Thromboembolism History: Yes  Vascular Disease History: Yes  Diabetes History: No  [id number] Score: 8  Stroke risks -: 8 Points. Risk of ischemic stroke: 10.8%. Risk of stroke/TIA/embolism: 15.2%    This patient is managed via a collaborative agreement, pursuant to IC 25-26-16. The signed protocol is kept in the [facility] at [address] [place] [address].    Education: Shruthi Amin has been instructed to call if any changes in medications, doses, concerns, etc. Patient was provided dosing instructions and expressed verbal understanding and has no further questions at this time.    Plan:  1. decrease by 7 %; warfarin 3 mg PO daily, except  warfarin 1.5 mg PO on Wednesday.  New total weekly dose = 19.5 mg.   2. INR should be tested weekly and called into clinic.     Counseled patient on increased bleeding risk associated with elevated INR, signs/symptoms to monitor for, and when to seek medical attention    Tasia Wen PharmD  9/4/2024  09:26 EDT

## 2024-09-04 NOTE — HOME HEALTH
"Annia Note    Patient's goal(s): \"I want to be able to eat regular food and stop having to drink these thickened liquids.\"    Services required to achieve goals: ST    Potential Issues for goal attainment: None    Problems identified: oropharyngeal dysphagia    Describe the Functional status and safety: Family is adequately preparing pt's [facility] soft diet with ground or chopped meats, adding gravies and sauces and adequately thickening liquids to the correct honey-thickened consistency. Pt utilizes her rollator to safely ambulate around her home.    Describe any environmental issues: None    Any equipment needs: None    POC confirmed with Shruthi Amin on date 9-4-24."

## 2024-09-05 ENCOUNTER — HOME CARE VISIT (OUTPATIENT)
Dept: HOME HEALTH SERVICES | Facility: HOME HEALTHCARE | Age: 89
End: 2024-09-05
Payer: MEDICARE

## 2024-09-05 PROCEDURE — G0151 HHCP-SERV OF PT,EA 15 MIN: HCPCS

## 2024-09-06 ENCOUNTER — HOME CARE VISIT (OUTPATIENT)
Dept: HOME HEALTH SERVICES | Facility: HOME HEALTHCARE | Age: 89
End: 2024-09-06
Payer: MEDICARE

## 2024-09-06 VITALS
DIASTOLIC BLOOD PRESSURE: 82 MMHG | SYSTOLIC BLOOD PRESSURE: 118 MMHG | RESPIRATION RATE: 12 BRPM | TEMPERATURE: 97.3 F | BODY MASS INDEX: 16.3 KG/M2 | HEART RATE: 82 BPM | OXYGEN SATURATION: 94 % | WEIGHT: 92 LBS

## 2024-09-06 VITALS
HEART RATE: 96 BPM | DIASTOLIC BLOOD PRESSURE: 60 MMHG | OXYGEN SATURATION: 98 % | TEMPERATURE: 97.4 F | RESPIRATION RATE: 18 BRPM | SYSTOLIC BLOOD PRESSURE: 102 MMHG

## 2024-09-06 PROCEDURE — G0162 HHC RN E&M PLAN SVS, 15 MIN: HCPCS

## 2024-09-09 ENCOUNTER — HOME CARE VISIT (OUTPATIENT)
Dept: HOME HEALTH SERVICES | Facility: HOME HEALTHCARE | Age: 89
End: 2024-09-09
Payer: MEDICARE

## 2024-09-09 VITALS
TEMPERATURE: 97.7 F | RESPIRATION RATE: 17 BRPM | OXYGEN SATURATION: 94 % | DIASTOLIC BLOOD PRESSURE: 76 MMHG | HEART RATE: 57 BPM | SYSTOLIC BLOOD PRESSURE: 122 MMHG

## 2024-09-09 PROCEDURE — G0299 HHS/HOSPICE OF RN EA 15 MIN: HCPCS

## 2024-09-09 PROCEDURE — G0151 HHCP-SERV OF PT,EA 15 MIN: HCPCS

## 2024-09-10 ENCOUNTER — READMISSION MANAGEMENT (OUTPATIENT)
Dept: CALL CENTER | Facility: HOSPITAL | Age: 89
End: 2024-09-10
Payer: MEDICARE

## 2024-09-10 ENCOUNTER — OFFICE VISIT (OUTPATIENT)
Dept: CARDIOLOGY | Facility: CLINIC | Age: 89
End: 2024-09-10
Payer: MEDICARE

## 2024-09-10 ENCOUNTER — ANTICOAGULATION VISIT (OUTPATIENT)
Dept: PHARMACY | Facility: HOSPITAL | Age: 89
End: 2024-09-10
Payer: MEDICARE

## 2024-09-10 VITALS
SYSTOLIC BLOOD PRESSURE: 124 MMHG | RESPIRATION RATE: 16 BRPM | HEART RATE: 104 BPM | DIASTOLIC BLOOD PRESSURE: 78 MMHG | WEIGHT: 93 LBS | HEIGHT: 63 IN | BODY MASS INDEX: 16.48 KG/M2 | OXYGEN SATURATION: 90 %

## 2024-09-10 DIAGNOSIS — Z79.01 CHRONIC ANTICOAGULATION: ICD-10-CM

## 2024-09-10 DIAGNOSIS — I48.21 PERMANENT ATRIAL FIBRILLATION: Chronic | ICD-10-CM

## 2024-09-10 DIAGNOSIS — I34.0 NONRHEUMATIC MITRAL VALVE REGURGITATION: ICD-10-CM

## 2024-09-10 DIAGNOSIS — I48.21 PERMANENT ATRIAL FIBRILLATION: Primary | Chronic | ICD-10-CM

## 2024-09-10 DIAGNOSIS — I10 PRIMARY HYPERTENSION: Chronic | ICD-10-CM

## 2024-09-10 DIAGNOSIS — I50.43 ACUTE ON CHRONIC COMBINED SYSTOLIC AND DIASTOLIC CONGESTIVE HEART FAILURE: Primary | ICD-10-CM

## 2024-09-10 LAB — INR PPP: 2 (ref 2–3)

## 2024-09-10 RX ORDER — POTASSIUM CHLORIDE 1500 MG/1
20 TABLET, EXTENDED RELEASE ORAL DAILY
Qty: 90 TABLET | Refills: 3 | Status: SHIPPED | OUTPATIENT
Start: 2024-09-10

## 2024-09-10 RX ORDER — MIDODRINE HYDROCHLORIDE 2.5 MG/1
2.5 TABLET ORAL 2 TIMES DAILY
Qty: 180 TABLET | Refills: 3 | Status: SHIPPED | OUTPATIENT
Start: 2024-09-10

## 2024-09-10 RX ORDER — METOPROLOL SUCCINATE 25 MG/1
12.5 TABLET, EXTENDED RELEASE ORAL EVERY 12 HOURS SCHEDULED
Qty: 90 TABLET | Refills: 3 | Status: SHIPPED | OUTPATIENT
Start: 2024-09-10

## 2024-09-10 RX ORDER — WARFARIN SODIUM 3 MG/1
3 TABLET ORAL
Qty: 90 TABLET | Refills: 3 | Status: SHIPPED | OUTPATIENT
Start: 2024-09-10

## 2024-09-10 RX ORDER — FUROSEMIDE 40 MG
40 TABLET ORAL DAILY
Qty: 90 TABLET | Refills: 3 | Status: SHIPPED | OUTPATIENT
Start: 2024-09-10

## 2024-09-10 NOTE — PROGRESS NOTES
Anticoagulation Clinic Progress Note - Home INR Testing     INR Goal: 2 - 3  Today's INR: 2.0 (therapeutic). INR result was called in today by Shea (patient's daughter).   Current warfarin dose: warfarin 3 mg PO daily, except warfarin 1.5 mg PO on Wednesday.  Current total weekly dose = 19.5 mg per week.     INR History:      Latest Ref Rng & Units 2024     1:09 AM 2024    12:00 AM 2024     9:16 AM 9/3/2024    12:00 AM 2024     9:22 AM 2024    12:00 AM 9/10/2024     4:01 PM   Anticoagulation Monitoring   INR    2.80  3.10  2.00   INR Date    2024  9/3/2024  2024   INR Goal    2.0-3.0  2.0-3.0  2.0-3.0   Trend    Up  Down  Same   Last Week Total    20.5 mg  21 mg  19.5 mg   Next Week Total    21 mg  19.5 mg  19.5 mg   Sun    3 mg  3 mg  3 mg   Mon    3 mg  3 mg  -   Tue    -  3 mg  3 mg   Wed    3 mg  1.5 mg  1.5 mg   Thu    3 mg  3 mg  3 mg   Fri    3 mg  3 mg  3 mg   Sat    3 mg  3 mg  3 mg   Historical INR 2.00 - 3.00 2.05  2.80      3.10      2.00  C       Visit Report        Report      C Corrected result    This result is from an external source.       Anticoagulation Summary  As of 9/10/2024      INR goal:  2.0-3.0   TTR:  71.5% (4.7 y)   INR used for dosin.00 (2024)   Warfarin maintenance plan:  1.5 mg every Wed; 3 mg all other days   Weekly warfarin total:  19.5 mg   No change documented:  Chel Muñoz, Yue   Plan last modified:  Tasia Wen, Yue (2024)   Next INR check:  2024   Priority:  High   Target end date:  Indefinite    Indications    Atrial fibrillation [I48.91]  Chronic anticoagulation [Z79.01]                 Anticoagulation Episode Summary       INR check location:  Anticoagulation Clinic    Preferred lab:      Send INR reminders to:  [facility] CLINICAL POOL    Comments:  Patient Contract: **Still need**    Last  = ; next due     Supplies mailed 24              Anticoagulation Care Providers       Provider  Role Specialty Phone number    Theodore Vital Len Mckay MD Referring Cardiology [phone]            Clinic Interview:   Patient Findings     Positives:  Change in medications (Peppermint soft gels for constipation   (may increase INR))    Negatives:  Signs/symptoms of thrombosis, Signs/symptoms of bleeding,   Laboratory test error suspected, Change in health, Change in alcohol use,   Change in activity, Upcoming invasive procedure, Emergency department   visit, Upcoming dental procedure, Missed doses, Extra doses, Change in   diet/appetite, Hospital admission, Bruising, Other complaints      Clinical Outcomes     Negatives:  Major bleeding event, Thromboembolic event,   Anticoagulation-related hospital admission, Anticoagulation-related ED   visit, Anticoagulation-related fatality        Current Medications:   Prior to Admission medications    Medication Sig Start Date End Date Taking? Authorizing Provider   acetaminophen (TYLENOL) 325 MG tablet Take 2 tablets by mouth Every 4 (Four) Hours As Needed for Mild Pain. 6/25/24   Tricia Roman MD   Cholecalciferol 10 MCG (400 UNIT) tablet Take 1 tablet by mouth Daily. Indications: Vitamin D Deficiency 1/1/22   Minh Bass MD   docusate sodium (COLACE) 250 MG capsule Take 1 capsule by mouth 2 (Two) Times a Day. Indications: Constipation 6/25/24   Tricia Roman MD   famotidine (Pepcid) 20 MG tablet Take 1 tablet by mouth Daily. 6/25/24   Tricia Roman MD   furosemide (LASIX) 40 MG tablet Take 1 tablet by mouth Daily. Indications: Cardiac Failure 9/10/24   Juju Castellon APRN   guaiFENesin (MUCINEX) 600 MG 12 hr tablet Take 1 tablet by mouth Every 12 (Twelve) Hours. 8/23/24   Josie Solano APRN   latanoprost (XALATAN) 0.005 % ophthalmic solution Administer 1 drop to both eyes Every Night. Indications: Wide-Angle Glaucoma 1/1/22   Minh Bass MD   magnesium hydroxide (Milk of Magnesia) 400 MG/5ML suspension Take 30 mL by mouth Daily As Needed for  Constipation. Indications: Constipation 3/15/24   Minh Bass MD   metoclopramide (REGLAN) 5 MG tablet Take 1 tablet by mouth 4 (Four) Times a Day Before Meals & at Bedtime. Indications: Gastroesophageal Reflux Disease 6/24/24   Josie Solano APRN   metoprolol succinate XL (TOPROL-XL) 25 MG 24 hr tablet Take 0.5 tablets by mouth Every 12 (Twelve) Hours. Indications: Atrial Fibrillation 9/10/24   Juju Castellon APRN   midodrine (PROAMATINE) 2.5 MG tablet Take 1 tablet by mouth 2 (Two) Times a Day. Indications: Disorder of Low Blood Pressure 9/10/24   Juju Castellon APRN   mirtazapine (REMERON) 15 MG tablet Take 1 tablet by mouth Every Night. Indications: Appetite Stimulant 3/13/24   Minh Bass MD   Misc Natural Products (ELDERBERRY IMMUNE COMPLEX PO) Take 1 tablet by mouth Daily. Indications: vitamin 7/9/22   Minh Bass MD   O2 (OXYGEN) Inhale 3 L/min Continuous. Indications: SOB 1/1/22   Minh Bass MD   potassium chloride (KLOR-CON M20) 20 MEQ CR tablet Take 1 tablet by mouth Daily. Indications: Low Amount of Potassium in the Blood 9/10/24   Juju Castellon APRN   Probiotic Product (PROBIOTIC BLEND PO) Take 1 capsule by mouth Daily. Indications: probiotic 8/26/24   Minh Bass MD   sennosides-docusate (senna-docusate sodium) 8.6-50 MG per tablet Take 1 tablet by mouth 2 (Two) Times a Day. Indications: Constipation 1/1/22   Minh Bass MD   Vitamins A & D (Vitamin A & D) 5000-400 units capsule Take 1 tablet by mouth Daily. Indications: vitamins 7/22/24   Minh Bass MD   warfarin (COUMADIN) 3 MG tablet Take 1 tablet by mouth Daily. 9/4/24 Take 1.5mg on Wed, 3mg all other days.  Repeat INR in 1 week  Indications: Atrial Fibrillation 9/10/24   Juju Castellon APRN   DIGESTIVE ENZYMES PO Take 1 capsule by mouth 3 (Three) Times a Day. Indications: supplement  Patient not taking: Reported on 9/10/2024 1/1/22 9/10/24  Shelia  MD Minh   furosemide (LASIX) 40 MG tablet Take 1 tablet by mouth Daily. Indications: Cardiac Failure 1/1/22 9/10/24  ProviderMinh MD   metoprolol succinate XL (TOPROL-XL) 25 MG 24 hr tablet Take 0.5 tablets by mouth Every 12 (Twelve) Hours.  Patient taking differently: Take 0.5 tablets by mouth Every 12 (Twelve) Hours. Hold SBP <100  Indications: Atrial Fibrillation 6/24/24 9/10/24  Josie Solano APRN   midodrine (PROAMATINE) 2.5 MG tablet Take 1 tablet by mouth 2 (Two) Times a Day.  Patient taking differently: Take 1 tablet by mouth 2 (Two) Times a Day. Hold if SBP is >135  Indications: Disorder of Low Blood Pressure 7/16/24 9/10/24  Theodore Vital MD   potassium chloride (KLOR-CON M20) 20 MEQ CR tablet Take 1 tablet by mouth Daily. Indications: Low Amount of Potassium in the Blood 8/4/24 9/10/24  Minh Bass MD   warfarin (COUMADIN) 3 MG tablet Take 1 tablet by mouth Daily. 24 Take 1.5mg on Wed, 3mg all other days.  Repeat INR in 1 week  Indications: Atrial Fibrillation 1/1/22 9/10/24  Minh Bass MD       Medical history:   Past Medical History:   Diagnosis Date    Atrial fibrillation     Coronary artery disease     Atrial fibrillation    GERD (gastroesophageal reflux disease)     Glaucoma     Hiatal hernia with gastroesophageal reflux     History of osteoporotic pathological fracture     Right intertroch (2019)    Hx of compression fracture of spine     T12 and L1(); T6 (2022)    Hyperlipidemia     Hypertension     Osteoarthritis     Osteoporosis     on prolia(3/21/22)    Stroke     off and on dizziness from the stroke.       Social history:   Social History     Tobacco Use    Smoking status: Former     Current packs/day: 0.00     Types: Cigarettes     Quit date: 8     Years since quittin.7     Passive exposure: Past    Smokeless tobacco: Never   Substance Use Topics    Alcohol use: No       Allergies:    Penicillins, Codeine, and  Latex    [id number] SCORE   [id number] Score for Atrial Fibrillation Stroke Risk  Age in Years: 75+  Sex: Female  CHF History: Yes  Hypertension History: Yes  Stroke/TIA/Thromboembolism History: Yes  Vascular Disease History: Yes  Diabetes History: No  [id number] Score: 8  Stroke risks -: 8 Points. Risk of ischemic stroke: 10.8%. Risk of stroke/TIA/embolism: 15.2%        This patient is managed via a collaborative agreement, pursuant to [id number]. The signed protocol is kept in the [facility] at [address] [place] [address].    Education: Shruthi Amin has been instructed to call if any changes in medications, doses, concerns, etc. Patient was provided dosing instructions and expressed verbal understanding and has no further questions at this time.    Plan:  1. no change - although large decrease in INR (3.1 -> 2.0) did not want to increase at this time given advanced age and current use of peppermint (which can increase INR); warfarin 3 mg PO daily, except warfarin 1.5 mg PO on Wednesday.  Total weekly dose = 19.5 mg.   2. INR should be tested on 9/16 (one week from last INR check) and called into clinic.     Chel Muñoz, PharmD  9/10/2024  16:03 EDT

## 2024-09-10 NOTE — PROGRESS NOTES
Cardiology Office Follow Up Visit      Primary Care Provider:  Cristal Goodwin MD    Reason for f/u:     Severe MR  Heart failure  Permanent A-fib      Subjective     CC:    Patient denies worsening shortness of breath or chest pain    History of Present Illness       Shruthi Amin is a 89 y.o. female.  Patient is a 89-year-old female who has a history of hypertension, atrial fibrillation, previous stroke, CAD, mitral regurgitation.    Patient had a recent hospitalization for heart failure.  She had recurrent pleural effusion.  She has severe mitral regurgitation and has not been candidate for any intervention.    She remains on warfarin due to her atrial fibrillation.    Patient's daughter reports some concern as they feel that her cognitive function may be climbing.  Patient answers my questions appropriately.  There is no unilateral weakness.      ASSESSMENT/PLAN:      Diagnoses and all orders for this visit:    1. Acute on chronic combined systolic and diastolic congestive heart failure (Primary)    2. Primary hypertension    3. Nonrheumatic mitral valve regurgitation    4. Permanent atrial fibrillation            MEDICAL DECISION MAKING:      Patient is alert and answering my questions appropriately.  She is denying worsening complaints.    We will get some baseline labs including a CMP, TSH, CBC.    If the patient's daughters continue to complain of cognitive concerns of asked them to discuss with her PCP.  Her blood pressure and heart rate are stable for the patient today.  We will continue home health care.  I have made her a follow-up to see Dr. Vital in 2 months.  She is anticoagulated with warfarin.  If there is new or worsening problems of asked her to contact the office sooner.      Past Medical History:   Diagnosis Date    Atrial fibrillation     Coronary artery disease     Atrial fibrillation    GERD (gastroesophageal reflux disease)     Glaucoma     Hiatal hernia with gastroesophageal reflux      History of osteoporotic pathological fracture     Right intertroch (7/2019)    Hx of compression fracture of spine     T12 and L1(2013); T6 (1/2022)    Hyperlipidemia     Hypertension     Osteoarthritis     Osteoporosis     on prolia(3/21/22)    Stroke     off and on dizziness from the stroke.       Past Surgical History:   Procedure Laterality Date    BACK SURGERY      kyphoplasty    EYE SURGERY      cataract surgery    FIXATION KYPHOPLASTY LUMBAR SPINE  2013    HIP TROCHANTERIC NAILING WITH INTRAMEDULLARY HIP SCREW Right 7/20/2019    Procedure: HIP TROCHANTERIC NAILING SHORT WITH INTRAMEDULLARY HIP SCREW;  Surgeon: Edward Centeno MD;  Location: [facility] MAIN OR;  Service: Orthopedics    RECTAL SURGERY      x 3         Current Outpatient Medications:     acetaminophen (TYLENOL) 325 MG tablet, Take 2 tablets by mouth Every 4 (Four) Hours As Needed for Mild Pain., Disp: , Rfl:     Cholecalciferol 10 MCG (400 UNIT) tablet, Take 1 tablet by mouth Daily. Indications: Vitamin D Deficiency, Disp: , Rfl:     docusate sodium (COLACE) 250 MG capsule, Take 1 capsule by mouth 2 (Two) Times a Day. Indications: Constipation, Disp: , Rfl:     famotidine (Pepcid) 20 MG tablet, Take 1 tablet by mouth Daily., Disp: , Rfl:     furosemide (LASIX) 40 MG tablet, Take 1 tablet by mouth Daily. Indications: Cardiac Failure, Disp: , Rfl:     guaiFENesin (MUCINEX) 600 MG 12 hr tablet, Take 1 tablet by mouth Every 12 (Twelve) Hours., Disp: , Rfl:     latanoprost (XALATAN) 0.005 % ophthalmic solution, Administer 1 drop to both eyes Every Night. Indications: Wide-Angle Glaucoma, Disp: , Rfl:     magnesium hydroxide (Milk of Magnesia) 400 MG/5ML suspension, Take 30 mL by mouth Daily As Needed for Constipation. Indications: Constipation, Disp: , Rfl:     metoclopramide (REGLAN) 5 MG tablet, Take 1 tablet by mouth 4 (Four) Times a Day Before Meals & at Bedtime. Indications: Gastroesophageal Reflux Disease, Disp: 120 tablet, Rfl: 1     metoprolol succinate XL (TOPROL-XL) 25 MG 24 hr tablet, Take 0.5 tablets by mouth Every 12 (Twelve) Hours. (Patient taking differently: Take 0.5 tablets by mouth Every 12 (Twelve) Hours. Hold SBP <100  Indications: Atrial Fibrillation), Disp: 60 tablet, Rfl: 1    midodrine (PROAMATINE) 2.5 MG tablet, Take 1 tablet by mouth 2 (Two) Times a Day. (Patient taking differently: Take 1 tablet by mouth 2 (Two) Times a Day. Hold if SBP is >135  Indications: Disorder of Low Blood Pressure), Disp: 60 tablet, Rfl: 3    mirtazapine (REMERON) 15 MG tablet, Take 1 tablet by mouth Every Night. Indications: Appetite Stimulant, Disp: , Rfl:     Misc Natural Products (ELDERBERRY IMMUNE COMPLEX PO), Take 1 tablet by mouth Daily. Indications: vitamin, Disp: , Rfl:     O2 (OXYGEN), Inhale 3 L/min Continuous. Indications: SOB, Disp: , Rfl:     potassium chloride (KLOR-CON M20) 20 MEQ CR tablet, Take 1 tablet by mouth Daily. Indications: Low Amount of Potassium in the Blood, Disp: , Rfl:     Probiotic Product (PROBIOTIC BLEND PO), Take 1 capsule by mouth Daily. Indications: probiotic, Disp: , Rfl:     sennosides-docusate (senna-docusate sodium) 8.6-50 MG per tablet, Take 1 tablet by mouth 2 (Two) Times a Day. Indications: Constipation, Disp: , Rfl:     Vitamins A & D (Vitamin A & D) 5000-400 units capsule, Take 1 tablet by mouth Daily. Indications: vitamins, Disp: , Rfl:     warfarin (COUMADIN) 3 MG tablet, Take 1 tablet by mouth Daily. 24 Take 1.5mg on Wed, 3mg all other days.  Repeat INR in 1 week  Indications: Atrial Fibrillation, Disp: , Rfl:     Social History     Socioeconomic History    Marital status:    Tobacco Use    Smoking status: Former     Current packs/day: 0.00     Types: Cigarettes     Quit date:      Years since quittin.7     Passive exposure: Past    Smokeless tobacco: Never   Vaping Use    Vaping status: Never Used   Substance and Sexual Activity    Alcohol use: No    Drug use: No    Sexual activity:  "Defer       Family History   Problem Relation Age of Onset    Heart disease Mother     Hypertension Mother     Osteoporosis Mother     Cancer Father         colon cancer    Osteoporosis Father     Cancer Sister         lung    Cancer Brother         colon cancer       The following portions of the patient's history were reviewed and updated as appropriate: allergies, current medications, past family history, past medical history, past social history, past surgical history and problem list.    Review of Systems   Constitutional: Positive for malaise/fatigue.   Neurological:  Positive for weakness.       Pertinent items are noted in HPI, all other systems reviewed and negative    /78 (BP Location: Right arm, Patient Position: Sitting, Cuff Size: Adult)   Pulse 104   Resp 16   Ht 160 cm (62.99\")   Wt 42.2 kg (93 lb)   SpO2 90%   BMI 16.48 kg/m² .  Objective     Vitals reviewed.   Constitutional:       General: Not in acute distress.     Appearance: Normal appearance. Well-developed. Frail. Chronically ill-appearing.   Eyes:      Pupils: Pupils are equal, round, and reactive to light.   HENT:      Head: Normocephalic and atraumatic.   Neck:      Vascular: No JVD.   Pulmonary:      Effort: Pulmonary effort is normal.      Breath sounds: Normal breath sounds.   Cardiovascular:      Normal rate. Regular rhythm.      Murmurs: There is a systolic murmur.   Pulses:     Intact distal pulses.   Edema:     Peripheral edema absent.   Abdominal:      General: There is no distension.      Palpations: Abdomen is soft.      Tenderness: There is no abdominal tenderness.   Musculoskeletal: Normal range of motion.      Cervical back: Normal range of motion and neck supple. Skin:     General: Skin is warm and dry.   Neurological:      Mental Status: Alert and oriented to person, place, and time.             ECG 12 Lead    Date/Time: 9/10/2024 9:37 AM  Performed by: Juju Castellon APRN    Authorized by: Juju Castellon, " APRN  Rhythm: atrial fibrillation  BPM: 104  Conduction: non-specific intraventricular conduction delay  QRS axis: indeterminate  Other findings: non-specific ST-T wave changes    Clinical impression: abnormal EKG          EKG ordered by and reviewed by me in office

## 2024-09-10 NOTE — OUTREACH NOTE
Medical Week 3 Survey      Flowsheet Row Responses   [facility] facility patient discharged from? [facility]   Does the patient have one of the following disease processes/diagnoses(primary or secondary)? Other   Week 3 attempt successful? No   Unsuccessful attempts Attempt 1  [All numbers listed were attempted-no answer]            Tierra H - Registered Nurse

## 2024-09-11 ENCOUNTER — HOME CARE VISIT (OUTPATIENT)
Dept: HOME HEALTH SERVICES | Facility: HOME HEALTHCARE | Age: 89
End: 2024-09-11
Payer: MEDICARE

## 2024-09-11 VITALS
OXYGEN SATURATION: 98 % | TEMPERATURE: 97.7 F | DIASTOLIC BLOOD PRESSURE: 58 MMHG | HEART RATE: 76 BPM | SYSTOLIC BLOOD PRESSURE: 102 MMHG | RESPIRATION RATE: 18 BRPM

## 2024-09-11 VITALS
OXYGEN SATURATION: 99 % | HEART RATE: 94 BPM | TEMPERATURE: 97.4 F | SYSTOLIC BLOOD PRESSURE: 93 MMHG | DIASTOLIC BLOOD PRESSURE: 76 MMHG

## 2024-09-11 PROCEDURE — G0153 HHCP-SVS OF S/L PATH,EA 15MN: HCPCS

## 2024-09-16 ENCOUNTER — HOME CARE VISIT (OUTPATIENT)
Dept: HOME HEALTH SERVICES | Facility: HOME HEALTHCARE | Age: 89
End: 2024-09-16
Payer: MEDICARE

## 2024-09-16 ENCOUNTER — HOSPITAL ENCOUNTER (EMERGENCY)
Facility: HOSPITAL | Age: 89
Discharge: HOME | End: 2024-09-16
Admitting: EMERGENCY MEDICINE
Payer: MEDICARE

## 2024-09-16 ENCOUNTER — APPOINTMENT (OUTPATIENT)
Dept: CT IMAGING | Facility: HOSPITAL | Age: 89
End: 2024-09-16
Payer: MEDICARE

## 2024-09-16 VITALS
OXYGEN SATURATION: 96 % | WEIGHT: 93.7 LBS | SYSTOLIC BLOOD PRESSURE: 120 MMHG | TEMPERATURE: 98.4 F | BODY MASS INDEX: 17.24 KG/M2 | HEART RATE: 79 BPM | HEIGHT: 62 IN | DIASTOLIC BLOOD PRESSURE: 76 MMHG | RESPIRATION RATE: 16 BRPM

## 2024-09-16 DIAGNOSIS — T14.8XXA ABRASION: ICD-10-CM

## 2024-09-16 DIAGNOSIS — S00.432A CONTUSION OF LEFT EAR, INITIAL ENCOUNTER: ICD-10-CM
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
## 2024-09-16 LAB
INR PPP: 2.49 (ref 2–3)
PROTHROMBIN TIME: 25.4 SECONDS (ref 19.4–28.5)

## 2024-09-16 PROCEDURE — 70450 CT HEAD/BRAIN W/O DYE: CPT

## 2024-09-16 PROCEDURE — G0153 HHCP-SVS OF S/L PATH,EA 15MN: HCPCS

## 2024-09-16 PROCEDURE — 72125 CT NECK SPINE W/O DYE: CPT

## 2024-09-16 PROCEDURE — 85610 PROTHROMBIN TIME: CPT

## 2024-09-16 PROCEDURE — 36415 COLL VENOUS BLD VENIPUNCTURE: CPT

## 2024-09-16 PROCEDURE — 99284 EMERGENCY DEPT VISIT MOD MDM: CPT

## 2024-09-17 ENCOUNTER — PATIENT OUTREACH (OUTPATIENT)
Dept: CASE MANAGEMENT | Facility: OTHER | Age: 89
End: 2024-09-17
Payer: MEDICARE

## 2024-09-17 ENCOUNTER — ANTICOAGULATION VISIT (OUTPATIENT)
Dept: PHARMACY | Facility: HOSPITAL | Age: 89
End: 2024-09-17
Payer: MEDICARE

## 2024-09-17 ENCOUNTER — HOME CARE VISIT (OUTPATIENT)
Dept: HOME HEALTH SERVICES | Facility: HOME HEALTHCARE | Age: 89
End: 2024-09-17
Payer: MEDICARE

## 2024-09-17 VITALS
OXYGEN SATURATION: 92 % | TEMPERATURE: 97.6 F | SYSTOLIC BLOOD PRESSURE: 110 MMHG | HEART RATE: 64 BPM | DIASTOLIC BLOOD PRESSURE: 62 MMHG

## 2024-09-17 VITALS
TEMPERATURE: 97.9 F | DIASTOLIC BLOOD PRESSURE: 64 MMHG | HEART RATE: 76 BPM | OXYGEN SATURATION: 97 % | SYSTOLIC BLOOD PRESSURE: 116 MMHG | RESPIRATION RATE: 18 BRPM

## 2024-09-17 DIAGNOSIS — Z79.01 CHRONIC ANTICOAGULATION: Primary | ICD-10-CM

## 2024-09-17 PROCEDURE — G0299 HHS/HOSPICE OF RN EA 15 MIN: HCPCS

## 2024-09-17 PROCEDURE — G0151 HHCP-SERV OF PT,EA 15 MIN: HCPCS

## 2024-09-17 PROCEDURE — G0249 PROVIDE INR TEST MATER/EQUIP: HCPCS

## 2024-09-19 ENCOUNTER — HOME CARE VISIT (OUTPATIENT)
Dept: HOME HEALTH SERVICES | Facility: HOME HEALTHCARE | Age: 89
End: 2024-09-19
Payer: MEDICARE

## 2024-09-23 ENCOUNTER — HOME CARE VISIT (OUTPATIENT)
Dept: HOME HEALTH SERVICES | Facility: HOME HEALTHCARE | Age: 89
End: 2024-09-23
Payer: MEDICARE

## 2024-09-23 ENCOUNTER — ANTICOAGULATION VISIT (OUTPATIENT)
Dept: PHARMACY | Facility: HOSPITAL | Age: 89
End: 2024-09-23
Payer: MEDICARE

## 2024-09-23 DIAGNOSIS — Z79.01 CHRONIC ANTICOAGULATION: Primary | ICD-10-CM

## 2024-09-23 LAB — INR PPP: 2 (ref 2–3)

## 2024-09-23 PROCEDURE — G0249 PROVIDE INR TEST MATER/EQUIP: HCPCS

## 2024-10-08 ENCOUNTER — TELEPHONE (OUTPATIENT)
Dept: PHARMACY | Facility: HOSPITAL | Age: 89
End: 2024-10-08
Payer: MEDICARE

## 2024-10-08 NOTE — TELEPHONE ENCOUNTER
Medication Management Clinic: [facility]  Clinical Outreach     Shruthi Amin is a 89 y.o. female and is a patient of the Medication Management Clinic at [facility] for anticoagulation monitoring.     Last INR reported to Medication Management Clinic was on 9/23/24. Contacted patient's daughter Shea to discuss. Per Shea, INR on 9/30 was 2.4 (on clinic meter) and INR on 10/8 was 1.7 (on hospice meter). Hospice now will be monitoring INR on their meters and making dose adjustments accordingly. Shea wanted to keep clinic meter for now just in case.    Spoke with ANTWAN Garcia with Hospice ([phone]) who put a note in patient's chart to notify Medication Management Clinic of any changes in patient status so that patient is not lost to follow-up.    Chel Muñoz, PharmD  Ambulatory Care Clinical Pharmacist  10/8/2024  15:15 EDT

## 2024-11-20 ENCOUNTER — TELEPHONE (OUTPATIENT)
Dept: PHARMACY | Facility: HOSPITAL | Age: 89
End: 2024-11-20
Payer: MEDICARE

## 2024-11-20 NOTE — TELEPHONE ENCOUNTER
Spoke with patient's daughter, Shea, and informed her of patient's discharge from clinic. Will email [employer] label to Shea so that clinic meter can be shipped to clinic as Shea says she is unable to get out very often due to being with her mother.    Chel Muñoz, PharmD, BCPS, BCACP  11/20/24 15:58 EST

## 2024-11-20 NOTE — TELEPHONE ENCOUNTER
Per Luis Gilliam, patient's INR is still being monitored by them. Will discharge patient from [facility]ication Management Clinic at this time and contact patient's daughter to have clinic meter returned.    Anselmo VillatoroD, BCPS, BCACP  11/20/24 13:54 EST

## 2024-12-25 ENCOUNTER — APPOINTMENT (OUTPATIENT)
Dept: GENERAL RADIOLOGY | Facility: HOSPITAL | Age: 89
End: 2024-12-25
Payer: MEDICARE

## 2024-12-25 ENCOUNTER — HOSPITAL ENCOUNTER (INPATIENT)
Facility: HOSPITAL | Age: 89
LOS: 2 days | Discharge: HOSPICE | End: 2024-12-27
Attending: EMERGENCY MEDICINE | Admitting: INTERNAL MEDICINE
Payer: MEDICARE

## 2024-12-25 ENCOUNTER — APPOINTMENT (OUTPATIENT)
Dept: CT IMAGING | Facility: HOSPITAL | Age: 89
End: 2024-12-25
Payer: MEDICARE

## 2024-12-25 DIAGNOSIS — S72.002A LEFT DISPLACED FEMORAL NECK FRACTURE: Primary | ICD-10-CM

## 2024-12-25 DIAGNOSIS — S72.002A CLOSED FRACTURE OF LEFT HIP, INITIAL ENCOUNTER: ICD-10-CM

## 2024-12-25 LAB
ABO GROUP BLD: NORMAL
ALBUMIN SERPL-MCNC: 3.5 G/DL (ref 3.5–5.2)
ALBUMIN/GLOB SERPL: 1.5 G/DL
ALP SERPL-CCNC: 201 U/L (ref 39–117)
ALT SERPL W P-5'-P-CCNC: 14 U/L (ref 1–33)
ANION GAP SERPL CALCULATED.3IONS-SCNC: 10.3 MMOL/L (ref 5–15)
ANION GAP SERPL CALCULATED.3IONS-SCNC: 5.4 MMOL/L (ref 5–15)
ANISOCYTOSIS BLD QL: ABNORMAL
AST SERPL-CCNC: 26 U/L (ref 1–32)
BASOPHILS # BLD AUTO: 0.17 10*3/MM3 (ref 0–0.2)
BASOPHILS # BLD MANUAL: 0.22 10*3/MM3 (ref 0–0.2)
BASOPHILS NFR BLD AUTO: 0.8 % (ref 0–1.5)
BASOPHILS NFR BLD MANUAL: 1 % (ref 0–1.5)
BILIRUB SERPL-MCNC: 1.1 MG/DL (ref 0–1.2)
BLD GP AB SCN SERPL QL: NEGATIVE
BUN SERPL-MCNC: 20 MG/DL (ref 8–23)
BUN SERPL-MCNC: 20 MG/DL (ref 8–23)
BUN/CREAT SERPL: 34.5 (ref 7–25)
BUN/CREAT SERPL: 37 (ref 7–25)
CALCIUM SPEC-SCNC: 9 MG/DL (ref 8.2–9.6)
CALCIUM SPEC-SCNC: 9.4 MG/DL (ref 8.2–9.6)
CHLORIDE SERPL-SCNC: 103 MMOL/L (ref 98–107)
CHLORIDE SERPL-SCNC: 105 MMOL/L (ref 98–107)
CO2 SERPL-SCNC: 31.7 MMOL/L (ref 22–29)
CO2 SERPL-SCNC: 35.6 MMOL/L (ref 22–29)
CREAT SERPL-MCNC: 0.54 MG/DL (ref 0.57–1)
CREAT SERPL-MCNC: 0.58 MG/DL (ref 0.57–1)
DEPRECATED RDW RBC AUTO: 62.8 FL (ref 37–54)
DEPRECATED RDW RBC AUTO: 63.6 FL (ref 37–54)
EGFRCR SERPLBLD CKD-EPI 2021: 82.9 ML/MIN/1.73
EGFRCR SERPLBLD CKD-EPI 2021: 84.4 ML/MIN/1.73
EOSINOPHIL # BLD AUTO: 0.02 10*3/MM3 (ref 0–0.4)
EOSINOPHIL NFR BLD AUTO: 0.1 % (ref 0.3–6.2)
ERYTHROCYTE [DISTWIDTH] IN BLOOD BY AUTOMATED COUNT: 16.2 % (ref 12.3–15.4)
ERYTHROCYTE [DISTWIDTH] IN BLOOD BY AUTOMATED COUNT: 16.3 % (ref 12.3–15.4)
GLOBULIN UR ELPH-MCNC: 2.4 GM/DL
GLUCOSE SERPL-MCNC: 111 MG/DL (ref 65–99)
GLUCOSE SERPL-MCNC: 126 MG/DL (ref 65–99)
HCT VFR BLD AUTO: 31.8 % (ref 34–46.6)
HCT VFR BLD AUTO: 32.2 % (ref 34–46.6)
HGB BLD-MCNC: 10 G/DL (ref 12–15.9)
HGB BLD-MCNC: 10.1 G/DL (ref 12–15.9)
IMM GRANULOCYTES # BLD AUTO: 1.07 10*3/MM3 (ref 0–0.05)
IMM GRANULOCYTES NFR BLD AUTO: 4.7 % (ref 0–0.5)
INR PPP: 1.5 (ref 0.9–1.1)
LARGE PLATELETS: ABNORMAL
LIPASE SERPL-CCNC: 21 U/L (ref 13–60)
LYMPHOCYTES # BLD AUTO: 1.15 10*3/MM3 (ref 0.7–3.1)
LYMPHOCYTES # BLD MANUAL: 0.22 10*3/MM3 (ref 0.7–3.1)
LYMPHOCYTES NFR BLD AUTO: 5.1 % (ref 19.6–45.3)
LYMPHOCYTES NFR BLD MANUAL: 5 % (ref 5–12)
MACROCYTES BLD QL SMEAR: ABNORMAL
MCH RBC QN AUTO: 33.1 PG (ref 26.6–33)
MCH RBC QN AUTO: 33.6 PG (ref 26.6–33)
MCHC RBC AUTO-ENTMCNC: 31.4 G/DL (ref 31.5–35.7)
MCHC RBC AUTO-ENTMCNC: 31.4 G/DL (ref 31.5–35.7)
MCV RBC AUTO: 105.6 FL (ref 79–97)
MCV RBC AUTO: 106.7 FL (ref 79–97)
METAMYELOCYTES NFR BLD MANUAL: 2 % (ref 0–0)
MONOCYTES # BLD AUTO: 1.55 10*3/MM3 (ref 0.1–0.9)
MONOCYTES # BLD: 1.1 10*3/MM3 (ref 0.1–0.9)
MONOCYTES NFR BLD AUTO: 6.9 % (ref 5–12)
NEUTROPHILS # BLD AUTO: 20.07 10*3/MM3 (ref 1.7–7)
NEUTROPHILS NFR BLD AUTO: 18.58 10*3/MM3 (ref 1.7–7)
NEUTROPHILS NFR BLD AUTO: 82.4 % (ref 42.7–76)
NEUTROPHILS NFR BLD MANUAL: 82 % (ref 42.7–76)
NEUTS BAND NFR BLD MANUAL: 9 % (ref 0–5)
NEUTS VAC BLD QL SMEAR: ABNORMAL
NRBC BLD AUTO-RTO: 0.4 /100 WBC (ref 0–0.2)
OVALOCYTES BLD QL SMEAR: ABNORMAL
PLATELET # BLD AUTO: 266 10*3/MM3 (ref 140–450)
PLATELET # BLD AUTO: 295 10*3/MM3 (ref 140–450)
PMV BLD AUTO: 11.1 FL (ref 6–12)
PMV BLD AUTO: 11.2 FL (ref 6–12)
POTASSIUM SERPL-SCNC: 3.8 MMOL/L (ref 3.5–5.2)
POTASSIUM SERPL-SCNC: 4.2 MMOL/L (ref 3.5–5.2)
PROT SERPL-MCNC: 5.9 G/DL (ref 6–8.5)
PROTHROMBIN TIME: 18.1 SECONDS (ref 11.7–14.2)
RBC # BLD AUTO: 2.98 10*6/MM3 (ref 3.77–5.28)
RBC # BLD AUTO: 3.05 10*6/MM3 (ref 3.77–5.28)
RH BLD: POSITIVE
SCAN SLIDE: NORMAL
SODIUM SERPL-SCNC: 145 MMOL/L (ref 136–145)
SODIUM SERPL-SCNC: 146 MMOL/L (ref 136–145)
T&S EXPIRATION DATE: NORMAL
TOXIC GRANULATION: ABNORMAL
VARIANT LYMPHS NFR BLD MANUAL: 1 % (ref 19.6–45.3)
WBC NRBC COR # BLD AUTO: 22.06 10*3/MM3 (ref 3.4–10.8)
WBC NRBC COR # BLD AUTO: 22.54 10*3/MM3 (ref 3.4–10.8)

## 2024-12-25 PROCEDURE — 25010000002 DIGOXIN PER 500 MCG: Performed by: NURSE PRACTITIONER

## 2024-12-25 PROCEDURE — 25010000002 ONDANSETRON PER 1 MG: Performed by: NURSE PRACTITIONER

## 2024-12-25 PROCEDURE — 86900 BLOOD TYPING SEROLOGIC ABO: CPT | Performed by: NURSE PRACTITIONER

## 2024-12-25 PROCEDURE — 86850 RBC ANTIBODY SCREEN: CPT | Performed by: NURSE PRACTITIONER

## 2024-12-25 PROCEDURE — 72125 CT NECK SPINE W/O DYE: CPT

## 2024-12-25 PROCEDURE — 83690 ASSAY OF LIPASE: CPT | Performed by: EMERGENCY MEDICINE

## 2024-12-25 PROCEDURE — 25810000003 SODIUM CHLORIDE 0.9 % SOLUTION

## 2024-12-25 PROCEDURE — 86901 BLOOD TYPING SEROLOGIC RH(D): CPT | Performed by: NURSE PRACTITIONER

## 2024-12-25 PROCEDURE — 85025 COMPLETE CBC W/AUTO DIFF WBC: CPT | Performed by: EMERGENCY MEDICINE

## 2024-12-25 PROCEDURE — 70450 CT HEAD/BRAIN W/O DYE: CPT

## 2024-12-25 PROCEDURE — 85025 COMPLETE CBC W/AUTO DIFF WBC: CPT | Performed by: NURSE PRACTITIONER

## 2024-12-25 PROCEDURE — 99285 EMERGENCY DEPT VISIT HI MDM: CPT

## 2024-12-25 PROCEDURE — 80053 COMPREHEN METABOLIC PANEL: CPT | Performed by: EMERGENCY MEDICINE

## 2024-12-25 PROCEDURE — 73502 X-RAY EXAM HIP UNI 2-3 VIEWS: CPT

## 2024-12-25 PROCEDURE — 85610 PROTHROMBIN TIME: CPT | Performed by: NURSE PRACTITIONER

## 2024-12-25 PROCEDURE — 87481 CANDIDA DNA AMP PROBE: CPT | Performed by: NURSE PRACTITIONER

## 2024-12-25 PROCEDURE — 85007 BL SMEAR W/DIFF WBC COUNT: CPT | Performed by: EMERGENCY MEDICINE

## 2024-12-25 PROCEDURE — 25010000002 MORPHINE PER 10 MG: Performed by: NURSE PRACTITIONER

## 2024-12-25 RX ORDER — OXYCODONE HYDROCHLORIDE 5 MG/1
5 TABLET ORAL EVERY 4 HOURS PRN
Status: DISCONTINUED | OUTPATIENT
Start: 2024-12-25 | End: 2024-12-27 | Stop reason: HOSPADM

## 2024-12-25 RX ORDER — SODIUM CHLORIDE 9 MG/ML
40 INJECTION, SOLUTION INTRAVENOUS AS NEEDED
Status: DISCONTINUED | OUTPATIENT
Start: 2024-12-25 | End: 2024-12-27 | Stop reason: HOSPADM

## 2024-12-25 RX ORDER — SODIUM CHLORIDE 0.9 % (FLUSH) 0.9 %
10 SYRINGE (ML) INJECTION AS NEEDED
Status: DISCONTINUED | OUTPATIENT
Start: 2024-12-25 | End: 2024-12-27 | Stop reason: HOSPADM

## 2024-12-25 RX ORDER — GUAIFENESIN 600 MG/1
600 TABLET, EXTENDED RELEASE ORAL EVERY 12 HOURS SCHEDULED
Status: DISCONTINUED | OUTPATIENT
Start: 2024-12-25 | End: 2024-12-27 | Stop reason: HOSPADM

## 2024-12-25 RX ORDER — BISACODYL 5 MG/1
5 TABLET, DELAYED RELEASE ORAL DAILY PRN
Status: DISCONTINUED | OUTPATIENT
Start: 2024-12-25 | End: 2024-12-27 | Stop reason: HOSPADM

## 2024-12-25 RX ORDER — SODIUM CHLORIDE 9 MG/ML
50 INJECTION, SOLUTION INTRAVENOUS CONTINUOUS
Status: DISCONTINUED | OUTPATIENT
Start: 2024-12-25 | End: 2024-12-27 | Stop reason: HOSPADM

## 2024-12-25 RX ORDER — POLYETHYLENE GLYCOL 3350 17 G/17G
17 POWDER, FOR SOLUTION ORAL DAILY PRN
Status: DISCONTINUED | OUTPATIENT
Start: 2024-12-25 | End: 2024-12-27 | Stop reason: HOSPADM

## 2024-12-25 RX ORDER — ACETAMINOPHEN 325 MG/1
650 TABLET ORAL EVERY 4 HOURS PRN
Status: DISCONTINUED | OUTPATIENT
Start: 2024-12-25 | End: 2024-12-27 | Stop reason: HOSPADM

## 2024-12-25 RX ORDER — ACETAMINOPHEN 650 MG/1
650 SUPPOSITORY RECTAL EVERY 4 HOURS PRN
Status: DISCONTINUED | OUTPATIENT
Start: 2024-12-25 | End: 2024-12-27 | Stop reason: HOSPADM

## 2024-12-25 RX ORDER — NITROGLYCERIN 0.4 MG/1
0.4 TABLET SUBLINGUAL
Status: DISCONTINUED | OUTPATIENT
Start: 2024-12-25 | End: 2024-12-27 | Stop reason: HOSPADM

## 2024-12-25 RX ORDER — LATANOPROST 50 UG/ML
1 SOLUTION/ DROPS OPHTHALMIC NIGHTLY
Status: DISCONTINUED | OUTPATIENT
Start: 2024-12-25 | End: 2024-12-27 | Stop reason: HOSPADM

## 2024-12-25 RX ORDER — MIRTAZAPINE 15 MG/1
15 TABLET, FILM COATED ORAL NIGHTLY
Status: DISCONTINUED | OUTPATIENT
Start: 2024-12-25 | End: 2024-12-27 | Stop reason: HOSPADM

## 2024-12-25 RX ORDER — METOCLOPRAMIDE 5 MG/1
5 TABLET ORAL
Status: DISCONTINUED | OUTPATIENT
Start: 2024-12-25 | End: 2024-12-27 | Stop reason: HOSPADM

## 2024-12-25 RX ORDER — ACETAMINOPHEN 160 MG/5ML
650 SOLUTION ORAL EVERY 4 HOURS PRN
Status: DISCONTINUED | OUTPATIENT
Start: 2024-12-25 | End: 2024-12-27 | Stop reason: HOSPADM

## 2024-12-25 RX ORDER — CHOLECALCIFEROL (VITAMIN D3) 125 MCG
10000 CAPSULE ORAL DAILY
COMMUNITY
End: 2024-12-27 | Stop reason: HOSPADM

## 2024-12-25 RX ORDER — AMOXICILLIN 250 MG
2 CAPSULE ORAL 2 TIMES DAILY
Status: DISCONTINUED | OUTPATIENT
Start: 2024-12-25 | End: 2024-12-27 | Stop reason: HOSPADM

## 2024-12-25 RX ORDER — HYDRALAZINE HYDROCHLORIDE 20 MG/ML
10 INJECTION INTRAMUSCULAR; INTRAVENOUS EVERY 6 HOURS PRN
Status: DISCONTINUED | OUTPATIENT
Start: 2024-12-25 | End: 2024-12-27 | Stop reason: HOSPADM

## 2024-12-25 RX ORDER — ONDANSETRON 4 MG/1
4 TABLET, ORALLY DISINTEGRATING ORAL EVERY 6 HOURS PRN
Status: DISCONTINUED | OUTPATIENT
Start: 2024-12-25 | End: 2024-12-27 | Stop reason: HOSPADM

## 2024-12-25 RX ORDER — HYDROCODONE BITARTRATE AND ACETAMINOPHEN 5; 325 MG/1; MG/1
0.5 TABLET ORAL EVERY 4 HOURS PRN
COMMUNITY
End: 2024-12-27 | Stop reason: HOSPADM

## 2024-12-25 RX ORDER — LUTEIN 6 MG
1 TABLET ORAL DAILY
COMMUNITY
End: 2024-12-27 | Stop reason: HOSPADM

## 2024-12-25 RX ORDER — DIGOXIN 0.25 MG/ML
125 INJECTION INTRAMUSCULAR; INTRAVENOUS ONCE
Status: COMPLETED | OUTPATIENT
Start: 2024-12-25 | End: 2024-12-25

## 2024-12-25 RX ORDER — MIDODRINE HYDROCHLORIDE 5 MG/1
2.5 TABLET ORAL
Status: DISCONTINUED | OUTPATIENT
Start: 2024-12-25 | End: 2024-12-27 | Stop reason: HOSPADM

## 2024-12-25 RX ORDER — MORPHINE SULFATE 20 MG/ML
2 SOLUTION ORAL AS NEEDED
Status: ON HOLD | COMMUNITY
End: 2024-12-27

## 2024-12-25 RX ORDER — FUROSEMIDE 40 MG/1
20 TABLET ORAL DAILY
COMMUNITY
End: 2024-12-27 | Stop reason: HOSPADM

## 2024-12-25 RX ORDER — SODIUM CHLORIDE 0.9 % (FLUSH) 0.9 %
10 SYRINGE (ML) INJECTION EVERY 12 HOURS SCHEDULED
Status: DISCONTINUED | OUTPATIENT
Start: 2024-12-25 | End: 2024-12-27 | Stop reason: HOSPADM

## 2024-12-25 RX ORDER — METOPROLOL SUCCINATE 25 MG/1
12.5 TABLET, EXTENDED RELEASE ORAL 2 TIMES DAILY PRN
COMMUNITY
End: 2024-12-27 | Stop reason: HOSPADM

## 2024-12-25 RX ORDER — IPRATROPIUM BROMIDE AND ALBUTEROL SULFATE 2.5; .5 MG/3ML; MG/3ML
3 SOLUTION RESPIRATORY (INHALATION)
Status: DISCONTINUED | OUTPATIENT
Start: 2024-12-25 | End: 2024-12-27 | Stop reason: HOSPADM

## 2024-12-25 RX ORDER — METOPROLOL TARTRATE 1 MG/ML
2.5 INJECTION, SOLUTION INTRAVENOUS ONCE
Status: COMPLETED | OUTPATIENT
Start: 2024-12-25 | End: 2024-12-25

## 2024-12-25 RX ORDER — ONDANSETRON 2 MG/ML
4 INJECTION INTRAMUSCULAR; INTRAVENOUS EVERY 6 HOURS PRN
Status: DISCONTINUED | OUTPATIENT
Start: 2024-12-25 | End: 2024-12-27 | Stop reason: HOSPADM

## 2024-12-25 RX ORDER — BISACODYL 10 MG
10 SUPPOSITORY, RECTAL RECTAL DAILY PRN
Status: DISCONTINUED | OUTPATIENT
Start: 2024-12-25 | End: 2024-12-27 | Stop reason: HOSPADM

## 2024-12-25 RX ORDER — METOPROLOL TARTRATE 1 MG/ML
5 INJECTION, SOLUTION INTRAVENOUS ONCE
Status: DISCONTINUED | OUTPATIENT
Start: 2024-12-25 | End: 2024-12-25

## 2024-12-25 RX ADMIN — GUAIFENESIN 600 MG: 600 TABLET, EXTENDED RELEASE ORAL at 21:11

## 2024-12-25 RX ADMIN — MORPHINE SULFATE 4 MG: 4 INJECTION, SOLUTION INTRAMUSCULAR; INTRAVENOUS at 16:39

## 2024-12-25 RX ADMIN — MIDODRINE HYDROCHLORIDE 2.5 MG: 5 TABLET ORAL at 21:11

## 2024-12-25 RX ADMIN — ONDANSETRON 4 MG: 2 INJECTION INTRAMUSCULAR; INTRAVENOUS at 16:39

## 2024-12-25 RX ADMIN — SENNOSIDES AND DOCUSATE SODIUM 2 TABLET: 50; 8.6 TABLET ORAL at 21:11

## 2024-12-25 RX ADMIN — MORPHINE SULFATE 4 MG: 4 INJECTION, SOLUTION INTRAMUSCULAR; INTRAVENOUS at 21:31

## 2024-12-25 RX ADMIN — Medication 10 ML: at 21:12

## 2024-12-25 RX ADMIN — METOCLOPRAMIDE 5 MG: 5 TABLET ORAL at 21:11

## 2024-12-25 RX ADMIN — SODIUM CHLORIDE 50 ML/HR: 9 INJECTION, SOLUTION INTRAVENOUS at 22:42

## 2024-12-25 RX ADMIN — METOPROLOL TARTRATE 2.5 MG: 1 INJECTION, SOLUTION INTRAVENOUS at 22:34

## 2024-12-25 RX ADMIN — MIRTAZAPINE 15 MG: 15 TABLET, FILM COATED ORAL at 21:11

## 2024-12-25 RX ADMIN — DIGOXIN 125 MCG: 0.25 INJECTION INTRAMUSCULAR; INTRAVENOUS at 21:11

## 2024-12-25 RX ADMIN — LATANOPROST 1 DROP: 50 SOLUTION/ DROPS OPHTHALMIC at 21:11

## 2024-12-25 NOTE — ED NOTES
Nursing report ED to floor  Shruthi Amin  89 y.o.  female    HPI:   Chief Complaint   Patient presents with    Hip Pain    Fall       Admitting doctor:   Erin Morrison MD    Admitting diagnosis:   The encounter diagnosis was Left displaced femoral neck fracture.    Code status:   Current Code Status       Date Active Code Status Order ID Comments User Context       12/25/2024 1555 No CPR (Do Not Attempt to Resuscitate) [id number]  Josie Solano, [facility]        Question Answer    Code Status (Patient has no pulse and is not breathing) No CPR (Do Not Attempt to Resuscitate)    Medical Interventions (Patient has pulse or is breathing) Full Support    Comments DNR/DNI    Level Of Support Discussed With Next of Kin (If No Surrogate)     Health Care Surrogate                    Allergies:   Penicillins, Codeine, and Latex    Isolation:  No active isolations     Fall Risk:  Fall Risk Assessment was completed, and patient is at high risk for falls.   Predictive Model Details         20 (Low) Factor Value    Calculated 12/25/2024 16:48 Age 89    Risk of Fall Model Active Peripheral IV Present     Imaging order in this encounter Present     Respiratory Rate 22     Magnesium not on file     Diastolic BP 59     Mark Scale not on file     Albumin 3.5 g/dL     Number of Distinct Medication Classes administered 2     Total Bilirubin 1.1 mg/dL     Creatinine 0.54 mg/dL     Number of administrations of Analgesic Narcotics 1     Chloride 103 mmol/L     Days after Admission 0.125     Tobacco Use Quit     Calcium 9.4 mg/dL     ALT 14 U/L     Potassium 3.8 mmol/L         Weight:       12/25/24  1347   Weight: 42.5 kg (93 lb 11.1 oz)       Intake and Output  No intake or output data in the 24 hours ending 12/25/24 1654    Diet:   Dietary Orders (From admission, onward)       Start     Ordered    12/26/24 0001  NPO Diet NPO Type: Strict NPO  Diet Effective Midnight        Question:  NPO Type  Answer:  Strict NPO    12/25/24 0800     12/25/24 1546  Diet: Regular/House; Fluid Consistency: Thin (IDDSI 0)  Diet Effective Now        References:    Diet Order Definitions   Question Answer Comment   Diets: Regular/House    Fluid Consistency: Thin (IDDSI 0)        12/25/24 1546                     Most recent vitals:   Vitals:    12/25/24 1603 12/25/24 1618 12/25/24 1633 12/25/24 1648   BP: 128/91 116/67 115/68 96/62   Pulse: (!) 122 (!) 126 119 (!) 130   Resp:       Temp:       TempSrc:       SpO2: 99% 100% 100% 100%   Weight:       Height:           Active LDAs/IV Access:   Lines, Drains & Airways       Active LDAs       Name Placement date Placement time Site Days    Peripheral IV 12/25/24 Anterior;Left Forearm 12/25/24  --  Forearm  less than 1                    Skin Condition:   Skin Assessments (last day)       None             Labs (abnormal labs have a star):   Labs Reviewed   COMPREHENSIVE METABOLIC PANEL - Abnormal; Notable for the following components:       Result Value    Glucose 111 (*)     Creatinine 0.54 (*)     CO2 31.7 (*)     Total Protein 5.9 (*)     Alkaline Phosphatase 201 (*)     BUN/Creatinine Ratio 37.0 (*)     All other components within normal limits    Narrative:     GFR Categories in Chronic Kidney Disease (CKD)      GFR Category          GFR (mL/min/1.73)    Interpretation  G1                     90 or greater         Normal or high (1)  G2                      60-89                Mild decrease (1)  G3a                   45-59                Mild to moderate decrease  G3b                   30-44                Moderate to severe decrease  G4                    15-29                Severe decrease  G5                    14 or less           Kidney failure          (1)In the absence of evidence of kidney disease, neither GFR category G1 or G2 fulfill the criteria for CKD.    eGFR calculation 2021 CKD-EPI creatinine equation, which does not include race as a factor   CBC WITH AUTO DIFFERENTIAL - Abnormal; Notable for the  following components:    WBC 22.06 (*)     RBC 3.05 (*)     Hemoglobin 10.1 (*)     Hematocrit 32.2 (*)     .6 (*)     MCH 33.1 (*)     MCHC 31.4 (*)     RDW 16.3 (*)     RDW-SD 62.8 (*)     All other components within normal limits    Narrative:     The previously reported component NRBC is no longer being reported. Previous result was 0.4 /100 WBC (Reference Range: 0.0-0.2 /100 WBC) on 12/25/2024 at 1539 EST.   MANUAL DIFFERENTIAL - Abnormal; Notable for the following components:    Neutrophil % 82.0 (*)     Lymphocyte % 1.0 (*)     Bands %  9.0 (*)     Metamyelocyte % 2.0 (*)     Neutrophils Absolute 20.07 (*)     Lymphocytes Absolute 0.22 (*)     Monocytes Absolute 1.10 (*)     Basophils Absolute 0.22 (*)     All other components within normal limits   LIPASE - Normal   SCAN SLIDE   CBC AND DIFFERENTIAL    Narrative:     The following orders were created for panel order CBC & Differential.  Procedure                               Abnormality         Status                     ---------                               -----------         ------                     CBC Auto Differential[[id number]]        Abnormal            Final result               Scan Slide[[id number]]                                       Final result                 Please view results for these tests on the individual orders.       LOC: Person, Place, Time, and Situation    Telemetry:  Telemetry    Cardiac Monitoring Ordered: yes    EKG:   No orders to display       Medications Given in the ED:   Medications   sodium chloride 0.9 % flush 10 mL (has no administration in time range)   sodium chloride 0.9 % flush 10 mL (has no administration in time range)   sodium chloride 0.9 % flush 10 mL (has no administration in time range)   sodium chloride 0.9 % infusion 40 mL (has no administration in time range)   nitroglycerin (NITROSTAT) SL tablet 0.4 mg (has no administration in time range)   Potassium Replacement - Follow Nurse / BPA Driven  Protocol (has no administration in time range)   Magnesium Standard Dose Replacement - Follow Nurse / BPA Driven Protocol (has no administration in time range)   Phosphorus Replacement - Follow Nurse / BPA Driven Protocol (has no administration in time range)   Calcium Replacement - Follow Nurse / BPA Driven Protocol (has no administration in time range)   acetaminophen (TYLENOL) tablet 650 mg (has no administration in time range)     Or   acetaminophen (TYLENOL) 160 MG/5ML oral solution 650 mg (has no administration in time range)     Or   acetaminophen (TYLENOL) suppository 650 mg (has no administration in time range)   sennosides-docusate (PERICOLACE) 8.6-50 MG per tablet 2 tablet (has no administration in time range)     And   polyethylene glycol (MIRALAX) packet 17 g (has no administration in time range)     And   bisacodyl (DULCOLAX) EC tablet 5 mg (has no administration in time range)     And   bisacodyl (DULCOLAX) suppository 10 mg (has no administration in time range)   ondansetron ODT (ZOFRAN-ODT) disintegrating tablet 4 mg ( Oral Not Given:  See Alt 12/25/24 1639)     Or   ondansetron (ZOFRAN) injection 4 mg (4 mg Intravenous Given 12/25/24 1639)   hydrALAZINE (APRESOLINE) injection 10 mg (has no administration in time range)   morphine injection 4 mg (4 mg Intravenous Given 12/25/24 1639)   oxyCODONE (ROXICODONE) immediate release tablet 5 mg (has no administration in time range)   ipratropium-albuterol (DUO-NEB) nebulizer solution 3 mL (has no administration in time range)   guaiFENesin (MUCINEX) 12 hr tablet 600 mg (has no administration in time range)   latanoprost (XALATAN) 0.005 % ophthalmic solution 1 drop (has no administration in time range)   metoclopramide (REGLAN) tablet 5 mg (has no administration in time range)   midodrine (PROAMATINE) tablet 2.5 mg (has no administration in time range)   mirtazapine (REMERON) tablet 15 mg (has no administration in time range)       Imaging results:  CT Head  Without Contrast    Result Date: 2024  Impression: 1. No acute intracranial pathology. 2. No cervical spine fracture. 3. Partially visualized moderate left pleural effusion. 4. Possible achalasia. 5. Questionable left posterior mediastinal mass posterior to the left mainstem bronchus. CT of the chest with contrast recommended. Electronically Signed: Orestes Ojeda MD  2024 2:39 PM EST  Workstation ID: [id number]    CT Cervical Spine Without Contrast    Result Date: 2024  Impression: 1. No acute intracranial pathology. 2. No cervical spine fracture. 3. Partially visualized moderate left pleural effusion. 4. Possible achalasia. 5. Questionable left posterior mediastinal mass posterior to the left mainstem bronchus. CT of the chest with contrast recommended. Electronically Signed: Orestes Ojeda MD  2024 2:39 PM EST  Workstation ID: [id number]    XR Hip With or Without Pelvis 2 - 3 View Left    Result Date: 2024  Impression: Acute transversely oriented fracture through the left femoral neck with superior migration of the distal fracture fragment. Electronically Signed: El Rosenthal  2024 2:25 PM EST  Workstation ID: [id number]     Social issues:   Social History     Socioeconomic History    Marital status:    Tobacco Use    Smoking status: Former     Current packs/day: 0.00     Types: Cigarettes     Quit date:      Years since quittin.0     Passive exposure: Past    Smokeless tobacco: Never   Vaping Use    Vaping status: Never Used   Substance and Sexual Activity    Alcohol use: No    Drug use: No    Sexual activity: Defer       NIH Stroke Scale:  Interval: (not recorded)  1a. Level of Consciousness: (not recorded)  1b. LOC Questions: (not recorded)  1c. LOC Commands: (not recorded)  2. Best Gaze: (not recorded)  3. Visual: (not recorded)  4. Facial Palsy: (not recorded)  5a. Motor Arm, Left: (not recorded)  5b. Motor Arm, Right: (not recorded)  6a. Motor Leg, Left: (not  recorded)  6b. Motor Leg, Right: (not recorded)  7. Limb Ataxia: (not recorded)  8. Sensory: (not recorded)  9. Best Language: (not recorded)  10. Dysarthria: (not recorded)  11. Extinction and Inattention (formerly Neglect): (not recorded)    Total (NIH Stroke Scale): (not recorded)     Additional notable assessment information:     Nursing report ED to floor:  Debbie Sharpe RN   12/25/24 16:54 EST

## 2024-12-25 NOTE — Clinical Note
Level of Care: Med/Surg [1]   Admitting Physician: FUNMI QUINN [[id number]]   Attending Physician: FUNMI QUINN [[id number]]

## 2024-12-25 NOTE — ED PROVIDER NOTES
Subjective   History of Present Illness  89-year-old female presents after fall at home.  Tripped over thick rug.  On home hospice care.  Denies hitting head or losing consciousness.  Chronically on 3 L nasal cannula.  Questionable leg shortening or EMS.  Review of Systems  See HPI.  Past Medical History:   Diagnosis Date    Atrial fibrillation     Coronary artery disease     Atrial fibrillation    GERD (gastroesophageal reflux disease)     Glaucoma     Hiatal hernia with gastroesophageal reflux     History of osteoporotic pathological fracture     Right intertroch (2019)    Hx of compression fracture of spine     T12 and L1(); T6 (2022)    Hyperlipidemia     Hypertension     Osteoarthritis     Osteoporosis     on prolia(3/21/22)    Stroke     off and on dizziness from the stroke.       Allergies   Allergen Reactions    Penicillins Hives    Codeine Nausea And Vomiting    Latex Rash       Past Surgical History:   Procedure Laterality Date    BACK SURGERY      kyphoplasty    EYE SURGERY      cataract surgery    FIXATION KYPHOPLASTY LUMBAR SPINE      HIP TROCHANTERIC NAILING WITH INTRAMEDULLARY HIP SCREW Right 2019    Procedure: HIP TROCHANTERIC NAILING SHORT WITH INTRAMEDULLARY HIP SCREW;  Surgeon: Edward Centeno MD;  Location: [facility] MAIN OR;  Service: Orthopedics    RECTAL SURGERY      x 3       Family History   Problem Relation Age of Onset    Heart disease Mother     Hypertension Mother     Osteoporosis Mother     Cancer Father         colon cancer    Osteoporosis Father     Cancer Sister         lung    Cancer Brother         colon cancer       Social History     Socioeconomic History    Marital status:    Tobacco Use    Smoking status: Former     Current packs/day: 0.00     Types: Cigarettes     Quit date:      Years since quittin.0     Passive exposure: Past    Smokeless tobacco: Never   Vaping Use    Vaping status: Never Used   Substance and Sexual Activity    Alcohol  "use: No    Drug use: No    Sexual activity: Defer           Objective   Physical Exam  Chronically ill-appearing, nasal cannula in place, irregularly irregular rhythm and mildly tachycardic rate, frail-appearing, diminished breath sounds bilaterally, tenderness palpation over left greater trochanter, intact PT and DP pulses, sensation intact to light touch distally, toes up/down, abdomen soft and nontender without rebound or guarding, no midline spinal tenderness to palpation, kyphotic.  Procedures           ED Course      /78   Pulse 105   Temp 97.8 °F (36.6 °C) (Oral)   Resp 22   Ht 157.5 cm (62\")   Wt 42.5 kg (93 lb 11.1 oz)   SpO2 96%   BMI 17.14 kg/m²   Labs Reviewed   COMPREHENSIVE METABOLIC PANEL   LIPASE   CBC WITH AUTO DIFFERENTIAL   CBC AND DIFFERENTIAL    Narrative:     The following orders were created for panel order CBC & Differential.  Procedure                               Abnormality         Status                     ---------                               -----------         ------                     CBC Auto Differential[[id number]]                                                         Please view results for these tests on the individual orders.     CT Head Without Contrast   Final Result   Impression:   1. No acute intracranial pathology.   2. No cervical spine fracture.   3. Partially visualized moderate left pleural effusion.   4. Possible achalasia.   5. Questionable left posterior mediastinal mass posterior to the left mainstem bronchus. CT of the chest with contrast recommended.               Electronically Signed: Orestes Ojeda MD     12/25/2024 2:39 PM EST     Workstation ID: [id number]      CT Cervical Spine Without Contrast   Final Result   Impression:   1. No acute intracranial pathology.   2. No cervical spine fracture.   3. Partially visualized moderate left pleural effusion.   4. Possible achalasia.   5. Questionable left posterior mediastinal mass posterior to the " left mainstem bronchus. CT of the chest with contrast recommended.               Electronically Signed: Orestes Ojeda MD     12/25/2024 2:39 PM EST     Workstation ID: [id number]      XR Hip With or Without Pelvis 2 - 3 View Left   Final Result   Impression:   Acute transversely oriented fracture through the left femoral neck with superior migration of the distal fracture fragment.            Electronically Signed: El Rosenthal     12/25/2024 2:25 PM EST     Workstation ID: [id number]                                                         Medical Decision Making    My interpretation of x-ray concerning for femoral neck fracture.  See system for radiology interpretation.    Questionable mediastinal mass on CT along with pleural effusions.  POA states that the effusion is chronic.  Has history of significant valvular disease.  States unsure about the mediastinal mass.  Patient usually able to ambulate at home.  Will admit to hospitalist service for likely Ortho consultation and further management.  Final diagnoses:   Left displaced femoral neck fracture       ED Disposition  ED Disposition       ED Disposition   Decision to Admit    Condition   --    Comment   Level of Care: Med/Surg [1]   Admitting Physician: FUNMI QUINN [[id number]]   Attending Physician: FUNMI QUINN [[id number]]                 No follow-up provider specified.       Medication List      No changes were made to your prescriptions during this visit.            Julito Chris MD  12/25/24 7059

## 2024-12-25 NOTE — PLAN OF CARE
Goal Outcome Evaluation:  Plan of Care Reviewed With: patient, family, durable power of         Progress: declining  Outcome Evaluation: Patient admitted to floor from ED. Ortho consulted. Patient on strict bedrest. Current with hospice care at home. Safety measures in place. Call light within reach. Patient able to make needs known. Plan of care ongoing. Family at bedside and very supportive.

## 2024-12-26 LAB
B PARAPERT DNA SPEC QL NAA+PROBE: NOT DETECTED
B PERT DNA SPEC QL NAA+PROBE: NOT DETECTED
BACTERIA UR QL AUTO: NORMAL /HPF
BILIRUB UR QL STRIP: NEGATIVE
C PNEUM DNA NPH QL NAA+NON-PROBE: NOT DETECTED
CLARITY UR: CLEAR
COD CRY URNS QL: NORMAL /HPF
COLOR UR: ABNORMAL
FLUAV SUBTYP SPEC NAA+PROBE: NOT DETECTED
FLUBV RNA ISLT QL NAA+PROBE: NOT DETECTED
GLUCOSE UR STRIP-MCNC: NEGATIVE MG/DL
HADV DNA SPEC NAA+PROBE: NOT DETECTED
HCOV 229E RNA SPEC QL NAA+PROBE: NOT DETECTED
HCOV HKU1 RNA SPEC QL NAA+PROBE: NOT DETECTED
HCOV NL63 RNA SPEC QL NAA+PROBE: NOT DETECTED
HCOV OC43 RNA SPEC QL NAA+PROBE: DETECTED
HGB UR QL STRIP.AUTO: NEGATIVE
HMPV RNA NPH QL NAA+NON-PROBE: NOT DETECTED
HPIV1 RNA ISLT QL NAA+PROBE: NOT DETECTED
HPIV2 RNA SPEC QL NAA+PROBE: NOT DETECTED
HPIV3 RNA NPH QL NAA+PROBE: NOT DETECTED
HPIV4 P GENE NPH QL NAA+PROBE: NOT DETECTED
HYALINE CASTS UR QL AUTO: NORMAL /LPF
KETONES UR QL STRIP: ABNORMAL
LEUKOCYTE ESTERASE UR QL STRIP.AUTO: ABNORMAL
M PNEUMO IGG SER IA-ACNC: NOT DETECTED
MUCOUS THREADS URNS QL MICRO: NORMAL /HPF
NITRITE UR QL STRIP: NEGATIVE
PH UR STRIP.AUTO: <=5 [PH] (ref 5–8)
PROT UR QL STRIP: ABNORMAL
RBC # UR STRIP: NORMAL /HPF
REF LAB TEST METHOD: NORMAL
RHINOVIRUS RNA SPEC NAA+PROBE: NOT DETECTED
RSV RNA NPH QL NAA+NON-PROBE: NOT DETECTED
SARS-COV-2 RNA RESP QL NAA+PROBE: NOT DETECTED
SP GR UR STRIP: 1.02 (ref 1–1.03)
SQUAMOUS #/AREA URNS HPF: NORMAL /HPF
UROBILINOGEN UR QL STRIP: ABNORMAL
WBC # UR STRIP: NORMAL /HPF

## 2024-12-26 PROCEDURE — 0202U NFCT DS 22 TRGT SARS-COV-2: CPT

## 2024-12-26 PROCEDURE — 94799 UNLISTED PULMONARY SVC/PX: CPT

## 2024-12-26 PROCEDURE — 81001 URINALYSIS AUTO W/SCOPE: CPT

## 2024-12-26 PROCEDURE — 94761 N-INVAS EAR/PLS OXIMETRY MLT: CPT

## 2024-12-26 RX ORDER — MORPHINE SULFATE 20 MG/ML
5 SOLUTION ORAL
Status: DISCONTINUED | OUTPATIENT
Start: 2024-12-26 | End: 2024-12-27 | Stop reason: HOSPADM

## 2024-12-26 RX ORDER — FAMOTIDINE 20 MG/1
20 TABLET, FILM COATED ORAL DAILY
Status: DISCONTINUED | OUTPATIENT
Start: 2024-12-26 | End: 2024-12-27 | Stop reason: HOSPADM

## 2024-12-26 RX ADMIN — Medication 10 ML: at 13:00

## 2024-12-26 RX ADMIN — MIDODRINE HYDROCHLORIDE 2.5 MG: 5 TABLET ORAL at 18:22

## 2024-12-26 RX ADMIN — METOCLOPRAMIDE 5 MG: 5 TABLET ORAL at 13:00

## 2024-12-26 RX ADMIN — IPRATROPIUM BROMIDE AND ALBUTEROL SULFATE 3 ML: .5; 3 SOLUTION RESPIRATORY (INHALATION) at 18:29

## 2024-12-26 RX ADMIN — IPRATROPIUM BROMIDE AND ALBUTEROL SULFATE 3 ML: .5; 3 SOLUTION RESPIRATORY (INHALATION) at 11:40

## 2024-12-26 RX ADMIN — Medication 10 ML: at 21:36

## 2024-12-26 RX ADMIN — MORPHINE SULFATE 2 MG: 100 SOLUTION ORAL at 13:00

## 2024-12-26 RX ADMIN — METOCLOPRAMIDE 5 MG: 5 TABLET ORAL at 18:22

## 2024-12-26 RX ADMIN — MORPHINE SULFATE 5 MG: 100 SOLUTION ORAL at 18:22

## 2024-12-26 RX ADMIN — MORPHINE SULFATE 2.5 MG: 100 SOLUTION ORAL at 21:31

## 2024-12-26 RX ADMIN — MORPHINE SULFATE 4 MG: 100 SOLUTION ORAL at 16:04

## 2024-12-26 NOTE — DISCHARGE PLACEMENT REQUEST
"Shruthi Hamiltno (89 y.o. Female)       Date of Birth   06/10/1928    Social Security Number       Address   [address] [place] [address]    Home Phone   [phone]    MRN   [id number]       Yazdanism   None    Marital Status                               Admission Date   12/25/24    Admission Type   Emergency    Admitting Provider   Erin Morrison MD    Attending Provider   Tricia Roman MD    Department, Room/Bed   [facility] SURGICAL INPATIENT, 4107/1       Discharge Date       Discharge Disposition       Discharge Destination                                 Attending Provider: Tricia Roman MD    Allergies: Penicillins, Codeine, Latex    Isolation: Contact   Infection: Candida Auris (rule out) (12/25/24)   Code Status: No CPR    Ht: 157.5 cm (62\")   Wt: 42.5 kg (93 lb 11.1 oz)    Admission Cmt: None   Principal Problem: Closed left hip fracture [S72.002A]                   Active Insurance as of 12/25/2024       Primary Coverage       Payor Plan Insurance Group Employer/Plan Group    MEDICARE MEDICARE A & B        Payor Plan Address Payor Plan Phone Number Payor Plan Fax Number Effective Dates    [address] [phone]  6/1/1993 - None Entered    [facility] [id number]         Subscriber Name Subscriber Birth Date Member ID       SHRUTHI HAMILTON 6/10/1928 [id number]               Secondary Coverage       Payor Plan Insurance Group Employer/Plan Group    ANTHEM BLUE CROSS ANTHEM BLUE CROSS BLUE SHIELD PPO [id number]       Payor Plan Address Payor Plan Phone Number Payor Plan Fax Number Effective Dates    [address] [phone]  1/1/2021 - None Entered    [facility] [id number]         Subscriber Name Subscriber Birth Date Member ID       SHRUTHI HAMILTON 6/10/1928 [id number]                     Emergency Contacts        (Rel.) Home Phone Work Phone Mobile Phone    Shea Nam (Daughter) [phone] -- [phone]    Chris Huangssica (Grandchild) [phone] -- " [phone]    sonya cartwright (Son) [phone] -- [phone]

## 2024-12-26 NOTE — PLAN OF CARE
Goal Outcome Evaluation:  Plan of Care Reviewed With: patient, family, durable power of         Progress: declining  Outcome Evaluation: Patient no l onger having surgery. Family and POA declined and are willing to go home with hospice and comfort care. Granddaughter (POA) at bedside throughout shift. [facility] liason was present today. Safety measures in place. Call light within reach. Plan of care ongoing.

## 2024-12-26 NOTE — SIGNIFICANT NOTE
12/26/24 0611   OTHER   Discipline physical therapist   Rehab Time/Intention   Session Not Performed other (see comments)  (Pt awaiting surgical hip repair for fx. Will f/u for therapy services evaluation post-sx.)   Recommendation   PT - Next Appointment 12/27/24

## 2024-12-26 NOTE — CASE MANAGEMENT/SOCIAL WORK
Discharge Planning Assessment   Jamey     Patient Name: Shruthi Amin  MRN: [id number]  Today's Date: 12/26/2024    Admit Date: 12/25/2024    Plan: Home with [facility] vs IP hospice facility.  May need assistance with transport at discharge.   Discharge Needs Assessment       Row Name 12/26/24 1452       Living Environment    People in Home child(juan francisco), adult;grandchild(juan francisco)    Name(s) of People in Home lives with mariella Valdivia    Current Living Arrangements home    Potentially Unsafe Housing Conditions none    In the past 12 months has the electric, gas, oil, or water company threatened to shut off services in your home? No    Primary Care Provided by self    Provides Primary Care For no one    Family Caregiver if Needed child(juan francisco), adult    Family Caregiver Names mariella Valdivia    Quality of Family Relationships helpful;involved;supportive    Able to Return to Prior Arrangements yes       Resource/Environmental Concerns    Resource/Environmental Concerns none    Transportation Concerns none       Transportation Needs    In the past 12 months, has lack of transportation kept you from medical appointments or from getting medications? no    In the past 12 months, has lack of transportation kept you from meetings, work, or from getting things needed for daily living? No       Food Insecurity    Within the past 12 months, you worried that your food would run out before you got the money to buy more. Never true    Within the past 12 months, the food you bought just didn't last and you didn't have money to get more. Never true       Transition Planning    Patient/Family Anticipates Transition to home with family    Patient/Family Anticipated Services at Transition hospice care  was current with [facility] prior to admission    Transportation Anticipated family or friend will provide;health plan transportation       Discharge Needs Assessment    Readmission Within the Last 30 Days no previous admission in last 30 days     Current Outpatient/Agency/Support Group home hospice  [facility]    Equipment Currently Used at Home walker, rolling;wheelchair;lift device;commode    Concerns to be Addressed care coordination/care conferences;discharge planning    Do you want help finding or keeping work or a job? I do not need or want help    Do you want help with school or training? For example, starting or completing job training or getting a high school diploma, GED or equivalent No    Anticipated Changes Related to Illness none    Equipment Needed After Discharge none    Outpatient/Agency/Support Group Needs home hospice    Discharge Facility/Level of Care Needs hospice facility;other (see comments)  hospice facility or home with [facility]    Provided Post Acute Provider List? N/A    N/A Provider List Comment denies need   was current with [facility] on admission and will go back with them at discharge                   Discharge Plan       Row Name 12/26/24 0272       Plan    Plan Home with [facility] vs IP hospice facility.  May need assistance with transport at discharge.    Patient/Family in Agreement with Plan yes    Plan Comments Patient lives at home with daughter, Shea.  Patient does not drive. Will likely need help with  transport at discharge. Patient performs ADL with assistance from family . PCP and pharmacy confirmed. Denies financial assistance needs for medication and/or food. Patient was current with [facility] prior to arrival to hospital.  Family made decision to decline surgery due to other medical conditions and risks of surgery.  Will likely dc back home with [facility] if able to get pain managed if not family may look at  hospice                  Continued Care and Services - Admitted Since 12/25/2024       Home Medical Care       Service Provider Request Status Services Address Phone Fax Patient Preferred    [facility] Accepted -- [address] [place] [address] [phone]  --    [facility] Pending - Request Sent -- [id number], [place] [id number] [phone] --                  Expected Discharge Date and Time       Expected Discharge Date Expected Discharge Time    Dec 28, 2024            Demographic Summary       Row Name 12/26/24 1452       General Information    Admission Type inpatient    Arrived From emergency department    Required Notices Provided Important Message from Medicare    Referral Source admission list    Reason for Consult discharge planning    Preferred Language English       Contact Information    Permission Granted to Share Info With                    Functional Status       Row Name 12/26/24 1452       Functional Status    Usual Activity Tolerance moderate    Current Activity Tolerance fair       Functional Status, IADL    Medications assistive person    Meal Preparation assistive person    Housekeeping assistive person    Laundry assistive person    Shopping assistive person       Mental Status    General Appearance WDL WDL       Mental Status Summary    Recent Changes in Mental Status/Cognitive Functioning no changes                    Ekaterina Durant RN    [facility] 1  [email or web address]  Office [phone]  Cell [phone]

## 2024-12-26 NOTE — THERAPY DISCHARGE NOTE
Patient Name: Shruthi Amin  : 6/10/1928    MRN: [id number]                              Today's Date: 2024       Admit Date: 2024    Visit Dx:     ICD-10-CM ICD-9-CM   1. Left displaced femoral neck fracture  S72.002A 820.8     Patient Active Problem List   Diagnosis    Atrial fibrillation    Chronic anticoagulation    Cerebrovascular accident (CVA)    Hiatal hernia    Hyperlipidemia    Hypertension    Osteoarthritis    Hypoxia    Compression fracture of thoracic vertebra with delayed healing    History of osteoporotic pathological fracture    Acute on chronic congestive heart failure    Pneumonia due to infectious organism    Severe malnutrition    Nonrheumatic mitral valve regurgitation    Back pain    Recurrent pleural effusion    Bradycardia    Atrial fibrillation with rapid ventricular response    Acute respiratory failure with hypoxia and hypercapnia    Closed left hip fracture     Past Medical History:   Diagnosis Date    Atrial fibrillation     Coronary artery disease     Atrial fibrillation    GERD (gastroesophageal reflux disease)     Glaucoma     Hiatal hernia with gastroesophageal reflux     History of osteoporotic pathological fracture     Right intertroch (2019)    Hx of compression fracture of spine     T12 and L1(); T6 (2022)    Hyperlipidemia     Hypertension     Osteoarthritis     Osteoporosis     on prolia(3/21/22)    Stroke     off and on dizziness from the stroke.     Past Surgical History:   Procedure Laterality Date    BACK SURGERY      kyphoplasty    EYE SURGERY      cataract surgery    FIXATION KYPHOPLASTY LUMBAR SPINE      HIP TROCHANTERIC NAILING WITH INTRAMEDULLARY HIP SCREW Right 2019    Procedure: HIP TROCHANTERIC NAILING SHORT WITH INTRAMEDULLARY HIP SCREW;  Surgeon: Edward Centeno MD;  Location: [facility] MAIN OR;  Service: Orthopedics    RECTAL SURGERY      x 3      General Information    No documentation.                  Mobility       Row  Name 12/26/24 0750          Gait/Stairs (Locomotion)    Patient was able to Ambulate no, other medical factors prevent ambulation  -CM     Reason Patient was unable to Ambulate --  not scheduled for sx untili 18:15 today; will see in AM.  -CM               User Key  (r) = Recorded By, (t) = Taken By, (c) = Cosigned By      Initials Name Provider Type    Sandy Salguero, PT Physical Therapist                   Obj/Interventions    No documentation.                  Goals/Plan    No documentation.                  Clinical Impression       Row Name 12/26/24 1443 12/26/24 0750       Plan of Care Review    Outcome Evaluation Pt admitted for L hip fx. Family has discussed options w/ orthopedist and has decided that risks of sx outweigh benefits of non-operative rx. Pt to be made comfort measures per family. PT confirmed w/ dtr that PT is not appropriate at this time and would cause pt great discomfort. Will sign off for now. If family wishes to pursue PT moving forward, they will notify RN. Dtr agreeable to this plan.  -CM Pt has L hip fx and not scheduled for sx until 18:15. Will see for PT in AM.  -CM              User Key  (r) = Recorded By, (t) = Taken By, (c) = Cosigned By      Initials Name Provider Type    Sandy Salguero, PT Physical Therapist                   Outcome Measures       Row Name 12/26/24 0859          How much help from another person do you currently need...    Turning from your back to your side while in flat bed without using bedrails? 1  -AH     Moving from lying on back to sitting on the side of a flat bed without bedrails? 1  -AH     Moving to and from a bed to a chair (including a wheelchair)? 1  -AH     Standing up from a chair using your arms (e.g., wheelchair, bedside chair)? 1  -AH     Climbing 3-5 steps with a railing? 1  -AH     To walk in hospital room? 1  -AH     AM-PAC 6 Clicks Score (PT) 6  -AH               User Key  (r) = Recorded By, (t) = Taken By, (c) = Cosigned By       Initials Name Provider Type    Debbie Onofre LPN Licensed Nurse                  Physical Therapy Education        No education to display                  PT Recommendation and Plan     Outcome Evaluation: Pt admitted for L hip fx. Family has discussed options w/ orthopedist and has decided that risks of sx outweigh benefits of non-operative rx. Pt to be made comfort measures per family. PT confirmed w/ dtr that PT is not appropriate at this time and would cause pt great discomfort. Will sign off for now. If family wishes to pursue PT moving forward, they will notify RN. Dtr agreeable to this plan.     Time Calculation:         PT Charges       Row Name 12/26/24 0611             Time Calculation    PT - Next Appointment 12/27/24  -CASSIDY                User Key  (r) = Recorded By, (t) = Taken By, (c) = Cosigned By      Initials Name Provider Type    Lynn Sue, PT Physical Therapist                      PT G-Codes  AM-PAC 6 Clicks Score (PT): 6         Sandy Snider, PT  12/26/2024

## 2024-12-26 NOTE — PLAN OF CARE
Goal Outcome Evaluation:  Plan of Care Reviewed With: patient        Progress: no change  Outcome Evaluation: Pt is here with a left hip FX, NPO for possible surgery later today, Strict bed rest, Pain treated, Pt has A fib, Pt has had a high heart rate this shift I got orders for medication and Pts HR has stayed in the low 100s , Pt also was unable to urinate and I bladder scanned and contacted ALMA Gallego and she said to place a Jade Cath. Family at bedside, Pt is also on 5L high flow. Q2 turn. Call light in reach, Plan on going. All labs and UA gotten

## 2024-12-26 NOTE — H&P
Patient Care Team:  Provider, No Known as PCP - Anai Cortes APRN as Nurse Practitioner (Cardiology)  Theodore Vitla MD as Consulting Physician (Cardiology)    Chief complaint left hip pain after fall    Subjective     Patient is a 89 y.o. female with PMH of Afib, chronic pleural effusion, chronic respiratory failure on 3L oxygen at home, severe mitral regurg, CAD, CVA who presents with left hip pain and inability to walk after tripping on a rug at home today.  She was currently under hospice care at home.     In the ER, Left hip xray showed an acute transversely oriented fracture through the left femoral neck with superior migration of the distal fracture fragment. WBC 22.06, HGB 10.1, Chemistry fairly unremarkable  except alk phos of 201 which was slightly elevated during last admission also. Gluc 111, creatinine 0.54.  CT cervical spine and head did not show any acute intercranial processes or cervical fractures, but did indicate a possible left mediastinal mass posterior to the left mainstem bronchus and recommended at CT chest for further evaluation.    Review of Systems   Reason unable to perform ROS: altered mental status.          History  Past Medical History:   Diagnosis Date    Atrial fibrillation     Coronary artery disease     Atrial fibrillation    GERD (gastroesophageal reflux disease)     Glaucoma     Hiatal hernia with gastroesophageal reflux     History of osteoporotic pathological fracture     Right intertroch (7/2019)    Hx of compression fracture of spine     T12 and L1(2013); T6 (1/2022)    Hyperlipidemia     Hypertension     Osteoarthritis     Osteoporosis     on prolia(3/21/22)    Stroke     off and on dizziness from the stroke.     Past Surgical History:   Procedure Laterality Date    BACK SURGERY      kyphoplasty    EYE SURGERY      cataract surgery    FIXATION KYPHOPLASTY LUMBAR SPINE  2013    HIP TROCHANTERIC NAILING WITH INTRAMEDULLARY HIP SCREW Right 7/20/2019     Procedure: HIP TROCHANTERIC NAILING SHORT WITH INTRAMEDULLARY HIP SCREW;  Surgeon: Edward Centeno MD;  Location: [facility] MAIN OR;  Service: Orthopedics    RECTAL SURGERY      x 3     Family History   Problem Relation Age of Onset    Heart disease Mother     Hypertension Mother     Osteoporosis Mother     Cancer Father         colon cancer    Osteoporosis Father     Cancer Sister         lung    Cancer Brother         colon cancer     Social History     Tobacco Use    Smoking status: Former     Current packs/day: 0.00     Types: Cigarettes     Quit date:      Years since quittin.0     Passive exposure: Past    Smokeless tobacco: Never   Vaping Use    Vaping status: Never Used   Substance Use Topics    Alcohol use: No    Drug use: No     Medications Prior to Admission   Medication Sig Dispense Refill Last Dose/Taking    Cholecalciferol (Vitamin D-3) 125 MCG (5000 UT) tablet Take 1 tablet by mouth Daily.   Taking    furosemide (LASIX) 40 MG tablet Take 0.5 tablets by mouth Daily.   2024    guaiFENesin (MUCINEX) 600 MG 12 hr tablet Take 1 tablet by mouth Every 12 (Twelve) Hours.   2024    latanoprost (XALATAN) 0.005 % ophthalmic solution Administer 1 drop to both eyes Every Night. Indications: Wide-Angle Glaucoma   2024    Lutein 20 MG tablet Take 1 tablet by mouth Daily.   Taking    metoclopramide (REGLAN) 5 MG tablet Take 1 tablet by mouth 4 (Four) Times a Day Before Meals & at Bedtime. Indications: Gastroesophageal Reflux Disease 120 tablet 1 2024    midodrine (PROAMATINE) 2.5 MG tablet Take 1 tablet by mouth 2 (Two) Times a Day. Indications: Disorder of Low Blood Pressure 180 tablet 3 2024    mirtazapine (REMERON) 15 MG tablet Take 1 tablet by mouth Every Night. Indications: Appetite Stimulant   2024    potassium chloride (KLOR-CON M20) 20 MEQ CR tablet Take 1 tablet by mouth Daily. Indications: Low Amount of Potassium in the Blood 90 tablet 3 2024     Probiotic Product (PROBIOTIC BLEND PO) Take 1 capsule by mouth Daily. Indications: probiotic   12/25/2024    sennosides-docusate (senna-docusate sodium) 8.6-50 MG per tablet Take 1 tablet by mouth 2 (Two) Times a Day. Indications: Constipation   12/25/2024    Vitamin A 3 MG (49361 UT) capsule Take 1 capsule by mouth Daily.   Taking    HYDROcodone-acetaminophen (NORCO) 5-325 MG per tablet Take 0.5 tablets by mouth Every 4 (Four) Hours As Needed.       metoprolol succinate XL (TOPROL-XL) 25 MG 24 hr tablet Take 0.5 tablets by mouth 2 (Two) Times a Day As Needed.       morphine 20 MG/ML concentrated solution 20mg/ml Take 0.1 mL by mouth As Needed.        Allergies:  Penicillins, Codeine, and Latex    Objective     Vital Signs  Temp:  [97.8 °F (36.6 °C)-98 °F (36.7 °C)] 98 °F (36.7 °C)  Heart Rate:  [103-145] 123  Resp:  [15-24] 24  BP: ()/(59-91) 118/75     Physical Exam:      General Appearance:     Frail, malnourished female   Head:    Normocephalic, without obvious abnormality, atraumatic   Eyes:            Lids and lashes normal, conjunctivae and sclerae normal, no   icterus, no pallor, corneas clear, PERRLA   Ears:    Ears appear intact with no abnormalities noted   Throat:   No oral lesions, no thrush, oral mucosa dry   Neck:   No adenopathy, supple, trachea midline, no thyromegaly, no   carotid bruit, no JVD   Lungs:     Diminished throughout,respirations regular, even and                  unlabored, on 4L oxygen    Heart:    Irregular rhythm, rapid rate normal S1 and S2,            murmur present, no gallop, no rub, no click        Abdomen:     Normal bowel sounds, no masses, no organomegaly, soft        non-tender, non-distended, no guarding, no rebound                tenderness   Extremities:   Fingers and toes dusky, cap refill 3 sec. Left leg shorted and externally rotated     Pulses:   Pulses palpable and equal bilaterally   Skin:   Scattered bruises   Lymph nodes:   No palpable adenopathy    Neurologic:    Eyes open, responsive to stimulation, speech unintelligible       Results Review:     Imaging Results (Last 24 Hours)       Procedure Component Value Units Date/Time    CT Head Without Contrast [[id number]] Collected: 12/25/24 1432     Updated: 12/25/24 1441    Narrative:      CT HEAD WO CONTRAST, CT CERVICAL SPINE WO CONTRAST    Date of Exam: 12/25/2024 2:27 PM EST    Indication: fall.    Comparison: None available.    Technique: Axial CT images were obtained of the head and cervical spine without contrast administration.  Coronal reconstructions were performed.  Automated exposure control and iterative reconstruction methods were used.    Findings: No intracranial hemorrhage. Gray-white matter differentiation is maintained without evidence of an acute infarction. Multiple foci of decreased attenuation are present within the subcortical, deep cerebral, and periventricular white matter   consistent with chronic small vessel/microangiopathic ischemic changes. No extra-axial mass or collection. The ventricles and sulci are prominent commensurate with involutional changes. The posterior fossa appears grossly normal. Sellar and suprasellar   structures are normal.    Lens replacements. Mucosal thickening with trace in the left sphenoid sinus. Partial opacification of the ethmoid air cells. Opacification of the left mastoid air cells suggesting mastoiditis.. The bony calvarium is intact.    The skull base is intact. Cervical vertebral body heights are maintained. Trace grade 1 anterolisthesis of C4 on C5. Multilevel disc space narrowing. C1 and the odontoid process are intact. The spinous and transverse processes are intact. Multilevel   facet disease. The visualized superior ribs are intact. Moderate left pleural effusion. Trace right pleural effusion. The thyroid gland is normal. No cervical adenopathy. The esophagus is patulous within this fluid level. Achalasia not excluded.   Partially visualized  questionable left posterior mediastinal mass posterior to the left mainstem bronchus. Severe loss of height of T7. Severe loss of height of T6 and T5 which appears chronic      Impression:      Impression:  1. No acute intracranial pathology.  2. No cervical spine fracture.  3. Partially visualized moderate left pleural effusion.  4. Possible achalasia.  5. Questionable left posterior mediastinal mass posterior to the left mainstem bronchus. CT of the chest with contrast recommended.          Electronically Signed: Orestes Ojeda MD    12/25/2024 2:39 PM EST    Workstation ID: [id number]    CT Cervical Spine Without Contrast [[id number]] Collected: 12/25/24 1432     Updated: 12/25/24 1441    Narrative:      CT HEAD WO CONTRAST, CT CERVICAL SPINE WO CONTRAST    Date of Exam: 12/25/2024 2:27 PM EST    Indication: fall.    Comparison: None available.    Technique: Axial CT images were obtained of the head and cervical spine without contrast administration.  Coronal reconstructions were performed.  Automated exposure control and iterative reconstruction methods were used.    Findings: No intracranial hemorrhage. Gray-white matter differentiation is maintained without evidence of an acute infarction. Multiple foci of decreased attenuation are present within the subcortical, deep cerebral, and periventricular white matter   consistent with chronic small vessel/microangiopathic ischemic changes. No extra-axial mass or collection. The ventricles and sulci are prominent commensurate with involutional changes. The posterior fossa appears grossly normal. Sellar and suprasellar   structures are normal.    Lens replacements. Mucosal thickening with trace in the left sphenoid sinus. Partial opacification of the ethmoid air cells. Opacification of the left mastoid air cells suggesting mastoiditis.. The bony calvarium is intact.    The skull base is intact. Cervical vertebral body heights are maintained. Trace grade 1 anterolisthesis of  C4 on C5. Multilevel disc space narrowing. C1 and the odontoid process are intact. The spinous and transverse processes are intact. Multilevel   facet disease. The visualized superior ribs are intact. Moderate left pleural effusion. Trace right pleural effusion. The thyroid gland is normal. No cervical adenopathy. The esophagus is patulous within this fluid level. Achalasia not excluded.   Partially visualized questionable left posterior mediastinal mass posterior to the left mainstem bronchus. Severe loss of height of T7. Severe loss of height of T6 and T5 which appears chronic      Impression:      Impression:  1. No acute intracranial pathology.  2. No cervical spine fracture.  3. Partially visualized moderate left pleural effusion.  4. Possible achalasia.  5. Questionable left posterior mediastinal mass posterior to the left mainstem bronchus. CT of the chest with contrast recommended.          Electronically Signed: Orestes Ojeda MD    12/25/2024 2:39 PM EST    Workstation ID: [id number]    XR Hip With or Without Pelvis 2 - 3 View Left [[id number]] Collected: 12/25/24 1423     Updated: 12/25/24 1427    Narrative:      XR HIP W OR WO PELVIS 2-3 VIEW LEFT    Date of Exam: 12/25/2024 2:02 PM EST    Indication: fall, L hip pain    Comparison: CT abdomen and pelvis dated 3/1/2024    Findings:  There is an acute transversely oriented fracture through the left femoral neck with superior migration of the distal fracture fragment. The femoral head appears in normal alignment within the acetabulum without dislocation. The SI joints and pubic   symphysis appear normally aligned. There is moderate rectal stool burden.      Impression:      Impression:  Acute transversely oriented fracture through the left femoral neck with superior migration of the distal fracture fragment.        Electronically Signed: El Rosenthal    12/25/2024 2:25 PM EST    Workstation ID: [id number]             Lab Results (last 24 hours)       Procedure  Component Value Units Date/Time    Protime-INR [[id number]]  (Abnormal) Collected: 12/25/24 2245    Specimen: Blood Updated: 12/25/24 2318     Protime 18.1 Seconds      INR 1.50    Basic Metabolic Panel [[id number]]  (Abnormal) Collected: 12/25/24 2245    Specimen: Blood Updated: 12/25/24 2315     Glucose 126 mg/dL      BUN 20 mg/dL      Creatinine 0.58 mg/dL      Sodium 146 mmol/L      Potassium 4.2 mmol/L      Chloride 105 mmol/L      CO2 35.6 mmol/L      Calcium 9.0 mg/dL      BUN/Creatinine Ratio 34.5     Anion Gap 5.4 mmol/L      eGFR 82.9 mL/min/1.73     Narrative:      GFR Categories in Chronic Kidney Disease (CKD)      GFR Category          GFR (mL/min/1.73)    Interpretation  G1                     90 or greater         Normal or high (1)  G2                      60-89                Mild decrease (1)  G3a                   45-59                Mild to moderate decrease  G3b                   30-44                Moderate to severe decrease  G4                    15-29                Severe decrease  G5                    14 or less           Kidney failure          (1)In the absence of evidence of kidney disease, neither GFR category G1 or G2 fulfill the criteria for CKD.    eGFR calculation 2021 CKD-EPI creatinine equation, which does not include race as a factor    [facility] PCR - Swab, Axilla Right, Axilla Left and Groin [[id number]] Collected: 12/25/24 2245    Specimen: Swab from Axilla Right, Axilla Left and Groin Updated: 12/25/24 2258    CBC & Differential [[id number]]  (Abnormal) Collected: 12/25/24 2245    Specimen: Blood Updated: 12/25/24 2258    Narrative:      The following orders were created for panel order CBC & Differential.  Procedure                               Abnormality         Status                     ---------                               -----------         ------                     CBC Auto Differential[[id number]]        Abnormal            Final result               Scan  Slide[[id number]]                                                                    Please view results for these tests on the individual orders.    CBC Auto Differential [[id number]]  (Abnormal) Collected: 12/25/24 2245    Specimen: Blood Updated: 12/25/24 2258     WBC 22.54 10*3/mm3      RBC 2.98 10*6/mm3      Hemoglobin 10.0 g/dL      Hematocrit 31.8 %      .7 fL      MCH 33.6 pg      MCHC 31.4 g/dL      RDW 16.2 %      RDW-SD 63.6 fl      MPV 11.1 fL      Platelets 295 10*3/mm3      Neutrophil % 82.4 %      Lymphocyte % 5.1 %      Monocyte % 6.9 %      Eosinophil % 0.1 %      Basophil % 0.8 %      Immature Grans % 4.7 %      Neutrophils, Absolute 18.58 10*3/mm3      Lymphocytes, Absolute 1.15 10*3/mm3      Monocytes, Absolute 1.55 10*3/mm3      Eosinophils, Absolute 0.02 10*3/mm3      Basophils, Absolute 0.17 10*3/mm3      Immature Grans, Absolute 1.07 10*3/mm3      nRBC 0.4 /100 WBC     Comprehensive Metabolic Panel [[id number]]  (Abnormal) Collected: 12/25/24 1532    Specimen: Blood Updated: 12/25/24 1607     Glucose 111 mg/dL      BUN 20 mg/dL      Creatinine 0.54 mg/dL      Sodium 145 mmol/L      Potassium 3.8 mmol/L      Comment: Slight hemolysis detected by analyzer. Result may be falsely elevated.        Chloride 103 mmol/L      CO2 31.7 mmol/L      Calcium 9.4 mg/dL      Total Protein 5.9 g/dL      Albumin 3.5 g/dL      ALT (SGPT) 14 U/L      AST (SGOT) 26 U/L      Alkaline Phosphatase 201 U/L      Total Bilirubin 1.1 mg/dL      Globulin 2.4 gm/dL      A/G Ratio 1.5 g/dL      BUN/Creatinine Ratio 37.0     Anion Gap 10.3 mmol/L      eGFR 84.4 mL/min/1.73     Narrative:      GFR Categories in Chronic Kidney Disease (CKD)      GFR Category          GFR (mL/min/1.73)    Interpretation  G1                     90 or greater         Normal or high (1)  G2                      60-89                Mild decrease (1)  G3a                   45-59                Mild to moderate decrease  G3b                    30-44                Moderate to severe decrease  G4                    15-29                Severe decrease  G5                    14 or less           Kidney failure          (1)In the absence of evidence of kidney disease, neither GFR category G1 or G2 fulfill the criteria for CKD.    eGFR calculation 2021 CKD-EPI creatinine equation, which does not include race as a factor    CBC & Differential [[id number]]  (Abnormal) Collected: 12/25/24 1532    Specimen: Blood Updated: 12/25/24 1603    Narrative:      The following orders were created for panel order CBC & Differential.  Procedure                               Abnormality         Status                     ---------                               -----------         ------                     CBC Auto Differential[[id number]]        Abnormal            Final result               Scan Slide[[id number]]                                       Final result                 Please view results for these tests on the individual orders.    CBC Auto Differential [[id number]]  (Abnormal) Collected: 12/25/24 1532    Specimen: Blood Updated: 12/25/24 1603     WBC 22.06 10*3/mm3      RBC 3.05 10*6/mm3      Hemoglobin 10.1 g/dL      Hematocrit 32.2 %      .6 fL      MCH 33.1 pg      MCHC 31.4 g/dL      RDW 16.3 %      RDW-SD 62.8 fl      MPV 11.2 fL      Platelets 266 10*3/mm3     Narrative:      The previously reported component NRBC is no longer being reported. Previous result was 0.4 /100 WBC (Reference Range: 0.0-0.2 /100 WBC) on 12/25/2024 at 1539 EST.    Scan Slide [[id number]] Collected: 12/25/24 1532    Specimen: Blood Updated: 12/25/24 1603     Scan Slide --     Comment: See Manual Differential Results       Manual Differential [[id number]]  (Abnormal) Collected: 12/25/24 1532    Specimen: Blood Updated: 12/25/24 1603     Neutrophil % 82.0 %      Lymphocyte % 1.0 %      Monocyte % 5.0 %      Basophil % 1.0 %      Bands %  9.0 %      Metamyelocyte % 2.0 %       Neutrophils Absolute 20.07 10*3/mm3      Lymphocytes Absolute 0.22 10*3/mm3      Monocytes Absolute 1.10 10*3/mm3      Basophils Absolute 0.22 10*3/mm3      Anisocytosis Slight/1+     Macrocytes Slight/1+     Ovalocytes Slight/1+     Toxic Granulation Slight/1+     Vacuolated Neutrophils Slight/1+     Large Platelets Slight/1+    Lipase [[id number]]  (Normal) Collected: 12/25/24 1532    Specimen: Blood Updated: 12/25/24 6039     Lipase 21 U/L              I reviewed the patient's new clinical results.    Assessment & Plan       Closed left hip fracture   -consult Ortho   -NPO for possible surgical intervention    Leukocytosis   -check Respiratory Panel and UA   -start NS at 50ml/hr     Urinary Retention   -place Jade Catheter    Atrial fibrillation with RVR   -IV dig x1   -IV lopressor 2.5mg x1   -telemetry   -appears to be off anticoagulation since beginning hospice care.    Pleural Effusion, Chronic    Possible Left posterior mediastinal mass posterior to the left mainstem bronchus   -CT of chest is recommended pending aggressiveness desired by family.    Malnutrition  Severe Mitral Regurg    VTE: SCD  PPI: pepcid  Code status: DNR      I discussed the patient's findings and my recommendations with patient.     Erica Gallego, APRN  12/26/24  00:04 EST

## 2024-12-26 NOTE — CONSULTS
Orthopaedic Surgery  Consult Note  Alireza Ortega MD    [phone]    HPI:  Patient is a 89 y.o.female who presents with left hip pain. She has extensive medical history with Afib, chronic pleural effusion, chronic respiratory failure on 3L oxygen at home, severe mitral regurg, CAD, CVA  Radiographs of the left hip demonstrate fracture of the left femoral neck. Patient unable to bear weight on the left lower extremity. Patient already under hospice care at home. Long discussion with daughter at bedside this AM and she wishes to pursue nonoperative/palliative treatment.     MEDICAL HISTORY  Past Medical History:   Diagnosis Date    Atrial fibrillation     Coronary artery disease     Atrial fibrillation    GERD (gastroesophageal reflux disease)     Glaucoma     Hiatal hernia with gastroesophageal reflux     History of osteoporotic pathological fracture     Right intertroch (7/2019)    Hx of compression fracture of spine     T12 and L1(2013); T6 (1/2022)    Hyperlipidemia     Hypertension     Osteoarthritis     Osteoporosis     on prolia(3/21/22)    Stroke     off and on dizziness from the stroke.     Past Surgical History:   Procedure Laterality Date    BACK SURGERY      kyphoplasty    EYE SURGERY      cataract surgery    FIXATION KYPHOPLASTY LUMBAR SPINE  2013    HIP TROCHANTERIC NAILING WITH INTRAMEDULLARY HIP SCREW Right 7/20/2019    Procedure: HIP TROCHANTERIC NAILING SHORT WITH INTRAMEDULLARY HIP SCREW;  Surgeon: Edward Centeno MD;  Location: [facility] MAIN OR;  Service: Orthopedics    RECTAL SURGERY      x 3     Prior to Admission medications    Medication Sig Start Date End Date Taking? Authorizing Provider   Alcohol Swabs (Pharmacist Choice Alcohol) pads 1 each 2 (Two) Times a Day. 5/4/22   Maite Mullins MD   amLODIPine (NORVASC) 5 MG tablet Take 1 tablet by mouth Daily. 2/21/24   Provider, MD Minh   atorvastatin (LIPITOR) 10 MG tablet TAKE 1 TABLET EVERY DAY 9/4/24   Maite Mullins MD    Cholecalciferol (VITAMIN D3) 5000 UNITS tablet Take 1 tablet by mouth Daily.    Minh Bass MD   DULoxetine (CYMBALTA) 60 MG capsule TAKE 1 CAPSULE EVERY DAY 1/15/24   Maite Mullins MD   EPINEPHrine (EPIPEN) 0.3 MG/0.3ML solution auto-injector injection 0.3 mL Daily As Needed. 16   Minh Bass MD   glucose blood (Pharmacist Choice Autocode) test strip 1 each by Other route 2 (Two) Times a Day. Use as instructed 23   Maite Mullins MD   Immune Globulin, Human, 40 GM/400ML solution 40 g Every 30 (Thirty) Days. 9/3/15   Minh Bass MD   metoprolol succinate XL (TOPROL-XL) 25 MG 24 hr tablet TAKE 1 TABLET EVERY DAY 24   Maite Mullins MD   olmesartan (BENICAR) 40 MG tablet Take 1 tablet by mouth Daily. 24   Maite Mullins MD   omeprazole (priLOSEC) 40 MG capsule TAKE 1 CAPSULE EVERY DAY 24   Maite Mullins MD   Pharmacist Choice Lancets misc Use 1 each 2 (Two) Times a Day. 23   Maite Mullins MD   Tirzepatide (Mounjaro) 15 MG/0.5ML solution pen-injector pen Inject 0.5 mL under the skin into the appropriate area as directed 1 (One) Time Per Week. 24   Maite Mullins MD   traMADol (ULTRAM) 50 MG tablet TAKE ONE TABLET BY MOUTH EVERY 8 HOURS AS NEEDED FOR MODERATE PAIN 23   Maite Mullins MD   Xarelto 20 MG tablet Take 1 tablet by mouth Daily. 23   Darnell Ramirez MD     Allergies   Allergen Reactions    Penicillins Hives    Codeine Nausea And Vomiting    Latex Rash     Most Recent Immunizations   Administered Date(s) Administered    Shingrix 2020    Tdap 2022     Social History     Tobacco Use    Smoking status: Former     Current packs/day: 0.00     Types: Cigarettes     Quit date: 1948     Years since quittin.0     Passive exposure: Past    Smokeless tobacco: Never   Substance Use Topics    Alcohol use: No      Social History     Substance and Sexual Activity   Drug Use No       VITALS: /64 (BP Location: Right arm, Patient Position: Lying)  "  Pulse 111   Temp 100.3 °F (37.9 °C) (Axillary)   Resp 16   Ht 157.5 cm (62\")   Wt 42.5 kg (93 lb 11.1 oz)   SpO2 91%   BMI 17.14 kg/m²  Body mass index is 17.14 kg/m².    PHYSICAL EXAM:   CONSTITUTIONAL: No acute distress  LUNGS: Equal chest rise, no shortness of air  CARDIOVASCULAR: palpable peripheral pulses  SKIN: no skin lesions in the area examined  LYMPH: no lymphadenopathy in the area examined  Musculoskeletal:   LLE  Skin intact  Leg resting in a position of comfort  Wiggles toes   Sensation is intact distally   Foot is wwp       RADIOLOGY REVIEW:   Radiographs reviewed of left hip demonstrate left femoral neck fracture   CT Head Without Contrast    Result Date: 12/25/2024  Impression: 1. No acute intracranial pathology. 2. No cervical spine fracture. 3. Partially visualized moderate left pleural effusion. 4. Possible achalasia. 5. Questionable left posterior mediastinal mass posterior to the left mainstem bronchus. CT of the chest with contrast recommended. Electronically Signed: Orestes Ojeda MD  12/25/2024 2:39 PM EST  Workstation ID: [id number]    CT Cervical Spine Without Contrast    Result Date: 12/25/2024  Impression: 1. No acute intracranial pathology. 2. No cervical spine fracture. 3. Partially visualized moderate left pleural effusion. 4. Possible achalasia. 5. Questionable left posterior mediastinal mass posterior to the left mainstem bronchus. CT of the chest with contrast recommended. Electronically Signed: Orestes Ojeda MD  12/25/2024 2:39 PM EST  Workstation ID: [id number]    XR Hip With or Without Pelvis 2 - 3 View Left    Result Date: 12/25/2024  Impression: Acute transversely oriented fracture through the left femoral neck with superior migration of the distal fracture fragment. Electronically Signed: El Rosenthal  12/25/2024 2:25 PM EST  Workstation ID: [id number]     LABS:   Results for the past 24 hours:   Recent Results (from the past 24 hours)   Comprehensive Metabolic Panel    " Collection Time: 12/25/24  3:32 PM    Specimen: Blood   Result Value Ref Range    Glucose 111 (H) 65 - 99 mg/dL    BUN 20 8 - 23 mg/dL    Creatinine 0.54 (L) 0.57 - 1.00 mg/dL    Sodium 145 136 - 145 mmol/L    Potassium 3.8 3.5 - 5.2 mmol/L    Chloride 103 98 - 107 mmol/L    CO2 31.7 (H) 22.0 - 29.0 mmol/L    Calcium 9.4 8.2 - 9.6 mg/dL    Total Protein 5.9 (L) 6.0 - 8.5 g/dL    Albumin 3.5 3.5 - 5.2 g/dL    ALT (SGPT) 14 1 - 33 U/L    AST (SGOT) 26 1 - 32 U/L    Alkaline Phosphatase 201 (H) 39 - 117 U/L    Total Bilirubin 1.1 0.0 - 1.2 mg/dL    Globulin 2.4 gm/dL    A/G Ratio 1.5 g/dL    BUN/Creatinine Ratio 37.0 (H) 7.0 - 25.0    Anion Gap 10.3 5.0 - 15.0 mmol/L    eGFR 84.4 >60.0 mL/min/1.73   Lipase    Collection Time: 12/25/24  3:32 PM    Specimen: Blood   Result Value Ref Range    Lipase 21 13 - 60 U/L   CBC Auto Differential    Collection Time: 12/25/24  3:32 PM    Specimen: Blood   Result Value Ref Range    WBC 22.06 (H) 3.40 - 10.80 10*3/mm3    RBC 3.05 (L) 3.77 - 5.28 10*6/mm3    Hemoglobin 10.1 (L) 12.0 - 15.9 g/dL    Hematocrit 32.2 (L) 34.0 - 46.6 %    .6 (H) 79.0 - 97.0 fL    MCH 33.1 (H) 26.6 - 33.0 pg    MCHC 31.4 (L) 31.5 - 35.7 g/dL    RDW 16.3 (H) 12.3 - 15.4 %    RDW-SD 62.8 (H) 37.0 - 54.0 fl    MPV 11.2 6.0 - 12.0 fL    Platelets 266 140 - 450 10*3/mm3   Scan Slide    Collection Time: 12/25/24  3:32 PM    Specimen: Blood   Result Value Ref Range    Scan Slide     Manual Differential    Collection Time: 12/25/24  3:32 PM    Specimen: Blood   Result Value Ref Range    Neutrophil % 82.0 (H) 42.7 - 76.0 %    Lymphocyte % 1.0 (L) 19.6 - 45.3 %    Monocyte % 5.0 5.0 - 12.0 %    Basophil % 1.0 0.0 - 1.5 %    Bands %  9.0 (H) 0.0 - 5.0 %    Metamyelocyte % 2.0 (H) 0.0 - 0.0 %    Neutrophils Absolute 20.07 (H) 1.70 - 7.00 10*3/mm3    Lymphocytes Absolute 0.22 (L) 0.70 - 3.10 10*3/mm3    Monocytes Absolute 1.10 (H) 0.10 - 0.90 10*3/mm3    Basophils Absolute 0.22 (H) 0.00 - 0.20 10*3/mm3     Anisocytosis Slight/1+ None Seen    Macrocytes Slight/1+ None Seen    Ovalocytes Slight/1+ None Seen    Toxic Granulation Slight/1+ None Seen    Vacuolated Neutrophils Slight/1+ None Seen    Large Platelets Slight/1+ None Seen   Basic Metabolic Panel    Collection Time: 12/25/24 10:45 PM    Specimen: Blood   Result Value Ref Range    Glucose 126 (H) 65 - 99 mg/dL    BUN 20 8 - 23 mg/dL    Creatinine 0.58 0.57 - 1.00 mg/dL    Sodium 146 (H) 136 - 145 mmol/L    Potassium 4.2 3.5 - 5.2 mmol/L    Chloride 105 98 - 107 mmol/L    CO2 35.6 (H) 22.0 - 29.0 mmol/L    Calcium 9.0 8.2 - 9.6 mg/dL    BUN/Creatinine Ratio 34.5 (H) 7.0 - 25.0    Anion Gap 5.4 5.0 - 15.0 mmol/L    eGFR 82.9 >60.0 mL/min/1.73   Protime-INR    Collection Time: 12/25/24 10:45 PM    Specimen: Blood   Result Value Ref Range    Protime 18.1 (H) 11.7 - 14.2 Seconds    INR 1.50 (H) 0.90 - 1.10   Type & Screen    Collection Time: 12/25/24 10:45 PM    Specimen: Blood   Result Value Ref Range    ABO Type A     RH type Positive     Antibody Screen Negative     T&S Expiration Date 12/28/2024 11:59:59 PM    CBC Auto Differential    Collection Time: 12/25/24 10:45 PM    Specimen: Blood   Result Value Ref Range    WBC 22.54 (H) 3.40 - 10.80 10*3/mm3    RBC 2.98 (L) 3.77 - 5.28 10*6/mm3    Hemoglobin 10.0 (L) 12.0 - 15.9 g/dL    Hematocrit 31.8 (L) 34.0 - 46.6 %    .7 (H) 79.0 - 97.0 fL    MCH 33.6 (H) 26.6 - 33.0 pg    MCHC 31.4 (L) 31.5 - 35.7 g/dL    RDW 16.2 (H) 12.3 - 15.4 %    RDW-SD 63.6 (H) 37.0 - 54.0 fl    MPV 11.1 6.0 - 12.0 fL    Platelets 295 140 - 450 10*3/mm3    Neutrophil % 82.4 (H) 42.7 - 76.0 %    Lymphocyte % 5.1 (L) 19.6 - 45.3 %    Monocyte % 6.9 5.0 - 12.0 %    Eosinophil % 0.1 (L) 0.3 - 6.2 %    Basophil % 0.8 0.0 - 1.5 %    Immature Grans % 4.7 (H) 0.0 - 0.5 %    Neutrophils, Absolute 18.58 (H) 1.70 - 7.00 10*3/mm3    Lymphocytes, Absolute 1.15 0.70 - 3.10 10*3/mm3    Monocytes, Absolute 1.55 (H) 0.10 - 0.90 10*3/mm3    Eosinophils,  Absolute 0.02 0.00 - 0.40 10*3/mm3    Basophils, Absolute 0.17 0.00 - 0.20 10*3/mm3    Immature Grans, Absolute 1.07 (H) 0.00 - 0.05 10*3/mm3    nRBC 0.4 (H) 0.0 - 0.2 /100 WBC   Respiratory Panel PCR w/COVID-19(SARS-CoV-2) [facility]/FABI/MISTY/PAD/COR/[facility]IS[facility] In-House, NP Swab in UTM/VTM, 2 HR TAT - Swab, Nasopharynx    Collection Time: 12/26/24 12:19 AM    Specimen: Nasopharynx; Swab   Result Value Ref Range    ADENOVIRUS, PCR Not Detected Not Detected    Coronavirus 229E Not Detected Not Detected    Coronavirus HKU1 Not Detected Not Detected    Coronavirus NL63 Not Detected Not Detected    Coronavirus OC43 Detected (A) Not Detected    COVID19 Not Detected Not Detected - Ref. Range    Human Metapneumovirus Not Detected Not Detected    Human Rhinovirus/Enterovirus Not Detected Not Detected    Influenza A PCR Not Detected Not Detected    Influenza B PCR Not Detected Not Detected    Parainfluenza Virus 1 Not Detected Not Detected    Parainfluenza Virus 2 Not Detected Not Detected    Parainfluenza Virus 3 Not Detected Not Detected    Parainfluenza Virus 4 Not Detected Not Detected    RSV, PCR Not Detected Not Detected    Bordetella pertussis pcr Not Detected Not Detected    Bordetella parapertussis PCR Not Detected Not Detected    Chlamydophila pneumoniae PCR Not Detected Not Detected    Mycoplasma pneumo by PCR Not Detected Not Detected   Urinalysis With Culture If Indicated - Urine, Clean Catch    Collection Time: 12/26/24  1:32 AM    Specimen: Urine, Clean Catch   Result Value Ref Range    Color, UA Dark Yellow (A) Yellow, Straw    Appearance, UA Clear Clear    pH, UA <=5.0 5.0 - 8.0    Specific Gravity, UA 1.018 1.005 - 1.030    Glucose, UA Negative Negative    Ketones, UA Trace (A) Negative    Bilirubin, UA Negative Negative    Blood, UA Negative Negative    Protein, UA Trace (A) Negative    Leuk Esterase, UA Trace (A) Negative    Nitrite, UA Negative Negative    Urobilinogen, UA 1.0 E.U./dL 0.2 - 1.0 E.U./dL   Urinalysis,  Microscopic Only - Urine, Clean Catch    Collection Time: 12/26/24  1:32 AM    Specimen: Urine, Clean Catch   Result Value Ref Range    RBC, UA None Seen None Seen, 0-2 /HPF    WBC, UA 0-2 None Seen, 0-2 /HPF    Bacteria, UA None Seen None Seen /HPF    Squamous Epithelial Cells, UA None Seen None Seen, 0-2 /HPF    Hyaline Casts, UA 21-30 None Seen /LPF    Calcium Oxalate Crystals, UA Small/1+ None Seen /HPF    Mucus, UA Trace None Seen, Trace /HPF    Methodology Manual Light Microscopy        IMPRESSION:  Patient is a 89 y.o. female with left femoral neck fracture     PLAN:   Admited to: Erin Morrison MD  [facility]   Okay for diet  After long discussion with daughter we came to the conclusion the risk of anesthesia and potential morbidity from surgery out weigh the time that her daughter has with her without the risk that she would be intubated and inability to become extubated post surgery. She would like to continue palliative measures and provide comfort care. I discussed that we often do this for pain control alone but again we both agree that surgical intervention to do a left hip duane arthroplasty may be too much for her in her current condition.  I discussed with her that I am available for any questions or concerns and am available if she were to change her mind and pursue surgery with left hip hemiarthroplasty.       please call/chat  with any questions or concerns.    Alireza Ortega MD   [facility]   [phone] - [place] Office  [phone] - [place] Office

## 2024-12-27 VITALS
TEMPERATURE: 97.4 F | HEIGHT: 62 IN | HEART RATE: 148 BPM | OXYGEN SATURATION: 94 % | DIASTOLIC BLOOD PRESSURE: 74 MMHG | SYSTOLIC BLOOD PRESSURE: 114 MMHG | RESPIRATION RATE: 28 BRPM | BODY MASS INDEX: 17.24 KG/M2 | WEIGHT: 93.7 LBS

## 2024-12-27 LAB — C AURIS DNA SPEC QL NAA+NON-PROBE: NOT DETECTED

## 2024-12-27 PROCEDURE — 94761 N-INVAS EAR/PLS OXIMETRY MLT: CPT

## 2024-12-27 PROCEDURE — 94799 UNLISTED PULMONARY SVC/PX: CPT

## 2024-12-27 RX ORDER — ONDANSETRON 4 MG/1
4 TABLET, ORALLY DISINTEGRATING ORAL EVERY 6 HOURS PRN
Qty: 20 TABLET | Refills: 1 | Status: SHIPPED | OUTPATIENT
Start: 2024-12-27

## 2024-12-27 RX ORDER — LORAZEPAM 2 MG/ML
1 CONCENTRATE ORAL EVERY 4 HOURS PRN
Qty: 30 ML | Refills: 1 | Status: SHIPPED | OUTPATIENT
Start: 2024-12-27

## 2024-12-27 RX ORDER — MORPHINE SULFATE 20 MG/ML
5 SOLUTION ORAL
Qty: 30 ML | Refills: 0 | Status: SHIPPED | OUTPATIENT
Start: 2024-12-27

## 2024-12-27 RX ORDER — METOPROLOL TARTRATE 1 MG/ML
5 INJECTION, SOLUTION INTRAVENOUS ONCE
Status: COMPLETED | OUTPATIENT
Start: 2024-12-27 | End: 2024-12-27

## 2024-12-27 RX ORDER — ACETAMINOPHEN 325 MG/1
650 TABLET ORAL EVERY 4 HOURS PRN
Start: 2024-12-27

## 2024-12-27 RX ADMIN — MORPHINE SULFATE 5 MG: 100 SOLUTION ORAL at 12:08

## 2024-12-27 RX ADMIN — SENNOSIDES AND DOCUSATE SODIUM 2 TABLET: 50; 8.6 TABLET ORAL at 09:42

## 2024-12-27 RX ADMIN — METOCLOPRAMIDE 5 MG: 5 TABLET ORAL at 09:42

## 2024-12-27 RX ADMIN — MORPHINE SULFATE 5 MG: 100 SOLUTION ORAL at 03:13

## 2024-12-27 RX ADMIN — GUAIFENESIN 600 MG: 600 TABLET, EXTENDED RELEASE ORAL at 09:42

## 2024-12-27 RX ADMIN — MORPHINE SULFATE 5 MG: 100 SOLUTION ORAL at 06:11

## 2024-12-27 RX ADMIN — MORPHINE SULFATE 5 MG: 100 SOLUTION ORAL at 09:42

## 2024-12-27 RX ADMIN — FAMOTIDINE 20 MG: 20 TABLET, FILM COATED ORAL at 09:42

## 2024-12-27 RX ADMIN — MIDODRINE HYDROCHLORIDE 2.5 MG: 5 TABLET ORAL at 09:42

## 2024-12-27 RX ADMIN — MORPHINE SULFATE 5 MG: 100 SOLUTION ORAL at 16:01

## 2024-12-27 RX ADMIN — MORPHINE SULFATE 5 MG: 100 SOLUTION ORAL at 00:09

## 2024-12-27 RX ADMIN — Medication 10 ML: at 09:42

## 2024-12-27 RX ADMIN — METOCLOPRAMIDE 5 MG: 5 TABLET ORAL at 12:08

## 2024-12-27 RX ADMIN — METOPROLOL TARTRATE 5 MG: 1 INJECTION, SOLUTION INTRAVENOUS at 12:08

## 2024-12-27 NOTE — CASE MANAGEMENT/SOCIAL WORK
Continued Stay Note  [facility]     Patient Name: Shruthi Amin  MRN: [id number]  Today's Date: 12/27/2024    Admit Date: 12/25/2024    Plan: Home with daughter and [facility] today.  Will transport by [facility] stretcher.  Pending hospital bed delivery to home.   Discharge Plan       Row Name 12/27/24 1322       Plan    Plan Home with daughter and [facility] today.  Will transport by [facility] stretcher.  Pending hospital bed delivery to home.    Plan Comments Will dc home today with [facility].  Hospital bed to be delivered today                      Expected Discharge Date and Time       Expected Discharge Date Expected Discharge Time    Dec 27, 2024           Ekaterina Durant RN    S[facility] 1  [email or web address]  Office [phone]  Cell [phone]

## 2024-12-27 NOTE — CASE MANAGEMENT/SOCIAL WORK
[id number]]  Case Management Discharge Note      Final Note: HOME WITH [facility]    Provided Post Acute Provider List?: N/A  N/A Provider List Comment: denies need   was current with [facility] on admission and will go back with them at discharge    Selected Continued Care - Discharged on 12/27/2024 Admission date: 12/25/2024 - Discharge disposition: Hospice/Home                     Home Medical Care Coordination complete.      Service Provider Services Address Phone Fax Patient Preferred    [facility] [address] [address] [phone] --                    Transportation Services  Ambulance: [facility]    Final Discharge Disposition Code: 50 - home with hospice   Susan Gamino   Home Medication Instructions [id number]    NURSE  [facility]  [facility]   3/29/2018 1:45 PM Cristopher Mcneil MD [facility]         

## 2024-12-27 NOTE — PLAN OF CARE
Goal Outcome Evaluation:  Plan of Care Reviewed With: patient, family, durable power of         Progress: declining  Outcome Evaluation: Patient no l onger having surgery. Family and POA declined and are willing to go home with hospice and comfort care. Granddaughter (POA) at bedside throughout shift. [facility] liason was present today. Safety measures in place. Call light within reach. Plan of care ongoing.                   Patient discharging to home, with family and hospice. EMS to transport

## 2024-12-27 NOTE — CASE MANAGEMENT/SOCIAL WORK
"Physicians Statement of Medical Necessity for  Ambulance Transportation    GENERAL INFORMATION     Name: Shruthi Amin  YOB: 1928  Medicare #: [id number]   Transport Date: 12/27/2024 (Valid for round trips this date, or for scheduled repetitive trips for 60 days from the date signed below.)  Origin: [facility]   Destination: [facility]  Is the Patient's stay covered under Medicare Part A (PPS/DRG?)Yes  Closest appropriate facility? Yes  If this a hosp-hosp transfer? No  Is this a hospice patient? Yes, Is this transport related to patient's terminal illness? No     MEDICAL NECESSITY QUESTIONAIRE    Ambulance Transportation is medically necessary only if other means of transportation are contraindicated or would be potentially harmful to the patient.  To meet this requirement, the patient must be either \"bed confined\" or suffer from a condition such that transport by means other than an ambulance is contraindicated by the patient's condition.  The following questions must be answered by the healthcare professional signing below for this form to be valid:     1) Describe the MEDICAL CONDITION (physical and/or mental) of this patient AT THE TIME OF AMBULANCE TRANSPORT that requires the patient to be transported in an ambulance, and why transport by other means is contraindicated by the patient's condition: UNABLE TO AMBULATE, UNHEALED FX, OXYGEN    Past Medical History:   Diagnosis Date    Atrial fibrillation     Coronary artery disease     Atrial fibrillation    GERD (gastroesophageal reflux disease)     Glaucoma     Hiatal hernia with gastroesophageal reflux     History of osteoporotic pathological fracture     Right intertroch (7/2019)    Hx of compression fracture of spine     T12 and L1(2013); T6 (1/2022)    Hyperlipidemia     Hypertension     Osteoarthritis     Osteoporosis     on prolia(3/21/22)    Stroke     off and on dizziness from the stroke.    " "  Past Surgical History:   Procedure Laterality Date    BACK SURGERY      kyphoplasty    EYE SURGERY      cataract surgery    FIXATION KYPHOPLASTY LUMBAR SPINE  2013    HIP TROCHANTERIC NAILING WITH INTRAMEDULLARY HIP SCREW Right 7/20/2019    Procedure: HIP TROCHANTERIC NAILING SHORT WITH INTRAMEDULLARY HIP SCREW;  Surgeon: Edward Centeno MD;  Location: [facility] MAIN OR;  Service: Orthopedics    RECTAL SURGERY      x 3      2) Is this patient \"bed confined\" as defined below?Yes   To be \"bed confined\" the patient must satisfy all three of the following criteria:  (1) unable to get up from bed without assistance; AND (2) unable to ambulate;  AND (3) unable to sit in a chair or wheelchair.  3) Can this patient safely be transported by car or wheelchair van (I.e., may safely sit during transport, without an attendant or monitoring?)No   4. In addition to completing questions 1-3 above, please check any of the following conditions that apply*:          *Note: supporting documentation for any boxes checked must be maintained in the patient's medical records Non-healed fractures, Moderate/severe pain on movement, Requires oxygen - unable to self administer, and Unable to tolerate seated position for time needed to transport      SIGNATURE OF PHYSICIAN OR OTHER AUTHORIZED HEALTHCARE PROFESSIONAL    I certify that the above information is true and correct based on my evaluation of this patient, and represent that the patient requires transport by ambulance and that other forms of transport are contraindicated.  I understand that this information will be used by the Centers for Medicare and Medicaid Services (CMS) to support the determiniation of medical necessity for ambulance services, and I represent that I have personal knowledge of the patient's condition at the time of transport.    BB   If this box is checked, I also certify that the patient is physically or mentally incapable of signing the ambulance service's " claim form and that the institution with which I am affiliated has furnished care, services or assistance to the patient.  My signature below is made on behalf of the patient pursuant to 42 .36(b)(4). In accordance with 42 .37, the specific reason(s) that the patient is physically or mentally incapable of signing the claim for is as follows: NA    Signature of Physician or Healthcare Professional   ODALIS HAILE MD/GUILLAUME ANTONY RN CASE MANAGER Date/Time:   12/27/2024 1315     (For Scheduled repetitive transport, this form is not valid for transports performed more than 60 days after this date).                                                                                                                                            --------------------------------------------------------------------------------------------  Printed Name and Credentials of Physician or Authorized Healthcare Professional     *Form must be signed by patient's attending physician for scheduled, repetitive transports,.  For non-repetitive ambulance transports, if unable to obtain the signature of the attending physician, any of the following may sign (please select below):     Physician  Clinical Nurse Specialist  Registered Nurse     Physician Assistant  Discharge Planner  Licensed Practical Nurse     Nurse Practitioner X

## 2024-12-27 NOTE — PLAN OF CARE
Goal Outcome Evaluation:              Outcome Evaluation: Comfort measures in place for pt, scheduled pain meds Q3 hours and turning pt Q3 hours when giving pain meds per family's request. Pt's heart rate has been elevated, contacted NP and no further orders for heart rate control at this time, hopeful to go home with hospice care in am, call light in reach, family at bedside, plan of care ongoing

## 2024-12-27 NOTE — DISCHARGE SUMMARY
Date of Discharge:  12/27/2024    Discharge Diagnosis:   **Closed left hip fracture [S72.002A]   Nonrheumatic mitral valve regurgitation [I34.0]   Severe malnutrition [E43]   Hyperlipidemia [E78.5]   Atrial fibrillation [I48.91]   Osteoarthritis [M19.90]   Hypertension [I10]       Presenting Problem/History of Present Illness  Active Hospital Problems    Diagnosis  POA    **Closed left hip fracture [S72.002A]  Yes    Nonrheumatic mitral valve regurgitation [I34.0]  Yes    Severe malnutrition [E43]  Yes    Hyperlipidemia [E78.5]  Yes    Atrial fibrillation [I48.91]  Yes    Osteoarthritis [M19.90]  Yes    Hypertension [I10]  Yes      Resolved Hospital Problems   No resolved problems to display.          Hospital Course  Patient is a 89 y.o. female with multiple medical problems, followed by hospice at home, presented with left hip pain after a fall.  She was a very poor surgical candidate and family opted for comfort care.  She is comfortable on morphine concentrate 5 mg every 3 hours.  She will return home with the care of family and [facility].   She is DNR/DNI.      Procedures Performed    Procedure(s):  HIP HEMIARTHROPLASTY ANTERIOR  -------------------       Consults:   Consults       Date and Time Order Name Status Description    12/25/2024  3:47 PM Inpatient Orthopedic Surgery Consult Completed             Pertinent Test Results:    Lab Results (most recent)       Procedure Component Value Units Date/Time    CANDIDA AURIS PCR - Swab, Axilla Right, Axilla Left and Groin [[id number]]  (Normal) Collected: 12/25/24 2245    Specimen: Swab from Axilla Right, Axilla Left and Groin Updated: 12/27/24 1056     CANDIDA AURIS PCR Not Detected    Urinalysis, Microscopic Only - Urine, Clean Catch [[id number]] Collected: 12/26/24 0132    Specimen: Urine, Clean Catch Updated: 12/26/24 0157     RBC, UA None Seen /HPF      WBC, UA 0-2 /HPF      Comment: Urine culture not indicated.        Bacteria, UA None Seen /HPF      Squamous  Epithelial Cells, UA None Seen /HPF      Hyaline Casts, UA 21-30 /LPF      Calcium Oxalate Crystals, UA Small/1+ /HPF      Mucus, UA Trace /HPF      Methodology Manual Light Microscopy    Urinalysis With Culture If Indicated - Urine, Clean Catch [[id number]]  (Abnormal) Collected: 12/26/24 0132    Specimen: Urine, Clean Catch Updated: 12/26/24 0147     Color, UA Dark Yellow     Appearance, UA Clear     pH, UA <=5.0     Specific Gravity, UA 1.018     Glucose, UA Negative     Ketones, UA Trace     Bilirubin, UA Negative     Blood, UA Negative     Protein, UA Trace     Leuk Esterase, UA Trace     Nitrite, UA Negative     Urobilinogen, UA 1.0 E.U./dL    Narrative:      In absence of clinical symptoms, the presence of pyuria, bacteria, and/or nitrites on the urinalysis result does not correlate with infection.    Respiratory Panel PCR w/COVID-19(SARS-CoV-2) KEYONA/FABI/MISTY/PAD/COR/ALISSON In-House, NP Swab in UTM/VTM, 2 HR TAT - Swab, Nasopharynx [[id number]]  (Abnormal) Collected: 12/26/24 0019    Specimen: Swab from Nasopharynx Updated: 12/26/24 0117     ADENOVIRUS, PCR Not Detected     Coronavirus 229E Not Detected     Coronavirus HKU1 Not Detected     Coronavirus NL63 Not Detected     Coronavirus OC43 Detected     COVID19 Not Detected     Human Metapneumovirus Not Detected     Human Rhinovirus/Enterovirus Not Detected     Influenza A PCR Not Detected     Influenza B PCR Not Detected     Parainfluenza Virus 1 Not Detected     Parainfluenza Virus 2 Not Detected     Parainfluenza Virus 3 Not Detected     Parainfluenza Virus 4 Not Detected     RSV, PCR Not Detected     Bordetella pertussis pcr Not Detected     Bordetella parapertussis PCR Not Detected     Chlamydophila pneumoniae PCR Not Detected     Mycoplasma pneumo by PCR Not Detected    Narrative:      In the setting of a positive respiratory panel with a viral infection PLUS a negative procalcitonin without other underlying concern for bacterial infection, consider observing  off antibiotics or discontinuation of antibiotics and continue supportive care. If the respiratory panel is positive for atypical bacterial infection (Bordetella pertussis, Chlamydophila pneumoniae, or Mycoplasma pneumoniae), consider antibiotic de-escalation to target atypical bacterial infection.    Protime-INR [[id number]]  (Abnormal) Collected: 12/25/24 2245    Specimen: Blood Updated: 12/25/24 2318     Protime 18.1 Seconds      INR 1.50    Basic Metabolic Panel [[id number]]  (Abnormal) Collected: 12/25/24 2245    Specimen: Blood Updated: 12/25/24 2315     Glucose 126 mg/dL      BUN 20 mg/dL      Creatinine 0.58 mg/dL      Sodium 146 mmol/L      Potassium 4.2 mmol/L      Chloride 105 mmol/L      CO2 35.6 mmol/L      Calcium 9.0 mg/dL      BUN/Creatinine Ratio 34.5     Anion Gap 5.4 mmol/L      eGFR 82.9 mL/min/1.73     Narrative:      GFR Categories in Chronic Kidney Disease (CKD)      GFR Category          GFR (mL/min/1.73)    Interpretation  G1                     90 or greater         Normal or high (1)  G2                      60-89                Mild decrease (1)  G3a                   45-59                Mild to moderate decrease  G3b                   30-44                Moderate to severe decrease  G4                    15-29                Severe decrease  G5                    14 or less           Kidney failure          (1)In the absence of evidence of kidney disease, neither GFR category G1 or G2 fulfill the criteria for CKD.    eGFR calculation 2021 CKD-EPI creatinine equation, which does not include race as a factor    CBC & Differential [[id number]]  (Abnormal) Collected: 12/25/24 2245    Specimen: Blood Updated: 12/25/24 2258    Narrative:      The following orders were created for panel order CBC & Differential.  Procedure                               Abnormality         Status                     ---------                               -----------         ------                     CBC Auto  Differential[[id number]]        Abnormal            Final result               Scan Slide[[id number]]                                                                    Please view results for these tests on the individual orders.    CBC Auto Differential [[id number]]  (Abnormal) Collected: 12/25/24 2245    Specimen: Blood Updated: 12/25/24 2258     WBC 22.54 10*3/mm3      RBC 2.98 10*6/mm3      Hemoglobin 10.0 g/dL      Hematocrit 31.8 %      .7 fL      MCH 33.6 pg      MCHC 31.4 g/dL      RDW 16.2 %      RDW-SD 63.6 fl      MPV 11.1 fL      Platelets 295 10*3/mm3      Neutrophil % 82.4 %      Lymphocyte % 5.1 %      Monocyte % 6.9 %      Eosinophil % 0.1 %      Basophil % 0.8 %      Immature Grans % 4.7 %      Neutrophils, Absolute 18.58 10*3/mm3      Lymphocytes, Absolute 1.15 10*3/mm3      Monocytes, Absolute 1.55 10*3/mm3      Eosinophils, Absolute 0.02 10*3/mm3      Basophils, Absolute 0.17 10*3/mm3      Immature Grans, Absolute 1.07 10*3/mm3      nRBC 0.4 /100 WBC     Comprehensive Metabolic Panel [[id number]]  (Abnormal) Collected: 12/25/24 1532    Specimen: Blood Updated: 12/25/24 1607     Glucose 111 mg/dL      BUN 20 mg/dL      Creatinine 0.54 mg/dL      Sodium 145 mmol/L      Potassium 3.8 mmol/L      Comment: Slight hemolysis detected by analyzer. Result may be falsely elevated.        Chloride 103 mmol/L      CO2 31.7 mmol/L      Calcium 9.4 mg/dL      Total Protein 5.9 g/dL      Albumin 3.5 g/dL      ALT (SGPT) 14 U/L      AST (SGOT) 26 U/L      Alkaline Phosphatase 201 U/L      Total Bilirubin 1.1 mg/dL      Globulin 2.4 gm/dL      A/G Ratio 1.5 g/dL      BUN/Creatinine Ratio 37.0     Anion Gap 10.3 mmol/L      eGFR 84.4 mL/min/1.73     Narrative:      GFR Categories in Chronic Kidney Disease (CKD)      GFR Category          GFR (mL/min/1.73)    Interpretation  G1                     90 or greater         Normal or high (1)  G2                      60-89                Mild decrease (1)  G3a                    45-59                Mild to moderate decrease  G3b                   30-44                Moderate to severe decrease  G4                    15-29                Severe decrease  G5                    14 or less           Kidney failure          (1)In the absence of evidence of kidney disease, neither GFR category G1 or G2 fulfill the criteria for CKD.    eGFR calculation 2021 CKD-EPI creatinine equation, which does not include race as a factor    CBC & Differential [[id number]]  (Abnormal) Collected: 12/25/24 1532    Specimen: Blood Updated: 12/25/24 1603    Narrative:      The following orders were created for panel order CBC & Differential.  Procedure                               Abnormality         Status                     ---------                               -----------         ------                     CBC Auto Differential[[id number]]        Abnormal            Final result               Scan Slide[[id number]]                                       Final result                 Please view results for these tests on the individual orders.    CBC Auto Differential [[id number]]  (Abnormal) Collected: 12/25/24 1532    Specimen: Blood Updated: 12/25/24 1603     WBC 22.06 10*3/mm3      RBC 3.05 10*6/mm3      Hemoglobin 10.1 g/dL      Hematocrit 32.2 %      .6 fL      MCH 33.1 pg      MCHC 31.4 g/dL      RDW 16.3 %      RDW-SD 62.8 fl      MPV 11.2 fL      Platelets 266 10*3/mm3     Narrative:      The previously reported component NRBC is no longer being reported. Previous result was 0.4 /100 WBC (Reference Range: 0.0-0.2 /100 WBC) on 12/25/2024 at 1539 EST.    Scan Slide [[id number]] Collected: 12/25/24 1532    Specimen: Blood Updated: 12/25/24 1603     Scan Slide --     Comment: See Manual Differential Results       Manual Differential [[id number]]  (Abnormal) Collected: 12/25/24 1532    Specimen: Blood Updated: 12/25/24 1603     Neutrophil % 82.0 %      Lymphocyte % 1.0 %      Monocyte % 5.0  %      Basophil % 1.0 %      Bands %  9.0 %      Metamyelocyte % 2.0 %      Neutrophils Absolute 20.07 10*3/mm3      Lymphocytes Absolute 0.22 10*3/mm3      Monocytes Absolute 1.10 10*3/mm3      Basophils Absolute 0.22 10*3/mm3      Anisocytosis Slight/1+     Macrocytes Slight/1+     Ovalocytes Slight/1+     Toxic Granulation Slight/1+     Vacuolated Neutrophils Slight/1+     Large Platelets Slight/1+    Lipase [[id number]]  (Normal) Collected: 12/25/24 1532    Specimen: Blood Updated: 12/25/24 1559     Lipase 21 U/L              Results for orders placed during the hospital encounter of 02/23/24    Adult Transesophageal Echo (JEFF) W/ Cont if Necessary Per Protocol    Interpretation Summary    Left ventricular ejection fraction appears to be 56 - 60%.    Severe mitral valve regurgitation is present.    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    Clinical correlation required              Condition on Discharge:  Frail, poor    Vital Signs  Temp:  [97.4 °F (36.3 °C)-99.5 °F (37.5 °C)] 97.4 °F (36.3 °C)  Heart Rate:  [109-148] 148  Resp:  [15-28] 28  BP: (112-120)/(60-74) 114/74    Physical Exam:     General Appearance:    Alert, cooperative, comfortable, oriented to person only, cachectic   Head:    Normocephalic, without obvious abnormality, atraumatic   Eyes:            Lids and lashes normal, conjunctivae and sclerae normal, no   icterus, no pallor, corneas clear, PERRLA   Ears:    Ears appear intact with no abnormalities noted   Throat:   No oral lesions, no thrush, oral mucosa moist   Neck:   No adenopathy, supple, trachea midline, no thyromegaly, no   carotid bruit, no JVD   Lungs:     Clear to auscultation,respirations regular, even and                  unlabored    Heart:    Regular rhythm and normal rate, normal S1 and S2, no            murmur, no gallop, no rub, no click    Chest Wall:    No abnormalities observed   Abdomen:     Normal bowel sounds, no masses, no organomegaly, soft        non-tender, non-distended, no guarding, no rebound                tenderness   Extremities:  Left leg - shortened and externally rotated   Pulses:   Pulses palpable and equal bilaterally   Skin:   No bleeding, bruising or rash   Lymph nodes:   No palpable adenopathy   Neurologic:   Unable to accurately assess       Discharge Disposition  Hospice/Home    Discharge Medications     Discharge Medications        New Medications        Instructions Start Date   acetaminophen 325 MG tablet  Commonly known as: TYLENOL   650 mg, Oral, Every 4 Hours PRN      LORazepam 2 MG/ML concentrated solution  Commonly known as: LORazepam Intensol   1 mg, Oral, Every 4 Hours PRN      ondansetron ODT 4 MG disintegrating tablet  Commonly known as: ZOFRAN-ODT   4 mg, Oral, Every 6 Hours PRN             Changes to Medications        Instructions Start Date   morphine 20 MG/ML concentrated solution 20mg/ml  What changed:   how much to take  when to take this  reasons to take this   5 mg, Oral, Every 3 Hours             Continue These Medications        Instructions Start Date   guaiFENesin 600 MG 12 hr tablet  Commonly known as: MUCINEX   600 mg, Oral, Every 12 Hours Scheduled      latanoprost 0.005 % ophthalmic solution  Commonly known as: XALATAN   1 drop, Nightly      metoclopramide 5 MG tablet  Commonly known as: REGLAN   5 mg, Oral, 4 Times Daily Before Meals & Nightly      sennosides-docusate 8.6-50 MG per tablet  Commonly known as: PERICOLACE   1 tablet, 2 Times Daily             Stop These Medications      furosemide 40 MG tablet  Commonly known as: LASIX     HYDROcodone-acetaminophen 5-325 MG per tablet  Commonly known as: NORCO     Lutein 20 MG tablet     metoprolol succinate XL 25 MG 24 hr tablet  Commonly known as: TOPROL-XL     midodrine 2.5 MG tablet  Commonly known as: PROAMATINE     mirtazapine 15 MG  tablet  Commonly known as: REMERON     potassium chloride 20 MEQ CR tablet  Commonly known as: KLOR-CON M20     PROBIOTIC BLEND PO     Vitamin A 3 MG (14701 UT) capsule     Vitamin D-3 125 MCG (5000 UT) tablet              Discharge Diet: pleasure foods    Activity at Discharge: bedrest    Follow-up Appointments  No future appointments.      Test Results Pending at Discharge  Pending Results       None             Tricia Roman MD  12/27/24  12:49 EST    Time: Discharge 35 min was spent coordinating discharge with family, staff, hospice, .

## 2025-01-22 NOTE — PROGRESS NOTES
January 22, 2025     Patient: Rachel Lawton  YOB: 2008  Date of Visit: 1/22/2025      To Whom it May Concern:    Rachel Lawton is under my professional care. Rachel was seen in my office on 1/22/2025. Rachel may return to school on 1/23/2025 and may return to gym class or sports on 1/23/2025 .    If you have any questions or concerns, please don't hesitate to call.         Sincerely,          Venu Cheek MD        CC: No Recipients   "Pharmacy dosing service  Anticoagulant  Warfarin     Subjective:    Shruthi Amin is a 89 y.o.female being continued on warfarin for Atrial Fibrillation.    INR Goal: 2 - 3  Home medication?:  Warfarin 4mg Mon/Fri and 2mg warfarin all other days  Bridge Therapy Present?:  No  Interacting Medications Evaluation (New/Present/Discontinued): CTX (2/23-2/28; may cause INR increase)         Assessment/Plan:    INR therapeutic. Will continue home regimen of warfarin 4mg Mon/Fri and 2mg all other days.     Continue to monitor and adjust based on INR.         Date 2/23 2/24 2/25         INR 2.57 2.51 2.23         Dose Missed dose 2mg 2mg              Objective:  [Ht: 165.1 cm (65\"); Wt: 50.3 kg (111 lb); BMI: Body mass index is 18.47 kg/m².]    Lab Results   Component Value Date    ALBUMIN 4.0 02/24/2024     Lab Results   Component Value Date    INR 2.23 02/25/2024    INR 2.28 02/24/2024    INR 2.51 02/24/2024    PROTIME 22.9 02/25/2024    PROTIME 23.4 02/24/2024    PROTIME 25.6 02/24/2024     Lab Results   Component Value Date    HGB 13.0 02/25/2024    HGB 12.8 02/24/2024    HGB 12.0 02/23/2024     Lab Results   Component Value Date    HCT 39.3 02/25/2024    HCT 38.4 02/24/2024    HCT 36.8 02/23/2024       Aida Pavon, PharmD  02/25/24 14:20 EST       "

## (undated) DEVICE — BNDG ELAS CO-FLEX SLF ADHR 4IN5YD LF STRL

## (undated) DEVICE — CUFF SCD HEMOFORCE SEQ CALF STD MD

## (undated) DEVICE — GLV SURG TRIUMPH GREEN W/ALOE PF LTX 8.5 STRL

## (undated) DEVICE — PK PROC TURNOVER

## (undated) DEVICE — DRSNG GZ CURAD XEROFORM NONADHR OVERWRAP 5X9IN

## (undated) DEVICE — PK GAM NAIL 50

## (undated) DEVICE — PAD,ABDOMINAL,5"X9",STERILE,LF,1/PK: Brand: MEDLINE INDUSTRIES, INC.

## (undated) DEVICE — SOL IRRIG NACL 9PCT 1000ML BTL

## (undated) DEVICE — SUT VIC 0 CP1 27IN J267H

## (undated) DEVICE — GLV SURG TRIUMPH GREEN W/ALOE PF LTX 8 STRL

## (undated) DEVICE — SYR BLUNT/NDL 10ML 18G 1 1/2IN

## (undated) DEVICE — Device

## (undated) DEVICE — SUT MNCRYL 3/0 Y936H

## (undated) DEVICE — SPNG GZ AVANT 6PLY 4X4IN STRL PK/2

## (undated) DEVICE — SYR LUERLOK 20CC

## (undated) DEVICE — GLV SURG TRIUMPH LT PF LTX 8.5 STRL

## (undated) DEVICE — PAD PERI POST

## (undated) DEVICE — GUIDEPIN TEMP THRD 3.2X508MM DISP

## (undated) DEVICE — OCCLUSIVE GAUZE STRIP OVERWRAP,3% BISMUTH TRIBROMOPHENATE IN PETROLATUM BLEND: Brand: XEROFORM

## (undated) DEVICE — SUT VIC COAT 1 CP-1 27IN

## (undated) DEVICE — GLV SURG TRIUMPH ORTHO W/ALOE PF LTX 8 STRL

## (undated) DEVICE — SUT VIC 2/0 CT1 36IN

## (undated) DEVICE — SKIN AFFIX SURG ADHESIVE 72/CS 0.55ML: Brand: MEDLINE